# Patient Record
Sex: FEMALE | Race: BLACK OR AFRICAN AMERICAN | Employment: OTHER | URBAN - METROPOLITAN AREA
[De-identification: names, ages, dates, MRNs, and addresses within clinical notes are randomized per-mention and may not be internally consistent; named-entity substitution may affect disease eponyms.]

---

## 2022-03-14 ENCOUNTER — TELEMEDICINE (OUTPATIENT)
Dept: FAMILY MEDICINE CLINIC | Age: 77
End: 2022-03-14
Payer: MEDICARE

## 2022-03-14 DIAGNOSIS — E78.00 PURE HYPERCHOLESTEROLEMIA: ICD-10-CM

## 2022-03-14 DIAGNOSIS — M54.50 CHRONIC LOW BACK PAIN, UNSPECIFIED BACK PAIN LATERALITY, UNSPECIFIED WHETHER SCIATICA PRESENT: ICD-10-CM

## 2022-03-14 DIAGNOSIS — R19.7 DIARRHEA, UNSPECIFIED TYPE: Primary | ICD-10-CM

## 2022-03-14 DIAGNOSIS — I10 ESSENTIAL HYPERTENSION: ICD-10-CM

## 2022-03-14 DIAGNOSIS — G89.29 CHRONIC LOW BACK PAIN, UNSPECIFIED BACK PAIN LATERALITY, UNSPECIFIED WHETHER SCIATICA PRESENT: ICD-10-CM

## 2022-03-14 DIAGNOSIS — E11.42 TYPE 2 DIABETES MELLITUS WITH DIABETIC POLYNEUROPATHY, WITHOUT LONG-TERM CURRENT USE OF INSULIN (HCC): ICD-10-CM

## 2022-03-14 PROCEDURE — 99443 PR PHYS/QHP TELEPHONE EVALUATION 21-30 MIN: CPT | Performed by: FAMILY MEDICINE

## 2022-03-14 RX ORDER — GABAPENTIN 300 MG/1
600 CAPSULE ORAL NIGHTLY
Qty: 180 CAPSULE | Refills: 0 | Status: SHIPPED | OUTPATIENT
Start: 2022-03-14 | End: 2022-04-13 | Stop reason: SDUPTHER

## 2022-03-14 RX ORDER — GABAPENTIN 300 MG/1
300 CAPSULE ORAL NIGHTLY
COMMUNITY
End: 2022-03-14 | Stop reason: SDUPTHER

## 2022-03-14 RX ORDER — ATORVASTATIN CALCIUM 80 MG/1
80 TABLET, FILM COATED ORAL DAILY
COMMUNITY
End: 2022-04-13 | Stop reason: SDUPTHER

## 2022-03-14 ASSESSMENT — PATIENT HEALTH QUESTIONNAIRE - PHQ9
2. FEELING DOWN, DEPRESSED OR HOPELESS: 0
SUM OF ALL RESPONSES TO PHQ QUESTIONS 1-9: 0
1. LITTLE INTEREST OR PLEASURE IN DOING THINGS: 0
SUM OF ALL RESPONSES TO PHQ QUESTIONS 1-9: 0
SUM OF ALL RESPONSES TO PHQ QUESTIONS 1-9: 0
SUM OF ALL RESPONSES TO PHQ9 QUESTIONS 1 & 2: 0
SUM OF ALL RESPONSES TO PHQ QUESTIONS 1-9: 0

## 2022-03-14 ASSESSMENT — ENCOUNTER SYMPTOMS
DIARRHEA: 1
BLOOD IN STOOL: 0

## 2022-03-14 NOTE — PROGRESS NOTES
Subjective:      Patient ID: Karen Payan is a 64 y.o. female    Diarrhea   This is a chronic problem. The current episode started more than 1 year ago. The stool consistency is described as mucous. Pertinent negatives include no fever. Hyperlipidemia  This is a chronic problem. The current episode started more than 1 year ago. Pertinent negatives include no chest pain. Current antihyperlipidemic treatment includes statins. Diabetes  Pertinent negatives for diabetes include no chest pain and no weakness. Here to establish. Here with diarrhea x 2.5 months. Has been progressively getting worse over this time. No fever or chills. No emesis. Some cramping abdominal pain but no real abdominal pain. Hx of diabetes and neuropathy. Having more stools than usual -up to 5 times per day. Has been in [facility] for last 3 months-previously in [facility]. No recent antibiotics. Taken kaopectate without much improvement. Has been on current dose of glucophage for nearly a year    Review of Systems   Constitutional: Negative for fever. Cardiovascular: Negative for chest pain. Gastrointestinal: Positive for diarrhea. Negative for blood in stool. Neurological: Negative for weakness. Reviewed allergy, medical, social, surgical, family and med list changes and updated   Files  Karen Payan is a 64 y.o. female evaluated via telephone on 3/14/2022. Consent:  She and/or health care decision maker is aware that that she may receive a bill for this telephone service, depending on her insurance coverage, and has provided verbal consent to proceed: Yes      Documentation:  I communicated with the patient and/or health care decision maker about . Details of this discussion including any medical advice provided: This visit was a telephone encounter. Patient was located at their home. Provider was located at their ___ home or        __x_ office.        I affirm this is a Patient Initiated Episode with an Established Patient who has not had a related appointment within my department in the past 7 days or scheduled within the next 24 hours. Total Time: minutes: 21-30 minutes    Note: not billable if this call serves to triage the patient into an appointment for the relevant concern      Wilmer Puente MD    Social History     Socioeconomic History    Marital status:      Spouse name: Not on file    Number of children: Not on file    Years of education: Not on file    Highest education level: Not on file   Occupational History    Not on file   Tobacco Use    Smoking status: Former Smoker     Years: 24.00    Smokeless tobacco: Never Used   Substance and Sexual Activity    Alcohol use: Yes     Alcohol/week: 3.0 standard drinks     Types: 3 Glasses of wine per week    Drug use: No    Sexual activity: Not on file   Other Topics Concern    Not on file   Social History Narrative    Not on file     Social Determinants of Health     Financial Resource Strain:     Difficulty of Paying Living Expenses: Not on file   Food Insecurity:     Worried About Running Out of Food in the Last Year: Not on file    [place] of Food in the Last Year: Not on file   Transportation Needs:     Lack of Transportation (Medical): Not on file    Lack of Transportation (Non-Medical):  Not on file   Physical Activity:     Days of Exercise per Week: Not on file    Minutes of Exercise per Session: Not on file   Stress:     Feeling of Stress : Not on file   Social Connections:     Frequency of Communication with Friends and Family: Not on file    Frequency of Social Gatherings with Friends and Family: Not on file    Attends Yarsanism Services: Not on file    Active Member of Clubs or Organizations: Not on file    Attends Club or Organization Meetings: Not on file    Marital Status: Not on file   Intimate Partner Violence:     Fear of Current or Ex-Partner: Not on file    Emotionally Abused: Not on file    Physically Abused: Not on file    Sexually Abused: Not on file   Housing Stability:     Unable to Pay for Housing in the Last Year: Not on file    Number of Places Lived in the Last Year: Not on file    Unstable Housing in the Last Year: Not on file     Current Outpatient Medications   Medication Sig Dispense Refill    atorvastatin (LIPITOR) 80 MG tablet Take 80 mg by mouth daily      gabapentin (NEURONTIN) 300 MG capsule Take 300 mg by mouth at bedtime.  furosemide (LASIX) 20 MG tablet Take 1 tablet by mouth daily 30 tablet 3    aspirin 81 MG tablet Take 81 mg by mouth daily      isosorbide mononitrate (IMDUR) 30 MG CR tablet Take 30 mg by mouth daily      metFORMIN (GLUCOPHAGE) 500 MG tablet Take 500 mg by mouth 2 times daily (with meals)      pantoprazole (PROTONIX) 40 MG tablet Take 1 tablet by mouth daily 30 tablet 3    lisinopril (PRINIVIL;ZESTRIL) 20 MG tablet Take 20 mg by mouth daily (Patient not taking: Reported on 3/14/2022)      metoprolol (LOPRESSOR) 50 MG tablet Take 50 mg by mouth 2 times daily (Patient not taking: Reported on 3/14/2022)       No current facility-administered medications for this visit. Family History   Problem Relation Age of Onset    Cancer Mother     Arthritis Mother     Diabetes Mother     Heart Disease Mother     High Blood Pressure Mother     High Cholesterol Mother     Arthritis Father     Diabetes Father     Heart Disease Father     High Blood Pressure Father     High Cholesterol Father      Past Medical History:   Diagnosis Date    Cancer ([facility] [address]     Hyperlipidemia     Hypertension     Type II or unspecified type diabetes mellitus without mention of complication, not stated as uncontrolled    Controlled substances monitoring: no signs of potential drug abuse or diversion identified and OARRS report reviewed today- activity consistent with treatment plan. Objective: There were no vitals taken for this visit.     Physical Exam  No exam   Assessment: Diagnosis Orders   1. Diarrhea, unspecified type     2. Type 2 diabetes mellitus with diabetic polyneuropathy, without long-term current use of insulin (HCC)     3. Pure hypercholesterolemia     4. Essential hypertension     5. Chronic low back pain, unspecified back pain laterality, unspecified whether sciatica present           Plan:      Orders Placed This Encounter   Procedures    Gastrointestinal Panel by DNA     Standing Status:   Future     Standing Expiration Date:   3/14/2023    Clostridium Difficile Toxin/Antigen     Standing Status:   Future     Standing Expiration Date:   3/14/2023    Lipid Panel     Standing Status:   Future     Standing Expiration Date:   3/14/2023     Order Specific Question:   Is Patient Fasting?/# of Hours     Answer:   9    Comprehensive Metabolic Panel     Standing Status:   Future     Standing Expiration Date:   3/14/2023    CBC with Auto Differential     Standing Status:   Future     Standing Expiration Date:   9/14/2022    Hemoglobin A1C     Standing Status:   Future     Standing Expiration Date:   3/14/2023     Orders Placed This Encounter   Medications    gabapentin (NEURONTIN) 300 MG capsule     Sig: Take 2 capsules by mouth at bedtime for 90 days.      Dispense:  180 capsule     Refill:  0   fasting blood work and stool cultures this wee  Hold glucophage-imodium ad otc for now   F/u next week

## 2022-03-21 DIAGNOSIS — R19.7 DIARRHEA, UNSPECIFIED TYPE: ICD-10-CM

## 2022-03-22 LAB
ADENOVIRUS F 40 41 PCR: NOT DETECTED
ASTROVIRUS PCR: NOT DETECTED
C DIFF TOXIN/ANTIGEN: NORMAL
CAMPYLOBACTER PCR: NOT DETECTED
CRYPTOSPORIDIUM PCR: NOT DETECTED
CYCLOSPORA CAYETANENSIS PCR: NOT DETECTED
E COLI ENTEROAGGREGATIVE PCR: NOT DETECTED
E COLI ENTEROPATHOGENIC PCR: NOT DETECTED
E COLI ENTEROTOXIGENIC PCR: NOT DETECTED
E COLI SHIGELLA/ENTEROINVASIVE PCR: NOT DETECTED
ENTAMOEBA HISTOLYTICA PCR: NOT DETECTED
GIARDIA LAMBLIA PCR: NOT DETECTED
NOROVIRUS GI GII PCR: NOT DETECTED
PLESIOMONAS SHIGELLOIDES PCR: NOT DETECTED
ROTAVIRUS A PCR: NOT DETECTED
SALMONELLA PCR: NOT DETECTED
SAPOVIRUS PCR: NOT DETECTED
SHIGA-LIKE TOXIN-PRODUCING E. COLI (STEC) STX1/STX2: NOT DETECTED
VIBRIO CHOLERAE PCR: NOT DETECTED
VIBRIO PCR: NOT DETECTED
YERSINIA ENTEROCOLITICA PCR: NOT DETECTED

## 2022-03-28 ENCOUNTER — TELEMEDICINE (OUTPATIENT)
Dept: FAMILY MEDICINE CLINIC | Age: 77
End: 2022-03-28
Payer: MEDICARE

## 2022-03-28 DIAGNOSIS — R19.7 DIARRHEA, UNSPECIFIED TYPE: Primary | ICD-10-CM

## 2022-03-28 PROCEDURE — 99442 PR PHYS/QHP TELEPHONE EVALUATION 11-20 MIN: CPT | Performed by: FAMILY MEDICINE

## 2022-03-28 ASSESSMENT — ENCOUNTER SYMPTOMS: BLOOD IN STOOL: 0

## 2022-03-28 NOTE — PROGRESS NOTES
Subjective:      Patient ID: Brian Turner is a 64 y.o. female    HPI  Here in follow up for diarrhea which is 60 % better overall. Not testing sugars    Review of Systems   Constitutional: Negative for fever and unexpected weight change. Gastrointestinal: Negative for blood in stool. Reviewed allergy, medical, social, surgical, family and med list changes and updated   Files--reviewed stool studies which were neg      Social History     Socioeconomic History    Marital status:      Spouse name: Not on file    Number of children: Not on file    Years of education: Not on file    Highest education level: Not on file   Occupational History    Not on file   Tobacco Use    Smoking status: Former Smoker     Years: 24.00    Smokeless tobacco: Never Used   Substance and Sexual Activity    Alcohol use: Yes     Alcohol/week: 3.0 standard drinks     Types: 3 Glasses of wine per week    Drug use: No    Sexual activity: Not on file   Other Topics Concern    Not on file   Social History Narrative    Not on file     Social Determinants of Health     Financial Resource Strain:     Difficulty of Paying Living Expenses: Not on file   Food Insecurity:     Worried About Running Out of Food in the Last Year: Not on file    [place] of Food in the Last Year: Not on file   Transportation Needs:     Lack of Transportation (Medical): Not on file    Lack of Transportation (Non-Medical):  Not on file   Physical Activity:     Days of Exercise per Week: Not on file    Minutes of Exercise per Session: Not on file   Stress:     Feeling of Stress : Not on file   Social Connections:     Frequency of Communication with Friends and Family: Not on file    Frequency of Social Gatherings with Friends and Family: Not on file    Attends Christian Services: Not on file    Active Member of Clubs or Organizations: Not on file    Attends Club or Organization Meetings: Not on file    Marital Status: Not on file   Intimate Partner Violence:     Fear of Current or Ex-Partner: Not on file    Emotionally Abused: Not on file    Physically Abused: Not on file    Sexually Abused: Not on file   Housing Stability:     Unable to Pay for Housing in the Last Year: Not on file    Number of [place] in the Last Year: Not on file    Unstable Housing in the Last Year: Not on file     Current Outpatient Medications   Medication Sig Dispense Refill    atorvastatin (LIPITOR) 80 MG tablet Take 80 mg by mouth daily      gabapentin (NEURONTIN) 300 MG capsule Take 2 capsules by mouth at bedtime for 90 days. 180 capsule 0    furosemide (LASIX) 20 MG tablet Take 1 tablet by mouth daily 30 tablet 3    aspirin 81 MG tablet Take 81 mg by mouth daily      isosorbide mononitrate (IMDUR) 30 MG CR tablet Take 30 mg by mouth daily      lisinopril (PRINIVIL;ZESTRIL) 20 MG tablet Take 20 mg by mouth daily (Patient not taking: Reported on 3/14/2022)      metFORMIN (GLUCOPHAGE) 500 MG tablet Take 500 mg by mouth 2 times daily (with meals)      metoprolol (LOPRESSOR) 50 MG tablet Take 50 mg by mouth 2 times daily (Patient not taking: Reported on 3/14/2022)      pantoprazole (PROTONIX) 40 MG tablet Take 1 tablet by mouth daily 30 tablet 3     No current facility-administered medications for this visit. Family History   Problem Relation Age of Onset    Cancer Mother     Arthritis Mother     Diabetes Mother     Heart Disease Mother     High Blood Pressure Mother     High Cholesterol Mother     Arthritis Father     Diabetes Father     Heart Disease Father     High Blood Pressure Father     High Cholesterol Father      Past Medical History:   Diagnosis Date    Cancer ([facility] [address]     Hyperlipidemia     Hypertension     Type II or unspecified type diabetes mellitus without mention of complication, not stated as uncontrolled    Fredo Matt is a 64 y.o. female evaluated via telephone on 3/28/2022.       Consent:  She and/or health care decision maker is aware that that she may receive a bill for this telephone service, depending on her insurance coverage, and has provided verbal consent to proceed: Yes      Documentation:  I communicated with the patient and/or health care decision maker about   Details of this discussion including any medical advice provided: This visit was a telephone encounter. Patient was located at their home. Provider was located at their ___ home or        __x__ office. I affirm this is a Patient Initiated Episode with an Established Patient who has not had a related appointment within my department in the past 7 days or scheduled within the next 24 hours. Total Time: minutes: 11-20 minutes    Note: not billable if this call serves to triage the patient into an appointment for the relevant concern      Rell Hall MD   Objective: There were no vitals taken for this visit. Physical Exam  No exam done-  Assessment:       Diagnosis Orders   1.  Diarrhea, unspecified type           Plan:      Remain off glucophage  Fasting blood work soon and f/u in office after done

## 2022-03-29 DIAGNOSIS — E78.00 PURE HYPERCHOLESTEROLEMIA: ICD-10-CM

## 2022-03-29 DIAGNOSIS — I10 ESSENTIAL HYPERTENSION: ICD-10-CM

## 2022-03-29 DIAGNOSIS — E11.42 TYPE 2 DIABETES MELLITUS WITH DIABETIC POLYNEUROPATHY, WITHOUT LONG-TERM CURRENT USE OF INSULIN (HCC): ICD-10-CM

## 2022-03-29 LAB
ALBUMIN SERPL-MCNC: 4.2 G/DL (ref 3.5–4.6)
ALP BLD-CCNC: 62 U/L (ref 40–130)
ALT SERPL-CCNC: 27 U/L (ref 0–33)
ANION GAP SERPL CALCULATED.3IONS-SCNC: 12 MEQ/L (ref 9–15)
AST SERPL-CCNC: 29 U/L (ref 0–35)
BASOPHILS ABSOLUTE: 0 K/UL (ref 0–0.2)
BASOPHILS RELATIVE PERCENT: 0.4 %
BILIRUB SERPL-MCNC: 0.9 MG/DL (ref 0.2–0.7)
BUN BLDV-MCNC: 13 MG/DL (ref 8–23)
CALCIUM SERPL-MCNC: 8.7 MG/DL (ref 8.5–9.9)
CHLORIDE BLD-SCNC: 103 MEQ/L (ref 95–107)
CHOLESTEROL, TOTAL: 160 MG/DL (ref 0–199)
CO2: 24 MEQ/L (ref 20–31)
CREAT SERPL-MCNC: 1.04 MG/DL (ref 0.5–0.9)
EOSINOPHILS ABSOLUTE: 0.1 K/UL (ref 0–0.7)
EOSINOPHILS RELATIVE PERCENT: 2.2 %
GFR AFRICAN AMERICAN: >60
GFR NON-AFRICAN AMERICAN: 51.2
GLOBULIN: 2.2 G/DL (ref 2.3–3.5)
GLUCOSE BLD-MCNC: 105 MG/DL (ref 70–99)
HBA1C MFR BLD: 6.2 % (ref 4.8–5.9)
HCT VFR BLD CALC: 43.2 % (ref 37–47)
HDLC SERPL-MCNC: 39 MG/DL (ref 40–59)
HEMOGLOBIN: 14.2 G/DL (ref 12–16)
LDL CHOLESTEROL CALCULATED: 98 MG/DL (ref 0–129)
LYMPHOCYTES ABSOLUTE: 1.6 K/UL (ref 1–4.8)
LYMPHOCYTES RELATIVE PERCENT: 34.9 %
MCH RBC QN AUTO: 31.3 PG (ref 27–31.3)
MCHC RBC AUTO-ENTMCNC: 32.9 % (ref 33–37)
MCV RBC AUTO: 95.2 FL (ref 82–100)
MONOCYTES ABSOLUTE: 0.6 K/UL (ref 0.2–0.8)
MONOCYTES RELATIVE PERCENT: 12.4 %
NEUTROPHILS ABSOLUTE: 2.4 K/UL (ref 1.4–6.5)
NEUTROPHILS RELATIVE PERCENT: 50.1 %
PDW BLD-RTO: 16.7 % (ref 11.5–14.5)
PLATELET # BLD: 237 K/UL (ref 130–400)
POTASSIUM SERPL-SCNC: 3.5 MEQ/L (ref 3.4–4.9)
RBC # BLD: 4.53 M/UL (ref 4.2–5.4)
SODIUM BLD-SCNC: 139 MEQ/L (ref 135–144)
TOTAL PROTEIN: 6.4 G/DL (ref 6.3–8)
TRIGL SERPL-MCNC: 113 MG/DL (ref 0–150)
WBC # BLD: 4.7 K/UL (ref 4.8–10.8)

## 2022-03-31 ENCOUNTER — IMMUNIZATION (OUTPATIENT)
Dept: FAMILY MEDICINE CLINIC | Age: 77
End: 2022-03-31
Payer: MEDICARE

## 2022-03-31 PROCEDURE — 91306 COVID-19, MODERNA BOOSTER VACCINE 0.25ML DOSE: CPT | Performed by: FAMILY MEDICINE

## 2022-03-31 PROCEDURE — 0064A COVID-19, MODERNA BOOSTER VACCINE 0.25ML DOSE: CPT | Performed by: FAMILY MEDICINE

## 2022-04-13 ENCOUNTER — OFFICE VISIT (OUTPATIENT)
Dept: FAMILY MEDICINE CLINIC | Age: 77
End: 2022-04-13
Payer: MEDICARE

## 2022-04-13 VITALS
OXYGEN SATURATION: 95 % | HEIGHT: 61 IN | RESPIRATION RATE: 14 BRPM | WEIGHT: 182 LBS | SYSTOLIC BLOOD PRESSURE: 120 MMHG | TEMPERATURE: 97.3 F | BODY MASS INDEX: 34.36 KG/M2 | HEART RATE: 64 BPM | DIASTOLIC BLOOD PRESSURE: 82 MMHG

## 2022-04-13 DIAGNOSIS — E78.00 PURE HYPERCHOLESTEROLEMIA: ICD-10-CM

## 2022-04-13 DIAGNOSIS — E11.42 TYPE 2 DIABETES MELLITUS WITH DIABETIC POLYNEUROPATHY, WITHOUT LONG-TERM CURRENT USE OF INSULIN (HCC): Primary | ICD-10-CM

## 2022-04-13 DIAGNOSIS — I10 ESSENTIAL HYPERTENSION: ICD-10-CM

## 2022-04-13 DIAGNOSIS — R29.6 FREQUENT FALLS: ICD-10-CM

## 2022-04-13 DIAGNOSIS — R42 DIZZINESS: ICD-10-CM

## 2022-04-13 PROCEDURE — G8427 DOCREV CUR MEDS BY ELIG CLIN: HCPCS | Performed by: FAMILY MEDICINE

## 2022-04-13 PROCEDURE — G8400 PT W/DXA NO RESULTS DOC: HCPCS | Performed by: FAMILY MEDICINE

## 2022-04-13 PROCEDURE — G8417 CALC BMI ABV UP PARAM F/U: HCPCS | Performed by: FAMILY MEDICINE

## 2022-04-13 PROCEDURE — 1036F TOBACCO NON-USER: CPT | Performed by: FAMILY MEDICINE

## 2022-04-13 PROCEDURE — 1090F PRES/ABSN URINE INCON ASSESS: CPT | Performed by: FAMILY MEDICINE

## 2022-04-13 PROCEDURE — 4040F PNEUMOC VAC/ADMIN/RCVD: CPT | Performed by: FAMILY MEDICINE

## 2022-04-13 PROCEDURE — 3044F HG A1C LEVEL LT 7.0%: CPT | Performed by: FAMILY MEDICINE

## 2022-04-13 PROCEDURE — 99214 OFFICE O/P EST MOD 30 MIN: CPT | Performed by: FAMILY MEDICINE

## 2022-04-13 PROCEDURE — 1123F ACP DISCUSS/DSCN MKR DOCD: CPT | Performed by: FAMILY MEDICINE

## 2022-04-13 RX ORDER — PANTOPRAZOLE SODIUM 40 MG/1
40 TABLET, DELAYED RELEASE ORAL DAILY
Qty: 90 TABLET | Refills: 0 | Status: SHIPPED | OUTPATIENT
Start: 2022-04-13 | End: 2022-08-17

## 2022-04-13 RX ORDER — GABAPENTIN 300 MG/1
600 CAPSULE ORAL NIGHTLY
Qty: 180 CAPSULE | Refills: 0 | Status: SHIPPED | OUTPATIENT
Start: 2022-04-13 | End: 2022-07-12

## 2022-04-13 RX ORDER — METOPROLOL TARTRATE 50 MG/1
50 TABLET, FILM COATED ORAL 2 TIMES DAILY
Qty: 180 TABLET | Refills: 0 | Status: SHIPPED | OUTPATIENT
Start: 2022-04-13 | End: 2022-10-31 | Stop reason: SDUPTHER

## 2022-04-13 RX ORDER — FUROSEMIDE 20 MG/1
20 TABLET ORAL DAILY
Qty: 90 TABLET | Refills: 0 | Status: SHIPPED | OUTPATIENT
Start: 2022-04-13 | End: 2022-08-09

## 2022-04-13 RX ORDER — ATORVASTATIN CALCIUM 80 MG/1
80 TABLET, FILM COATED ORAL DAILY
Qty: 90 TABLET | Refills: 0 | Status: SHIPPED | OUTPATIENT
Start: 2022-04-13 | End: 2022-07-16

## 2022-04-13 RX ORDER — EZETIMIBE 10 MG/1
10 TABLET ORAL DAILY
Qty: 90 TABLET | Refills: 0 | Status: SHIPPED | OUTPATIENT
Start: 2022-04-13 | End: 2022-08-17

## 2022-04-13 RX ORDER — LANCETS 30 GAUGE
1 EACH MISCELLANEOUS DAILY
Qty: 50 EACH | Refills: 5 | Status: SHIPPED | OUTPATIENT
Start: 2022-04-13

## 2022-04-13 RX ORDER — GLUCOSAMINE HCL/CHONDROITIN SU 500-400 MG
CAPSULE ORAL
Qty: 50 STRIP | Refills: 2 | Status: SHIPPED | OUTPATIENT
Start: 2022-04-13

## 2022-04-13 SDOH — ECONOMIC STABILITY: FOOD INSECURITY: WITHIN THE PAST 12 MONTHS, YOU WORRIED THAT YOUR FOOD WOULD RUN OUT BEFORE YOU GOT MONEY TO BUY MORE.: NEVER TRUE

## 2022-04-13 SDOH — ECONOMIC STABILITY: FOOD INSECURITY: WITHIN THE PAST 12 MONTHS, THE FOOD YOU BOUGHT JUST DIDN'T LAST AND YOU DIDN'T HAVE MONEY TO GET MORE.: NEVER TRUE

## 2022-04-13 ASSESSMENT — ENCOUNTER SYMPTOMS
SHORTNESS OF BREATH: 0
BLOOD IN STOOL: 0

## 2022-04-13 ASSESSMENT — SOCIAL DETERMINANTS OF HEALTH (SDOH): HOW HARD IS IT FOR YOU TO PAY FOR THE VERY BASICS LIKE FOOD, HOUSING, MEDICAL CARE, AND HEATING?: NOT HARD AT ALL

## 2022-04-13 NOTE — PROGRESS NOTES
Subjective:      Patient ID: Jeffy Rodriguez is a 64 y.o. female. HPI    Review of Systems  Treatment Adherence:   Medication compliance:  {Desc; compliance:5303::\"compliant most of the time\"}  Diet compliance:  {Desc; compliance:5303::\"compliant most of the time\"}  Weight trend: {INCREASING/DECREASING/STABLE:43202}  Current exercise: {EXERCISE JKNW:648769849}  What might prevent you from meeting your goal?: {Barriers to success:57060}  Patient plan for overcoming barriers: {COMMENT/NA:245017312}     Patient Confidence: {NUMBERS 1-10:22763}/10      Diabetes Mellitus Type 2: Current symptoms/problems include {Symptoms; diabetes:91763::\"none\"}. Home blood sugar records:  {diabetes glucometry results:24671}  Any episodes of hypoglycemia? {yes***/no:36494}  Eye exam current (within one year): {yes/no/unknown:74}  Tobacco history: She  reports that she has quit smoking. She quit after 24.00 years of use. She has never used smokeless tobacco.   Daily Aspirin? {yes no:784452::\"Yes\"}  Known diabetic complications: {diabetes complications:1215}    Hypertension:  Home blood pressure monitoring: {NO/YES:2953538610::\"No\"}. She {is/is not:9024} adherent to a low sodium diet. Patient {denies/complains:01272} {Symptoms of Hypertension, Denies:29031}. Antihypertensive medication side effects: {Hypertension med side effects:5728::\"no medication side effects noted\"}. Use of agents associated with hypertension: {bp agents assoc with hypertension:511::\"none\"}. Hyperlipidemia:  No new myalgias or GI upset on {RP HYPERLIPIDEMIA MEDS:45631}.        Lab Results   Component Value Date    LABA1C 6.2 (H) 03/29/2022    LABA1C 6.2 (H) 07/31/2015     Lab Results   Component Value Date    CREATININE 1.04 (H) 03/29/2022     Lab Results   Component Value Date    ALT 27 03/29/2022    AST 29 03/29/2022     Lab Results   Component Value Date    CHOL 160 03/29/2022    TRIG 113 03/29/2022    HDL 39 (L) 03/29/2022    LDLCALC 98 03/29/2022 Objective:   Physical Exam    Assessment / Plan:

## 2022-04-13 NOTE — PROGRESS NOTES
Subjective:      Patient ID: Shine Clemons is a 64 y.o. female    Hypertension  This is a chronic problem. Pertinent negatives include no chest pain or shortness of breath. Diabetes  Pertinent negatives for diabetes include no chest pain. Hyperlipidemia  Pertinent negatives include no chest pain or shortness of breath. Here in follow up for htn and lipids and diabetes and diarrhea. Has known pad with stent in past.  Chronic dizziness x 3 months . has fallen few times over the last few months hitting head . No headaches     No headaches or nausea or emesis. Does not have meter to test sugars    Review of Systems   Constitutional: Negative for chills and unexpected weight change. Respiratory: Negative for shortness of breath. Cardiovascular: Negative for chest pain. Gastrointestinal: Negative for blood in stool. Skin: Negative for rash. Reviewed allergy, medical, social, surgical, family and med list changes and updated   Files   -reviewed blood work with elevated lipids  Social History     Socioeconomic History    Marital status:      Spouse name: None    Number of children: None    Years of education: None    Highest education level: None   Occupational History    None   Tobacco Use    Smoking status: Former Smoker     Years: 24.00    Smokeless tobacco: Never Used   Substance and Sexual Activity    Alcohol use: Yes     Alcohol/week: 3.0 standard drinks     Types: 3 Glasses of wine per week    Drug use: No    Sexual activity: None   Other Topics Concern    None   Social History Narrative    None     Social Determinants of Health     Financial Resource Strain: Low Risk     Difficulty of Paying Living Expenses: Not hard at all   Food Insecurity: No Food Insecurity    Worried About Running Out of Food in the Last Year: Never true    Antonieta of Food in the Last Year: Never true   Transportation Needs:     Lack of Transportation (Medical):  Not on file    Lack of Transportation (Non-Medical): Not on file   Physical Activity:     Days of Exercise per Week: Not on file    Minutes of Exercise per Session: Not on file   Stress:     Feeling of Stress : Not on file   Social Connections:     Frequency of Communication with Friends and Family: Not on file    Frequency of Social Gatherings with Friends and Family: Not on file    Attends Mosque Services: Not on file    Active Member of [address] [place] [address] or Organizations: Not on file    Attends Club or Organization Meetings: Not on file    Marital Status: Not on file   Intimate Partner Violence:     Fear of Current or Ex-Partner: Not on file    Emotionally Abused: Not on file    Physically Abused: Not on file    Sexually Abused: Not on file   Housing Stability:     Unable to Pay for Housing in the Last Year: Not on file    Number of [place] in the Last Year: Not on file    Unstable Housing in the Last Year: Not on file     Current Outpatient Medications   Medication Sig Dispense Refill    atorvastatin (LIPITOR) 80 MG tablet Take 80 mg by mouth daily      gabapentin (NEURONTIN) 300 MG capsule Take 2 capsules by mouth at bedtime for 90 days. 180 capsule 0    furosemide (LASIX) 20 MG tablet Take 1 tablet by mouth daily 30 tablet 3    aspirin 81 MG tablet Take 81 mg by mouth daily      isosorbide mononitrate (IMDUR) 30 MG CR tablet Take 30 mg by mouth daily      lisinopril (PRINIVIL;ZESTRIL) 20 MG tablet Take 20 mg by mouth daily       metFORMIN (GLUCOPHAGE) 500 MG tablet Take 500 mg by mouth 2 times daily (with meals)      metoprolol (LOPRESSOR) 50 MG tablet Take 50 mg by mouth 2 times daily       pantoprazole (PROTONIX) 40 MG tablet Take 1 tablet by mouth daily 30 tablet 3     No current facility-administered medications for this visit.      Family History   Problem Relation Age of Onset    Cancer Mother     Arthritis Mother     Diabetes Mother     Heart Disease Mother     High Blood Pressure Mother     High Cholesterol Mother     Arthritis Father     Diabetes Father     Heart Disease Father     High Blood Pressure Father     High Cholesterol Father      Past Medical History:   Diagnosis Date    Cancer ([facility] [address]     Hyperlipidemia     Hypertension     Type II or unspecified type diabetes mellitus without mention of complication, not stated as uncontrolled    Neck:no c  Objective:   /82   Pulse 64   Temp 97.3 °F (36.3 °C)   Resp 14   Ht 5' 1.2\" (1.554 m)   Wt 182 lb (82.6 kg)   SpO2 95%   BMI 34.16 kg/m²     Physical Exam  Neck:no carotid bruits. No masses. No adenopathy. No thyroid asymmetry. Lungs:clear and equal breath sounds. No wheezes or rales. Heart:rate reg. No murmur. No gallops   Pulses:Radials 2+ equal               D.P  1+ equal  Extremities: 1+ pitting edema of both legs   Gen: In no acute distress  Abdomen; B.S present. Soft  Non tender. No hepatosplenomegaly. No masses    Heent: T.M's normal Nares patent. EOM'S intact. Pupillary reaction directly and                Consensually to light normal.  No photophobia. Tongue mid line. No facial               Asymmetry. Neck:   No rigidity. No masses. No thyroid asymmetry. No bruits    Neuro:  C.N 2-12 intact. No focal deficits. Cerebellar function intact    Dorsalis pedis and posterior tibial pulses are symmetric. No fissures between the toes. No open sores on the feet. Tactile sensation intact    Assessment:       Diagnosis Orders   1. Type 2 diabetes mellitus with diabetic polyneuropathy, without long-term current use of insulin ([facility] [address]     2. Pure hypercholesterolemia     3. Essential hypertension     4. Dizziness           Plan:      Add zetia   Orders Placed This Encounter   Medications    atorvastatin (LIPITOR) 80 MG tablet     Sig: Take 1 tablet by mouth daily     Dispense:  90 tablet     Refill:  0    gabapentin (NEURONTIN) 300 MG capsule     Sig: Take 2 capsules by mouth at bedtime for 90 days. Dispense:  180 capsule     Refill:  0    furosemide (LASIX) 20 MG tablet     Sig: Take 1 tablet by mouth daily     Dispense:  90 tablet     Refill:  0     For ksenich    metoprolol tartrate (LOPRESSOR) 50 MG tablet     Sig: Take 1 tablet by mouth 2 times daily     Dispense:  180 tablet     Refill:  0    pantoprazole (PROTONIX) 40 MG tablet     Sig: Take 1 tablet by mouth daily     Dispense:  90 tablet     Refill:  0    ezetimibe (ZETIA) 10 MG tablet     Sig: Take 1 tablet by mouth daily     Dispense:  90 tablet     Refill:  0   fasting blood work in 8 weeks and f/u after done

## 2022-05-23 ENCOUNTER — APPOINTMENT (OUTPATIENT)
Dept: ULTRASOUND IMAGING | Age: 77
DRG: 070 | End: 2022-05-23
Payer: MEDICARE

## 2022-05-23 ENCOUNTER — APPOINTMENT (OUTPATIENT)
Dept: GENERAL RADIOLOGY | Age: 77
DRG: 070 | End: 2022-05-23
Payer: MEDICARE

## 2022-05-23 ENCOUNTER — APPOINTMENT (OUTPATIENT)
Dept: CT IMAGING | Age: 77
DRG: 070 | End: 2022-05-23
Payer: MEDICARE

## 2022-05-23 ENCOUNTER — HOSPITAL ENCOUNTER (INPATIENT)
Age: 77
LOS: 9 days | Discharge: HOME HEALTH | DRG: 070 | End: 2022-06-03
Attending: INTERNAL MEDICINE | Admitting: INTERNAL MEDICINE
Payer: MEDICARE

## 2022-05-23 DIAGNOSIS — Z74.09 IMPAIRED MOBILITY AND ACTIVITIES OF DAILY LIVING: ICD-10-CM

## 2022-05-23 DIAGNOSIS — M47.816 LUMBAR SPONDYLOSIS: ICD-10-CM

## 2022-05-23 DIAGNOSIS — Z78.9 IMPAIRED MOBILITY AND ACTIVITIES OF DAILY LIVING: ICD-10-CM

## 2022-05-23 DIAGNOSIS — R77.8 ELEVATED TROPONIN: Primary | ICD-10-CM

## 2022-05-23 DIAGNOSIS — M48.062 LUMBAR STENOSIS WITH NEUROGENIC CLAUDICATION: ICD-10-CM

## 2022-05-23 PROBLEM — R79.89 ELEVATED TROPONIN: Status: ACTIVE | Noted: 2022-05-23

## 2022-05-23 LAB
ALBUMIN SERPL-MCNC: 4.4 G/DL (ref 3.5–4.6)
ALP BLD-CCNC: 74 U/L (ref 40–130)
ALT SERPL-CCNC: 23 U/L (ref 0–33)
ANION GAP SERPL CALCULATED.3IONS-SCNC: 14 MEQ/L (ref 9–15)
APTT: 26.3 SEC (ref 24.4–36.8)
AST SERPL-CCNC: 18 U/L (ref 0–35)
BASOPHILS ABSOLUTE: 0.1 K/UL (ref 0–0.2)
BASOPHILS RELATIVE PERCENT: 0.4 %
BILIRUB SERPL-MCNC: 1.1 MG/DL (ref 0.2–0.7)
BILIRUBIN URINE: NEGATIVE
BLOOD, URINE: NEGATIVE
BUN BLDV-MCNC: 15 MG/DL (ref 8–23)
CALCIUM SERPL-MCNC: 10.6 MG/DL (ref 8.5–9.9)
CHLORIDE BLD-SCNC: 102 MEQ/L (ref 95–107)
CLARITY: CLEAR
CO2: 24 MEQ/L (ref 20–31)
COLOR: YELLOW
CREAT SERPL-MCNC: 0.96 MG/DL (ref 0.5–0.9)
EOSINOPHILS ABSOLUTE: 0 K/UL (ref 0–0.7)
EOSINOPHILS RELATIVE PERCENT: 0.1 %
GFR AFRICAN AMERICAN: >60
GFR NON-AFRICAN AMERICAN: 56.1
GLOBULIN: 2.2 G/DL (ref 2.3–3.5)
GLUCOSE BLD-MCNC: 103 MG/DL (ref 70–99)
GLUCOSE BLD-MCNC: 146 MG/DL (ref 70–99)
GLUCOSE URINE: NEGATIVE MG/DL
HCT VFR BLD CALC: 43.4 % (ref 37–47)
HEMOGLOBIN: 14.3 G/DL (ref 12–16)
INR BLD: 1
KETONES, URINE: NEGATIVE MG/DL
LEUKOCYTE ESTERASE, URINE: NEGATIVE
LYMPHOCYTES ABSOLUTE: 0.9 K/UL (ref 1–4.8)
LYMPHOCYTES RELATIVE PERCENT: 6.4 %
MCH RBC QN AUTO: 31.8 PG (ref 27–31.3)
MCHC RBC AUTO-ENTMCNC: 32.9 % (ref 33–37)
MCV RBC AUTO: 96.9 FL (ref 82–100)
MONOCYTES ABSOLUTE: 1 K/UL (ref 0.2–0.8)
MONOCYTES RELATIVE PERCENT: 7.2 %
NEUTROPHILS ABSOLUTE: 12.2 K/UL (ref 1.4–6.5)
NEUTROPHILS RELATIVE PERCENT: 85.9 %
NITRITE, URINE: NEGATIVE
PDW BLD-RTO: 15.8 % (ref 11.5–14.5)
PERFORMED ON: ABNORMAL
PH UA: 7 (ref 5–9)
PLATELET # BLD: 150 K/UL (ref 130–400)
POTASSIUM SERPL-SCNC: 4 MEQ/L (ref 3.4–4.9)
PROTEIN UA: NEGATIVE MG/DL
PROTHROMBIN TIME: 12.7 SEC (ref 12.3–14.9)
RBC # BLD: 4.48 M/UL (ref 4.2–5.4)
REASON FOR REJECTION: NORMAL
REJECTED TEST: NORMAL
SARS-COV-2, NAAT: NOT DETECTED
SODIUM BLD-SCNC: 140 MEQ/L (ref 135–144)
SPECIFIC GRAVITY UA: 1.01 (ref 1–1.03)
TOTAL CK: 139 U/L (ref 0–170)
TOTAL PROTEIN: 6.6 G/DL (ref 6.3–8)
TROPONIN: 0.02 NG/ML (ref 0–0.01)
TROPONIN: 0.03 NG/ML (ref 0–0.01)
URINE REFLEX TO CULTURE: NORMAL
UROBILINOGEN, URINE: 0.2 E.U./DL
WBC # BLD: 14.2 K/UL (ref 4.8–10.8)

## 2022-05-23 PROCEDURE — G0378 HOSPITAL OBSERVATION PER HR: HCPCS

## 2022-05-23 PROCEDURE — 87635 SARS-COV-2 COVID-19 AMP PRB: CPT

## 2022-05-23 PROCEDURE — 85025 COMPLETE CBC W/AUTO DIFF WBC: CPT

## 2022-05-23 PROCEDURE — 6360000002 HC RX W HCPCS: Performed by: PHYSICIAN ASSISTANT

## 2022-05-23 PROCEDURE — 93005 ELECTROCARDIOGRAM TRACING: CPT | Performed by: INTERNAL MEDICINE

## 2022-05-23 PROCEDURE — 85730 THROMBOPLASTIN TIME PARTIAL: CPT

## 2022-05-23 PROCEDURE — 84484 ASSAY OF TROPONIN QUANT: CPT

## 2022-05-23 PROCEDURE — 96372 THER/PROPH/DIAG INJ SC/IM: CPT

## 2022-05-23 PROCEDURE — 6370000000 HC RX 637 (ALT 250 FOR IP): Performed by: PHYSICIAN ASSISTANT

## 2022-05-23 PROCEDURE — 6370000000 HC RX 637 (ALT 250 FOR IP): Performed by: INTERNAL MEDICINE

## 2022-05-23 PROCEDURE — 80053 COMPREHEN METABOLIC PANEL: CPT

## 2022-05-23 PROCEDURE — G0378 HOSPITAL OBSERVATION PER HR: HCPCS | Performed by: INTERNAL MEDICINE

## 2022-05-23 PROCEDURE — 81003 URINALYSIS AUTO W/O SCOPE: CPT

## 2022-05-23 PROCEDURE — 36415 COLL VENOUS BLD VENIPUNCTURE: CPT

## 2022-05-23 PROCEDURE — 99220 PR INITIAL OBSERVATION CARE/DAY 70 MINUTES: CPT | Performed by: INTERNAL MEDICINE

## 2022-05-23 PROCEDURE — 71045 X-RAY EXAM CHEST 1 VIEW: CPT

## 2022-05-23 PROCEDURE — 99285 EMERGENCY DEPT VISIT HI MDM: CPT

## 2022-05-23 PROCEDURE — 70450 CT HEAD/BRAIN W/O DYE: CPT

## 2022-05-23 PROCEDURE — 82550 ASSAY OF CK (CPK): CPT

## 2022-05-23 PROCEDURE — 93880 EXTRACRANIAL BILAT STUDY: CPT

## 2022-05-23 PROCEDURE — 85610 PROTHROMBIN TIME: CPT

## 2022-05-23 RX ORDER — ACETAMINOPHEN 325 MG/1
650 TABLET ORAL EVERY 6 HOURS PRN
Status: DISCONTINUED | OUTPATIENT
Start: 2022-05-23 | End: 2022-06-03 | Stop reason: HOSPADM

## 2022-05-23 RX ORDER — ACETAMINOPHEN 650 MG/1
650 SUPPOSITORY RECTAL EVERY 6 HOURS PRN
Status: DISCONTINUED | OUTPATIENT
Start: 2022-05-23 | End: 2022-06-03 | Stop reason: HOSPADM

## 2022-05-23 RX ORDER — NITROGLYCERIN 0.4 MG/1
0.4 TABLET SUBLINGUAL EVERY 5 MIN PRN
Status: DISCONTINUED | OUTPATIENT
Start: 2022-05-23 | End: 2022-06-03 | Stop reason: HOSPADM

## 2022-05-23 RX ORDER — NITROGLYCERIN 0.4 MG/1
0.4 TABLET SUBLINGUAL EVERY 5 MIN PRN
Status: DISCONTINUED | OUTPATIENT
Start: 2022-05-23 | End: 2022-05-23 | Stop reason: SDUPTHER

## 2022-05-23 RX ORDER — HYDRALAZINE HYDROCHLORIDE 20 MG/ML
10 INJECTION INTRAMUSCULAR; INTRAVENOUS EVERY 6 HOURS PRN
Status: DISCONTINUED | OUTPATIENT
Start: 2022-05-23 | End: 2022-06-03 | Stop reason: HOSPADM

## 2022-05-23 RX ORDER — GABAPENTIN 300 MG/1
600 CAPSULE ORAL NIGHTLY
Status: DISCONTINUED | OUTPATIENT
Start: 2022-05-23 | End: 2022-06-03 | Stop reason: HOSPADM

## 2022-05-23 RX ORDER — SODIUM CHLORIDE 0.9 % (FLUSH) 0.9 %
5-40 SYRINGE (ML) INJECTION PRN
Status: DISCONTINUED | OUTPATIENT
Start: 2022-05-23 | End: 2022-06-03 | Stop reason: HOSPADM

## 2022-05-23 RX ORDER — SODIUM CHLORIDE 9 MG/ML
INJECTION, SOLUTION INTRAVENOUS PRN
Status: DISCONTINUED | OUTPATIENT
Start: 2022-05-23 | End: 2022-06-03 | Stop reason: HOSPADM

## 2022-05-23 RX ORDER — ASPIRIN 81 MG/1
81 TABLET, CHEWABLE ORAL DAILY
Status: DISCONTINUED | OUTPATIENT
Start: 2022-05-24 | End: 2022-05-23 | Stop reason: ALTCHOICE

## 2022-05-23 RX ORDER — MECLIZINE HYDROCHLORIDE 25 MG/1
25 TABLET ORAL ONCE
Status: COMPLETED | OUTPATIENT
Start: 2022-05-23 | End: 2022-05-23

## 2022-05-23 RX ORDER — ATORVASTATIN CALCIUM 80 MG/1
80 TABLET, FILM COATED ORAL NIGHTLY
Status: DISCONTINUED | OUTPATIENT
Start: 2022-05-23 | End: 2022-06-03 | Stop reason: HOSPADM

## 2022-05-23 RX ORDER — ENOXAPARIN SODIUM 100 MG/ML
1 INJECTION SUBCUTANEOUS 2 TIMES DAILY
Status: DISCONTINUED | OUTPATIENT
Start: 2022-05-23 | End: 2022-05-28

## 2022-05-23 RX ORDER — ASPIRIN 81 MG/1
81 TABLET ORAL 2 TIMES DAILY
Status: DISCONTINUED | OUTPATIENT
Start: 2022-05-23 | End: 2022-06-03 | Stop reason: HOSPADM

## 2022-05-23 RX ORDER — ONDANSETRON 2 MG/ML
4 INJECTION INTRAMUSCULAR; INTRAVENOUS EVERY 6 HOURS PRN
Status: DISCONTINUED | OUTPATIENT
Start: 2022-05-23 | End: 2022-06-03 | Stop reason: HOSPADM

## 2022-05-23 RX ORDER — SODIUM CHLORIDE 0.9 % (FLUSH) 0.9 %
5-40 SYRINGE (ML) INJECTION EVERY 12 HOURS SCHEDULED
Status: DISCONTINUED | OUTPATIENT
Start: 2022-05-23 | End: 2022-06-03 | Stop reason: HOSPADM

## 2022-05-23 RX ORDER — ONDANSETRON 4 MG/1
4 TABLET, ORALLY DISINTEGRATING ORAL EVERY 8 HOURS PRN
Status: DISCONTINUED | OUTPATIENT
Start: 2022-05-23 | End: 2022-06-03 | Stop reason: HOSPADM

## 2022-05-23 RX ORDER — INSULIN LISPRO 100 [IU]/ML
0-12 INJECTION, SOLUTION INTRAVENOUS; SUBCUTANEOUS
Status: DISCONTINUED | OUTPATIENT
Start: 2022-05-23 | End: 2022-06-03 | Stop reason: HOSPADM

## 2022-05-23 RX ORDER — ASPIRIN 81 MG/1
324 TABLET, CHEWABLE ORAL ONCE
Status: COMPLETED | OUTPATIENT
Start: 2022-05-23 | End: 2022-05-23

## 2022-05-23 RX ORDER — INSULIN LISPRO 100 [IU]/ML
0-6 INJECTION, SOLUTION INTRAVENOUS; SUBCUTANEOUS NIGHTLY
Status: DISCONTINUED | OUTPATIENT
Start: 2022-05-23 | End: 2022-06-03 | Stop reason: HOSPADM

## 2022-05-23 RX ORDER — POLYETHYLENE GLYCOL 3350 17 G/17G
17 POWDER, FOR SOLUTION ORAL DAILY PRN
Status: DISCONTINUED | OUTPATIENT
Start: 2022-05-23 | End: 2022-06-03 | Stop reason: HOSPADM

## 2022-05-23 RX ADMIN — INSULIN LISPRO 1 UNITS: 100 INJECTION, SOLUTION INTRAVENOUS; SUBCUTANEOUS at 22:10

## 2022-05-23 RX ADMIN — MECLIZINE HYDROCHLORIDE 25 MG: 25 TABLET ORAL at 15:12

## 2022-05-23 RX ADMIN — ENOXAPARIN SODIUM 80 MG: 100 INJECTION SUBCUTANEOUS at 21:54

## 2022-05-23 RX ADMIN — GABAPENTIN 600 MG: 300 CAPSULE ORAL at 21:54

## 2022-05-23 RX ADMIN — ASPIRIN 81 MG 324 MG: 81 TABLET ORAL at 15:12

## 2022-05-23 RX ADMIN — ATORVASTATIN CALCIUM 80 MG: 80 TABLET, FILM COATED ORAL at 21:54

## 2022-05-23 ASSESSMENT — ENCOUNTER SYMPTOMS
ABDOMINAL DISTENTION: 0
WHEEZING: 0
BLOOD IN STOOL: 0
ABDOMINAL PAIN: 0
COLOR CHANGE: 0
SHORTNESS OF BREATH: 0
CONSTIPATION: 0
RHINORRHEA: 0
GASTROINTESTINAL NEGATIVE: 1
STRIDOR: 0
EYES NEGATIVE: 1
RESPIRATORY NEGATIVE: 1
SORE THROAT: 0
COUGH: 0
NAUSEA: 0
CHEST TIGHTNESS: 0
EYE DISCHARGE: 0

## 2022-05-23 ASSESSMENT — PAIN - FUNCTIONAL ASSESSMENT
PAIN_FUNCTIONAL_ASSESSMENT: NONE - DENIES PAIN
PAIN_FUNCTIONAL_ASSESSMENT: NONE - DENIES PAIN

## 2022-05-23 NOTE — ED NOTES
Report called to [facility] [place], [place] RN     Elbert Corcoran RN  05/23/22 [id number]

## 2022-05-23 NOTE — ED PROVIDER NOTES
[address] [facility] ED  eMERGENCY dEPARTMENT eNCOUnter      Pt Name: Miguelito Calvert  MRN: [id number]  [place] 1945  Date of evaluation: 5/23/2022  Provider: Aleksandar Landa PA-C    CHIEF COMPLAINT       Chief Complaint   Patient presents with    Dizziness         HISTORY OF PRESENT ILLNESS   (Location/Symptom, Timing/Onset,Context/Setting, Quality, Duration, Modifying Factors, Severity)  Note limiting factors. Miguelito Calvert is a 64 y.o. female who presents to the emergency department complaint of dizziness, and balance issues, which patient states been ongoing for last 2 weeks, she states she has not followed up with her regular family provider, she has no headaches, no blurred vision, no speech issues, no appreciable weakness in arms or legs. Patient states she did just recently have an MRI of her brain at the [facility] clinic yesterday, she does not know the results of this, she states this was due to an unrelated fall that she has had in the past causing some weakness for her. She denies any pain at this time, 0-10 she been eating and drinking is normal, no vomiting, no diarrhea. Has medical history significant for hyperlipidemia, type 2 diabetes, hypertension    HPI    NursingNotes were reviewed. REVIEW OF SYSTEMS    (2-9 systems for level 4, 10 or more for level 5)     Review of Systems   Constitutional: Negative for activity change, appetite change and fatigue. HENT: Negative for congestion, ear discharge, ear pain, nosebleeds, rhinorrhea and sore throat. Eyes: Negative for discharge. Respiratory: Negative for shortness of breath. Cardiovascular: Negative for chest pain, palpitations and leg swelling. Gastrointestinal: Negative for abdominal distention, abdominal pain and constipation. Genitourinary: Negative for difficulty urinating and dysuria. Musculoskeletal: Negative for arthralgias. Skin: Negative for color change. Neurological: Positive for dizziness and weakness. Negative for syncope, numbness and headaches. Psychiatric/Behavioral: Negative for agitation and confusion. Except as noted above the remainder of the review of systems was reviewed and negative. PAST MEDICAL HISTORY     Past Medical History:   Diagnosis Date    Cancer ([facility] [address]     Hyperlipidemia     Hypertension     Type II or unspecified type diabetes mellitus without mention of complication, not stated as uncontrolled          SURGICALHISTORY       Past Surgical History:   Procedure Laterality Date    CORONARY ANGIOPLASTY WITH STENT PLACEMENT      UPPER GASTROINTESTINAL ENDOSCOPY  8/12/15    w/bx,dilation          CURRENT MEDICATIONS       Previous Medications    ASPIRIN 81 MG TABLET    Take 81 mg by mouth daily    ATORVASTATIN (LIPITOR) 80 MG TABLET    Take 1 tablet by mouth daily    BLOOD GLUCOSE MONITOR KIT AND SUPPLIES    Test 1 time daily    BLOOD GLUCOSE MONITOR STRIPS    Test 1 time daily    EZETIMIBE (ZETIA) 10 MG TABLET    Take 1 tablet by mouth daily    FUROSEMIDE (LASIX) 20 MG TABLET    Take 1 tablet by mouth daily    GABAPENTIN (NEURONTIN) 300 MG CAPSULE    Take 2 capsules by mouth at bedtime for 90 days.     ISOSORBIDE MONONITRATE (IMDUR) 30 MG CR TABLET    Take 30 mg by mouth daily    LANCETS MISC    1 each by Does not apply route daily    LISINOPRIL (PRINIVIL;ZESTRIL) 20 MG TABLET    Take 20 mg by mouth daily     METOPROLOL TARTRATE (LOPRESSOR) 50 MG TABLET    Take 1 tablet by mouth 2 times daily    PANTOPRAZOLE (PROTONIX) 40 MG TABLET    Take 1 tablet by mouth daily       ALLERGIES     Eggs or egg-derived products, Glucophage [metformin], Valium [diazepam], Zithromax [azithromycin], and Milk-related compounds    FAMILY HISTORY       Family History   Problem Relation Age of Onset    Cancer Mother     Arthritis Mother     Diabetes Mother     Heart Disease Mother     High Blood Pressure Mother     High Cholesterol Mother     Arthritis Father     Diabetes Father  Heart Disease Father     High Blood Pressure Father     High Cholesterol Father           SOCIAL HISTORY       Social History     Socioeconomic History    Marital status:      Spouse name: None    Number of children: None    Years of education: None    Highest education level: None   Occupational History    None   Tobacco Use    Smoking status: Former Smoker     Years: 24.00    Smokeless tobacco: Never Used   Substance and Sexual Activity    Alcohol use: Yes     Alcohol/week: 3.0 standard drinks     Types: 3 Glasses of wine per week    Drug use: No    Sexual activity: None   Other Topics Concern    None   Social History Narrative    None     Social Determinants of Health     Financial Resource Strain: Low Risk     Difficulty of Paying Living Expenses: Not hard at all   Food Insecurity: No Food Insecurity    Worried About Running Out of Food in the Last Year: Never true    Antonieta of Food in the Last Year: Never true   Transportation Needs:     Lack of Transportation (Medical): Not on file    Lack of Transportation (Non-Medical):  Not on file   Physical Activity:     Days of Exercise per Week: Not on file    Minutes of Exercise per Session: Not on file   Stress:     Feeling of Stress : Not on file   Social Connections:     Frequency of Communication with Friends and Family: Not on file    Frequency of Social Gatherings with Friends and Family: Not on file    Attends Faith Services: Not on file    Active Member of Clubs or Organizations: Not on file    Attends Club or Organization Meetings: Not on file    Marital Status: Not on file   Intimate Partner Violence:     Fear of Current or Ex-Partner: Not on file    Emotionally Abused: Not on file    Physically Abused: Not on file    Sexually Abused: Not on file   Housing Stability:     Unable to Pay for Housing in the Last Year: Not on file    Number of [place] in the Last Year: Not on file    Unstable Housing in the Last Year: Not on file       SCREENINGS   NIH Stroke Scale  Interval: Baseline  Level of Consciousness (1a): Alert  LOC Questions (1b): Answers both correctly  LOC Commands (1c): Performs both tasks correctly  Best Gaze (2): Normal  Visual (3): No visual loss  Facial Palsy (4): Normal symmetrical movement  Motor Arm, Left (5a): No drift  Motor Arm, Right (5b): No drift  Motor Leg, Left (6a): No drift  Motor Leg, Right (6b): No drift  Limb Ataxia (7): Absent  Sensory (8): Normal  Best Language (9): No aphasia  Dysarthria (10): Normal  Extinction and Inattention (11): No abnormality  Total: 0Glasgow Coma Scale  Eye Opening: Spontaneous  Best Verbal Response: Oriented  Best Motor Response: Obeys commands  [place] Coma Scale Score: 15 @FLOW([id number])@      PHYSICAL EXAM    (up to 7 for level 4, 8 or more for level 5)     ED Triage Vitals [05/23/22 1305]   BP Temp Temp Source Pulse Resp SpO2 Height Weight   (!) 139/97 99.2 °F (37.3 °C) Oral 81 18 100 % 5' (1.524 m) 183 lb (83 kg)       Physical Exam  Vitals and nursing note reviewed. Constitutional:       General: She is not in acute distress. Appearance: She is well-developed. She is not ill-appearing, toxic-appearing or diaphoretic. HENT:      Head: Normocephalic. Nose: No congestion. Mouth/Throat:      Mouth: Mucous membranes are moist.      Pharynx: No oropharyngeal exudate or posterior oropharyngeal erythema. Eyes:      Extraocular Movements: Extraocular movements intact. Conjunctiva/sclera: Conjunctivae normal.      Pupils: Pupils are equal, round, and reactive to light. Neck:      Vascular: No JVD. Trachea: No tracheal deviation. Cardiovascular:      Rate and Rhythm: Normal rate. Pulses: Normal pulses. Heart sounds: Normal heart sounds. No murmur heard. No friction rub. No gallop. Pulmonary:      Effort: Pulmonary effort is normal. No tachypnea, accessory muscle usage, respiratory distress or retractions.       Breath sounds: No stridor. No wheezing, rhonchi or rales. Chest:      Chest wall: No tenderness. Abdominal:      General: Abdomen is flat. Bowel sounds are normal. There is no distension or abdominal bruit. Palpations: There is no shifting dullness, fluid wave, hepatomegaly, splenomegaly, mass or pulsatile mass. Tenderness: There is no abdominal tenderness. There is no right CVA tenderness, left CVA tenderness, guarding or rebound. Negative signs include Mcneill's sign, Rovsing's sign and McBurney's sign. Musculoskeletal:         General: No deformity. Normal range of motion. Cervical back: Normal range of motion and neck supple. No rigidity. Comments: Patient is moving all extremities well without any significant distress, she has weakness noted to her lower extremities, which she states is chronically present for her. She is able to raise her legs up off the bed, but cannot hold them for prolonged period time due to pain in her hips which she states is been present for many months. Skin:     General: Skin is warm and dry. Capillary Refill: Capillary refill takes less than 2 seconds. Coloration: Skin is not jaundiced. Neurological:      General: No focal deficit present. Mental Status: She is alert and oriented to person, place, and time. Mental status is at baseline. Cranial Nerves: No cranial nerve deficit. Sensory: No sensory deficit. Motor: No weakness. Coordination: Coordination normal.      Comments: Patient is alert and oriented, she is neurologically intact, no facial droop, no slurring of speech, no drift upper or lower extremities.    Psychiatric:         Mood and Affect: Mood normal.         DIAGNOSTIC RESULTS     EKG: All EKG's are interpreted by the Emergency Department Physician who either signs or Co-signsthis chart in the absence of a cardiologist.    EKG shows normal sinus rhythm at 77 bpm there is no acute ST segment abnormality no ventricular ectopy  ms    RADIOLOGY:   Non-plain filmimages such as CT, Ultrasound and MRI are read by the radiologist. Plain radiographic images are visualized and preliminarily interpreted by the emergency physician with the below findings:        Interpretation per the Radiologist below, if available at the time ofthis note:    CT Head WO Contrast   Final Result   No acute intracranial hemorrhage or mass effect. Chronic microvascular disease and chronic atrophic brain changes. XR CHEST PORTABLE   Final Result   COPD and chronic interstitial changes. ED BEDSIDE ULTRASOUND:   Performed by ED Physician - none    LABS:  Labs Reviewed   CBC WITH AUTO DIFFERENTIAL - Abnormal; Notable for the following components:       Result Value    WBC 14.2 (*)     MCH 31.8 (*)     MCHC 32.9 (*)     RDW 15.8 (*)     Neutrophils Absolute 12.2 (*)     Lymphocytes Absolute 0.9 (*)     Monocytes Absolute 1.0 (*)     All other components within normal limits   TROPONIN - Abnormal; Notable for the following components:    Troponin 0.024 (*)     All other components within normal limits    Narrative:     CALL  [facility] tel. [phone],  Trop results called to and read back by Mery Brumfield, 05/23/2022 14:31, by  Freedom Jennings   COMPREHENSIVE METABOLIC PANEL - Abnormal; Notable for the following components:    Glucose 103 (*)     CREATININE 0.96 (*)     GFR Non- 56.1 (*)     Calcium 10.6 (*)     Total Bilirubin 1.1 (*)     Globulin 2.2 (*)     All other components within normal limits    Narrative:     redraw   COVID-19, RAPID   URINALYSIS WITH REFLEX TO CULTURE   PROTIME-INR   APTT   SPECIMEN REJECTION   CK    Narrative:     redraw       All other labs were within normal range or not returned as of this dictation.     EMERGENCY DEPARTMENT COURSE and DIFFERENTIAL DIAGNOSIS/MDM:   Vitals:    Vitals:    05/23/22 1305 05/23/22 1445 05/23/22 1539 05/23/22 1624   BP: (!) 139/97 (!) 153/68  (!) 149/78 Pulse: 81 73 78 79   Resp: 18 23 18 18   Temp: 99.2 °F (37.3 °C)      TempSrc: Oral      SpO2: 100% 95% 100% 100%   Weight: 183 lb (83 kg)      Height: 5' (1.524 m)           MDM  Number of Diagnoses or Management Options  Elevated troponin  Diagnosis management comments: MRI of the brain which was completed at the [facility] 5/20/2022 shows no acute intracranial process    Patient presented ED with complaint of dizziness which she states been intermittently occurring for last 1 week. She has no dizziness at the time my evaluation, cardiac evaluation was completed, does show an elevation troponin 0.028, EKG shows no acute abnormality. Patient states she does not have any chest pain. CT scan of the brain shows no acute intracranial process. Chest x-ray findings chronic changes consistent with that of COPD. Patient was given aspirin and nitroglycerin, and she was started on Lovenox. I did speak with [facility] cardiology Dr. Cristy Hernandez, he will accept admissions patient for further evaluation management for elevated troponin. Amount and/or Complexity of Data Reviewed  Decide to obtain previous medical records or to obtain history from someone other than the patient: yes        CRITICAL CARE TIME   Total Critical Care time was 0 minutes, excluding separately reportableprocedures. There was a high probability of clinicallysignificant/life threatening deterioration in the patient's condition which required my urgent intervention. CONSULTS:  None    PROCEDURES:  Unless otherwise noted below, none     Procedures    FINAL IMPRESSION      1. Elevated troponin          DISPOSITION/PLAN   DISPOSITION Decision To Admit 05/23/2022 04:54:12 PM      PATIENT REFERRED TO:  No follow-up provider specified.     DISCHARGE MEDICATIONS:  New Prescriptions    No medications on file          (Please note that portions of this note were completed with a voice recognition program.  Efforts were made to edit the dictations but occasionally words are mis-transcribed.)    Sridhar Leiva PA-C (electronically signed)  Attending Emergency Physician         Sridhar Leiva PA-C  05/23/22 [phone]

## 2022-05-23 NOTE — H&P
History and Physical  Patient: Leigh Ann Mendoza  Unit/Bed: 05/05  YOB: 1945  MRN: [id number]  Acct: [de-identified]   Admitting Diagnosis: No admission diagnoses are documented for this encounter. Admit Date:  5/23/2022  Hospital Day: 0      Chief Complaint: Dizzy       History of Present Illness: pt has been dizzy on and off for > 1 year. She has had 3 falls at home since October. She did not have her walker with her on times when she fell. States she gets dizzy often. She had MR Brain [facility] yesterday and reported unremakable. No prior MI nor CVA. She denies CP nor SOB.  in ER with her. EKG: SR  PMHx:  Past Medical History:   Diagnosis Date    Cancer ([facility] [address]     Hyperlipidemia     Hypertension     Type II or unspecified type diabetes mellitus without mention of complication, not stated as uncontrolled        PSHx:  Past Surgical History:   Procedure Laterality Date    CORONARY ANGIOPLASTY WITH STENT PLACEMENT      UPPER GASTROINTESTINAL ENDOSCOPY  8/12/15    w/bx,dilation        Social Hx:  Social History     Socioeconomic History    Marital status:      Spouse name: None    Number of children: None    Years of education: None    Highest education level: None   Occupational History    None   Tobacco Use    Smoking status: Former Smoker     Years: 24.00    Smokeless tobacco: Never Used   Substance and Sexual Activity    Alcohol use:  Yes     Alcohol/week: 3.0 standard drinks     Types: 3 Glasses of wine per week    Drug use: No    Sexual activity: None   Other Topics Concern    None   Social History Narrative    None     Social Determinants of Health     Financial Resource Strain: Low Risk     Difficulty of Paying Living Expenses: Not hard at all   Food Insecurity: No Food Insecurity    Worried About Running Out of Food in the Last Year: Never true    Antonieta of Food in the Last Year: Never true   Transportation Needs:     Lack of Transportation (Medical): Not on file    Lack of Transportation (Non-Medical):  Not on file   Physical Activity:     Days of Exercise per Week: Not on file    Minutes of Exercise per Session: Not on file   Stress:     Feeling of Stress : Not on file   Social Connections:     Frequency of Communication with Friends and Family: Not on file    Frequency of Social Gatherings with Friends and Family: Not on file    Attends Judaism Services: Not on file    Active Member of Clubs or Organizations: Not on file    Attends Club or Organization Meetings: Not on file    Marital Status: Not on file   Intimate Partner Violence:     Fear of Current or Ex-Partner: Not on file    Emotionally Abused: Not on file    Physically Abused: Not on file    Sexually Abused: Not on file   Housing Stability:     Unable to Pay for Housing in the Last Year: Not on file    Number of Places Lived in the Last Year: Not on file    Unstable Housing in the Last Year: Not on file       Family Hx:  Family History   Problem Relation Age of Onset    Cancer Mother     Arthritis Mother     Diabetes Mother     Heart Disease Mother     High Blood Pressure Mother     High Cholesterol Mother     Arthritis Father     Diabetes Father     Heart Disease Father     High Blood Pressure Father     High Cholesterol Father        Allergies   Allergen Reactions    Eggs Or Egg-Derived Products Nausea Only    Glucophage [Metformin]      diarrhea    Valium [Diazepam]     Zithromax [Azithromycin]     Milk-Related Compounds Diarrhea and Nausea Only       Current Facility-Administered Medications   Medication Dose Route Frequency Provider Last Rate Last Admin    nitroGLYCERIN (NITROSTAT) SL tablet 0.4 mg  0.4 mg SubLINGual Q5 Min PRN Sridhar Leiva PA-C         Current Outpatient Medications   Medication Sig Dispense Refill    atorvastatin (LIPITOR) 80 MG tablet Take 1 tablet by mouth daily 90 tablet 0    gabapentin (NEURONTIN) 300 MG capsule Take 2 capsules by mouth at bedtime for 90 days. 180 capsule 0    furosemide (LASIX) 20 MG tablet Take 1 tablet by mouth daily 90 tablet 0    metoprolol tartrate (LOPRESSOR) 50 MG tablet Take 1 tablet by mouth 2 times daily 180 tablet 0    pantoprazole (PROTONIX) 40 MG tablet Take 1 tablet by mouth daily 90 tablet 0    ezetimibe (ZETIA) 10 MG tablet Take 1 tablet by mouth daily 90 tablet 0    blood glucose monitor kit and supplies Test 1 time daily 1 kit 0    blood glucose monitor strips Test 1 time daily 50 strip 2    Lancets MISC 1 each by Does not apply route daily 50 each 5    aspirin 81 MG tablet Take 81 mg by mouth daily      isosorbide mononitrate (IMDUR) 30 MG CR tablet Take 30 mg by mouth daily      lisinopril (PRINIVIL;ZESTRIL) 20 MG tablet Take 20 mg by mouth daily          Review of Systems:   Review of Systems   Constitutional: Negative. Negative for diaphoresis and fatigue. HENT: Negative. Eyes: Negative. Respiratory: Negative. Negative for cough, chest tightness, shortness of breath, wheezing and stridor. Cardiovascular: Negative. Negative for chest pain, palpitations and leg swelling. Gastrointestinal: Negative. Negative for blood in stool and nausea. Genitourinary: Negative. Musculoskeletal: Negative. Skin: Negative. Neurological: Positive for weakness and light-headedness. Negative for syncope. Hematological: Negative. Psychiatric/Behavioral: Negative. Physical Examination:    BP (!) 130/112   Pulse 83   Temp 99.2 °F (37.3 °C) (Oral)   Resp 18   Ht 5' (1.524 m)   Wt 183 lb (83 kg)   SpO2 100%   BMI 35.74 kg/m²    Physical Exam   Constitutional: She appears healthy. No distress. HENT:   Normal cephalic and Atraumatic   Eyes: Pupils are equal, round, and reactive to light. Neck: Thyroid normal. No JVD present. No neck adenopathy. No thyromegaly present.    Cardiovascular: Normal rate, regular rhythm, normal heart sounds, intact distal pulses and normal pulses. Pulmonary/Chest: Effort normal and breath sounds normal. She has no wheezes. She has no rales. She exhibits no tenderness. Abdominal: Soft. Bowel sounds are normal. There is no abdominal tenderness. Musculoskeletal:         General: No tenderness or edema. Normal range of motion. Cervical back: Normal range of motion and neck supple. Neurological: She is alert and oriented to person, place, and time. Skin: Skin is warm. No cyanosis. Nails show no clubbing.          LABS:  CBC:   Lab Results   Component Value Date    WBC 14.2 05/23/2022    RBC 4.48 05/23/2022    HGB 14.3 05/23/2022    HCT 43.4 05/23/2022    MCV 96.9 05/23/2022    MCH 31.8 05/23/2022    MCHC 32.9 05/23/2022    RDW 15.8 05/23/2022     05/23/2022    MPV 8.2 10/12/2015     CBC with Differential:    Lab Results   Component Value Date    WBC 14.2 05/23/2022    RBC 4.48 05/23/2022    HGB 14.3 05/23/2022    HCT 43.4 05/23/2022     05/23/2022    MCV 96.9 05/23/2022    MCH 31.8 05/23/2022    MCHC 32.9 05/23/2022    RDW 15.8 05/23/2022    LYMPHOPCT 6.4 05/23/2022    MONOPCT 7.2 05/23/2022    BASOPCT 0.4 05/23/2022    MONOSABS 1.0 05/23/2022    LYMPHSABS 0.9 05/23/2022    EOSABS 0.0 05/23/2022    BASOSABS 0.1 05/23/2022     CMP:    Lab Results   Component Value Date     05/23/2022    K 4.0 05/23/2022     05/23/2022    CO2 24 05/23/2022    BUN 15 05/23/2022    CREATININE 0.96 05/23/2022    GFRAA >60.0 05/23/2022    LABGLOM 56.1 05/23/2022    GLUCOSE 103 05/23/2022    PROT 6.6 05/23/2022    LABALBU 4.4 05/23/2022    CALCIUM 10.6 05/23/2022    BILITOT 1.1 05/23/2022    ALKPHOS 74 05/23/2022    AST 18 05/23/2022    ALT 23 05/23/2022     BMP:    Lab Results   Component Value Date     05/23/2022    K 4.0 05/23/2022     05/23/2022    CO2 24 05/23/2022    BUN 15 05/23/2022    LABALBU 4.4 05/23/2022    CREATININE 0.96 05/23/2022    CALCIUM 10.6 05/23/2022    GFRAA >60.0 05/23/2022    LABGLOM 56.1 05/23/2022 GLUCOSE 103 05/23/2022     Magnesium:    Lab Results   Component Value Date    MG 1.8 10/12/2015     Troponin:    Lab Results   Component Value Date    TROPONINI 0.024 05/23/2022       There are no active hospital problems to display for this patient. Assessment/Plan:  1. Dizziness _ Telemetry. CUS  2. Elevated Trop- possible Demand ischemia - follow peak. 3. [facility] Echo  4. HTN stable - continue meds. Low salt diet.    5. HPL - Statin      Electronically signed by Izabel Saini MD on 5/23/2022 at 5:52 PM

## 2022-05-23 NOTE — ED NOTES
Food and drink provided to patient and visitor. Deny other needs at this time.      Yi Fleming RN  05/23/22 5926

## 2022-05-23 NOTE — ED NOTES
Patient denies any chest pain. Nitro held at this time. Patient encouraged to notify RN if chest pain returns. Patient and family member updated on pending results. Deny any further needs at this time.      Rudi Cramer RN  05/23/22 5622

## 2022-05-23 NOTE — ED NOTES
Patient to restroom via wheelchair with RN. Patient returned to room and placed back on monitor. Denies other needs at this time.      Mallory Grande RN  05/23/22 [id number]

## 2022-05-23 NOTE — ED TRIAGE NOTES
Pt states that she has been having off and on headaches and dizziness for \"weeks\" pt states that she feels dizzy right now.

## 2022-05-24 ENCOUNTER — APPOINTMENT (OUTPATIENT)
Dept: CT IMAGING | Age: 77
DRG: 070 | End: 2022-05-24
Payer: MEDICARE

## 2022-05-24 ENCOUNTER — APPOINTMENT (OUTPATIENT)
Dept: GENERAL RADIOLOGY | Age: 77
DRG: 070 | End: 2022-05-24
Payer: MEDICARE

## 2022-05-24 LAB
ANION GAP SERPL CALCULATED.3IONS-SCNC: 12 MEQ/L (ref 9–15)
BASOPHILS ABSOLUTE: 0.1 K/UL (ref 0–0.2)
BASOPHILS RELATIVE PERCENT: 0.5 %
BUN BLDV-MCNC: 11 MG/DL (ref 8–23)
CALCIUM SERPL-MCNC: 10.2 MG/DL (ref 8.5–9.9)
CHLORIDE BLD-SCNC: 103 MEQ/L (ref 95–107)
CHOLESTEROL, TOTAL: 101 MG/DL (ref 0–199)
CO2: 24 MEQ/L (ref 20–31)
CREAT SERPL-MCNC: 1.06 MG/DL (ref 0.5–0.9)
EKG ATRIAL RATE: 77 BPM
EKG ATRIAL RATE: 78 BPM
EKG P AXIS: 47 DEGREES
EKG P AXIS: 50 DEGREES
EKG P-R INTERVAL: 180 MS
EKG P-R INTERVAL: 180 MS
EKG Q-T INTERVAL: 330 MS
EKG Q-T INTERVAL: 362 MS
EKG QRS DURATION: 76 MS
EKG QRS DURATION: 82 MS
EKG QTC CALCULATION (BAZETT): 373 MS
EKG QTC CALCULATION (BAZETT): 412 MS
EKG R AXIS: -1 DEGREES
EKG R AXIS: -8 DEGREES
EKG T AXIS: 33 DEGREES
EKG T AXIS: 50 DEGREES
EKG VENTRICULAR RATE: 77 BPM
EKG VENTRICULAR RATE: 78 BPM
EOSINOPHILS ABSOLUTE: 0 K/UL (ref 0–0.7)
EOSINOPHILS RELATIVE PERCENT: 0.2 %
GFR AFRICAN AMERICAN: >60
GFR NON-AFRICAN AMERICAN: 50
GLUCOSE BLD-MCNC: 104 MG/DL (ref 70–99)
GLUCOSE BLD-MCNC: 117 MG/DL (ref 70–99)
GLUCOSE BLD-MCNC: 120 MG/DL (ref 70–99)
GLUCOSE BLD-MCNC: 120 MG/DL (ref 70–99)
GLUCOSE BLD-MCNC: 89 MG/DL (ref 70–99)
GLUCOSE BLD-MCNC: 99 MG/DL (ref 70–99)
HCT VFR BLD CALC: 39 % (ref 37–47)
HCT VFR BLD CALC: 40.6 % (ref 37–47)
HDLC SERPL-MCNC: 59 MG/DL (ref 40–59)
HEMOGLOBIN: 12.7 G/DL (ref 12–16)
HEMOGLOBIN: 13.1 G/DL (ref 12–16)
LACTIC ACID: 1.4 MMOL/L (ref 0.5–2.2)
LDL CHOLESTEROL CALCULATED: 31 MG/DL (ref 0–129)
LV EF: 65 %
LVEF MODALITY: NORMAL
LYMPHOCYTES ABSOLUTE: 1.1 K/UL (ref 1–4.8)
LYMPHOCYTES RELATIVE PERCENT: 7.5 %
MAGNESIUM: 1.6 MG/DL (ref 1.7–2.4)
MCH RBC QN AUTO: 31.5 PG (ref 27–31.3)
MCH RBC QN AUTO: 31.8 PG (ref 27–31.3)
MCHC RBC AUTO-ENTMCNC: 32.3 % (ref 33–37)
MCHC RBC AUTO-ENTMCNC: 32.6 % (ref 33–37)
MCV RBC AUTO: 97.4 FL (ref 82–100)
MCV RBC AUTO: 97.5 FL (ref 82–100)
MONOCYTES ABSOLUTE: 1.1 K/UL (ref 0.2–0.8)
MONOCYTES RELATIVE PERCENT: 8.1 %
NEUTROPHILS ABSOLUTE: 11.9 K/UL (ref 1.4–6.5)
NEUTROPHILS RELATIVE PERCENT: 83.7 %
PDW BLD-RTO: 15.4 % (ref 11.5–14.5)
PDW BLD-RTO: 15.6 % (ref 11.5–14.5)
PERFORMED ON: ABNORMAL
PERFORMED ON: NORMAL
PERFORMED ON: NORMAL
PLATELET # BLD: 194 K/UL (ref 130–400)
PLATELET # BLD: 203 K/UL (ref 130–400)
POTASSIUM SERPL-SCNC: 4 MEQ/L (ref 3.4–4.9)
PRO-BNP: 539 PG/ML
PROCALCITONIN: 0.11 NG/ML (ref 0–0.15)
RBC # BLD: 4 M/UL (ref 4.2–5.4)
RBC # BLD: 4.16 M/UL (ref 4.2–5.4)
SODIUM BLD-SCNC: 139 MEQ/L (ref 135–144)
TRIGL SERPL-MCNC: 53 MG/DL (ref 0–150)
TROPONIN: 0.03 NG/ML (ref 0–0.01)
TROPONIN: 0.04 NG/ML (ref 0–0.01)
WBC # BLD: 13.8 K/UL (ref 4.8–10.8)
WBC # BLD: 14.2 K/UL (ref 4.8–10.8)

## 2022-05-24 PROCEDURE — 93306 TTE W/DOPPLER COMPLETE: CPT

## 2022-05-24 PROCEDURE — 84145 PROCALCITONIN (PCT): CPT

## 2022-05-24 PROCEDURE — 99223 1ST HOSP IP/OBS HIGH 75: CPT | Performed by: INTERNAL MEDICINE

## 2022-05-24 PROCEDURE — 96372 THER/PROPH/DIAG INJ SC/IM: CPT

## 2022-05-24 PROCEDURE — 80048 BASIC METABOLIC PNL TOTAL CA: CPT

## 2022-05-24 PROCEDURE — 6370000000 HC RX 637 (ALT 250 FOR IP): Performed by: PHYSICIAN ASSISTANT

## 2022-05-24 PROCEDURE — G0378 HOSPITAL OBSERVATION PER HR: HCPCS

## 2022-05-24 PROCEDURE — 2580000003 HC RX 258: Performed by: NURSE PRACTITIONER

## 2022-05-24 PROCEDURE — 70498 CT ANGIOGRAPHY NECK: CPT

## 2022-05-24 PROCEDURE — 96367 TX/PROPH/DG ADDL SEQ IV INF: CPT

## 2022-05-24 PROCEDURE — 97162 PT EVAL MOD COMPLEX 30 MIN: CPT

## 2022-05-24 PROCEDURE — 85027 COMPLETE CBC AUTOMATED: CPT

## 2022-05-24 PROCEDURE — 99221 1ST HOSP IP/OBS SF/LOW 40: CPT | Performed by: PSYCHIATRY & NEUROLOGY

## 2022-05-24 PROCEDURE — 6370000000 HC RX 637 (ALT 250 FOR IP): Performed by: INTERNAL MEDICINE

## 2022-05-24 PROCEDURE — 97166 OT EVAL MOD COMPLEX 45 MIN: CPT

## 2022-05-24 PROCEDURE — 70450 CT HEAD/BRAIN W/O DYE: CPT

## 2022-05-24 PROCEDURE — 99225 PR SBSQ OBSERVATION CARE/DAY 25 MINUTES: CPT | Performed by: INTERNAL MEDICINE

## 2022-05-24 PROCEDURE — 71045 X-RAY EXAM CHEST 1 VIEW: CPT

## 2022-05-24 PROCEDURE — 96366 THER/PROPH/DIAG IV INF ADDON: CPT

## 2022-05-24 PROCEDURE — 6370000000 HC RX 637 (ALT 250 FOR IP): Performed by: PSYCHIATRY & NEUROLOGY

## 2022-05-24 PROCEDURE — 2580000003 HC RX 258: Performed by: PHYSICIAN ASSISTANT

## 2022-05-24 PROCEDURE — 87040 BLOOD CULTURE FOR BACTERIA: CPT

## 2022-05-24 PROCEDURE — 6360000002 HC RX W HCPCS: Performed by: NURSE PRACTITIONER

## 2022-05-24 PROCEDURE — 6360000002 HC RX W HCPCS: Performed by: PHYSICIAN ASSISTANT

## 2022-05-24 PROCEDURE — 96365 THER/PROPH/DIAG IV INF INIT: CPT

## 2022-05-24 PROCEDURE — 70496 CT ANGIOGRAPHY HEAD: CPT

## 2022-05-24 PROCEDURE — G0378 HOSPITAL OBSERVATION PER HR: HCPCS | Performed by: INTERNAL MEDICINE

## 2022-05-24 PROCEDURE — 83880 ASSAY OF NATRIURETIC PEPTIDE: CPT

## 2022-05-24 PROCEDURE — 83605 ASSAY OF LACTIC ACID: CPT

## 2022-05-24 PROCEDURE — 93005 ELECTROCARDIOGRAM TRACING: CPT | Performed by: PHYSICIAN ASSISTANT

## 2022-05-24 PROCEDURE — 6360000004 HC RX CONTRAST MEDICATION: Performed by: PSYCHIATRY & NEUROLOGY

## 2022-05-24 PROCEDURE — 36415 COLL VENOUS BLD VENIPUNCTURE: CPT

## 2022-05-24 PROCEDURE — 84484 ASSAY OF TROPONIN QUANT: CPT

## 2022-05-24 PROCEDURE — 83735 ASSAY OF MAGNESIUM: CPT

## 2022-05-24 PROCEDURE — 85025 COMPLETE CBC W/AUTO DIFF WBC: CPT

## 2022-05-24 PROCEDURE — 80061 LIPID PANEL: CPT

## 2022-05-24 RX ORDER — MECLIZINE HYDROCHLORIDE 25 MG/1
25 TABLET ORAL 3 TIMES DAILY
Status: COMPLETED | OUTPATIENT
Start: 2022-05-24 | End: 2022-05-31

## 2022-05-24 RX ORDER — DEXTROSE MONOHYDRATE 50 MG/ML
100 INJECTION, SOLUTION INTRAVENOUS PRN
Status: DISCONTINUED | OUTPATIENT
Start: 2022-05-24 | End: 2022-06-03 | Stop reason: HOSPADM

## 2022-05-24 RX ORDER — LANOLIN ALCOHOL/MO/W.PET/CERES
800 CREAM (GRAM) TOPICAL DAILY
Status: DISCONTINUED | OUTPATIENT
Start: 2022-05-24 | End: 2022-06-03 | Stop reason: HOSPADM

## 2022-05-24 RX ORDER — PANTOPRAZOLE SODIUM 40 MG/1
40 TABLET, DELAYED RELEASE ORAL
Status: DISCONTINUED | OUTPATIENT
Start: 2022-05-25 | End: 2022-06-03 | Stop reason: HOSPADM

## 2022-05-24 RX ORDER — LISINOPRIL 20 MG/1
20 TABLET ORAL DAILY
Status: DISCONTINUED | OUTPATIENT
Start: 2022-05-24 | End: 2022-06-03 | Stop reason: HOSPADM

## 2022-05-24 RX ORDER — GUAIFENESIN/DEXTROMETHORPHAN 100-10MG/5
5 SYRUP ORAL EVERY 4 HOURS PRN
Status: DISCONTINUED | OUTPATIENT
Start: 2022-05-24 | End: 2022-06-03 | Stop reason: HOSPADM

## 2022-05-24 RX ORDER — AMLODIPINE BESYLATE 10 MG/1
10 TABLET ORAL DAILY
Status: DISCONTINUED | OUTPATIENT
Start: 2022-05-24 | End: 2022-06-03 | Stop reason: HOSPADM

## 2022-05-24 RX ADMIN — Medication 10 ML: at 08:45

## 2022-05-24 RX ADMIN — GUAIFENESIN SYRUP AND DEXTROMETHORPHAN 5 ML: 100; 10 SYRUP ORAL at 21:26

## 2022-05-24 RX ADMIN — ENOXAPARIN SODIUM 80 MG: 100 INJECTION SUBCUTANEOUS at 08:45

## 2022-05-24 RX ADMIN — GUAIFENESIN SYRUP AND DEXTROMETHORPHAN 5 ML: 100; 10 SYRUP ORAL at 12:11

## 2022-05-24 RX ADMIN — Medication 800 MG: at 12:10

## 2022-05-24 RX ADMIN — VANCOMYCIN HYDROCHLORIDE 1250 MG: 1.25 INJECTION, POWDER, LYOPHILIZED, FOR SOLUTION INTRAVENOUS at 18:29

## 2022-05-24 RX ADMIN — MECLIZINE HYDROCHLORIDE 25 MG: 25 TABLET ORAL at 12:10

## 2022-05-24 RX ADMIN — ATORVASTATIN CALCIUM 80 MG: 80 TABLET, FILM COATED ORAL at 21:21

## 2022-05-24 RX ADMIN — ASPIRIN 81 MG: 81 TABLET, COATED ORAL at 08:45

## 2022-05-24 RX ADMIN — IOPAMIDOL 100 ML: 612 INJECTION, SOLUTION INTRAVENOUS at 16:06

## 2022-05-24 RX ADMIN — ACETAMINOPHEN 650 MG: 325 TABLET ORAL at 16:27

## 2022-05-24 RX ADMIN — GABAPENTIN 600 MG: 300 CAPSULE ORAL at 21:21

## 2022-05-24 RX ADMIN — AMLODIPINE BESYLATE 10 MG: 10 TABLET ORAL at 16:27

## 2022-05-24 RX ADMIN — CEFTRIAXONE SODIUM 2000 MG: 2 INJECTION, POWDER, FOR SOLUTION INTRAMUSCULAR; INTRAVENOUS at 17:42

## 2022-05-24 RX ADMIN — MECLIZINE HYDROCHLORIDE 25 MG: 25 TABLET ORAL at 21:21

## 2022-05-24 RX ADMIN — Medication 10 ML: at 21:21

## 2022-05-24 RX ADMIN — AMPICILLIN SODIUM 2000 MG: 2 INJECTION, POWDER, FOR SOLUTION INTRAMUSCULAR; INTRAVENOUS at 21:22

## 2022-05-24 RX ADMIN — LISINOPRIL 20 MG: 20 TABLET ORAL at 12:10

## 2022-05-24 ASSESSMENT — ENCOUNTER SYMPTOMS
WHEEZING: 0
RESPIRATORY NEGATIVE: 1
STRIDOR: 0
CHOKING: 0
VOMITING: 0
SHORTNESS OF BREATH: 0
PHOTOPHOBIA: 0
GASTROINTESTINAL NEGATIVE: 1
COLOR CHANGE: 0
BLOOD IN STOOL: 0
CHEST TIGHTNESS: 0
TROUBLE SWALLOWING: 0
NAUSEA: 0
COUGH: 0
EYES NEGATIVE: 1
BACK PAIN: 1

## 2022-05-24 ASSESSMENT — PAIN SCALES - GENERAL: PAINLEVEL_OUTOF10: 5

## 2022-05-24 NOTE — FLOWSHEET NOTE
AM assessment completed. Patient resting in bed at this time. Denies any chest pain. Remains NSR/Sinus arrhythmia on the monitor. +1 pitting pedal edema noted. Pedal pulses palpable. Denies any shortness of breath. Lungs are clear but diminished bilaterally. SATS 96% on RA. She is A/Ox3 and can be up with a 1 person assist secondary to weakness and complaints of dizziness with movement. Denies any pain with elimination. Last BM 5/23/22. Skin remains warm, dry and intact. #20g SL to right wrist, flushed and patent. Vital signs stable and AM medications provided. Call light remains in reach.  Electronically signed by Júnior Quintanilla RN on 5/24/2022 at 9:58 AM

## 2022-05-24 NOTE — PROGRESS NOTES
[address] [facility] Pharmacokinetic Monitoring Service - Vancomycin     Miguelito Calvert is a 64 y.o. 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 Pharmacy consulted by Paul Lanza for monitoring and adjustment. Target Concentration: Goal AUC/NADER 400-600 mg*hr/L    Additional Antimicrobials: Ampicillin, Rocephin    Pertinent Laboratory Values: Wt Readings from Last 1 Encounters:   05/24/22 185 lb (83.9 kg)     Temp Readings from Last 1 Encounters:   05/24/22 (!) 101.8 °F (38.8 °C) (Oral)     Estimated Creatinine Clearance: 42 mL/min (A) (based on SCr of 1.06 mg/dL (H)). Recent Labs     05/23/22  1330 05/23/22  1457 05/24/22  0448 05/24/22  1513   CREATININE  --  0.96*  --  1.06*   WBC   < >  --  13.8* 14.2*    < > = values in this interval not displayed. Pertinent Cultures:  Culture Date Source Results   5-24-22 blood Culture, Blood 2  Order: [id number]  Status: In process    Visible to patient: No (not released)    Next appt: 06/09/2022 at 09:45 AM in Family [facility] [place] MD [place])       MRSA Nasal Swab: N/A. Non-respiratory infection.     Plan:  Dosing recommendations based on Bayesian software  Start vancomycin 1250mg IV q 24 hours  Anticipated AUC of 467 and trough concentration of 14.2 at steady state  Renal labs as indicated   Vancomycin concentration ordered for 05-26-22 @ 0600   Pharmacy will continue to monitor patient and adjust therapy as indicated    Thank you for the consult,  Danny Craig, [facility]  5/24/2022 5:40 PM

## 2022-05-24 NOTE — FLOWSHEET NOTE
Per PT/OT therapy they stated that patient is exhibiting some slurred speech and numbness in her left arm/hand. Also only oriented to 2 at this time when she was A/Ox3 earlier with my AM assessment. BP elevated. Dr Sun Diaz notified that a rapid response was being called.  Electronically signed by Checo Banegas RN on 5/24/2022 at 3:05 PM

## 2022-05-24 NOTE — PROGRESS NOTES
[facility] OCCUPATIONAL THERAPY EVALUATION - ACUTE     NAME: Chey Bagley  : 1945 (64 y.o.)  MRN: [id number]  CODE STATUS: Full Code  Room: [facility]/V903-58    Date of Service: 2022    Patient Diagnosis(es): Elevated troponin [R77.8]   Patient Active Problem List    Diagnosis Date Noted    Dizziness     Benign paroxysmal positional vertigo due to bilateral vestibular disorder     Elevated troponin 2022    Hyperlipidemia 10/16/2015    Type 2 diabetes mellitus with circulatory disorder ([facility] 10/16/2015    Vertebral compression fracture ([facility] 10/16/2015    Multiple myeloma ([facility] 2015    Obese 2015    Ataxia 2015        Past Medical History:   Diagnosis Date    Cancer ([facility]     Hyperlipidemia     Hypertension     Type II or unspecified type diabetes mellitus without mention of complication, not stated as uncontrolled      Past Surgical History:   Procedure Laterality Date    CORONARY ANGIOPLASTY WITH STENT PLACEMENT      UPPER GASTROINTESTINAL ENDOSCOPY  8/12/15    w/bx,dilation         Restrictions  Restrictions/Precautions: Fall Risk              Safety Devices: Safety Devices  Type of Devices: All fall risk precautions in place; Left in bed;Bed alarm in place     Patient's date of birth confirmed: Yes    General:       Subjective:Pt pleasant and cooperative. Pt's spouse arrived during session. Nursing notified of patient's cognitive and functional decline during session and called Dr Brown for assessment. Pt returned to supine position for assessment.           Pain at start of treatment:     Pain at end of treatment:     Location: Low back, not rated     Prior Level of Function:  Social/Functional History  Lives With: Spouse,Son (pt never home alone)  Type of Home: [address] [place], [place] [address] [facility],[address]: Laundry in basement,Two level (doesn't have to use basement)  Home Access: Stairs to enter with rails  Entrance Stairs - Number of Steps: 6  Entrance Stairs - Rails: Right  Bathroom Shower/Tub: Walk-in shower  Bathroom Equipment: Shower chair,Grab bars in shower  Home Equipment: [id number] [id number]:  ( assists with bathing/dressing; pt indep with toileting activities)  Homemaking Assistance:  (spouse and son complete)  Homemaking Responsibilities: No  Ambulation Assistance: Independent (cane/rollator PRN)  Transfer Assistance: Independent  Additional Comments: Pt inconsistent historian. Pt's spouse arriving mid assessment and confirms home set up and PLOF    OBJECTIVE:     Orientation Status:  Orientation  Orientation Level: Oriented to person    Observation:  Observation/Palpation  Posture: Fair  Observation: Drowsy with decreased attention and at times inaudible speech requiring frequent clarification. Significant slurred speech and word finding difficulties. RN notified    Cognition Status:  Cognition  Cognition Comment: Pt inconsistently following one step commands. Pt exhibiting word finding difficulty and and words tailing off at the end    Perception Status:  Perception  Overall Perceptual Status: WFL    Vision and Hearing Status:  Vision  Vision Exceptions: Wears glasses for reading  Hearing  Hearing: Within functional limits   Vision - Basic Assessment  Prior Vision: Wears glasses only for reading    GROSS ASSESSMENT AROM/PROM:     PROM: Within functional limits    ROM:   LUE PROM (degrees)  LUE PROM: WFL  Left Hand AROM (degrees)  Left Hand AROM: WFL  RUE PROM (degrees)  RUE PROM: WFL  Right Hand AROM (degrees)  Right Hand AROM: WFL    UE STRENGTH:       UE COORDINATION:  Coordination:  (decreased bilateral UE gross anf FMC)    UE TONE:  Tone: Normal    UE SENSATION:  Sensation: Impaired (Pt reporting numbness in left hand)    Hand Dominance:  Hand Dominance  Hand Dominance: Right    ADL Status:  ADL  Feeding: Unable to assess(comment)  Grooming: Maximum assistance; Increased time to complete  UE Bathing: Maximum assistance; Increased time to complete  LE Bathing: Maximum assistance; Increased time to complete  UE Dressing: Maximum assistance; Increased time to complete  LE Dressing: Dependent/Total  Toileting: Unable to assess(comment)    Functional Mobility:  Patient ambulated no secondary to decreasing medical status  Bed Mobility  Bed mobility  Rolling to Left: Minimal assistance  Supine to Sit: Minimal assistance  Sit to Supine:  Moderate assistance    Seated and Standing Balance:  Balance  Sitting:  (SBA)  Standing:  (Max A/D)    Functional Endurance:  Activity Tolerance  Activity Tolerance: Patient limited by fatigue;Treatment limited secondary to decreased cognition;Treatment limited secondary to medical complications (free text)    D/C Recommendations:  OT D/C RECOMMENDATIONS  REQUIRES OT FOLLOW-UP: Yes    Equipment Recommendations:       OT Education:        OT Follow Up:  OT D/C RECOMMENDATIONS  REQUIRES OT FOLLOW-UP: Yes       Assessment/Discharge Disposition:     Performance deficits / Impairments: Decreased functional mobility ,Decreased balance,Decreased safe awareness,Decreased coordination,Decreased posture,Decreased cognition,Decreased ADL status,Decreased endurance,Decreased sensation,Decreased strength,Decreased fine motor control,Decreased ROM  Prognosis: Fair  Discharge Recommendations: Continue to assess pending progress  Decision Making: Medium Complexity  History: 4 complexities  Exam: 12 deficits  Assistance / Modification: Max A    [facility] ([employer]) Self care Score    How much help for putting on and taking off regular lower body clothing?: Total  How much help for Bathing?: A Lot  How much help for Toileting?: Total  How much help for putting on and taking off regular upper body clothing?: A Lot  How much help for taking care of personal grooming?: A Lot  How much help for eating meals?: A Little  AM-[facility] Inpatient Daily Activity Raw Score: 11  AM-PAC Inpatient ADL T-Scale Score : 29.04  ADL Inpatient CMS 0-100% Score: 70.42    Therapy key for assistance levels -   Independent/Mod I = Pt. is able to perform task with no assistance but may require a device   Stand by assistance = Pt. does not perform task at an independent level but does not need physical assistance, requires verbal cues  Minimal, Moderate, Maximal Assistance = Pt. requires physical assistance (25%, 50%, 75% assist from helper) for task but is able to actively participate in task   Dependent = Pt. requires total assistance with task and is not able to actively participate with task completion     Plan:  Plan  Times per Week: 1-4x/wk  Current Treatment Recommendations: Strengthening,Functional mobility training,Neuromuscular re-education,Endurance training,Self-Care / ADL,Cognitive/Perceptual training    Goals:   Patient will:    - Improve functional endurance to tolerate/complete 10-15 mins of ADL's  - Be Min A in UB ADLs   - Be Mod A in LB ADLs  - Be Min A in ADL transfers without LOB  - Be Mod A in toileting tasks  - Improve bilateral UE strength and endurance to Fair in order to participate in self-care activities as projected.   - Sequence self-care tasks with out verbal cues    Patient Goal: Patient goals : Not stated at this time      Discussed and agreed upon: Yes Comments:       Therapy Time:   Individual   Time In 1443   Time Out 1455   Minutes 12          Eval: 12 minutes     Electronically signed by:    WYATT Clark,   3/33/7843, 3:58 PM Electronically signed by WYATT Clark on 7/76/33 at 3:57 PM EDT

## 2022-05-24 NOTE — CONSULTS
Consult Note    Reason for Consult:  Medical management    Requesting Physician:  Stas Diop MD    HISTORY OF PRESENT ILLNESS:    The patient is a 64 y.o. female who is admitted under the cardiac service for dizziness on and off x1 year and elevated troponin. Evaluated by neurology and felt dizziness may likely be suggestive of vestibular dysfucntoin and she was started on antivert. Had MRI done at [facility] which showed no acute findings. Today, rapid response was called for mentation change and confusion. CTH/CTA head/neck completed. NIH stroke scale was 0 per documentation. Ambulatory with walker. Febrile 38.8 Has PMH of CKD3,  DM2, HTN, multiple myeloma in remission, CAD, DVT, NSTEMI . We are consulted to assist with medical management. Past Medical History:   Diagnosis Date    Cancer ([facility]     Hyperlipidemia     Hypertension     Type II or unspecified type diabetes mellitus without mention of complication, not stated as uncontrolled        Past Surgical History:   Procedure Laterality Date    CORONARY ANGIOPLASTY WITH STENT PLACEMENT      UPPER GASTROINTESTINAL ENDOSCOPY  8/12/15    w/bx,dilation        Prior to Admission medications    Medication Sig Start Date End Date Taking? Authorizing Provider   atorvastatin (LIPITOR) 80 MG tablet Take 1 tablet by mouth daily 4/13/22   Shana Ortiz MD   gabapentin (NEURONTIN) 300 MG capsule Take 2 capsules by mouth at bedtime for 90 days.  4/13/22 7/12/22  Shana Ortiz MD   furosemide (LASIX) 20 MG tablet Take 1 tablet by mouth daily 4/13/22   Shana Ortiz MD   metoprolol tartrate (LOPRESSOR) 50 MG tablet Take 1 tablet by mouth 2 times daily  Patient taking differently: Take 50 mg by mouth daily  4/13/22   Shana Ortiz MD   pantoprazole (PROTONIX) 40 MG tablet Take 1 tablet by mouth daily 4/13/22   Shana Ortiz MD   ezetimibe (ZETIA) 10 MG tablet Take 1 tablet by mouth daily 4/13/22   Shana Ortiz MD   blood glucose monitor kit and supplies Test 1 time daily 4/13/22   Josesito Winters MD   blood glucose monitor strips Test 1 time daily 4/13/22   Josesito Winters MD   Lancets MISC 1 each by Does not apply route daily 4/13/22   Josesito Winters MD   aspirin 81 MG tablet Take 81 mg by mouth 2 times daily     Historical Provider, MD   isosorbide mononitrate (IMDUR) 30 MG CR tablet Take 30 mg by mouth daily  Patient not taking: Reported on 5/23/2022    Historical Provider, MD   lisinopril (PRINIVIL;ZESTRIL) 20 MG tablet Take 20 mg by mouth daily   Patient not taking: Reported on 5/23/2022    Historical Provider, MD       Scheduled Meds:   lisinopril  20 mg Oral Daily    [START ON 5/25/2022] pantoprazole  40 mg Oral QAM AC    magnesium oxide  800 mg Oral Daily    meclizine  25 mg Oral TID    amLODIPine  10 mg Oral Daily    sodium chloride flush  5-40 mL IntraVENous 2 times per day    atorvastatin  80 mg Oral Nightly    enoxaparin  1 mg/kg SubCUTAneous BID    aspirin  81 mg Oral BID    gabapentin  600 mg Oral Nightly    insulin lispro  0-12 Units SubCUTAneous 4x Daily WC    insulin lispro  0-6 Units SubCUTAneous Nightly     Continuous Infusions:   dextrose      sodium chloride       PRN Meds:guaiFENesin-dextromethorphan, glucose, dextrose bolus **OR** dextrose bolus, glucagon (rDNA), dextrose, iopamidol, sodium chloride flush, sodium chloride, ondansetron **OR** ondansetron, acetaminophen **OR** acetaminophen, polyethylene glycol, nitroGLYCERIN, hydrALAZINE    Allergies   Allergen Reactions    Eggs Or Egg-Derived Products Nausea Only    Glucophage [Metformin]      diarrhea    Valium [Diazepam]     Zithromax [Azithromycin]     Milk-Related Compounds Diarrhea and Nausea Only       Social History     Socioeconomic History    Marital status:      Spouse name: Not on file    Number of children: Not on file    Years of education: Not on file    Highest education level: Not on file   Occupational History    Not on file   Tobacco Use    Smoking status: Former Smoker     Years: 24.00    Smokeless tobacco: Never Used   Substance and Sexual Activity    Alcohol use: Yes     Alcohol/week: 3.0 standard drinks     Types: 3 Glasses of wine per week    Drug use: No    Sexual activity: Not on file   Other Topics Concern    Not on file   Social History Narrative    Not on file     Social Determinants of Health     Financial Resource Strain: Low Risk     Difficulty of Paying Living Expenses: Not hard at all   Food Insecurity: No Food Insecurity    Worried About Running Out of Food in the Last Year: Never true    [address] in the Last Year: Never true   Transportation Needs:     Lack of Transportation (Medical): Not on file    Lack of Transportation (Non-Medical):  Not on file   Physical Activity:     Days of Exercise per Week: Not on file    Minutes of Exercise per Session: Not on file   Stress:     Feeling of Stress : Not on file   Social Connections:     Frequency of Communication with Friends and Family: Not on file    Frequency of Social Gatherings with Friends and Family: Not on file    Attends Zoroastrian Services: Not on file    Active Member of [address] [place] [address] or Organizations: Not on file    Attends Club or Organization Meetings: Not on file    Marital Status: Not on file   Intimate Partner Violence:     Fear of Current or Ex-Partner: Not on file    Emotionally Abused: Not on file    Physically Abused: Not on file    Sexually Abused: Not on file   Housing Stability:     Unable to Pay for Housing in the Last Year: Not on file    Number of [place] in the Last Year: Not on file    Unstable Housing in the Last Year: Not on file       Family History   Problem Relation Age of Onset    Cancer Mother     Arthritis Mother     Diabetes Mother     Heart Disease Mother     High Blood Pressure Mother     High Cholesterol Mother     Arthritis Father     Diabetes Father     Heart Disease Father     High Blood Pressure Father     High Cholesterol Father        Review Of Systems:   12 point ROS negative unless indicated below or in the HPI    Physical Exam:  Vitals:    05/24/22 0854 05/24/22 1146 05/24/22 1435 05/24/22 1456   BP: (!) 163/78 (!) 161/116 (!) 175/94 (!) 151/85   Pulse: 88 102 111 104   Resp: 18 20 17 18   Temp: 98.1 °F (36.7 °C)  100.6 °F (38.1 °C)    TempSrc: Oral  Oral    SpO2: 96% 93% 95% 98%   Weight:       Height:           CONSTITUTIONAL:  awake, confused, cooperative, no apparent distress, and appears stated age  LUNGS: Clear to auscultation bilaterally, no crackles or wheezing  CARDIOVASCULAR:  Tachycardic, normal S1 and S2  ABDOMEN: Normal bowel sounds, soft, non-distended, non-tender  MUSCULOSKELETAL:  There is no redness, warmth, or swelling of the joints. Full range of motion noted. Motor strength is 5 out of 5 all extremities bilaterally. Tone is normal.  NEUROLOGIC:  Awake, alert, oriented x1.   Follows commands   SKIN:  Warm and dry    Labs:  Recent Results (from the past 24 hour(s))   Troponin    Collection Time: 05/23/22  7:45 PM   Result Value Ref Range    Troponin 0.026 (HH) 0.000 - 0.010 ng/mL   POCT Glucose    Collection Time: 05/23/22  8:00 PM   Result Value Ref Range    POC Glucose 146 (H) 70 - 99 mg/dl    Performed on ACCU-CHEK    Troponin    Collection Time: 05/24/22 12:15 AM   Result Value Ref Range    Troponin 0.028 (HH) 0.000 - 0.010 ng/mL   Brain natriuretic peptide    Collection Time: 05/24/22 12:15 AM   Result Value Ref Range    Pro- pg/mL   Magnesium    Collection Time: 05/24/22  4:48 AM   Result Value Ref Range    Magnesium 1.6 (L) 1.7 - 2.4 mg/dL   Lipid panel - fasting    Collection Time: 05/24/22  4:48 AM   Result Value Ref Range    Cholesterol, Total 101 0 - 199 mg/dL    Triglycerides 53 0 - 150 mg/dL    HDL 59 40 - 59 mg/dL    LDL Calculated 31 0 - 129 mg/dL   CBC    Collection Time: 05/24/22  4:48 AM   Result Value Ref Range    WBC 13.8 (H) 4.8 - 10.8 K/uL RBC 4.00 (L) 4.20 - 5.40 M/uL    Hemoglobin 12.7 12.0 - 16.0 g/dL    Hematocrit 39.0 37.0 - 47.0 %    MCV 97.4 82.0 - 100.0 fL    MCH 31.8 (H) 27.0 - 31.3 pg    MCHC 32.6 (L) 33.0 - 37.0 %    RDW 15.4 (H) 11.5 - 14.5 %    Platelets 186 156 - 191 K/uL   EKG 12 lead    Collection Time: 05/24/22  5:13 AM   Result Value Ref Range    Ventricular Rate 78 BPM    Atrial Rate 78 BPM    P-R Interval 180 ms    QRS Duration 76 ms    Q-T Interval 362 ms    QTc Calculation (Bazett) 412 ms    P Axis 47 degrees    R Axis -1 degrees    T Axis 50 degrees   POCT Glucose    Collection Time: 05/24/22  5:50 AM   Result Value Ref Range    POC Glucose 104 (H) 70 - 99 mg/dl    Performed on ACCU-CHEK    POCT Glucose    Collection Time: 05/24/22 11:21 AM   Result Value Ref Range    POC Glucose 89 70 - 99 mg/dl    Performed on ACCU-CHEK    POCT Glucose    Collection Time: 05/24/22  2:58 PM   Result Value Ref Range    POC Glucose 99 70 - 99 mg/dl    Performed on ACCU-CHEK    CBC with Auto Differential    Collection Time: 05/24/22  3:13 PM   Result Value Ref Range    WBC 14.2 (H) 4.8 - 10.8 K/uL    RBC 4.16 (L) 4.20 - 5.40 M/uL    Hemoglobin 13.1 12.0 - 16.0 g/dL    Hematocrit 40.6 37.0 - 47.0 %    MCV 97.5 82.0 - 100.0 fL    MCH 31.5 (H) 27.0 - 31.3 pg    MCHC 32.3 (L) 33.0 - 37.0 %    RDW 15.6 (H) 11.5 - 14.5 %    Platelets 869 762 - 486 K/uL    Neutrophils % 83.7 %    Lymphocytes % 7.5 %    Monocytes % 8.1 %    Eosinophils % 0.2 %    Basophils % 0.5 %    Neutrophils Absolute 11.9 (H) 1.4 - 6.5 K/uL    Lymphocytes Absolute 1.1 1.0 - 4.8 K/uL    Monocytes Absolute 1.1 (H) 0.2 - 0.8 K/uL    Eosinophils Absolute 0.0 0.0 - 0.7 K/uL    Basophils Absolute 0.1 0.0 - 0.2 K/uL     Assessment/Plan:    64 y.o. female with PMH of  CKD3,  DM2, HTN, multiple myeloma, CAD, DVT, NSTEMI presented with:    1. Fever unknown origin: Check BCx2, UA and CXR. Check lactic and pro-brittni. Given confusion and no source of infection will empirically treat for meningitis. Consult ID. Consult IR for LP. Neurology already following. Repeat labs in am. Hold lovenox (okay with cardiology) for planned LP. Medicine will become primary with cardiology to follow on consult. 2. Metabolic encephalopathy: CTH no acute changes. Neurology following. Neuro checks q4hr. Infectious work-up ongoing. 3. Dizziness: CUS completed. Management per neurology and cardiology. 4. Elevated troponin: Cardiology managing. Possibly demand ischemia. Cycle and trend. ECHO completed. 5. HTN: BP elevated. Continue current medication regimen. Cardiology managing  6. DM2: SSI  7. CKD3: Stable.    8. Hypomagnesemia: Replete    Electronically signed by JOSE Warren NP on 5/24/22 at 4:03 PM EDT

## 2022-05-24 NOTE — FLOWSHEET NOTE
63  Pt family to desk and state patient is having chest pain. Upon arrival room patient states yes, she is having pain. She describes it as a soreness. Vitals obtained. BP is elevated 161/116, Sarah Campbell notified. 1155  Pt requesting to go to bathroom before EKG done. Assisted to [facility] with assist x1, gait is very slow. EKG obtained and placed on chart. Pt states she is not having any chest pain at this time but, her chest is \"sore\". Patient states this has been happening at home also. 1202  Pt back to bed and EKG obtained. Suzette Jurado RN in room to continue patient care.

## 2022-05-24 NOTE — PROGRESS NOTES
Physical Therapy Med Surg Initial Assessment  Facility/Department: [facility]  Room: [facility]/N943-06       NAME: Shine Clemons  : 1945 (64 y.o.)  MRN: [id number]  CODE STATUS: Full Code    Date of Service: 2022    Patient Diagnosis(es): Elevated troponin [R77.8]   Chief Complaint   Patient presents with    Dizziness     Patient Active Problem List    Diagnosis Date Noted    Dizziness     Benign paroxysmal positional vertigo due to bilateral vestibular disorder     Elevated troponin 2022    Hyperlipidemia 10/16/2015    Type 2 diabetes mellitus with circulatory disorder ([facility] 10/16/2015    Vertebral compression fracture ([facility] 10/16/2015    Multiple myeloma ([facility] 2015    Obese 2015    Ataxia 2015        Past Medical History:   Diagnosis Date    Cancer ([facility]     Hyperlipidemia     Hypertension     Type II or unspecified type diabetes mellitus without mention of complication, not stated as uncontrolled      Past Surgical History:   Procedure Laterality Date    CORONARY ANGIOPLASTY WITH STENT PLACEMENT      UPPER GASTROINTESTINAL ENDOSCOPY  8/12/15    w/bx,dilation        Chart Reviewed: Yes  Family / Caregiver Present: No  General Comment  Comments: Pt resting in bed - agreeable to PT evaluation    Restrictions:  Restrictions/Precautions: Fall Risk     SUBJECTIVE:   Subjective: \"I'm at the cross. \"    Pain  Pain: Pt describes low back pain    Prior Level of Function:  Social/Functional History  Lives With: Spouse,Son (pt never home alone)  Type of Home: House  Home Layout: Laundry in basement,Two level,Bed/Bath upstairs (doesn't have to use basement; pt's family members assist pt up steps)  Home Access: Stairs to enter with rails  Entrance Stairs - Number of Steps: 6  Entrance Stairs - Rails: Right  Bathroom Shower/Tub: Walk-in shower  Bathroom Equipment: Shower chair,Grab bars in [address]: Rollator,Cane  ADL Assistance:  ( assists with bathing/dressing; pt indep with toileting activities)  Homemaking Assistance:  (spouse and son complete)  Ambulation Assistance: Independent (cane/rollator PRN)  Transfer Assistance: Independent  Additional Comments: Pt inconsistent historian. Pt's spouse arriving mid assessment and confirms home set up and PLOF    OBJECTIVE:   Vision  Vision Exceptions: Wears glasses for reading  Hearing: Within functional limits    Cognition:  Orientation Level: Oriented to person  Follows Commands: Within Functional Limits  Cognition Comment: Pt inconsistently following one step commands. Pt exhibiting word finding difficulty and and words tailing off at the end    Observation/Palpation  Observation: Drowsy with decreased attention and at times inaudible speech requiring frequent clarification. Significant slurred speech and word finding difficulties. RN notified    ROM:  RLE PROM: WFL  LLE PROM: WFL    Strength:  Strength RLE  Strength RLE: WFL  Comment: Grossly 3/5  Strength LLE  Strength LLE: WFL  Comment: Grossly 3/5  Strength Other  Other: Trunk strength grossly 2+/5    Neuro:  Balance  Sitting - Static: Good  Sitting - Dynamic: Fair                      Tone: Normal    Sensation: Intact    Bed mobility  Rolling to Left: Minimal assistance  Supine to Sit: Minimal assistance  Sit to Supine: Moderate assistance  Bed Mobility Comments: Slow to complete. Increased time and effort. HHA to lift trunk from bed surface. HOB slightly elevated. Transfers  Comment: Deferred d/t safety concerns    Ambulation  Comments: Deferred - safety concerns                   Activity Tolerance  Activity Tolerance: Treatment limited secondary to decreased cognition  Activity Tolerance Comments: Elevated BP once sitting up 170/104. Improves once returned to supine to 156/77. Patient Education  Education Given To: Patient; Family  Education Provided: Role of Therapy;Plan of Care;Precautions  Education Method: Verbal  Barriers to Learning: (attention/difficulty verbalizing)  Education Outcome: Unable to demonstrate understanding; Unable to verbalize       ASSESSMENT:   Body Structures, Functions, Activity Limitations Requiring Skilled Therapeutic Intervention: Decreased ADL status; Decreased functional mobility ; Decreased ROM; Decreased safe awareness;Decreased cognition;Decreased endurance;Decreased balance;Decreased coordination  Decision Making: High Complexity  History: High  Exam: Med  Clinical Presentation: High    Therapy Prognosis: Good;Fair  Barriers to Learning: speech         DISCHARGE RECOMMENDATIONS:  Discharge Recommendations: Continue to assess pending progress,Patient would benefit from continued therapy after discharge    Assessment: pt with garbled speech throughout assessment demonstrating significant word finding difficulties and decreased attention to task. oriented X 1. Per spouse, symptoms are new since this AM. Pt also admitting to new numbness/tingling L UE. RN notified. Rapid response called. Handoff of care to response team. Continued PT indicated to progress mobility and facilitate DC at highest level of indep and safety. Guarded prognosis pending medical work up and plan. Would recommend therapy stay prior to DC home if pt unable to achieve goals upon DC. Requires PT Follow-Up: Yes      PLAN OF CARE:  Plan  Plan: 1 time a day 3-6 times a week  Current Treatment Recommendations: Strengthening,Balance training,Functional mobility training,Transfer training,Endurance training,Gait training,Neuromuscular re-education,Home exercise program,Safety education & training,Patient/Caregiver education & training,Equipment evaluation, education, & procurement,Modalities,Positioning    Safety Devices  Type of Devices:  All fall risk precautions in place,Left in bed,Nurse notified (rapid response team present)    Goals:  Long Term Goals  Long term goal 1: Pt to complete bed mobility with indep  Long term goal 2: Pt to complete transfers with indep (once assessed)  Long term goal 3: Pt to ambulate 50-150ft with LRD and indep (once assessed)  Long term goal 4: Pt to tolerate 15min therapeutic exercise/activities without rest break    AMPA (6 CLICK) Ayden Modi 28 Inpatient Mobility Raw Score : 10     Therapy Time:   Individual   Time In [address] [place]    Time Out 1455   Minutes [address], [place], 05/24/22 at 3:45 PM         Definitions for assistance levels  Independent = pt does not require any physical supervision or assistance from another person for activity completion. Device may be needed.   Stand by assistance = pt requires verbal cues or instructions from another person, close to but not touching, to perform the activity  Minimal assistance= pt performs 75% or more of the activity; assistance is required to complete the activity  Moderate assistance= pt performs 50% of the activity; assistance is required to complete the activity  Maximal assistance = pt performs 25% of the activity; assistance is required to complete the activity  Dependent = pt requires total physical assistance to accomplish the task

## 2022-05-24 NOTE — PROGRESS NOTES
Progress Note  Patient: Petra Wilson  Unit/Bed: L695/T238-05  YOB: 1945  MRN: [id number]  Acct: [de-identified]   Admitting Diagnosis: Elevated troponin [R77.8]  Admit Date:  5/23/2022  Hospital Day: 0    Chief Complaint: weakness dizizness     Histories:  Past Medical History:   Diagnosis Date    Cancer ([facility]     Hyperlipidemia     Hypertension     Type II or unspecified type diabetes mellitus without mention of complication, not stated as uncontrolled      Past Surgical History:   Procedure Laterality Date    CORONARY ANGIOPLASTY WITH STENT PLACEMENT      UPPER GASTROINTESTINAL ENDOSCOPY  8/12/15    w/bx,dilation      Family History   Problem Relation Age of Onset    Cancer Mother     Arthritis Mother     Diabetes Mother     Heart Disease Mother     High Blood Pressure Mother     High Cholesterol Mother     Arthritis Father     Diabetes Father     Heart Disease Father     High Blood Pressure Father     High Cholesterol Father      Social History     Socioeconomic History    Marital status:      Spouse name: None    Number of children: None    Years of education: None    Highest education level: None   Occupational History    None   Tobacco Use    Smoking status: Former Smoker     Years: 24.00    Smokeless tobacco: Never Used   Substance and Sexual Activity    Alcohol use: Yes     Alcohol/week: 3.0 standard drinks     Types: 3 Glasses of wine per week    Drug use: No    Sexual activity: None   Other Topics Concern    None   Social History Narrative    None     Social Determinants of Health     Financial Resource Strain: Low Risk     Difficulty of Paying Living Expenses: Not hard at all   Food Insecurity: No Food Insecurity    Worried About Running Out of Food in the Last Year: Never true    [place] of Food in the Last Year: Never true   Transportation Needs:     Lack of Transportation (Medical): Not on file    Lack of Transportation (Non-Medical):  Not on file   Physical Activity:     Days of Exercise per Week: Not on file    Minutes of Exercise per Session: Not on file   Stress:     Feeling of Stress : Not on file   Social Connections:     Frequency of Communication with Friends and Family: Not on file    Frequency of Social Gatherings with Friends and Family: Not on file    Attends [employer] Services: Not on file    Active Member of [address] [place] [address] or Organizations: Not on file    Attends Club or Organization Meetings: Not on file    Marital Status: Not on file   Intimate Partner Violence:     Fear of Current or Ex-Partner: Not on file    Emotionally Abused: Not on file    Physically Abused: Not on file    Sexually Abused: Not on file   Housing Stability:     Unable to Pay for Housing in the Last Year: Not on file    Number of [place] in the Last Year: Not on file    Unstable Housing in the Last Year: Not on file       Subjective/HPI  Legs feel weaker. Difficult walking. No cp no sob  in room. EKG: SR 90 PVCs        Review of Systems:   Review of Systems   Constitutional: Negative. Negative for diaphoresis and fatigue. HENT: Negative. Eyes: Negative. Respiratory: Negative. Negative for cough, chest tightness, shortness of breath, wheezing and stridor. Cardiovascular: Negative. Negative for chest pain, palpitations and leg swelling. Gastrointestinal: Negative. Negative for blood in stool and nausea. Genitourinary: Negative. Musculoskeletal: Positive for arthralgias, back pain and gait problem. Skin: Negative. Neurological: Positive for weakness. Negative for dizziness, syncope and light-headedness. Hematological: Negative. Psychiatric/Behavioral: Negative. Physical Examination:    BP (!) 163/78   Pulse 88   Temp 98.1 °F (36.7 °C) (Oral)   Resp 18   Ht 5' (1.524 m)   Wt 185 lb (83.9 kg)   SpO2 96%   BMI 36.13 kg/m²    Physical Exam   Constitutional: She appears healthy. No distress.    HENT: Normal cephalic and Atraumatic   Eyes: Pupils are equal, round, and reactive to light. Neck: Thyroid normal. No JVD present. No neck adenopathy. No thyromegaly present. Cardiovascular: Normal rate, regular rhythm, normal heart sounds, intact distal pulses and normal pulses. Pulmonary/Chest: Effort normal and breath sounds normal. She has no wheezes. She has no rales. She exhibits no tenderness. Abdominal: Soft. Bowel sounds are normal. There is no abdominal tenderness. Musculoskeletal:         General: No tenderness or edema. Normal range of motion. Cervical back: Normal range of motion and neck supple. Neurological: She is alert and oriented to person, place, and time. Skin: Skin is warm. No cyanosis. Nails show no clubbing.        LABS:  CBC:   Lab Results   Component Value Date    WBC 13.8 05/24/2022    RBC 4.00 05/24/2022    HGB 12.7 05/24/2022    HCT 39.0 05/24/2022    MCV 97.4 05/24/2022    MCH 31.8 05/24/2022    MCHC 32.6 05/24/2022    RDW 15.4 05/24/2022     05/24/2022    MPV 8.2 10/12/2015     CBC with Differential:    Lab Results   Component Value Date    WBC 13.8 05/24/2022    RBC 4.00 05/24/2022    HGB 12.7 05/24/2022    HCT 39.0 05/24/2022     05/24/2022    MCV 97.4 05/24/2022    MCH 31.8 05/24/2022    MCHC 32.6 05/24/2022    RDW 15.4 05/24/2022    LYMPHOPCT 6.4 05/23/2022    MONOPCT 7.2 05/23/2022    BASOPCT 0.4 05/23/2022    MONOSABS 1.0 05/23/2022    LYMPHSABS 0.9 05/23/2022    EOSABS 0.0 05/23/2022    BASOSABS 0.1 05/23/2022     CMP:    Lab Results   Component Value Date     05/23/2022    K 4.0 05/23/2022     05/23/2022    CO2 24 05/23/2022    BUN 15 05/23/2022    CREATININE 0.96 05/23/2022    GFRAA >60.0 05/23/2022    LABGLOM 56.1 05/23/2022    GLUCOSE 103 05/23/2022    PROT 6.6 05/23/2022    LABALBU 4.4 05/23/2022    CALCIUM 10.6 05/23/2022    BILITOT 1.1 05/23/2022    ALKPHOS 74 05/23/2022    AST 18 05/23/2022    ALT 23 05/23/2022     BMP:    Lab Results   Component Value Date     05/23/2022    K 4.0 05/23/2022     05/23/2022    CO2 24 05/23/2022    BUN 15 05/23/2022    LABALBU 4.4 05/23/2022    CREATININE 0.96 05/23/2022    CALCIUM 10.6 05/23/2022    GFRAA >60.0 05/23/2022    LABGLOM 56.1 05/23/2022    GLUCOSE 103 05/23/2022     Magnesium:    Lab Results   Component Value Date    MG 1.6 05/24/2022     Troponin:    Lab Results   Component Value Date    TROPONINI 0.028 05/24/2022        Active Hospital Problems    Diagnosis Date Noted    Elevated troponin [R77.8] 05/23/2022     Priority: Medium        Assessment/Plan:  1. Dizziness -Telemetry. CUS - negative  2. Elevated Trop- possible Demand ischemia - follow peak. - Borderline levels. 3. [facility] Echo  4. HTN stable - continue meds. Low salt diet. 5. HPL - Statin   6. HypoMag 1.6 - will replace  7.  Weakness - will consult Neurololgy and obtain PTOT eval         Electronically signed by Guero Cody MD on 5/24/2022 at 10:02 AM

## 2022-05-24 NOTE — CONSULTS
Subjective:      Patient ID: Kervin Valenzuela is a 64 y.o. female who presents today for dizziness    HPI 76-year right-handed female admitted for dizziness and issues with balance. Symptoms going on for almost a year on and off. She reports she is dizzy upon walking and reads to 1 side or the other. MRI was done yesterday at [facility] clinic which appeared to show no acute findings. Patient has significant other cerebrovascular risk factors she denies any tinnitus or hearing loss. She denies any neck pain but does have back pain. Has not recent falls injuries or trauma. Review of Systems   Constitutional: Negative for fever. HENT: Negative for ear pain, tinnitus and trouble swallowing. Eyes: Negative for photophobia and visual disturbance. Respiratory: Negative for choking and shortness of breath. Cardiovascular: Negative for chest pain and palpitations. Gastrointestinal: Negative for nausea and vomiting. Musculoskeletal: Positive for back pain. Negative for gait problem, joint swelling, myalgias, neck pain and neck stiffness. Skin: Negative for color change. Allergic/Immunologic: Negative for food allergies. Neurological: Positive for dizziness and light-headedness. Negative for tremors, seizures, syncope, facial asymmetry, speech difficulty, weakness, numbness and headaches. Psychiatric/Behavioral: Negative for behavioral problems, confusion, hallucinations and sleep disturbance.        Past Medical History:   Diagnosis Date    Cancer ([facility] [address]     Hyperlipidemia     Hypertension     Type II or unspecified type diabetes mellitus without mention of complication, not stated as uncontrolled      Past Surgical History:   Procedure Laterality Date    CORONARY ANGIOPLASTY WITH STENT PLACEMENT      UPPER GASTROINTESTINAL ENDOSCOPY  8/12/15    w/bx,dilation      Social History     Socioeconomic History    Marital status:      Spouse name: Not on file    Number of children: Not on file    Years of education: Not on file    Highest education level: Not on file   Occupational History    Not on file   Tobacco Use    Smoking status: Former Smoker     Years: 24.00    Smokeless tobacco: Never Used   Substance and Sexual Activity    Alcohol use: Yes     Alcohol/week: 3.0 standard drinks     Types: 3 Glasses of wine per week    Drug use: No    Sexual activity: Not on file   Other Topics Concern    Not on file   Social History Narrative    Not on file     Social Determinants of Health     Financial Resource Strain: Low Risk     Difficulty of Paying Living Expenses: Not hard at all   Food Insecurity: No Food Insecurity    Worried About Running Out of Food in the Last Year: Never true    [address] in the Last Year: Never true   Transportation Needs:     Lack of Transportation (Medical): Not on file    Lack of Transportation (Non-Medical):  Not on file   Physical Activity:     Days of Exercise per Week: Not on file    Minutes of Exercise per Session: Not on file   Stress:     Feeling of Stress : Not on file   Social Connections:     Frequency of Communication with Friends and Family: Not on file    Frequency of Social Gatherings with Friends and Family: Not on file    Attends Jehovah's witness Services: Not on file    Active Member of [address] [place] [address] [employer] or Organizations: Not on file    Attends Club or Organization Meetings: Not on file    Marital Status: Not on file   Intimate Partner Violence:     Fear of Current or Ex-Partner: Not on file    Emotionally Abused: Not on file    Physically Abused: Not on file    Sexually Abused: Not on file   Housing Stability:     Unable to Pay for Housing in the Last Year: Not on file    Number of [place] in the Last Year: Not on file    Unstable Housing in the Last Year: Not on file     Family History   Problem Relation Age of Onset    Cancer Mother     Arthritis Mother     Diabetes Mother     Heart Disease Mother     High Blood Pressure Mother     High Cholesterol Mother     Arthritis Father     Diabetes Father     Heart Disease Father     High Blood Pressure Father     High Cholesterol Father      Allergies   Allergen Reactions    Eggs Or Egg-Derived Products Nausea Only    Glucophage [Metformin]      diarrhea    Valium [Diazepam]     Zithromax [Azithromycin]     Milk-Related Compounds Diarrhea and Nausea Only     Current Facility-Administered Medications   Medication Dose Route Frequency Provider Last Rate Last Admin    lisinopril (PRINIVIL;ZESTRIL) tablet 20 mg  20 mg Oral Daily Anuja Brown MD        Jovan Coil ON 5/25/2022] pantoprazole (PROTONIX) tablet 40 mg  40 mg Oral QAM AC Anuja Brown MD        magnesium oxide (MAG-OX) tablet 800 mg  800 mg Oral Daily Anuja Brown MD        guaiFENesin-dextromethorphan (ROBITUSSIN DM) 100-10 MG/5ML syrup 5 mL  5 mL Oral Q4H PRN Anuja Brown MD        meclizine (ANTIVERT) tablet 25 mg  25 mg Oral TID Delfina Marquez MD        sodium chloride flush 0.9 % injection 5-40 mL  5-40 mL IntraVENous 2 times per day Ryan Ta PA-C   10 mL at 05/24/22 0845    sodium chloride flush 0.9 % injection 5-40 mL  5-40 mL IntraVENous PRN Ryan Ta PA-C        0.9 % sodium chloride infusion   IntraVENous PRN Ryan Ta PA-C        ondansetron (ZOFRAN-ODT) disintegrating tablet 4 mg  4 mg Oral Q8H PRN Ryan Ta PA-C        Or    ondansetron [facility]) injection 4 mg  4 mg IntraVENous Q6H PRN Ryan Ta PA-C        acetaminophen (TYLENOL) tablet 650 mg  650 mg Oral Q6H PRN Ryan Ta PA-C        Or    acetaminophen (TYLENOL) suppository 650 mg  650 mg Rectal Q6H PRN Ryan Ta PA-C        polyethylene glycol (GLYCOLAX) packet 17 g  17 g Oral Daily PRN Ryan Ta PA-C        atorvastatin (LIPITOR) tablet 80 mg  80 mg Oral Nightly Ryan Ta PA-C   80 mg at 05/23/22 2154    nitroGLYCERIN (NITROSTAT) SL tablet 0.4 mg  0.4 mg SubLINGual Q5 Min PRN [facility] Kyle ELIO Marshall        enoxaparin (LOVENOX) injection 80 mg  1 mg/kg SubCUTAneous BID Aleksandar Landa PA-C   80 mg at 05/24/22 0845    aspirin EC tablet 81 mg  81 mg Oral BID Barbara Saavedra MD   81 mg at 05/24/22 0845    gabapentin (NEURONTIN) capsule 600 mg  600 mg Oral Nightly Barbara Saavedra MD   600 mg at 05/23/22 2154    hydrALAZINE (APRESOLINE) injection 10 mg  10 mg IntraVENous Q6H PRN Barbara Saavedra MD        insulin lispro (HUMALOG) injection vial 0-12 Units  0-12 Units SubCUTAneous 4x Daily WC Barbara Saavedra MD        insulin lispro (HUMALOG) injection vial 0-6 Units  0-6 Units SubCUTAneous Nightly Barbara Saavedra MD   1 Units at 05/23/22 2210        Objective:   BP (!) 163/78   Pulse 88   Temp 98.1 °F (36.7 °C) (Oral)   Resp 18   Ht 5' (1.524 m)   Wt 185 lb (83.9 kg)   SpO2 96%   BMI 36.13 kg/m²     Physical Exam  Vitals reviewed. Eyes:      Pupils: Pupils are equal, round, and reactive to light. Cardiovascular:      Rate and Rhythm: Normal rate and regular rhythm. Heart sounds: No murmur heard. Pulmonary:      Effort: Pulmonary effort is normal.      Breath sounds: Normal breath sounds. Abdominal:      General: Bowel sounds are normal.   Musculoskeletal:         General: Normal range of motion. Cervical back: Normal range of motion. Skin:     General: Skin is warm. Neurological:      Mental Status: She is alert and oriented to person, place, and time. Cranial Nerves: No cranial nerve deficit. Sensory: No sensory deficit. Motor: No abnormal muscle tone. Coordination: Coordination normal.      Deep Tendon Reflexes: Reflexes are normal and symmetric. Babinski sign absent on the right side. Babinski sign absent on the left side.    Psychiatric:         Mood and Affect: Mood normal.       Patient appears to be dizzy upon sitting up from a lying down position and we were not able to walk her or perform a vestibular examination due to the dizziness. Patient is areflexic in the lower extremities no definite sensory levels are noted  CT Head WO Contrast    Result Date: 5/23/2022  INDICATION: 24-year-old female presenting with dizziness and unsteady gait. COMPARISON: 10/9/2015. TECHNIQUE: Multidetector CT imaging was obtained from the skull base to vertex without the administration of intravenous contrast. Coronal and sagittal reformatted images were obtained. Automated dose exposure control was used for this exam. FINDINGS: Scattered areas of low-attenuation are visualized in the periventricular and subcortical white matter consistent with chronic microvascular disease. No evidence of parenchymal hemorrhages or contusions. No evidence of intra or extra-axial fluid collection is seen. Prominence of ventricles and sulci are visualized demonstrating no significant increase in comparison to the prior study consistent is more chronic brain changes. Ventricles and sulci normal in size and configuration. The posterior fossa is unremarkable. No structural midline abnormalities seen. Visualized paranasal sinuses are well aerated. Visualized mastoid air cells are well aerated. The calvarium is intact. No acute intracranial hemorrhage or mass effect. Chronic microvascular disease and chronic atrophic brain changes. XR CHEST PORTABLE    Result Date: 5/23/2022  CHEST X-RAY. TECHNIQUE: Single AP portable view of the chest was obtained. Júnior Weir CLINICAL INDICATION: 24-year-old female presenting with dizziness. COMPARISON: Chest x-ray obtained on 10/9/2015 PROCEDURE AND FINDINGS: Poor inspiratory effort is seen. The cardiomediastinal silhouette is slightly prominent in size, mild tortuosity of the thoracic aorta is seen. Biapical prominence suggestive of COPD changes. Mild prominence of the bronchovascular interstitial lung markings is seen but no evidence of focal lung infiltrate or consolidation is seen.  The costophrenic angles are clear, no evidence of lung infiltrate, pleural effusion or parenchymal lung mass. Degenerative bone changes are visualized. COPD and chronic interstitial changes. Lab Results   Component Value Date    WBC 13.8 05/24/2022    RBC 4.00 05/24/2022    HGB 12.7 05/24/2022    HCT 39.0 05/24/2022    MCV 97.4 05/24/2022    MCH 31.8 05/24/2022    MCHC 32.6 05/24/2022    RDW 15.4 05/24/2022     05/24/2022    MPV 8.2 10/12/2015     Lab Results   Component Value Date     05/23/2022    K 4.0 05/23/2022     05/23/2022    CO2 24 05/23/2022    BUN 15 05/23/2022    CREATININE 0.96 05/23/2022    GFRAA >60.0 05/23/2022    LABGLOM 56.1 05/23/2022    GLUCOSE 103 05/23/2022    PROT 6.6 05/23/2022    LABALBU 4.4 05/23/2022    CALCIUM 10.6 05/23/2022    BILITOT 1.1 05/23/2022    ALKPHOS 74 05/23/2022    AST 18 05/23/2022    ALT 23 05/23/2022     Lab Results   Component Value Date    PROTIME 12.7 05/23/2022    INR 1.0 05/23/2022     Lab Results   Component Value Date    TSH 0.822 10/09/2015     Lab Results   Component Value Date    TRIG 53 05/24/2022    HDL 59 05/24/2022    LDLCALC 31 05/24/2022     No results found for: LABAMPH, BARBSCNU, LABBENZ, CANNAB, COCAINESCRN, LABMETH, OPIATESCREENURINE, PHENCYCLIDINESCREENURINE, PPXUR, ETOH  No results found for: LITHIUM, DILFRTOT, VALPROATE    Assessment:   Dizziness which may be suggestive of vestibular dysfunction. We will start her on Antivert 25 g 3 times a day and arrange for orthostatic blood pressure recordings. MRI of the brain was done at [facility] clinic yesterday which are reviewed and does not show any acute stroke. Recommended a CT angiogram to make sure there is no basilar artery stenosis. Patient may benefit from vestibular rehab once the work-up is complete. Kashif Velázquez MD, [facility], American Board of Psychiatry & Neurology  Board Certified in Vascular Neurology  Board Certified in Neuromuscular Medicine  Certified in [facility]:

## 2022-05-24 NOTE — CARE COORDINATION
[facility] AT [place] Case Management Initial Discharge Assessment    Met with Patient and Family to discuss discharge plan. MET WITH PT AND SPOUSE AT BEDSIDE      PCP: Carolina Granado MD                                Date of Last Visit: A FEW WEEKS AGO    [facility] Patient: No        [facility] Notified: no    If no PCP, list provided? N/A    Discharge Planning    Living Arrangements: independently at home    Who do you live with? SPOUSE, SON    Who helps you with your care:  self    If lives at home:     Do you have any barriers navigating in your home? no    Patient can perform ADL? Yes    Current Services (outpatient and in home) :  None    Dialysis: No    Is transportation available to get to your appointments? Yes SON DRIVES    DME Equipment:  yes - WALKER    Respiratory equipment: None    Respiratory provider:  no     Pharmacy:  yes - [facility], [address] with Medication Assistance Program?  No      Patient agreeable to [address]? Declined    Patient agreeable to SNF/Rehab? Declined    Other discharge needs identified? N/A    Does Patient Have a High-Risk for Readmission Diagnosis (CHF, PN, MI, COPD)? No        Initial Discharge Plan? (Note: please see concurrent daily documentation for any updates after initial note). RETURN HOME W/SPOUSE.  DENIES NEEDS    Readmission Risk              Risk of Unplanned Readmission:  0         Electronically signed by Leidy Barrett RN on 5/24/2022 at 1:25 PM

## 2022-05-24 NOTE — PROGRESS NOTES
Transfer received, pt A&O x3, Temp 101.8, elevated BP, NP aware.  Tylenol and norvasc given    Lab called critical trop 0.036, dr mercer notified

## 2022-05-24 NOTE — PROGRESS NOTES
Pharmacy Note - Renal Dosing    Ampicillin 2,000 mg IV every 4 hours ordered for treatment of meningitis. Per [facility] Renal Dose Adjustment Policy, ampicillin 0,673 mg IV every 4 hours will be changed to ampicillin 2,000 mg IV every 6 hours. Estimated Creatinine Clearance: Estimated Creatinine Clearance: 42 mL/min (A) (based on SCr of 1.06 mg/dL (H)). Dialysis Status, OSIRIS, CKD: CKD stage 3    BMI: Body mass index is 36.13 kg/m². Rationale for Adjustment: Agent is renally eliminated. Pharmacy will continue to monitor renal function and adjust dose as necessary. Please call with any questions.     Thank you,    Alisson Cárdenas, PharmD   5/24/2022 5:11 PM

## 2022-05-24 NOTE — FLOWSHEET NOTE
Medicated with 5ml Robitussin DM liquid for complaints of persistent cough. Also medicated with routine antivert ordered per Dr Ramos Staff, and her routine lisinopril that was re-ordered per Dr Ena Matias. Will reassess BP in 1 hour. Family remains at bedside. Call light remains in reach.  Electronically signed by Amira Oglesby RN on 5/24/2022 at 12:16 PM

## 2022-05-24 NOTE — SIGNIFICANT EVENT
Rapid response called for change in mentation. Patient seemed to be more confused. She is oriented x2. Previously oriented x3. Patient also complained to therapist of left hand numbness. Upon arrival to the room, patient is oriented times 2-3, requires prompting. Neurologically her strength is 5/5 throughout intact and symmetric with good muscle bulk and tone, sensation intact throughout, subjectively she has left hand numbness in the ulnar distribution. No drift. Speech is well intelligible, no dysarthria. Cranial nerve II through XII symmetric and intact. Vitals are stable. Glucose 99. NIH stroke scale of 0    CT head stat ordered  Will moved to neuro telemetry floor, neurochecks  Patient is already on aspirin and statin  Neurologist urology following, has been notified  Dr. Josh Caballero has also been notified by staff already.   Reassess basic labs     [facility] Medicine

## 2022-05-24 NOTE — PROGRESS NOTES
Spiritual Care Services     Summary of Visit:  Accompanied the  to the patient's new room on [address]. He became rather emotional. They have been  45 years. He expressed that he became very teary in the room as the doctor was working with his wife. They recently moved here from [facility] to be close to their son and daughter-in-law and new grandchild. He was concerned that she couldn't focus or answer some of the questions correctly.  also expressed that his wife had fallen in November, prior to their move, and hit her head very hard on the floor. The hospital, at that time, said that she was fine and didn't see anything. However, he said that he had noticed that she has not been the same since then. More falling. Spiritual Assessment/Intervention/Outcomes:    Encounter Summary  Encounter Overview/Reason : Crisis  Service Provided For[de-identified] Patient and family together  Referral/Consult From[de-identified] Nurse  Support System: [employer] [address] of Encounter: Moderate  Begin Time: 1445  End Time : 1510  Total Time Calculated: 25 min  Encounter   Type: Initial Screen/Assessment  Crisis  Type: Rapid Response  Spiritual/Emotional needs  Type: Emotional Distress     Grief, Loss, and Adjustments  Type: New Diagnosis              Values / Beliefs  Do You Have Any Ethnic, Cultural, Sacramental, or Spiritual [place]ian Needs You Would Like Us To Be Aware of While You Are in the Hospital : No    Care Plan:    Spiritual care to continue to provide support to both patient and her . Spiritual Care Services   Electronically signed by Campos Clemente on 5/24/22 at 4:42 PM EDT     To reach a  for emotional and spiritual support, place an [facility] consult request.   If a  is needed immediately, dial 0 and ask to page the on-call .

## 2022-05-24 NOTE — FLOWSHEET NOTE
Report called to MADAY Byrnes on [address]. Plan is to go to CT and then to [facility] room 269.  Electronically signed by Kaiden Yanez RN on 5/24/2022 at 3:22 PM

## 2022-05-24 NOTE — FLOWSHEET NOTE
Secondary IV access obtained for CT scan. #20g SL placed right below her right AC with 2 attempts. Patient tolerated well with no complaints of any discomfort. Family has her belongings and is heading up to room 269 with them while she is taken to CT scan via bed.  Electronically signed by French Rosales RN on 5/24/2022 at 3:51 PM

## 2022-05-24 NOTE — PROGRESS NOTES
Radiology called, states dr Alanna Lucio is on vacation and there is a possibility there is another physician who can perform the LP tomorrow but will need either dr Rachna Davidson or dr George Ulrich to place the orders and labs.  Dr Rachna Davidson made aware

## 2022-05-25 ENCOUNTER — APPOINTMENT (OUTPATIENT)
Dept: INTERVENTIONAL RADIOLOGY/VASCULAR | Age: 77
DRG: 070 | End: 2022-05-25
Payer: MEDICARE

## 2022-05-25 LAB
ANION GAP SERPL CALCULATED.3IONS-SCNC: 14 MEQ/L (ref 9–15)
APPEARANCE CSF: CLEAR
BACTERIA: NEGATIVE /HPF
BASOPHILS ABSOLUTE: 0.1 K/UL (ref 0–0.2)
BASOPHILS RELATIVE PERCENT: 0.8 %
BILIRUBIN URINE: NEGATIVE
BLOOD, URINE: NEGATIVE
BUN BLDV-MCNC: 12 MG/DL (ref 8–23)
CALCIUM SERPL-MCNC: 9.3 MG/DL (ref 8.5–9.9)
CHLORIDE BLD-SCNC: 105 MEQ/L (ref 95–107)
CLARITY: CLEAR
CLOT EVALUATION CSF: NORMAL
CO2: 21 MEQ/L (ref 20–31)
COLOR CSF: COLORLESS
COLOR: YELLOW
CREAT SERPL-MCNC: 0.97 MG/DL (ref 0.5–0.9)
EKG ATRIAL RATE: 93 BPM
EKG P AXIS: 48 DEGREES
EKG P-R INTERVAL: 164 MS
EKG Q-T INTERVAL: 332 MS
EKG QRS DURATION: 74 MS
EKG QTC CALCULATION (BAZETT): 412 MS
EKG R AXIS: 7 DEGREES
EKG T AXIS: 66 DEGREES
EKG VENTRICULAR RATE: 93 BPM
EOSINOPHILS ABSOLUTE: 0.2 K/UL (ref 0–0.7)
EOSINOPHILS RELATIVE PERCENT: 1.4 %
EPITHELIAL CELLS, UA: NORMAL /HPF (ref 0–5)
GFR AFRICAN AMERICAN: >60
GFR NON-AFRICAN AMERICAN: 55.4
GLUCOSE BLD-MCNC: 108 MG/DL (ref 70–99)
GLUCOSE BLD-MCNC: 113 MG/DL (ref 70–99)
GLUCOSE BLD-MCNC: 124 MG/DL (ref 70–99)
GLUCOSE BLD-MCNC: 175 MG/DL (ref 70–99)
GLUCOSE URINE: NEGATIVE MG/DL
GLUCOSE, CSF: 73 MG/DL (ref 50–70)
HCT VFR BLD CALC: 38.5 % (ref 37–47)
HEMOGLOBIN: 12.2 G/DL (ref 12–16)
HYALINE CASTS: NORMAL /HPF (ref 0–5)
KETONES, URINE: NEGATIVE MG/DL
LEUKOCYTE ESTERASE, URINE: ABNORMAL
LYMPHOCYTES ABSOLUTE: 1.3 K/UL (ref 1–4.8)
LYMPHOCYTES RELATIVE PERCENT: 10 %
MCH RBC QN AUTO: 31.1 PG (ref 27–31.3)
MCHC RBC AUTO-ENTMCNC: 31.7 % (ref 33–37)
MCV RBC AUTO: 98.2 FL (ref 82–100)
MONOCYTES ABSOLUTE: 1.5 K/UL (ref 0.2–0.8)
MONOCYTES RELATIVE PERCENT: 11.3 %
NEUTROPHILS ABSOLUTE: 9.8 K/UL (ref 1.4–6.5)
NEUTROPHILS RELATIVE PERCENT: 76.5 %
NITRITE, URINE: NEGATIVE
NO DIFFERENTIAL CSF: NORMAL
PDW BLD-RTO: 15.9 % (ref 11.5–14.5)
PERFORMED ON: ABNORMAL
PH UA: 8.5 (ref 5–9)
PLATELET # BLD: 191 K/UL (ref 130–400)
POTASSIUM SERPL-SCNC: 4.3 MEQ/L (ref 3.4–4.9)
PROCALCITONIN: 0.12 NG/ML (ref 0–0.15)
PROTEIN CSF: 47 MG/DL (ref 15–45)
PROTEIN UA: 30 MG/DL
RBC # BLD: 3.92 M/UL (ref 4.2–5.4)
RBC CSF: 3 K/UL
RBC UA: NORMAL /HPF (ref 0–5)
SODIUM BLD-SCNC: 140 MEQ/L (ref 135–144)
SPECIFIC GRAVITY UA: 1.05 (ref 1–1.03)
TUBE NUMBER CSF: NORMAL
URINE REFLEX TO CULTURE: ABNORMAL
UROBILINOGEN, URINE: 1 E.U./DL
VOLUME CSF: 13.5 ML
WBC # BLD: 12.8 K/UL (ref 4.8–10.8)
WBC CSF: 1 K/UL (ref 0–8)
WBC UA: NORMAL /HPF (ref 0–5)

## 2022-05-25 PROCEDURE — 2500000003 HC RX 250 WO HCPCS: Performed by: RADIOLOGY

## 2022-05-25 PROCEDURE — 00JU3ZZ INSPECTION OF SPINAL CANAL, PERCUTANEOUS APPROACH: ICD-10-PCS | Performed by: RADIOLOGY

## 2022-05-25 PROCEDURE — 89050 BODY FLUID CELL COUNT: CPT

## 2022-05-25 PROCEDURE — 96366 THER/PROPH/DIAG IV INF ADDON: CPT

## 2022-05-25 PROCEDURE — 2580000003 HC RX 258: Performed by: PSYCHIATRY & NEUROLOGY

## 2022-05-25 PROCEDURE — 86592 SYPHILIS TEST NON-TREP QUAL: CPT

## 2022-05-25 PROCEDURE — 1210000000 HC MED SURG R&B

## 2022-05-25 PROCEDURE — 85025 COMPLETE CBC W/AUTO DIFF WBC: CPT

## 2022-05-25 PROCEDURE — 99233 SBSQ HOSP IP/OBS HIGH 50: CPT | Performed by: PSYCHIATRY & NEUROLOGY

## 2022-05-25 PROCEDURE — 36415 COLL VENOUS BLD VENIPUNCTURE: CPT

## 2022-05-25 PROCEDURE — 99232 SBSQ HOSP IP/OBS MODERATE 35: CPT | Performed by: INTERNAL MEDICINE

## 2022-05-25 PROCEDURE — 86694 HERPES SIMPLEX NES ANTBDY: CPT

## 2022-05-25 PROCEDURE — 84145 PROCALCITONIN (PCT): CPT

## 2022-05-25 PROCEDURE — 6370000000 HC RX 637 (ALT 250 FOR IP): Performed by: PHYSICIAN ASSISTANT

## 2022-05-25 PROCEDURE — 6360000002 HC RX W HCPCS: Performed by: NURSE PRACTITIONER

## 2022-05-25 PROCEDURE — 2709999900 IR LUMBAR PUNCTURE FOR DIAGNOSIS

## 2022-05-25 PROCEDURE — 82945 GLUCOSE OTHER FLUID: CPT

## 2022-05-25 PROCEDURE — 62328 DX LMBR SPI PNXR W/FLUOR/CT: CPT

## 2022-05-25 PROCEDURE — 6370000000 HC RX 637 (ALT 250 FOR IP): Performed by: INTERNAL MEDICINE

## 2022-05-25 PROCEDURE — 93880 EXTRACRANIAL BILAT STUDY: CPT | Performed by: INTERNAL MEDICINE

## 2022-05-25 PROCEDURE — 2580000003 HC RX 258: Performed by: NURSE PRACTITIONER

## 2022-05-25 PROCEDURE — 87070 CULTURE OTHR SPECIMN AEROBIC: CPT

## 2022-05-25 PROCEDURE — 80048 BASIC METABOLIC PNL TOTAL CA: CPT

## 2022-05-25 PROCEDURE — 6370000000 HC RX 637 (ALT 250 FOR IP)

## 2022-05-25 PROCEDURE — 87205 SMEAR GRAM STAIN: CPT

## 2022-05-25 PROCEDURE — 6370000000 HC RX 637 (ALT 250 FOR IP): Performed by: PSYCHIATRY & NEUROLOGY

## 2022-05-25 PROCEDURE — 81001 URINALYSIS AUTO W/SCOPE: CPT

## 2022-05-25 PROCEDURE — 84157 ASSAY OF PROTEIN OTHER: CPT

## 2022-05-25 PROCEDURE — 87529 HSV DNA AMP PROBE: CPT

## 2022-05-25 PROCEDURE — 6360000002 HC RX W HCPCS: Performed by: PHYSICIAN ASSISTANT

## 2022-05-25 RX ORDER — SODIUM CHLORIDE 9 MG/ML
INJECTION, SOLUTION INTRAVENOUS PRN
Status: DISCONTINUED | OUTPATIENT
Start: 2022-05-25 | End: 2022-06-03 | Stop reason: HOSPADM

## 2022-05-25 RX ORDER — SODIUM CHLORIDE 0.9 % (FLUSH) 0.9 %
5-40 SYRINGE (ML) INJECTION PRN
Status: DISCONTINUED | OUTPATIENT
Start: 2022-05-25 | End: 2022-06-03 | Stop reason: HOSPADM

## 2022-05-25 RX ORDER — SODIUM CHLORIDE 0.9 % (FLUSH) 0.9 %
5-40 SYRINGE (ML) INJECTION EVERY 12 HOURS SCHEDULED
Status: DISCONTINUED | OUTPATIENT
Start: 2022-05-25 | End: 2022-06-03 | Stop reason: HOSPADM

## 2022-05-25 RX ORDER — LIDOCAINE HYDROCHLORIDE 10 MG/ML
INJECTION, SOLUTION INFILTRATION; PERINEURAL
Status: COMPLETED | OUTPATIENT
Start: 2022-05-25 | End: 2022-05-25

## 2022-05-25 RX ORDER — LANOLIN ALCOHOL/MO/W.PET/CERES
3 CREAM (GRAM) TOPICAL NIGHTLY PRN
Status: DISCONTINUED | OUTPATIENT
Start: 2022-05-25 | End: 2022-06-03 | Stop reason: HOSPADM

## 2022-05-25 RX ADMIN — CEFTRIAXONE SODIUM 2000 MG: 2 INJECTION, POWDER, FOR SOLUTION INTRAMUSCULAR; INTRAVENOUS at 06:00

## 2022-05-25 RX ADMIN — ATORVASTATIN CALCIUM 80 MG: 80 TABLET, FILM COATED ORAL at 21:31

## 2022-05-25 RX ADMIN — LISINOPRIL 20 MG: 20 TABLET ORAL at 09:52

## 2022-05-25 RX ADMIN — MECLIZINE HYDROCHLORIDE 25 MG: 25 TABLET ORAL at 09:52

## 2022-05-25 RX ADMIN — ASPIRIN 81 MG: 81 TABLET, COATED ORAL at 21:30

## 2022-05-25 RX ADMIN — MECLIZINE HYDROCHLORIDE 25 MG: 25 TABLET ORAL at 21:31

## 2022-05-25 RX ADMIN — AMPICILLIN SODIUM 2000 MG: 2 INJECTION, POWDER, FOR SOLUTION INTRAMUSCULAR; INTRAVENOUS at 00:29

## 2022-05-25 RX ADMIN — MECLIZINE HYDROCHLORIDE 25 MG: 25 TABLET ORAL at 14:07

## 2022-05-25 RX ADMIN — CEFTRIAXONE SODIUM 2000 MG: 2 INJECTION, POWDER, FOR SOLUTION INTRAMUSCULAR; INTRAVENOUS at 17:44

## 2022-05-25 RX ADMIN — GABAPENTIN 600 MG: 300 CAPSULE ORAL at 21:31

## 2022-05-25 RX ADMIN — AMPICILLIN SODIUM 2000 MG: 2 INJECTION, POWDER, FOR SOLUTION INTRAMUSCULAR; INTRAVENOUS at 09:58

## 2022-05-25 RX ADMIN — AMPICILLIN SODIUM 2000 MG: 2 INJECTION, POWDER, FOR SOLUTION INTRAMUSCULAR; INTRAVENOUS at 23:13

## 2022-05-25 RX ADMIN — GUAIFENESIN SYRUP AND DEXTROMETHORPHAN 5 ML: 100; 10 SYRUP ORAL at 09:52

## 2022-05-25 RX ADMIN — Medication 3 MG: at 23:09

## 2022-05-25 RX ADMIN — Medication 800 MG: at 09:52

## 2022-05-25 RX ADMIN — INSULIN LISPRO 2 UNITS: 100 INJECTION, SOLUTION INTRAVENOUS; SUBCUTANEOUS at 17:01

## 2022-05-25 RX ADMIN — PANTOPRAZOLE SODIUM 40 MG: 40 TABLET, DELAYED RELEASE ORAL at 06:12

## 2022-05-25 RX ADMIN — ACETAMINOPHEN 650 MG: 325 TABLET ORAL at 00:37

## 2022-05-25 RX ADMIN — AMLODIPINE BESYLATE 10 MG: 10 TABLET ORAL at 09:52

## 2022-05-25 RX ADMIN — LIDOCAINE HYDROCHLORIDE 9 ML: 10 INJECTION, SOLUTION INFILTRATION; PERINEURAL at 11:30

## 2022-05-25 RX ADMIN — ACETAMINOPHEN 650 MG: 325 TABLET ORAL at 13:17

## 2022-05-25 RX ADMIN — Medication 10 ML: at 09:55

## 2022-05-25 RX ADMIN — ENOXAPARIN SODIUM 80 MG: 100 INJECTION SUBCUTANEOUS at 21:30

## 2022-05-25 RX ADMIN — Medication 10 ML: at 21:31

## 2022-05-25 RX ADMIN — AMPICILLIN SODIUM 2000 MG: 2 INJECTION, POWDER, FOR SOLUTION INTRAMUSCULAR; INTRAVENOUS at 17:00

## 2022-05-25 RX ADMIN — VANCOMYCIN HYDROCHLORIDE 1250 MG: 1.25 INJECTION, POWDER, LYOPHILIZED, FOR SOLUTION INTRAVENOUS at 18:26

## 2022-05-25 ASSESSMENT — ENCOUNTER SYMPTOMS
VOMITING: 0
CHEST TIGHTNESS: 0
VOICE CHANGE: 0
SORE THROAT: 0
BLOOD IN STOOL: 0
ABDOMINAL PAIN: 0
DIARRHEA: 0
CONSTIPATION: 0
COLOR CHANGE: 0
SHORTNESS OF BREATH: 0
NAUSEA: 0
EYES NEGATIVE: 1
WHEEZING: 0
BACK PAIN: 1
STRIDOR: 0
ABDOMINAL PAIN: 1
RESPIRATORY NEGATIVE: 1
TROUBLE SWALLOWING: 0
COUGH: 0

## 2022-05-25 ASSESSMENT — PAIN SCALES - GENERAL
PAINLEVEL_OUTOF10: 4
PAINLEVEL_OUTOF10: 0

## 2022-05-25 NOTE — PROGRESS NOTES
Pharmacy Vancomycin Consult     Vancomycin Day: 2  Current Dosing: Vanco 1250mg IV q24h      Recent Labs     05/24/22  1513 05/25/22  0611   BUN 11 12   CREATININE 1.06* 0.97*   WBC 14.2* 12.8*       Intake/Output Summary (Last 24 hours) at 5/25/2022 1411  Last data filed at 5/25/2022 1037  Gross per 24 hour   Intake 651.29 ml   Output --   Net 651.29 ml     Cultures  Recent Labs     05/25/22  0941 05/23/22  1408   [address] CSF  --    COVID19  --  Not Detected     Height: 5' (152.4 cm), Weight: 185 lb (83.9 kg), Body mass index is 36.13 kg/m². Estimated Creatinine Clearance: 46 mL/min (A) (based on SCr of 0.97 mg/dL (H)). .    Trough:  No results for input(s): Elicia Benjamin in the last 72 hours. Assessment/Plan:  Data input into [email or web address] platform. Current dosing continues therapeutic prediction  trough 13.6. Random dosing tomorrow am to further assess dosing. Timing of future trough levels may be adjusted based on culture results, renal function, and clinical response.     Thank you,  PURNIMA [facility] PharmD

## 2022-05-25 NOTE — PROGRESS NOTES
Progress Note  Patient: Yvonna Duverney  Unit/Bed: C841/D365-48  YOB: 1945  MRN: [id number]  Acct: [de-identified]   Admitting Diagnosis: Elevated troponin [R77.8]  Admit Date:  5/23/2022  Hospital Day: 0    Chief Complaint: weakness dizizness     Histories:  Past Medical History:   Diagnosis Date    Cancer ([facility] [email or web address] 75.)     Hyperlipidemia     Hypertension     Type II or unspecified type diabetes mellitus without mention of complication, not stated as uncontrolled      Past Surgical History:   Procedure Laterality Date    CORONARY ANGIOPLASTY WITH STENT PLACEMENT      UPPER GASTROINTESTINAL ENDOSCOPY  8/12/15    w/bx,dilation      Family History   Problem Relation Age of Onset    Cancer Mother     Arthritis Mother     Diabetes Mother     Heart Disease Mother     High Blood Pressure Mother     High Cholesterol Mother     Arthritis Father     Diabetes Father     Heart Disease Father     High Blood Pressure Father     High Cholesterol Father      Social History     Socioeconomic History    Marital status:      Spouse name: None    Number of children: None    Years of education: None    Highest education level: None   Occupational History    None   Tobacco Use    Smoking status: Former Smoker     Years: 24.00    Smokeless tobacco: Never Used   Substance and Sexual Activity    Alcohol use: Yes     Alcohol/week: 3.0 standard drinks     Types: 3 Glasses of wine per week    Drug use: No    Sexual activity: None   Other Topics Concern    None   Social History Narrative    None     Social Determinants of Health     Financial Resource Strain: Low Risk     Difficulty of Paying Living Expenses: Not hard at all   Food Insecurity: No Food Insecurity    Worried About Running Out of Food in the Last Year: Never true    [place] of Food in the Last Year: Never true   Transportation Needs:     Lack of Transportation (Medical): Not on file    Lack of Transportation (Non-Medical):  Not on file   Physical Activity:     Days of Exercise per Week: Not on file    Minutes of Exercise per Session: Not on file   Stress:     Feeling of Stress : Not on file   Social Connections:     Frequency of Communication with Friends and Family: Not on file    Frequency of Social Gatherings with Friends and Family: Not on file    Attends Orthodox Services: Not on file    Active Member of [address] [place] [address] or Organizations: Not on file    Attends Club or Organization Meetings: Not on file    Marital Status: Not on file   Intimate Partner Violence:     Fear of Current or Ex-Partner: Not on file    Emotionally Abused: Not on file    Physically Abused: Not on file    Sexually Abused: Not on file   Housing Stability:     Unable to Pay for Housing in the Last Year: Not on file    Number of [place] in the Last Year: Not on file    Unstable Housing in the Last Year: Not on file       Subjective/HPI   Pt had fever. Had mild abd/epigastric pain. Has been there 2 weeks. No cp no osb    EKG: SR 87        Review of Systems:   Review of Systems   Constitutional: Negative. Negative for diaphoresis and fatigue. HENT: Negative. Eyes: Negative. Respiratory: Negative. Negative for cough, chest tightness, shortness of breath, wheezing and stridor. Cardiovascular: Negative. Negative for chest pain, palpitations and leg swelling. Gastrointestinal: Positive for abdominal pain. Negative for blood in stool and nausea. Genitourinary: Negative. Musculoskeletal: Positive for arthralgias, back pain and gait problem. Skin: Negative. Neurological: Positive for dizziness and weakness. Negative for syncope and light-headedness. Hematological: Negative. Psychiatric/Behavioral: Negative. Physical Examination:    /66   Pulse 84   Temp 98.2 °F (36.8 °C) (Oral)   Resp 18   Ht 5' (1.524 m)   Wt 185 lb (83.9 kg)   SpO2 95%   BMI 36.13 kg/m²    Physical Exam   Constitutional: She appears healthy. No distress. HENT:   Normal cephalic and Atraumatic   Eyes: Pupils are equal, round, and reactive to light. Neck: Thyroid normal. No JVD present. No neck adenopathy. No thyromegaly present. Cardiovascular: Normal rate, regular rhythm, normal heart sounds, intact distal pulses and normal pulses. Pulmonary/Chest: Effort normal and breath sounds normal. She has no wheezes. She has no rales. She exhibits no tenderness. Abdominal: Soft. Bowel sounds are normal. There is no abdominal tenderness. Musculoskeletal:         General: No tenderness or edema. Normal range of motion. Cervical back: Normal range of motion and neck supple. Neurological: She is alert and oriented to person, place, and time. Skin: Skin is warm. No cyanosis. Nails show no clubbing.        LABS:  CBC:   Lab Results   Component Value Date    WBC 12.8 05/25/2022    RBC 3.92 05/25/2022    HGB 12.2 05/25/2022    HCT 38.5 05/25/2022    MCV 98.2 05/25/2022    MCH 31.1 05/25/2022    MCHC 31.7 05/25/2022    RDW 15.9 05/25/2022     05/25/2022    MPV 8.2 10/12/2015     CBC with Differential:    Lab Results   Component Value Date    WBC 12.8 05/25/2022    RBC 3.92 05/25/2022    HGB 12.2 05/25/2022    HCT 38.5 05/25/2022     05/25/2022    MCV 98.2 05/25/2022    MCH 31.1 05/25/2022    MCHC 31.7 05/25/2022    RDW 15.9 05/25/2022    LYMPHOPCT 10.0 05/25/2022    MONOPCT 11.3 05/25/2022    BASOPCT 0.8 05/25/2022    MONOSABS 1.5 05/25/2022    LYMPHSABS 1.3 05/25/2022    EOSABS 0.2 05/25/2022    BASOSABS 0.1 05/25/2022     CMP:    Lab Results   Component Value Date     05/25/2022    K 4.3 05/25/2022     05/25/2022    CO2 21 05/25/2022    BUN 12 05/25/2022    CREATININE 0.97 05/25/2022    GFRAA >60.0 05/25/2022    LABGLOM 55.4 05/25/2022    GLUCOSE 108 05/25/2022    PROT 6.6 05/23/2022    LABALBU 4.4 05/23/2022    CALCIUM 9.3 05/25/2022    BILITOT 1.1 05/23/2022    ALKPHOS 74 05/23/2022    AST 18 05/23/2022    ALT 23 05/23/2022     BMP:    Lab Results   Component Value Date     05/25/2022    K 4.3 05/25/2022     05/25/2022    CO2 21 05/25/2022    BUN 12 05/25/2022    LABALBU 4.4 05/23/2022    CREATININE 0.97 05/25/2022    CALCIUM 9.3 05/25/2022    GFRAA >60.0 05/25/2022    LABGLOM 55.4 05/25/2022    GLUCOSE 108 05/25/2022     Magnesium:    Lab Results   Component Value Date    MG 1.6 05/24/2022     Troponin:    Lab Results   Component Value Date    TROPONINI 0.036 05/24/2022        Active Hospital Problems    Diagnosis Date Noted    Dizziness [R42]      Priority: Medium    Benign paroxysmal positional vertigo due to bilateral vestibular disorder [H81.13]      Priority: Medium    Maxillary pain [R68.84]      Priority: Medium    Nonintractable headache [R51.9]      Priority: Medium    Fall at home [W19. Jennie Ropes, Y92.009]      Priority: Medium    Fever and chills [R50.9]      Priority: Medium    Elevated troponin [R77.8] 05/23/2022     Priority: Medium        Assessment/Plan:  1. Pt was transferred to Medicine service as of yesterday at request of Dr. Harmony Ceballos. 2. Fever - work up in progress. 3. Dizziness -Telemetry. CUS - negative  4. Elevated possible Demand ischemia  - Borderline levels. No CP  5. Echo EF 65%  6. HTN stable - continue meds. Low salt diet. 7. HPL - Statin   8.  HypoMag 1.6 - replaced         Electronically signed by Yoni Pastrana MD on 5/25/2022 at 9:17 AM

## 2022-05-25 NOTE — OR NURSING
NO SEDATION    1117 - Pt arrived to specials via cart. Lumbar Puncture procedure explained. Consent confirmed - telephone consent by patient's . VSS. On RA. Patient positioned prone on IR table with pillows for support. Imaging done by IR tech using fluoroscopy to visualize lumbar spine. 1127 - Time out completed, site marked by Dr. Juan Luis Urias, then procedure site prepped with Betadine and draped with sterile drape and towels. 1130 - Lumbar spine site then numbed with lidocaine 2% by Dr Juan Luis Urias, spinal needle placed into the central canal of the spinal cord using fluoroscopic guidance. Fluid draining appears clear. 1140 -Sample of fluid obtained and placed into 4 separate specimen tubes. Each tube labeled with one through four. Total CSF removed 14.5 ml.  Band-aid applied. Patient tolerated well and assisted onto cart for recovery period. Post procedure instructions telling patient to keep the head of bed flat x 1 hour per verbal instruction Dr. Juan Luis Urias. Patient returned to inpatient room and report called to MADAY Byrnes.     1155 - Fluid taken to lab for testing as ordered.

## 2022-05-25 NOTE — PROGRESS NOTES
Pt remained flat for 1hr post LP as ordered. Denies headache, complains of mild back pain. Medicated with PRN tylenol.  at bedside.

## 2022-05-25 NOTE — PROGRESS NOTES
Physical Therapy Missed Treatment   Facility/Department: [facility] MED SURG O539/[id number]    NAME: Christopher Miller    : 1945 (64 y.o.)  MRN: [id number]    Account: [de-identified]  Gender: female      Chart reviewed. Discussed pt case with Heather Diallo MD - suspect meningitis. Pt scheduled for LP today. Physician requesting hold PT this date and resume on Thursday.       Marco Dowling, PT, 22 at 11:08 AM

## 2022-05-25 NOTE — PROGRESS NOTES
[facility] Neurology Daily Progress Note  Name: Chey Bagley  Age: 64 y.o. Gender: female  CodeStatus: Full Code  Allergies: Eggs Or Egg-Derived Products  Glucophage [Metformin]  Valium [Diazepam]  Zithromax [Azithromycin]  Milk-Related Compounds    Chief Complaint:Dizziness    Primary Care Provider: Bhavna Short MD  InpatientTreatment Team: Treatment Team: Attending Provider: Sam Duenas MD; Consulting Physician: Graham Day MD; Consulting Physician: Orville Mesa DO; Consulting Physician: Pretty Robin MD; Consulting Physician: Karly Rojo MD; Registered Nurse: Yash Quiroz RN; Utilization Reviewer: Jalen Tolbert RN  Admission Date: 5/23/2022  HPI 66-year right-handed female admitted for dizziness and issues with balance. Symptoms going on for almost a year on and off. She reports she is dizzy upon walking and reads to 1 side or the other. MRI was done yesterday at [facility] which appeared to show no acute findings. Patient has significant other cerebrovascular risk factors she denies any tinnitus or hearing loss. She denies any neck pain but does have back pain. Has not recent falls injuries or trauma. Patient appears to be dizzy upon sitting up from a lying down position and we were not able to walk her or perform a vestibular examination due to the dizziness. Patient is areflexic in the lower extremities no definite sensory levels are noted  CT Head WO Contrast    Events noted, episode of confusion  Seen by ID and ordered LP    HPI   Pt seen and examined for neuro follow up. NO Headache, double vision, blurry vision, difficulty with speech, difficulty with swallowing, weakness, numbness, pain, nausea, vomiting, choking, neck pain, reports dizziness with movement. Vitals:    05/25/22 1147   BP: (!) 103/59   Pulse: (!) 102   Resp: 16   Temp:    SpO2: 99%      Review of Systems   Constitutional: Negative for appetite change and fever.    HENT: Negative for drooling, ear pain, sore throat, trouble swallowing and voice change. Respiratory: Negative for cough and shortness of breath. Cardiovascular: Negative for chest pain. Gastrointestinal: Negative for abdominal pain, constipation, diarrhea, nausea and vomiting. Genitourinary: Negative for decreased urine volume and dysuria. Musculoskeletal: Positive for back pain. Negative for arthralgias. Skin: Negative for color change. Neurological: Positive for dizziness and light-headedness. Negative for weakness and headaches. Psychiatric/Behavioral: Negative for agitation and behavioral problems. All other systems reviewed and are negative. Physical Exam  Vitals and nursing note reviewed. Constitutional:       General: She is not in acute distress. Appearance: Normal appearance. She is well-developed. HENT:      Head: Normocephalic and atraumatic. Nose: Nose normal.      Mouth/Throat:      Mouth: Mucous membranes are moist.   Eyes:      General:         Right eye: No discharge. Left eye: No discharge. Conjunctiva/sclera: Conjunctivae normal.   Neck:      Vascular: No JVD. Trachea: No tracheal deviation. Cardiovascular:      Rate and Rhythm: Normal rate. Pulmonary:      Effort: Pulmonary effort is normal.      Breath sounds: Normal breath sounds. Abdominal:      General: Bowel sounds are normal.      Palpations: Abdomen is soft. Musculoskeletal:         General: Normal range of motion. Cervical back: Normal range of motion and neck supple. No rigidity. No muscular tenderness. Lymphadenopathy:      Cervical: No cervical adenopathy. Skin:     General: Skin is warm and dry. Capillary Refill: Capillary refill takes less than 2 seconds. Neurological:      Mental Status: She is alert and oriented to person, place, and time.    Psychiatric:         Mood and Affect: Mood normal.         Behavior: Behavior normal.       Pt oreinted x 3 and nonfocal        Medications: Reviewed    Infusion Medications:    sodium chloride      sodium chloride      dextrose      sodium chloride       Scheduled Medications:    sodium chloride flush  5-40 mL IntraVENous 2 times per day    sodium chloride flush  5-40 mL IntraVENous 2 times per day    lisinopril  20 mg Oral Daily    pantoprazole  40 mg Oral QAM AC    magnesium oxide  800 mg Oral Daily    meclizine  25 mg Oral TID    amLODIPine  10 mg Oral Daily    cefTRIAXone (ROCEPHIN) IV  2,000 mg IntraVENous Q12H    ampicillin IV  2,000 mg IntraVENous 4 times per day    vancomycin  15 mg/kg IntraVENous Q24H    vancomycin (VANCOCIN) intermittent dosing (placeholder)   Other RX Placeholder    sodium chloride flush  5-40 mL IntraVENous 2 times per day    atorvastatin  80 mg Oral Nightly    [Held by provider] enoxaparin  1 mg/kg SubCUTAneous BID    [Held by provider] aspirin  81 mg Oral BID    gabapentin  600 mg Oral Nightly    insulin lispro  0-12 Units SubCUTAneous 4x Daily WC    insulin lispro  0-6 Units SubCUTAneous Nightly     PRN Meds: sodium chloride flush, sodium chloride, sodium chloride flush, sodium chloride, guaiFENesin-dextromethorphan, glucose, dextrose bolus **OR** dextrose bolus, glucagon (rDNA), dextrose, sodium chloride flush, sodium chloride, ondansetron **OR** ondansetron, acetaminophen **OR** acetaminophen, polyethylene glycol, nitroGLYCERIN, hydrALAZINE    Labs:   Recent Labs     05/24/22  0448 05/24/22  1513 05/25/22  0611   WBC 13.8* 14.2* 12.8*   HGB 12.7 13.1 12.2   HCT 39.0 40.6 38.5    203 191     Recent Labs     05/23/22  1457 05/24/22  1513 05/25/22  0611    139 140   K 4.0 4.0 4.3    103 105   CO2 24 24 21   BUN 15 11 12   CREATININE 0.96* 1.06* 0.97*   CALCIUM 10.6* 10.2* 9.3     Recent Labs     05/23/22  1457   AST 18   ALT 23   BILITOT 1.1*   ALKPHOS 74     Recent Labs     05/23/22  1330   INR 1.0     Recent Labs     05/23/22  1330 05/23/22  1457 05/23/22  1945 05/24/22  0015 05/24/22  1513   CKTOTAL --  139  --   --   --    TROPONINI   < >  --  0.026* 0.028* 0.036*    < > = values in this interval not displayed. Urinalysis:   Lab Results   Component Value Date    NITRU Negative 05/25/2022    WBCUA 0-2 05/25/2022    BACTERIA Negative 05/25/2022    RBCUA 0-2 05/25/2022    BLOODU Negative 05/25/2022    SPECGRAV 1.050 05/25/2022    GLUCOSEU Negative 05/25/2022       Radiology:   Most recent    EEG No valid procedures specified. MRI of Brain No results found for this or any previous visit. No results found for this or any previous visit. MRA of the Head and Neck: No results found for this or any previous visit. No results found for this or any previous visit. No results found for this or any previous visit. CT of the Head: Results for orders placed during the hospital encounter of 05/23/22    CT HEAD WO CONTRAST    Narrative  INDICATION: 64-year-old female presenting with dizziness and altered mental status. COMPARISON: 5/23/2022 and 10/9/2015. Ted Spinner TECHNIQUE: Multidetector CT imaging was obtained from the skull base to vertex without the administration of intravenous contrast. Coronal and sagittal reformatted images were obtained. Automated dose exposure control was used for this exam.    FINDINGS:    Scattered areas of low-attenuation are visualized in the periventricular and subcortical white matter that demonstrate no significant change in comparison to the prior study consistent with chronic microvascular disease. No evidence of parenchymal hemorrhages or contusions. No evidence of intra or extra-axial fluid collection is seen. Mild prominence of ventricles and sulci are visualized demonstrating no significant increase in comparison to the prior study consistent is more chronic brain changes. No structural midline abnormalities seen. Visualized paranasal sinuses are well aerated.  Visualized mastoid air cells are well aerated. Extensive lucencies visualized within the diploic space throughout the calvarium would recommend clinical correlation. Impression  No acute intracranial hemorrhage or mass effect. Chronic microvascular disease and chronic atrophic brain changes. Extensive lucencies visualized within the diploic space throughout the calvarium would recommend clinical correlation. No results found for this or any previous visit. No results found for this or any previous visit. Carotid duplex: No results found for this or any previous visit. No results found for this or any previous visit. Results for orders placed during the hospital encounter of 05/23/22    US CAROTID ARTERY BILATERAL    Narrative  EXAMINATION:  CAROTID DUPLEX ULTRASONOGRAPHY    CLINICAL HISTORY:  DIZZINESS    COMPARISONS:  NONE AVAILABLE    TECHNIQUE:  B-mode, color flow and spectral Doppler    FINDINGS:    ARTERIAL BLOOD FLOW VELOCITY    RIGHT PS    Prox CCA    67 cm/s  Mid CCA     65 cm/s  Dist CCA    62 cm/s  Prox ICA    59 cm/s  Mid ICA     106 cm/s  Dist ICA    94 cm/s  Prox ECA    72 cm/s  Prox VERT   61 cm/s    ICA/CCA     1.64    LEFT PS    Prox CCA    120 cm/s  Mid CCA     95 cm/s  Dist CCA    92 cm/s  Prox ICA    87 cm/s  Mid ICA     119 cm/s  Dist ICA    146 cm/s  Prox ECA    71 cm/s  Prox VERT   76 cm/s    ICA/CCA     1.62    Impression  MILD SCATTERED ATHEROSCLEROSIS BOTH CAROTID TREES. NO FLOW OBSTRUCTION. BILATERAL ICAS LESS THAN 50% STENOSIS. BILATERAL ANTEGRADE VERTEBRAL FLOW. Echo No results found for this or any previous visit. Assessment/Plan:  5/24/22  Dizziness which may be suggestive of vestibular dysfunction. We will start her on Antivert 25 mg 3 times daily and arrange for orthostatic blood pressure recordings. MRI of the brain was done at [facility] yesterday which were reviewed and does not show an acute stroke. Recommend ct angiogram to make sure there is no basilar artery stenosis.      Patient may benefit from vestibular rehab once work-up is complete. 5/25/22  Patient continues to have dizziness with movement  Started on Antivert yesterday 25mg TID  Orthostatic blood pressure readings negative for orthostatic hypotension   MRI of brain negative (performed yesterday at [facility])  Ct angiogram shows no significant carotid stenosis. Carotid Ultrasound: bilateral internal carotid less than 50% stenosis    LP pending for fevers ordered by medical team   Patient's symptoms consistent with vestibular dysfunction. As noted above would benefit from vestibular rehab. Can discharge from neurology standpoint. Follow up in 4-6 weeks. 5/26  I have personally performed a face to face diagnostic evaluation on this patient, reviewed all data and investigations, and am the sole provider of all clinical decisions on the neurological status of this patient. Transient confusion,  seen by ID, ordered LP, which is negative, MRI at [facility] negative, dizziness better with antivert      Kashif Baig MD, [address], American Board of Psychiatry & Neurology  Board Certified in Vascular Neurology  Board Certified in Neuromuscular Medicine  Certified in Neurorehabilitation           Collaborating physicians: Dr Peggy Baig    Electronically signed by JOSE Gramajo CNP on 5/25/2022 at 11:47 AM

## 2022-05-25 NOTE — CONSULTS
Yvrose Beckford  1945  female  Medical Record Number: [id number]    Patient informed that I am an Infectious Disease physician and permission obtained from the patient to speak in front of any visitors prior to any discussion for HIPPA purposes. HPI: Patient with dizziness and pain under the eyes across her face in the area of the bilateral maxillary sinus/ ethmoid region  + hx of fever and chills. + hx altered mentation, now better. BC and UC pending  Started on Vanco, Cef, and Ampicillin    Review of Systems: All 14 review of systems were discussed with the patient and are negative other than as stated above      Physical Exam:  General: Patient appears in no acute distress, cooperative  Sleepy but arousable and appropriate in her conversation with me. No slurred speech. Complains of dizziness with too much movement and no headache or neck pain per say - has facial pain bilateral maxillary region under her eyes when asked to point to the source of the pain. Also has pain in the sternocleidomastoid posterior belly L>R on exam. No midline pain. She is able to touch the chin to the chest with ease. All CNs intact. States that that she has had chronic sinus issues for years.    Skin: no new rashes or erythema or induration  HEENT: EOMI, MMM, Neck is supple  Heart: S1 S2  Lungs: bilaterally clear to auscultation  Abdomen: soft, ND, NTTP, +BS  Extrem:+pulses, no calf pain, no asymmetry of the upper or lower extremities  Neuro exam: CN II-XII intact, facial expressions symmertrical bilaterally, no slurred speech        Past Medical History:   Diagnosis Date    Cancer ([facility] [address]     Hyperlipidemia     Hypertension     Type II or unspecified type diabetes mellitus without mention of complication, not stated as uncontrolled        Past Surgical History:   Procedure Laterality Date    CORONARY ANGIOPLASTY WITH STENT PLACEMENT      UPPER GASTROINTESTINAL ENDOSCOPY  8/12/15    w/bx,dilation        Social History     Socioeconomic History    Marital status:      Spouse name: Not on file    Number of children: Not on file    Years of education: Not on file    Highest education level: Not on file   Occupational History    Not on file   Tobacco Use    Smoking status: Former Smoker     Years: 24.00    Smokeless tobacco: Never Used   Substance and Sexual Activity    Alcohol use: Yes     Alcohol/week: 3.0 standard drinks     Types: 3 Glasses of wine per week    Drug use: No    Sexual activity: Not on file   Other Topics Concern    Not on file   Social History Narrative    Not on file     Social Determinants of Health     Financial Resource Strain: Low Risk     Difficulty of Paying Living Expenses: Not hard at all   Food Insecurity: No Food Insecurity    Worried About Running Out of Food in the Last Year: Never true    [address] in the Last Year: Never true   Transportation Needs:     Lack of Transportation (Medical): Not on file    Lack of Transportation (Non-Medical):  Not on file   Physical Activity:     Days of Exercise per Week: Not on file    Minutes of Exercise per Session: Not on file   Stress:     Feeling of Stress : Not on file   Social Connections:     Frequency of Communication with Friends and Family: Not on file    Frequency of Social Gatherings with Friends and Family: Not on file    Attends Nondenominational Services: Not on file    Active Member of [address] [place] [address] or Organizations: Not on file    Attends Club or Organization Meetings: Not on file    Marital Status: Not on file   Intimate Partner Violence:     Fear of Current or Ex-Partner: Not on file    Emotionally Abused: Not on file    Physically Abused: Not on file    Sexually Abused: Not on file   Housing Stability:     Unable to Pay for Housing in the Last Year: Not on file    Number of [place] in the Last Year: Not on file    Unstable Housing in the Last Year: Not on file       Family History   Problem Relation Age of Onset    Cancer Mother     Arthritis Mother     Diabetes Mother     Heart Disease Mother     High Blood Pressure Mother     High Cholesterol Mother     Arthritis Father     Diabetes Father     Heart Disease Father     High Blood Pressure Father     High Cholesterol Father        No current facility-administered medications on file prior to encounter. Current Outpatient Medications on File Prior to Encounter   Medication Sig Dispense Refill    atorvastatin (LIPITOR) 80 MG tablet Take 1 tablet by mouth daily 90 tablet 0    gabapentin (NEURONTIN) 300 MG capsule Take 2 capsules by mouth at bedtime for 90 days. 180 capsule 0    furosemide (LASIX) 20 MG tablet Take 1 tablet by mouth daily 90 tablet 0    metoprolol tartrate (LOPRESSOR) 50 MG tablet Take 1 tablet by mouth 2 times daily (Patient taking differently: Take 50 mg by mouth daily ) 180 tablet 0    pantoprazole (PROTONIX) 40 MG tablet Take 1 tablet by mouth daily 90 tablet 0    ezetimibe (ZETIA) 10 MG tablet Take 1 tablet by mouth daily 90 tablet 0    blood glucose monitor kit and supplies Test 1 time daily 1 kit 0    blood glucose monitor strips Test 1 time daily 50 strip 2    Lancets MISC 1 each by Does not apply route daily 50 each 5    aspirin 81 MG tablet Take 81 mg by mouth 2 times daily       isosorbide mononitrate (IMDUR) 30 MG CR tablet Take 30 mg by mouth daily (Patient not taking: Reported on 5/23/2022)      lisinopril (PRINIVIL;ZESTRIL) 20 MG tablet Take 20 mg by mouth daily  (Patient not taking: Reported on 5/23/2022)         Labs: I have reviewed all lab results by electronic record, including most recent CBC, metabolic panel, and pertinent abnormalities were addressed from an infectious disease perspective. WBC trends are being monitored.     Lab Results   Component Value Date     05/24/2022    K 4.0 05/24/2022     05/24/2022    CO2 24 05/24/2022    BUN 11 05/24/2022    CREATININE 1.06 05/24/2022    GLUCOSE 120 05/24/2022    CALCIUM 10.2 05/24/2022      Lab Results   Component Value Date    WBC 14.2 (H) 05/24/2022    HGB 13.1 05/24/2022    HCT 40.6 05/24/2022    MCV 97.5 05/24/2022     05/24/2022       Radiology:   I have reviewed imaging results per electronic record and most pertinent abnormalities are being addressed from an infectious disease standpoint. CT head showed well aerated sinuses and mastoid. Assessment:  Acute metabolic encephalopathy  Falls at home  Dizziness  Headache/ facial pain with fever and chills per patient. Plan:  Syphilis Igg  Add VDRL to LP along with HSV 1 and HSV 2  Consider CT sinuses as she states that she has had issues with sinusitis for years. LP pending  Currently started on Vanco, Ceftriaxone, and Ampicillin for possible meningitis. Add procal  Follow up all cx results.          Dyna Burns D.O.

## 2022-05-25 NOTE — CARE COORDINATION
PATIENT NOT MEDICALLY CLEARED FOR D/C. STILL COMPLETING W/U TO R/O MENINGITIS. PATIENT TO HAVE LP, AND IS ON IV ANTIBIOTICS. PLAN WAS FOR THE PATIENT TO RETURN HOME W/ SPOUSE. MAY NEED TO RE-EVAL PATIENT FOR THERAPY NEEDS AS THE PATIENT HAD A MEDICAL STATUS CHANGE. CM TO FOLLOW FOR ANY NEW D/C NEEDS OR CHANGES TO THE D/C PLAN OF CARE.

## 2022-05-25 NOTE — PROGRESS NOTES
Hospitalist Progress Note      PCP: Lit Tucker MD    Date of Admission: 5/23/2022    Chief Complaint:    Chief Complaint   Patient presents with    Dizziness     Subjective:  Patient feels better and back to her normal; does not fully recall the incident yesterday.   12 point ROS negative other than mentioned above     Medications:  Reviewed    Infusion Medications    sodium chloride      sodium chloride      dextrose      sodium chloride       Scheduled Medications    sodium chloride flush  5-40 mL IntraVENous 2 times per day    sodium chloride flush  5-40 mL IntraVENous 2 times per day    lisinopril  20 mg Oral Daily    pantoprazole  40 mg Oral QAM AC    magnesium oxide  800 mg Oral Daily    meclizine  25 mg Oral TID    amLODIPine  10 mg Oral Daily    cefTRIAXone (ROCEPHIN) IV  2,000 mg IntraVENous Q12H    ampicillin IV  2,000 mg IntraVENous 4 times per day    vancomycin  15 mg/kg IntraVENous Q24H    vancomycin (VANCOCIN) intermittent dosing (placeholder)   Other RX Placeholder    sodium chloride flush  5-40 mL IntraVENous 2 times per day    atorvastatin  80 mg Oral Nightly    [Held by provider] enoxaparin  1 mg/kg SubCUTAneous BID    [Held by provider] aspirin  81 mg Oral BID    gabapentin  600 mg Oral Nightly    insulin lispro  0-12 Units SubCUTAneous 4x Daily WC    insulin lispro  0-6 Units SubCUTAneous Nightly     PRN Meds: sodium chloride flush, sodium chloride, sodium chloride flush, sodium chloride, guaiFENesin-dextromethorphan, glucose, dextrose bolus **OR** dextrose bolus, glucagon (rDNA), dextrose, sodium chloride flush, sodium chloride, ondansetron **OR** ondansetron, acetaminophen **OR** acetaminophen, polyethylene glycol, nitroGLYCERIN, hydrALAZINE      Intake/Output Summary (Last 24 hours) at 5/25/2022 1327  Last data filed at 5/25/2022 1037  Gross per 24 hour   Intake 651.29 ml   Output --   Net 651.29 ml     Exam:    BP (!) 103/59   Pulse (!) 102   Temp 98.2 °F (36.8 °C) (Oral)   Resp 16   Ht 5' (1.524 m)   Wt 185 lb (83.9 kg)   SpO2 99%   BMI 36.13 kg/m²     General appearance: No apparent distress, appears stated age and cooperative. HEENT: Conjunctivae/corneas clear. Neck:  Trachea midline. Respiratory:  Normal respiratory effort. Clear to auscultation. Cardiovascular: Regular rate and rhythm   Abdomen: Soft, non-tender, non-distended with normal bowel sounds. Musculoskeletal: No clubbing, cyanosis or edema bilaterally. Neuro: Non Focal.   Capillary Refill: Brisk,< 3 seconds   Peripheral Pulses: +2 palpable, equal bilaterally     Labs:   Recent Labs     05/24/22  0448 05/24/22  1513 05/25/22  0611   WBC 13.8* 14.2* 12.8*   HGB 12.7 13.1 12.2   HCT 39.0 40.6 38.5    203 191     Recent Labs     05/23/22  1457 05/24/22  1513 05/25/22  0611    139 140   K 4.0 4.0 4.3    103 105   CO2 24 24 21   BUN 15 11 12   CREATININE 0.96* 1.06* 0.97*   CALCIUM 10.6* 10.2* 9.3     Recent Labs     05/23/22  1457   AST 18   ALT 23   BILITOT 1.1*   ALKPHOS 74     Recent Labs     05/23/22  1330   INR 1.0     Recent Labs     05/23/22  1330 05/23/22  1457 05/23/22  1945 05/24/22  0015 05/24/22  1513   CKTOTAL  --  139  --   --   --    TROPONINI   < >  --  0.026* 0.028* 0.036*    < > = values in this interval not displayed. Urinalysis:      Lab Results   Component Value Date    NITRU Negative 05/25/2022    WBCUA 0-2 05/25/2022    BACTERIA Negative 05/25/2022    RBCUA 0-2 05/25/2022    BLOODU Negative 05/25/2022    SPECGRAV 1.050 05/25/2022    GLUCOSEU Negative 05/25/2022     Radiology:  XR CHEST PORTABLE   Final Result   Suggestion of left lower lobe infiltrate/consolidation. Recommend clinical correlation      CT HEAD WO CONTRAST   Final Result   No acute intracranial hemorrhage or mass effect. Chronic microvascular disease and chronic atrophic brain changes.    Extensive lucencies visualized within the diploic space throughout the calvarium would recommend clinical correlation. CTA HEAD W WO CONTRAST   Final Result      Small caliber of the vertebrobasilar system as discussed. Possible short segment of narrowing of the distal basilar artery near terminus versus tortuosity. Dominant anterior circulation with fetal origin of the PCAs bilaterally. No evidence for significant carotid stenosis. CTA NECK W WO CONTRAST   Final Result      Small caliber of the vertebrobasilar system as discussed. Possible short segment of narrowing of the distal basilar artery near terminus versus tortuosity. Dominant anterior circulation with fetal origin of the PCAs bilaterally. No evidence for significant carotid stenosis. US CAROTID ARTERY BILATERAL   Final Result   MILD SCATTERED ATHEROSCLEROSIS BOTH CAROTID TREES. NO FLOW OBSTRUCTION. BILATERAL ICAS LESS THAN 50% STENOSIS. BILATERAL ANTEGRADE VERTEBRAL FLOW. CT Head WO Contrast   Final Result   No acute intracranial hemorrhage or mass effect. Chronic microvascular disease and chronic atrophic brain changes. XR CHEST PORTABLE   Final Result   COPD and chronic interstitial changes. IR LUMBAR PUNCTURE FOR DIAGNOSIS    (Results Pending)     Assessment/Plan:    #Fever with encephalopathy     - neuro and ID consulted; LP planned for today; empirically treated for meningitis started yesterday     - encephalopathy appears much improved    #Dizziness    - per neuro    #Elevated troponin    - demand ischemia per cardiology    #HTN/HLD    - continue home meds    #DMII    - Continue SSI    Active Hospital Problems    Diagnosis Date Noted    Dizziness [R42]      Priority: Medium    Benign paroxysmal positional vertigo due to bilateral vestibular disorder [H81.13]      Priority: Medium    Maxillary pain [R68.84]      Priority: Medium    Nonintractable headache [R51.9]      Priority: Medium    Fall at home [W19. Sruthi Worthington, Y92.009]      Priority: Medium    Fever and chills [R50.9]      Priority: Medium    Elevated troponin [R77.8] 05/23/2022     Priority: Medium     Additional work up or/and treatment plan may be added today or then after based on clinical progression. I am managing a portion of pt care. Some medical issues are handled by other specialists. Additional work up and treatment should be done in out pt setting by pt PCP and other out pt providers. In addition to examining and evaluating pt, I spent additional time explaining care, normal and abnormal findings, and treatment plan. All of pt questions were answered. Counseling, diet and education were  provided. Case will be discussed with nursing staff when appropriate. Family will be updated if and when appropriate. Diet: ADULT DIET; Regular; Low Fat/Low Chol/High Fiber/2 gm Na;  No Caffeine    Code Status: Full Code    PT/OT Eval     Electronically signed by Radha Nunez MD on 5/25/2022 at 1:27 PM

## 2022-05-25 NOTE — PROGRESS NOTES
Pt A&O x3, consent for LP completed by  (primary decision maker).     945am- dr Pancho Lange notified of need for LP order/imaging

## 2022-05-26 ENCOUNTER — APPOINTMENT (OUTPATIENT)
Dept: MRI IMAGING | Age: 77
DRG: 070 | End: 2022-05-26
Payer: MEDICARE

## 2022-05-26 ENCOUNTER — TELEPHONE (OUTPATIENT)
Dept: INTERVENTIONAL RADIOLOGY/VASCULAR | Age: 77
End: 2022-05-26

## 2022-05-26 LAB
ADENOVIRUS BY PCR: NOT DETECTED
ANION GAP SERPL CALCULATED.3IONS-SCNC: 15 MEQ/L (ref 9–15)
BASOPHILS ABSOLUTE: 0.1 K/UL (ref 0–0.2)
BASOPHILS RELATIVE PERCENT: 0.6 %
BORDETELLA PARAPERTUSSIS BY PCR: NOT DETECTED
BORDETELLA PERTUSSIS BY PCR: NOT DETECTED
BUN BLDV-MCNC: 13 MG/DL (ref 8–23)
CALCIUM SERPL-MCNC: 9.5 MG/DL (ref 8.5–9.9)
CHLAMYDOPHILIA PNEUMONIAE BY PCR: NOT DETECTED
CHLORIDE BLD-SCNC: 103 MEQ/L (ref 95–107)
CO2: 20 MEQ/L (ref 20–31)
CORONAVIRUS 229E BY PCR: NOT DETECTED
CORONAVIRUS HKU1 BY PCR: NOT DETECTED
CORONAVIRUS NL63 BY PCR: NOT DETECTED
CORONAVIRUS OC43 BY PCR: NOT DETECTED
CREAT SERPL-MCNC: 1.18 MG/DL (ref 0.5–0.9)
EOSINOPHILS ABSOLUTE: 0.3 K/UL (ref 0–0.7)
EOSINOPHILS RELATIVE PERCENT: 2.4 %
GFR AFRICAN AMERICAN: 53.5
GFR NON-AFRICAN AMERICAN: 44.2
GLUCOSE BLD-MCNC: 102 MG/DL (ref 70–99)
GLUCOSE BLD-MCNC: 105 MG/DL (ref 70–99)
GLUCOSE BLD-MCNC: 127 MG/DL (ref 70–99)
GLUCOSE BLD-MCNC: 131 MG/DL (ref 70–99)
GLUCOSE BLD-MCNC: 168 MG/DL (ref 70–99)
GLUCOSE BLD-MCNC: 99 MG/DL (ref 70–99)
HCT VFR BLD CALC: 40.6 % (ref 37–47)
HEMOGLOBIN: 13.4 G/DL (ref 12–16)
HUMAN METAPNEUMOVIRUS BY PCR: NOT DETECTED
HUMAN RHINOVIRUS/ENTEROVIRUS BY PCR: NOT DETECTED
INFLUENZA A BY PCR: NOT DETECTED
INFLUENZA B BY PCR: NOT DETECTED
LYMPHOCYTES ABSOLUTE: 1.2 K/UL (ref 1–4.8)
LYMPHOCYTES RELATIVE PERCENT: 11.2 %
MCH RBC QN AUTO: 32 PG (ref 27–31.3)
MCHC RBC AUTO-ENTMCNC: 32.9 % (ref 33–37)
MCV RBC AUTO: 97.2 FL (ref 82–100)
MONOCYTES ABSOLUTE: 0.9 K/UL (ref 0.2–0.8)
MONOCYTES RELATIVE PERCENT: 8 %
MYCOPLASMA PNEUMONIAE BY PCR: NOT DETECTED
NEUTROPHILS ABSOLUTE: 8.5 K/UL (ref 1.4–6.5)
NEUTROPHILS RELATIVE PERCENT: 77.8 %
PARAINFLUENZA VIRUS 1 BY PCR: NOT DETECTED
PARAINFLUENZA VIRUS 2 BY PCR: NOT DETECTED
PARAINFLUENZA VIRUS 3 BY PCR: NOT DETECTED
PARAINFLUENZA VIRUS 4 BY PCR: NOT DETECTED
PDW BLD-RTO: 15.5 % (ref 11.5–14.5)
PERFORMED ON: ABNORMAL
PERFORMED ON: NORMAL
PLATELET # BLD: 230 K/UL (ref 130–400)
POTASSIUM REFLEX MAGNESIUM: 3.6 MEQ/L (ref 3.4–4.9)
PROCALCITONIN: 0.18 NG/ML (ref 0–0.15)
RBC # BLD: 4.17 M/UL (ref 4.2–5.4)
RESPIRATORY SYNCYTIAL VIRUS BY PCR: NOT DETECTED
SARS-COV-2, PCR: NOT DETECTED
SODIUM BLD-SCNC: 138 MEQ/L (ref 135–144)
WBC # BLD: 10.9 K/UL (ref 4.8–10.8)

## 2022-05-26 PROCEDURE — 97112 NEUROMUSCULAR REEDUCATION: CPT

## 2022-05-26 PROCEDURE — 97535 SELF CARE MNGMENT TRAINING: CPT

## 2022-05-26 PROCEDURE — 6360000002 HC RX W HCPCS: Performed by: PHYSICIAN ASSISTANT

## 2022-05-26 PROCEDURE — 6370000000 HC RX 637 (ALT 250 FOR IP): Performed by: PSYCHIATRY & NEUROLOGY

## 2022-05-26 PROCEDURE — 6360000002 HC RX W HCPCS: Performed by: NURSE PRACTITIONER

## 2022-05-26 PROCEDURE — 80048 BASIC METABOLIC PNL TOTAL CA: CPT

## 2022-05-26 PROCEDURE — 99232 SBSQ HOSP IP/OBS MODERATE 35: CPT | Performed by: INTERNAL MEDICINE

## 2022-05-26 PROCEDURE — 2580000003 HC RX 258: Performed by: PSYCHIATRY & NEUROLOGY

## 2022-05-26 PROCEDURE — 1210000000 HC MED SURG R&B

## 2022-05-26 PROCEDURE — 85025 COMPLETE CBC W/AUTO DIFF WBC: CPT

## 2022-05-26 PROCEDURE — 84145 PROCALCITONIN (PCT): CPT

## 2022-05-26 PROCEDURE — 2580000003 HC RX 258: Performed by: PHYSICIAN ASSISTANT

## 2022-05-26 PROCEDURE — 99233 SBSQ HOSP IP/OBS HIGH 50: CPT | Performed by: PSYCHIATRY & NEUROLOGY

## 2022-05-26 PROCEDURE — 6370000000 HC RX 637 (ALT 250 FOR IP): Performed by: PHYSICIAN ASSISTANT

## 2022-05-26 PROCEDURE — 6370000000 HC RX 637 (ALT 250 FOR IP): Performed by: INTERNAL MEDICINE

## 2022-05-26 PROCEDURE — 72148 MRI LUMBAR SPINE W/O DYE: CPT

## 2022-05-26 PROCEDURE — 2580000003 HC RX 258: Performed by: NURSE PRACTITIONER

## 2022-05-26 PROCEDURE — 36415 COLL VENOUS BLD VENIPUNCTURE: CPT

## 2022-05-26 PROCEDURE — 0202U NFCT DS 22 TRGT SARS-COV-2: CPT

## 2022-05-26 RX ADMIN — PANTOPRAZOLE SODIUM 40 MG: 40 TABLET, DELAYED RELEASE ORAL at 05:54

## 2022-05-26 RX ADMIN — ATORVASTATIN CALCIUM 80 MG: 80 TABLET, FILM COATED ORAL at 20:42

## 2022-05-26 RX ADMIN — Medication 10 ML: at 11:24

## 2022-05-26 RX ADMIN — ASPIRIN 81 MG: 81 TABLET, COATED ORAL at 20:42

## 2022-05-26 RX ADMIN — AMLODIPINE BESYLATE 10 MG: 10 TABLET ORAL at 11:20

## 2022-05-26 RX ADMIN — GABAPENTIN 600 MG: 300 CAPSULE ORAL at 20:42

## 2022-05-26 RX ADMIN — MECLIZINE HYDROCHLORIDE 25 MG: 25 TABLET ORAL at 11:20

## 2022-05-26 RX ADMIN — LISINOPRIL 20 MG: 20 TABLET ORAL at 11:20

## 2022-05-26 RX ADMIN — MECLIZINE HYDROCHLORIDE 25 MG: 25 TABLET ORAL at 15:07

## 2022-05-26 RX ADMIN — Medication 10 ML: at 11:23

## 2022-05-26 RX ADMIN — ENOXAPARIN SODIUM 80 MG: 100 INJECTION SUBCUTANEOUS at 20:41

## 2022-05-26 RX ADMIN — Medication 10 ML: at 20:42

## 2022-05-26 RX ADMIN — Medication 10 ML: at 20:47

## 2022-05-26 RX ADMIN — ASPIRIN 81 MG: 81 TABLET, COATED ORAL at 11:20

## 2022-05-26 RX ADMIN — MECLIZINE HYDROCHLORIDE 25 MG: 25 TABLET ORAL at 20:42

## 2022-05-26 RX ADMIN — GUAIFENESIN SYRUP AND DEXTROMETHORPHAN 5 ML: 100; 10 SYRUP ORAL at 20:42

## 2022-05-26 RX ADMIN — GUAIFENESIN SYRUP AND DEXTROMETHORPHAN 5 ML: 100; 10 SYRUP ORAL at 15:07

## 2022-05-26 RX ADMIN — CEFTRIAXONE SODIUM 2000 MG: 2 INJECTION, POWDER, FOR SOLUTION INTRAMUSCULAR; INTRAVENOUS at 18:28

## 2022-05-26 RX ADMIN — ENOXAPARIN SODIUM 80 MG: 100 INJECTION SUBCUTANEOUS at 11:23

## 2022-05-26 RX ADMIN — CEFTRIAXONE SODIUM 2000 MG: 2 INJECTION, POWDER, FOR SOLUTION INTRAMUSCULAR; INTRAVENOUS at 05:54

## 2022-05-26 RX ADMIN — Medication 800 MG: at 11:19

## 2022-05-26 ASSESSMENT — ENCOUNTER SYMPTOMS
VOICE CHANGE: 0
ABDOMINAL PAIN: 1
STRIDOR: 0
WHEEZING: 0
BACK PAIN: 1
EYES NEGATIVE: 1
SORE THROAT: 0
NAUSEA: 0
TROUBLE SWALLOWING: 0
BLOOD IN STOOL: 0
CHEST TIGHTNESS: 0
VOMITING: 0
DIARRHEA: 0
RESPIRATORY NEGATIVE: 1
ABDOMINAL PAIN: 0
SHORTNESS OF BREATH: 0
CONSTIPATION: 0
COUGH: 0
COLOR CHANGE: 0

## 2022-05-26 ASSESSMENT — PAIN SCALES - GENERAL
PAINLEVEL_OUTOF10: 0
PAINLEVEL_OUTOF10: 0

## 2022-05-26 NOTE — DISCHARGE INSTR - COC
Continuity of Care Form    Patient Name: Kayla Alarcon   :  1945  MRN:  [id number]    Admit date:  2022  Discharge date:  6/3/22    Code Status Order: Full Code   Advance Directives:      Admitting Physician:  Stas Diop MD  PCP: Shana Ortiz MD    Discharging Nurse: [address] [place] [address] Unit/Room#: K766/V110-53  Discharging Unit Phone Number: [phone]    Emergency Contact:   Extended Emergency Contact Information  Primary Emergency Contact: Jess Mendoza  Address: [address], [address] [facility] [place] of [address] Phone: [phone]  Mobile Phone: [phone]  Relation: Child  Secondary Emergency Contact: Ana Maria Reeves  Address: [address]           [place], [address] [facility] N[place] Ogles of [address] Phone: [phone]  Relation: Spouse   needed? 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 XXXA, Y92.009    Fever and chills R50.9       Isolation/Infection:   Isolation            No Isolation          Patient Infection Status       Infection Onset Added Last Indicated Last Indicated By Review Planned Expiration Resolved Resolved By    COVID-19 (Rule Out) 05/26/22 05/26/22 05/26/22 Respiratory Panel, Molecular, with COVID-19 (Restricted: peds pts or suitable admitted adults) (Ordered) 06/02/22 06/09/22      Resolved    COVID-19 (Rule Out) 05/23/22 05/23/22 05/23/22 COVID-19, Rapid (Ordered)   05/23/22 Rule-Out Test Resulted    C-diff Rule Out 03/21/22 03/21/22 03/21/22 Gastrointestinal Panel by DNA (Ordered)   03/22/22 Rule-Out Test Resulted            Nurse Assessment:  Last Vital Signs: /64   Pulse 92   Temp 98.8 °F (37.1 °C) (Oral)   Resp 16   Ht 5' (1.524 m)   Wt 185 lb (83.9 kg)   SpO2 93%   BMI 36.13 kg/m²     Last documented pain score (0-10 scale): Pain Level: 0  Last Weight:   Wt Readings from Last 1 Encounters:   05/24/22 185 lb (83.9 kg)     Mental Status:  oriented and alert    IV Access:  - None    Nursing Mobility/ADLs:  Walking   Assisted  Transfer  Assisted  Bathing  Independent  Dressing  Independent  Toileting  Independent  Feeding  Independent  Med Admin  Independent  Med Delivery   whole    Wound Care Documentation and Therapy:        Elimination:  Continence: Bowel: Yes  Bladder: Yes  Urinary Catheter: None   Colostomy/Ileostomy/Ileal Conduit: No       Date of Last BM: 6/3/22    Intake/Output Summary (Last 24 hours) at 5/26/2022 1531  Last data filed at 5/25/2022 1818  Gross per 24 hour   Intake 98.28 ml   Output --   Net 98.28 ml     I/O last 3 completed shifts: In: 702.2 [IV Piggyback:702.2]  Out: -     Safety Concerns: At Risk for Falls    Impairments/Disabilities:      VISION     Nutrition Therapy:  Current Nutrition Therapy:   - Oral Diet:  Carb Control 4 carbs/meal (1800kcals/day)    Routes of Feeding: Oral  Liquids: Thin Liquids  Daily Fluid Restriction: no  Last Modified Barium Swallow with Video (Video Swallowing Test): not done    Treatments at the Time of Hospital Discharge:   Respiratory Treatments: NONE  Oxygen Therapy:  is not on home oxygen therapy.   Ventilator:    - No ventilator support    Rehab Therapies: Physical Therapy and Occupational Therapy  Weight Bearing Status/Restrictions: No weight bearing restrictions  Other Medical Equipment (for information only, NOT a DME order):  walker and bedside commode  Other Treatments: NONE    Patient's personal belongings (please select all that are sent with patient):  Glasses    RN SIGNATURE:  Electronically signed by Monae Corado RN on 6/3/22 at 11:50 AM EDT    CASE MANAGEMENT/SOCIAL WORK SECTION    Inpatient Status Date:     Readmission Risk Assessment Score:  Readmission Risk              Risk of Unplanned Readmission:  15           Discharging to Facility/ Agency   Name: Antoinette Solano  Address:  Phone:  Fax:    Dialysis Facility (if applicable)   Name:  Address:  Dialysis Schedule:  Phone:  Fax:    / signature: Electronically signed by NORMAN Hennessy on 5/26/22 at 3:31 PM EDT    PHYSICIAN SECTION    Prognosis: {Prognosis:8881166877}    Condition at Discharge: Nayana Lyman Patient Condition:[id number]}    Rehab Potential (if transferring to Rehab): {Prognosis:0200184410}    Recommended Labs or Other Treatments After Discharge: ***    Physician Certification: I certify the above information and transfer of Kervin Jacobochester  is necessary for the continuing treatment of the diagnosis listed and that she requires {Admit to Appropriate Level of Care:70929} for {GREATER/LESS:039780437} 30 days.      Update Admission H&P: [facility]}    PHYSICIAN SIGNATURE:  Electronically signed by Renetta Shepherd MD on 6/1/22 at 1:09 PM EDT

## 2022-05-26 NOTE — PROGRESS NOTES
[facility] Neurology Daily Progress Note  Name: Rita Billy  Age: 64 y.o. Gender: female  CodeStatus: Full Code  Allergies: Eggs Or Egg-Derived Products  Glucophage [Metformin]  Valium [Diazepam]  Zithromax [Azithromycin]  Milk-Related Compounds    Chief Complaint:Dizziness    Primary Care Provider: Kyree Herman MD  InpatientTreatment Team: Treatment Team: Attending Provider: Ute Hutchins MD; Consulting Physician: Artemio Pearson MD; Consulting Physician: Anastacia Cantor DO; Consulting Physician: Bette Sandifer, MD; Utilization Reviewer: France Ross, RN; Registered Nurse: Vera Preston RN; Patient Care Tech: edulio  Admission Date: 5/23/2022  HPI 66-year right-handed female admitted for dizziness and issues with balance. Symptoms going on for almost a year on and off. She reports she is dizzy upon walking and reads to 1 side or the other. MRI was done yesterday at [facility] which appeared to show no acute findings. Patient has significant other cerebrovascular risk factors she denies any tinnitus or hearing loss. She denies any neck pain but does have back pain. Has not recent falls injuries or trauma. Patient appears to be dizzy upon sitting up from a lying down position and we were not able to walk her or perform a vestibular examination due to the dizziness. Patient is areflexic in the lower extremities no definite sensory levels are noted  CT Head WO Contrast    Events noted, episode of confusion  Seen by ID and ordered LP    Dizziness  Pertinent negatives include no abdominal pain, arthralgias, chest pain, coughing, fever, headaches, nausea, sore throat, vomiting or weakness. Pt seen and examined for neuro follow up. NO Headache, double vision, blurry vision, difficulty with speech, difficulty with swallowing, weakness, numbness, pain, nausea, vomiting, choking, neck pain, reports dizziness with movement. Patient seen and examined for neuro follow-up.   She is sitting in the chair with complaints of dizziness. She describes this dizziness as her head spinning. She denies any nausea or vomiting. She has not any further episodes of dizziness but has difficulty ambulating. She has chronic low back pain which has not been evaluated. Vitals:    05/26/22 0722   BP: 126/64   Pulse: 92   Resp: 16   Temp: 98.8 °F (37.1 °C)   SpO2: 93%      Review of Systems   Constitutional: Negative for appetite change and fever. HENT: Negative for drooling, ear pain, sore throat, trouble swallowing and voice change. Respiratory: Negative for cough and shortness of breath. Cardiovascular: Negative for chest pain. Gastrointestinal: Negative for abdominal pain, constipation, diarrhea, nausea and vomiting. Genitourinary: Negative for decreased urine volume and dysuria. Musculoskeletal: Positive for back pain. Negative for arthralgias. Skin: Negative for color change. Neurological: Positive for dizziness and light-headedness. Negative for weakness and headaches. Psychiatric/Behavioral: Negative for agitation and behavioral problems. All other systems reviewed and are negative. Physical Exam  Vitals and nursing note reviewed. Constitutional:       General: She is not in acute distress. Appearance: Normal appearance. She is well-developed. HENT:      Head: Normocephalic and atraumatic. Nose: Nose normal.      Mouth/Throat:      Mouth: Mucous membranes are moist.   Eyes:      General:         Right eye: No discharge. Left eye: No discharge. Conjunctiva/sclera: Conjunctivae normal.   Neck:      Vascular: No JVD. Trachea: No tracheal deviation. Cardiovascular:      Rate and Rhythm: Normal rate. Pulmonary:      Effort: Pulmonary effort is normal.      Breath sounds: Normal breath sounds. Abdominal:      General: Bowel sounds are normal.      Palpations: Abdomen is soft. Musculoskeletal:         General: Normal range of motion.       Cervical back: Normal range of motion and neck supple. No rigidity. No muscular tenderness. Lymphadenopathy:      Cervical: No cervical adenopathy. Skin:     General: Skin is warm and dry. Capillary Refill: Capillary refill takes less than 2 seconds. Neurological:      Mental Status: She is alert and oriented to person, place, and time.    Psychiatric:         Mood and Affect: Mood normal.         Behavior: Behavior normal.       Pt oreinted x 3 and nonfocal        Medications:  Reviewed    Infusion Medications:    sodium chloride      sodium chloride      dextrose      sodium chloride       Scheduled Medications:    sodium chloride flush  5-40 mL IntraVENous 2 times per day    sodium chloride flush  5-40 mL IntraVENous 2 times per day    lisinopril  20 mg Oral Daily    pantoprazole  40 mg Oral QAM AC    magnesium oxide  800 mg Oral Daily    meclizine  25 mg Oral TID    amLODIPine  10 mg Oral Daily    cefTRIAXone (ROCEPHIN) IV  2,000 mg IntraVENous Q12H    sodium chloride flush  5-40 mL IntraVENous 2 times per day    atorvastatin  80 mg Oral Nightly    enoxaparin  1 mg/kg SubCUTAneous BID    aspirin  81 mg Oral BID    gabapentin  600 mg Oral Nightly    insulin lispro  0-12 Units SubCUTAneous 4x Daily WC    insulin lispro  0-6 Units SubCUTAneous Nightly     PRN Meds: sodium chloride flush, sodium chloride, sodium chloride flush, sodium chloride, melatonin, guaiFENesin-dextromethorphan, glucose, dextrose bolus **OR** dextrose bolus, glucagon (rDNA), dextrose, sodium chloride flush, sodium chloride, ondansetron **OR** ondansetron, acetaminophen **OR** acetaminophen, polyethylene glycol, nitroGLYCERIN, hydrALAZINE    Labs:   Recent Labs     05/24/22  0448 05/24/22  1513 05/25/22  0611   WBC 13.8* 14.2* 12.8*   HGB 12.7 13.1 12.2   HCT 39.0 40.6 38.5    203 191     Recent Labs     05/23/22  1457 05/24/22  1513 05/25/22  0611    139 140   K 4.0 4.0 4.3    103 105   CO2 24 24 21   BUN 15 11 12 CREATININE 0.96* 1.06* 0.97*   CALCIUM 10.6* 10.2* 9.3     Recent Labs     05/23/22  1457   AST 18   ALT 23   BILITOT 1.1*   ALKPHOS 74     Recent Labs     05/23/22  1330   INR 1.0     Recent Labs     05/23/22  1330 05/23/22  1457 05/23/22  1945 05/24/22  0015 05/24/22  1513   CKTOTAL  --  139  --   --   --    TROPONINI   < >  --  0.026* 0.028* 0.036*    < > = values in this interval not displayed. Urinalysis:   Lab Results   Component Value Date    NITRU Negative 05/25/2022    WBCUA 0-2 05/25/2022    BACTERIA Negative 05/25/2022    RBCUA 0-2 05/25/2022    BLOODU Negative 05/25/2022    SPECGRAV 1.050 05/25/2022    GLUCOSEU Negative 05/25/2022       Radiology:   Most recent    EEG No valid procedures specified. MRI of Brain No results found for this or any previous visit. No results found for this or any previous visit. MRA of the Head and Neck: No results found for this or any previous visit. No results found for this or any previous visit. No results found for this or any previous visit. CT of the Head: Results for orders placed during the hospital encounter of 05/23/22    CT HEAD WO CONTRAST    Narrative  INDICATION: 57-year-old female presenting with dizziness and altered mental status. COMPARISON: 5/23/2022 and 10/9/2015. [facility] TECHNIQUE: Multidetector CT imaging was obtained from the skull base to vertex without the administration of intravenous contrast. Coronal and sagittal reformatted images were obtained. Automated dose exposure control was used for this exam.    FINDINGS:    Scattered areas of low-attenuation are visualized in the periventricular and subcortical white matter that demonstrate no significant change in comparison to the prior study consistent with chronic microvascular disease. No evidence of parenchymal hemorrhages or contusions. No evidence of intra or extra-axial fluid collection is seen.     Mild prominence of ventricles and sulci are visualized demonstrating no significant increase in comparison to the prior study consistent is more chronic brain changes. No structural midline abnormalities seen. Visualized paranasal sinuses are well aerated. Visualized mastoid air cells are well aerated. Extensive lucencies visualized within the diploic space throughout the calvarium would recommend clinical correlation. Impression  No acute intracranial hemorrhage or mass effect. Chronic microvascular disease and chronic atrophic brain changes. Extensive lucencies visualized within the diploic space throughout the calvarium would recommend clinical correlation. No results found for this or any previous visit. No results found for this or any previous visit. Carotid duplex: No results found for this or any previous visit. No results found for this or any previous visit. Results for orders placed during the hospital encounter of 05/23/22    US CAROTID ARTERY BILATERAL    Narrative  EXAMINATION:  CAROTID DUPLEX ULTRASONOGRAPHY    CLINICAL HISTORY:  DIZZINESS    COMPARISONS:  NONE AVAILABLE    TECHNIQUE:  B-mode, color flow and spectral Doppler    FINDINGS:    ARTERIAL BLOOD FLOW VELOCITY    RIGHT PS    Prox CCA    67 cm/s  Mid CCA     65 cm/s  Dist CCA    62 cm/s  Prox ICA    59 cm/s  Mid ICA     106 cm/s  Dist ICA    94 cm/s  Prox ECA    72 cm/s  Prox VERT   61 cm/s    ICA/CCA     1.64    LEFT PS    Prox CCA    120 cm/s  Mid CCA     95 cm/s  Dist CCA    92 cm/s  Prox ICA    87 cm/s  Mid ICA     119 cm/s  Dist ICA    146 cm/s  Prox ECA    71 cm/s  Prox VERT   76 cm/s    ICA/CCA     1.62    Impression  MILD SCATTERED ATHEROSCLEROSIS BOTH CAROTID TREES. NO FLOW OBSTRUCTION. BILATERAL ICAS LESS THAN 50% STENOSIS. BILATERAL ANTEGRADE VERTEBRAL FLOW. Echo No results found for this or any previous visit. Assessment/Plan:  5/24/22  Dizziness which may be suggestive of vestibular dysfunction.  We will start her on Antivert 25 mg 3 times daily and arrange for orthostatic blood pressure recordings. MRI of the brain was done [facility] yesterday which were reviewed and does not show an acute stroke. Recommend ct angiogram to make sure there is no basilar artery stenosis. Patient may benefit from vestibular rehab once work-up is complete. 5/25/22  Patient continues to have dizziness with movement  Started on Antivert yesterday 25mg TID  Orthostatic blood pressure readings negative for orthostatic hypotension   MRI of brain negative (performed yesterday at [facility])  Ct angiogram shows no significant carotid stenosis. Carotid Ultrasound: bilateral internal carotid less than 50% stenosis    LP pending for fevers ordered by medical team   Patient's symptoms consistent with vestibular dysfunction. As noted above would benefit from vestibular rehab. Can discharge from neurology standpoint. Follow up in 4-6 weeks. I have personally performed a face to face diagnostic evaluation on this patient, reviewed all data and investigations, and am the sole provider of all clinical decisions on the neurological status of this patient. Transient confusion,  seen by ID, ordered LP, which is negative, MRI at [facility] negative, dizziness better with antivert        5/26/22  Patient reports to be having episodes of dizziness where her head is spinning. She is seen sitting in chair awake alert and oriented. She denies any nausea or vomiting. She reports lower extremity weakness which is not new for her. Denies any numbness tingling. Patient continues Antivert. LP performed and is negative. RI at [facility] negative. MRI of lumbar spine for evaluation of cervical stenosis pending. I have personally performed a face to face diagnostic evaluation on this patient, reviewed all data and investigations, and am the sole provider of all clinical decisions on the neurological status of this patient.   No further dizzy spells are noted though patient is nonambulatory and I recommended we obtain MRI of the lumbar spine as she is not walking on her own. Patient continue on Antivert for now lumbar puncture was      Kashif Smith MD, 1961 Radha Melchor, American Board of Psychiatry & Neurology  Board Certified in Vascular Neurology  Board Certified in Neuromuscular Medicine  Certified in Neurorehabilitation         Collaborating physicians: Dr Bridger Smith    Electronically signed by JOSE Wilkerson CNP on 5/26/2022 at 11:14 AM

## 2022-05-26 NOTE — PROGRESS NOTES
Spoke with the nurse Chevy Alcala today 05/26/2022. This patient had a lumbar puncture done on 05/25/2022 by Dr. Mckayla Jimenez. The patient has had no problems,issues, or complications overnight due to the LP. The site looks good per nurse Chevy Alcala.

## 2022-05-26 NOTE — PROGRESS NOTES
Angely Mcclendon  1945  female  Medical Record Number: [id number]    Patient with dizziness and pain under the eyes across her face in the area of the bilateral maxillary sinus/ ethmoid region  + hx of fever and chills. + hx altered mentation, now better. Fevers are trending down  BC and UC negative  Started on Vanco, Ceftriaxone, and Ampicillin    Prior imaging reviewed and the maxillary sinuses were patent  Patient states that she is most concerned about the pain in the maxillary areas. She is sniffling and spitting up clear mucus    Physical Exam:  General: Patient appears in no acute distress, cooperative  Much more awake today and feels better - still with intermittent headache. No slurred speech. Complains of dizziness   No midline pain neck or back. She is able to touch the chin to the chest with ease. All CNs intact. States that that she has had chronic sinus issues for years.    Skin: no new rashes or erythema or induration  HEENT: EOMI, MMM, Neck is supple  Heart: S1 S2  Lungs: bilaterally clear to auscultation  Abdomen: soft, ND, NTTP, +BS  Extrem:+pulses, no calf pain, no asymmetry of the upper or lower extremities  Neuro exam: CN II-XII intact, facial expressions symmertrical bilaterally, no slurred speech        Past Medical History:   Diagnosis Date    Cancer [facility])     Encounter for lumbar puncture 05/25/2022    Completed by Dr. Macrina Perez - diagnostics sent    Hyperlipidemia     Hypertension     Type II or unspecified type diabetes mellitus without mention of complication, not stated as uncontrolled        Past Surgical History:   Procedure Laterality Date    CORONARY ANGIOPLASTY WITH STENT PLACEMENT      UPPER GASTROINTESTINAL ENDOSCOPY  8/12/15    w/bx,dilation        Social History     Socioeconomic History    Marital status:      Spouse name: Not on file    Number of children: Not on file    Years of education: Not on file    Highest education level: Not on file Occupational History    Not on file   Tobacco Use    Smoking status: Former Smoker     Years: 24.00    Smokeless tobacco: Never Used   Substance and Sexual Activity    Alcohol use: Yes     Alcohol/week: 3.0 standard drinks     Types: 3 Glasses of wine per week    Drug use: No    Sexual activity: Not on file   Other Topics Concern    Not on file   Social History Narrative    Not on file     Social Determinants of Health     Financial Resource Strain: Low Risk     Difficulty of Paying Living Expenses: Not hard at all   Food Insecurity: No Food Insecurity    Worried About Running Out of Food in the Last Year: Never true    [address] [facility] [address] in the Last Year: Never true   Transportation Needs:     Lack of Transportation (Medical): Not on file    Lack of Transportation (Non-Medical):  Not on file   Physical Activity:     Days of Exercise per Week: Not on file    Minutes of Exercise per Session: Not on file   Stress:     Feeling of Stress : Not on file   Social Connections:     Frequency of Communication with Friends and Family: Not on file    Frequency of Social Gatherings with Friends and Family: Not on file    Attends Hoahaoism Services: Not on file    Active Member of [address] [place] [address] or Organizations: Not on file    Attends Club or Organization Meetings: Not on file    Marital Status: Not on file   Intimate Partner Violence:     Fear of Current or Ex-Partner: Not on file    Emotionally Abused: Not on file    Physically Abused: Not on file    Sexually Abused: Not on file   Housing Stability:     Unable to Pay for Housing in the Last Year: Not on file    Number of [place] in the Last Year: Not on file    Unstable Housing in the Last Year: Not on file       Family History   Problem Relation Age of Onset    Cancer Mother     Arthritis Mother     Diabetes Mother     Heart Disease Mother     High Blood Pressure Mother     High Cholesterol Mother     Arthritis Father     Diabetes Father    Radha Hahn Heart Disease Father     High Blood Pressure Father     High Cholesterol Father        No current facility-administered medications on file prior to encounter. Current Outpatient Medications on File Prior to Encounter   Medication Sig Dispense Refill    atorvastatin (LIPITOR) 80 MG tablet Take 1 tablet by mouth daily 90 tablet 0    gabapentin (NEURONTIN) 300 MG capsule Take 2 capsules by mouth at bedtime for 90 days. 180 capsule 0    furosemide (LASIX) 20 MG tablet Take 1 tablet by mouth daily 90 tablet 0    metoprolol tartrate (LOPRESSOR) 50 MG tablet Take 1 tablet by mouth 2 times daily (Patient taking differently: Take 50 mg by mouth daily ) 180 tablet 0    pantoprazole (PROTONIX) 40 MG tablet Take 1 tablet by mouth daily 90 tablet 0    ezetimibe (ZETIA) 10 MG tablet Take 1 tablet by mouth daily 90 tablet 0    blood glucose monitor kit and supplies Test 1 time daily 1 kit 0    blood glucose monitor strips Test 1 time daily 50 strip 2    Lancets MISC 1 each by Does not apply route daily 50 each 5    aspirin 81 MG tablet Take 81 mg by mouth 2 times daily       isosorbide mononitrate (IMDUR) 30 MG CR tablet Take 30 mg by mouth daily (Patient not taking: Reported on 5/23/2022)      lisinopril (PRINIVIL;ZESTRIL) 20 MG tablet Take 20 mg by mouth daily  (Patient not taking: Reported on 5/23/2022)         Labs: I have reviewed all lab results by electronic record, including most recent CBC, metabolic panel, and pertinent abnormalities were addressed from an infectious disease perspective. WBC trends are being monitored.     Lab Results   Component Value Date     05/25/2022    K 4.3 05/25/2022     05/25/2022    CO2 21 05/25/2022    BUN 12 05/25/2022    CREATININE 0.97 05/25/2022    GLUCOSE 108 05/25/2022    CALCIUM 9.3 05/25/2022      Lab Results   Component Value Date    WBC 12.8 (H) 05/25/2022    HGB 12.2 05/25/2022    HCT 38.5 05/25/2022    MCV 98.2 05/25/2022     05/25/2022 Radiology:   I have reviewed imaging results per electronic record and most pertinent abnormalities are being addressed from an infectious disease standpoint. CT head showed well aerated sinuses and mastoid. Assessment:  Acute metabolic encephalopathy - better  Fevers - trending down  Falls at home  Dizziness  Headache/ facial pain with fever and chills     Plan:  CSF counts reviewed. Based on cell counts, DC all abx except Rocephin for now. Follow up all cx results. Verified no hx of cold sores or HSV. She has poor dentition but no dental abscess. + missing and broken teeth    Etiology of her pain, dizziness, and fever unclear.  Consider ENT eval.     Rukhsana Burns D.O.

## 2022-05-26 NOTE — CONSULTS
Physical Medicine & Rehabilitation  Consult Note      Admitting Physician: Travis Lin MD    Primary Care Provider: Alison Muhammad MD     Reason for Consult:  Asses rehab needs, promote physical and mental function, analyze level of care to determine rehab needs, improve ability to actively participate in the rehabilitation process, and decrease likelihood of re-admit to the hospital after discharge. History of Present Illness:    Elan Bustos is a 64 y.o. female admitted to [facility] on 5/23/2022. Presented to hospital with fevers chills mental status changes being worked up currently for metabolic encephalopathy with primary origins of infectious etiology. Initial work-up has included but not limited to CS F examination for meningitis. Had functional loss but been able to participate in therapy she is being evaluated for acute rehabilitation needs. Patient is still having active signs of fever and mental status changes currently. HPI      I reviewed recent nursing notes discussed care with acute care providers, \" see note\". Events from the previous 24 hours reviewed    .       Their inpatient work up has included:    Imaging:  Imaging and other studies reviewed and discussed with patient and staff    Echocardiogram complete 2D with doppler with color    Result Date: 5/24/2022  Transthoracic Echocardiography Report (TTE)  Demographics   Patient Name    Asya Vaughn Gender                Female   Patient Number  [id number]      Race                  Other                                 Ethnicity   Visit Number    [id number]     Room Number           W269   Corporate ID                  Date of Study         05/24/2022   Accession       [id number]    Referring Physician   Jerome Portillo MD  Number   Date of Birth   1945    Sonographer           Britney Cobb   Age             64 year(s)    Interpreting          [facility])                                Physician Cardiology                                                      [facility]  Procedure Type of Study   TTE procedure:ECHO COMPLETE 2D W/DOP W/COLOR. Procedure Date Date: 05/24/2022 Start: 08:20 AM Study Location: Portable Technical Quality: Adequate visualization Indications: Altered Mental Status. Patient Status: Routine Height: 60 inches Weight: 183 pounds BSA: 1.8 m^2 BMI: 35.74 kg/m^2 BP: 139/69 mmHg Allergies   - Other allergy:(eggs, dairy, valium, zithromax). Conclusions   Summary  Left ventricular ejection fraction is estimated at 65%. E/A flow reversal noted. Suggestive of diastolic dysfunction. Normal right ventricle systolic pressure. RVSP 22mmHg  No hemodynamic evidence of significant valve disease   Signature   ----------------------------------------------------------------  Electronically signed by Nazia Munoz(Interpreting physician)  on 05/24/2022 04:13 PM  ----------------------------------------------------------------   Findings  Left Ventricle Left ventricular ejection fraction is estimated at 65%. E/A flow reversal noted. Suggestive of diastolic dysfunction. Right Ventricle Normal right ventricle structure and function. Normal right ventricle systolic pressure. RVSP 22mmHg Left Atrium Normal size left atrium. Right Atrium Normal right atrium. Mitral Valve Structurally normal mitral valve. No evidence of mitral valve stenosis. No evidence of mitral regurgitaton. Tricuspid Valve Tricuspid valve is structurally normal. No evidence of tricuspid stenosis. No evidence of tricuspid regurgitation. Aortic Valve Mildly thickened trileaflet aortic valve with normal excursion. No evidence of aortic valve stenosis . No evidence of aortic valve regurgitation . Pulmonic Valve The pulmonic valve was not well visualized . Pericardial Effusion No evidence of significant pericardial effusion is noted. Aorta \ Miscellaneous The aorta is within normal limits.  M-Mode Measurements (cm)   LVIDd: 3.2 cm LVIDs: 2.01 cm  IVSd: 1.63 cm                          IVSs: 2.05 cm  LVPWd: 1.42 cm                         LVPWs: 1.31 cm  Rt. Vent.  Dimension: 2.36 cm           AO Root Dimension: 2.46 cm                                         ACS: 1.32 cm                                         LVOT: 1.8 cm  Doppler Measurements:   AV Velocity:0.02 m/s                   MV Peak E-Wave: 0.83 m/s  AV Peak Gradient: 10.58 mmHg           MV Peak A-Wave: 1.39 m/s  AV Mean Gradient: 4.87 mmHg  AV Area (Continuity):2.14 cm^2  TR Velocity:2.06 m/s                   Estimated RAP:5 mmHg  TR Gradient:16.9 mmHg                  RVSP:21.9 mmHg  Valves  Mitral Valve   Peak E-Wave: 0.83 m/s                 Peak A-Wave: 1.39 m/s                                        E/A Ratio: 0.59                                        Peak Gradient: 2.73 mmHg                                        Deceleration Time: 205.2 msec   Tissue Doppler   E' Septal Velocity: 0.06 m/s  E' Lateral Velocity: 0.09 m/s   Aortic Valve   Peak Velocity: 1.63 m/s                Mean Velocity: 0.99 m/s  Peak Gradient: 10.58 mmHg              Mean Gradient: 4.87 mmHg  Area (continuity): 2.14 cm^2  AV VTI: 25.47 cm   Cusp Separation: 1.32 cm   Tricuspid Valve   Estimated RVSP: 21.9 mmHg               Estimated RAP: 5 mmHg  TR Velocity: 2.06 m/s                   TR Gradient: 16.9 mmHg   Pulmonic Valve   Peak Velocity: 0.97 m/s            Peak Gradient: 3.78 mmHg                                     Estimated PASP: 21.9 mmHg   LVOT   Peak Velocity: 1.08 m/s               Mean Velocity: 0.77 m/s  Peak Gradient: 4.65 mmHg              Mean Gradient: 2.5 mmHg  LVOT Diameter: 1.8 cm                 LVOT VTI: 21.42 cm  Structures  Left Atrium   LA Volume/Index: 33.74 ml /19 m^2             LA Area: 12.41 cm^2   Left Ventricle   Diastolic Dimension: 3.2 cm           Systolic Dimension: 3.24 cm  Septum Diastolic: 0.51 cm             Septum Systolic: 5.86 cm  PW Diastolic: 7.66 cm                 PW Systolic: 5.30 cm                                        FS: 37.2 %  LV EDV/LV EDV Index: 40.91 ml/23 m^2  LV ESV/LV ESV Index: 12.84 ml/7 m^2  EF Calculated: 68.6 %                 LV Length: 5.74 cm   LVOT Diameter: 1.8 cm   Right Atrium   RA Systolic Pressure: 5 mmHg   Right Ventricle   Diastolic Dimension: 2.24 cm                                     RV Systolic Pressure: 70.5 mmHg  Aorta/ Miscellaneous Aorta   Aortic Root: 2.46 cm  LVOT Diameter: 1.8 cm      CTA HEAD W WO CONTRAST    Result Date: 5/24/2022  EXAMINATION:  CTA NECK W WO CONTRAST, CTA HEAD W WO CONTRAST HISTORY:  Dizziness and altered mental status. TECHNIQUE:   Spiral high resolution axial images were obtained through the head, neck and superior mediastinum following bolus administration of intravenous contrast for CT angiography. The data was subsequently post-processed utilizing 3D multi-planar  reconstructions, 3D maximum intensity projections. Contrast:  iopamidol (ISOVUE-370) injection of 100 mL All CT scans at this facility use dose modulation, iterative reconstruction, and/or weight based dosing when appropriate to reduce radiation dose to as low as reasonably achievable. COMPARISON: CT head 5/24/2022. RESULT: BRAIN: Grossly unchanged from the recent CT head dictation. Please refer to the CT head dictation. NECK: Soft tissues: The soft tissue planes are maintained throughout. No evidence of a soft tissue mass in the neck or superior mediastinum. No significant lymphadenopathy. Spine:  Multilevel degenerative changes. Lung apices:   Nonspecific patchy interstitial opacities, which may be chronic, and emphysematous changes, partially imaged. CT ARTERIOGRAM:     Extracranial Circulation: Aortic Arch: Normal branching pattern. .  There is no significant stenosis in the proximal brachiocephalic vessels. Carotid Stenosis: Right Common:  No significant stenosis.   Right Internal Carotid  Plaque:  Mild plaque formation. Right Internal Carotid Stenosis by NASCET criteria:  Less than 20% Left Common:  No significant stenosis. Left Internal Carotid Plaque:  Mild plaque formation. Left Internal Carotid Stenosis by NASCET criteria:  Less than 20%. Cervical Vertebral Arteries: Small in caliber bilaterally with the right side slightly dominant. Appear patent with tortuosity. Intracranial Circulation: Anterior Circulation:  Distal ICAs appear patent with mild to moderate calcifications. Proximal ACAs and proximal MCAs appear patent. No evidence for significant stenosis or aneurysm. Vertebrobasilar Circulation:   Small caliber of the vertebral arteries as above with the left vertebral artery appearing to end in PICA, likely normal variant. Basilar artery is also small in caliber with tortuosity with short segment of possible narrowing near terminus versus tortuosity. Fetal origin of the bilateral PCAs with the PCAs appearing patent. Dural venous sinuses: Visualized dural venous sinuses are patent. Small caliber of the vertebrobasilar system as discussed. Possible short segment of narrowing of the distal basilar artery near terminus versus tortuosity. Dominant anterior circulation with fetal origin of the PCAs bilaterally. No evidence for significant carotid stenosis. CT HEAD WO CONTRAST    Result Date: 5/24/2022  INDICATION: 68-year-old female presenting with dizziness and altered mental status. COMPARISON: 5/23/2022 [facility]: Multidetector CT imaging was obtained from the skull base to vertex without the administration of intravenous contrast. Coronal and sagittal reformatted images were obtained. Automated dose exposure control was used for this exam. FINDINGS: Scattered areas of low-attenuation are visualized in the periventricular and subcortical white matter that demonstrate no significant change in comparison to the prior study consistent with chronic microvascular disease.  No evidence of parenchymal hemorrhages or contusions. No evidence of intra or extra-axial fluid collection is seen. Mild prominence of ventricles and sulci are visualized demonstrating no significant increase in comparison to the prior study consistent is more chronic brain changes. No structural midline abnormalities seen. Visualized paranasal sinuses are well aerated. Visualized mastoid air cells are well aerated. Extensive lucencies visualized within the diploic space throughout the calvarium would recommend clinical correlation. No acute intracranial hemorrhage or mass effect. Chronic microvascular disease and chronic atrophic brain changes. Extensive lucencies visualized within the diploic space throughout the calvarium would recommend clinical correlation. CT Head WO Contrast    Result Date: 5/23/2022  INDICATION: 68-year-old female presenting with dizziness and unsteady gait. COMPARISON: 10/9/2015. TECHNIQUE: Multidetector CT imaging was obtained from the skull base to vertex without the administration of intravenous contrast. Coronal and sagittal reformatted images were obtained. Automated dose exposure control was used for this exam. FINDINGS: Scattered areas of low-attenuation are visualized in the periventricular and subcortical white matter consistent with chronic microvascular disease. No evidence of parenchymal hemorrhages or contusions. No evidence of intra or extra-axial fluid collection is seen. Prominence of ventricles and sulci are visualized demonstrating no significant increase in comparison to the prior study consistent is more chronic brain changes. Ventricles and sulci normal in size and configuration. The posterior fossa is unremarkable. No structural midline abnormalities seen. Visualized paranasal sinuses are well aerated. Visualized mastoid air cells are well aerated. The calvarium is intact. No acute intracranial hemorrhage or mass effect.  Chronic microvascular disease and chronic atrophic brain changes. CTA NECK W WO CONTRAST    Result Date: 5/24/2022  EXAMINATION:  CTA NECK W WO CONTRAST, CTA HEAD W WO CONTRAST HISTORY:  Dizziness and altered mental status. TECHNIQUE:   Spiral high resolution axial images were obtained through the head, neck and superior mediastinum following bolus administration of intravenous contrast for CT angiography. The data was subsequently post-processed utilizing 3D multi-planar  reconstructions, 3D maximum intensity projections. Contrast:  iopamidol (ISOVUE-370) injection of 100 mL All CT scans at this facility use dose modulation, iterative reconstruction, and/or weight based dosing when appropriate to reduce radiation dose to as low as reasonably achievable. COMPARISON: CT head 5/24/2022. RESULT: BRAIN: Grossly unchanged from the recent CT head dictation. Please refer to the CT head dictation. NECK: Soft tissues: The soft tissue planes are maintained throughout. No evidence of a soft tissue mass in the neck or superior mediastinum. No significant lymphadenopathy. Spine:  Multilevel degenerative changes. Lung apices:   Nonspecific patchy interstitial opacities, which may be chronic, and emphysematous changes, partially imaged. CT ARTERIOGRAM:     Extracranial Circulation: Aortic Arch: Normal branching pattern. .  There is no significant stenosis in the proximal brachiocephalic vessels. Carotid Stenosis: Right Common:  No significant stenosis. Right Internal Carotid  Plaque:  Mild plaque formation. Right Internal Carotid Stenosis by NASCET criteria:  Less than 20% Left Common:  No significant stenosis. Left Internal Carotid Plaque:  Mild plaque formation. Left Internal Carotid Stenosis by NASCET criteria:  Less than 20%. Cervical Vertebral Arteries: Small in caliber bilaterally with the right side slightly dominant. Appear patent with tortuosity. Intracranial Circulation: Anterior Circulation:  Distal ICAs appear patent with mild to moderate calcifications. Proximal ACAs and proximal MCAs appear patent. No evidence for significant stenosis or aneurysm. Vertebrobasilar Circulation:   Small caliber of the vertebral arteries as above with the left vertebral artery appearing to end in PICA, likely normal variant. Basilar artery is also small in caliber with tortuosity with short segment of possible narrowing near terminus versus tortuosity. Fetal origin of the bilateral PCAs with the PCAs appearing patent. Dural venous sinuses: Visualized dural venous sinuses are patent. Small caliber of the vertebrobasilar system as discussed. Possible short segment of narrowing of the distal basilar artery near terminus versus tortuosity. Dominant anterior circulation with fetal origin of the PCAs bilaterally. No evidence for significant carotid stenosis. XR CHEST PORTABLE    Result Date: 5/25/2022  CHEST X-RAY. TECHNIQUE: Single AP portable view of the chest was obtained CLINICAL INDICATION: 75-year-old female presenting with fever. COMPARISON: Chest x-ray obtained on 5/23/2022. PROCEDURE AND FINDINGS: The cardiomediastinal silhouette is slightly prominent with tortuosity of the thoracic aorta. Mild prominence of the interstitial and bronchovascular markings is seen. Mild blunting of the left costophrenic angle is visualized and demonstrates prominence in comparison to the prior study cannot rule out left lower lobe infiltrate would recommend clinical correlation. Biapical prominent CSF COPD changes. No evidence of pneumothorax or parenchymal lung mass. Degenerative joint changes. Suggestion of left lower lobe infiltrate/consolidation. Recommend clinical correlation    XR CHEST PORTABLE    Result Date: 5/23/2022  CHEST X-RAY. TECHNIQUE: Single AP portable view of the chest was obtained. Lupillo Lee CLINICAL INDICATION: 75-year-old female presenting with dizziness. COMPARISON: Chest x-ray obtained on 10/9/2015 PROCEDURE AND FINDINGS: Poor inspiratory effort is seen.  The cardiomediastinal silhouette is slightly prominent in size, mild tortuosity of the thoracic aorta is seen. Biapical prominence suggestive of COPD changes. Mild prominence of the bronchovascular interstitial lung markings is seen but no evidence of focal lung infiltrate or consolidation is seen. The costophrenic angles are clear, no evidence of lung infiltrate, pleural effusion or parenchymal lung mass. Degenerative bone changes are visualized. COPD and chronic interstitial changes. IR LUMBAR PUNCTURE FOR DIAGNOSIS    1. Technically successful diagnostic lumbar puncture. HISTORY: Pita Crandall is a Female of 64 years age, with  lp r/o meningitis . FLUOROSCOPY TIME:   58.7 seconds. RADIATION DOSE:         25.85 mGy. DIAGNOSIS: 68-year-old female with headaches, evaluation for meningitis. COMMENTS: R77.8 Elevated troponin ICD10 PROCEDURE: Following universal protocol, patient and site verification was performed with a \"timeout\" prior to the procedure. Following the discussion of the procedure, and this, risks versus benefits, informed consent was obtained from the patient. The patient was placed on fluoroscopy table in prone position and the lower back area was prepped and draped in usual sterile fashion. The area between the interspinous process was marked. This was at the L3 level. Using the usual sterile conditions, 1% lidocaine (5 mL) and fluoroscopy guidance, a 20 gauge needle was inserted into the spinal canal.  After confirmation of intra-thecal location of the needle tip by CSF leakage through the needle. Approximately 14.5 cc of CSF were collected in 4 separate containers. Following that the needle was withdrawn from the back. The patient tolerated the procedure well without complications.      US CAROTID ARTERY BILATERAL    Result Date: 5/25/2022  EXAMINATION:  CAROTID DUPLEX ULTRASONOGRAPHY CLINICAL HISTORY:  DIZZINESS COMPARISONS:  NONE AVAILABLE TECHNIQUE:  B-mode, color flow and spectral Doppler FINDINGS:  ARTERIAL BLOOD FLOW VELOCITY RIGHT PS                                               Prox CCA    67 cm/s             Mid CCA     65 cm/s              Dist CCA    62 cm/s              Prox ICA    59 cm/s               Mid ICA     106 cm/s            Dist ICA    94 cm/s             Prox ECA    72 cm/s             Prox VERT   61 cm/s              ICA/CCA     1.64                        LEFT PS Prox CCA    120 cm/s Mid CCA     95 cm/s Dist CCA    92 cm/s Prox ICA    87 cm/s Mid ICA     119 cm/s Dist ICA    146 cm/s Prox ECA    71 cm/s Prox VERT   76 cm/s ICA/CCA     1.62     MILD SCATTERED ATHEROSCLEROSIS BOTH CAROTID TREES. NO FLOW OBSTRUCTION. BILATERAL ICAS LESS THAN 50% STENOSIS. BILATERAL ANTEGRADE VERTEBRAL FLOW.               Labs:    Labs reviewed and discussed with patient and staff    Lab Results   Component Value Date    POCGLU 105 05/26/2022    POCGLU 127 05/26/2022    POCGLU 102 05/26/2022    POCGLU 124 05/25/2022    POCGLU 175 05/25/2022     Lab Results   Component Value Date     05/25/2022    K 4.3 05/25/2022     05/25/2022    CO2 21 05/25/2022    BUN 12 05/25/2022    CREATININE 0.97 05/25/2022    CALCIUM 9.3 05/25/2022    LABALBU 4.4 05/23/2022    BILITOT 1.1 05/23/2022    ALKPHOS 74 05/23/2022    AST 18 05/23/2022    ALT 23 05/23/2022     Lab Results   Component Value Date    WBC 10.9 05/26/2022    RBC 4.17 05/26/2022    HGB 13.4 05/26/2022    HCT 40.6 05/26/2022    MCV 97.2 05/26/2022    MCH 32.0 05/26/2022    MCHC 32.9 05/26/2022    RDW 15.5 05/26/2022     05/26/2022    MPV 8.2 10/12/2015     No results found for: VITD25  Lab Results   Component Value Date    COLORU Yellow 05/25/2022    NITRU Negative 05/25/2022    GLUCOSEU Negative 05/25/2022    KETUA Negative 05/25/2022    UROBILINOGEN 1.0 05/25/2022    BILIRUBINUR Negative 05/25/2022     Lab Results   Component Value Date    PROTIME 12.7 05/23/2022     Lab Results   Component Value Date    INR 1.0 05/23/2022 I discussed results with patient. Current Rehabilitation Assessments:    Rehabilitation:  Physical Therapy  Bed mobility:  Bed mobility  Rolling to Left: Minimal assistance (05/24/22 1537)  Rolling to Right: Stand by assistance (05/26/22 1047)  Supine to Sit: Stand by assistance (05/26/22 1047)  Sit to Supine: Moderate assistance (05/24/22 1537)  Bed Mobility Comments: increased time and effort to complete, however pt able to complete on her own without assist. HOB slightly elevated. (05/26/22 1047)  Transfers:  Transfers  Sit to Stand: Supervision;Stand by assistance (05/26/22 1047)  Stand to sit: Supervision;Stand by assistance (05/26/22 1047)  Bed to Chair: Supervision;Stand by assistance (05/26/22 1047)  Comment: Multiple transfers completed. Slow to complete. Safe technique employed. (05/26/22 1047)  Gait:   Ambulation  Surface: level tile (05/26/22 1048)  Device: Rolling Walker (05/26/22 1048)  Assistance: Stand by assistance (05/26/22 1048)  Quality of Gait: Slow pacing. Pt describes dizziness and requires multiple standing rest breaks. No LOB however. (05/26/22 1048)  Distance: 12ft X 2 (05/26/22 1048)  Comments: Deferred - safety concerns (05/24/22 1538)  Stairs:     W/C mobility:         Occupational therapy:   Hand Dominance: Right  ADL  Feeding: Unable to assess(comment) (05/24/22 1541)  Grooming: Maximum assistance; Increased time to complete (05/24/22 1541)  UE Bathing: Maximum assistance; Increased time to complete (05/24/22 1541)  LE Bathing: Maximum assistance; Increased time to complete (05/24/22 1541)  UE Dressing: Maximum assistance; Increased time to complete (05/24/22 1541)  LE Dressing: Dependent/Total (05/24/22 1541)  Toileting: Unable to assess(comment) (05/24/22 1541)               Speech therapy:            Diet/Swallow:                         COGNITION  OT: Cognition Comment: Pt inconsistently following one step commands.   Pt exhibiting word finding difficulty and and words tailing off at the end  SP:             Re-evals pending      Past Medical History:        Diagnosis Date    Cancer [facility])     Encounter for lumbar puncture 05/25/2022    Completed by Dr. Willem Chang - diagnostics sent    Hyperlipidemia     Hypertension     Type II or unspecified type diabetes mellitus without mention of complication, not stated as uncontrolled          PastSurgical History:        Procedure Laterality Date    CORONARY ANGIOPLASTY WITH STENT PLACEMENT      UPPER GASTROINTESTINAL ENDOSCOPY  8/12/15    w/bx,dilation          Allergies:     Allergies   Allergen Reactions    Eggs Or Egg-Derived Products Nausea Only    Glucophage [Metformin]      diarrhea    Valium [Diazepam]     Zithromax [Azithromycin]     Milk-Related Compounds Diarrhea and Nausea Only        CurrentMedications:   Current Facility-Administered Medications: sodium chloride flush 0.9 % injection 5-40 mL, 5-40 mL, IntraVENous, 2 times per day  sodium chloride flush 0.9 % injection 5-40 mL, 5-40 mL, IntraVENous, PRN  0.9 % sodium chloride infusion, , IntraVENous, PRN  sodium chloride flush 0.9 % injection 5-40 mL, 5-40 mL, IntraVENous, 2 times per day  sodium chloride flush 0.9 % injection 5-40 mL, 5-40 mL, IntraVENous, PRN  0.9 % sodium chloride infusion, , IntraVENous, PRN  melatonin tablet 3 mg, 3 mg, Oral, Nightly PRN  lisinopril (PRINIVIL;ZESTRIL) tablet 20 mg, 20 mg, Oral, Daily  pantoprazole (PROTONIX) tablet 40 mg, 40 mg, Oral, QAM AC  magnesium oxide (MAG-OX) tablet 800 mg, 800 mg, Oral, Daily  guaiFENesin-dextromethorphan (ROBITUSSIN DM) 100-10 MG/5ML syrup 5 mL, 5 mL, Oral, Q4H PRN  meclizine (ANTIVERT) tablet 25 mg, 25 mg, Oral, TID  glucose chewable tablet 16 g, 4 tablet, Oral, PRN  dextrose bolus 10% 125 mL, 125 mL, IntraVENous, PRN **OR** dextrose bolus 10% 250 mL, 250 mL, IntraVENous, PRN  glucagon (rDNA) injection 1 mg, 1 mg, IntraMUSCular, PRN  dextrose 5 % solution, 100 mL/hr, IntraVENous, Strain: Low Risk     Difficulty of Paying Living Expenses: Not hard at all   Food Insecurity: No Food Insecurity    Worried About Running Out of Food in the Last Year: Never true    [place] of Food in the Last Year: Never true   Transportation Needs:     Lack of Transportation (Medical): Not on file    Lack of Transportation (Non-Medical): Not on file   Physical Activity:     Days of Exercise per Week: Not on file    Minutes of Exercise per Session: Not on file   Stress:     Feeling of Stress : Not on file   Social Connections:     Frequency of Communication with Friends and Family: Not on file    Frequency of Social Gatherings with Friends and Family: Not on file    Attends Alevism Services: Not on file    Active Member of Clubs or Organizations: Not on file    Attends Club or Organization Meetings: Not on file    Marital Status: Not on file   Intimate Partner Violence:     Fear of Current or Ex-Partner: Not on file    Emotionally Abused: Not on file    Physically Abused: Not on file    Sexually Abused: Not on file   Housing Stability:     Unable to Pay for Housing in the Last Year: Not on file    Number of [place] in the Last Year: Not on file    Unstable Housing in the Last Year: Not on file          Family History:       Problem Relation Age of Onset    Cancer Mother     Arthritis Mother     Diabetes Mother     Heart Disease Mother     High Blood Pressure Mother     High Cholesterol Mother     Arthritis Father     Diabetes Father     Heart Disease Father     High Blood Pressure Father     High Cholesterol Father        Review of Systems:  Review of Systems          Physical Exam:  /64   Pulse 92   Temp 98.8 °F (37.1 °C) (Oral)   Resp 16   Ht 5' (1.524 m)   Wt 185 lb (83.9 kg)   SpO2 93%   BMI 36.13 kg/m²      Physical Exam  Ortho Exam  Neurologic Exam       ROS x10: The patient also complains of severely impaired mobility and activities of daily living. Otherwise no new problems with vision, hearing, nose, mouth, throat, dermal, cardiovascular, GI, , pulmonary, musculoskeletal, psychiatric or neurological. See also Acute Rehab PM&R H&P. Vital signs:  /64   Pulse 92   Temp 98.8 °F (37.1 °C) (Oral)   Resp 16   Ht 5' (1.524 m)   Wt 185 lb (83.9 kg)   SpO2 93%   BMI 36.13 kg/m²   I/O:   PO/Intake:  fair PO intake,      Bowel:   continent   Bladder: continent    General:  Patient is well developed,   adequately nourished, and    well kempt. HEENT:    Pupils equal, hearing intact to loud voice, external inspection of ear and nose benign. Inspection of lips, tongue and gums benign  Musculoskeletal: No significant change in strength or tone. All joints stable. Inspection and palpation of digits and nails show no clubbing, cyanosis or inflammatory conditions. Neuro/Psychiatric: Affect: flat but pleasant. Alert and oriented to person, place and situation with    cues. No significant change in deep tendon reflexes or sensation  Lungs:  Diminished, CTA-B. Respiration effort is   normal at rest.     Heart:   S1 = S2,   RRR. Abdomen:  Soft, non-tender, no enlargement of liver or spleen. Extremities:   trace lower extremity edema    Skin:   Intact to general survey,    Rehabilitation:  Physical Therapy:   Bed mobility:  Bed mobility  Rolling to Left: Minimal assistance (05/24/22 1537)  Rolling to Right: Stand by assistance (05/26/22 1047)  Supine to Sit: Stand by assistance (05/26/22 1047)  Sit to Supine: Moderate assistance (05/24/22 1537)  Bed Mobility Comments: increased time and effort to complete, however pt able to complete on her own without assist. HOB slightly elevated.  (05/26/22 1047)  Transfers:  Transfers  Sit to Stand: Supervision;Stand by assistance (05/26/22 1047)  Stand to sit: Supervision;Stand by assistance (05/26/22 1047)  Bed to Chair: Supervision;Stand by assistance (05/26/22 1047)  Comment: Multiple transfers completed. Slow to complete. Safe technique employed. (05/26/22 1047)  Gait:   Ambulation  Surface: level tile (05/26/22 1048)  Device: Rolling Walker (05/26/22 1048)  Assistance: Stand by assistance (05/26/22 1048)  Quality of Gait: Slow pacing. Pt describes dizziness and requires multiple standing rest breaks. No LOB however. (05/26/22 1048)  Distance: 12ft X 2 (05/26/22 1048)  Comments: Deferred - safety concerns (05/24/22 1538)  Stairs:     W/C mobility:       Occupational Therapy:   Hand Dominance: Right  ADL  Feeding: Unable to assess(comment) (05/24/22 1541)  Grooming: Maximum assistance; Increased time to complete (05/24/22 1541)  UE Bathing: Maximum assistance; Increased time to complete (05/24/22 1541)  LE Bathing: Maximum assistance; Increased time to complete (05/24/22 1541)  UE Dressing: Maximum assistance; Increased time to complete (05/24/22 1541)  LE Dressing: Dependent/Total (05/24/22 1541)  Toileting: Unable to assess(comment) (05/24/22 1541)             Speech Therapy:            Diet/Swallow:                      Diagnostics:  Recent Results (from the past 24 hour(s))   POCT Glucose    Collection Time: 05/25/22  4:12 PM   Result Value Ref Range    POC Glucose 175 (H) 70 - 99 mg/dl    Performed on ACCU-CHEK    POCT Glucose    Collection Time: 05/25/22  7:35 PM   Result Value Ref Range    POC Glucose 124 (H) 70 - 99 mg/dl    Performed on ACCU-CHEK    POCT Glucose    Collection Time: 05/26/22  8:04 AM   Result Value Ref Range    POC Glucose 102 (H) 70 - 99 mg/dl    Performed on ACCU-CHEK    POCT Glucose    Collection Time: 05/26/22  8:37 AM   Result Value Ref Range    POC Glucose 127 (H) 70 - 99 mg/dl    Performed on ACCU-CHEK    POCT Glucose    Collection Time: 05/26/22 11:29 AM   Result Value Ref Range    POC Glucose 105 (H) 70 - 99 mg/dl    Performed on ACCU-CHEK    CBC with Auto Differential    Collection Time: 05/26/22  1:39 PM   Result Value Ref Range    WBC 10.9 (H) 4.8 - 10.8 K/uL    RBC 4.17 (L) 4.20 - 5.40 M/uL    Hemoglobin 13.4 12.0 - 16.0 g/dL    Hematocrit 40.6 37.0 - 47.0 %    MCV 97.2 82.0 - 100.0 fL    MCH 32.0 (H) 27.0 - 31.3 pg    MCHC 32.9 (L) 33.0 - 37.0 %    RDW 15.5 (H) 11.5 - 14.5 %    Platelets 034 959 - 906 K/uL    Neutrophils % 77.8 %    Lymphocytes % 11.2 %    Monocytes % 8.0 %    Eosinophils % 2.4 %    Basophils % 0.6 %    Neutrophils Absolute 8.5 (H) 1.4 - 6.5 K/uL    Lymphocytes Absolute 1.2 1.0 - 4.8 K/uL    Monocytes Absolute 0.9 (H) 0.2 - 0.8 K/uL    Eosinophils Absolute 0.3 0.0 - 0.7 K/uL    Basophils Absolute 0.1 0.0 - 0.2 K/uL              Impression:  1. Impaired mobility and ADLs due to metabolic encephalopathy and dizziness currently being worked up by medical team neurology and infectious disease  2. Factors of concern for recovery include:  good family support at home        Complex Active General Medical Issues thatcomplicate care Assess & Plan: 1. Principal Problem:    Elevated troponin  Active Problems:    Dizziness    Benign paroxysmal positional vertigo due to bilateral vestibular disorder    Maxillary pain    Nonintractable headache    Fall at home    Fever and chills  Resolved Problems:    * No resolved hospital problems. *    Patient Active Problem List   Diagnosis    Multiple myeloma ([facility] [address]    Obese    Ataxia    Hyperlipidemia    Type 2 diabetes mellitus with circulatory disorder (HCC)    Vertebral compression fracture (HCC)    Elevated troponin    Dizziness    Benign paroxysmal positional vertigo due to bilateral vestibular disorder    Maxillary pain    Nonintractable headache    Fall at home    Fever and chills             Recommendations:  1. Considering all of the factors above including the patient's current medical status, social status/home environment, their functional needs, and their ability to participate in a therapy program, I feel that they would best be served at   acute    rehabilitation level of care.   It is my opinion that they will   be able to tolerate and benefit from 3 hours of therapy a day. I reviewed the various options re: levels of care with the patient and family. Please see pre-admission screen note for further details. 2. Specialized nursing care to focus on:  1. Bowel and bladder issues-Monitor for urinary retention-check PVRs, bladder scan--cath if no void. 2. Wound management re   -pressure relief protocols-side to side turns  3. IV medication administration    3. Monitor endurance and if necessary spread therapy out over a 7-day window-adding scheduled rest breaks when needed. Focus on energy conservation. Monitor heart rate and   cardiac medications effects on heart rate and blood pressure before, during and after therapy. Progress toward endurance training with pulse ox monitoring for saturation and heart rate. 4. continue to monitor closely for dehydration-- Improve hydration and nutrition by adding Vitamin B12 shot times one, adding Protein supplements and push PO fluids  5. Monitor for higher level cognitive deficits, focus on difficulty with sequencing and problem-solving. 6. Focus on higher-level balance and falls risk issues focusing on balance training and monitoring for orthostasis. Above recommendations are indicated to address medical complexity and need for appropriate rehab services. Will tailor individual care and rehab plan per individuals needs . Focus of today's plan-pain OT evaluation for full functional assessment we will also obtain speech-language pathology evaluation  Follow clinically, she does have help at home but has had functional loss cognitive deficits and would be a good rehab candidate once primary etiology has been identified. I discussed acute rehab with the patient and verify that the patient is able and willing to participate in 3 hours of therapy a day. Rehab and Acute Care Case Management has also reinforced this expectation.        It was my pleasure to evaluate Mckenna Mendoza today. Please call [phone] with questions.     Divina Hammer MD

## 2022-05-26 NOTE — CARE COORDINATION
PM&R Consult received. Met with patient and spouse along with PM&R Dr Chad Menard, explained [facility] Acute Inpatient Rehab program and requirements, including 3 hours of intense therapy daily, anticipated length of stay and goal of discharge to home. All questions answered and patient verbalized understanding. PM&R recommendation for Acute rehab d/t functional loss with dizziness requiring multiple rest breaks and cognitive deficits. Freedom of choice provided and patient requests admit to [facility] if approved by insurance. Page sent to OT for updated notes. SLP eval also ordered. Precert will be needed from [facility] [employer] [facility].  Electronically signed by Alanna Mcdaniels RN on 5/26/22 at 2:42 PM EDT

## 2022-05-26 NOTE — PROGRESS NOTES
Progress Note  Patient: Arcenio Ellington  Unit/Bed: W245/P170-86  YOB: 1945  MRN: [id number]  Acct: [de-identified]   Admitting Diagnosis: Elevated troponin [R77.8]  Fever and chills [R50.9]  Admit Date:  5/23/2022  Hospital Day: 1    Chief Complaint: weakness dizizness     Histories:  Past Medical History:   Diagnosis Date    Cancer [facility])     Encounter for lumbar puncture 05/25/2022    Completed by Dr. Shaista Guzmán - diagnostics sent    Hyperlipidemia     Hypertension     Type II or unspecified type diabetes mellitus without mention of complication, not stated as uncontrolled      Past Surgical History:   Procedure Laterality Date    CORONARY ANGIOPLASTY WITH STENT PLACEMENT      UPPER GASTROINTESTINAL ENDOSCOPY  8/12/15    w/bx,dilation      Family History   Problem Relation Age of Onset    Cancer Mother     Arthritis Mother     Diabetes Mother     Heart Disease Mother     High Blood Pressure Mother     High Cholesterol Mother     Arthritis Father     Diabetes Father     Heart Disease Father     High Blood Pressure Father     High Cholesterol Father      Social History     Socioeconomic History    Marital status:      Spouse name: None    Number of children: None    Years of education: None    Highest education level: None   Occupational History    None   Tobacco Use    Smoking status: Former Smoker     Years: 24.00    Smokeless tobacco: Never Used   Substance and Sexual Activity    Alcohol use:  Yes     Alcohol/week: 3.0 standard drinks     Types: 3 Glasses of wine per week    Drug use: No    Sexual activity: None   Other Topics Concern    None   Social History Narrative    None     Social Determinants of Health     Financial Resource Strain: Low Risk     Difficulty of Paying Living Expenses: Not hard at all   Food Insecurity: No Food Insecurity    Worried About Running Out of Food in the Last Year: Never true    [place] of Food in the Last Year: Never true Transportation Needs:     Lack of Transportation (Medical): Not on file    Lack of Transportation (Non-Medical): Not on file   Physical Activity:     Days of Exercise per Week: Not on file    Minutes of Exercise per Session: Not on file   Stress:     Feeling of Stress : Not on file   Social Connections:     Frequency of Communication with Friends and Family: Not on file    Frequency of Social Gatherings with Friends and Family: Not on file    Attends Temple Services: Not on file    Active Member of [address] [place] [address] [employer] or Organizations: Not on file    Attends Club or Organization Meetings: Not on file    Marital Status: Not on file   Intimate Partner Violence:     Fear of Current or Ex-Partner: Not on file    Emotionally Abused: Not on file    Physically Abused: Not on file    Sexually Abused: Not on file   Housing Stability:     Unable to Pay for Housing in the Last Year: Not on file    Number of [place] in the Last Year: Not on file    Unstable Housing in the Last Year: Not on file       Subjective/HPI   Pt had fever. Feels head and sinus pressure. No cp no sob. Still little dizzy. EKG: SR 80        Review of Systems:   Review of Systems   Constitutional: Negative. Negative for diaphoresis and fatigue. HENT: Negative. Eyes: Negative. Respiratory: Negative. Negative for cough, chest tightness, shortness of breath, wheezing and stridor. Cardiovascular: Negative. Negative for chest pain, palpitations and leg swelling. Gastrointestinal: Positive for abdominal pain. Negative for blood in stool and nausea. Genitourinary: Negative. Musculoskeletal: Positive for arthralgias, back pain and gait problem. Skin: Negative. Neurological: Positive for dizziness and weakness. Negative for syncope and light-headedness. Hematological: Negative. Psychiatric/Behavioral: Negative.           Physical Examination:    /64   Pulse 92   Temp 98.8 °F (37.1 °C) (Oral)   Resp 16   Ht 5' 05/23/2022    CALCIUM 9.3 05/25/2022    BILITOT 1.1 05/23/2022    ALKPHOS 74 05/23/2022    AST 18 05/23/2022    ALT 23 05/23/2022     BMP:    Lab Results   Component Value Date     05/25/2022    K 4.3 05/25/2022     05/25/2022    CO2 21 05/25/2022    BUN 12 05/25/2022    LABALBU 4.4 05/23/2022    CREATININE 0.97 05/25/2022    CALCIUM 9.3 05/25/2022    GFRAA >60.0 05/25/2022    LABGLOM 55.4 05/25/2022    GLUCOSE 108 05/25/2022     Magnesium:    Lab Results   Component Value Date    MG 1.6 05/24/2022     Troponin:    Lab Results   Component Value Date    TROPONINI 0.036 05/24/2022        Active Hospital Problems    Diagnosis Date Noted    Dizziness [R42]      Priority: Medium    Benign paroxysmal positional vertigo due to bilateral vestibular disorder [H81.13]      Priority: Medium    Maxillary pain [R68.84]      Priority: Medium    Nonintractable headache [R51.9]      Priority: Medium    Fall at home [W19. Yohannes Chandrakant, Y92.009]      Priority: Medium    Fever and chills [R50.9]      Priority: Medium    Elevated troponin [R77.8] 05/23/2022     Priority: Medium        Assessment/Plan:    1. Fever - work up in progress. 2. Dizziness -Telemetry. CUS - negative  3. Elevated possible Demand ischemia  - Borderline levels. No CP  4. Echo EF 65%  5. HTN stable - continue meds. Low salt diet. 6. HPL - Statin   7.  HypoMag 1.6 - replaced         Electronically signed by Dorcas Sharma MD on 5/26/2022 at 12:28 PM

## 2022-05-26 NOTE — PROGRESS NOTES
[facility] OCCUPATIONAL THERAPY MED SURG TREATMENT NOTE     Date: 2022  Patient Name: Any Del Valle        MRN: [id number]  Account: [de-identified]   : 1945  (64 y.o.)  [facility]    Chart Review:    Restrictions  Restrictions/Precautions  Restrictions/Precautions: Fall Risk     Safety:  Safety Devices  Type of Devices: Bed alarm in place; All fall risk precautions in place;Call light within reach; Left in bed    Patient's birthday verified: Yes    Subjective:    Subjective: \"I'm sore but I feel better\"       Pain at start of treatment: No    Pain at end of treatment: No    Location: Feeling stiff and sore but declined to rate- per pt, low back where lumbar puncture    Nursing notified: Not Applicable  RN: Hannah Engel  Intervention: Repositioned    Objective:    ADL  Feeding: Setup  Grooming: Setup  UE Bathing: Setup  LE Bathing: Minimal assistance  UE Dressing: None  UE Dressing Skilled Clinical Factors: Anticipate min A for bra fastener  LE Dressing: Moderate assistance  Toileting: Contact guard assistance  Additional Comments: Pt states she washed up already today, however amenable to partial sponge bath. Washed face, torso and down legs. Toileted with brief CGA during pants management. Pt also performed hygiene with SBA. Unable to reach feet to doff/don socks but was able to doff/don underwear.   Toilet Transfers  Toilet - Technique: Ambulating  Equipment Used: Grab bars  Toilet Transfer: Stand by assistance  Toilet Transfers Comments: Increased effort, BUE pull from grab bar to stand    Therapy key for assistance levels -   Independent/Mod I = Pt. is able to perform task with no assistance but may require a device   Stand by assistance = Pt. does not perform task at an independent level but does not need physical assistance, requires verbal cues  Minimal, Moderate, Maximal Assistance = Pt. requires physical assistance (25%, 50%, 75% assist from helper) for task but is able to actively participate in task   Dependent = Pt. requires total assistance with task and is not able to actively participate with task completion    Cognition  Overall Cognitive Status: WFL  Cognition Comment: Mild safety deficits    Functional Balance:  Balance  Sitting: Intact  Standing: Impaired  Standing - Static: Constant support  Standing - Dynamic: Fair     Standing: Impaired  Standing - Static: Constant support    Patient ambulated to/from bathroom with Handheld assist at [facility] level. Pt initially ambulates with no device and SBA per per preference. F safety, reaching for walls and objects. Pt CGA to ambulate remainder of the distance back from bathroom d/t fatigue.     Treatment consisted of:    ADL training    Assessment/Discharge Disposition:  Assessment  Discharge Recommendations: Continue to assess pending progress    [employer]  How much help for putting on and taking off regular lower body clothing?: A Lot  How much help for Bathing?: A Little  How much help for Toileting?: A Little  How much help for putting on and taking off regular upper body clothing?: A Little  How much help for taking care of personal grooming?: A Little  How much help for eating meals?: A Little  AM-[facility] Inpatient Daily Activity Raw Score: 17  AM-PAC Inpatient ADL T-Scale Score : 37.26  ADL Inpatient CMS 0-100% Score: 50.11  ADL Inpatient CMS G-Code Modifier : CK    Plan:    Continue OT per POC    Patient Education:  Patient Education  Education Given To: Patient  Education Provided: Role of Therapy;Plan of Care  Education Method: Verbal;Demonstration  Barriers to Learning: None  Education Outcome: Verbalized understanding;Demonstrated understanding    Equipment recommendations:  OT Equipment Recommendations  Other: Continue to assess    Goals/Plan of care addressed during this session:          Improve [place] with ADLs, Improve [place] with Functional Transfers and Improve Cognition/Safety awareness    Therapy Time:   Individual Group Co-Treat Time In 1505       Time Out 1530         Minutes 25           ADL/IADL trainin minutes    Electronically signed by:     NICK De Souza/L    2022, 3:47 PM

## 2022-05-26 NOTE — CARE COORDINATION
[facility] Pre-Admission Screening Document      Patient Name: Shine Clemons       MRN: [id number]    : 1945    Age: 64 y.o. Gender: female   Payor: Payor: Philippe Alvarez / Plan: Niki Wan / Product Type: *No Product type* /   MSSP: No    Admitted from: [facility] Floor: 2w  Attending Care Provider: Ji Ayers MD  Inpatient Rehab Referring Care Provider: Adrien Tan MD  Primary Care Provider: Dudley Rodríguez MD  Inpatient Treatment Team including Consults: Treatment Team: Attending Provider: Ji Ayers MD; Consulting Physician: Mikael Faith MD; Consulting Physician: Willy Hall DO; Consulting Physician: Serafin Lucero MD; Utilization Reviewer: Gloria Walsh RN; Consulting Physician: Radha Jim DO; Consulting Physician: Preston Morejon MD; Registered Nurse: Daysi Vences RN; Patient Care Tech: Bety Dimas; Consulting Physician: Mariaa Luque MD    Reason for Hospitalization:   1. Elevated troponin      Chief Complaint   Patient presents with    Dizziness     Isolation:No active isolations    Hospital Course:  Admit Date: 2022 12:56 PM  Inpatient Rehab Referral Date: 22  Narrative of hospital course/history of present illness: 64 yr old female presented to ED with c/o dizziness, and balance issues, ongoing for last 2 weeks. Neurology consult-Dizziness which may be suggestive of vestibular dysfunction. MRI Brain and LP negative. Started on antivert. Cardiology consult-Elevated possible Demand ischemia.  Meds adjusted           Medical & Surgical History/Current Comorbidities:  Past Medical History:   Diagnosis Date    Cancer [facility])     Encounter for lumbar puncture 2022    Completed by Dr. Yoel Escudero - diagnostics sent    Hyperlipidemia     Hypertension     Type II or unspecified type diabetes mellitus without mention of complication, not stated as uncontrolled      Past Surgical History: Procedure Laterality Date    CORONARY ANGIOPLASTY WITH STENT PLACEMENT      UPPER GASTROINTESTINAL ENDOSCOPY  8/12/15    w/bx,dilation        Advanced Directives:  Advance Directives     Power of  Living Will ACP-Advance Directive ACP-Power of     Not on File Not on File Not on File Not on File          Labs/Infection Control:  Recent Labs     05/24/22  1513 05/25/22  0611 05/26/22  1339   WBC 14.2* 12.8* 10.9*   HGB 13.1 12.2 13.4   HCT 40.6 38.5 40.6    191 230   BUN 11 12 13   CREATININE 1.06* 0.97* 1.18*   GLUCOSE 120* 108* 168*    140 138   K 4.0 4.3 3.6   CALCIUM 10.2* 9.3 9.5      Blood cultures:  Recent Labs     05/24/22  1714   BC No Growth to date. Any change in status will be called. Urinalysis/C&S:  Recent Labs     05/25/22  0633   WBCUA 0-2   RBCUA 0-2   BACTERIA Negative   LEUKOCYTESUR TRACE*   BLOODU Negative   GLUCOSEU Negative   KETUA Negative       Radiology:  Echocardiogram complete 2D with doppler with color  Result Date: 5/24/2022  Left Ventricle Left ventricular ejection fraction is estimated at 65%. E/A flow reversal noted. Suggestive of diastolic dysfunction. Right Ventricle Normal right ventricle structure and function. Normal right ventricle systolic pressure. RVSP 22mmHg Left Atrium Normal size left atrium. Right Atrium Normal right atrium. Mitral Valve Structurally normal mitral valve. No evidence of mitral valve stenosis. No evidence of mitral regurgitaton. Tricuspid Valve Tricuspid valve is structurally normal. No evidence of tricuspid stenosis. No evidence of tricuspid regurgitation. Aortic Valve Mildly thickened trileaflet aortic valve with normal excursion. No evidence of aortic valve stenosis . No evidence of aortic valve regurgitation . Pulmonic Valve The pulmonic valve was not well visualized . Pericardial Effusion No evidence of significant pericardial effusion is noted.      CTA HEAD W WO CONTRAST  Result Date: 5/24/2022  Small caliber of the vertebrobasilar system as discussed. Possible short segment of narrowing of the distal basilar artery near terminus versus tortuosity. Dominant anterior circulation with fetal origin of the PCAs bilaterally. No evidence for significant carotid stenosis. CT HEAD WO CONTRAST  Result Date: 5/24/2022  No acute intracranial hemorrhage or mass effect. Chronic microvascular disease and chronic atrophic brain changes. Extensive lucencies visualized within the diploic space throughout the calvarium would recommend clinical correlation. CT Head WO Contrast  Result Date: 5/23/2022  No acute intracranial hemorrhage or mass effect. Chronic microvascular disease and chronic atrophic brain changes. CTA NECK W WO CONTRAST  Result Date: 5/24/2022  Small caliber of the vertebrobasilar system as discussed. Possible short segment of narrowing of the distal basilar artery near terminus versus tortuosity. Dominant anterior circulation with fetal origin of the PCAs bilaterally. No evidence for significant carotid stenosis. XR CHEST PORTABLE  Result Date: 5/25/2022  Suggestion of left lower lobe infiltrate/consolidation. Recommend clinical correlation    XR CHEST PORTABLE  Result Date: 5/23/2022  COPD and chronic interstitial changes. US CAROTID ARTERY BILATERAL  Result Date: 5/25/2022  MILD SCATTERED ATHEROSCLEROSIS BOTH CAROTID TREES. NO FLOW OBSTRUCTION. BILATERAL ICAS LESS THAN 50% STENOSIS. BILATERAL ANTEGRADE VERTEBRAL FLOW. Medications/IV's:  The patient is currently on lovenox for DVT prophylaxis.      Scheduled:    guaiFENesin, 600 mg, Oral, BID    sodium chloride flush, 5-40 mL, IntraVENous, 2 times per day    sodium chloride flush, 5-40 mL, IntraVENous, 2 times per day    [Held by provider] lisinopril, 20 mg, Oral, Daily    pantoprazole, 40 mg, Oral, QAM AC    magnesium oxide, 800 mg, Oral, Daily    meclizine, 25 mg, Oral, TID    amLODIPine, 10 mg, Oral, Daily    cefTRIAXone (ROCEPHIN) IV, 2,000 mg, IntraVENous, Q12H    sodium chloride flush, 5-40 mL, IntraVENous, 2 times per day    atorvastatin, 80 mg, Oral, Nightly    enoxaparin, 1 mg/kg, SubCUTAneous, BID    aspirin, 81 mg, Oral, BID    gabapentin, 600 mg, Oral, Nightly    insulin lispro, 0-12 Units, SubCUTAneous, 4x Daily WC    insulin lispro, 0-6 Units, SubCUTAneous, Nightly    PRN:  sodium chloride flush, sodium chloride, sodium chloride flush, sodium chloride, melatonin, guaiFENesin-dextromethorphan, glucose, dextrose bolus **OR** dextrose bolus, glucagon (rDNA), dextrose, sodium chloride flush, sodium chloride, ondansetron **OR** ondansetron, acetaminophen **OR** acetaminophen, polyethylene glycol, nitroGLYCERIN, hydrALAZINE    Allergies: Allergies   Allergen Reactions    Eggs Or Egg-Derived Products Nausea Only    Glucophage [Metformin]      diarrhea    Valium [Diazepam]     Zithromax [Azithromycin]          Most Recent Vitals, Height and Weight  /62   Pulse 86   Temp 98.1 °F (36.7 °C) (Oral)   Resp 18   Ht 5' (1.524 m)   Wt 186 lb 1.6 oz (84.4 kg)   SpO2 94%   BMI 36.35 kg/m²     Weight Bearing Restrictions: wbat    Current Diet Order: ADULT DIET; Regular; Low Fat/Low Chol/High Fiber/2 gm Na;  No Caffeine    Skin: intact          Lungs:   Respiratory  Respiratory Pattern: Regular  Respiratory Depth: Normal  Respiratory Quality/Effort: Unlabored  Chest Assessment: Chest expansion symmetrical,Trachea midline  L Breath Sounds: Diminished  R Breath Sounds: Diminished  Level of Activity/Mobility: Up with assist  Subcutaneous air/Crepitus: None  Breath Sounds  Right Lower Lobe: Crackles  Left Lower Lobe: Crackles      Cognition and Behavior:  Language Preference (if other than English):      Alertness/Behavior  Neuro (WDL): Exceptions to WDL  Level of Consciousness: Alert (0)  History of Falling: Yes Cognition Comment: Mild safety deficits    Short Term Memory Deficits     History of Falling: Yes    Safety Prior Level of Function and Living Arrangements:  Social/Functional History  Lives With: Spouse,Son (pt never home alone)  Type of Home: House  Home Layout: Laundry in basement,Two level (doesn't have to use basement)  Home Access: Stairs to enter with rails  Entrance Stairs - Number of Steps: 6  Entrance Stairs - Rails: Right  Bathroom Shower/Tub: Walk-in shower  Bathroom Equipment: Shower chair,Grab bars in shower  Home Equipment: Rollator,Cane  ADL Assistance:  ( assists with bathing/dressing; pt indep with toileting activities)  Homemaking Assistance:  (spouse and son complete)  Homemaking Responsibilities: No  Ambulation Assistance: Independent (cane/rollator PRN)  Transfer Assistance: Independent  Additional Comments: Pt inconsistent historian. Pt's spouse arriving mid assessment and confirms home set up and PLOF  Living Arrangements: Spouse/Significant Other,Children  Support Systems: Spouse/Significant Other  Type of Home Care Services: None  Dental Appliances: None  Vision - Corrective Lenses: Eyeglasses  Hearing Aid: None  Personal Equipment:   Dental Appliances: None  Vision - Corrective Lenses: Eyeglasses  Hearing Aid: None      CURRENT FUNCTIONAL LEVEL:  Physical Therapy  Bed mobility:  Bed mobility  Rolling to Left: Minimal assistance (05/24/22 1537)  Rolling to Right: Stand by assistance (05/26/22 1047)  Supine to Sit: Stand by assistance (05/26/22 1047)  Sit to Supine: Moderate assistance (05/24/22 1537)  Bed Mobility Comments: increased time and effort to complete, however pt able to complete on her own without assist. HOB slightly elevated. (05/26/22 1047)  Transfers:  Transfers  Sit to Stand: Supervision;Stand by assistance (05/26/22 1047)  Stand to sit: Supervision;Stand by assistance (05/26/22 1047)  Bed to Chair: Supervision;Stand by assistance (05/26/22 1047)  Comment: Multiple transfers completed. Slow to complete. Safe technique employed.  (05/26/22 1047)  Gait: Ambulation  Surface: level tile (05/26/22 1048)  Device: Rolling Walker (05/26/22 1048)  Assistance: Stand by assistance (05/26/22 1048)  Quality of Gait: Slow pacing. Pt describes dizziness and requires multiple standing rest breaks. No LOB however. (05/26/22 1048)  Distance: 12ft X 2 (05/26/22 1048)  Comments: Deferred - safety concerns (05/24/22 1538)  Stairs:     W/C mobility:         Occupational Therapy  Hand Dominance: Right  ADL  Feeding: Setup (05/26/22 1534)  Grooming: Setup (05/26/22 1534)  UE Bathing: Setup (05/26/22 1534)  LE Bathing: Minimal assistance (05/26/22 1534)  UE Dressing: None (05/26/22 1534)  UE Dressing Skilled Clinical Factors: Anticipate min A for bra fastener (05/26/22 1534)  LE Dressing: Moderate assistance (05/26/22 1534)  Toileting: Contact guard assistance (05/26/22 1534)  Additional Comments: Pt states she washed up already today, however amenable to partial sponge bath. Washed face, torso and down legs. Toileted with brief CGA during pants management. Pt also performed hygiene with SBA. Unable to reach feet to doff/don socks but was able to doff/don underwear. (05/26/22 1534)  Toilet Transfers  Toilet - Technique: Ambulating (05/26/22 1538)  Equipment Used: Grab bars (05/26/22 1538)  Toilet Transfer: Stand by assistance (05/26/22 1538)  Toilet Transfers Comments: Increased effort, BUE pull from grab bar to stand (05/26/22 1538)            Speech Language Pathology      Comprehension: Within Functional Limits  Verbal Expression: Within functional limits  Diet/Swallow:        Dysphagia Outcome Severity Scale: Level 7: Normal in all situations  Compensatory Swallowing Strategies : Small bites/sips,Upright as possible for all oral intake         Current Conditions Requiring Inpatient Rehabilitation  Bowel/Bladder Dysfunction: Yes  Intervention Required = Frequent toileting  Risk for Medical/Clinical Complications = moderate  Skin Healing/Breakdown Risk: Yes  Intervention Required = Side to side turns  Risk for Medical/Clinical Complications = moderate  Nutrition/Hydration Deficiency: Yes  Intervention Required = Monitor I&Os and Check Labs  Risk for Medical/Clinical Complications = moderate  Medical Comorbidities: Yes  Intervention Required = DVT risk and DMII  Risk for Medical/Clinical Complications = high    Rehab/Skilled Needs:   3 hours of Intensive Acute Rehab therapy daily, 5 days/week for a total of 900 minutes  PT Treatment Time:  1.5 hrs/day  OT Treatment Time: 1.5 hrs/day  Rehabilitation Nursing   Case management/Social work    Cultural needs:   Values / Beliefs  Do You Have Any Ethnic, Cultural, Sacramental, or Spiritual Denominational Needs You Would Like Us To Be Aware of While You Are in the Hospital : No   Funding needs:   Potential Assistance Purchasing Medications: No     Expected Level of Improvement with Rehab  Assist for ADL Modified [place]  Assist for Transfers Modified [place]  Assist for Gait Modified [place]    Patient's willingness to participate: Yes  Patient's ability to tolerate proposed care: Yes  Patient/Family Goals of Rehab (in patient's/family's own words): go back home, stop being so dizzy    Anticipated Discharge Plan: Denied by Insurance for [address] with   [address], RN PT OT Aide to be determined      Barriers to Discharge:  Home entry accessibility  Multi-level home  Caregiver availability  [address] transportation  Resource availability      Rehab evaluation plan: Recommend Acute Rehab But Denied for Acute Inpatient Rehab  Rehabilitation Impairment Group Code: 3.9  Rehab Impairment Group: Neurologic: Metabolic Encephalopathy  Estimated Length of Stay (days): 12  1. Rehab Diagnosis: Impaired mobility and ADL's due to metabolic encephalopathy and dizziness    Reviewer's Signature: Electronically signed by Felicia Rivas RN on 5/27/22 at 1:57 PM EDT        I have reviewed and concur with the above Preadmission Screening. Rehab Admitting Doctor: Dr. Shilpa Carlton, DO

## 2022-05-26 NOTE — PROGRESS NOTES
Hospitalist Progress Note      PCP: Shana Ortiz MD    Date of Admission: 5/23/2022    Chief Complaint:    Chief Complaint   Patient presents with    Dizziness     Subjective:  Patient feels wel today; denies fevers, chills,s weats, diarrhea; has a small cough with expectoration today. 12 point ROS negative other than mentioned above     Medications:  Reviewed    Infusion Medications    sodium chloride      sodium chloride      dextrose      sodium chloride       Scheduled Medications    sodium chloride flush  5-40 mL IntraVENous 2 times per day    sodium chloride flush  5-40 mL IntraVENous 2 times per day    lisinopril  20 mg Oral Daily    pantoprazole  40 mg Oral QAM AC    magnesium oxide  800 mg Oral Daily    meclizine  25 mg Oral TID    amLODIPine  10 mg Oral Daily    cefTRIAXone (ROCEPHIN) IV  2,000 mg IntraVENous Q12H    sodium chloride flush  5-40 mL IntraVENous 2 times per day    atorvastatin  80 mg Oral Nightly    enoxaparin  1 mg/kg SubCUTAneous BID    aspirin  81 mg Oral BID    gabapentin  600 mg Oral Nightly    insulin lispro  0-12 Units SubCUTAneous 4x Daily WC    insulin lispro  0-6 Units SubCUTAneous Nightly     PRN Meds: sodium chloride flush, sodium chloride, sodium chloride flush, sodium chloride, melatonin, guaiFENesin-dextromethorphan, glucose, dextrose bolus **OR** dextrose bolus, glucagon (rDNA), dextrose, sodium chloride flush, sodium chloride, ondansetron **OR** ondansetron, acetaminophen **OR** acetaminophen, polyethylene glycol, nitroGLYCERIN, hydrALAZINE      Intake/Output Summary (Last 24 hours) at 5/26/2022 1239  Last data filed at 5/25/2022 1818  Gross per 24 hour   Intake 98.28 ml   Output --   Net 98.28 ml     Exam:    /64   Pulse 92   Temp 98.8 °F (37.1 °C) (Oral)   Resp 16   Ht 5' (1.524 m)   Wt 185 lb (83.9 kg)   SpO2 93%   BMI 36.13 kg/m²     General appearance: No apparent distress, appears stated age and cooperative.   HEENT: Conjunctivae/corneas clear. Neck:  Trachea midline. Respiratory:  Normal respiratory effort. Clear to auscultation. Cardiovascular: Regular rate and rhythm   Abdomen: Soft, non-tender, non-distended with normal bowel sounds. Musculoskeletal: No clubbing, cyanosis or edema bilaterally. Neuro: Non Focal.   Capillary Refill: Brisk,< 3 seconds   Peripheral Pulses: +2 palpable, equal bilaterally     Labs:   Recent Labs     05/24/22  0448 05/24/22  1513 05/25/22  0611   WBC 13.8* 14.2* 12.8*   HGB 12.7 13.1 12.2   HCT 39.0 40.6 38.5    203 191     Recent Labs     05/23/22  1457 05/24/22  1513 05/25/22  0611    139 140   K 4.0 4.0 4.3    103 105   CO2 24 24 21   BUN 15 11 12   CREATININE 0.96* 1.06* 0.97*   CALCIUM 10.6* 10.2* 9.3     Recent Labs     05/23/22  1457   AST 18   ALT 23   BILITOT 1.1*   ALKPHOS 74     Recent Labs     05/23/22  1330   INR 1.0     Recent Labs     05/23/22  1330 05/23/22  1457 05/23/22  1945 05/24/22  0015 05/24/22  1513   CKTOTAL  --  139  --   --   --    TROPONINI   < >  --  0.026* 0.028* 0.036*    < > = values in this interval not displayed. Urinalysis:      Lab Results   Component Value Date    NITRU Negative 05/25/2022    WBCUA 0-2 05/25/2022    BACTERIA Negative 05/25/2022    RBCUA 0-2 05/25/2022    BLOODU Negative 05/25/2022    SPECGRAV 1.050 05/25/2022    GLUCOSEU Negative 05/25/2022     Radiology:  IR LUMBAR PUNCTURE FOR DIAGNOSIS   Final Result   1. Technically successful diagnostic lumbar puncture. HISTORY: Abby Dolan is a Female of 64 years age, with  lp r/o meningitis . FLUOROSCOPY TIME:   58.7 seconds. RADIATION DOSE:         25.85 mGy. DIAGNOSIS: 58-year-old female with headaches, evaluation for meningitis. COMMENTS: R77.8 Elevated troponin ICD10      PROCEDURE:   Following universal protocol, patient and site verification was performed with a \"timeout\" prior to the procedure.     Following the discussion of the procedure, and this, risks versus benefits, informed consent was obtained from the patient. The patient was placed on fluoroscopy table in prone position and the lower back area was prepped and draped in usual sterile    fashion. The area between the interspinous process was marked. This was at the L3 level. Using the usual sterile conditions, 1% lidocaine (5 mL) and fluoroscopy guidance, a 20 gauge needle was inserted into the spinal canal.  After confirmation of    intra-thecal location of the needle tip by CSF leakage through the needle. Approximately 14.5 cc of CSF were collected in 4 separate containers. Following that the needle was withdrawn from the back. The patient tolerated the procedure well without    complications. XR CHEST PORTABLE   Final Result   Suggestion of left lower lobe infiltrate/consolidation. Recommend clinical correlation      CT HEAD WO CONTRAST   Final Result   No acute intracranial hemorrhage or mass effect. Chronic microvascular disease and chronic atrophic brain changes. Extensive lucencies visualized within the diploic space throughout the calvarium would recommend clinical correlation. CTA HEAD W WO CONTRAST   Final Result      Small caliber of the vertebrobasilar system as discussed. Possible short segment of narrowing of the distal basilar artery near terminus versus tortuosity. Dominant anterior circulation with fetal origin of the PCAs bilaterally. No evidence for significant carotid stenosis. CTA NECK W WO CONTRAST   Final Result      Small caliber of the vertebrobasilar system as discussed. Possible short segment of narrowing of the distal basilar artery near terminus versus tortuosity. Dominant anterior circulation with fetal origin of the PCAs bilaterally. No evidence for significant carotid stenosis. US CAROTID ARTERY BILATERAL   Final Result   MILD SCATTERED ATHEROSCLEROSIS BOTH CAROTID TREES. NO FLOW OBSTRUCTION. BILATERAL ICAS LESS THAN 50% STENOSIS. BILATERAL ANTEGRADE VERTEBRAL FLOW. CT Head WO Contrast   Final Result   No acute intracranial hemorrhage or mass effect. Chronic microvascular disease and chronic atrophic brain changes. XR CHEST PORTABLE   Final Result   COPD and chronic interstitial changes. MRI LUMBAR SPINE WO CONTRAST    (Results Pending)     Assessment/Plan:    #Fever with encephalopathy     - Unclear etiology; examined tympanic membrane today and no obvious abnormality or fluid level    - possible infiltrate now observed on CXR with fevers    #Dizziness    - per neuro    #Elevated troponin    - demand ischemia per cardiology    #HTN/HLD    - continue home meds    #DMII    - Continue SSI    Active Hospital Problems    Diagnosis Date Noted    Dizziness [R42]      Priority: Medium    Benign paroxysmal positional vertigo due to bilateral vestibular disorder [H81.13]      Priority: Medium    Maxillary pain [R68.84]      Priority: Medium    Nonintractable headache [R51.9]      Priority: Medium    Fall at home [W19. Warren Trinidad, Y92.009]      Priority: Medium    Fever and chills [R50.9]      Priority: Medium    Elevated troponin [R77.8] 05/23/2022     Priority: Medium     Additional work up or/and treatment plan may be added today or then after based on clinical progression. I am managing a portion of pt care. Some medical issues are handled by other specialists. Additional work up and treatment should be done in out pt setting by pt PCP and other out pt providers. In addition to examining and evaluating pt, I spent additional time explaining care, normal and abnormal findings, and treatment plan. All of pt questions were answered. Counseling, diet and education were  provided. Case will be discussed with nursing staff when appropriate. Family will be updated if and when appropriate. Diet: ADULT DIET; Regular; Low Fat/Low Chol/High Fiber/2 gm Na;  No Caffeine    Code

## 2022-05-26 NOTE — PROGRESS NOTES
Physical Therapy Med Surg Daily Treatment Note  Fac[facility]Department[facility]  Room: B4/U668-42       NAME: Elan Bustos  : 1945 (64 y.o.)  MRN: [id number]  CODE STATUS: Full Code    Date of Service: 2022    Patient Diagnosis(es): Elevated troponin [R77.8]  Fever and chills [R50.9]   Chief Complaint   Patient presents with    Dizziness     Patient Active Problem List    Diagnosis Date Noted    Dizziness     Benign paroxysmal positional vertigo due to bilateral vestibular disorder     Maxillary pain     Nonintractable headache     Fall at home     Fever and chills     Elevated troponin 2022    Hyperlipidemia 10/16/2015    Type 2 diabetes mellitus with circulatory disorder ([facility] [address] 10/16/2015    Vertebral compression fracture ([facility] [address] 10/16/2015    Multiple myeloma ([facility] [address] 2015    Obese 2015    Ataxia 2015        Past Medical History:   Diagnosis Date    Cancer ([facility] [address]     Encounter for lumbar puncture 2022    Completed by Dr. Vandana Garrido - diagnostics sent    Hyperlipidemia     Hypertension     Type II or unspecified type diabetes mellitus without mention of complication, not stated as uncontrolled      Past Surgical History:   Procedure Laterality Date    CORONARY ANGIOPLASTY WITH STENT PLACEMENT      UPPER GASTROINTESTINAL ENDOSCOPY  8/12/15    w/bx,dilation        Chart Reviewed: Yes  Family / Caregiver Present: No  General Comment  Comments: Pt resting in bed - agreeable to PT tx    Restrictions:  Restrictions/Precautions: Fall Risk    SUBJECTIVE:   Subjective: \"I'm doing alright. I wish they'd figure out what is going on with this cough. \"    Pain  Pain: Denies pre and post session pain.     OBJECTIVE:   Orientation  Orientation Level: Oriented to situation;Oriented to person;Oriented to place    Bed mobility  Rolling to Right: Stand by assistance  Supine to Sit: Stand by assistance  Bed Mobility Comments: increased time and effort to complete, however pt able to complete on her own without assist. HOB slightly elevated. Transfers  Sit to Stand: Supervision;Stand by assistance  Stand to sit: Supervision;Stand by assistance  Bed to Chair: Supervision;Stand by assistance  Comment: Multiple transfers completed. Slow to complete. Safe technique employed. Ambulation  Surface: level tile  Device: Rolling Walker  Assistance: Stand by assistance  Quality of Gait: Slow pacing. Pt describes dizziness and requires multiple standing rest breaks. No LOB however. Distance: 12ft X 2              Balance  Comments: Balance activities including static standing with reaching; lateral and rotational weight shifts. Activity Tolerance  Activity Tolerance Comments: Limited minimally by dizziness    Patient Education  Education Given To: Patient  Education Provided: Role of Therapy;Plan of Care  Education Method: Verbal  Education Outcome: Verbalized understanding     ASSESSMENT   Assessment: Pt with vast improvement in functional mobility this date. Still somewhat limited by dizziness. States that she normally walks rather slow and can have her spouse walking with her. Discussed vestibular therapy with pt - pt states this would depend on her son's schedule. Would benefit from continued therapy upon DC.   Specific Instructions for Next Treatment: increased gait/mobility speed; mobility and balance with head turns     Discharge Recommendations:  Continue to assess pending progress,Patient would benefit from continued therapy after discharge         Goals  Long Term Goals  Long term goal 1: Pt to complete bed mobility with indep  Long term goal 2: Pt to complete transfers with indep  Long term goal 3: Pt to ambulate 50-150ft with LRD and indep  Long term goal 4: Pt to tolerate 15min therapeutic exercise/activities without rest break  Long term goal 5: Pt to manage 12 steps with HR and Kenny    PLAN    Plan: 1 time a day 3-6 times a week  Specific Instructions for Next Treatment: increased gait/mobility speed; mobility and balance with head turns  Safety Devices  Type of Devices: All fall risk precautions in place,Left in chair,Nurse notified     [facility] (6 CLICK) [facility] Inpatient Mobility Raw Score : 17     Therapy Time   Individual   Time In 1015   Time Out 1042   Minutes 27     Timed Code Treatment Minutes: 23 Minutes (gait 10min; NMR 8min; 5min transfers)   *4min using rest room    Nafisa Benito PT, 05/26/22 at 12:33 PM         Definitions for assistance levels  Independent = pt does not require any physical supervision or assistance from another person for activity completion. Device may be needed.   Stand by assistance = pt requires verbal cues or instructions from another person, close to but not touching, to perform the activity  Minimal assistance= pt performs 75% or more of the activity; assistance is required to complete the activity  Moderate assistance= pt performs 50% of the activity; assistance is required to complete the activity  Maximal assistance = pt performs 25% of the activity; assistance is required to complete the activity  Dependent = pt requires total physical assistance to accomplish the task

## 2022-05-27 PROBLEM — M47.816 LUMBAR SPONDYLOSIS: Status: ACTIVE | Noted: 2022-05-27

## 2022-05-27 PROBLEM — Z74.09 IMPAIRED MOBILITY AND ACTIVITIES OF DAILY LIVING: Status: ACTIVE | Noted: 2022-05-27

## 2022-05-27 PROBLEM — M48.062 LUMBAR STENOSIS WITH NEUROGENIC CLAUDICATION: Status: ACTIVE | Noted: 2022-05-27

## 2022-05-27 PROBLEM — Z78.9 IMPAIRED MOBILITY AND ACTIVITIES OF DAILY LIVING: Status: ACTIVE | Noted: 2022-05-27

## 2022-05-27 LAB
GLUCOSE BLD-MCNC: 100 MG/DL (ref 70–99)
GLUCOSE BLD-MCNC: 109 MG/DL (ref 70–99)
GLUCOSE BLD-MCNC: 113 MG/DL (ref 70–99)
GLUCOSE BLD-MCNC: 88 MG/DL (ref 70–99)
PERFORMED ON: ABNORMAL
PERFORMED ON: NORMAL
VDRL CSF SCREEN: NON REACTIVE

## 2022-05-27 PROCEDURE — 87449 NOS EACH ORGANISM AG IA: CPT

## 2022-05-27 PROCEDURE — 2580000003 HC RX 258: Performed by: PHYSICIAN ASSISTANT

## 2022-05-27 PROCEDURE — 2580000003 HC RX 258: Performed by: INTERNAL MEDICINE

## 2022-05-27 PROCEDURE — 92610 EVALUATE SWALLOWING FUNCTION: CPT

## 2022-05-27 PROCEDURE — 2580000003 HC RX 258: Performed by: PSYCHIATRY & NEUROLOGY

## 2022-05-27 PROCEDURE — 2580000003 HC RX 258: Performed by: NURSE PRACTITIONER

## 2022-05-27 PROCEDURE — 99232 SBSQ HOSP IP/OBS MODERATE 35: CPT | Performed by: INTERNAL MEDICINE

## 2022-05-27 PROCEDURE — 99233 SBSQ HOSP IP/OBS HIGH 50: CPT | Performed by: PSYCHIATRY & NEUROLOGY

## 2022-05-27 PROCEDURE — 99232 SBSQ HOSP IP/OBS MODERATE 35: CPT | Performed by: PHYSICAL MEDICINE & REHABILITATION

## 2022-05-27 PROCEDURE — 6370000000 HC RX 637 (ALT 250 FOR IP)

## 2022-05-27 PROCEDURE — 6370000000 HC RX 637 (ALT 250 FOR IP): Performed by: PSYCHIATRY & NEUROLOGY

## 2022-05-27 PROCEDURE — 6370000000 HC RX 637 (ALT 250 FOR IP): Performed by: INTERNAL MEDICINE

## 2022-05-27 PROCEDURE — 92523 SPEECH SOUND LANG COMPREHEN: CPT

## 2022-05-27 PROCEDURE — 99222 1ST HOSP IP/OBS MODERATE 55: CPT | Performed by: INTERNAL MEDICINE

## 2022-05-27 PROCEDURE — 1210000000 HC MED SURG R&B

## 2022-05-27 PROCEDURE — 6360000002 HC RX W HCPCS: Performed by: NURSE PRACTITIONER

## 2022-05-27 PROCEDURE — 6360000002 HC RX W HCPCS: Performed by: PHYSICIAN ASSISTANT

## 2022-05-27 PROCEDURE — 6370000000 HC RX 637 (ALT 250 FOR IP): Performed by: PHYSICIAN ASSISTANT

## 2022-05-27 RX ORDER — GUAIFENESIN 600 MG/1
600 TABLET, EXTENDED RELEASE ORAL 2 TIMES DAILY
Status: DISCONTINUED | OUTPATIENT
Start: 2022-05-27 | End: 2022-06-03 | Stop reason: HOSPADM

## 2022-05-27 RX ORDER — SODIUM CHLORIDE 9 MG/ML
INJECTION, SOLUTION INTRAVENOUS CONTINUOUS
Status: DISCONTINUED | OUTPATIENT
Start: 2022-05-27 | End: 2022-05-30

## 2022-05-27 RX ADMIN — Medication 10 ML: at 22:35

## 2022-05-27 RX ADMIN — AMLODIPINE BESYLATE 10 MG: 10 TABLET ORAL at 08:12

## 2022-05-27 RX ADMIN — Medication 10 ML: at 22:31

## 2022-05-27 RX ADMIN — CEFTRIAXONE SODIUM 2000 MG: 2 INJECTION, POWDER, FOR SOLUTION INTRAMUSCULAR; INTRAVENOUS at 06:02

## 2022-05-27 RX ADMIN — GUAIFENESIN 600 MG: 600 TABLET ORAL at 22:30

## 2022-05-27 RX ADMIN — MECLIZINE HYDROCHLORIDE 25 MG: 25 TABLET ORAL at 22:30

## 2022-05-27 RX ADMIN — ENOXAPARIN SODIUM 80 MG: 100 INJECTION SUBCUTANEOUS at 08:11

## 2022-05-27 RX ADMIN — ENOXAPARIN SODIUM 80 MG: 100 INJECTION SUBCUTANEOUS at 22:31

## 2022-05-27 RX ADMIN — GABAPENTIN 600 MG: 300 CAPSULE ORAL at 22:31

## 2022-05-27 RX ADMIN — GUAIFENESIN 600 MG: 600 TABLET ORAL at 09:32

## 2022-05-27 RX ADMIN — MECLIZINE HYDROCHLORIDE 25 MG: 25 TABLET ORAL at 08:11

## 2022-05-27 RX ADMIN — ASPIRIN 81 MG: 81 TABLET, COATED ORAL at 08:11

## 2022-05-27 RX ADMIN — MECLIZINE HYDROCHLORIDE 25 MG: 25 TABLET ORAL at 13:17

## 2022-05-27 RX ADMIN — ATORVASTATIN CALCIUM 80 MG: 80 TABLET, FILM COATED ORAL at 22:31

## 2022-05-27 RX ADMIN — SODIUM CHLORIDE: 9 INJECTION, SOLUTION INTRAVENOUS at 13:18

## 2022-05-27 RX ADMIN — CEFTRIAXONE SODIUM 2000 MG: 2 INJECTION, POWDER, FOR SOLUTION INTRAMUSCULAR; INTRAVENOUS at 18:06

## 2022-05-27 RX ADMIN — LISINOPRIL 20 MG: 20 TABLET ORAL at 08:11

## 2022-05-27 RX ADMIN — Medication 10 ML: at 22:34

## 2022-05-27 RX ADMIN — ASPIRIN 81 MG: 81 TABLET, COATED ORAL at 22:30

## 2022-05-27 RX ADMIN — PANTOPRAZOLE SODIUM 40 MG: 40 TABLET, DELAYED RELEASE ORAL at 06:03

## 2022-05-27 RX ADMIN — Medication 800 MG: at 08:12

## 2022-05-27 RX ADMIN — GUAIFENESIN SYRUP AND DEXTROMETHORPHAN 5 ML: 100; 10 SYRUP ORAL at 22:31

## 2022-05-27 RX ADMIN — Medication 10 ML: at 08:11

## 2022-05-27 RX ADMIN — GUAIFENESIN SYRUP AND DEXTROMETHORPHAN 5 ML: 100; 10 SYRUP ORAL at 08:16

## 2022-05-27 RX ADMIN — Medication 3 MG: at 22:31

## 2022-05-27 ASSESSMENT — ENCOUNTER SYMPTOMS
SHORTNESS OF BREATH: 0
NAUSEA: 0
BLOOD IN STOOL: 0
VOICE CHANGE: 0
SORE THROAT: 0
RESPIRATORY NEGATIVE: 1
BACK PAIN: 1
COUGH: 0
CHEST TIGHTNESS: 0
STRIDOR: 0
ABDOMINAL PAIN: 1
TROUBLE SWALLOWING: 0
EYES NEGATIVE: 1
VOMITING: 0
ABDOMINAL PAIN: 0
WHEEZING: 0
DIARRHEA: 0
CONSTIPATION: 0
COLOR CHANGE: 0

## 2022-05-27 NOTE — CONSULTS
Patient Name: Dylan Arndt Date: 2022 12:56 PM  MR #: [id number]  : 1945    Attending Physician: Jerald Zabala MD  Reason for consult: Lumbar stenosis    History of Presenting Illness and Review of Systems     Warden De Paz is a 64 y.o. female on hospital day 2 . History Of Present Illness: Admitted to the hospital for mental status changes fever chills possible metabolic encephalopathy. CSF examination for meningitis not positive. Has over a year dizziness difficulty with balance walking. She is much improved at this time completely awake and alert and gives a good history. Came to the house this last December is walker dependent. Back surgery  for cracked vertebrae which the surgery did help but was difficult recovery.       History:      Past Medical History:   Diagnosis Date    Cancer [facility])     Encounter for lumbar puncture 2022    Completed by Dr. Gennaro Desai - diagnostics sent    Hyperlipidemia     Hypertension     Type II or unspecified type diabetes mellitus without mention of complication, not stated as uncontrolled      Past Surgical History:   Procedure Laterality Date    CORONARY ANGIOPLASTY WITH STENT PLACEMENT      UPPER GASTROINTESTINAL ENDOSCOPY  8/12/15    w/bx,dilation        Family History  Family History   Problem Relation Age of Onset    Cancer Mother     Arthritis Mother     Diabetes Mother     Heart Disease Mother     High Blood Pressure Mother     High Cholesterol Mother     Arthritis Father     Diabetes Father     Heart Disease Father     High Blood Pressure Father     High Cholesterol Father      [] Unable to obtain due to ventilated and/ or neurologic status    Social History     Socioeconomic History    Marital status:      Spouse name: Not on file    Number of children: Not on file    Years of education: Not on file    Highest education level: Not on file   Occupational History    Not on file   Tobacco Use    Smoking status: Former Smoker     Years: 24.00    Smokeless tobacco: Never Used   Substance and Sexual Activity    Alcohol use: Yes     Alcohol/week: 3.0 standard drinks     Types: 3 Glasses of wine per week    Drug use: No    Sexual activity: Not on file   Other Topics Concern    Not on file   Social History Narrative    Lives With: Spouse,  Son (pt never home alone)    Type of Home: House in [address] in basement,Two level (doesn't have to use basement)    Home Access: Stairs to enter with rails- Number of Steps: 6- Rails: Right    Bathroom Shower/Tub: Walk-in shower    Bathroom Equipment: Shower chair,Grab bars in [facility]: [facility] [address]:  ( assists with bathing/dressing; pt indep with toileting activities)    Homemaking Assistance:  (spouse and son complete)    Homemaking Responsibilities: No    Ambulation Assistance: Independent (cane/rollator PRN)    Transfer Assistance: Independent    Additional Comments: Pt inconsistent historian. Pt's spouse arriving mid assessment and confirms home set up and PLOF         Social Determinants of Health     Financial Resource Strain: Low Risk     Difficulty of Paying Living Expenses: Not hard at all   Food Insecurity: No Food Insecurity    Worried About Running Out of Food in the Last Year: Never true    [address] in the Last Year: Never true   Transportation Needs:     Lack of Transportation (Medical): Not on file    Lack of Transportation (Non-Medical):  Not on file   Physical Activity:     Days of Exercise per Week: Not on file    Minutes of Exercise per Session: Not on file   Stress:     Feeling of Stress : Not on file   Social Connections:     Frequency of Communication with Friends and Family: Not on file    Frequency of Social Gatherings with Friends and Family: Not on file    Attends Restorationist Services: Not on file    Active Member of Clubs or Organizations: Not on file   [employer] [employer] or Organization Meetings: Not on file    Marital Status: Not on file   Intimate Partner Violence:     Fear of Current or Ex-Partner: Not on file    Emotionally Abused: Not on file    Physically Abused: Not on file    Sexually Abused: Not on file   Housing Stability:     Unable to Pay for Housing in the Last Year: Not on file    Number of Mey in the Last Year: Not on file    Unstable Housing in the Last Year: Not on file      [] Unable to obtain due to ventilated and/ or neurologic status    Review of Systems    Home Medications:      Medications Prior to Admission: atorvastatin (LIPITOR) 80 MG tablet, Take 1 tablet by mouth daily  gabapentin (NEURONTIN) 300 MG capsule, Take 2 capsules by mouth at bedtime for 90 days.   furosemide (LASIX) 20 MG tablet, Take 1 tablet by mouth daily  metoprolol tartrate (LOPRESSOR) 50 MG tablet, Take 1 tablet by mouth 2 times daily (Patient taking differently: Take 50 mg by mouth daily )  pantoprazole (PROTONIX) 40 MG tablet, Take 1 tablet by mouth daily  ezetimibe (ZETIA) 10 MG tablet, Take 1 tablet by mouth daily  blood glucose monitor kit and supplies, Test 1 time daily  blood glucose monitor strips, Test 1 time daily  Lancets MISC, 1 each by Does not apply route daily  aspirin 81 MG tablet, Take 81 mg by mouth 2 times daily   isosorbide mononitrate (IMDUR) 30 MG CR tablet, Take 30 mg by mouth daily (Patient not taking: Reported on 5/23/2022)  lisinopril (PRINIVIL;ZESTRIL) 20 MG tablet, Take 20 mg by mouth daily  (Patient not taking: Reported on 5/23/2022)    Current Hospital Medications:     Scheduled Meds:   guaiFENesin  600 mg Oral BID    sodium chloride flush  5-40 mL IntraVENous 2 times per day    sodium chloride flush  5-40 mL IntraVENous 2 times per day    [Held by provider] lisinopril  20 mg Oral Daily    pantoprazole  40 mg Oral QAM AC    magnesium oxide  800 mg Oral Daily    meclizine  25 mg Oral TID    amLODIPine  10 mg Oral Daily    cefTRIAXone (ROCEPHIN) IV  2,000 mg IntraVENous Q12H    sodium chloride flush  5-40 mL IntraVENous 2 times per day    atorvastatin  80 mg Oral Nightly    enoxaparin  1 mg/kg SubCUTAneous BID    aspirin  81 mg Oral BID    gabapentin  600 mg Oral Nightly    insulin lispro  0-12 Units SubCUTAneous 4x Daily WC    insulin lispro  0-6 Units SubCUTAneous Nightly     Continuous Infusions:   sodium chloride 75 mL/hr at 05/27/22 1318    sodium chloride      sodium chloride      dextrose      sodium chloride       PRN Meds:.sodium chloride flush, sodium chloride, sodium chloride flush, sodium chloride, melatonin, guaiFENesin-dextromethorphan, glucose, dextrose bolus **OR** dextrose bolus, glucagon (rDNA), dextrose, sodium chloride flush, sodium chloride, ondansetron **OR** ondansetron, acetaminophen **OR** acetaminophen, polyethylene glycol, nitroGLYCERIN, hydrALAZINE  .  sodium chloride 75 mL/hr at 05/27/22 1318    sodium chloride      sodium chloride      dextrose      sodium chloride          Allergies: Allergies   Allergen Reactions    Eggs Or Egg-Derived Products Nausea Only    Glucophage [Metformin]      diarrhea    Valium [Diazepam]     Zithromax [Azithromycin]           REVIEW OF SYSTEMS    (2-9 systems for level 4, 10 or more for level 5)     Review of Systems   Constitutional: Negative for fever. HENT: Negative for ear pain, tinnitus and trouble swallowing. Eyes: Negative for photophobia and visual disturbance. Respiratory: Negative for choking and shortness of breath. Cardiovascular: Negative for chest pain and palpitations. Gastrointestinal: Negative for nausea and vomiting. Musculoskeletal: Positive for back pain. Negative for gait problem, joint swelling, myalgias, neck pain and neck stiffness. Skin: Negative for color change. Allergic/Immunologic: Negative for food allergies. Neurological: Positive for dizziness and light-headedness.  Negative for tremors, seizures, syncope, facial asymmetry, speech difficulty, weakness, numbness and headaches. Psychiatric/Behavioral: Negative for behavioral problems, confusion, hallucinations and sleep disturbance. Except as noted above the remainder of the review of systems was reviewed and negative. Physical Exam     Physical Exam:  Vitals and notes reviewed. Constitutional:  See above height, weight, pulse rate, and blood pressure. BMI      Appearance: Normal appearance. Head:      Normocephalic and atraumatic. Ears, Nose, Mouth, and Throat:      Normal shape, no discharge, deglutition intact  Eyes:      Extraocular Movements: Extraocular movements intact. Pupils: Pupils are equal, round, and reactive to light. Cardiovascular:      Pulses: Normal radialis pulses. Heart sounds: Normal heart sounds, regular rate, no rubs or murmurs. Respiratory:      lungs clear to auscultation  Abdomen:        Soft to palpation. Bowel sounds present. Genitourinary:      Normal for sex  Musculoskeletal:   Since to sit up stand up walker dependent for gait. General: Normal range of motion. Extremities well developed and symmetric. Muscle tone normal with no spasticity or fasciculations. Skin:     General: Skin is warm and dry. Capillary Refill: Capillary refill less than 2 seconds. Neurological: Absent reflexes in the lower extremities. Right-sided Babinski sign. No sensory levels. With movement sitting from a laying position felt lightheaded and dizzy. Mental Status: Alert and oriented to person, place, and time. Cranial nerves individually tested 2 through 12 normal  Hematologic/Lymphatic/Immunologic:      Negative for lymphadenopathy, hematomas. Psychiatric:         Mood and Affect: Mood normal. Appropriate affect    I have reviewed all laboratory studies, reports, data, and pertinent images.     Objective Findings:     Vitals:   Vitals:    05/26/22 1930 05/26/22 1945 05/27/22 0600 05/27/22 0753   BP:  128/73  133/62   Pulse:  (!) 101  86   Resp: 18 18  18   Temp: 98.4 °F (36.9 °C) 98.4 °F (36.9 °C)  98.1 °F (36.7 °C)   TempSrc: Oral Oral  Oral   SpO2:    94%   Weight:   186 lb 1.6 oz (84.4 kg)    Height:            Laboratory, Microbiology, Pathology, Radiology, Cardiology, Medications and Transcriptions reviewed  Scheduled Meds:   guaiFENesin  600 mg Oral BID    sodium chloride flush  5-40 mL IntraVENous 2 times per day    sodium chloride flush  5-40 mL IntraVENous 2 times per day    [Held by provider] lisinopril  20 mg Oral Daily    pantoprazole  40 mg Oral QAM AC    magnesium oxide  800 mg Oral Daily    meclizine  25 mg Oral TID    amLODIPine  10 mg Oral Daily    cefTRIAXone (ROCEPHIN) IV  2,000 mg IntraVENous Q12H    sodium chloride flush  5-40 mL IntraVENous 2 times per day    atorvastatin  80 mg Oral Nightly    enoxaparin  1 mg/kg SubCUTAneous BID    aspirin  81 mg Oral BID    gabapentin  600 mg Oral Nightly    insulin lispro  0-12 Units SubCUTAneous 4x Daily WC    insulin lispro  0-6 Units SubCUTAneous Nightly     Continuous Infusions:   sodium chloride 75 mL/hr at 05/27/22 1318    sodium chloride      sodium chloride      dextrose      sodium chloride         Recent Labs     05/25/22  0611 05/26/22  1339   WBC 12.8* 10.9*   HGB 12.2 13.4   HCT 38.5 40.6   MCV 98.2 97.2    230     Recent Labs     05/25/22  0611 05/26/22  1339    138   K 4.3 3.6    103   CO2 21 20   BUN 12 13   CREATININE 0.97* 1.18*     No results for input(s): AST, ALT, ALB, BILIDIR, BILITOT, ALKPHOS in the last 72 hours. No results for input(s): LIPASE, AMYLASE in the last 72 hours. No results for input(s): PROT, INR in the last 72 hours. Echocardiogram complete 2D with doppler with color      CTA HEAD W WO CONTRAST    Result Date: 5/24/2022  EXAMINATION:  CTA NECK W WO CONTRAST, CTA HEAD W WO CONTRAST HISTORY:  Dizziness and altered mental status.  TECHNIQUE:   Spiral high resolution axial images were obtained through the head, neck and superior mediastinum following bolus administration of intravenous contrast for CT angiography. The data was subsequently post-processed utilizing 3D multi-planar  reconstructions, 3D maximum intensity projections. Contrast:  iopamidol (ISOVUE-370) injection of 100 mL All CT scans at this facility use dose modulation, iterative reconstruction, and/or weight based dosing when appropriate to reduce radiation dose to as low as reasonably achievable. COMPARISON: CT head 5/24/2022. RESULT: BRAIN: Grossly unchanged from the recent CT head dictation. Please refer to the CT head dictation. NECK: Soft tissues: The soft tissue planes are maintained throughout. No evidence of a soft tissue mass in the neck or superior mediastinum. No significant lymphadenopathy. Spine:  Multilevel degenerative changes. Lung apices:   Nonspecific patchy interstitial opacities, which may be chronic, and emphysematous changes, partially imaged. CT ARTERIOGRAM:     Extracranial Circulation: Aortic Arch: Normal branching pattern. .  There is no significant stenosis in the proximal brachiocephalic vessels. Carotid Stenosis: Right Common:  No significant stenosis. Right Internal Carotid  Plaque:  Mild plaque formation. Right Internal Carotid Stenosis by NASCET criteria:  Less than 20% Left Common:  No significant stenosis. Left Internal Carotid Plaque:  Mild plaque formation. Left Internal Carotid Stenosis by NASCET criteria:  Less than 20%. Cervical Vertebral Arteries: Small in caliber bilaterally with the right side slightly dominant. Appear patent with tortuosity. Intracranial Circulation: Anterior Circulation:  Distal ICAs appear patent with mild to moderate calcifications. Proximal ACAs and proximal MCAs appear patent. No evidence for significant stenosis or aneurysm.  Vertebrobasilar Circulation:   Small caliber of the vertebral arteries as above with the left vertebral artery appearing to end in PICA, likely normal variant. Basilar artery is also small in caliber with tortuosity with short segment of possible narrowing near terminus versus tortuosity. Fetal origin of the bilateral PCAs with the PCAs appearing patent. Dural venous sinuses: Visualized dural venous sinuses are patent. Small caliber of the vertebrobasilar system as discussed. Possible short segment of narrowing of the distal basilar artery near terminus versus tortuosity. Dominant anterior circulation with fetal origin of the PCAs bilaterally. No evidence for significant carotid stenosis. CT HEAD WO CONTRAST    Result Date: 5/24/2022  INDICATION: 75-year-old female presenting with dizziness and altered mental status. COMPARISON: 5/23/2022 and 10/9/2015. Colby Aranak TECHNIQUE: Multidetector CT imaging was obtained from the skull base to vertex without the administration of intravenous contrast. Coronal and sagittal reformatted images were obtained. Automated dose exposure control was used for this exam. FINDINGS: Scattered areas of low-attenuation are visualized in the periventricular and subcortical white matter that demonstrate no significant change in comparison to the prior study consistent with chronic microvascular disease. No evidence of parenchymal hemorrhages or contusions. No evidence of intra or extra-axial fluid collection is seen. Mild prominence of ventricles and sulci are visualized demonstrating no significant increase in comparison to the prior study consistent is more chronic brain changes. No structural midline abnormalities seen. Visualized paranasal sinuses are well aerated. Visualized mastoid air cells are well aerated. Extensive lucencies visualized within the diploic space throughout the calvarium would recommend clinical correlation. No acute intracranial hemorrhage or mass effect. Chronic microvascular disease and chronic atrophic brain changes.  Extensive lucencies visualized within the diploic space throughout the calvarium would recommend clinical correlation. CT Head WO Contrast    Result Date: 5/23/2022  INDICATION: 49-year-old female presenting with dizziness and unsteady gait. COMPARISON: 10/9/2015. TECHNIQUE: Multidetector CT imaging was obtained from the skull base to vertex without the administration of intravenous contrast. Coronal and sagittal reformatted images were obtained. Automated dose exposure control was used for this exam. FINDINGS: Scattered areas of low-attenuation are visualized in the periventricular and subcortical white matter consistent with chronic microvascular disease. No evidence of parenchymal hemorrhages or contusions. No evidence of intra or extra-axial fluid collection is seen. Prominence of ventricles and sulci are visualized demonstrating no significant increase in comparison to the prior study consistent is more chronic brain changes. Ventricles and sulci normal in size and configuration. The posterior fossa is unremarkable. No structural midline abnormalities seen. Visualized paranasal sinuses are well aerated. Visualized mastoid air cells are well aerated. The calvarium is intact. No acute intracranial hemorrhage or mass effect. Chronic microvascular disease and chronic atrophic brain changes. CTA NECK W WO CONTRAST    Result Date: 5/24/2022  EXAMINATION:  CTA NECK W WO CONTRAST, CTA HEAD W WO CONTRAST HISTORY:  Dizziness and altered mental status. TECHNIQUE:   Spiral high resolution axial images were obtained through the head, neck and superior mediastinum following bolus administration of intravenous contrast for CT angiography. The data was subsequently post-processed utilizing 3D multi-planar  reconstructions, 3D maximum intensity projections.  Contrast:  iopamidol (ISOVUE-370) injection of 100 mL All CT scans at this facility use dose modulation, iterative reconstruction, and/or weight based dosing when appropriate to reduce radiation dose to as low as reasonably achievable. COMPARISON: CT head 5/24/2022. RESULT: BRAIN: Grossly unchanged from the recent CT head dictation. Please refer to the CT head dictation. NECK: Soft tissues: The soft tissue planes are maintained throughout. No evidence of a soft tissue mass in the neck or superior mediastinum. No significant lymphadenopathy. Spine:  Multilevel degenerative changes. Lung apices:   Nonspecific patchy interstitial opacities, which may be chronic, and emphysematous changes, partially imaged. CT ARTERIOGRAM:     Extracranial Circulation: Aortic Arch: Normal branching pattern. .  There is no significant stenosis in the proximal brachiocephalic vessels. Carotid Stenosis: Right Common:  No significant stenosis. Right Internal Carotid  Plaque:  Mild plaque formation. Right Internal Carotid Stenosis by NASCET criteria:  Less than 20% Left Common:  No significant stenosis. Left Internal Carotid Plaque:  Mild plaque formation. Left Internal Carotid Stenosis by NASCET criteria:  Less than 20%. Cervical Vertebral Arteries: Small in caliber bilaterally with the right side slightly dominant. Appear patent with tortuosity. Intracranial Circulation: Anterior Circulation:  Distal ICAs appear patent with mild to moderate calcifications. Proximal ACAs and proximal MCAs appear patent. No evidence for significant stenosis or aneurysm. Vertebrobasilar Circulation:   Small caliber of the vertebral arteries as above with the left vertebral artery appearing to end in PICA, likely normal variant. Basilar artery is also small in caliber with tortuosity with short segment of possible narrowing near terminus versus tortuosity. Fetal origin of the bilateral PCAs with the PCAs appearing patent. Dural venous sinuses: Visualized dural venous sinuses are patent. Small caliber of the vertebrobasilar system as discussed. Possible short segment of narrowing of the distal basilar artery near terminus versus tortuosity.  Dominant anterior circulation with fetal origin of the PCAs bilaterally. No evidence for significant carotid stenosis. MRI LUMBAR SPINE WO CONTRAST    Result Date: 5/26/2022  INDICATION: 42-year-old female, lumbar stenosis. COMPARISON: None. TECHNIQUE: Multiplanar, multisequence MR imaging of the lumbar spine was performed without No administration of intravenous contrast. FINDINGS: There is exaggeration of the normal lordosis of the columns of the lumbar spine visualized, grade 1 anterolisthesis of L5 over S1. Vertebral augmentation in the L1 vertebral body. Depression of the superior endplate of the L3 and L5 vertebral bodies is seen. Multilevel degenerative endplate changes is seen. The STIR sequence demonstrates unremarkable signal intensity of the bone marrow , no evidence of T2 prolongation within the marrow to suggest acute fracture, edema or infiltrative process. The cord terminates at the level of the L1-L2 intervertebral disc space, unremarkable signal intensity of internal cord, the terminal nerve fibers demonstrate unremarkable contour with no evidence of thickening or clumping. L1-L2 demonstrates degenerative disc changes with hypertrophic changes in the facet joints, no significant narrowing of the spinal canal is seen, with mild narrowing of bilateral neuroforamina. L2-L3 demonstrates degenerative disc changes. Atrophic changes of the facet joints and ligamentum flavum, no significant narrowing of the spinal canal is visualized, mild narrowing of the right neural foramina and mild-to-moderate narrowing of the left neural foramina is seen at this level. L3-L4 demonstrates degenerative disc with hypertrophic changes in the facet joints and ligamentum flavum, no significant narrowing of the spinal canal is visualized. Moderate to severe narrowing of the right neural foramina and moderate narrowing of the left neural foramina is seen at this level.  L4-L5 demonstrates extensive degenerative changes, hypertrophic changes in the facet joints with prominent hypertrophic changes in the ligamentum flavum, moderate to severe narrowing of the spinal canal with severe narrowing of the right neural foramina and moderate to severe narrowing of the left neural foramina seen at this level. L5-S1 demonstrates degenerative changes with hypertrophic changes in the facet joints, no significant narrowing of the spinal canal is visualized at this level,  severe narrowing of the right neuroforamina and moderate to severe narrowing of the left neural foramina is seen at this level. Extensive degenerative changes of the lumbar spine visualized most prominent at L4-L5 and L5-S1. XR CHEST PORTABLE    Result Date: 5/25/2022  CHEST X-RAY. TECHNIQUE: Single AP portable view of the chest was obtained CLINICAL INDICATION: 73-year-old female presenting with fever. COMPARISON: Chest x-ray obtained on 5/23/2022. PROCEDURE AND FINDINGS: The cardiomediastinal silhouette is slightly prominent with tortuosity of the thoracic aorta. Mild prominence of the interstitial and bronchovascular markings is seen. Mild blunting of the left costophrenic angle is visualized and demonstrates prominence in comparison to the prior study cannot rule out left lower lobe infiltrate would recommend clinical correlation. Biapical prominent CSF COPD changes. No evidence of pneumothorax or parenchymal lung mass. Degenerative joint changes. Suggestion of left lower lobe infiltrate/consolidation. Recommend clinical correlation    XR CHEST PORTABLE    Result Date: 5/23/2022  CHEST X-RAY. TECHNIQUE: Single AP portable view of the chest was obtained. Toya Barrow CLINICAL INDICATION: 73-year-old female presenting with dizziness. COMPARISON: Chest x-ray obtained on 10/9/2015 PROCEDURE AND FINDINGS: Poor inspiratory effort is seen. The cardiomediastinal silhouette is slightly prominent in size, mild tortuosity of the thoracic aorta is seen. Biapical prominence suggestive of COPD changes.  Mild prominence of the bronchovascular interstitial lung markings is seen but no evidence of focal lung infiltrate or consolidation is seen. The costophrenic angles are clear, no evidence of lung infiltrate, pleural effusion or parenchymal lung mass. Degenerative bone changes are visualized. COPD and chronic interstitial changes. IR LUMBAR PUNCTURE FOR DIAGNOSIS    1. Technically successful diagnostic lumbar puncture. HISTORY: Leonel Jacobsen is a Female of 64 years age, with  lp r/o meningitis . FLUOROSCOPY TIME:   58.7 seconds. RADIATION DOSE:         25.85 mGy. DIAGNOSIS: 64-year-old female with headaches, evaluation for meningitis. COMMENTS: R77.8 Elevated troponin ICD10 PROCEDURE: Following universal protocol, patient and site verification was performed with a \"timeout\" prior to the procedure. Following the discussion of the procedure, and this, risks versus benefits, informed consent was obtained from the patient. The patient was placed on fluoroscopy table in prone position and the lower back area was prepped and draped in usual sterile fashion. The area between the interspinous process was marked. This was at the L3 level. Using the usual sterile conditions, 1% lidocaine (5 mL) and fluoroscopy guidance, a 20 gauge needle was inserted into the spinal canal.  After confirmation of intra-thecal location of the needle tip by CSF leakage through the needle. Approximately 14.5 cc of CSF were collected in 4 separate containers. Following that the needle was withdrawn from the back. The patient tolerated the procedure well without complications.      US CAROTID ARTERY BILATERAL    Result Date: 5/25/2022  EXAMINATION:  CAROTID DUPLEX ULTRASONOGRAPHY CLINICAL HISTORY:  DIZZINESS COMPARISONS:  NONE AVAILABLE TECHNIQUE:  B-mode, color flow and spectral Doppler FINDINGS:  ARTERIAL BLOOD FLOW VELOCITY RIGHT PS                                               Prox CCA    67 cm/s             Mid CCA     65 cm/s Dist CCA    62 cm/s              Prox ICA    59 cm/s               Mid ICA     106 cm/s            Dist ICA    94 cm/s             Prox ECA    72 cm/s             Prox VERT   61 cm/s              ICA/CCA     1.64                        LEFT PS Prox CCA    120 cm/s Mid CCA     95 cm/s Dist CCA    92 cm/s Prox ICA    87 cm/s Mid ICA     119 cm/s Dist ICA    146 cm/s Prox ECA    71 cm/s Prox VERT   76 cm/s ICA/CCA     1.62     MILD SCATTERED ATHEROSCLEROSIS BOTH CAROTID TREES. NO FLOW OBSTRUCTION. BILATERAL ICAS LESS THAN 50% STENOSIS. BILATERAL ANTEGRADE VERTEBRAL FLOW. Impression:     Significant improvement since admission feeling better awake alert no fever chills. Moderately severe canal stenosis L4-5  Significant lumbar spondylosis. Status post 2014 lumbar spine surgery. Walker dependent for gait. Plan:     Patient and I concur that she should undergo a course of physiotherapy and follow-up as an outpatient. Comments: The patient may benefit from elective L4-5 microdecompression to enhance her ability to walk. She may respond to physical therapy. Plan to follow-up as an outpatient for further discussion.       Electronically signed by Rajinder Pederson MD on 5/27/2022 at 4:27 PM

## 2022-05-27 NOTE — PROGRESS NOTES
Progress Note  Patient: Yahaira Ast  Unit/Bed: B021/U673-88  YOB: 1945  MRN: [id number]  Acct: [de-identified]   Admitting Diagnosis: Elevated troponin [R77.8]  Fever and chills [R50.9]  Admit Date:  5/23/2022  Hospital Day: 2    Chief Complaint: weakness dizizness     Histories:  Past Medical History:   Diagnosis Date    Cancer [facility])     Encounter for lumbar puncture 05/25/2022    Completed by Dr. Nasreen Elizalde - diagnostics sent    Hyperlipidemia     Hypertension     Type II or unspecified type diabetes mellitus without mention of complication, not stated as uncontrolled      Past Surgical History:   Procedure Laterality Date    CORONARY ANGIOPLASTY WITH STENT PLACEMENT      UPPER GASTROINTESTINAL ENDOSCOPY  8/12/15    w/bx,dilation      Family History   Problem Relation Age of Onset    Cancer Mother     Arthritis Mother     Diabetes Mother     Heart Disease Mother     High Blood Pressure Mother     High Cholesterol Mother     Arthritis Father     Diabetes Father     Heart Disease Father     High Blood Pressure Father     High Cholesterol Father      Social History     Socioeconomic History    Marital status:      Spouse name: None    Number of children: None    Years of education: None    Highest education level: None   Occupational History    None   Tobacco Use    Smoking status: Former Smoker     Years: 24.00    Smokeless tobacco: Never Used   Substance and Sexual Activity    Alcohol use:  Yes     Alcohol/week: 3.0 standard drinks     Types: 3 Glasses of wine per week    Drug use: No    Sexual activity: None   Other Topics Concern    None   Social History Narrative    Lives With: Spouse,  Son (pt never home alone)    Type of Home: House in [address] [place] [address],[address] in basement,Two level (doesn't have to use basement)    Home Access: Stairs to enter with rails- Number of Steps: 6- Rails: Right    Bathroom Shower/Tub: Walk-in shower Bathroom Equipment: Shower chair,Grab bars in shower    Home Equipment: [address]:  ( assists with bathing/dressing; pt indep with toileting activities)    Homemaking Assistance:  (spouse and son complete)    Homemaking Responsibilities: No    Ambulation Assistance: Independent (cane/rollator PRN)    Transfer Assistance: Independent    Additional Comments: Pt inconsistent historian. Pt's spouse arriving mid assessment and confirms home set up and PLOF         Social Determinants of Health     Financial Resource Strain: Low Risk     Difficulty of Paying Living Expenses: Not hard at all   Food Insecurity: No Food Insecurity    Worried About Running Out of Food in the Last Year: Never true    [address] in the Last Year: Never true   Transportation Needs:     Lack of Transportation (Medical): Not on file    Lack of Transportation (Non-Medical): Not on file   Physical Activity:     Days of Exercise per Week: Not on file    Minutes of Exercise per Session: Not on file   Stress:     Feeling of Stress : Not on file   Social Connections:     Frequency of Communication with Friends and Family: Not on file    Frequency of Social Gatherings with Friends and Family: Not on file    Attends Scientologist Services: Not on file    Active Member of [address] [place] [address] [employer] or Organizations: Not on file    Attends Club or Organization Meetings: Not on file    Marital Status: Not on file   Intimate Partner Violence:     Fear of Current or Ex-Partner: Not on file    Emotionally Abused: Not on file    Physically Abused: Not on file    Sexually Abused: Not on file   Housing Stability:     Unable to Pay for Housing in the Last Year: Not on file    Number of [place] in the Last Year: Not on file    Unstable Housing in the Last Year: Not on file       Subjective/HPI  No fever today. Coughing more. No cp     EKG: SR 80        Review of Systems:   Review of Systems   Constitutional: Negative.   Negative for diaphoresis and fatigue. HENT: Negative. Eyes: Negative. Respiratory: Negative. Negative for cough, chest tightness, shortness of breath, wheezing and stridor. Cardiovascular: Negative. Negative for chest pain, palpitations and leg swelling. Gastrointestinal: Positive for abdominal pain. Negative for blood in stool and nausea. Genitourinary: Negative. Musculoskeletal: Positive for arthralgias, back pain and gait problem. Skin: Negative. Neurological: Positive for dizziness and weakness. Negative for syncope and light-headedness. Hematological: Negative. Psychiatric/Behavioral: Negative. Physical Examination:    /62   Pulse 86   Temp 98.1 °F (36.7 °C) (Oral)   Resp 18   Ht 5' (1.524 m)   Wt 186 lb 1.6 oz (84.4 kg)   SpO2 94%   BMI 36.35 kg/m²    Physical Exam   Constitutional: She appears healthy. No distress. HENT:   Normal cephalic and Atraumatic   Eyes: Pupils are equal, round, and reactive to light. Neck: Thyroid normal. No JVD present. No neck adenopathy. No thyromegaly present. Cardiovascular: Normal rate, regular rhythm, normal heart sounds, intact distal pulses and normal pulses. Pulmonary/Chest: Effort normal and breath sounds normal. She has no wheezes. She has no rales. She exhibits no tenderness. Abdominal: Soft. Bowel sounds are normal. There is no abdominal tenderness. Musculoskeletal:         General: No tenderness or edema. Normal range of motion. Cervical back: Normal range of motion and neck supple. Neurological: She is alert and oriented to person, place, and time. Skin: Skin is warm. No cyanosis. Nails show no clubbing.        LABS:  CBC:   Lab Results   Component Value Date    WBC 10.9 05/26/2022    RBC 4.17 05/26/2022    HGB 13.4 05/26/2022    HCT 40.6 05/26/2022    MCV 97.2 05/26/2022    MCH 32.0 05/26/2022    MCHC 32.9 05/26/2022    RDW 15.5 05/26/2022     05/26/2022    MPV 8.2 10/12/2015     CBC with Differential:    Lab Results   Component Value Date    WBC 10.9 05/26/2022    RBC 4.17 05/26/2022    HGB 13.4 05/26/2022    HCT 40.6 05/26/2022     05/26/2022    MCV 97.2 05/26/2022    MCH 32.0 05/26/2022    MCHC 32.9 05/26/2022    RDW 15.5 05/26/2022    LYMPHOPCT 11.2 05/26/2022    MONOPCT 8.0 05/26/2022    BASOPCT 0.6 05/26/2022    MONOSABS 0.9 05/26/2022    LYMPHSABS 1.2 05/26/2022    EOSABS 0.3 05/26/2022    BASOSABS 0.1 05/26/2022     CMP:    Lab Results   Component Value Date     05/26/2022    K 3.6 05/26/2022     05/26/2022    CO2 20 05/26/2022    BUN 13 05/26/2022    CREATININE 1.18 05/26/2022    GFRAA 53.5 05/26/2022    LABGLOM 44.2 05/26/2022    GLUCOSE 168 05/26/2022    PROT 6.6 05/23/2022    LABALBU 4.4 05/23/2022    CALCIUM 9.5 05/26/2022    BILITOT 1.1 05/23/2022    ALKPHOS 74 05/23/2022    AST 18 05/23/2022    ALT 23 05/23/2022     BMP:    Lab Results   Component Value Date     05/26/2022    K 3.6 05/26/2022     05/26/2022    CO2 20 05/26/2022    BUN 13 05/26/2022    LABALBU 4.4 05/23/2022    CREATININE 1.18 05/26/2022    CALCIUM 9.5 05/26/2022    GFRAA 53.5 05/26/2022    LABGLOM 44.2 05/26/2022    GLUCOSE 168 05/26/2022     Magnesium:    Lab Results   Component Value Date    MG 1.6 05/24/2022     Troponin:    Lab Results   Component Value Date    TROPONINI 0.036 05/24/2022        Active Hospital Problems    Diagnosis Date Noted    Impaired mobility and activities of daily living-metabolic encephalopathy with primary origins of infectious etiology [Z74.09, Z78.9] 05/27/2022     Priority: Medium    Dizziness [R42]      Priority: Medium    Benign paroxysmal positional vertigo due to bilateral vestibular disorder [H81.13]      Priority: Medium    Maxillary pain [R68.84]      Priority: Medium    Nonintractable headache [R51.9]      Priority: Medium    Fall at home [W19. Rebecca Mejia, Y92.009]      Priority: Medium    Fever and chills [R50.9]      Priority: Medium    Elevated troponin [R77.8] 05/23/2022     Priority: Medium        Assessment/Plan:    1. Fever - work up in progress. Possible PNA  2. Dizziness -Telemetry. CUS - negative  3. Elevated possible Demand ischemia  - Borderline levels. No CP  4. Echo EF 65%  5. HTN stable - continue meds. Low salt diet. 6. HPL - Statin   7. HypoMag 1.6 - replaced  8. Awaiting rehab precert. Ok for transfer anytime.           Electronically signed by Pam Yost MD on 5/27/2022 at 10:03 AM

## 2022-05-27 NOTE — CARE COORDINATION
[facility] for [facility] acute [facility] rehab stay. The case #[id number] was forwarded to the medical director who determined the case does not support the criteria for an acute inpatient rehab admission. A P;P can be done by the treating physician if requested  By calling [phone] ext [phone] this case will close completely on Tuesday 5/31/2022. Reference #[id number]  Called PM&R Updated PAS now that the patient has been denied she will not be able to admit to acute inpatient rehab at this time. Called Zaida DAN CM to update.    Electronically signed by Jacky Vinson RN on 5/27/22 at 3:51 PM EDT

## 2022-05-27 NOTE — PROGRESS NOTES
Progress Note  Date:2022       [facility]:[id number]  Patient Name:Mckenna Mendoza     YOB: 1945     Age:76 y.o. Subjective    Subjective:  Symptoms:  She reports malaise and weakness. No shortness of breath, cough, chest pain, headache, chest pressure, anorexia, diarrhea or anxiety. Diet:  No nausea or vomiting. Review of Systems   Respiratory: Negative for cough and shortness of breath. Cardiovascular: Negative for chest pain. Gastrointestinal: Negative for anorexia, diarrhea, nausea and vomiting. Neurological: Positive for weakness. Objective         Vitals Last 24 Hours:  TEMPERATURE:  Temp  Av.3 °F (36.8 °C)  Min: 98.1 °F (36.7 °C)  Max: 98.4 °F (36.9 °C)  RESPIRATIONS RANGE: Resp  Av  Min: 18  Max: 18  PULSE OXIMETRY RANGE: SpO2  Av %  Min: 94 %  Max: 94 %  PULSE RANGE: Pulse  Av.5  Min: 86  Max: 101  BLOOD PRESSURE RANGE: Systolic (86NMU), SDB:837 , Min:128 , LUE:678   ; Diastolic (81DBI), XZM:47, Min:62, Max:73    I/O (24Hr): Intake/Output Summary (Last 24 hours) at 2022 1309  Last data filed at 2022 2300  Gross per 24 hour   Intake 240 ml   Output --   Net 240 ml     Objective:  General Appearance:  Comfortable, in no acute distress and well-appearing. Vital signs: (most recent): Blood pressure 133/62, pulse 86, temperature 98.1 °F (36.7 °C), temperature source Oral, resp. rate 18, height 5' (1.524 m), weight 186 lb 1.6 oz (84.4 kg), SpO2 94 %. HEENT: Normal HEENT exam.    Lungs: There are decreased breath sounds. Heart: Regular rhythm. S1 normal and S2 normal.    Abdomen: Abdomen is soft. Bowel sounds are normal.     Neurological: Patient is alert and oriented to person, place and time. Pupils:  Pupils are equal, round, and reactive to light. Skin:  Warm and dry.       Labs/Imaging/Diagnostics    Labs:  CBC:  Recent Labs     22  1513 22  0611 22  1339   WBC 14.2* 12.8* 10.9*   RBC 4.16* 3.92* 4.17* HGB 13.1 12.2 13.4   HCT 40.6 38.5 40.6   MCV 97.5 98.2 97.2   RDW 15.6* 15.9* 15.5*    191 230     CHEMISTRIES:  Recent Labs     05/24/22  1513 05/25/22  0611 05/26/22  1339    140 138   K 4.0 4.3 3.6    105 103   CO2 24 21 20   BUN 11 12 13   CREATININE 1.06* 0.97* 1.18*   GLUCOSE 120* 108* 168*     PT/INR:No results for input(s): PROTIME, INR in the last 72 hours. APTT:No results for input(s): APTT in the last 72 hours. LIVER PROFILE:No results for input(s): AST, ALT, BILIDIR, BILITOT, ALKPHOS in the last 72 hours. Imaging Last 24 Hours:  MRI LUMBAR SPINE WO CONTRAST    Result Date: 5/26/2022  INDICATION: 78-year-old female, lumbar stenosis. COMPARISON: None. TECHNIQUE: Multiplanar, multisequence MR imaging of the lumbar spine was performed without No administration of intravenous contrast. FINDINGS: There is exaggeration of the normal lordosis of the columns of the lumbar spine visualized, grade 1 anterolisthesis of L5 over S1. Vertebral augmentation in the L1 vertebral body. Depression of the superior endplate of the L3 and L5 vertebral bodies is seen. Multilevel degenerative endplate changes is seen. The STIR sequence demonstrates unremarkable signal intensity of the bone marrow , no evidence of T2 prolongation within the marrow to suggest acute fracture, edema or infiltrative process. The cord terminates at the level of the L1-L2 intervertebral disc space, unremarkable signal intensity of internal cord, the terminal nerve fibers demonstrate unremarkable contour with no evidence of thickening or clumping. L1-L2 demonstrates degenerative disc changes with hypertrophic changes in the facet joints, no significant narrowing of the spinal canal is seen, with mild narrowing of bilateral neuroforamina. L2-L3 demonstrates degenerative disc changes.  Atrophic changes of the facet joints and ligamentum flavum, no significant narrowing of the spinal canal is visualized, mild narrowing of the right neural foramina and mild-to-moderate narrowing of the left neural foramina is seen at this level. L3-L4 demonstrates degenerative disc with hypertrophic changes in the facet joints and ligamentum flavum, no significant narrowing of the spinal canal is visualized. Moderate to severe narrowing of the right neural foramina and moderate narrowing of the left neural foramina is seen at this level. L4-L5 demonstrates extensive degenerative changes, hypertrophic changes in the facet joints with prominent hypertrophic changes in the ligamentum flavum, moderate to severe narrowing of the spinal canal with severe narrowing of the right neural foramina and moderate to severe narrowing of the left neural foramina seen at this level. L5-S1 demonstrates degenerative changes with hypertrophic changes in the facet joints, no significant narrowing of the spinal canal is visualized at this level,  severe narrowing of the right neuroforamina and moderate to severe narrowing of the left neural foramina is seen at this level. Extensive degenerative changes of the lumbar spine visualized most prominent at L4-L5 and L5-S1. Assessment//Plan           Hospital Problems           Last Modified POA    * (Principal) Elevated troponin 5/23/2022 Yes    Dizziness 5/24/2022 Yes    Benign paroxysmal positional vertigo due to bilateral vestibular disorder 5/24/2022 Yes    Maxillary pain 5/24/2022 Yes    Nonintractable headache 5/24/2022 Yes    Fall at home 5/24/2022 Yes    Fever and chills 5/24/2022 Yes    Impaired mobility and activities of daily living-metabolic encephalopathy with primary origins of infectious etiology 5/27/2022 Yes      Encephalopathy  OSIRIS  Dizziness  HTN/HLD  OSIRIS  Assessment & Plan  5/27: Hold lisinopril, IV fluids for acute kidney injury, continue with PT OT. Dizziness resolved. Encephalopathy resolved. Fevers down. No overnight events no new complaints. anticipated discharge tomorrow to rehab.  Bmp tomorrow.   Electronically signed by Jerald Zabala MD on 5/27/22 at 1:09 PM EDT

## 2022-05-27 NOTE — CONSULTS
Inpatient consult to Pulmonology  Consult performed by: Landy Garcia MD  Consult ordered by: Ilia Cummings MD            Admit Date: 5/23/2022    PCP:  Robby Natarajan MD    CHIEF COMPLAINT: falls, dizziness     HPI:  The patient is a 64 y.o. female with significant past medical history of HTN and HLP who presented with falls and dizziness and some mental status changes. She presented with fever and chills. She had complete blood culture and urine culture and was started on broad-spectrum antibiotic. She was seen by infectious disease. Initially, there was concern about meningitis. She had LP and the results were not strongly suggestive of meningitis. Her antibiotic downgraded and now she is currently only on Rocephin. We have been asked to see her due to concern of abnormal CXR and possible consolidation vs atelectasis. She did not have respiratory complaints per se. She has been on room air. She is denying any cough. She is not short of breath. I reviewed chest x-ray personally and interpreted the results independently. There is subtle infiltrates on the left side.   Her procalcitonin is normal.       Past Medical History:      Diagnosis Date    Cancer [facility])     Encounter for lumbar puncture 05/25/2022    Completed by Dr. Tianna Keating - diagnostics sent    Hyperlipidemia     Hypertension     Type II or unspecified type diabetes mellitus without mention of complication, not stated as uncontrolled         Past Surgical History:        Procedure Laterality Date    CORONARY ANGIOPLASTY WITH STENT PLACEMENT      UPPER GASTROINTESTINAL ENDOSCOPY  8/12/15    w/bx,dilation        Current Medications:     guaiFENesin  600 mg Oral BID    sodium chloride flush  5-40 mL IntraVENous 2 times per day    sodium chloride flush  5-40 mL IntraVENous 2 times per day    [Held by provider] lisinopril  20 mg Oral Daily    pantoprazole  40 mg Oral QAM AC    magnesium oxide  800 mg Oral Daily    meclizine  25 mg Oral TID    amLODIPine  10 mg Oral Daily    cefTRIAXone (ROCEPHIN) IV  2,000 mg IntraVENous Q12H    sodium chloride flush  5-40 mL IntraVENous 2 times per day    atorvastatin  80 mg Oral Nightly    enoxaparin  1 mg/kg SubCUTAneous BID    aspirin  81 mg Oral BID    gabapentin  600 mg Oral Nightly    insulin lispro  0-12 Units SubCUTAneous 4x Daily WC    insulin lispro  0-6 Units SubCUTAneous Nightly     Home Meds:  Prior to Admission medications    Medication Sig Start Date End Date Taking? Authorizing Provider   atorvastatin (LIPITOR) 80 MG tablet Take 1 tablet by mouth daily 4/13/22   Dora Padilla MD   gabapentin (NEURONTIN) 300 MG capsule Take 2 capsules by mouth at bedtime for 90 days.  4/13/22 7/12/22  Dora Padilla MD   furosemide (LASIX) 20 MG tablet Take 1 tablet by mouth daily 4/13/22   Dora Padilla MD   metoprolol tartrate (LOPRESSOR) 50 MG tablet Take 1 tablet by mouth 2 times daily  Patient taking differently: Take 50 mg by mouth daily  4/13/22   Dora Padilla MD   pantoprazole (PROTONIX) 40 MG tablet Take 1 tablet by mouth daily 4/13/22   Dora Padilla MD   ezetimibe (ZETIA) 10 MG tablet Take 1 tablet by mouth daily 4/13/22   Dora Padilla MD   blood glucose monitor kit and supplies Test 1 time daily 4/13/22   Dora Padilla MD   blood glucose monitor strips Test 1 time daily 4/13/22   Dora Padilla MD   Lancets MISC 1 each by Does not apply route daily 4/13/22   Dora Padilla MD   aspirin 81 MG tablet Take 81 mg by mouth 2 times daily     Historical Provider, MD   isosorbide mononitrate (IMDUR) 30 MG CR tablet Take 30 mg by mouth daily  Patient not taking: Reported on 5/23/2022    Historical Provider, MD   lisinopril (PRINIVIL;ZESTRIL) 20 MG tablet Take 20 mg by mouth daily   Patient not taking: Reported on 5/23/2022    Historical Provider, MD       Allergies:  Eggs or egg-derived products, Glucophage [metformin], Valium [diazepam], and Zithromax [azithromycin]     Social History:      reports that she has quit smoking. She quit after 24.00 years of use. She has never used smokeless tobacco. She reports current alcohol use of about 3.0 standard drinks of alcohol per week. She reports that she does not use drugs. TOBACCO:   reports that she has quit smoking. She quit after 24.00 years of use. She has never used smokeless tobacco.     ETOH:   reports current alcohol use of about 3.0 standard drinks of alcohol per week. Family History:   family history includes Arthritis in her father and mother; Cancer in her mother; Diabetes in her father and mother; Heart Disease in her father and mother; High Blood Pressure in her father and mother; High Cholesterol in her father and mother. Review of Systems  Complete review of systems done and negative unless otherwise noted positive. Objective:     PHYSICAL EXAM:      VITALS:  /62   Pulse 86   Temp 98.1 °F (36.7 °C) (Oral)   Resp 18   Ht 5' (1.524 m)   Wt 186 lb 1.6 oz (84.4 kg)   SpO2 94%   BMI 36.35 kg/m²   24HR INTAKE/OUTPUT:      Intake/Output Summary (Last 24 hours) at 2022 1000  Last data filed at 2022 2300  Gross per 24 hour   Intake 240 ml   Output --   Net 240 ml     CURRENT PULSE OXIMETRY:  SpO2: 94 %  24HR PULSE OXIMETRY RANGE:  SpO2  Av %  Min: 94 %  Max: 94 %    General appearance - alert, ill appearing, and in no distress  Mental status - alert, oriented to person, place, and time  Eyes - pupils equal and reactive, extraocular eye movements intact. Normal sclera and conjunctiva   Nose - normal and patent, no erythema   Neck - supple, no significant adenopathy. No thyromegaly. Chest -diminished bilaterally to auscultation, no wheezes, rales or rhonchi, symmetric air entry  Heart - normal rate, regular rhythm, normal S1, S2, no murmurs, rubs, clicks or gallops  Abdomen - soft, nontender, nondistended, no masses or organomegaly. Bowel sounds present.  No hernia   Rectal - deferred, not clinically indicated  Neurological - alert, oriented, normal speech, no focal findings or movement disorder noted, motor and sensory grossly normal bilaterally  Musculoskeletal - no joint tenderness, deformity or swelling  Extremities - peripheral pulses normal, no pedal edema, no clubbing or cyanosis  Skin - normal coloration and turgor, no rashes, no suspicious skin lesions noted  Psych: Normal mood          DATA:    CBC:   Recent Labs     05/24/22  1513 05/25/22  0611 05/26/22  1339   WBC 14.2* 12.8* 10.9*   HGB 13.1 12.2 13.4   HCT 40.6 38.5 40.6    191 230     BMP:    Recent Labs     05/24/22  1513 05/25/22  0611 05/26/22  1339    140 138   K 4.0 4.3 3.6    105 103   CO2 24 21 20   BUN 11 12 13   CREATININE 1.06* 0.97* 1.18*   GLUCOSE 120* 108* 168*   CALCIUM 10.2* 9.3 9.5     HEPATIC: No results for input(s): AST, ALT, ALB, BILITOT, ALKPHOS in the last 72 hours. LACTATE:   Recent Labs     05/24/22  1714   LACTA 1.4     PROCALCITONIN:   Recent Labs     05/24/22  1714 05/25/22  0611 05/26/22  1339   PROCAL 0.11 0.12 0.18*     TROPONIN:   Recent Labs     05/24/22  1513   TROPONINI 0.036*      Recent Labs     05/25/22  0633   COLORU Yellow   NITRU Negative   LEUKOCYTESUR TRACE*   GLUCOSEU Negative   KETUA Negative   RBCUA 0-2   WBCUA 0-2   BACTERIA Negative     TSH: No results for input(s): TSH in the last 72 hours. Cultures:  No results for input(s): LABURIN in the last 72 hours. Recent Labs     05/24/22  1714   BC No Growth to date. Any change in status will be called. Recent Labs     05/24/22  1715   BLOODCULT2 No Growth to date. Any change in status will be called. Radiology Review:    CXR portable: Results for orders placed during the hospital encounter of 05/23/22    XR CHEST PORTABLE    Narrative  CHEST X-RAY.     TECHNIQUE:  Single AP portable view of the chest was obtained    CLINICAL INDICATION:  47-year-old female presenting with fever.    COMPARISON:  Chest x-ray obtained on 5/23/2022. PROCEDURE AND FINDINGS:  The cardiomediastinal silhouette is slightly prominent with tortuosity of the thoracic aorta. Mild prominence of the interstitial and bronchovascular markings is seen. Mild blunting of the left costophrenic angle is visualized and demonstrates prominence in comparison to the prior study cannot rule out left lower lobe infiltrate would recommend clinical correlation. Biapical prominent CSF COPD changes. No evidence of pneumothorax or parenchymal lung mass. Degenerative joint changes. Impression  Suggestion of left lower lobe infiltrate/consolidation. Recommend clinical correlation       Impression:     -Acute encephalopathy. Toxic metabolic. In the setting of fever. Presumed to be infectious process but so far work-up is negative. Overall has improved  -Fever of unknown origin. This has improved. Work-up so far is unrevealing  -Abnormal chest x-ray. Subtle infiltrates in the left lower lobe. Not very suggestive of pneumonia but cannot be ruled out completely.  -Elevated troponin. Likely demand.  -History of hypertension and hyperlipidemia. Recommendations:    -Continue to observe off of oxygen.    -Aspiration precautions.  -Reasonable to treat for a total of 7 days of antibiotics. Procalcitonin is normal and so far work-up is unremarkable. -Incentive spirometry. -Mental status has improved. Baseline is unclear to me but she appears to be appropriate at this point. -DVT GI prophylaxis          Thank you for allowing as to participate in the care of this pleasant patient. Further recommendations to follow.        Electronically signed by Zari Rivera MD on 5/27/2022 at 10:00 AM

## 2022-05-27 NOTE — PROGRESS NOTES
[facility]   Facility/Department: [facility] [address] [place]  Speech Language Pathology  Clinical Bedside Swallow Evaluation    NAME:Mckenna Mendoza  : 1945 (64 y.o.)   [x]   confirmed    MRN: [id number]  ROOM: [place] [address]  ADMISSION DATE: 2022  PATIENT DIAGNOSIS(ES): Elevated troponin [R77.8]  Fever and chills [R50.9]  Chief Complaint   Patient presents with    Dizziness     Patient Active Problem List    Diagnosis Date Noted    Impaired mobility and activities of daily living-metabolic encephalopathy with primary origins of infectious etiology 2022    Dizziness     Benign paroxysmal positional vertigo due to bilateral vestibular disorder     Maxillary pain     Nonintractable headache     Fall at home     Fever and chills     Elevated troponin 2022    Hyperlipidemia 10/16/2015    Type 2 diabetes mellitus with circulatory disorder ([facility] [address] 10/16/2015    Vertebral compression fracture ([facility] [address] 10/16/2015    Multiple myeloma ([facility] [address] 2015    Obese 2015    Ataxia 2015     Past Medical History:   Diagnosis Date    Cancer ([facility] [address]     Encounter for lumbar puncture 2022    Completed by Dr. Nery Bundy - diagnostics sent    Hyperlipidemia     Hypertension     Type II or unspecified type diabetes mellitus without mention of complication, not stated as uncontrolled      Past Surgical History:   Procedure Laterality Date    CORONARY ANGIOPLASTY WITH STENT PLACEMENT      UPPER GASTROINTESTINAL ENDOSCOPY  8/12/15    w/bx,dilation      Allergies   Allergen Reactions    Eggs Or Egg-Derived Products Nausea Only    Glucophage [Metformin]      diarrhea    Valium [Diazepam]     Zithromax [Azithromycin]        DATE ONSET: 2022    Date of Evaluation: 2022   Evaluating Therapist: Rosalee Franco SLP    Dysphagia Diagnosis  Dysphagia Diagnosis: Swallow function appears [facility]  Dysphagia Impression : Patient presents with swallow function that appears to be [facility]. Patient demonstrated no s/s of aspiration in all observed trials. A modified diet and follow up is not recommended at this time. Patient is cleared for a regular solid and thin liquid diet. Recommended Diet     Diet Solids Recommendation: Regular  Liquid Consistency Recommendation: Thin  Recommended Form of Meds: PO  Compensatory Swallowing Strategies : Small bites/sips;Upright as possible for all oral intake      Reason for Referral  Mckenna Mendoza was referred for a bedside swallow evaluation to assess the efficiency of her swallow function, identify signs and symptoms of aspiration, identify risk factors, and make recommendations regarding safe dietary consistencies, effective compensatory strategies, and safe eating environment. General  Chart Reviewed: Yes  Subjective  Subjective: Patient was pleasant and cooperative for BSE. Patient was alert and oreinted to person, place, and situation. Patient arrived to ER for dizziness follow a fall. Chest x-ray and CT scan were clear. Patient has a cough that she reports has been going on for several days. Patient reports that the doctor had just prescribed medication for her cough. Behavior/Cognition: Alert;Pleasant mood; Cooperative  Respiratory Status: Room air  O2 Device: None (Room air)  Communication Observation: Functional  Follows Directions: Simple  Dentition: Some missing teeth  Patient Positioning: Upright in bed  Baseline Vocal Quality: Normal  Volitional Cough: Strong  Prior Dysphagia History: Patient denies hx of dysphagia  Consistencies Administered: Regular;Pureed; Thin    Vision and Hearing  Vision  Vision Exceptions: Wears glasses for reading  Hearing  Hearing: Within functional limits    Current Diet level  Current Diet : Regular  Current Liquid Diet : Thin    Oral Motor  Labial: No impairment  Lingual: No impairment  Velum: No Impairment  Mandible: No impairment    Oral/Pharyngeal Phase  Oral Phase - Comment: Patient presents with functional oral phase of the swallow. Patient demonstrated adequate bolus acceptance, mastication, and oral clearance. Pharyngeal Phase: Patient presents with suspected functional pharyngeal phase of the swallow. Patient demonstratd adequate laryngeal elevation, a timely swallow onset, and no s/s of aspiration. PO Trials  Assessment Method(s): Observation  Patient Position: Patient was positioned upright in bed. Vocal Quality: No Impairment  Consistency Presented: Pureed  How Presented: Self-fed/presented  How much presented: 5 (Teaspoons)  Bolus Acceptance: No impairment  Bolus Formation/Control: No impairment  Propulsion: No impairment  Oral Residue: None  Initiation of Swallow: No impairment  Laryngeal Elevation: Functional  Aspiration Signs/Symptoms: None  Pharyngeal Phase Characteristics: No impairment, issues, or problems  Comments: [facility]  Additional PO Trials: 2      PO Trials 2  Consistency Presented: Regular  How Presented: Self-fed/presented  How much presented: 1 (cracker)  Bolus Acceptance: No impairment  Bolus Formation/Control: No impairment  Propulsion: No impairment  Oral Residue: None  Aspiration Signs/Symptoms: None  Pharyngeal Phase Characteristics: No impairment, issues, or problems  Comments: [facility]      PO Trials 3  Consistency Presented: Thin  How Presented: Self-fed/presented  How much presented: 3 (oz via side of cup)  Bolus Acceptance: No impairment  Bolus Formation/Control: No impairment  Propulsion: No impairment  Oral Residue: None  Initiation of Swallow: No impairment  Aspiration Signs/Symptoms: None  Pharyngeal Phase Characteristics: No impairment, issues, or problems  Effective Modifications: None  Comments: [facility]      Dysphagia Diagnosis  Dysphagia Diagnosis: Swallow function appears [facility]  Dysphagia Impression : Patient presents with swallow function that appears to be [facility]. Patient demonstrated no s/s of aspiration in all observed trials. A modified diet and follow up is not recommended at this time. Patient is cleared for a regular solid and thin liquid diet. Dysphagia Outcome Severity Scale: Level 7: Normal in all situations     Recommendations  Requires SLP Intervention: No  Diet Solids Recommendation: Regular  Liquid Consistency Recommendation:  Thin  Compensatory Swallowing Strategies : Small bites/sips;Upright as possible for all oral intake  Recommended Form of Meds: PO    Education  Individuals consulted  Consulted and agree with results and recommendations: Patient;RN  RN Name: Jb Xiong Devices in place: Yes  Type of devices: Bed alarm in place;Call light within reach  Restraints Initially in Place: No    Pain Assessment  Pain Assessment: Patient does not c/o pain    Pain Re-assessment  Pain Reassessment: Patient does not c/o pain      Therapy Time  SLP Individual Minutes  Time In: 0900  Time Out: 7043  Minutes: 10              Signature: Electronically signed by DERRELL Uribe on 5/27/2022 at 11:39 AM

## 2022-05-27 NOTE — PROGRESS NOTES
Inspection and palpation of digits and nails show no clubbing, cyanosis or inflammatory conditions. Neuro/Psychiatric: Affect: flat-  Alert and oriented to self and situation with  mod cues. No significant change in deep tendon reflexes or sensation  Lungs:  Diminished, CTA-B  . Respiration effort is normal at rest.   Heart:   S1 = S2,   RRR. Abdomen:  Soft, non-tender    Extremities:  Trace  lower extremity edema but no unusual tenderness. Skin:   BUE bruises dt blood draws      Rehabilitation:  Physical Therapy:   Bed mobility:  Bed mobility  Rolling to Left: Minimal assistance (05/24/22 1537)  Rolling to Right: Stand by assistance (05/26/22 1047)  Supine to Sit: Stand by assistance (05/26/22 1047)  Sit to Supine: Moderate assistance (05/24/22 1537)  Bed Mobility Comments: increased time and effort to complete, however pt able to complete on her own without assist. HOB slightly elevated. (05/26/22 1047)  Transfers:  Transfers  Sit to Stand: Supervision;Stand by assistance (05/26/22 1047)  Stand to sit: Supervision;Stand by assistance (05/26/22 1047)  Bed to Chair: Supervision;Stand by assistance (05/26/22 1047)  Comment: Multiple transfers completed. Slow to complete. Safe technique employed. (05/26/22 1047)  Gait:   Ambulation  Surface: level tile (05/26/22 1048)  Device: Rolling Walker (05/26/22 1048)  Assistance: Stand by assistance (05/26/22 1048)  Quality of Gait: Slow pacing. Pt describes dizziness and requires multiple standing rest breaks.  No LOB however. (05/26/22 1048)  Distance: 12ft X 2 (05/26/22 1048)  Comments: Deferred - safety concerns (05/24/22 1538)  Stairs:     W/C mobility:       Occupational Therapy:   Hand Dominance: Right  ADL  Feeding: Setup (05/26/22 1534)  Grooming: Setup (05/26/22 1534)  UE Bathing: Setup (05/26/22 1534)  LE Bathing: Minimal assistance (05/26/22 1534)  UE Dressing: None (05/26/22 1534)  UE Dressing Skilled Clinical Factors: Anticipate min A for bra fastener (05/26/22 1534)  LE Dressing: Moderate assistance (05/26/22 1534)  Toileting: Contact guard assistance (05/26/22 1534)  Additional Comments: Pt states she washed up already today, however amenable to partial sponge bath. Washed face, torso and down legs. Toileted with brief CGA during pants management. Pt also performed hygiene with SBA. Unable to reach feet to doff/don socks but was able to doff/don underwear. (05/26/22 1534)  Toilet Transfers  Toilet - Technique: Ambulating (05/26/22 1538)  Equipment Used: Grab bars (05/26/22 1538)  Toilet Transfer: Stand by assistance (05/26/22 1538)  Toilet Transfers Comments: Increased effort, BUE pull from grab bar to stand (05/26/22 1538)          Speech Therapy:            Diet/Swallow:                   COGNITION  OT: Cognition Comment: Mild safety deficits  SP:           Lab/X-ray studies reviewed, analyzed and discussed with patient and staff:   Recent Results (from the past 24 hour(s))   POCT Glucose    Collection Time: 05/26/22  8:37 AM   Result Value Ref Range    POC Glucose 127 (H) 70 - 99 mg/dl    Performed on ACCU-CHEK    POCT Glucose    Collection Time: 05/26/22 11:29 AM   Result Value Ref Range    POC Glucose 105 (H) 70 - 99 mg/dl    Performed on ACCU-CHEK    CBC with Auto Differential    Collection Time: 05/26/22  1:39 PM   Result Value Ref Range    WBC 10.9 (H) 4.8 - 10.8 K/uL    RBC 4.17 (L) 4.20 - 5.40 M/uL    Hemoglobin 13.4 12.0 - 16.0 g/dL    Hematocrit 40.6 37.0 - 47.0 %    MCV 97.2 82.0 - 100.0 fL    MCH 32.0 (H) 27.0 - 31.3 pg    MCHC 32.9 (L) 33.0 - 37.0 %    RDW 15.5 (H) 11.5 - 14.5 %    Platelets 416 490 - 042 K/uL    Neutrophils % 77.8 %    Lymphocytes % 11.2 %    Monocytes % 8.0 %    Eosinophils % 2.4 %    Basophils % 0.6 %    Neutrophils Absolute 8.5 (H) 1.4 - 6.5 K/uL    Lymphocytes Absolute 1.2 1.0 - 4.8 K/uL    Monocytes Absolute 0.9 (H) 0.2 - 0.8 K/uL    Eosinophils Absolute 0.3 0.0 - 0.7 K/uL    Basophils Absolute 0.1 0.0 - 0.2 K/uL Basic Metabolic Panel w/ Reflex to MG    Collection Time: 05/26/22  1:39 PM   Result Value Ref Range    Sodium 138 135 - 144 mEq/L    Potassium reflex Magnesium 3.6 3.4 - 4.9 mEq/L    Chloride 103 95 - 107 mEq/L    CO2 20 20 - 31 mEq/L    Anion Gap 15 9 - 15 mEq/L    Glucose 168 (H) 70 - 99 mg/dL    BUN 13 8 - 23 mg/dL    CREATININE 1.18 (H) 0.50 - 0.90 mg/dL    GFR Non- 44.2 (L) >60    GFR  53.5 (L) >60    Calcium 9.5 8.5 - 9.9 mg/dL   Procalcitonin    Collection Time: 05/26/22  1:39 PM   Result Value Ref Range    Procalcitonin 0.18 (H) 0.00 - 0.15 ng/mL   Respiratory Panel, Molecular, with COVID-19 (Restricted: peds pts or suitable admitted adults)    Collection Time: 05/26/22  2:54 PM    Specimen: Nasopharyngeal   Result Value Ref Range    Adenovirus by PCR Not Detected Not Detected    Bordetella parapertussis by PCR Not Detected Not Detected    Bordetella pertussis by PCR Not Detected Not Detected    Chlamydophilia pneumoniae by PCR Not Detected Not Detected    Coronavirus 229E by PCR Not Detected Not Detected    Coronavirus HKU1 by PCR Not Detected Not Detected    Coronavirus NL63 by PCR Not Detected Not Detected    Coronavirus OC43 by PCR Not Detected Not Detected    SARS-CoV-2, PCR Not Detected Not Detected    Human Metapneumovirus by PCR Not Detected Not Detected    Human Rhinovirus/Enterovirus by PCR Not Detected Not Detected    Influenza A by PCR Not Detected Not Detected    Influenza B by PCR Not Detected Not Detected    Mycoplasma pneumoniae by PCR Not Detected Not Detected    Parainfluenza Virus 1 by PCR Not Detected Not Detected    Parainfluenza Virus 2 by PCR Not Detected Not Detected    Parainfluenza Virus 3 by PCR Not Detected Not Detected    Parainfluenza Virus 4 by PCR Not Detected Not Detected    Respiratory Syncytial Virus by PCR Not Detected Not Detected   POCT Glucose    Collection Time: 05/26/22  3:55 PM   Result Value Ref Range    POC Glucose 131 (H) 70 - 99 mg/dl    Performed on ACCU-CHEK    POCT Glucose    Collection Time: 05/26/22  8:41 PM   Result Value Ref Range    POC Glucose 99 70 - 99 mg/dl    Performed on ACCU-CHEK    POCT Glucose    Collection Time: 05/27/22  7:49 AM   Result Value Ref Range    POC Glucose 100 (H) 70 - 99 mg/dl    Performed on ACCU-CHEK      Previous extensive, complex labs, notes and diagnostics reviewed and analyzed. ALLERGIES:    Allergies as of 05/23/2022 - Fully Reviewed 05/23/2022   Allergen Reaction Noted    Eggs or egg-derived products Nausea Only 10/10/2015    Glucophage [metformin]  04/13/2022    Valium [diazepam]  06/30/2015    Zithromax [azithromycin]  06/30/2015    Milk-related compounds Diarrhea and Nausea Only 10/10/2015      (please also verify by checking [facility])     Complex Physical Medicine & Rehab Issues Assess & Plan:   1. Severe abnormality of gait and mobility and impaired self-care and ADL's secondary to  metabolic encephalopathy with primary origins of infectious etiology . Updated functional and medical status reassessed regarding patients ability to participate in therapies and patient found to be able to participate in  acute intensive comprehensive inpatient rehabilitation program including PT/OT to improve balance, ambulation, ADLs, and to improve the P/AROM. It is my opinion that they will be able to tolerate 3 hours of therapy a day and benefit from it at an acute level. As always we will attempt to get patient to the most efficient but most effective level of care will be in their best interest.  Continue to focus on energy conservation heart rate and blood pressure monitoring before during and after therapy endurance and consistency of function. Will continue to follow to attempt to dc to the lowest most effective and efficient level of rehab care.   2. Bowel constipation   and Bladder dysfunction   overactive, neurogenic bladder:  frequent toileting, ambulate to bathroom with assistance, check post void residuals. Check for C.difficile x1 if >2 loose stools in 24 hours, continue bowel & bladder program.  Monitor for UTI symptoms including lethargy and confusion  3. Severe LB and generalized OA pain: reassess pain every shift and prior to and after each therapy session, give prn Tylenol   and consider scheduled Tylenol, modalities prn in therapy, consider Lidoderm, K-pad prn.   4. Skin breakdown   risk:  continue pressure relief program.  Daily skin exams and reports from nursing. 5. Severe fatigue due to immobility and nutritional deficits: continue to monitor closely for dehydration   Add vitamin B12 vitamin D and CoQ10 titrate dosing and add protein supplementation with low carb content. 6. Complex discharge planning:   Discussed with care team-last 24 hour events noted. I will continue to follow along and reassess functional and medical status as we strive to improve patient's functional and medical outcomes progressing to the most efficient and lowest level of care. Complex Active General Medical Issues that complicate care Assess & Plan:     1. Principal Problem:    Elevated troponin  Active Problems:    Dizziness    Benign paroxysmal positional vertigo due to bilateral vestibular disorder    Maxillary pain    Nonintractable headache    Fall at home    Fever and chills    Impaired mobility and activities of daily living-metabolic encephalopathy with primary origins of infectious etiology  Resolved Problems:    * No resolved hospital problems. *          Events and functional changes in the past 24 hours reviewed improvements in functional status are encouraging     I again discussed acute rehab with the patient and verify that the patient is able and willing to participate in 3 hours of therapy a day. Rehab and Acute Care Case Management has also reinforced this expectation. Focus of today's plan-await word from her insurance company regarding precertification for rehab.     Dk Covarrubias, HORACIO, PM&R     Attending    [address] [facility]

## 2022-05-27 NOTE — PROGRESS NOTES
[facility] Neurology Daily Progress Note  Name: Luly Kathleen  Age: 64 y.o. Gender: female  CodeStatus: Full Code  Allergies: Eggs Or Egg-Derived Products  Glucophage [Metformin]  Valium [Diazepam]  Zithromax [Azithromycin]    Chief Complaint:Dizziness    Primary Care Provider: Jono Bustillo MD  InpatientTreatment Team: Treatment Team: Attending Provider: Christiano Rodriguez MD; Consulting Physician: Lj Dalton MD; Consulting Physician: Joana Saenz DO; Consulting Physician: Ady Patel MD; Utilization Reviewer: Katt Gaxiola RN; Consulting Physician: Leonard Cash DO; Consulting Physician: Stefany Cummings MD; Registered Nurse: Yahaira Hua RN; Patient Care Tech: Magalys Every  Admission Date: 5/23/2022  HPI 66-year right-handed female admitted for dizziness and issues with balance. Symptoms going on for almost a year on and off. She reports she is dizzy upon walking and reads to 1 side or the other. MRI was done yesterday at [facility] clinic which appeared to show no acute findings. Patient has significant other cerebrovascular risk factors she denies any tinnitus or hearing loss. She denies any neck pain but does have back pain. Has not recent falls injuries or trauma. Patient appears to be dizzy upon sitting up from a lying down position and we were not able to walk her or perform a vestibular examination due to the dizziness. Patient is areflexic in the lower extremities no definite sensory levels are noted  CT Head WO Contrast    Events noted, episode of confusion  Seen by ID and ordered LP    Dizziness  Pertinent negatives include no abdominal pain, arthralgias, chest pain, coughing, fever, headaches, nausea, sore throat, vomiting or weakness. Pt seen and examined for neuro follow up. NO Headache, double vision, blurry vision, difficulty with speech, difficulty with swallowing, weakness, numbness, pain, nausea, vomiting, choking, neck pain, reports dizziness with movement.      Patient seen and examined for neuro follow-up. She is sitting in the chair with complaints of dizziness. She describes this dizziness as her head spinning. She denies any nausea or vomiting. She has not any further episodes of dizziness but has difficulty ambulating. She has chronic low back pain which has not been evaluated. As noted above    Patient has not any episodes of confusion and her dizziness is improving. Her main issues appears to be lower extremity weakness and therefore we obtained an MRI. LP does not show anything significant. Vitals:    05/27/22 0753   BP: 133/62   Pulse: 86   Resp: 18   Temp: 98.1 °F (36.7 °C)   SpO2: 94%      Review of Systems   Constitutional: Negative for appetite change and fever. HENT: Negative for drooling, ear pain, sore throat, trouble swallowing and voice change. Respiratory: Negative for cough and shortness of breath. Cardiovascular: Negative for chest pain. Gastrointestinal: Negative for abdominal pain, constipation, diarrhea, nausea and vomiting. Genitourinary: Negative for decreased urine volume and dysuria. Musculoskeletal: Positive for back pain. Negative for arthralgias. Skin: Negative for color change. Neurological: Positive for dizziness and light-headedness. Negative for weakness and headaches. Psychiatric/Behavioral: Negative for agitation and behavioral problems. All other systems reviewed and are negative. Physical Exam  Vitals and nursing note reviewed. Constitutional:       General: She is not in acute distress. Appearance: Normal appearance. She is well-developed. HENT:      Head: Normocephalic and atraumatic. Nose: Nose normal.      Mouth/Throat:      Mouth: Mucous membranes are moist.   Eyes:      General:         Right eye: No discharge. Left eye: No discharge. Conjunctiva/sclera: Conjunctivae normal.   Neck:      Vascular: No JVD. Trachea: No tracheal deviation.    Cardiovascular: Rate and Rhythm: Normal rate. Pulmonary:      Effort: Pulmonary effort is normal.      Breath sounds: Normal breath sounds. Abdominal:      General: Bowel sounds are normal.      Palpations: Abdomen is soft. Musculoskeletal:         General: Normal range of motion. Cervical back: Normal range of motion and neck supple. No rigidity. No muscular tenderness. Lymphadenopathy:      Cervical: No cervical adenopathy. Skin:     General: Skin is warm and dry. Capillary Refill: Capillary refill takes less than 2 seconds. Neurological:      Mental Status: She is alert and oriented to person, place, and time.    Psychiatric:         Mood and Affect: Mood normal.         Behavior: Behavior normal.       Pt oreinted x 3 and nonfocal        Medications:  Reviewed    Infusion Medications:    sodium chloride      sodium chloride      dextrose      sodium chloride       Scheduled Medications:    guaiFENesin  600 mg Oral BID    sodium chloride flush  5-40 mL IntraVENous 2 times per day    sodium chloride flush  5-40 mL IntraVENous 2 times per day    [Held by provider] lisinopril  20 mg Oral Daily    pantoprazole  40 mg Oral QAM AC    magnesium oxide  800 mg Oral Daily    meclizine  25 mg Oral TID    amLODIPine  10 mg Oral Daily    cefTRIAXone (ROCEPHIN) IV  2,000 mg IntraVENous Q12H    sodium chloride flush  5-40 mL IntraVENous 2 times per day    atorvastatin  80 mg Oral Nightly    enoxaparin  1 mg/kg SubCUTAneous BID    aspirin  81 mg Oral BID    gabapentin  600 mg Oral Nightly    insulin lispro  0-12 Units SubCUTAneous 4x Daily WC    insulin lispro  0-6 Units SubCUTAneous Nightly     PRN Meds: sodium chloride flush, sodium chloride, sodium chloride flush, sodium chloride, melatonin, guaiFENesin-dextromethorphan, glucose, dextrose bolus **OR** dextrose bolus, glucagon (rDNA), dextrose, sodium chloride flush, sodium chloride, ondansetron **OR** ondansetron, acetaminophen **OR** acetaminophen, polyethylene glycol, nitroGLYCERIN, hydrALAZINE    Labs:   Recent Labs     05/24/22  1513 05/25/22  0611 05/26/22  1339   WBC 14.2* 12.8* 10.9*   HGB 13.1 12.2 13.4   HCT 40.6 38.5 40.6    191 230     Recent Labs     05/24/22  1513 05/25/22  0611 05/26/22  1339    140 138   K 4.0 4.3 3.6    105 103   CO2 24 21 20   BUN 11 12 13   CREATININE 1.06* 0.97* 1.18*   CALCIUM 10.2* 9.3 9.5     No results for input(s): AST, ALT, BILIDIR, BILITOT, ALKPHOS in the last 72 hours. No results for input(s): INR in the last 72 hours. Recent Labs     05/24/22  1513   TROPONINI 0.036*       Urinalysis:   Lab Results   Component Value Date    NITRU Negative 05/25/2022    WBCUA 0-2 05/25/2022    BACTERIA Negative 05/25/2022    RBCUA 0-2 05/25/2022    BLOODU Negative 05/25/2022    SPECGRAV 1.050 05/25/2022    GLUCOSEU Negative 05/25/2022       Radiology:   Most recent    EEG No valid procedures specified. MRI of Brain No results found for this or any previous visit. No results found for this or any previous visit. MRA of the Head and Neck: No results found for this or any previous visit. No results found for this or any previous visit. No results found for this or any previous visit. CT of the Head: Results for orders placed during the hospital encounter of 05/23/22    CT HEAD WO CONTRAST    Narrative  INDICATION: 58-year-old female presenting with dizziness and altered mental status. COMPARISON: 5/23/2022 and 10/9/2015. Cruzito Tay TECHNIQUE: Multidetector CT imaging was obtained from the skull base to vertex without the administration of intravenous contrast. Coronal and sagittal reformatted images were obtained.  Automated dose exposure control was used for this exam.    FINDINGS:    Scattered areas of low-attenuation are visualized in the periventricular and subcortical white matter that demonstrate no significant change in comparison to the prior study consistent with chronic microvascular disease. No evidence of parenchymal hemorrhages or contusions. No evidence of intra or extra-axial fluid collection is seen. Mild prominence of ventricles and sulci are visualized demonstrating no significant increase in comparison to the prior study consistent is more chronic brain changes. No structural midline abnormalities seen. Visualized paranasal sinuses are well aerated. Visualized mastoid air cells are well aerated. Extensive lucencies visualized within the diploic space throughout the calvarium would recommend clinical correlation. Impression  No acute intracranial hemorrhage or mass effect. Chronic microvascular disease and chronic atrophic brain changes. Extensive lucencies visualized within the diploic space throughout the calvarium would recommend clinical correlation. No results found for this or any previous visit. No results found for this or any previous visit. Carotid duplex: No results found for this or any previous visit. No results found for this or any previous visit. Results for orders placed during the hospital encounter of 05/23/22    US CAROTID ARTERY BILATERAL    Narrative  EXAMINATION:  CAROTID DUPLEX ULTRASONOGRAPHY    CLINICAL HISTORY:  DIZZINESS    COMPARISONS:  NONE AVAILABLE    TECHNIQUE:  B-mode, color flow and spectral Doppler    FINDINGS:    ARTERIAL BLOOD FLOW VELOCITY    RIGHT PS    Prox CCA    67 cm/s  Mid CCA     65 cm/s  Dist CCA    62 cm/s  Prox ICA    59 cm/s  Mid ICA     106 cm/s  Dist ICA    94 cm/s  Prox ECA    72 cm/s  Prox VERT   61 cm/s    ICA/CCA     1.64    LEFT PS    Prox CCA    120 cm/s  Mid CCA     95 cm/s  Dist CCA    92 cm/s  Prox ICA    87 cm/s  Mid ICA     119 cm/s  Dist ICA    146 cm/s  Prox ECA    71 cm/s  Prox VERT   76 cm/s    ICA/CCA     1.62    Impression  MILD SCATTERED ATHEROSCLEROSIS BOTH CAROTID TREES. NO FLOW OBSTRUCTION. BILATERAL ICAS LESS THAN 50% STENOSIS.     BILATERAL ANTEGRADE VERTEBRAL FLOW.      Echo No results found for this or any previous visit. Assessment/Plan:  5/24/22  Dizziness which may be suggestive of vestibular dysfunction. We will start her on Antivert 25 mg 3 times daily and arrange for orthostatic blood pressure recordings. MRI of the brain was done at [facility] yesterday which were reviewed and does not show an acute stroke. Recommend ct angiogram to make sure there is no basilar artery stenosis. Patient may benefit from vestibular rehab once work-up is complete. 5/25/22  Patient continues to have dizziness with movement  Started on Antivert yesterday 25mg TID  Orthostatic blood pressure readings negative for orthostatic hypotension   MRI of brain negative (performed yesterday at [facility])  Ct angiogram shows no significant carotid stenosis. Carotid Ultrasound: bilateral internal carotid less than 50% stenosis    LP pending for fevers ordered by medical team   Patient's symptoms consistent with vestibular dysfunction. As noted above would benefit from vestibular rehab. Can discharge from neurology standpoint. Follow up in 4-6 weeks. I have personally performed a face to face diagnostic evaluation on this patient, reviewed all data and investigations, and am the sole provider of all clinical decisions on the neurological status of this patient. Transient confusion,  seen by ID, ordered LP, which is negative, MRI at [facility] negative, dizziness better with antivert        5/26/22  Patient reports to be having episodes of dizziness where her head is spinning. She is seen sitting in chair awake alert and oriented. She denies any nausea or vomiting. She reports lower extremity weakness which is not new for her. Denies any numbness tingling. Patient continues Antivert. LP performed and is negative. RI at [facility] negative. MRI of lumbar spine for evaluation of cervical stenosis pending.     I have personally performed a face to face diagnostic evaluation on this patient, reviewed all data and investigations, and am the sole provider of all clinical decisions on the neurological status of this patient. No further dizzy spells are noted though patient is nonambulatory and I recommended we obtain MRI of the lumbar spine as she is not walking on her own. Patient continue on Antivert for now lumbar puncture was    5/27/22  Further dizzy spells are noted though the patient is not ambulatory  MRI of bar spine obtained secondary to patient not ambulating on her own  MRI shows extensive degenerative changes of the lumbar spine visualized most prominent L4-L5 and L5-S1. No significant narrowing of the spinal canal seen at L1-L2, L2-L3, L3-L4, L5-S1. There is moderate to severe narrowing of the spinal canal with severe narrowing of the right neuroforamina and moderate to severe narrowing of the left neuroforamina at the L4-L5 levels. We will consult neurosurgery for evaluation and treatment recommendations. Continue Antivert for dizziness. May be a good candidate for vestibular rehabilitation. I have personally performed a face to face diagnostic evaluation on this patient, reviewed all data and investigations, and am the sole provider of all clinical decisions on the neurological status of this patient. Patient's dizziness is improving. Her main issues appears to be lower extremity weakness and she has severe spinal canal stenosis notable at L4-L5. Patient may require surgical intervention. We will continue with rehabilitation for an treated dizziness for now. Kashif Bowles MD, [place], American Board of Psychiatry & Neurology  Board Certified in Vascular Neurology  Board Certified in Neuromuscular Medicine  Certified in [address] [place] [address].  Verónica Bowles MD, [place], American Board of Psychiatry & Neurology  Board Certified in Vascular Neurology  Board Certified in Neuromuscular Medicine  Certified in Neurorehabilitation         Collaborating physicians: Dr Grayson Catherine    Electronically signed by JOSE Reaves CNP on 5/27/2022 at 12:53 PM

## 2022-05-27 NOTE — PROGRESS NOTES
Pt A&O x4 today, 1 assist with the walker to bedside commode. Patient complains of mild dizziness when ambulating. Pt states mucinex has helped her cough. Patient's legs continue to be very weak with ambulation. Call light within reach. Denies any needs at this time.

## 2022-05-27 NOTE — CARE COORDINATION
PATIENT WAS DENIED FOR [facility] ACUTE REHAB BY INSURANCE. PATIENT IS DOING BETTER PER THERAPY NOTES THAN BEFORE, HOWEVER TODAY THE PATIENT HAS A NEW CONSULT FOR NEUROSURGERY. IF PATIENT DOES HAVE NEUROSURGERY THEY COULD TRY AGAIN FOR ACUTE REHAB AND SEE IF THE PATIENT WOULD QUALIFY, OTHERWISE IF THE PATIENT STILL HAS THERAPY NEEDS SHE WILL NEED TO GO HOME W/ [facility] VS SNF. MET WITH THE PATIENT. SHE IS STILL DECIDING BETWEEN GOING TO A SNF AND HOME W/ [facility]. SHE STATES SHE IS AGREEABLE TO [facility] FOC PROVIDED, AND WILL HOPEFULLY DO THAT. WE ARE STILL WAITING ON THE NEURO SX CONSULT AND IF THE PATIENT DOES HAVE SURGERY SHE MAY NEED MORE HELP AFTER. SHE IS AGREEABLE TO A SNF BUT DOESN'T WANT TO PICK ON RIGHT NOW UNTIL SHE KNOWS SHE WILL DEFINITELY NEED ONE. PATIENT WILL BE HERE THROUGH THE WEEKEND IF SHE DOES DECIDE ON A SNF AS IT WILL NEED A PRECERT. OTHERWISE THE PATIENT CAN D/C HOME W/ [facility], ONCE ORDERED AND SET UP. CM TO FOLLOW ON PATIENT DECISION ONCE NEURO SX PLAN HAS BEEN ESTABLISHED.

## 2022-05-27 NOTE — PROGRESS NOTES
[facility]   Facility/Department: [facility]  Jadyn Merino  Speech Language Pathology  Initial Speech/Language/Cognitive Assessment    NAME:Mckenna Mendoza  : 1945 (64 y.o.)   [x]   confirmed    MRN: [id number]  ROOM: [place] [id number]  ADMISSION DATE: 2022  PATIENT DIAGNOSIS(ES): Elevated troponin [R77.8]  Fever and chills [R50.9]  Chief Complaint   Patient presents with    Dizziness     Patient Active Problem List    Diagnosis Date Noted    Impaired mobility and activities of daily living-metabolic encephalopathy with primary origins of infectious etiology 2022    Dizziness     Benign paroxysmal positional vertigo due to bilateral vestibular disorder     Maxillary pain     Nonintractable headache     Fall at home     Fever and chills     Elevated troponin 2022    Hyperlipidemia 10/16/2015    Type 2 diabetes mellitus with circulatory disorder ([facility] [address] 10/16/2015    Vertebral compression fracture ([facility] [address] 10/16/2015    Multiple myeloma ([facility] [address] 2015    Obese 2015    Ataxia 2015     Past Medical History:   Diagnosis Date    Cancer ([facility] [address]     Encounter for lumbar puncture 2022    Completed by Dr. Macrina Perez - diagnostics sent    Hyperlipidemia     Hypertension     Type II or unspecified type diabetes mellitus without mention of complication, not stated as uncontrolled      Past Surgical History:   Procedure Laterality Date    CORONARY ANGIOPLASTY WITH STENT PLACEMENT      UPPER GASTROINTESTINAL ENDOSCOPY  8/12/15    w/bx,dilation        DATE ONSET: 2022    Date of Evaluation: 2022   Evaluating Therapist: DERRELL Herman        Diagnosis: Patient presents with functional speech and language abilities. Patient reports that she feels like she is speaking slower than usual. SLP determined that patient is speaking at a rate that is [facility] and demonstrating no apraxia or dysarthria characteristics. Patient is 100% intelligible to an unfamiliar listener. Follow up is not recommended at this time.     Requires SLP Intervention: No    General  General  Chart Reviewed: Yes  Patient assessed for rehabilitation services?: Yes  Family / Caregiver Present: No    Vision and Hearing  Vision  Vision: Impaired  Vision Exceptions: Wears glasses for reading  Hearing  Hearing: Within functional limits     Oral/Motor  Oral Motor   Labial: No impairment  Lingual: No impairment  Velum: No Impairment  Mandible: No impairment    Motor Speech  Motor Speech  Apraxic Characteristics: None  Dysarthric Characteristics: None  Intelligibility: No impairment  Overall Impairment Severity: None    Comprehension  Auditory Comprehension  Comprehension: Within Functional Limits  Basic Questions: WFL  Complex Questions: WFL  One Step Commands: WFL  Two Step Commands: WFL  Multistep Commands: WFL  Common Objects: WFL    Expression  Expression  Primary Mode of Expression: Verbal  Verbal Expression  Verbal Expression: Within functional limits  Initiation: WFL  Repetition: WFL  Automatic Speech: WFL (GRAY MARQUEZ)  Confrontation: WFL  Convergent: WFL  Divergent: WFL  Responsive: WFL    Recommendations  Requires SLP Intervention: No  Patient Education: Patient educated on results of SLE  Patient Education Response: Verbalizes understanding      Education  Individuals consulted  Consulted and agree with results and recommendations: Patient;RN  RN Name: Danyember Ordaz in place: Yes  Type of devices: Bed alarm in place;Call light within reach  Restraints Initially in Place: No    Pain Assessment  Pain Assessment: Patient does not c/o pain    Pain Re-assessment  Pain Reassessment: Patient does not c/o pain         Therapy Time  SLP Individual Minutes  Time In: 0910  Time Out: [facility]  Minutes: 15                 Signature: Electronically signed by DERRELL Stoddard on 5/27/2022 at 11:44 AM

## 2022-05-27 NOTE — ED NOTES
86/10/00    PRECERT IS STILL PENDING FOR [facility] ACUTE REHAB--CM/SW FOLLOWING    From: HOME W/SPOUSE, SON.  WALKER    Admit:DIZZINESS, BALANCE ISSUES    PMH: HTN, HLD, CA, DM    Anticipated Discharge Disposition: [facility]    Patient Mobility or PT/OT ordered: YES--RECOM REHAB--PRECERT PENDING  Consults: GERALD SERRATO IR    Covid result &/or vacc status: 5/23 COVID NEG  TROP 0.024-0.026-0.028--0.036  5/24 RR--AMS, SLURRED SPEECH, LT ARM/HAND NUMBNESS--TX 2W    Barriers to Discharge:   R/O MENINGITIS    LP: NEG  W: 14.2--12.8--10.9  12/0.97--13/1.18  IV MECHELLE    Assessments: CMI DONE

## 2022-05-28 LAB
ANION GAP SERPL CALCULATED.3IONS-SCNC: 12 MEQ/L (ref 9–15)
BUN BLDV-MCNC: 13 MG/DL (ref 8–23)
CALCIUM SERPL-MCNC: 8.7 MG/DL (ref 8.5–9.9)
CHLORIDE BLD-SCNC: 103 MEQ/L (ref 95–107)
CO2: 21 MEQ/L (ref 20–31)
CREAT SERPL-MCNC: 1.06 MG/DL (ref 0.5–0.9)
CSF CULTURE: NORMAL
GFR AFRICAN AMERICAN: >60
GFR NON-AFRICAN AMERICAN: 50
GLUCOSE BLD-MCNC: 106 MG/DL (ref 70–99)
GLUCOSE BLD-MCNC: 113 MG/DL (ref 70–99)
GLUCOSE BLD-MCNC: 118 MG/DL (ref 70–99)
GLUCOSE BLD-MCNC: 189 MG/DL (ref 70–99)
HERPES SIMPLEX VIRUS BY PCR: NOT DETECTED
HSV I/II IGG CSF: 0.39 IV
HSV I/II IGM CSF: 0.2 IV
HSV SOURCE: NORMAL
PERFORMED ON: ABNORMAL
POTASSIUM SERPL-SCNC: 3.5 MEQ/L (ref 3.4–4.9)
SODIUM BLD-SCNC: 136 MEQ/L (ref 135–144)

## 2022-05-28 PROCEDURE — 2580000003 HC RX 258: Performed by: PSYCHIATRY & NEUROLOGY

## 2022-05-28 PROCEDURE — 6370000000 HC RX 637 (ALT 250 FOR IP): Performed by: INTERNAL MEDICINE

## 2022-05-28 PROCEDURE — 2580000003 HC RX 258: Performed by: PHYSICIAN ASSISTANT

## 2022-05-28 PROCEDURE — 80048 BASIC METABOLIC PNL TOTAL CA: CPT

## 2022-05-28 PROCEDURE — 2580000003 HC RX 258: Performed by: NURSE PRACTITIONER

## 2022-05-28 PROCEDURE — 99233 SBSQ HOSP IP/OBS HIGH 50: CPT | Performed by: INTERNAL MEDICINE

## 2022-05-28 PROCEDURE — 99223 1ST HOSP IP/OBS HIGH 75: CPT | Performed by: NEUROLOGICAL SURGERY

## 2022-05-28 PROCEDURE — 6360000002 HC RX W HCPCS: Performed by: PHYSICIAN ASSISTANT

## 2022-05-28 PROCEDURE — 99232 SBSQ HOSP IP/OBS MODERATE 35: CPT | Performed by: INTERNAL MEDICINE

## 2022-05-28 PROCEDURE — 1210000000 HC MED SURG R&B

## 2022-05-28 PROCEDURE — 99232 SBSQ HOSP IP/OBS MODERATE 35: CPT | Performed by: PHYSICAL MEDICINE & REHABILITATION

## 2022-05-28 PROCEDURE — 6370000000 HC RX 637 (ALT 250 FOR IP): Performed by: PHYSICIAN ASSISTANT

## 2022-05-28 PROCEDURE — 6370000000 HC RX 637 (ALT 250 FOR IP): Performed by: PSYCHIATRY & NEUROLOGY

## 2022-05-28 PROCEDURE — 6360000002 HC RX W HCPCS: Performed by: NURSE PRACTITIONER

## 2022-05-28 PROCEDURE — 6370000000 HC RX 637 (ALT 250 FOR IP)

## 2022-05-28 PROCEDURE — 36415 COLL VENOUS BLD VENIPUNCTURE: CPT

## 2022-05-28 PROCEDURE — 97116 GAIT TRAINING THERAPY: CPT

## 2022-05-28 RX ORDER — MECLIZINE HYDROCHLORIDE 25 MG/1
25 TABLET ORAL 2 TIMES DAILY
Qty: 14 TABLET | Refills: 0 | Status: SHIPPED | OUTPATIENT
Start: 2022-05-28 | End: 2022-06-04

## 2022-05-28 RX ORDER — AMLODIPINE BESYLATE 10 MG/1
10 TABLET ORAL DAILY
Qty: 30 TABLET | Refills: 3 | Status: SHIPPED | OUTPATIENT
Start: 2022-05-29 | End: 2022-08-09

## 2022-05-28 RX ORDER — ENOXAPARIN SODIUM 100 MG/ML
40 INJECTION SUBCUTANEOUS DAILY
Status: DISCONTINUED | OUTPATIENT
Start: 2022-05-29 | End: 2022-06-03 | Stop reason: HOSPADM

## 2022-05-28 RX ADMIN — Medication 10 ML: at 22:44

## 2022-05-28 RX ADMIN — ACETAMINOPHEN 650 MG: 325 TABLET ORAL at 13:32

## 2022-05-28 RX ADMIN — ASPIRIN 81 MG: 81 TABLET, COATED ORAL at 22:43

## 2022-05-28 RX ADMIN — CEFTRIAXONE SODIUM 2000 MG: 2 INJECTION, POWDER, FOR SOLUTION INTRAMUSCULAR; INTRAVENOUS at 05:38

## 2022-05-28 RX ADMIN — GABAPENTIN 600 MG: 300 CAPSULE ORAL at 22:43

## 2022-05-28 RX ADMIN — Medication 10 ML: at 22:46

## 2022-05-28 RX ADMIN — ASPIRIN 81 MG: 81 TABLET, COATED ORAL at 08:30

## 2022-05-28 RX ADMIN — ATORVASTATIN CALCIUM 80 MG: 80 TABLET, FILM COATED ORAL at 22:43

## 2022-05-28 RX ADMIN — AMLODIPINE BESYLATE 10 MG: 10 TABLET ORAL at 08:30

## 2022-05-28 RX ADMIN — GUAIFENESIN SYRUP AND DEXTROMETHORPHAN 5 ML: 100; 10 SYRUP ORAL at 05:39

## 2022-05-28 RX ADMIN — GUAIFENESIN 600 MG: 600 TABLET ORAL at 08:30

## 2022-05-28 RX ADMIN — ACETAMINOPHEN 650 MG: 325 TABLET ORAL at 23:25

## 2022-05-28 RX ADMIN — Medication 800 MG: at 08:30

## 2022-05-28 RX ADMIN — MECLIZINE HYDROCHLORIDE 25 MG: 25 TABLET ORAL at 13:32

## 2022-05-28 RX ADMIN — Medication 10 ML: at 22:45

## 2022-05-28 RX ADMIN — MECLIZINE HYDROCHLORIDE 25 MG: 25 TABLET ORAL at 22:43

## 2022-05-28 RX ADMIN — ENOXAPARIN SODIUM 80 MG: 100 INJECTION SUBCUTANEOUS at 08:32

## 2022-05-28 RX ADMIN — INSULIN LISPRO 2 UNITS: 100 INJECTION, SOLUTION INTRAVENOUS; SUBCUTANEOUS at 13:45

## 2022-05-28 RX ADMIN — Medication 3 MG: at 23:21

## 2022-05-28 RX ADMIN — MECLIZINE HYDROCHLORIDE 25 MG: 25 TABLET ORAL at 08:30

## 2022-05-28 RX ADMIN — GUAIFENESIN 600 MG: 600 TABLET ORAL at 22:43

## 2022-05-28 RX ADMIN — ACETAMINOPHEN 650 MG: 325 TABLET ORAL at 08:30

## 2022-05-28 RX ADMIN — PANTOPRAZOLE SODIUM 40 MG: 40 TABLET, DELAYED RELEASE ORAL at 05:39

## 2022-05-28 ASSESSMENT — ENCOUNTER SYMPTOMS
BLOOD IN STOOL: 0
STRIDOR: 0
EYES NEGATIVE: 1
ABDOMINAL PAIN: 1
ABDOMINAL PAIN: 0
TROUBLE SWALLOWING: 0
BACK PAIN: 1
CHEST TIGHTNESS: 0
RESPIRATORY NEGATIVE: 1
WHEEZING: 0
SHORTNESS OF BREATH: 0
DIARRHEA: 0
SORE THROAT: 0
COUGH: 0
COLOR CHANGE: 0
CONSTIPATION: 0
VOMITING: 0
VOICE CHANGE: 0
NAUSEA: 0

## 2022-05-28 ASSESSMENT — PAIN SCALES - GENERAL: PAINLEVEL_OUTOF10: 3

## 2022-05-28 NOTE — PROGRESS NOTES
INPATIENT PROGRESS NOTES    PATIENT NAME: Milind Braga  MRN: [id number]  SERVICE DATE:  May 28, 2022   SERVICE TIME:  12:31 PM      PRIMARY SERVICE: Pulmonary Disease    CHIEF COMPLAIN: Fall      INTERVAL HPI: Patient seen and examined at bedside, Interval Notes, orders reviewed. Nursing notes noted   she denies having any shortness of breath. No cough or sputum production. No fever or chills. She is on room air 94% she is on Mucinex 600 mg daily. Chest x-ray shows left lower lobe atelectasis versus infiltration. OBJECTIVE    Body mass index is 36.35 kg/m². PHYSICAL EXAM:  Vitals:  /62   Pulse 86   Temp 98.1 °F (36.7 °C) (Oral)   Resp 18   Ht 5' (1.524 m)   Wt 186 lb 1.6 oz (84.4 kg)   SpO2 94%   BMI 36.35 kg/m²   General: Alert, awake . comfortable in bed, No distress. Head: Atraumatic , Normocephalic   Eyes: PERRL. No sclera icterus. No conjunctival injection. No discharge   ENT: No nasal  discharge. Pharynx clear. Neck:  Trachea midline. No thyromegaly, no JVD, No cervical adenopathy. Chest : Bilaterally symmetrical ,Normal effort,  No accessory muscle use  Lung : . Fair BS bilateral, decreased BS at bases. No Rales. No wheezing. No rhonchi. Heart[de-identified] Normal  rate. Regular rhythm. No mumur ,  Rub or gallop  ABD: Non-tender. Non-distended. No masses. No organmegaly. Normal bowel sounds. No hernia. Ext : No Pitting both leg , No Cyanosis No clubbing  Neuro: no focal weakness          DATA:   Recent Labs     05/26/22  1339   WBC 10.9*   HGB 13.4   HCT 40.6   MCV 97.2        Recent Labs     05/26/22  1339 05/26/22  1339 05/28/22  0452     --  136   K 3.6  --  3.5     --  103   CO2 20  --  21   BUN 13  --  13   CREATININE 1.18*  --  1.06*   GLUCOSE 168*  --  118*   CALCIUM 9.5  --  8.7   LABGLOM 44.2*   < > 50.0*   GFRAA 53.5*   < > >60.0    < > = values in this interval not displayed.        MV Settings:          No results for input(s): PHART, ZPS5WFC, PO2ART, ELS0QOR, BEART, T5TWIHOB in the last 72 hours. O2 Device: None (Room air)    ADULT DIET; Regular; Low Fat/Low Chol/High Fiber/2 gm Na;  No Caffeine     MEDICATIONS during current hospitalization:    Continuous Infusions:   sodium chloride 75 mL/hr at 05/27/22 1318    sodium chloride      sodium chloride      dextrose      sodium chloride         Scheduled Meds:   [START ON 5/29/2022] enoxaparin  40 mg SubCUTAneous Daily    guaiFENesin  600 mg Oral BID    sodium chloride flush  5-40 mL IntraVENous 2 times per day    sodium chloride flush  5-40 mL IntraVENous 2 times per day    [Held by provider] lisinopril  20 mg Oral Daily    pantoprazole  40 mg Oral QAM AC    magnesium oxide  800 mg Oral Daily    meclizine  25 mg Oral TID    amLODIPine  10 mg Oral Daily    cefTRIAXone (ROCEPHIN) IV  2,000 mg IntraVENous Q12H    sodium chloride flush  5-40 mL IntraVENous 2 times per day    atorvastatin  80 mg Oral Nightly    aspirin  81 mg Oral BID    gabapentin  600 mg Oral Nightly    insulin lispro  0-12 Units SubCUTAneous 4x Daily WC    insulin lispro  0-6 Units SubCUTAneous Nightly       PRN Meds:sodium chloride flush, sodium chloride, sodium chloride flush, sodium chloride, melatonin, guaiFENesin-dextromethorphan, glucose, dextrose bolus **OR** dextrose bolus, glucagon (rDNA), dextrose, sodium chloride flush, sodium chloride, ondansetron **OR** ondansetron, acetaminophen **OR** acetaminophen, polyethylene glycol, nitroGLYCERIN, hydrALAZINE    Radiology  Echocardiogram complete 2D with doppler with color    Result Date: 5/24/2022  Transthoracic Echocardiography Report (TTE)  Demographics   Patient Name    Areli Falling Gender                Female   Patient Number  [id number]      Race                  Other                                 Ethnicity   Visit Number    [id number]     Room Number           R917   Corporate ID                  Date of Study         05/24/2022   Accession       [id number]    Referring Physician   Yeimi Villegas MD  Number   Date of Birth   1945    Sonographer           Tania Dowling   Age             64 year(s)    Interpreting          [facility])                                Physician             Cardiology                                                      Trudi Trinidad  Procedure Type of Study   TTE procedure:ECHO COMPLETE 2D W/DOP W/COLOR. Procedure Date Date: 05/24/2022 Start: 08:20 AM Study Location: Portable Technical Quality: Adequate visualization Indications: Altered Mental Status. Patient Status: Routine Height: 60 inches Weight: 183 pounds BSA: 1.8 m^2 BMI: 35.74 kg/m^2 BP: 139/69 mmHg Allergies   - Other allergy:(eggs, dairy, valium, zithromax). Conclusions   Summary  Left ventricular ejection fraction is estimated at 65%. E/A flow reversal noted. Suggestive of diastolic dysfunction. Normal right ventricle systolic pressure. RVSP 22mmHg  No hemodynamic evidence of significant valve disease   Signature   ----------------------------------------------------------------  Electronically signed by Magaly Munoz(Interpreting physician)  on 05/24/2022 04:13 PM  ----------------------------------------------------------------   Findings  Left Ventricle Left ventricular ejection fraction is estimated at 65%. E/A flow reversal noted. Suggestive of diastolic dysfunction. Right Ventricle Normal right ventricle structure and function. Normal right ventricle systolic pressure. RVSP 22mmHg Left Atrium Normal size left atrium. Right Atrium Normal right atrium. Mitral Valve Structurally normal mitral valve. No evidence of mitral valve stenosis. No evidence of mitral regurgitaton. Tricuspid Valve Tricuspid valve is structurally normal. No evidence of tricuspid stenosis. No evidence of tricuspid regurgitation. Aortic Valve Mildly thickened trileaflet aortic valve with normal excursion. No evidence of aortic valve stenosis . No evidence of aortic valve regurgitation .  Pulmonic Valve The pulmonic valve was not well visualized . Pericardial Effusion No evidence of significant pericardial effusion is noted. Aorta \ Miscellaneous The aorta is within normal limits. M-Mode Measurements (cm)   LVIDd: 3.2 cm                          LVIDs: 2.01 cm  IVSd: 1.63 cm                          IVSs: 2.05 cm  LVPWd: 1.42 cm                         LVPWs: 1.31 cm  Rt. Vent.  Dimension: 2.36 cm           AO Root Dimension: 2.46 cm                                         ACS: 1.32 cm                                         LVOT: 1.8 cm  Doppler Measurements:   AV Velocity:0.02 m/s                   MV Peak E-Wave: 0.83 m/s  AV Peak Gradient: 10.58 mmHg           MV Peak A-Wave: 1.39 m/s  AV Mean Gradient: 4.87 mmHg  AV Area (Continuity):2.14 cm^2  TR Velocity:2.06 m/s                   Estimated RAP:5 mmHg  TR Gradient:16.9 mmHg                  RVSP:21.9 mmHg  Valves  Mitral Valve   Peak E-Wave: 0.83 m/s                 Peak A-Wave: 1.39 m/s                                        E/A Ratio: 0.59                                        Peak Gradient: 2.73 mmHg                                        Deceleration Time: 205.2 msec   Tissue Doppler   E' Septal Velocity: 0.06 m/s  E' Lateral Velocity: 0.09 m/s   Aortic Valve   Peak Velocity: 1.63 m/s                Mean Velocity: 0.99 m/s  Peak Gradient: 10.58 mmHg              Mean Gradient: 4.87 mmHg  Area (continuity): 2.14 cm^2  AV VTI: 25.47 cm   Cusp Separation: 1.32 cm   Tricuspid Valve   Estimated RVSP: 21.9 mmHg               Estimated RAP: 5 mmHg  TR Velocity: 2.06 m/s                   TR Gradient: 16.9 mmHg   Pulmonic Valve   Peak Velocity: 0.97 m/s            Peak Gradient: 3.78 mmHg                                     Estimated PASP: 21.9 mmHg   LVOT   Peak Velocity: 1.08 m/s               Mean Velocity: 0.77 m/s  Peak Gradient: 4.65 mmHg              Mean Gradient: 2.5 mmHg  LVOT Diameter: 1.8 cm                 LVOT VTI: 21.42 cm  Structures  Left Atrium LA Volume/Index: 33.74 ml /19 m^2             LA Area: 12.41 cm^2   Left Ventricle   Diastolic Dimension: 3.2 cm           Systolic Dimension: 1.63 cm  Septum Diastolic: 9.29 cm             Septum Systolic: 8.36 cm  PW Diastolic: 9.23 cm                 PW Systolic: 9.18 cm                                        FS: 37.2 %  LV EDV/LV EDV Index: 40.91 ml/23 m^2  LV ESV/LV ESV Index: 12.84 ml/7 m^2  EF Calculated: 68.6 %                 LV Length: 5.74 cm   LVOT Diameter: 1.8 cm   Right Atrium   RA Systolic Pressure: 5 mmHg   Right Ventricle   Diastolic Dimension: 7.95 cm                                     RV Systolic Pressure: 06.1 mmHg  Aorta/ Miscellaneous Aorta   Aortic Root: 2.46 cm  LVOT Diameter: 1.8 cm      CTA HEAD W WO CONTRAST    Result Date: 5/24/2022  EXAMINATION:  CTA NECK W WO CONTRAST, CTA HEAD W WO CONTRAST HISTORY:  Dizziness and altered mental status. TECHNIQUE:   Spiral high resolution axial images were obtained through the head, neck and superior mediastinum following bolus administration of intravenous contrast for CT angiography. The data was subsequently post-processed utilizing 3D multi-planar  reconstructions, 3D maximum intensity projections. Contrast:  iopamidol (ISOVUE-370) injection of 100 mL All CT scans at this facility use dose modulation, iterative reconstruction, and/or weight based dosing when appropriate to reduce radiation dose to as low as reasonably achievable. COMPARISON: CT head 5/24/2022. RESULT: BRAIN: Grossly unchanged from the recent CT head dictation. Please refer to the CT head dictation. NECK: Soft tissues: The soft tissue planes are maintained throughout. No evidence of a soft tissue mass in the neck or superior mediastinum. No significant lymphadenopathy. Spine:  Multilevel degenerative changes. Lung apices:   Nonspecific patchy interstitial opacities, which may be chronic, and emphysematous changes, partially imaged.  CT ARTERIOGRAM:     Extracranial Circulation: Aortic Arch: Normal branching pattern. .  There is no significant stenosis in the proximal brachiocephalic vessels. Carotid Stenosis: Right Common:  No significant stenosis. Right Internal Carotid  Plaque:  Mild plaque formation. Right Internal Carotid Stenosis by NASCET criteria:  Less than 20% Left Common:  No significant stenosis. Left Internal Carotid Plaque:  Mild plaque formation. Left Internal Carotid Stenosis by NASCET criteria:  Less than 20%. Cervical Vertebral Arteries: Small in caliber bilaterally with the right side slightly dominant. Appear patent with tortuosity. Intracranial Circulation: Anterior Circulation:  Distal ICAs appear patent with mild to moderate calcifications. Proximal ACAs and proximal MCAs appear patent. No evidence for significant stenosis or aneurysm. Vertebrobasilar Circulation:   Small caliber of the vertebral arteries as above with the left vertebral artery appearing to end in PICA, likely normal variant. Basilar artery is also small in caliber with tortuosity with short segment of possible narrowing near terminus versus tortuosity. Fetal origin of the bilateral PCAs with the PCAs appearing patent. Dural venous sinuses: Visualized dural venous sinuses are patent. Small caliber of the vertebrobasilar system as discussed. Possible short segment of narrowing of the distal basilar artery near terminus versus tortuosity. Dominant anterior circulation with fetal origin of the PCAs bilaterally. No evidence for significant carotid stenosis. CT HEAD WO CONTRAST    Result Date: 5/24/2022  INDICATION: 70-year-old female presenting with dizziness and altered mental status. COMPARISON: 5/23/2022 and 10/9/2015. Fallon Diaz TECHNIQUE: Multidetector CT imaging was obtained from the skull base to vertex without the administration of intravenous contrast. Coronal and sagittal reformatted images were obtained.  Automated dose exposure control was used for this exam. FINDINGS: Scattered areas of low-attenuation are visualized in the periventricular and subcortical white matter that demonstrate no significant change in comparison to the prior study consistent with chronic microvascular disease. No evidence of parenchymal hemorrhages or contusions. No evidence of intra or extra-axial fluid collection is seen. Mild prominence of ventricles and sulci are visualized demonstrating no significant increase in comparison to the prior study consistent is more chronic brain changes. No structural midline abnormalities seen. Visualized paranasal sinuses are well aerated. Visualized mastoid air cells are well aerated. Extensive lucencies visualized within the diploic space throughout the calvarium would recommend clinical correlation. No acute intracranial hemorrhage or mass effect. Chronic microvascular disease and chronic atrophic brain changes. Extensive lucencies visualized within the diploic space throughout the calvarium would recommend clinical correlation. CT Head WO Contrast    Result Date: 5/23/2022  INDICATION: 17-year-old female presenting with dizziness and unsteady gait. COMPARISON: 10/9/2015. TECHNIQUE: Multidetector CT imaging was obtained from the skull base to vertex without the administration of intravenous contrast. Coronal and sagittal reformatted images were obtained. Automated dose exposure control was used for this exam. FINDINGS: Scattered areas of low-attenuation are visualized in the periventricular and subcortical white matter consistent with chronic microvascular disease. No evidence of parenchymal hemorrhages or contusions. No evidence of intra or extra-axial fluid collection is seen. Prominence of ventricles and sulci are visualized demonstrating no significant increase in comparison to the prior study consistent is more chronic brain changes. Ventricles and sulci normal in size and configuration. The posterior fossa is unremarkable.  No structural midline abnormalities seen. Visualized paranasal sinuses are well aerated. Visualized mastoid air cells are well aerated. The calvarium is intact. No acute intracranial hemorrhage or mass effect. Chronic microvascular disease and chronic atrophic brain changes. CTA NECK W WO CONTRAST    Result Date: 5/24/2022  EXAMINATION:  CTA NECK W WO CONTRAST, CTA HEAD W WO CONTRAST HISTORY:  Dizziness and altered mental status. TECHNIQUE:   Spiral high resolution axial images were obtained through the head, neck and superior mediastinum following bolus administration of intravenous contrast for CT angiography. The data was subsequently post-processed utilizing 3D multi-planar  reconstructions, 3D maximum intensity projections. Contrast:  iopamidol (ISOVUE-370) injection of 100 mL All CT scans at this facility use dose modulation, iterative reconstruction, and/or weight based dosing when appropriate to reduce radiation dose to as low as reasonably achievable. COMPARISON: CT head 5/24/2022. RESULT: BRAIN: Grossly unchanged from the recent CT head dictation. Please refer to the CT head dictation. NECK: Soft tissues: The soft tissue planes are maintained throughout. No evidence of a soft tissue mass in the neck or superior mediastinum. No significant lymphadenopathy. Spine:  Multilevel degenerative changes. Lung apices:   Nonspecific patchy interstitial opacities, which may be chronic, and emphysematous changes, partially imaged. CT ARTERIOGRAM:     Extracranial Circulation: Aortic Arch: Normal branching pattern. .  There is no significant stenosis in the proximal brachiocephalic vessels. Carotid Stenosis: Right Common:  No significant stenosis. Right Internal Carotid  Plaque:  Mild plaque formation. Right Internal Carotid Stenosis by NASCET criteria:  Less than 20% Left Common:  No significant stenosis. Left Internal Carotid Plaque:  Mild plaque formation. Left Internal Carotid Stenosis by NASCET criteria:  Less than 20%.  Cervical Vertebral Arteries: Small in caliber bilaterally with the right side slightly dominant. Appear patent with tortuosity. Intracranial Circulation: Anterior Circulation:  Distal ICAs appear patent with mild to moderate calcifications. Proximal ACAs and proximal MCAs appear patent. No evidence for significant stenosis or aneurysm. Vertebrobasilar Circulation:   Small caliber of the vertebral arteries as above with the left vertebral artery appearing to end in PICA, likely normal variant. Basilar artery is also small in caliber with tortuosity with short segment of possible narrowing near terminus versus tortuosity. Fetal origin of the bilateral PCAs with the PCAs appearing patent. Dural venous sinuses: Visualized dural venous sinuses are patent. Small caliber of the vertebrobasilar system as discussed. Possible short segment of narrowing of the distal basilar artery near terminus versus tortuosity. Dominant anterior circulation with fetal origin of the PCAs bilaterally. No evidence for significant carotid stenosis. MRI LUMBAR SPINE WO CONTRAST    Result Date: 5/26/2022  INDICATION: 64-year-old female, lumbar stenosis. COMPARISON: None. TECHNIQUE: Multiplanar, multisequence MR imaging of the lumbar spine was performed without No administration of intravenous contrast. FINDINGS: There is exaggeration of the normal lordosis of the columns of the lumbar spine visualized, grade 1 anterolisthesis of L5 over S1. Vertebral augmentation in the L1 vertebral body. Depression of the superior endplate of the L3 and L5 vertebral bodies is seen. Multilevel degenerative endplate changes is seen. The STIR sequence demonstrates unremarkable signal intensity of the bone marrow , no evidence of T2 prolongation within the marrow to suggest acute fracture, edema or infiltrative process.  The cord terminates at the level of the L1-L2 intervertebral disc space, unremarkable signal intensity of internal cord, the terminal nerve fibers demonstrate unremarkable contour with no evidence of thickening or clumping. L1-L2 demonstrates degenerative disc changes with hypertrophic changes in the facet joints, no significant narrowing of the spinal canal is seen, with mild narrowing of bilateral neuroforamina. L2-L3 demonstrates degenerative disc changes. Atrophic changes of the facet joints and ligamentum flavum, no significant narrowing of the spinal canal is visualized, mild narrowing of the right neural foramina and mild-to-moderate narrowing of the left neural foramina is seen at this level. L3-L4 demonstrates degenerative disc with hypertrophic changes in the facet joints and ligamentum flavum, no significant narrowing of the spinal canal is visualized. Moderate to severe narrowing of the right neural foramina and moderate narrowing of the left neural foramina is seen at this level. L4-L5 demonstrates extensive degenerative changes, hypertrophic changes in the facet joints with prominent hypertrophic changes in the ligamentum flavum, moderate to severe narrowing of the spinal canal with severe narrowing of the right neural foramina and moderate to severe narrowing of the left neural foramina seen at this level. L5-S1 demonstrates degenerative changes with hypertrophic changes in the facet joints, no significant narrowing of the spinal canal is visualized at this level,  severe narrowing of the right neuroforamina and moderate to severe narrowing of the left neural foramina is seen at this level. Extensive degenerative changes of the lumbar spine visualized most prominent at L4-L5 and L5-S1. XR CHEST PORTABLE    Result Date: 5/25/2022  CHEST X-RAY. TECHNIQUE: Single AP portable view of the chest was obtained CLINICAL INDICATION: 75-year-old female presenting with fever. COMPARISON: Chest x-ray obtained on 5/23/2022. PROCEDURE AND FINDINGS: The cardiomediastinal silhouette is slightly prominent with tortuosity of the thoracic aorta.  Mild prominence of the interstitial and bronchovascular markings is seen. Mild blunting of the left costophrenic angle is visualized and demonstrates prominence in comparison to the prior study cannot rule out left lower lobe infiltrate would recommend clinical correlation. Biapical prominent CSF COPD changes. No evidence of pneumothorax or parenchymal lung mass. Degenerative joint changes. Suggestion of left lower lobe infiltrate/consolidation. Recommend clinical correlation    XR CHEST PORTABLE    Result Date: 5/23/2022  CHEST X-RAY. TECHNIQUE: Single AP portable view of the chest was obtained. [facility] CLINICAL INDICATION: 75-year-old female presenting with dizziness. COMPARISON: Chest x-ray obtained on 10/9/2015 PROCEDURE AND FINDINGS: Poor inspiratory effort is seen. The cardiomediastinal silhouette is slightly prominent in size, mild tortuosity of the thoracic aorta is seen. Biapical prominence suggestive of COPD changes. Mild prominence of the bronchovascular interstitial lung markings is seen but no evidence of focal lung infiltrate or consolidation is seen. The costophrenic angles are clear, no evidence of lung infiltrate, pleural effusion or parenchymal lung mass. Degenerative bone changes are visualized. COPD and chronic interstitial changes. IR LUMBAR PUNCTURE FOR DIAGNOSIS    1. Technically successful diagnostic lumbar puncture. HISTORY: Ade Snider is a Female of 64 years age, with  lp r/o meningitis . FLUOROSCOPY TIME:   58.7 seconds. RADIATION DOSE:         25.85 mGy. DIAGNOSIS: 75-year-old female with headaches, evaluation for meningitis. COMMENTS: R77.8 Elevated troponin ICD10 PROCEDURE: Following universal protocol, patient and site verification was performed with a \"timeout\" prior to the procedure. Following the discussion of the procedure, and this, risks versus benefits, informed consent was obtained from the patient.   The patient was placed on fluoroscopy table in prone position and the lower back area was prepped and draped in usual sterile fashion. The area between the interspinous process was marked. This was at the L3 level. Using the usual sterile conditions, 1% lidocaine (5 mL) and fluoroscopy guidance, a 20 gauge needle was inserted into the spinal canal.  After confirmation of intra-thecal location of the needle tip by CSF leakage through the needle. Approximately 14.5 cc of CSF were collected in 4 separate containers. Following that the needle was withdrawn from the back. The patient tolerated the procedure well without complications. US CAROTID ARTERY BILATERAL    Result Date: 5/25/2022  EXAMINATION:  CAROTID DUPLEX ULTRASONOGRAPHY CLINICAL HISTORY:  DIZZINESS COMPARISONS:  NONE AVAILABLE TECHNIQUE:  B-mode, color flow and spectral Doppler FINDINGS:  ARTERIAL BLOOD FLOW VELOCITY RIGHT PS                                               Prox CCA    67 cm/s             Mid CCA     65 cm/s              Dist CCA    62 cm/s              Prox ICA    59 cm/s               Mid ICA     106 cm/s            Dist ICA    94 cm/s             Prox ECA    72 cm/s             Prox VERT   61 cm/s              ICA/CCA     1.64                        LEFT PS Prox CCA    120 cm/s Mid CCA     95 cm/s Dist CCA    92 cm/s Prox ICA    87 cm/s Mid ICA     119 cm/s Dist ICA    146 cm/s Prox ECA    71 cm/s Prox VERT   76 cm/s ICA/CCA     1.62     MILD SCATTERED ATHEROSCLEROSIS BOTH CAROTID TREES. NO FLOW OBSTRUCTION. BILATERAL ICAS LESS THAN 50% STENOSIS. BILATERAL ANTEGRADE VERTEBRAL FLOW. IMPRESSION AND SUGGESTION:  1. Abnormal chest x-ray, left lower lobe atelectasis rule out pneumonia  2. Acute encephalopathy resolved  3. History of hypertension  4. Hyperlipidemia    Patient is currently afebrile on room air. No complaint of shortness of breath or cough. The patient developed fevers and antibiotic otherwise observed likely left basilar atelectasis. Incentive spirometer.   Continue Mucinex 600 mg daily.    NOTE: This report was transcribed using voice recognition software. Every effort was made to ensure accuracy; however, inadvertent computerized transcription errors may be present.       Electronically signed by Gerald Cramer MD, FCCP on 5/28/2022 at 12:31 PM

## 2022-05-28 NOTE — CARE COORDINATION
SPOKE W/PT AND FAMILY TO DISCUSS D/C PLANNING. PT IS UNDECIDED AT THIS TIME AS TO DISPOSITION. SHE WOULD LIKE TO DISCUSS WITH SPOUSE AND FAMILY AND WILL LET CM KNOW TOMORROW. PT WAS DENIED ACUTE REHAB BY INSURANCE. WILL AWAIT PT/FAMILY'S DECISION. WILL FOLLOW PROGRESS.

## 2022-05-28 NOTE — PROGRESS NOTES
Progress Note  Patient: Leslye Blomo  Unit/Bed: U446/W687-17  YOB: 1945  MRN: [id number]  Acct: [de-identified]   Admitting Diagnosis: Elevated troponin [R77.8]  Fever and chills [R50.9]  Admit Date:  5/23/2022  Hospital Day: 3    Chief Complaint: weakness dizizness     Histories:  Past Medical History:   Diagnosis Date    Cancer [facility])     Encounter for lumbar puncture 05/25/2022    Completed by Dr. Gaurav Patiño - diagnostics sent    Hyperlipidemia     Hypertension     Type II or unspecified type diabetes mellitus without mention of complication, not stated as uncontrolled      Past Surgical History:   Procedure Laterality Date    CORONARY ANGIOPLASTY WITH STENT PLACEMENT      UPPER GASTROINTESTINAL ENDOSCOPY  8/12/15    w/bx,dilation      Family History   Problem Relation Age of Onset    Cancer Mother     Arthritis Mother     Diabetes Mother     Heart Disease Mother     High Blood Pressure Mother     High Cholesterol Mother     Arthritis Father     Diabetes Father     Heart Disease Father     High Blood Pressure Father     High Cholesterol Father      Social History     Socioeconomic History    Marital status:      Spouse name: None    Number of children: None    Years of education: None    Highest education level: None   Occupational History    None   Tobacco Use    Smoking status: Former Smoker     Years: 24.00    Smokeless tobacco: Never Used   Substance and Sexual Activity    Alcohol use:  Yes     Alcohol/week: 3.0 standard drinks     Types: 3 Glasses of wine per week    Drug use: No    Sexual activity: None   Other Topics Concern    None   Social History Narrative    Lives With: Spouse,  Son (pt never home alone)    Type of Home: House in [address] [place] [address],[address] in basement,Two level (doesn't have to use basement)    Home Access: Stairs to enter with rails- Number of Steps: 6- Rails: Right    Bathroom Shower/Tub: Walk-in shower Bathroom Equipment: Shower chair,Grab bars in shower    Home Equipment: [address]:  ( assists with bathing/dressing; pt indep with toileting activities)    Homemaking Assistance:  (spouse and son complete)    Homemaking Responsibilities: No    Ambulation Assistance: Independent (cane/rollator PRN)    Transfer Assistance: Independent    Additional Comments: Pt inconsistent historian. Pt's spouse arriving mid assessment and confirms home set up and PLOF         Social Determinants of Health     Financial Resource Strain: Low Risk     Difficulty of Paying Living Expenses: Not hard at all   Food Insecurity: No Food Insecurity    Worried About Running Out of Food in the Last Year: Never true    [address] in the Last Year: Never true   Transportation Needs:     Lack of Transportation (Medical): Not on file    Lack of Transportation (Non-Medical):  Not on file   Physical Activity:     Days of Exercise per Week: Not on file    Minutes of Exercise per Session: Not on file   Stress:     Feeling of Stress : Not on file   Social Connections:     Frequency of Communication with Friends and Family: Not on file    Frequency of Social Gatherings with Friends and Family: Not on file    Attends Zoroastrian Services: Not on file    Active Member of [address] [place] [address] [employer] or Organizations: Not on file    Attends Club or Organization Meetings: Not on file    Marital Status: Not on file   Intimate Partner Violence:     Fear of Current or Ex-Partner: Not on file    Emotionally Abused: Not on file    Physically Abused: Not on file    Sexually Abused: Not on file   Housing Stability:     Unable to Pay for Housing in the Last Year: Not on file    Number of [place] in the Last Year: Not on file    Unstable Housing in the Last Year: Not on file       Subjective/HPI   Patient laying in bed looks comfortable  Denies chest pain  Denies dizziness  Tele sinus rhythm in the 80s  Stable from cardiology standpoint to be discharged at any time    EKG: SR 80        Review of Systems:   Review of Systems   Constitutional: Negative. Negative for diaphoresis and fatigue. HENT: Negative. Eyes: Negative. Respiratory: Negative. Negative for cough, chest tightness, shortness of breath, wheezing and stridor. Cardiovascular: Negative. Negative for chest pain, palpitations and leg swelling. Gastrointestinal: Positive for abdominal pain. Negative for blood in stool and nausea. Genitourinary: Negative. Musculoskeletal: Positive for arthralgias, back pain and gait problem. Skin: Negative. Neurological: Positive for dizziness and weakness. Negative for syncope and light-headedness. Hematological: Negative. Psychiatric/Behavioral: Negative. Physical Examination:    /62   Pulse 86   Temp 98.1 °F (36.7 °C) (Oral)   Resp 18   Ht 5' (1.524 m)   Wt 186 lb 1.6 oz (84.4 kg)   SpO2 94%   BMI 36.35 kg/m²    Physical Exam   Constitutional: She appears healthy. No distress. HENT:   Normal cephalic and Atraumatic   Eyes: Pupils are equal, round, and reactive to light. Neck: Thyroid normal. No JVD present. No neck adenopathy. No thyromegaly present. Cardiovascular: Normal rate, regular rhythm, normal heart sounds, intact distal pulses and normal pulses. Pulmonary/Chest: Effort normal and breath sounds normal. She has no wheezes. She has no rales. She exhibits no tenderness. Abdominal: Soft. Bowel sounds are normal. There is no abdominal tenderness. Musculoskeletal:         General: No tenderness or edema. Normal range of motion. Cervical back: Normal range of motion and neck supple. Neurological: She is alert and oriented to person, place, and time. Skin: Skin is warm. No cyanosis. Nails show no clubbing.        LABS:  CBC:   Lab Results   Component Value Date    WBC 10.9 05/26/2022    RBC 4.17 05/26/2022    HGB 13.4 05/26/2022    HCT 40.6 05/26/2022    MCV 97.2 05/26/2022 MCH 32.0 05/26/2022    MCHC 32.9 05/26/2022    RDW 15.5 05/26/2022     05/26/2022    MPV 8.2 10/12/2015     CBC with Differential:    Lab Results   Component Value Date    WBC 10.9 05/26/2022    RBC 4.17 05/26/2022    HGB 13.4 05/26/2022    HCT 40.6 05/26/2022     05/26/2022    MCV 97.2 05/26/2022    MCH 32.0 05/26/2022    MCHC 32.9 05/26/2022    RDW 15.5 05/26/2022    LYMPHOPCT 11.2 05/26/2022    MONOPCT 8.0 05/26/2022    BASOPCT 0.6 05/26/2022    MONOSABS 0.9 05/26/2022    LYMPHSABS 1.2 05/26/2022    EOSABS 0.3 05/26/2022    BASOSABS 0.1 05/26/2022     CMP:    Lab Results   Component Value Date     05/28/2022    K 3.5 05/28/2022    K 3.6 05/26/2022     05/28/2022    CO2 21 05/28/2022    BUN 13 05/28/2022    CREATININE 1.06 05/28/2022    GFRAA >60.0 05/28/2022    LABGLOM 50.0 05/28/2022    GLUCOSE 118 05/28/2022    PROT 6.6 05/23/2022    LABALBU 4.4 05/23/2022    CALCIUM 8.7 05/28/2022    BILITOT 1.1 05/23/2022    ALKPHOS 74 05/23/2022    AST 18 05/23/2022    ALT 23 05/23/2022     BMP:    Lab Results   Component Value Date     05/28/2022    K 3.5 05/28/2022    K 3.6 05/26/2022     05/28/2022    CO2 21 05/28/2022    BUN 13 05/28/2022    LABALBU 4.4 05/23/2022    CREATININE 1.06 05/28/2022    CALCIUM 8.7 05/28/2022    GFRAA >60.0 05/28/2022    LABGLOM 50.0 05/28/2022    GLUCOSE 118 05/28/2022     Magnesium:    Lab Results   Component Value Date    MG 1.6 05/24/2022     Troponin:    Lab Results   Component Value Date    TROPONINI 0.036 05/24/2022        Active Hospital Problems    Diagnosis Date Noted    Impaired mobility and activities of daily living-metabolic encephalopathy with primary origins of infectious etiology [Z74.09, Z78.9] 05/27/2022     Priority: Medium    Lumbar spondylosis [M47.816] 05/27/2022     Priority: Medium    Lumbar stenosis with neurogenic claudication [M48.062] 05/27/2022     Priority: Medium    Dizziness [R42]      Priority: Medium    Benign paroxysmal positional vertigo due to bilateral vestibular disorder [H81.13]      Priority: Medium    Maxillary pain [R68.84]      Priority: Medium    Nonintractable headache [R51.9]      Priority: Medium    Fall at home [W19. Alexei Estes, Y92.009]      Priority: Medium    Fever and chills [R50.9]      Priority: Medium    Elevated troponin [R77.8] 05/23/2022     Priority: Medium        Assessment/Plan:    1. Fever - work up in progress. Possible PNA  2. Dizziness -Telemetry. CUS - negative  3. Elevated possible Demand ischemia  - Borderline levels. No CP  4. Echo EF 65%  5. HTN stable - continue meds. Low salt diet. 6. HPL - Statin   7. HypoMag 1.6 - replaced  8. Awaiting rehab precert. Ok for transfer anytime.           Electronically signed by Beena Odom MD on 5/28/2022 at 11:04 AM

## 2022-05-28 NOTE — PROGRESS NOTES
05/28/22  From: HOME W/SPOUSE, SON.  WALKER    Admit:DIZZINESS, BALANCE ISSUES    PMH: HTN, HLD, CA, DM    [facility]--PENDING NEUROSX  PLAN> F/U AS OP    Patient Mobility or PT/OT ordered: YES  Consults: GERALD SERRATO IR    Covid result &/or vacc status: 5/23 COVID NEG  TROP 0.024-0.026-0.028--0.036  5/24 RR--AMS, SLURRED SPEECH, LT ARM/HAND NUMBNESS--TX 2W    Barriers to Discharge:   R/O MENINGITIS    LP: NEG  W: 14.2--12.8--10.9  12/0.97--13/1.18    Assessments: CMI DONE

## 2022-05-28 NOTE — PROGRESS NOTES
Progress Note  Date:2022       GGXF:U589/P156-14  Patient Name:Mckenna Mendoza     YOB: 1945     Age:76 y.o. Subjective    Subjective:  Symptoms:  She reports malaise and weakness. No shortness of breath, cough, chest pain, headache, chest pressure, anorexia, diarrhea or anxiety. Diet:  No nausea or vomiting. Review of Systems   Respiratory: Negative for cough and shortness of breath. Cardiovascular: Negative for chest pain. Gastrointestinal: Negative for anorexia, diarrhea, nausea and vomiting. Neurological: Positive for weakness. Objective         Vitals Last 24 Hours:  TEMPERATURE:  No data recorded  RESPIRATIONS RANGE: Resp  Av  Min: 18  Max: 18  PULSE OXIMETRY RANGE: No data recorded  PULSE RANGE: No data recorded  BLOOD PRESSURE RANGE: No data recorded.  ; No data recorded. I/O (24Hr): No intake or output data in the 24 hours ending 22 1133  Objective:  General Appearance:  Comfortable, in no acute distress and well-appearing. Vital signs: (most recent): Blood pressure 133/62, pulse 86, temperature 98.1 °F (36.7 °C), temperature source Oral, resp. rate 18, height 5' (1.524 m), weight 186 lb 1.6 oz (84.4 kg), SpO2 94 %. HEENT: Normal HEENT exam.    Lungs: There are decreased breath sounds. Heart: Regular rhythm. S1 normal and S2 normal.    Abdomen: Abdomen is soft. Bowel sounds are normal.     Neurological: Patient is alert and oriented to person, place and time. Pupils:  Pupils are equal, round, and reactive to light. Skin:  Warm and dry.       Labs/Imaging/Diagnostics    Labs:  CBC:  Recent Labs     22  1339   WBC 10.9*   RBC 4.17*   HGB 13.4   HCT 40.6   MCV 97.2   RDW 15.5*        CHEMISTRIES:  Recent Labs     22  1339 22  0452    136   K 3.6 3.5    103   CO2 20 21   BUN 13 13   CREATININE 1.18* 1.06*   GLUCOSE 168* 118*     PT/INR:No results for input(s): PROTIME, INR in the last 72 hours.  APTT:No results for input(s): APTT in the last 72 hours. LIVER PROFILE:No results for input(s): AST, ALT, BILIDIR, BILITOT, ALKPHOS in the last 72 hours. Imaging Last 24 Hours:  MRI LUMBAR SPINE WO CONTRAST    Result Date: 5/26/2022  INDICATION: 64-year-old female, lumbar stenosis. COMPARISON: None. TECHNIQUE: Multiplanar, multisequence MR imaging of the lumbar spine was performed without No administration of intravenous contrast. FINDINGS: There is exaggeration of the normal lordosis of the columns of the lumbar spine visualized, grade 1 anterolisthesis of L5 over S1. Vertebral augmentation in the L1 vertebral body. Depression of the superior endplate of the L3 and L5 vertebral bodies is seen. Multilevel degenerative endplate changes is seen. The STIR sequence demonstrates unremarkable signal intensity of the bone marrow , no evidence of T2 prolongation within the marrow to suggest acute fracture, edema or infiltrative process. The cord terminates at the level of the L1-L2 intervertebral disc space, unremarkable signal intensity of internal cord, the terminal nerve fibers demonstrate unremarkable contour with no evidence of thickening or clumping. L1-L2 demonstrates degenerative disc changes with hypertrophic changes in the facet joints, no significant narrowing of the spinal canal is seen, with mild narrowing of bilateral neuroforamina. L2-L3 demonstrates degenerative disc changes. Atrophic changes of the facet joints and ligamentum flavum, no significant narrowing of the spinal canal is visualized, mild narrowing of the right neural foramina and mild-to-moderate narrowing of the left neural foramina is seen at this level. L3-L4 demonstrates degenerative disc with hypertrophic changes in the facet joints and ligamentum flavum, no significant narrowing of the spinal canal is visualized.  Moderate to severe narrowing of the right neural foramina and moderate narrowing of the left neural foramina is seen at this level. L4-L5 demonstrates extensive degenerative changes, hypertrophic changes in the facet joints with prominent hypertrophic changes in the ligamentum flavum, moderate to severe narrowing of the spinal canal with severe narrowing of the right neural foramina and moderate to severe narrowing of the left neural foramina seen at this level. L5-S1 demonstrates degenerative changes with hypertrophic changes in the facet joints, no significant narrowing of the spinal canal is visualized at this level,  severe narrowing of the right neuroforamina and moderate to severe narrowing of the left neural foramina is seen at this level. Extensive degenerative changes of the lumbar spine visualized most prominent at L4-L5 and L5-S1. Assessment//Plan           Hospital Problems           Last Modified POA    * (Principal) Elevated troponin 5/23/2022 Yes    Dizziness 5/24/2022 Yes    Benign paroxysmal positional vertigo due to bilateral vestibular disorder 5/24/2022 Yes    Maxillary pain 5/24/2022 Yes    Nonintractable headache 5/24/2022 Yes    Fall at home 5/24/2022 Yes    Fever and chills 5/24/2022 Yes    Impaired mobility and activities of daily living-metabolic encephalopathy with primary origins of infectious etiology 5/27/2022 Yes    Lumbar spondylosis 5/27/2022 Yes    Lumbar stenosis with neurogenic claudication 5/27/2022 Yes      Encephalopathy  OSIRIS  Dizziness  HTN/HLD  OSIRIS  Assessment & Plan    5/27: Hold lisinopril, IV fluids for acute kidney injury, continue with PT OT. Dizziness resolved. Encephalopathy resolved. Fevers down. No overnight events no new complaints. anticipated discharge tomorrow to rehab. Bmp tomorrow.   5/28: pt is medically stable to be discharged any time to [facility] vs home, no overnight events, dizziness is less no fevers, abx as per ID  Electronically signed by Scarlet Casanova MD on 5/27/22 at 1:09 PM EDT

## 2022-05-28 NOTE — PROGRESS NOTES
[facility] Neurology Daily Progress Note  Name: Leslye Bloom  Age: 64 y.o. Gender: female  CodeStatus: Full Code  Allergies: Eggs Or Egg-Derived Products  Glucophage [Metformin]  Valium [Diazepam]  Zithromax [Azithromycin]    Chief Complaint:Dizziness    Primary Care Provider: Julia Love MD  InpatientTreatment Team: Treatment Team: Attending Provider: Melanie Fowler MD; Consulting Physician: Kourtney Pastrana MD; Consulting Physician: Marbella Oglesby DO; Consulting Physician: Kathryn Demarco MD; Utilization Reviewer: Jay Strange, RN; Consulting Physician: Shilpa Carlton DO; Consulting Physician: David Nolasco MD; Tech: Skylar Ona; LPN: Keely Berkowitz LPN; Registered Nurse: Ruddy Canas RN; : Jerrell Davis RN; Respiratory Therapist (Day): Campos Campbell RCP; Utilization Reviewer: Mukesh Palomares RN; : Loraine Ratliff RN  Admission Date: 5/23/2022  HPI 66-year right-handed female admitted for dizziness and issues with balance. Symptoms going on for almost a year on and off. She reports she is dizzy upon walking and reads to 1 side or the other. MRI was done yesterday at [facility] which appeared to show no acute findings. Patient has significant other cerebrovascular risk factors she denies any tinnitus or hearing loss. She denies any neck pain but does have back pain. Has not recent falls injuries or trauma. Patient appears to be dizzy upon sitting up from a lying down position and we were not able to walk her or perform a vestibular examination due to the dizziness. Patient is areflexic in the lower extremities no definite sensory levels are noted  CT Head WO Contrast    Events noted, episode of confusion  Seen by ID and ordered LP    Dizziness  Pertinent negatives include no abdominal pain, arthralgias, chest pain, coughing, fever, headaches, nausea, sore throat, vomiting or weakness. Pt seen and examined for neuro follow up.   NO Headache, double vision, blurry vision, difficulty with speech, difficulty with swallowing, weakness, numbness, pain, nausea, vomiting, choking, neck pain, reports dizziness with movement. Patient seen and examined for neuro follow-up. She is sitting in the chair with complaints of dizziness. She describes this dizziness as her head spinning. She denies any nausea or vomiting. She has not any further episodes of dizziness but has difficulty ambulating. She has chronic low back pain which has not been evaluated. As noted above    Patient has not any episodes of confusion and her dizziness is improving. Her main issues appears to be lower extremity weakness and therefore we obtained an MRI. LP does not show anything significant. 5/28  Patient not feeling as dizzy as she was and no confusional episodes have occurred. Patient had a neurosurgical evaluation and may be able to be discharged. Vitals:    05/27/22 1915   BP:    Pulse:    Resp: 18   Temp:    SpO2:       Review of Systems   Constitutional: Negative for appetite change and fever. HENT: Negative for drooling, ear pain, sore throat, trouble swallowing and voice change. Respiratory: Negative for cough and shortness of breath. Cardiovascular: Negative for chest pain. Gastrointestinal: Negative for abdominal pain, constipation, diarrhea, nausea and vomiting. Genitourinary: Negative for decreased urine volume and dysuria. Musculoskeletal: Positive for back pain. Negative for arthralgias. Skin: Negative for color change. Neurological: Positive for dizziness and light-headedness. Negative for weakness and headaches. Psychiatric/Behavioral: Negative for agitation and behavioral problems. All other systems reviewed and are negative. Physical Exam  Vitals and nursing note reviewed. Constitutional:       General: She is not in acute distress. Appearance: Normal appearance. She is well-developed.    HENT:      Head: Normocephalic and atraumatic. Nose: Nose normal.      Mouth/Throat:      Mouth: Mucous membranes are moist.   Eyes:      General:         Right eye: No discharge. Left eye: No discharge. Conjunctiva/sclera: Conjunctivae normal.   Neck:      Vascular: No JVD. Trachea: No tracheal deviation. Cardiovascular:      Rate and Rhythm: Normal rate. Pulmonary:      Effort: Pulmonary effort is normal.      Breath sounds: Normal breath sounds. Abdominal:      General: Bowel sounds are normal.      Palpations: Abdomen is soft. Musculoskeletal:         General: Normal range of motion. Cervical back: Normal range of motion and neck supple. No rigidity. No muscular tenderness. Lymphadenopathy:      Cervical: No cervical adenopathy. Skin:     General: Skin is warm and dry. Capillary Refill: Capillary refill takes less than 2 seconds. Neurological:      Mental Status: She is alert and oriented to person, place, and time.    Psychiatric:         Mood and Affect: Mood normal.         Behavior: Behavior normal.       Pt oreinted x 3 and nonfocal and not dizzy and no behavioral issues occurred        Medications:  Reviewed    Infusion Medications:    sodium chloride 75 mL/hr at 05/27/22 1318    sodium chloride      sodium chloride      dextrose      sodium chloride       Scheduled Medications:    [START ON 5/29/2022] enoxaparin  40 mg SubCUTAneous Daily    guaiFENesin  600 mg Oral BID    sodium chloride flush  5-40 mL IntraVENous 2 times per day    sodium chloride flush  5-40 mL IntraVENous 2 times per day    [Held by provider] lisinopril  20 mg Oral Daily    pantoprazole  40 mg Oral QAM AC    magnesium oxide  800 mg Oral Daily    meclizine  25 mg Oral TID    amLODIPine  10 mg Oral Daily    cefTRIAXone (ROCEPHIN) IV  2,000 mg IntraVENous Q12H    sodium chloride flush  5-40 mL IntraVENous 2 times per day    atorvastatin  80 mg Oral Nightly    aspirin  81 mg Oral BID    gabapentin  600 mg Oral Nightly    insulin lispro  0-12 Units SubCUTAneous 4x Daily WC    insulin lispro  0-6 Units SubCUTAneous Nightly     PRN Meds: sodium chloride flush, sodium chloride, sodium chloride flush, sodium chloride, melatonin, guaiFENesin-dextromethorphan, glucose, dextrose bolus **OR** dextrose bolus, glucagon (rDNA), dextrose, sodium chloride flush, sodium chloride, ondansetron **OR** ondansetron, acetaminophen **OR** acetaminophen, polyethylene glycol, nitroGLYCERIN, hydrALAZINE    Labs:   Recent Labs     05/26/22  1339   WBC 10.9*   HGB 13.4   HCT 40.6        Recent Labs     05/26/22  1339 05/28/22  0452    136   K 3.6 3.5    103   CO2 20 21   BUN 13 13   CREATININE 1.18* 1.06*   CALCIUM 9.5 8.7     No results for input(s): AST, ALT, BILIDIR, BILITOT, ALKPHOS in the last 72 hours. No results for input(s): INR in the last 72 hours. No results for input(s): Jah Kendleton in the last 72 hours. Urinalysis:   Lab Results   Component Value Date    NITRU Negative 05/25/2022    WBCUA 0-2 05/25/2022    BACTERIA Negative 05/25/2022    RBCUA 0-2 05/25/2022    BLOODU Negative 05/25/2022    SPECGRAV 1.050 05/25/2022    GLUCOSEU Negative 05/25/2022       Radiology:   Most recent    EEG No valid procedures specified. MRI of Brain No results found for this or any previous visit. No results found for this or any previous visit. MRA of the Head and Neck: No results found for this or any previous visit. No results found for this or any previous visit. No results found for this or any previous visit. CT of the Head: Results for orders placed during the hospital encounter of 05/23/22    CT HEAD WO CONTRAST    Narrative  INDICATION: 64-year-old female presenting with dizziness and altered mental status. COMPARISON: 5/23/2022 and 10/9/2015. Fabienne Luna     TECHNIQUE: Multidetector CT imaging was obtained from the skull base to vertex without the administration of intravenous contrast. Coronal and sagittal reformatted images were obtained. Automated dose exposure control was used for this exam.    FINDINGS:    Scattered areas of low-attenuation are visualized in the periventricular and subcortical white matter that demonstrate no significant change in comparison to the prior study consistent with chronic microvascular disease. No evidence of parenchymal hemorrhages or contusions. No evidence of intra or extra-axial fluid collection is seen. Mild prominence of ventricles and sulci are visualized demonstrating no significant increase in comparison to the prior study consistent is more chronic brain changes. No structural midline abnormalities seen. Visualized paranasal sinuses are well aerated. Visualized mastoid air cells are well aerated. Extensive lucencies visualized within the diploic space throughout the calvarium would recommend clinical correlation. Impression  No acute intracranial hemorrhage or mass effect. Chronic microvascular disease and chronic atrophic brain changes. Extensive lucencies visualized within the diploic space throughout the calvarium would recommend clinical correlation. No results found for this or any previous visit. No results found for this or any previous visit. Carotid duplex: No results found for this or any previous visit. No results found for this or any previous visit.   Results for orders placed during the hospital encounter of 05/23/22    US CAROTID ARTERY BILATERAL    Narrative  EXAMINATION:  CAROTID DUPLEX ULTRASONOGRAPHY    CLINICAL HISTORY:  DIZZINESS    COMPARISONS:  NONE AVAILABLE    TECHNIQUE:  B-mode, color flow and spectral Doppler    FINDINGS:    ARTERIAL BLOOD FLOW VELOCITY    RIGHT PS    Prox CCA    67 cm/s  Mid CCA     65 cm/s  Dist CCA    62 cm/s  Prox ICA    59 cm/s  Mid ICA     106 cm/s  Dist ICA    94 cm/s  Prox ECA    72 cm/s  Prox VERT   61 cm/s    ICA/CCA     1.64    LEFT PS    Prox CCA 120 cm/s  Mid CCA     95 cm/s  Dist CCA    92 cm/s  Prox ICA    87 cm/s  Mid ICA     119 cm/s  Dist ICA    146 cm/s  Prox ECA    71 cm/s  Prox VERT   76 cm/s    ICA/CCA     1.62    Impression  MILD SCATTERED ATHEROSCLEROSIS BOTH CAROTID TREES. NO FLOW OBSTRUCTION. BILATERAL ICAS LESS THAN 50% STENOSIS. BILATERAL ANTEGRADE VERTEBRAL FLOW. Echo No results found for this or any previous visit. Assessment/Plan:  5/24/22  Dizziness which may be suggestive of vestibular dysfunction. We will start her on Antivert 25 mg 3 times daily and arrange for orthostatic blood pressure recordings. MRI of the brain was done at [facility] yesterday which were reviewed and does not show an acute stroke. Recommend ct angiogram to make sure there is no basilar artery stenosis. Patient may benefit from vestibular rehab once work-up is complete. 5/25/22  Patient continues to have dizziness with movement  Started on Antivert yesterday 25mg TID  Orthostatic blood pressure readings negative for orthostatic hypotension   MRI of brain negative (performed yesterday at [facility])  Ct angiogram shows no significant carotid stenosis. Carotid Ultrasound: bilateral internal carotid less than 50% stenosis    LP pending for fevers ordered by medical team   Patient's symptoms consistent with vestibular dysfunction. As noted above would benefit from vestibular rehab. Can discharge from neurology standpoint. Follow up in 4-6 weeks. I have personally performed a face to face diagnostic evaluation on this patient, reviewed all data and investigations, and am the sole provider of all clinical decisions on the neurological status of this patient. Transient confusion,  seen by ID, ordered LP, which is negative, MRI at [facility] negative, dizziness better with antivert        5/26/22  Patient reports to be having episodes of dizziness where her head is spinning. She is seen sitting in chair awake alert and oriented.   She denies any nausea or vomiting. She reports lower extremity weakness which is not new for her. Denies any numbness tingling. Patient continues Antivert. LP performed and is negative. RI at [facility] negative. MRI of lumbar spine for evaluation of cervical stenosis pending. I have personally performed a face to face diagnostic evaluation on this patient, reviewed all data and investigations, and am the sole provider of all clinical decisions on the neurological status of this patient. No further dizzy spells are noted though patient is nonambulatory and I recommended we obtain MRI of the lumbar spine as she is not walking on her own. Patient continue on Antivert for now lumbar puncture was    5/27/22  Further dizzy spells are noted though the patient is not ambulatory  MRI of bar spine obtained secondary to patient not ambulating on her own  MRI shows extensive degenerative changes of the lumbar spine visualized most prominent L4-L5 and L5-S1. No significant narrowing of the spinal canal seen at L1-L2, L2-L3, L3-L4, L5-S1. There is moderate to severe narrowing of the spinal canal with severe narrowing of the right neuroforamina and moderate to severe narrowing of the left neuroforamina at the L4-L5 levels. We will consult neurosurgery for evaluation and treatment recommendations. Continue Antivert for dizziness. May be a good candidate for vestibular rehabilitation. 5/28  Patient dizziness has improved but she was not able to ambulate much so we are not quite sure if she starts to ambulate and get dizzy. We will further obtain an MRI of the lumbar spine which shows severe stenosis and consulted neurosurgery. We recommended rehabilitation for now and surgical intervention when she is somewhat better patient may be transferred to rehabilitation    Kashif Ricketts MD, Ej Garland, American Board of Psychiatry & Neurology  Board Certified in Vascular Neurology  Board Certified in Neuromuscular Medicine  Certified in Neurorehabilitation           Collaborating physicians: Dr Gaviota Zapata    Electronically signed by Blanka Suarez MD on 5/28/2022 at 11:38 AM

## 2022-05-28 NOTE — PROGRESS NOTES
Physical Therapy Med Surg Daily Treatment Note  Facility/Department: [facility]  [facility]       NAME: Yahaira Merida  : 1945 (64 y.o.)  MRN: [id number]  CODE STATUS: Full Code    Date of Service: 2022    Patient Diagnosis(es): Elevated troponin [R77.8]  Fever and chills [R50.9]   Chief Complaint   Patient presents with    Dizziness     Patient Active Problem List    Diagnosis Date Noted    Atelectasis, left     Impaired mobility and activities of daily living-metabolic encephalopathy with primary origins of infectious etiology 2022    Lumbar spondylosis 2022    Lumbar stenosis with neurogenic claudication 2022    Dizziness     Benign paroxysmal positional vertigo due to bilateral vestibular disorder     Maxillary pain     Nonintractable headache     Fall at home     Fever and chills     Elevated troponin 2022    Hyperlipidemia 10/16/2015    Type 2 diabetes mellitus with circulatory disorder ([address] 10/16/2015    Vertebral compression fracture ([address] 10/16/2015    Multiple myeloma ([address] 2015    Obese 2015    Ataxia 2015        Past Medical History:   Diagnosis Date    Cancer ([address]     Encounter for lumbar puncture 2022    Completed by Dr. Nasreen Elizalde - diagnostics sent    Hyperlipidemia     Hypertension     Type II or unspecified type diabetes mellitus without mention of complication, not stated as uncontrolled      Past Surgical History:   Procedure Laterality Date    CORONARY ANGIOPLASTY WITH STENT PLACEMENT      UPPER GASTROINTESTINAL ENDOSCOPY  8/12/15    w/bx,dilation        Chart Reviewed: Yes  Family / Caregiver Present: No    Restrictions:  Restrictions/Precautions: Fall Risk    SUBJECTIVE:   Subjective: \"I am doing okay. \"      OBJECTIVE:        Bed mobility  Supine to Sit: Stand by assistance  Sit to Supine: Stand by assistance  Bed Mobility Comments: Educated pt on log roll technique for return to supine, with G follow through of instruction, increased time and effort    Transfers  Sit to Stand: Supervision  Stand to sit: Supervision  Comment: Slow to complete. Safe technique employed. Ambulation  Surface: level tile  Device: Rolling Walker  Assistance: Stand by assistance  Quality of Gait: Slow pacing. No LOB however. Distance: 36'            Activity Tolerance  Activity Tolerance: Patient tolerated treatment well          ASSESSMENT   Assessment: pt able to increase ambulation distance, very slow pacing. Discharge Recommendations:  Continue to assess pending progress,Patient would benefit from continued therapy after discharge         Goals  Long Term Goals  Long term goal 1: Pt to complete bed mobility with indep  Long term goal 2: Pt to complete transfers with indep  Long term goal 3: Pt to ambulate 50-150ft with LRD and indep  Long term goal 4: Pt to tolerate 15min therapeutic exercise/activities without rest break  Long term goal 5: Pt to manage 12 steps with HR and Kenny    PLAN    Plan: 1 time a day 3-6 times a week  Safety Devices  Type of Devices: All fall risk precautions in place,Bed alarm in place,Call light within reach,Left in bed     AMPAC (6 CLICK) BASIC MOBILITY  AM-PAC Inpatient Mobility Raw Score : 17      Therapy Time   Individual   Time In 1513   Time Out 1530   Minutes 17      BM/Trsf: 4  Gait: [facility], PTA, 05/28/22 at 3:52 PM         Definitions for assistance levels  Independent = pt does not require any physical supervision or assistance from another person for activity completion. Device may be needed.   Stand by assistance = pt requires verbal cues or instructions from another person, close to but not touching, to perform the activity  Minimal assistance= pt performs 75% or more of the activity; assistance is required to complete the activity  Moderate assistance= pt performs 50% of the activity; assistance is required to complete the activity  Maximal assistance = pt performs 25% of the activity; assistance is required to complete the activity  Dependent = pt requires total physical assistance to accomplish the task

## 2022-05-28 NOTE — PROGRESS NOTES
Subjective: The patient complains of severe acute on chronic progressive fatigue and generalized weakness partially relieved by rest, PT, OT and meds and IV fluids and exacerbated by exertion and recent illness-fever chills pneumonia. Patient is also suffering from acute encephalopathy -seems to be clearing. I am concerned about patients medical complexities including:  Principal Problem:    Elevated troponin  Active Problems:    Dizziness    Benign paroxysmal positional vertigo due to bilateral vestibular disorder    Maxillary pain    Nonintractable headache    Fall at home    Fever and chills    Impaired mobility and activities of daily living-metabolic encephalopathy with primary origins of infectious etiology    Lumbar spondylosis    Lumbar stenosis with neurogenic claudication  Resolved Problems:    * No resolved hospital problems. *      .    Reviewed recent nursing note and discussed current status and planned care with acute care providers, \" Pt A&O x4 today, 1 assist with the walker to bedside commode. Patient complains of mild dizziness when ambulating. Pt states mucinex has helped her cough. Patient's legs continue to be very weak with ambulation. Call light within reach. Denies any needs at this time. .\".    ROS x10: The patient also complains of severely impaired mobility and activities of daily living. Otherwise no new problems with vision, hearing, nose, mouth, throat, dermal, cardiovascular, GI, , pulmonary, musculoskeletal, psychiatric or neurological.        Vital signs:  /62   Pulse 86   Temp 98.1 °F (36.7 °C) (Oral)   Resp 18   Ht 5' (1.524 m)   Wt 186 lb 1.6 oz (84.4 kg)   SpO2 94%   BMI 36.35 kg/m²   I/O:   PO/Intake:    fair PO intake, monitoring for dysphagia    Bowel/Bladder:   continent, diarrhea  General:  Patient is well developed, adequately nourished, and    well kempt. HEENT:    PERRLA, hearing intact to loud voice, external inspection of ear and nose benign. Inspection of lips, tongue and gums benign  Musculoskeletal: No significant change in strength or tone. All joints stable. Inspection and palpation of digits and nails show no clubbing, cyanosis or inflammatory conditions. Neuro/Psychiatric: Affect: flat-  Alert and oriented to self and situation with  mod cues. No significant change in deep tendon reflexes or sensation  Lungs:  Diminished, CTA-B  . Respiration effort is normal at rest.   Heart:   S1 = S2,   RRR. Abdomen:  Soft, non-tender    Extremities:  Trace  lower extremity edema but no unusual tenderness. Skin:   BUE bruises dt blood draws      Rehabilitation:  Physical Therapy:   Bed mobility:  Bed mobility  Rolling to Left: Minimal assistance (05/24/22 1537)  Rolling to Right: Stand by assistance (05/26/22 1047)  Supine to Sit: Stand by assistance (05/26/22 1047)  Sit to Supine: Moderate assistance (05/24/22 1537)  Bed Mobility Comments: increased time and effort to complete, however pt able to complete on her own without assist. HOB slightly elevated. (05/26/22 1047)  Transfers:  Transfers  Sit to Stand: Supervision;Stand by assistance (05/26/22 1047)  Stand to sit: Supervision;Stand by assistance (05/26/22 1047)  Bed to Chair: Supervision;Stand by assistance (05/26/22 1047)  Comment: Multiple transfers completed. Slow to complete. Safe technique employed. (05/26/22 1047)  Gait:   Ambulation  Surface: level tile (05/26/22 1048)  Device: Rolling Walker (05/26/22 1048)  Assistance: Stand by assistance (05/26/22 1048)  Quality of Gait: Slow pacing. Pt describes dizziness and requires multiple standing rest breaks.  No LOB however. (05/26/22 1048)  Distance: 12ft X 2 (05/26/22 1048)  Comments: Deferred - safety concerns (05/24/22 1538)  Stairs:     W/C mobility:       Occupational Therapy:   Hand Dominance: Right  ADL  Feeding: Setup (05/26/22 1534)  Grooming: Setup (05/26/22 1534)  UE Bathing: Setup (05/26/22 1534)  LE Bathing: Minimal assistance (05/26/22 1534)  UE Dressing: None (05/26/22 1534)  UE Dressing Skilled Clinical Factors: Anticipate min A for bra fastener (05/26/22 1534)  LE Dressing: Moderate assistance (05/26/22 1534)  Toileting: Contact guard assistance (05/26/22 1534)  Additional Comments: Pt states she washed up already today, however amenable to partial sponge bath. Washed face, torso and down legs. Toileted with brief CGA during pants management. Pt also performed hygiene with SBA. Unable to reach feet to doff/don socks but was able to doff/don underwear. (05/26/22 1534)  Toilet Transfers  Toilet - Technique: Ambulating (05/26/22 1538)  Equipment Used: Grab bars (05/26/22 1538)  Toilet Transfer: Stand by assistance (05/26/22 1538)  Toilet Transfers Comments: Increased effort, BUE pull from grab bar to stand (05/26/22 1538)          Speech Therapy:      Comprehension: Within Functional Limits  Verbal Expression: Within functional limits  Diet/Swallow:        Dysphagia Outcome Severity Scale: Level 7: Normal in all situations  Compensatory Swallowing Strategies : Small bites/sips,Upright as possible for all oral intake       COGNITION  OT: Cognition Comment: Mild safety deficits  SP:           Lab/X-ray studies reviewed, analyzed and discussed with patient and staff:   Recent Results (from the past 24 hour(s))   POCT Glucose    Collection Time: 05/27/22 11:37 AM   Result Value Ref Range    POC Glucose 109 (H) 70 - 99 mg/dl    Performed on ACCU-CHEK    POCT Glucose    Collection Time: 05/27/22  4:59 PM   Result Value Ref Range    POC Glucose 88 70 - 99 mg/dl    Performed on ACCU-CHEK    POCT Glucose    Collection Time: 05/27/22 10:26 PM   Result Value Ref Range    POC Glucose 113 (H) 70 - 99 mg/dl    Performed on ACCU-CHEK    Basic Metabolic Panel    Collection Time: 05/28/22  4:52 AM   Result Value Ref Range    Sodium 136 135 - 144 mEq/L    Potassium 3.5 3.4 - 4.9 mEq/L    Chloride 103 95 - 107 mEq/L    CO2 21 20 - 31 mEq/L Anion Gap 12 9 - 15 mEq/L    Glucose 118 (H) 70 - 99 mg/dL    BUN 13 8 - 23 mg/dL    CREATININE 1.06 (H) 0.50 - 0.90 mg/dL    GFR Non-African American 50.0 (L) >60    GFR  >60.0 >60    Calcium 8.7 8.5 - 9.9 mg/dL     Previous extensive, complex labs, notes and diagnostics reviewed and analyzed. ALLERGIES:    Allergies as of 05/23/2022 - Fully Reviewed 05/23/2022   Allergen Reaction Noted    Eggs or egg-derived products Nausea Only 10/10/2015    Glucophage [metformin]  04/13/2022    Valium [diazepam]  06/30/2015    Zithromax [azithromycin]  06/30/2015    Milk-related compounds Diarrhea and Nausea Only 10/10/2015      (please also verify by checking [facility])     Complex Physical Medicine & Rehab Issues Assess & Plan:   1. Severe abnormality of gait and mobility and impaired self-care and ADL's secondary to  metabolic encephalopathy with primary origins of infectious etiology . Updated functional and medical status reassessed regarding patients ability to participate in therapies and patient found to be able to participate in  acute intensive comprehensive inpatient rehabilitation program including PT/OT to improve balance, ambulation, ADLs, and to improve the P/AROM. It is my opinion that they will be able to tolerate 3 hours of therapy a day and benefit from it at an acute level. As always we will attempt to get patient to the most efficient but most effective level of care will be in their best interest.  Continue to focus on energy conservation heart rate and blood pressure monitoring before during and after therapy endurance and consistency of function. Will continue to follow to attempt to dc to the lowest most effective and efficient level of rehab care. 2. Bowel constipation now with diarrhea and Bladder dysfunction   overactive, neurogenic bladder:  frequent toileting, ambulate to bathroom with assistance, check post void residuals.   Check for C.difficile x1 if >2 loose stools in 24 hours, continue bowel & bladder program.  Monitor for UTI symptoms including lethargy and confusion  3. Severe LB and generalized OA pain: reassess pain every shift and prior to and after each therapy session, give prn Tylenol   and consider scheduled Tylenol, modalities prn in therapy, consider Lidoderm, K-pad prn.   4. Skin breakdown   risk:  continue pressure relief program.  Daily skin exams and reports from nursing. 5. Severe fatigue due to immobility and nutritional deficits: continue to monitor closely for dehydration   Add vitamin B12 vitamin D and CoQ10 titrate dosing and add protein supplementation with low carb content. 6. Complex discharge planning:   Discussed with care team-last 24 hour events noted. I will continue to follow along and reassess functional and medical status as we strive to improve patient's functional and medical outcomes progressing to the most efficient and lowest level of care. Complex Active General Medical Issues that complicate care Assess & Plan:     1. Principal Problem:    Elevated troponin  Active Problems:    Dizziness    Benign paroxysmal positional vertigo due to bilateral vestibular disorder    Maxillary pain    Nonintractable headache    Fall at home    Fever and chills    Impaired mobility and activities of daily living-metabolic encephalopathy with primary origins of infectious etiology    Lumbar spondylosis    Lumbar stenosis with neurogenic claudication  Resolved Problems:    * No resolved hospital problems. *          Events and functional changes in the past 24 hours reviewed improvements in functional status are encouraging     I again discussed acute rehab with the patient and verify that the patient is able and willing to participate in 3 hours of therapy a day. Rehab and Acute Care Case Management has also reinforced this expectation.   She has been denied by her insurance however I will follow along regarding her myelopathy possible surgery and repeat recertify postop if required. Focus of today's plan-await word from her insurance company regarding precertification for rehab.     Conrad Choudhury D.O., PM&R     Attending    [address]

## 2022-05-28 NOTE — PROGRESS NOTES
Fatuma Pham  1945  female  Medical Record Number: [id number]    Patient with dizziness and pain under the eyes across her face in the area of the bilateral maxillary sinus/ ethmoid region  + hx of fever and chills. + hx altered mentation, now better. Fevers are down  BC and UC negative    Physical Exam:  Resting  All CNs intact. States that that she has had chronic sinus issues for years. Skin: no new rashes or erythema or induration  HEENT: EOMI, MMM, Neck is supple  Heart: S1 S2  Lungs: bilaterally equal expansion  Abdomen: soft, ND, NTTP, +BS  Extrem: no asymmetry of the upper or lower extremities  Neuro exam: CN II-XII intact      Past Medical History:   Diagnosis Date    Cancer [facility])     Encounter for lumbar puncture 05/25/2022    Completed by Dr. Elzbieta Philip - diagnostics sent    Hyperlipidemia     Hypertension     Type II or unspecified type diabetes mellitus without mention of complication, not stated as uncontrolled        Past Surgical History:   Procedure Laterality Date    CORONARY ANGIOPLASTY WITH STENT PLACEMENT      UPPER GASTROINTESTINAL ENDOSCOPY  8/12/15    w/bx,dilation        Social History     Socioeconomic History    Marital status:      Spouse name: Not on file    Number of children: Not on file    Years of education: Not on file    Highest education level: Not on file   Occupational History    Not on file   Tobacco Use    Smoking status: Former Smoker     Years: 24.00    Smokeless tobacco: Never Used   Substance and Sexual Activity    Alcohol use:  Yes     Alcohol/week: 3.0 standard drinks     Types: 3 Glasses of wine per week    Drug use: No    Sexual activity: Not on file   Other Topics Concern    Not on file   Social History Narrative    Lives With: Spouse,  Son (pt never home alone)    Type of Home: House in [address] [place] [address],[address] in basement,Two level (doesn't have to use basement)    Home Access: Stairs to enter with rails- Number of Steps: 6- Rails: Right    Bathroom Shower/Tub: Walk-in shower    Bathroom Equipment: Shower chair,Grab bars in shower    Home Equipment: [address]:  ( assists with bathing/dressing; pt indep with toileting activities)    Homemaking Assistance:  (spouse and son complete)    Homemaking Responsibilities: No    Ambulation Assistance: Independent (cane/rollator PRN)    Transfer Assistance: Independent    Additional Comments: Pt inconsistent historian. Pt's spouse arriving mid assessment and confirms home set up and PLOF         Social Determinants of Health     Financial Resource Strain: Low Risk     Difficulty of Paying Living Expenses: Not hard at all   Food Insecurity: No Food Insecurity    Worried About Running Out of Food in the Last Year: Never true    [address] in the Last Year: Never true   Transportation Needs:     Lack of Transportation (Medical): Not on file    Lack of Transportation (Non-Medical):  Not on file   Physical Activity:     Days of Exercise per Week: Not on file    Minutes of Exercise per Session: Not on file   Stress:     Feeling of Stress : Not on file   Social Connections:     Frequency of Communication with Friends and Family: Not on file    Frequency of Social Gatherings with Friends and Family: Not on file    Attends [employer] Services: Not on file    Active Member of [address] [place] [address] or Organizations: Not on file    Attends Club or Organization Meetings: Not on file    Marital Status: Not on file   Intimate Partner Violence:     Fear of Current or Ex-Partner: Not on file    Emotionally Abused: Not on file    Physically Abused: Not on file    Sexually Abused: Not on file   Housing Stability:     Unable to Pay for Housing in the Last Year: Not on file    Number of [place] in the Last Year: Not on file    Unstable Housing in the Last Year: Not on file       Family History   Problem Relation Age of Onset    Cancer Mother    Brice Arthritis Mother     Diabetes Mother     Heart Disease Mother     High Blood Pressure Mother     High Cholesterol Mother     Arthritis Father     Diabetes Father     Heart Disease Father     High Blood Pressure Father     High Cholesterol Father        No current facility-administered medications on file prior to encounter. Current Outpatient Medications on File Prior to Encounter   Medication Sig Dispense Refill    atorvastatin (LIPITOR) 80 MG tablet Take 1 tablet by mouth daily 90 tablet 0    gabapentin (NEURONTIN) 300 MG capsule Take 2 capsules by mouth at bedtime for 90 days. 180 capsule 0    furosemide (LASIX) 20 MG tablet Take 1 tablet by mouth daily 90 tablet 0    metoprolol tartrate (LOPRESSOR) 50 MG tablet Take 1 tablet by mouth 2 times daily (Patient taking differently: Take 50 mg by mouth daily ) 180 tablet 0    pantoprazole (PROTONIX) 40 MG tablet Take 1 tablet by mouth daily 90 tablet 0    ezetimibe (ZETIA) 10 MG tablet Take 1 tablet by mouth daily 90 tablet 0    blood glucose monitor kit and supplies Test 1 time daily 1 kit 0    blood glucose monitor strips Test 1 time daily 50 strip 2    Lancets MISC 1 each by Does not apply route daily 50 each 5    aspirin 81 MG tablet Take 81 mg by mouth 2 times daily       isosorbide mononitrate (IMDUR) 30 MG CR tablet Take 30 mg by mouth daily (Patient not taking: Reported on 5/23/2022)      lisinopril (PRINIVIL;ZESTRIL) 20 MG tablet Take 20 mg by mouth daily  (Patient not taking: Reported on 5/23/2022)         Labs: I have reviewed all lab results by electronic record, including most recent CBC, metabolic panel, and pertinent abnormalities were addressed from an infectious disease perspective. WBC trends are being monitored.     Lab Results   Component Value Date     05/28/2022    K 3.5 05/28/2022    K 3.6 05/26/2022     05/28/2022    CO2 21 05/28/2022    BUN 13 05/28/2022    CREATININE 1.06 05/28/2022    GLUCOSE 118 05/28/2022    CALCIUM 8.7 05/28/2022      Lab Results   Component Value Date    WBC 10.9 (H) 05/26/2022    HGB 13.4 05/26/2022    HCT 40.6 05/26/2022    MCV 97.2 05/26/2022     05/26/2022       Radiology:   I have reviewed imaging results per electronic record and most pertinent abnormalities are being addressed from an infectious disease standpoint. CT head showed well aerated sinuses and mastoid. Assessment:  Acute metabolic encephalopathy - better  Fevers - trending down  Falls at home  Dizziness  Headache/ facial pain with fever and chills - no more fevers or chills. No evidence of sinusitis on imaging. Spinal stenosis by MRI    Plan:  She has poor dentition. She will need to follow up with oral surgeon    Stopped antibiotics   Observe off of abx.         Dyan Burns D.O.

## 2022-05-29 ENCOUNTER — APPOINTMENT (OUTPATIENT)
Dept: MRI IMAGING | Age: 77
DRG: 070 | End: 2022-05-29
Payer: MEDICARE

## 2022-05-29 ENCOUNTER — APPOINTMENT (OUTPATIENT)
Dept: GENERAL RADIOLOGY | Age: 77
DRG: 070 | End: 2022-05-29
Payer: MEDICARE

## 2022-05-29 LAB
CULTURE, BLOOD 2: NORMAL
GLUCOSE BLD-MCNC: 102 MG/DL (ref 70–99)
GLUCOSE BLD-MCNC: 123 MG/DL (ref 70–99)
GLUCOSE BLD-MCNC: 96 MG/DL (ref 70–99)
GLUCOSE BLD-MCNC: 98 MG/DL (ref 70–99)
L. PNEUMOPHILA SEROGP 1 UR AG: NEGATIVE
PERFORMED ON: ABNORMAL
PERFORMED ON: ABNORMAL
PERFORMED ON: NORMAL
PERFORMED ON: NORMAL

## 2022-05-29 PROCEDURE — 6370000000 HC RX 637 (ALT 250 FOR IP): Performed by: PHYSICIAN ASSISTANT

## 2022-05-29 PROCEDURE — 6360000002 HC RX W HCPCS: Performed by: INTERNAL MEDICINE

## 2022-05-29 PROCEDURE — 99232 SBSQ HOSP IP/OBS MODERATE 35: CPT | Performed by: INTERNAL MEDICINE

## 2022-05-29 PROCEDURE — 99232 SBSQ HOSP IP/OBS MODERATE 35: CPT | Performed by: PHYSICAL MEDICINE & REHABILITATION

## 2022-05-29 PROCEDURE — 70551 MRI BRAIN STEM W/O DYE: CPT

## 2022-05-29 PROCEDURE — 6370000000 HC RX 637 (ALT 250 FOR IP): Performed by: INTERNAL MEDICINE

## 2022-05-29 PROCEDURE — 99233 SBSQ HOSP IP/OBS HIGH 50: CPT | Performed by: PSYCHIATRY & NEUROLOGY

## 2022-05-29 PROCEDURE — 71046 X-RAY EXAM CHEST 2 VIEWS: CPT

## 2022-05-29 PROCEDURE — 2580000003 HC RX 258: Performed by: PSYCHIATRY & NEUROLOGY

## 2022-05-29 PROCEDURE — 2580000003 HC RX 258: Performed by: INTERNAL MEDICINE

## 2022-05-29 PROCEDURE — 6370000000 HC RX 637 (ALT 250 FOR IP): Performed by: PSYCHIATRY & NEUROLOGY

## 2022-05-29 PROCEDURE — 1210000000 HC MED SURG R&B

## 2022-05-29 PROCEDURE — 2580000003 HC RX 258: Performed by: PHYSICIAN ASSISTANT

## 2022-05-29 RX ORDER — LIDOCAINE 4 G/G
3 PATCH TOPICAL DAILY
Status: DISCONTINUED | OUTPATIENT
Start: 2022-05-29 | End: 2022-06-03 | Stop reason: HOSPADM

## 2022-05-29 RX ORDER — LORAZEPAM 2 MG/ML
1 INJECTION INTRAMUSCULAR
Status: ACTIVE | OUTPATIENT
Start: 2022-05-29 | End: 2022-05-29

## 2022-05-29 RX ADMIN — Medication 10 ML: at 21:11

## 2022-05-29 RX ADMIN — PANTOPRAZOLE SODIUM 40 MG: 40 TABLET, DELAYED RELEASE ORAL at 07:17

## 2022-05-29 RX ADMIN — ATORVASTATIN CALCIUM 80 MG: 80 TABLET, FILM COATED ORAL at 20:57

## 2022-05-29 RX ADMIN — GABAPENTIN 600 MG: 300 CAPSULE ORAL at 20:57

## 2022-05-29 RX ADMIN — AMLODIPINE BESYLATE 10 MG: 10 TABLET ORAL at 08:58

## 2022-05-29 RX ADMIN — MECLIZINE HYDROCHLORIDE 25 MG: 25 TABLET ORAL at 08:58

## 2022-05-29 RX ADMIN — GUAIFENESIN 600 MG: 600 TABLET ORAL at 08:58

## 2022-05-29 RX ADMIN — ASPIRIN 81 MG: 81 TABLET, COATED ORAL at 08:58

## 2022-05-29 RX ADMIN — Medication 800 MG: at 08:58

## 2022-05-29 RX ADMIN — MECLIZINE HYDROCHLORIDE 25 MG: 25 TABLET ORAL at 14:34

## 2022-05-29 RX ADMIN — ENOXAPARIN SODIUM 40 MG: 100 INJECTION SUBCUTANEOUS at 08:58

## 2022-05-29 RX ADMIN — SODIUM CHLORIDE: 9 INJECTION, SOLUTION INTRAVENOUS at 14:32

## 2022-05-29 RX ADMIN — ACETAMINOPHEN 650 MG: 325 TABLET ORAL at 08:58

## 2022-05-29 RX ADMIN — GUAIFENESIN 600 MG: 600 TABLET ORAL at 20:56

## 2022-05-29 RX ADMIN — ACETAMINOPHEN 650 MG: 325 TABLET ORAL at 19:44

## 2022-05-29 RX ADMIN — MECLIZINE HYDROCHLORIDE 25 MG: 25 TABLET ORAL at 20:57

## 2022-05-29 RX ADMIN — ASPIRIN 81 MG: 81 TABLET, COATED ORAL at 20:56

## 2022-05-29 RX ADMIN — SALINE NASAL SPRAY 2 SPRAY: 1.5 SOLUTION NASAL at 20:14

## 2022-05-29 ASSESSMENT — ENCOUNTER SYMPTOMS
COUGH: 0
COLOR CHANGE: 0
CONSTIPATION: 0
ABDOMINAL PAIN: 0
NAUSEA: 0
VOICE CHANGE: 0
DIARRHEA: 0
BACK PAIN: 1
TROUBLE SWALLOWING: 0
VOMITING: 0
SORE THROAT: 0
SHORTNESS OF BREATH: 0

## 2022-05-29 ASSESSMENT — PAIN SCALES - GENERAL: PAINLEVEL_OUTOF10: 7

## 2022-05-29 ASSESSMENT — PAIN DESCRIPTION - LOCATION: LOCATION: BACK

## 2022-05-29 NOTE — FLOWSHEET NOTE
Pt has been very good throught the day. Has used her call light to use the bathroom Medicated two times for pain with tylenol. Electronically signed by Ernesto Grijalva LPN on 0/30/8976 at 6:55 PM

## 2022-05-29 NOTE — PROGRESS NOTES
INPATIENT PROGRESS NOTES    PATIENT NAME: Arcenio Ellington  MRN: [id number]  SERVICE DATE:  May 29, 2022   SERVICE TIME:  12:05 PM      PRIMARY SERVICE: Pulmonary Disease    CHIEF COMPLAIN: Fall      INTERVAL HPI: Patient seen and examined at bedside, Interval Notes, orders reviewed. Nursing notes noted  She denies having any shortness of breath. She has cough with green mucus. She has postnasal drip sinus congestion. Complains of dizziness. No fever or chills. She is on room air 98% she is on Mucinex 600 mg daily. Chest x-ray shows left lower lobe atelectasis versus infiltration. OBJECTIVE    Body mass index is 36.35 kg/m². PHYSICAL EXAM:  Vitals:  /62   Pulse 83   Temp 97 °F (36.1 °C) (Oral)   Resp 18   Ht 5' (1.524 m)   Wt 186 lb 1.6 oz (84.4 kg)   SpO2 98%   BMI 36.35 kg/m²   General: Alert, awake . comfortable in bed, No distress. Head: Atraumatic , Normocephalic   Eyes: PERRL. No sclera icterus. No conjunctival injection. No discharge   ENT: No nasal  discharge. Pharynx clear. Neck:  Trachea midline. No thyromegaly, no JVD, No cervical adenopathy. Chest : Bilaterally symmetrical ,Normal effort,  No accessory muscle use  Lung : . Fair BS bilateral, decreased BS at bases. No Rales. No wheezing. No rhonchi. Heart[de-identified] Normal  rate. Regular rhythm. No mumur ,  Rub or gallop  ABD: Non-tender. Non-distended. No masses. No organmegaly. Normal bowel sounds. No hernia. Ext : No Pitting both leg , No Cyanosis No clubbing  Neuro: no focal weakness          DATA:   Recent Labs     05/26/22  1339   WBC 10.9*   HGB 13.4   HCT 40.6   MCV 97.2        Recent Labs     05/26/22  1339 05/26/22  1339 05/28/22  0452     --  136   K 3.6  --  3.5     --  103   CO2 20  --  21   BUN 13  --  13   CREATININE 1.18*  --  1.06*   GLUCOSE 168*  --  118*   CALCIUM 9.5  --  8.7   LABGLOM 44.2*   < > 50.0*   GFRAA 53.5*   < > >60.0    < > = values in this interval not displayed.        MV Settings: No results for input(s): PHART, WKO9AQS, PO2ART, AKD4MAW, BEART, R9NDBLJF in the last 72 hours. O2 Device: Nasal cannula    ADULT DIET; Regular; Low Fat/Low Chol/High Fiber/2 gm Na;  No Caffeine     MEDICATIONS during current hospitalization:    Continuous Infusions:   sodium chloride 75 mL/hr at 05/27/22 1318    sodium chloride      sodium chloride      dextrose      sodium chloride         Scheduled Meds:   sodium chloride  2 spray Each Nostril TID    enoxaparin  40 mg SubCUTAneous Daily    guaiFENesin  600 mg Oral BID    sodium chloride flush  5-40 mL IntraVENous 2 times per day    sodium chloride flush  5-40 mL IntraVENous 2 times per day    [Held by provider] lisinopril  20 mg Oral Daily    pantoprazole  40 mg Oral QAM AC    magnesium oxide  800 mg Oral Daily    meclizine  25 mg Oral TID    amLODIPine  10 mg Oral Daily    sodium chloride flush  5-40 mL IntraVENous 2 times per day    atorvastatin  80 mg Oral Nightly    aspirin  81 mg Oral BID    gabapentin  600 mg Oral Nightly    insulin lispro  0-12 Units SubCUTAneous 4x Daily WC    insulin lispro  0-6 Units SubCUTAneous Nightly       PRN Meds:LORazepam, sodium chloride flush, sodium chloride, sodium chloride flush, sodium chloride, melatonin, guaiFENesin-dextromethorphan, glucose, dextrose bolus **OR** dextrose bolus, glucagon (rDNA), dextrose, sodium chloride flush, sodium chloride, ondansetron **OR** ondansetron, acetaminophen **OR** acetaminophen, polyethylene glycol, nitroGLYCERIN, hydrALAZINE    Radiology  Echocardiogram complete 2D with doppler with color    Result Date: 5/24/2022  Transthoracic Echocardiography Report (TTE)  Demographics   Patient Name    Milagros Grande Gender                Female   Patient Number  [id number]      Race                  Other                                 Ethnicity   Visit Number    [id number]     Room Number           E041   Corporate ID                  Date of Study         05/24/2022 Accession       [id number]    Referring Physician   Lanre Sparks MD  Number   Date of Birth   1945    Sonographer           Juan J Fisher   Age             64 year(s)    Interpreting          [facility])                                Physician             Cardiology                                                      [facility]  Procedure Type of Study   TTE procedure:ECHO COMPLETE 2D W/DOP W/COLOR. Procedure Date Date: 05/24/2022 Start: 08:20 AM Study Location: Portable Technical Quality: Adequate visualization Indications: Altered Mental Status. Patient Status: Routine Height: 60 inches Weight: 183 pounds BSA: 1.8 m^2 BMI: 35.74 kg/m^2 BP: 139/69 mmHg Allergies   - Other allergy:(eggs, dairy, valium, zithromax). Conclusions   Summary  Left ventricular ejection fraction is estimated at 65%. E/A flow reversal noted. Suggestive of diastolic dysfunction. Normal right ventricle systolic pressure. RVSP 22mmHg  No hemodynamic evidence of significant valve disease   Signature   ----------------------------------------------------------------  Electronically signed by Latonia Munoz(Interpreting physician)  on 05/24/2022 04:13 PM  ----------------------------------------------------------------   Findings  Left Ventricle Left ventricular ejection fraction is estimated at 65%. E/A flow reversal noted. Suggestive of diastolic dysfunction. Right Ventricle Normal right ventricle structure and function. Normal right ventricle systolic pressure. RVSP 22mmHg Left Atrium Normal size left atrium. Right Atrium Normal right atrium. Mitral Valve Structurally normal mitral valve. No evidence of mitral valve stenosis. No evidence of mitral regurgitaton. Tricuspid Valve Tricuspid valve is structurally normal. No evidence of tricuspid stenosis. No evidence of tricuspid regurgitation. Aortic Valve Mildly thickened trileaflet aortic valve with normal excursion. No evidence of aortic valve stenosis .  No evidence of aortic valve regurgitation . Pulmonic Valve The pulmonic valve was not well visualized . Pericardial Effusion No evidence of significant pericardial effusion is noted. Aorta \ Miscellaneous The aorta is within normal limits. M-Mode Measurements (cm)   LVIDd: 3.2 cm                          LVIDs: 2.01 cm  IVSd: 1.63 cm                          IVSs: 2.05 cm  LVPWd: 1.42 cm                         LVPWs: 1.31 cm  Rt. Vent.  Dimension: 2.36 cm           AO Root Dimension: 2.46 cm                                         ACS: 1.32 cm                                         LVOT: 1.8 cm  Doppler Measurements:   AV Velocity:0.02 m/s                   MV Peak E-Wave: 0.83 m/s  AV Peak Gradient: 10.58 mmHg           MV Peak A-Wave: 1.39 m/s  AV Mean Gradient: 4.87 mmHg  AV Area (Continuity):2.14 cm^2  TR Velocity:2.06 m/s                   Estimated RAP:5 mmHg  TR Gradient:16.9 mmHg                  RVSP:21.9 mmHg  Valves  Mitral Valve   Peak E-Wave: 0.83 m/s                 Peak A-Wave: 1.39 m/s                                        E/A Ratio: 0.59                                        Peak Gradient: 2.73 mmHg                                        Deceleration Time: 205.2 msec   Tissue Doppler   E' Septal Velocity: 0.06 m/s  E' Lateral Velocity: 0.09 m/s   Aortic Valve   Peak Velocity: 1.63 m/s                Mean Velocity: 0.99 m/s  Peak Gradient: 10.58 mmHg              Mean Gradient: 4.87 mmHg  Area (continuity): 2.14 cm^2  AV VTI: 25.47 cm   Cusp Separation: 1.32 cm   Tricuspid Valve   Estimated RVSP: 21.9 mmHg               Estimated RAP: 5 mmHg  TR Velocity: 2.06 m/s                   TR Gradient: 16.9 mmHg   Pulmonic Valve   Peak Velocity: 0.97 m/s            Peak Gradient: 3.78 mmHg                                     Estimated PASP: 21.9 mmHg   LVOT   Peak Velocity: 1.08 m/s               Mean Velocity: 0.77 m/s  Peak Gradient: 4.65 mmHg              Mean Gradient: 2.5 mmHg  LVOT Diameter: 1.8 cm                 LVOT VTI: 21.42 cm  Structures  Left Atrium   LA Volume/Index: 33.74 ml /19 m^2             LA Area: 12.41 cm^2   Left Ventricle   Diastolic Dimension: 3.2 cm           Systolic Dimension: 7.11 cm  Septum Diastolic: 0.19 cm             Septum Systolic: 8.03 cm  PW Diastolic: 3.25 cm                 PW Systolic: 8.28 cm                                        FS: 37.2 %  LV EDV/LV EDV Index: 40.91 ml/23 m^2  LV ESV/LV ESV Index: 12.84 ml/7 m^2  EF Calculated: 68.6 %                 LV Length: 5.74 cm   LVOT Diameter: 1.8 cm   Right Atrium   RA Systolic Pressure: 5 mmHg   Right Ventricle   Diastolic Dimension: 4.58 cm                                     RV Systolic Pressure: 77.7 mmHg  Aorta/ Miscellaneous Aorta   Aortic Root: 2.46 cm  LVOT Diameter: 1.8 cm      CTA HEAD W WO CONTRAST    Result Date: 5/24/2022  EXAMINATION:  CTA NECK W WO CONTRAST, CTA HEAD W WO CONTRAST HISTORY:  Dizziness and altered mental status. TECHNIQUE:   Spiral high resolution axial images were obtained through the head, neck and superior mediastinum following bolus administration of intravenous contrast for CT angiography. The data was subsequently post-processed utilizing 3D multi-planar  reconstructions, 3D maximum intensity projections. Contrast:  iopamidol (ISOVUE-370) injection of 100 mL All CT scans at this facility use dose modulation, iterative reconstruction, and/or weight based dosing when appropriate to reduce radiation dose to as low as reasonably achievable. COMPARISON: CT head 5/24/2022. RESULT: BRAIN: Grossly unchanged from the recent CT head dictation. Please refer to the CT head dictation. NECK: Soft tissues: The soft tissue planes are maintained throughout. No evidence of a soft tissue mass in the neck or superior mediastinum. No significant lymphadenopathy. Spine:  Multilevel degenerative changes. Lung apices:   Nonspecific patchy interstitial opacities, which may be chronic, and emphysematous changes, partially imaged.  CT ARTERIOGRAM:     Extracranial Circulation: Aortic Arch: Normal branching pattern. .  There is no significant stenosis in the proximal brachiocephalic vessels. Carotid Stenosis: Right Common:  No significant stenosis. Right Internal Carotid  Plaque:  Mild plaque formation. Right Internal Carotid Stenosis by NASCET criteria:  Less than 20% Left Common:  No significant stenosis. Left Internal Carotid Plaque:  Mild plaque formation. Left Internal Carotid Stenosis by NASCET criteria:  Less than 20%. Cervical Vertebral Arteries: Small in caliber bilaterally with the right side slightly dominant. Appear patent with tortuosity. Intracranial Circulation: Anterior Circulation:  Distal ICAs appear patent with mild to moderate calcifications. Proximal ACAs and proximal MCAs appear patent. No evidence for significant stenosis or aneurysm. Vertebrobasilar Circulation:   Small caliber of the vertebral arteries as above with the left vertebral artery appearing to end in PICA, likely normal variant. Basilar artery is also small in caliber with tortuosity with short segment of possible narrowing near terminus versus tortuosity. Fetal origin of the bilateral PCAs with the PCAs appearing patent. Dural venous sinuses: Visualized dural venous sinuses are patent. Small caliber of the vertebrobasilar system as discussed. Possible short segment of narrowing of the distal basilar artery near terminus versus tortuosity. Dominant anterior circulation with fetal origin of the PCAs bilaterally. No evidence for significant carotid stenosis. CT HEAD WO CONTRAST    Result Date: 5/24/2022  INDICATION: 68-year-old female presenting with dizziness and altered mental status. COMPARISON: 5/23/2022 and 10/9/2015. [facility] TECHNIQUE: Multidetector CT imaging was obtained from the skull base to vertex without the administration of intravenous contrast. Coronal and sagittal reformatted images were obtained.  Automated dose exposure control was used for this exam. FINDINGS: Scattered areas of low-attenuation are visualized in the periventricular and subcortical white matter that demonstrate no significant change in comparison to the prior study consistent with chronic microvascular disease. No evidence of parenchymal hemorrhages or contusions. No evidence of intra or extra-axial fluid collection is seen. Mild prominence of ventricles and sulci are visualized demonstrating no significant increase in comparison to the prior study consistent is more chronic brain changes. No structural midline abnormalities seen. Visualized paranasal sinuses are well aerated. Visualized mastoid air cells are well aerated. Extensive lucencies visualized within the diploic space throughout the calvarium would recommend clinical correlation. No acute intracranial hemorrhage or mass effect. Chronic microvascular disease and chronic atrophic brain changes. Extensive lucencies visualized within the diploic space throughout the calvarium would recommend clinical correlation. CT Head WO Contrast    Result Date: 5/23/2022  INDICATION: 75-year-old female presenting with dizziness and unsteady gait. COMPARISON: 10/9/2015. TECHNIQUE: Multidetector CT imaging was obtained from the skull base to vertex without the administration of intravenous contrast. Coronal and sagittal reformatted images were obtained. Automated dose exposure control was used for this exam. FINDINGS: Scattered areas of low-attenuation are visualized in the periventricular and subcortical white matter consistent with chronic microvascular disease. No evidence of parenchymal hemorrhages or contusions. No evidence of intra or extra-axial fluid collection is seen. Prominence of ventricles and sulci are visualized demonstrating no significant increase in comparison to the prior study consistent is more chronic brain changes. Ventricles and sulci normal in size and configuration. The posterior fossa is unremarkable.  No structural midline abnormalities seen. Visualized paranasal sinuses are well aerated. Visualized mastoid air cells are well aerated. The calvarium is intact. No acute intracranial hemorrhage or mass effect. Chronic microvascular disease and chronic atrophic brain changes. CTA NECK W WO CONTRAST    Result Date: 5/24/2022  EXAMINATION:  CTA NECK W WO CONTRAST, CTA HEAD W WO CONTRAST HISTORY:  Dizziness and altered mental status. TECHNIQUE:   Spiral high resolution axial images were obtained through the head, neck and superior mediastinum following bolus administration of intravenous contrast for CT angiography. The data was subsequently post-processed utilizing 3D multi-planar  reconstructions, 3D maximum intensity projections. Contrast:  iopamidol (ISOVUE-370) injection of 100 mL All CT scans at this facility use dose modulation, iterative reconstruction, and/or weight based dosing when appropriate to reduce radiation dose to as low as reasonably achievable. COMPARISON: CT head 5/24/2022. RESULT: BRAIN: Grossly unchanged from the recent CT head dictation. Please refer to the CT head dictation. NECK: Soft tissues: The soft tissue planes are maintained throughout. No evidence of a soft tissue mass in the neck or superior mediastinum. No significant lymphadenopathy. Spine:  Multilevel degenerative changes. Lung apices:   Nonspecific patchy interstitial opacities, which may be chronic, and emphysematous changes, partially imaged. CT ARTERIOGRAM:     Extracranial Circulation: Aortic Arch: Normal branching pattern. .  There is no significant stenosis in the proximal brachiocephalic vessels. Carotid Stenosis: Right Common:  No significant stenosis. Right Internal Carotid  Plaque:  Mild plaque formation. Right Internal Carotid Stenosis by NASCET criteria:  Less than 20% Left Common:  No significant stenosis. Left Internal Carotid Plaque:  Mild plaque formation.  Left Internal Carotid Stenosis by NASCET criteria:  Less than 20%. Cervical Vertebral Arteries: Small in caliber bilaterally with the right side slightly dominant. Appear patent with tortuosity. Intracranial Circulation: Anterior Circulation:  Distal ICAs appear patent with mild to moderate calcifications. Proximal ACAs and proximal MCAs appear patent. No evidence for significant stenosis or aneurysm. Vertebrobasilar Circulation:   Small caliber of the vertebral arteries as above with the left vertebral artery appearing to end in PICA, likely normal variant. Basilar artery is also small in caliber with tortuosity with short segment of possible narrowing near terminus versus tortuosity. Fetal origin of the bilateral PCAs with the PCAs appearing patent. Dural venous sinuses: Visualized dural venous sinuses are patent. Small caliber of the vertebrobasilar system as discussed. Possible short segment of narrowing of the distal basilar artery near terminus versus tortuosity. Dominant anterior circulation with fetal origin of the PCAs bilaterally. No evidence for significant carotid stenosis. MRI LUMBAR SPINE WO CONTRAST    Result Date: 5/26/2022  INDICATION: 75-year-old female, lumbar stenosis. COMPARISON: None. TECHNIQUE: Multiplanar, multisequence MR imaging of the lumbar spine was performed without No administration of intravenous contrast. FINDINGS: There is exaggeration of the normal lordosis of the columns of the lumbar spine visualized, grade 1 anterolisthesis of L5 over S1. Vertebral augmentation in the L1 vertebral body. Depression of the superior endplate of the L3 and L5 vertebral bodies is seen. Multilevel degenerative endplate changes is seen. The STIR sequence demonstrates unremarkable signal intensity of the bone marrow , no evidence of T2 prolongation within the marrow to suggest acute fracture, edema or infiltrative process.  The cord terminates at the level of the L1-L2 intervertebral disc space, unremarkable signal intensity of internal cord, the terminal nerve fibers demonstrate unremarkable contour with no evidence of thickening or clumping. L1-L2 demonstrates degenerative disc changes with hypertrophic changes in the facet joints, no significant narrowing of the spinal canal is seen, with mild narrowing of bilateral neuroforamina. L2-L3 demonstrates degenerative disc changes. Atrophic changes of the facet joints and ligamentum flavum, no significant narrowing of the spinal canal is visualized, mild narrowing of the right neural foramina and mild-to-moderate narrowing of the left neural foramina is seen at this level. L3-L4 demonstrates degenerative disc with hypertrophic changes in the facet joints and ligamentum flavum, no significant narrowing of the spinal canal is visualized. Moderate to severe narrowing of the right neural foramina and moderate narrowing of the left neural foramina is seen at this level. L4-L5 demonstrates extensive degenerative changes, hypertrophic changes in the facet joints with prominent hypertrophic changes in the ligamentum flavum, moderate to severe narrowing of the spinal canal with severe narrowing of the right neural foramina and moderate to severe narrowing of the left neural foramina seen at this level. L5-S1 demonstrates degenerative changes with hypertrophic changes in the facet joints, no significant narrowing of the spinal canal is visualized at this level,  severe narrowing of the right neuroforamina and moderate to severe narrowing of the left neural foramina is seen at this level. Extensive degenerative changes of the lumbar spine visualized most prominent at L4-L5 and L5-S1. XR CHEST PORTABLE    Result Date: 5/25/2022  CHEST X-RAY. TECHNIQUE: Single AP portable view of the chest was obtained CLINICAL INDICATION: 64-year-old female presenting with fever. COMPARISON: Chest x-ray obtained on 5/23/2022. PROCEDURE AND FINDINGS: The cardiomediastinal silhouette is slightly prominent with tortuosity of the thoracic aorta. Mild prominence of the interstitial and bronchovascular markings is seen. Mild blunting of the left costophrenic angle is visualized and demonstrates prominence in comparison to the prior study cannot rule out left lower lobe infiltrate would recommend clinical correlation. Biapical prominent CSF COPD changes. No evidence of pneumothorax or parenchymal lung mass. Degenerative joint changes. Suggestion of left lower lobe infiltrate/consolidation. Recommend clinical correlation    XR CHEST PORTABLE    Result Date: 5/23/2022  CHEST X-RAY. TECHNIQUE: Single AP portable view of the chest was obtained. [place] CLINICAL INDICATION: 49-year-old female presenting with dizziness. COMPARISON: Chest x-ray obtained on 10/9/2015 PROCEDURE AND FINDINGS: Poor inspiratory effort is seen. The cardiomediastinal silhouette is slightly prominent in size, mild tortuosity of the thoracic aorta is seen. Biapical prominence suggestive of COPD changes. Mild prominence of the bronchovascular interstitial lung markings is seen but no evidence of focal lung infiltrate or consolidation is seen. The costophrenic angles are clear, no evidence of lung infiltrate, pleural effusion or parenchymal lung mass. Degenerative bone changes are visualized. COPD and chronic interstitial changes. IR LUMBAR PUNCTURE FOR DIAGNOSIS    1. Technically successful diagnostic lumbar puncture. HISTORY: Zachary Cagle is a Female of 64 years age, with  lp r/o meningitis . FLUOROSCOPY TIME:   58.7 seconds. RADIATION DOSE:         25.85 mGy. DIAGNOSIS: 49-year-old female with headaches, evaluation for meningitis. COMMENTS: R77.8 Elevated troponin ICD10 PROCEDURE: Following universal protocol, patient and site verification was performed with a \"timeout\" prior to the procedure. Following the discussion of the procedure, and this, risks versus benefits, informed consent was obtained from the patient.   The patient was placed on fluoroscopy table in prone position and the lower back area was prepped and draped in usual sterile fashion. The area between the interspinous process was marked. This was at the L3 level. Using the usual sterile conditions, 1% lidocaine (5 mL) and fluoroscopy guidance, a 20 gauge needle was inserted into the spinal canal.  After confirmation of intra-thecal location of the needle tip by CSF leakage through the needle. Approximately 14.5 cc of CSF were collected in 4 separate containers. Following that the needle was withdrawn from the back. The patient tolerated the procedure well without complications. US CAROTID ARTERY BILATERAL    Result Date: 5/25/2022  EXAMINATION:  CAROTID DUPLEX ULTRASONOGRAPHY CLINICAL HISTORY:  DIZZINESS COMPARISONS:  NONE AVAILABLE TECHNIQUE:  B-mode, color flow and spectral Doppler FINDINGS:  ARTERIAL BLOOD FLOW VELOCITY RIGHT PS                                               Prox CCA    67 cm/s             Mid CCA     65 cm/s              Dist CCA    62 cm/s              Prox ICA    59 cm/s               Mid ICA     106 cm/s            Dist ICA    94 cm/s             Prox ECA    72 cm/s             Prox VERT   61 cm/s              ICA/CCA     1.64                        LEFT PS Prox CCA    120 cm/s Mid CCA     95 cm/s Dist CCA    92 cm/s Prox ICA    87 cm/s Mid ICA     119 cm/s Dist ICA    146 cm/s Prox ECA    71 cm/s Prox VERT   76 cm/s ICA/CCA     1.62     MILD SCATTERED ATHEROSCLEROSIS BOTH CAROTID TREES. NO FLOW OBSTRUCTION. BILATERAL ICAS LESS THAN 50% STENOSIS. BILATERAL ANTEGRADE VERTEBRAL FLOW. IMPRESSION AND SUGGESTION:  1. Abnormal chest x-ray, left lower lobe atelectasis r/o  pneumonia  2. Acute encephalopathy resolved  3. History of hypertension  4. Hyperlipidemia    Patient is currently afebrile on room air. No complaint of shortness of breath or cough. The patient developed fevers and antibiotic otherwise observed likely left basilar atelectasis. Incentive spirometer. Repeat chest x-ray.   Continue Mucinex 600 mg daily. NOTE: This report was transcribed using voice recognition software. Every effort was made to ensure accuracy; however, inadvertent computerized transcription errors may be present.       Electronically signed by Rakan Sampson MD, FCCP on 5/29/2022 at 12:05 PM

## 2022-05-29 NOTE — PROGRESS NOTES
Progress Note  Date:2022       DEOY:L949/L107-08  Patient Name:Mckenna Mendoza     YOB: 1945     Age:76 y.o. Subjective    Subjective:  Symptoms:  She reports malaise and weakness. No shortness of breath, cough, chest pain, headache, chest pressure, anorexia, diarrhea or anxiety. Diet:  No nausea or vomiting. Review of Systems   Respiratory: Negative for cough and shortness of breath. Cardiovascular: Negative for chest pain. Gastrointestinal: Negative for anorexia, diarrhea, nausea and vomiting. Neurological: Positive for weakness. Objective         Vitals Last 24 Hours:  TEMPERATURE:  Temp  Av.6 °F (36.4 °C)  Min: 97 °F (36.1 °C)  Max: 98.1 °F (36.7 °C)  RESPIRATIONS RANGE: Resp  Av  Min: 18  Max: 18  PULSE OXIMETRY RANGE: SpO2  Av.5 %  Min: 97 %  Max: 98 %  PULSE RANGE: Pulse  Av.5  Min: 83  Max: 86  BLOOD PRESSURE RANGE: Systolic (31ADU), WEZ:021 , Min:119 , CKA:974   ; Diastolic (99HPM), JTW:31, Min:62, Max:62    I/O (24Hr): No intake or output data in the 24 hours ending 22 1122  Objective:  General Appearance:  Comfortable, in no acute distress and well-appearing. Vital signs: (most recent): Blood pressure 125/62, pulse 83, temperature 97 °F (36.1 °C), temperature source Oral, resp. rate 18, height 5' (1.524 m), weight 186 lb 1.6 oz (84.4 kg), SpO2 98 %. HEENT: Normal HEENT exam.    Lungs: There are decreased breath sounds. Heart: Regular rhythm. S1 normal and S2 normal.    Abdomen: Abdomen is soft. Bowel sounds are normal.     Neurological: Patient is alert and oriented to person, place and time. Pupils:  Pupils are equal, round, and reactive to light. Skin:  Warm and dry.       Labs/Imaging/Diagnostics    Labs:  CBC:  Recent Labs     22  1339   WBC 10.9*   RBC 4.17*   HGB 13.4   HCT 40.6   MCV 97.2   RDW 15.5*        CHEMISTRIES:  Recent Labs     22  1339 22  0452    136   K 3.6 3.5   CL 103 103   CO2 20 21   BUN 13 13   CREATININE 1.18* 1.06*   GLUCOSE 168* 118*     PT/INR:No results for input(s): PROTIME, INR in the last 72 hours. APTT:No results for input(s): APTT in the last 72 hours. LIVER PROFILE:No results for input(s): AST, ALT, BILIDIR, BILITOT, ALKPHOS in the last 72 hours. Imaging Last 24 Hours:  MRI LUMBAR SPINE WO CONTRAST    Result Date: 5/26/2022  INDICATION: 68-year-old female, lumbar stenosis. COMPARISON: None. TECHNIQUE: Multiplanar, multisequence MR imaging of the lumbar spine was performed without No administration of intravenous contrast. FINDINGS: There is exaggeration of the normal lordosis of the columns of the lumbar spine visualized, grade 1 anterolisthesis of L5 over S1. Vertebral augmentation in the L1 vertebral body. Depression of the superior endplate of the L3 and L5 vertebral bodies is seen. Multilevel degenerative endplate changes is seen. The STIR sequence demonstrates unremarkable signal intensity of the bone marrow , no evidence of T2 prolongation within the marrow to suggest acute fracture, edema or infiltrative process. The cord terminates at the level of the L1-L2 intervertebral disc space, unremarkable signal intensity of internal cord, the terminal nerve fibers demonstrate unremarkable contour with no evidence of thickening or clumping. L1-L2 demonstrates degenerative disc changes with hypertrophic changes in the facet joints, no significant narrowing of the spinal canal is seen, with mild narrowing of bilateral neuroforamina. L2-L3 demonstrates degenerative disc changes. Atrophic changes of the facet joints and ligamentum flavum, no significant narrowing of the spinal canal is visualized, mild narrowing of the right neural foramina and mild-to-moderate narrowing of the left neural foramina is seen at this level.  L3-L4 demonstrates degenerative disc with hypertrophic changes in the facet joints and ligamentum flavum, no significant narrowing of the spinal canal is visualized. Moderate to severe narrowing of the right neural foramina and moderate narrowing of the left neural foramina is seen at this level. L4-L5 demonstrates extensive degenerative changes, hypertrophic changes in the facet joints with prominent hypertrophic changes in the ligamentum flavum, moderate to severe narrowing of the spinal canal with severe narrowing of the right neural foramina and moderate to severe narrowing of the left neural foramina seen at this level. L5-S1 demonstrates degenerative changes with hypertrophic changes in the facet joints, no significant narrowing of the spinal canal is visualized at this level,  severe narrowing of the right neuroforamina and moderate to severe narrowing of the left neural foramina is seen at this level. Extensive degenerative changes of the lumbar spine visualized most prominent at L4-L5 and L5-S1. Assessment//Plan           Hospital Problems           Last Modified POA    * (Principal) Elevated troponin 5/23/2022 Yes    Dizziness 5/24/2022 Yes    Benign paroxysmal positional vertigo due to bilateral vestibular disorder 5/24/2022 Yes    Maxillary pain 5/24/2022 Yes    Nonintractable headache 5/24/2022 Yes    Fall at home 5/24/2022 Yes    Fever and chills 5/24/2022 Yes    Impaired mobility and activities of daily living-metabolic encephalopathy with primary origins of infectious etiology 5/27/2022 Yes    Lumbar spondylosis 5/27/2022 Yes    Lumbar stenosis with neurogenic claudication 5/27/2022 Yes    Atelectasis, left 5/28/2022 Yes      Encephalopathy  OSIRIS  Dizziness  HTN/HLD  OSIRIS  Assessment & Plan    5/27: Hold lisinopril, IV fluids for acute kidney injury, continue with PT OT. Dizziness resolved. Encephalopathy resolved. Fevers down. No overnight events no new complaints. anticipated discharge tomorrow to rehab. Bmp tomorrow.   5/28: pt is medically stable to be discharged any time to [facility] vs home, no overnight events, dizziness is less no fevers, abx as per ID  5/29: Had episode of dizziness upon standing. Orthostatic was negative. ENT consulted for chronic sinus pain and pressure. This is the first time patient is told me this. Patient stated today that she had a lot of nasal surgery and ENT evaluation in the past for sinuses and also patient complaining of ear pain also as per edna. Patient has been getting IV antibiotics since admission. Spoke with nursing about the care.   Electronically signed by Marisa Torres MD on 5/27/22 at 1:09 PM EDT

## 2022-05-29 NOTE — PROGRESS NOTES
05/29/22  From: HOME W/SPOUSE, SON. MICHELLE    Admit:DIZZINESS, BALANCE ISSUES    PMH: HTN, HLD, CA, DM    Anticipated Discharge Disposition: [facility] DEN[facility]--[facility] VS SNF--PENDING NEUROSX  PLAN> F/U AS OP. SHAMIKA Wyatt. NEED CURRENT OT NOTES PER TAMMIE DIETRICH FOR INSURANCE.      Patient Mobility or PT/OT ordered: YES  Consults: [facility]ER[facility]D SERRATO IR    Covid result &/or vacc status: 5/23 COVID NEG  TROP 0.024-0.026-0.028--0.036  5/24 RR--AMS, SLURRED SPEECH, LT ARM/HAND NUMBNESS--TX 2W    Barriers to Discharge:   R/O MENINGITIS    LP: NEG  W: 14.2--12.8--10.9  12/0.97--13/1.18    Assessments: CMI DONE

## 2022-05-29 NOTE — FLOWSHEET NOTE
Pt was c/o having a dizzy spell this morning she almost passed out. Pt said it happened early this morning and to tell the doctor when he came in [address] here to see her and we mentioned it to him also that she is coughing up green sputum in am. New orders received for ortho static b/p taken. Balaton spray ordered and ENT consulted and  and Dr. Ruth Knows are talking to each other. Pt went off the floor for Ct and is now off the floor for MRI Pt was up at the sink bathing. Voices no further c/o discomfort  visiting. Electronically signed by Xavier Quiroz LPN on 0/51/4321 at 6:33 PM

## 2022-05-29 NOTE — PROGRESS NOTES
New referral from Gely MUNSON CM. I called [employer] insurance to verify benefits. No benefit information was available via automated system, I was transferred to a representative line, but they are only open M-F 8a-8p. I also informed Gely Daley that OT notes are too old and will need update PT/OT notes to send to [facility].

## 2022-05-29 NOTE — PROGRESS NOTES
[facility] Daily Progress Note  Name: Shine Clemons  Age: 64 y.o. Gender: female  CodeStatus: Full Code  Allergies: Eggs Or Egg-Derived Products  Glucophage [Metformin]  Valium [Diazepam]  Zithromax [Azithromycin]    Chief Complaint:Dizziness    Primary Care Provider: Dudley Rodríguez MD  InpatientTreatment Team: Treatment Team: Consulting Physician: Mikael Faith MD; Consulting Physician: Willy Hall DO; Consulting Physician: Radha Jim DO; Consulting Physician: Preston Morejon MD; : Godwin Layton RN; LPN: Clyde Coles LPN; : Yamileth De La Fuente RN; Patient Care Tech: Jasmin Naidu; Respiratory Therapist (Day): Adelina Foote RCP; Utilization Reviewer: Sherryle Canterbury, RN; Consulting Physician: Denise Lopez MD  Admission Date: 5/23/2022  HPI 66-year right-handed female admitted for dizziness and issues with balance. Symptoms going on for almost a year on and off. She reports she is dizzy upon walking and reads to 1 side or the other. MRI was done yesterday at [facility] which appeared to show no acute findings. Patient has significant other cerebrovascular risk factors she denies any tinnitus or hearing loss. She denies any neck pain but does have back pain. Has not recent falls injuries or trauma. Patient appears to be dizzy upon sitting up from a lying down position and we were not able to walk her or perform a vestibular examination due to the dizziness. Patient is areflexic in the lower extremities no definite sensory levels are noted  CT Head WO Contrast    Events noted, episode of confusion  Seen by ID and ordered LP    Dizziness  Pertinent negatives include no abdominal pain, arthralgias, chest pain, coughing, fever, headaches, nausea, sore throat, vomiting or weakness. Pt seen and examined for neuro follow up.   NO Headache, double vision, blurry vision, difficulty with speech, difficulty with swallowing, weakness, numbness, pain, nausea, vomiting, choking, neck pain, reports dizziness with movement. Patient seen and examined for neuro follow-up. She is sitting in the chair with complaints of dizziness. She describes this dizziness as her head spinning. She denies any nausea or vomiting. She has not any further episodes of dizziness but has difficulty ambulating. She has chronic low back pain which has not been evaluated. As noted above    Patient has not any episodes of confusion and her dizziness is improving. Her main issues appears to be lower extremity weakness and therefore we obtained an MRI. LP does not show anything significant. 5/28  Patient not feeling as dizzy as she was and no confusional episodes have occurred. Patient had a neurosurgical evaluation and may be able to be discharged. 5/29  Patient had another spell of dizziness again upon movement but she was not orthostatic. She has greenish sputum noted. She denies any fever or headaches. Vitals:    05/29/22 0900   BP: 125/62   Pulse: 83   Resp:    Temp: 97 °F (36.1 °C)   SpO2: 98%      Review of Systems   Constitutional: Negative for appetite change and fever. HENT: Negative for drooling, ear pain, sore throat, trouble swallowing and voice change. Respiratory: Negative for cough and shortness of breath. Cardiovascular: Negative for chest pain. Gastrointestinal: Negative for abdominal pain, constipation, diarrhea, nausea and vomiting. Genitourinary: Negative for decreased urine volume and dysuria. Musculoskeletal: Positive for back pain. Negative for arthralgias. Skin: Negative for color change. Neurological: Positive for dizziness and light-headedness. Negative for weakness and headaches. Psychiatric/Behavioral: Negative for agitation and behavioral problems. All other systems reviewed and are negative. Physical Exam  Vitals and nursing note reviewed. Constitutional:       General: She is not in acute distress.      Appearance: Normal appearance. She is well-developed. HENT:      Head: Normocephalic and atraumatic. Nose: Nose normal.      Mouth/Throat:      Mouth: Mucous membranes are moist.   Eyes:      General:         Right eye: No discharge. Left eye: No discharge. Conjunctiva/sclera: Conjunctivae normal.   Neck:      Vascular: No JVD. Trachea: No tracheal deviation. Cardiovascular:      Rate and Rhythm: Normal rate. Pulmonary:      Effort: Pulmonary effort is normal.      Breath sounds: Normal breath sounds. Abdominal:      General: Bowel sounds are normal.      Palpations: Abdomen is soft. Musculoskeletal:         General: Normal range of motion. Cervical back: Normal range of motion and neck supple. No rigidity. No muscular tenderness. Lymphadenopathy:      Cervical: No cervical adenopathy. Skin:     General: Skin is warm and dry. Capillary Refill: Capillary refill takes less than 2 seconds. Neurological:      Mental Status: She is alert and oriented to person, place, and time. Psychiatric:         Mood and Affect: Mood normal.         Behavior: Behavior normal.       Pt oreinted x 3 and nonfocal and not dizzy and no behavioral issues occurred.   Patient had 1 more dizzy spell yesterday as well        Medications:  Reviewed    Infusion Medications:    sodium chloride 75 mL/hr at 05/27/22 1318    sodium chloride      sodium chloride      dextrose      sodium chloride       Scheduled Medications:    sodium chloride  2 spray Each Nostril TID    enoxaparin  40 mg SubCUTAneous Daily    guaiFENesin  600 mg Oral BID    sodium chloride flush  5-40 mL IntraVENous 2 times per day    sodium chloride flush  5-40 mL IntraVENous 2 times per day    [Held by provider] lisinopril  20 mg Oral Daily    pantoprazole  40 mg Oral QAM AC    magnesium oxide  800 mg Oral Daily    meclizine  25 mg Oral TID    amLODIPine  10 mg Oral Daily    sodium chloride flush  5-40 mL IntraVENous 2 times per day    atorvastatin  80 mg Oral Nightly    aspirin  81 mg Oral BID    gabapentin  600 mg Oral Nightly    insulin lispro  0-12 Units SubCUTAneous 4x Daily WC    insulin lispro  0-6 Units SubCUTAneous Nightly     PRN Meds: LORazepam, sodium chloride flush, sodium chloride, sodium chloride flush, sodium chloride, melatonin, guaiFENesin-dextromethorphan, glucose, dextrose bolus **OR** dextrose bolus, glucagon (rDNA), dextrose, sodium chloride flush, sodium chloride, ondansetron **OR** ondansetron, acetaminophen **OR** acetaminophen, polyethylene glycol, nitroGLYCERIN, hydrALAZINE    Labs:   Recent Labs     05/26/22  1339   WBC 10.9*   HGB 13.4   HCT 40.6        Recent Labs     05/26/22  1339 05/28/22  0452    136   K 3.6 3.5    103   CO2 20 21   BUN 13 13   CREATININE 1.18* 1.06*   CALCIUM 9.5 8.7     No results for input(s): AST, ALT, BILIDIR, BILITOT, ALKPHOS in the last 72 hours. No results for input(s): INR in the last 72 hours. No results for input(s): Donraffy Keens in the last 72 hours. Urinalysis:   Lab Results   Component Value Date    NITRU Negative 05/25/2022    WBCUA 0-2 05/25/2022    BACTERIA Negative 05/25/2022    RBCUA 0-2 05/25/2022    BLOODU Negative 05/25/2022    SPECGRAV 1.050 05/25/2022    GLUCOSEU Negative 05/25/2022       Radiology:   Most recent    EEG No valid procedures specified. MRI of Brain No results found for this or any previous visit. No results found for this or any previous visit. MRA of the Head and Neck: No results found for this or any previous visit. No results found for this or any previous visit. No results found for this or any previous visit. CT of the Head: Results for orders placed during the hospital encounter of 05/23/22    CT HEAD WO CONTRAST    Narrative  INDICATION: 64-year-old female presenting with dizziness and altered mental status.     COMPARISON: 5/23/2022 and 10/9/2015. Sanjeev Mcmahon TECHNIQUE: Multidetector CT imaging was obtained from the skull base to vertex without the administration of intravenous contrast. Coronal and sagittal reformatted images were obtained. Automated dose exposure control was used for this exam.    FINDINGS:    Scattered areas of low-attenuation are visualized in the periventricular and subcortical white matter that demonstrate no significant change in comparison to the prior study consistent with chronic microvascular disease. No evidence of parenchymal hemorrhages or contusions. No evidence of intra or extra-axial fluid collection is seen. Mild prominence of ventricles and sulci are visualized demonstrating no significant increase in comparison to the prior study consistent is more chronic brain changes. No structural midline abnormalities seen. Visualized paranasal sinuses are well aerated. Visualized mastoid air cells are well aerated. Extensive lucencies visualized within the diploic space throughout the calvarium would recommend clinical correlation. Impression  No acute intracranial hemorrhage or mass effect. Chronic microvascular disease and chronic atrophic brain changes. Extensive lucencies visualized within the diploic space throughout the calvarium would recommend clinical correlation. No results found for this or any previous visit. No results found for this or any previous visit. Carotid duplex: No results found for this or any previous visit. No results found for this or any previous visit.   Results for orders placed during the hospital encounter of 05/23/22    US CAROTID ARTERY BILATERAL    Narrative  EXAMINATION:  CAROTID DUPLEX ULTRASONOGRAPHY    CLINICAL HISTORY:  DIZZINESS    COMPARISONS:  NONE AVAILABLE    TECHNIQUE:  B-mode, color flow and spectral Doppler    FINDINGS:    ARTERIAL BLOOD FLOW VELOCITY    RIGHT PS    Prox CCA    67 cm/s  Mid CCA     65 cm/s  Dist CCA    62 cm/s  Prox ICA    59 cm/s  Mid ICA 106 cm/s  Dist ICA    94 cm/s  Prox ECA    72 cm/s  Prox VERT   61 cm/s    ICA/CCA     1.64    LEFT PS    Prox CCA    120 cm/s  Mid CCA     95 cm/s  Dist CCA    92 cm/s  Prox ICA    87 cm/s  Mid ICA     119 cm/s  Dist ICA    146 cm/s  Prox ECA    71 cm/s  Prox VERT   76 cm/s    ICA/CCA     1.62    Impression  MILD SCATTERED ATHEROSCLEROSIS BOTH CAROTID TREES. NO FLOW OBSTRUCTION. BILATERAL ICAS LESS THAN 50% STENOSIS. BILATERAL ANTEGRADE VERTEBRAL FLOW. Echo No results found for this or any previous visit. Assessment/Plan:  5/24/22  Dizziness which may be suggestive of vestibular dysfunction. We will start her on Antivert 25 mg 3 times daily and arrange for orthostatic blood pressure recordings. MRI of the brain was done at [facility] yesterday which were reviewed and does not show an acute stroke. Recommend ct angiogram to make sure there is no basilar artery stenosis. Patient may benefit from vestibular rehab once work-up is complete. 5/25/22  Patient continues to have dizziness with movement  Started on Antivert yesterday 25mg TID  Orthostatic blood pressure readings negative for orthostatic hypotension   MRI of brain negative (performed yesterday at [facility])  Ct angiogram shows no significant carotid stenosis. Carotid Ultrasound: bilateral internal carotid less than 50% stenosis    LP pending for fevers ordered by medical team   Patient's symptoms consistent with vestibular dysfunction. As noted above would benefit from vestibular rehab. Can discharge from neurology standpoint. Follow up in 4-6 weeks. I have personally performed a face to face diagnostic evaluation on this patient, reviewed all data and investigations, and am the sole provider of all clinical decisions on the neurological status of this patient. Transient confusion,  seen by ID, ordered LP, which is negative, MRI at [facility] negative, dizziness better with antivert        5/26/22  Patient reports to be having episodes of dizziness where her head is spinning. She is seen sitting in chair awake alert and oriented. She denies any nausea or vomiting. She reports lower extremity weakness which is not new for her. Denies any numbness tingling. Patient continues Antivert. LP performed and is negative. RI at [facility] negative. MRI of lumbar spine for evaluation of cervical stenosis pending. I have personally performed a face to face diagnostic evaluation on this patient, reviewed all data and investigations, and am the sole provider of all clinical decisions on the neurological status of this patient. No further dizzy spells are noted though patient is nonambulatory and I recommended we obtain MRI of the lumbar spine as she is not walking on her own. Patient continue on Antivert for now lumbar puncture was    5/27/22  Further dizzy spells are noted though the patient is not ambulatory  MRI of bar spine obtained secondary to patient not ambulating on her own  MRI shows extensive degenerative changes of the lumbar spine visualized most prominent L4-L5 and L5-S1. No significant narrowing of the spinal canal seen at L1-L2, L2-L3, L3-L4, L5-S1. There is moderate to severe narrowing of the spinal canal with severe narrowing of the right neuroforamina and moderate to severe narrowing of the left neuroforamina at the L4-L5 levels. We will consult neurosurgery for evaluation and treatment recommendations. Continue Antivert for dizziness. May be a good candidate for vestibular rehabilitation. 5/28  Patient dizziness has improved but she was not able to ambulate much so we are not quite sure if she starts to ambulate and get dizzy. We will further obtain an MRI of the lumbar spine which shows severe stenosis and consulted neurosurgery. We recommended rehabilitation for now and surgical intervention when she is somewhat better patient may be transferred to rehabilitation    5/29  Again another dizzy spell did not COVID.   She was not orthostatic. MRI results were reviewed from [facility] and there is some suggestion of mild ventriculomegaly. 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 We will repeat her MRI as her initial MRI was done too early in the course of her disease at [facility] Kashif Sher MD, Devin Trinidad, American Board of Psychiatry & Neurology  Board Certified in Vascular Neurology  Board Certified in Neuromuscular Medicine  Certified in Neurorehabilitation           Collaborating physicians: Dr Oleksandr Sher    Electronically signed by Tyson Cochran MD on 5/29/2022 at 11:37 AM

## 2022-05-29 NOTE — PROGRESS NOTES
Subjective: The patient complains of severe acute on chronic progressive fatigue and generalized weakness partially relieved by rest, PT, OT and meds and IV fluids and exacerbated by exertion and recent illness-fever chills pneumonia. Patient is also suffering from acute encephalopathy -seems to be clearing. I am concerned about patients medical complexities including:  Principal Problem:    Elevated troponin  Active Problems:    Dizziness    Benign paroxysmal positional vertigo due to bilateral vestibular disorder    Maxillary pain    Nonintractable headache    Fall at home    Fever and chills    Impaired mobility and activities of daily living-metabolic encephalopathy with primary origins of infectious etiology    Lumbar spondylosis    Lumbar stenosis with neurogenic claudication    Atelectasis, left  Resolved Problems:    * No resolved hospital problems. *      .    Reviewed recent nursing note and discussed current status and planned care with acute care providers, \" Pt A&O x4 today, 1 assist with the walker to bedside commode. Patient complains of mild dizziness when ambulating. Pt states mucinex has helped her cough. Patient's legs continue to be very weak with ambulation. Call light within reach. Denies any needs at this time. .\". Today she is complaining of sinusitis and acute on chronic low back pain. She states that she was on Ultram and Tylenol at home and also took Neurontin 600 mg. She was taken off of her Ultram and Tylenol in [place] when he was she was living there because of the [facility] requirement to wean opiate medication. She states that she is still in a lot of pain. She was willing to try to Lidoderm and we will discuss Ultram if okay with medical.    ROS x10: The patient also complains of severely impaired mobility and activities of daily living.   Otherwise no new problems with vision, hearing, nose, mouth, throat, dermal, cardiovascular, GI, , pulmonary, musculoskeletal, pacing. No LOB however. (05/28/22 1550)  Distance: 40' (05/28/22 1550)  Comments: Deferred - safety concerns (05/24/22 1538)  Stairs:     W/C mobility:       Occupational Therapy:   Hand Dominance: Right  ADL  Feeding: Setup (05/26/22 1534)  Grooming: Setup (05/26/22 1534)  UE Bathing: Setup (05/26/22 1534)  LE Bathing: Minimal assistance (05/26/22 1534)  UE Dressing: None (05/26/22 1534)  UE Dressing Skilled Clinical Factors: Anticipate min A for bra fastener (05/26/22 1534)  LE Dressing: Moderate assistance (05/26/22 1534)  Toileting: Contact guard assistance (05/26/22 1534)  Additional Comments: Pt states she washed up already today, however amenable to partial sponge bath. Washed face, torso and down legs. Toileted with brief CGA during pants management. Pt also performed hygiene with SBA. Unable to reach feet to doff/don socks but was able to doff/don underwear. (05/26/22 1534)  Toilet Transfers  Toilet - Technique: Ambulating (05/26/22 1538)  Equipment Used: Grab bars (05/26/22 1538)  Toilet Transfer: Stand by assistance (05/26/22 1538)  Toilet Transfers Comments: Increased effort, BUE pull from grab bar to stand (05/26/22 1538)          Speech Therapy:      Comprehension: Within Functional Limits  Verbal Expression: Within functional limits  Diet/Swallow:        Dysphagia Outcome Severity Scale: Level 7: Normal in all situations  Compensatory Swallowing Strategies : Small bites/sips,Upright as possible for all oral intake       COGNITION  OT: Cognition Comment: Mild safety deficits  SP:           Lab/X-ray studies reviewed, analyzed and discussed with patient and staff:   Recent Results (from the past 24 hour(s))   POCT Glucose    Collection Time: 05/28/22  1:00 PM   Result Value Ref Range    POC Glucose 189 (H) 70 - 99 mg/dl    Performed on ACCU-CHEK    POCT Glucose    Collection Time: 05/28/22  4:55 PM   Result Value Ref Range    POC Glucose 106 (H) 70 - 99 mg/dl    Performed on ACCU-CHEK    POCT Glucose Collection Time: 05/28/22  7:55 PM   Result Value Ref Range    POC Glucose 113 (H) 70 - 99 mg/dl    Performed on ACCU-CHEK    POCT Glucose    Collection Time: 05/29/22  7:19 AM   Result Value Ref Range    POC Glucose 102 (H) 70 - 99 mg/dl    Performed on ACCU-CHEK      Previous extensive, complex labs, notes and diagnostics reviewed and analyzed. ALLERGIES:    Allergies as of 05/23/2022 - Fully Reviewed 05/23/2022   Allergen Reaction Noted    Eggs or egg-derived products Nausea Only 10/10/2015    Glucophage [metformin]  04/13/2022    Valium [diazepam]  06/30/2015    Zithromax [azithromycin]  06/30/2015    Milk-related compounds Diarrhea and Nausea Only 10/10/2015      (please also verify by checking [facility])     Complex Physical Medicine & Rehab Issues Assess & Plan:   1. Severe abnormality of gait and mobility and impaired self-care and ADL's secondary to  metabolic encephalopathy with primary origins of infectious etiology . Updated functional and medical status reassessed regarding patients ability to participate in therapies and patient found to be able to participate in  acute intensive comprehensive inpatient rehabilitation program including PT/OT to improve balance, ambulation, ADLs, and to improve the P/AROM. It is my opinion that they will be able to tolerate 3 hours of therapy a day and benefit from it at an acute level. As always we will attempt to get patient to the most efficient but most effective level of care will be in their best interest.  Continue to focus on energy conservation heart rate and blood pressure monitoring before during and after therapy endurance and consistency of function. Will continue to follow to attempt to dc to the lowest most effective and efficient level of rehab care. 2. Bowel constipation now with diarrhea and Bladder dysfunction   overactive, neurogenic bladder:  frequent toileting, ambulate to bathroom with assistance, check post void residuals.   Check for C.difficile x1 if >2 loose stools in 24 hours, continue bowel & bladder program.  Monitor for UTI symptoms including lethargy and confusion  3. Severe LB and generalized OA pain: reassess pain every shift and prior to and after each therapy session, give prn Tylenol   and consider scheduled Tylenol, modalities prn in therapy, consider Lidoderm, K-pad prn. I suggest that she be trialed on Ultram versus Tylenol 3 and Neurontin 600 mg at at bedtime. 4. Skin breakdown   risk:  continue pressure relief program.  Daily skin exams and reports from nursing. 5. Severe fatigue due to immobility and nutritional deficits: continue to monitor closely for dehydration   Add vitamin B12 vitamin D and CoQ10 titrate dosing and add protein supplementation with low carb content. 6. Complex discharge planning:   Discussed with care team-last 24 hour events noted. I will continue to follow along and reassess functional and medical status as we strive to improve patient's functional and medical outcomes progressing to the most efficient and lowest level of care. Complex Active General Medical Issues that complicate care Assess & Plan:     1. Principal Problem:    Elevated troponin  Active Problems:    Dizziness    Benign paroxysmal positional vertigo due to bilateral vestibular disorder    Maxillary pain    Nonintractable headache    Fall at home    Fever and chills    Impaired mobility and activities of daily living-metabolic encephalopathy with primary origins of infectious etiology    Lumbar spondylosis    Lumbar stenosis with neurogenic claudication    Atelectasis, left  Resolved Problems:    * No resolved hospital problems. *          Events and functional changes in the past 24 hours reviewed improvements in functional status are encouraging     I again discussed acute rehab with the patient and verify that the patient is able and willing to participate in 3 hours of therapy a day.   Rehab and Acute Care Case Management has also reinforced this expectation. She has been denied by her insurance however I will follow along regarding her myelopathy possible surgery and repeat recertify postop if required. Focus of today's plan-await word from her insurance company regarding precertification for rehab.     Leonor Rios D.O., PM&R     Attending    [address]

## 2022-05-29 NOTE — FLOWSHEET NOTE
5/29/22 @ 1051 Notified Dr. Robby Santoro of ENT consult via [facility]

## 2022-05-29 NOTE — CARE COORDINATION
[facility]. WILL FOLLOW.

## 2022-05-30 LAB
GLUCOSE BLD-MCNC: 104 MG/DL (ref 70–99)
GLUCOSE BLD-MCNC: 106 MG/DL (ref 70–99)
GLUCOSE BLD-MCNC: 138 MG/DL (ref 70–99)
GLUCOSE BLD-MCNC: 97 MG/DL (ref 70–99)
PERFORMED ON: ABNORMAL
PERFORMED ON: NORMAL

## 2022-05-30 PROCEDURE — 1210000000 HC MED SURG R&B

## 2022-05-30 PROCEDURE — 6370000000 HC RX 637 (ALT 250 FOR IP): Performed by: PSYCHIATRY & NEUROLOGY

## 2022-05-30 PROCEDURE — 99233 SBSQ HOSP IP/OBS HIGH 50: CPT | Performed by: PSYCHIATRY & NEUROLOGY

## 2022-05-30 PROCEDURE — 97535 SELF CARE MNGMENT TRAINING: CPT

## 2022-05-30 PROCEDURE — 6370000000 HC RX 637 (ALT 250 FOR IP): Performed by: INTERNAL MEDICINE

## 2022-05-30 PROCEDURE — 2700000000 HC OXYGEN THERAPY PER DAY

## 2022-05-30 PROCEDURE — 6360000002 HC RX W HCPCS: Performed by: INTERNAL MEDICINE

## 2022-05-30 PROCEDURE — 99232 SBSQ HOSP IP/OBS MODERATE 35: CPT | Performed by: PHYSICAL MEDICINE & REHABILITATION

## 2022-05-30 PROCEDURE — 2580000003 HC RX 258: Performed by: PSYCHIATRY & NEUROLOGY

## 2022-05-30 PROCEDURE — 99232 SBSQ HOSP IP/OBS MODERATE 35: CPT | Performed by: INTERNAL MEDICINE

## 2022-05-30 PROCEDURE — 97116 GAIT TRAINING THERAPY: CPT

## 2022-05-30 PROCEDURE — 6370000000 HC RX 637 (ALT 250 FOR IP): Performed by: PHYSICIAN ASSISTANT

## 2022-05-30 RX ADMIN — ENOXAPARIN SODIUM 40 MG: 100 INJECTION SUBCUTANEOUS at 08:10

## 2022-05-30 RX ADMIN — GABAPENTIN 600 MG: 300 CAPSULE ORAL at 21:24

## 2022-05-30 RX ADMIN — SALINE NASAL SPRAY 2 SPRAY: 1.5 SOLUTION NASAL at 21:29

## 2022-05-30 RX ADMIN — Medication 10 ML: at 08:14

## 2022-05-30 RX ADMIN — PANTOPRAZOLE SODIUM 40 MG: 40 TABLET, DELAYED RELEASE ORAL at 05:53

## 2022-05-30 RX ADMIN — GUAIFENESIN 600 MG: 600 TABLET ORAL at 08:11

## 2022-05-30 RX ADMIN — SALINE NASAL SPRAY 2 SPRAY: 1.5 SOLUTION NASAL at 16:44

## 2022-05-30 RX ADMIN — ACETAMINOPHEN 650 MG: 325 TABLET ORAL at 23:21

## 2022-05-30 RX ADMIN — ATORVASTATIN CALCIUM 80 MG: 80 TABLET, FILM COATED ORAL at 21:25

## 2022-05-30 RX ADMIN — ASPIRIN 81 MG: 81 TABLET, COATED ORAL at 21:25

## 2022-05-30 RX ADMIN — MECLIZINE HYDROCHLORIDE 25 MG: 25 TABLET ORAL at 08:11

## 2022-05-30 RX ADMIN — Medication 800 MG: at 08:11

## 2022-05-30 RX ADMIN — MECLIZINE HYDROCHLORIDE 25 MG: 25 TABLET ORAL at 16:44

## 2022-05-30 RX ADMIN — MECLIZINE HYDROCHLORIDE 25 MG: 25 TABLET ORAL at 21:25

## 2022-05-30 RX ADMIN — GUAIFENESIN 600 MG: 600 TABLET ORAL at 21:25

## 2022-05-30 RX ADMIN — SALINE NASAL SPRAY 2 SPRAY: 1.5 SOLUTION NASAL at 08:12

## 2022-05-30 RX ADMIN — ASPIRIN 81 MG: 81 TABLET, COATED ORAL at 08:11

## 2022-05-30 RX ADMIN — AMLODIPINE BESYLATE 10 MG: 10 TABLET ORAL at 08:11

## 2022-05-30 ASSESSMENT — ENCOUNTER SYMPTOMS
DIARRHEA: 0
COLOR CHANGE: 0
NAUSEA: 0
VOMITING: 0
TROUBLE SWALLOWING: 0
CONSTIPATION: 0
VOICE CHANGE: 0
SORE THROAT: 0
BACK PAIN: 1
COUGH: 0
SHORTNESS OF BREATH: 0
ABDOMINAL PAIN: 0

## 2022-05-30 ASSESSMENT — PAIN DESCRIPTION - DESCRIPTORS: DESCRIPTORS: ACHING

## 2022-05-30 NOTE — PROGRESS NOTES
Subjective: The patient complains of severe acute on chronic progressive fatigue and generalized weakness partially relieved by rest, PT, OT and meds and IV fluids and exacerbated by exertion and recent illness-fever chills pneumonia. Patient is also suffering from acute encephalopathy -seems to be clearing. She had a problem with sinus pain and pressure. She was seen by ear nose and throat recently. There were no evidence of sinus disease on her CAT scan. I again spent a long time talking to her about pain management issues. She is taken Tylenol 3 and Ultram at home before. We discussed Percocet she does not want anything stronger than Ultram or Tylenol 3. Hi again I am advising that she started on those medications prior to her going to [facility] to optimize her functional status. I am concerned about patients medical complexities including:  Principal Problem:    Elevated troponin  Active Problems:    Dizziness    Benign paroxysmal positional vertigo due to bilateral vestibular disorder    Maxillary pain    Nonintractable headache    Fall at home    Fever and chills    Impaired mobility and activities of daily living-metabolic encephalopathy with primary origins of infectious etiology    Lumbar spondylosis    Lumbar stenosis with neurogenic claudication    Atelectasis, left  Resolved Problems:    * No resolved hospital problems. *      .    Reviewed recent nursing note and discussed current status and planned care with acute care providers, [facility] referral from Chastity MUNSON CM. I called [facility] insurance to verify benefits. No benefit information was available via automated system, I was transferred to a representative line, but they are only open M-F 8a-8p. I also informed Chastity Angela that OT notes are too old and will need update PT/OT notes to send to [facility]. \".    Today she is complaining of sinusitis and acute on chronic low back pain.   She states that she was on Ultram and Tylenol at home and also took Neurontin 600 mg. ROS x10: The patient also complains of severely impaired mobility and activities of daily living. Otherwise no new problems with vision, hearing, nose, mouth, throat, dermal, cardiovascular, GI, , pulmonary, musculoskeletal, psychiatric or neurological.        Vital signs:  /73   Pulse (!) 101   Temp 97.9 °F (36.6 °C) (Oral)   Resp 18   Ht 5' (1.524 m)   Wt 186 lb 1.6 oz (84.4 kg)   SpO2 99%   BMI 36.35 kg/m²   I/O:   PO/Intake:    fair PO intake, monitoring for dysphagia    Bowel/Bladder:   continent, diarrhea  General:  Patient is well developed, adequately nourished, and    well kempt. HEENT:    PERRLA, hearing intact to loud voice, external inspection of ear and nose benign. Inspection of lips, tongue and gums benign  Musculoskeletal: No significant change in strength or tone. All joints stable. Inspection and palpation of digits and nails show no clubbing, cyanosis or inflammatory conditions. Neuro/Psychiatric: Affect: flat-  Alert and oriented to self and situation with  mod cues. No significant change in deep tendon reflexes or sensation  Lungs:  Diminished, CTA-B  . Respiration effort is normal at rest.   Heart:   S1 = S2,   RRR. Abdomen:  Soft, non-tender    Extremities:  Trace  lower extremity edema but no unusual tenderness.   Skin:   BUE bruises dt blood draws      Rehabilitation:  Physical Therapy:   Bed mobility:  Bed mobility  Rolling to Left: Minimal assistance (05/24/22 1537)  Rolling to Right: Stand by assistance (05/26/22 1047)  Supine to Sit: Stand by assistance (05/28/22 1549)  Sit to Supine: Stand by assistance (05/28/22 1549)  Bed Mobility Comments: Educated pt on log roll technique for return to supine, with G follow through of instruction, increased time and effort (05/28/22 1549)  Transfers:  Transfers  Sit to Stand: Supervision (05/28/22 1549)  Stand to sit: Supervision (05/28/22 1549)  Bed to Chair: Supervision;Stand by assistance (05/26/22 1047)  Comment: Slow to complete. Safe technique employed. (05/28/22 1549)  Gait:   Ambulation  Surface: level tile (05/28/22 1550)  Device: Rolling Walker (05/28/22 1550)  Assistance: Stand by assistance (05/28/22 1550)  Quality of Gait: Slow pacing. No LOB however. (05/28/22 1550)  Distance: 40' (05/28/22 1550)  Comments: Deferred - safety concerns (05/24/22 1538)  Stairs:     W/C mobility:       Occupational Therapy:   Hand Dominance: Right  ADL  Feeding: Setup (05/26/22 1534)  Grooming: Setup (05/26/22 1534)  UE Bathing: Setup (05/26/22 1534)  LE Bathing: Minimal assistance (05/26/22 1534)  UE Dressing: None (05/26/22 1534)  UE Dressing Skilled Clinical Factors: Anticipate min A for bra fastener (05/26/22 1534)  LE Dressing: Moderate assistance (05/26/22 1534)  Toileting: Contact guard assistance (05/26/22 1534)  Additional Comments: Pt states she washed up already today, however amenable to partial sponge bath. Washed face, torso and down legs. Toileted with brief CGA during pants management. Pt also performed hygiene with SBA. Unable to reach feet to doff/don socks but was able to doff/don underwear. (05/26/22 1534)  Toilet Transfers  Toilet - Technique: Ambulating (05/26/22 1538)  Equipment Used: Grab bars (05/26/22 1538)  Toilet Transfer: Stand by assistance (05/26/22 1538)  Toilet Transfers Comments: Increased effort, BUE pull from grab bar to stand (05/26/22 1538)          Speech Therapy:      Comprehension: Within Functional Limits  Verbal Expression: Within functional limits  Diet/Swallow:        Dysphagia Outcome Severity Scale: Level 7: Normal in all situations  Compensatory Swallowing Strategies : Small bites/sips,Upright as possible for all oral intake       COGNITION  OT: Cognition Comment: Mild safety deficits  SP:           Lab/X-ray studies reviewed, analyzed and discussed with patient and staff:   Recent Results (from the past 24 hour(s))   POCT Glucose    Collection Time: 05/29/22 11:36 AM   Result Value Ref Range    POC Glucose 96 70 - 99 mg/dl    Performed on ACCU-CHEK    POCT Glucose    Collection Time: 05/29/22  4:26 PM   Result Value Ref Range    POC Glucose 123 (H) 70 - 99 mg/dl    Performed on ACCU-CHEK    POCT Glucose    Collection Time: 05/29/22  9:09 PM   Result Value Ref Range    POC Glucose 98 70 - 99 mg/dl    Performed on ACCU-CHEK    POCT Glucose    Collection Time: 05/30/22  7:56 AM   Result Value Ref Range    POC Glucose 97 70 - 99 mg/dl    Performed on ACCU-CHEK      Previous extensive, complex labs, notes and diagnostics reviewed and analyzed. ALLERGIES:    Allergies as of 05/23/2022 - Fully Reviewed 05/23/2022   Allergen Reaction Noted    Eggs or egg-derived products Nausea Only 10/10/2015    Glucophage [metformin]  04/13/2022    Valium [diazepam]  06/30/2015    Zithromax [azithromycin]  06/30/2015    Milk-related compounds Diarrhea and Nausea Only 10/10/2015      (please also verify by checking [facility])     Complex Physical Medicine & Rehab Issues Assess & Plan:   1. Severe abnormality of gait and mobility and impaired self-care and ADL's secondary to  metabolic encephalopathy with primary origins of infectious etiology . Updated functional and medical status reassessed regarding patients ability to participate in therapies and patient found to be able to participate in  acute intensive comprehensive inpatient rehabilitation program including PT/OT to improve balance, ambulation, ADLs, and to improve the P/AROM. It is my opinion that they will be able to tolerate 3 hours of therapy a day and benefit from it at an acute level. As always we will attempt to get patient to the most efficient but most effective level of care will be in their best interest.  Continue to focus on energy conservation heart rate and blood pressure monitoring before during and after therapy endurance and consistency of function.   Will continue to follow to attempt to dc to the lowest most effective and efficient level of rehab care. 2. Bowel constipation now with diarrhea and Bladder dysfunction   overactive, neurogenic bladder:  frequent toileting, ambulate to bathroom with assistance, check post void residuals. Check for C.difficile x1 if >2 loose stools in 24 hours, continue bowel & bladder program.  Monitor for UTI symptoms including lethargy and confusion  3. Severe LB and generalized OA pain: reassess pain every shift and prior to and after each therapy session, give prn Tylenol   and consider scheduled Tylenol, modalities prn in therapy, consider Lidoderm, K-pad prn. I AGAIN suggest that she be trialed on Ultram versus Tylenol 3 and Neurontin 600 mg at at bedtime. 4. Skin breakdown   risk:  continue pressure relief program.  Daily skin exams and reports from nursing. 5. Severe fatigue due to immobility and nutritional deficits: continue to monitor closely for dehydration   Add vitamin B12 vitamin D and CoQ10 titrate dosing and add protein supplementation with low carb content. 6. Complex discharge planning:   Discussed with care team-last 24 hour events noted. I will continue to follow along and reassess functional and medical status as we strive to improve patient's functional and medical outcomes progressing to the most efficient and lowest level of care. Complex Active General Medical Issues that complicate care Assess & Plan:     1. Principal Problem:    Elevated troponin  Active Problems:    Dizziness    Benign paroxysmal positional vertigo due to bilateral vestibular disorder    Maxillary pain    Nonintractable headache    Fall at home    Fever and chills    Impaired mobility and activities of daily living-metabolic encephalopathy with primary origins of infectious etiology    Lumbar spondylosis    Lumbar stenosis with neurogenic claudication    Atelectasis, left  Resolved Problems:    * No resolved hospital problems.  *          Events and functional changes in the past 24 hours reviewed improvements in functional status are encouraging     I again discussed acute rehab with the patient and verify that the patient is able and willing to participate in 3 hours of therapy a day. Rehab and Acute Care Case Management has also reinforced this expectation. She needs reevaluated in PT OT and possibly speech to get certified for her acute versus skilled level of care. There may be a possibility of acute level of rehab if she decides to have surgery we need to reevaluate postop. Focus of today's plan-await word from her insurance company regarding precertification for rehab.     Mario Faust D.O., PM&R     Attending    [address] [facility]

## 2022-05-30 NOTE — CONSULTS
[address]                      [facility], [address] [facility]                                  CONSULTATION    PATIENT NAME: Kye Robbins                      :        1945  MED REC NO:   [id number]                            ROOM:       W269  ACCOUNT NO:   [de-identified]                           ADMIT DATE: 2022  PROVIDER:     Amauri Lara MD    CONSULT DATE:  2022    HISTORY OF PRESENT ILLNESS:  The patient is a pleasant 68-year-old  female seen today at the request of Dr. Fidel Jacobo. The patient is being  evaluated for dizziness and sinus pressure. She has had a history of  dizziness dating back since October and she fell. There has been  intermittent episodes. Occasionally, they are accompanied by ear and  sinus pressure and fullness as well as occasionally accompanied by  tinnitus. She denies any [place] hearing loss or changes. The remaining  ENT inquiry is otherwise unremarkable. Her chart is reviewed and her  physical exam is performed at her bedside in bed space 269 on the second  floor, in [address]. Medications status noted as well as her allergies. REVIEW OF SYSTEMS:  Delineated. PHYSICAL EXAMINATION:  GENERAL:  Not in any acute distress. VITAL SIGNS:  Pulse 74, respirations 18. HEENT:  Tympanic membrane, canal and external ear are completely normal  bilaterally. The nasal exam is clear anteriorly. There is no evidence  of any mucopurulent secretions or polyp or mass or tumor, etc.  The oral  cavity, oropharynx unremarkable. NECK:  Negative for mass or lymphadenopathy. Trachea, parotid clear. Palpation of facial structures clear. DIAGNOSTIC DATA:  CT scans have been personally reviewed by me and  showed no evidence of any concerning sinus disease whatsoever. IMPRESSION AND PLAN:  History of dizziness.   She may very well have a  component of possible and lymphatic hydrops with pressures elevated in  the inner ear manifested by pressure and fullness along with tinnitus  and vertigo. She will need definitive hearing test evaluation in my  office upon discharge. Furthermore, she needs to watch dietary intake  of salt track and caffeine, all of which she stipulates she does a fair  amount of ingestion of and may be concerning factor. Furthermore, I  have already discussed the case with Dr. Brien Yan, Neurology. No  further input is otherwise necessary from our vantage point. Again, I  like to see her post discharge. We will review _____ again when she was  back to the office with a hearing test.    Thank you again for allowing me to partake in the care of this patient. Sally Ruiz MD    D: 05/29/2022 15:27:39       T: 05/29/2022 15:29:57     [email or web address]  Job#: [id number]     Doc#: [id number]    CC:   Edgardo Rosales MD

## 2022-05-30 NOTE — PLAN OF CARE
See OT evaluation for all goals and OT POC.  Electronically signed by WYATT Galloway on 5/30/2022 at 10:34 AM

## 2022-05-30 NOTE — PROGRESS NOTES
INPATIENT PROGRESS NOTES    PATIENT NAME: Marlyn Stevens  MRN: [id number]  SERVICE DATE:  May 30, 2022   SERVICE TIME:  10:29 AM      PRIMARY SERVICE: Pulmonary Disease    CHIEF COMPLAIN: Fall      INTERVAL HPI: Patient seen and examined at bedside, Interval Notes, orders reviewed. Nursing notes noted  She denies having any shortness of breath. She has cough with green mucus. She has postnasal drip sinus congestion. Complains of dizziness. No fever or chills. She is on room air 99%. Chest x-ray shows bibasilar atelectasis and trace pleural effusion. OBJECTIVE    Body mass index is 36.35 kg/m². PHYSICAL EXAM:  Vitals:  /73   Pulse (!) 101   Temp 97.9 °F (36.6 °C) (Oral)   Resp 18   Ht 5' (1.524 m)   Wt 186 lb 1.6 oz (84.4 kg)   SpO2 99%   BMI 36.35 kg/m²   General: Alert, awake . comfortable in bed, No distress. Head: Atraumatic , Normocephalic   Eyes: PERRL. No sclera icterus. No conjunctival injection. No discharge   ENT: No nasal  discharge. Pharynx clear. Neck:  Trachea midline. No thyromegaly, no JVD, No cervical adenopathy. Chest : Bilaterally symmetrical ,Normal effort,  No accessory muscle use  Lung : . Fair BS bilateral, decreased BS at bases. No Rales. No wheezing. No rhonchi. Heart[de-identified] Normal  rate. Regular rhythm. No mumur ,  Rub or gallop  ABD: Non-tender. Non-distended. No masses. No organmegaly. Normal bowel sounds. No hernia. Ext : No Pitting both leg , No Cyanosis No clubbing  Neuro: no focal weakness          DATA:   No results for input(s): WBC, HGB, HCT, MCV, PLT in the last 72 hours. Recent Labs     05/28/22  0452      K 3.5      CO2 21   BUN 13   CREATININE 1.06*   GLUCOSE 118*   CALCIUM 8.7   LABGLOM 50.0*   GFRAA >60.0       MV Settings:          No results for input(s): PHART, RSS1FTG, PO2ART, IIG7QJF, BEART, D1IMKBTW in the last 72 hours. O2 Device: Nasal cannula    ADULT DIET; Regular; Low Fat/Low Chol/High Fiber/2 gm Na;  No Caffeine     MEDICATIONS during current hospitalization:    Continuous Infusions:   sodium chloride      sodium chloride      dextrose      sodium chloride         Scheduled Meds:   sodium chloride  2 spray Each Nostril TID    lidocaine  3 patch TransDERmal Daily    enoxaparin  40 mg SubCUTAneous Daily    guaiFENesin  600 mg Oral BID    sodium chloride flush  5-40 mL IntraVENous 2 times per day    sodium chloride flush  5-40 mL IntraVENous 2 times per day    [Held by provider] lisinopril  20 mg Oral Daily    pantoprazole  40 mg Oral QAM AC    magnesium oxide  800 mg Oral Daily    meclizine  25 mg Oral TID    amLODIPine  10 mg Oral Daily    sodium chloride flush  5-40 mL IntraVENous 2 times per day    atorvastatin  80 mg Oral Nightly    aspirin  81 mg Oral BID    gabapentin  600 mg Oral Nightly    insulin lispro  0-12 Units SubCUTAneous 4x Daily WC    insulin lispro  0-6 Units SubCUTAneous Nightly       PRN Meds:sodium chloride flush, sodium chloride, sodium chloride flush, sodium chloride, melatonin, guaiFENesin-dextromethorphan, glucose, dextrose bolus **OR** dextrose bolus, glucagon (rDNA), dextrose, sodium chloride flush, sodium chloride, ondansetron **OR** ondansetron, acetaminophen **OR** acetaminophen, polyethylene glycol, nitroGLYCERIN, hydrALAZINE    Radiology  Echocardiogram complete 2D with doppler with color    Result Date: 5/24/2022  Transthoracic Echocardiography Report (TTE)  Demographics   Patient Name    Sandee Woods Gender                Female   Patient Number  [id number]      Race                  Other                                 Ethnicity   Visit Number    [id number]     Room Number           W269   Corporate ID                  Date of Study         05/24/2022   Accession       [id number]    Referring Physician   Yeimi Villegas MD  Number   Date of Birth   1945    Sonographer           Tania Dowling   Age             64 year(s)    Interpreting          [facility]) Physician             Cardiology                                                      [facility]  Procedure Type of Study   TTE procedure:ECHO COMPLETE 2D W/DOP W/COLOR. Procedure Date Date: 05/24/2022 Start: 08:20 AM Study Location: Portable Technical Quality: Adequate visualization Indications: Altered Mental Status. Patient Status: Routine Height: 60 inches Weight: 183 pounds BSA: 1.8 m^2 BMI: 35.74 kg/m^2 BP: 139/69 mmHg Allergies   - Other allergy:(eggs, dairy, valium, zithromax). Conclusions   Summary  Left ventricular ejection fraction is estimated at 65%. E/A flow reversal noted. Suggestive of diastolic dysfunction. Normal right ventricle systolic pressure. RVSP 22mmHg  No hemodynamic evidence of significant valve disease   Signature   ----------------------------------------------------------------  Electronically signed by Shay Munoz(Interpreting physician)  on 05/24/2022 04:13 PM  ----------------------------------------------------------------   Findings  Left Ventricle Left ventricular ejection fraction is estimated at 65%. E/A flow reversal noted. Suggestive of diastolic dysfunction. Right Ventricle Normal right ventricle structure and function. Normal right ventricle systolic pressure. RVSP 22mmHg Left Atrium Normal size left atrium. Right Atrium Normal right atrium. Mitral Valve Structurally normal mitral valve. No evidence of mitral valve stenosis. No evidence of mitral regurgitaton. Tricuspid Valve Tricuspid valve is structurally normal. No evidence of tricuspid stenosis. No evidence of tricuspid regurgitation. Aortic Valve Mildly thickened trileaflet aortic valve with normal excursion. No evidence of aortic valve stenosis . No evidence of aortic valve regurgitation . Pulmonic Valve The pulmonic valve was not well visualized . Pericardial Effusion No evidence of significant pericardial effusion is noted. Aorta \ Miscellaneous The aorta is within normal limits.  M-Mode Measurements (cm) LVIDd: 3.2 cm                          LVIDs: 2.01 cm  IVSd: 1.63 cm                          IVSs: 2.05 cm  LVPWd: 1.42 cm                         LVPWs: 1.31 cm  Rt. Vent.  Dimension: 2.36 cm           AO Root Dimension: 2.46 cm                                         ACS: 1.32 cm                                         LVOT: 1.8 cm  Doppler Measurements:   AV Velocity:0.02 m/s                   MV Peak E-Wave: 0.83 m/s  AV Peak Gradient: 10.58 mmHg           MV Peak A-Wave: 1.39 m/s  AV Mean Gradient: 4.87 mmHg  AV Area (Continuity):2.14 cm^2  TR Velocity:2.06 m/s                   Estimated RAP:5 mmHg  TR Gradient:16.9 mmHg                  RVSP:21.9 mmHg  Valves  Mitral Valve   Peak E-Wave: 0.83 m/s                 Peak A-Wave: 1.39 m/s                                        E/A Ratio: 0.59                                        Peak Gradient: 2.73 mmHg                                        Deceleration Time: 205.2 msec   Tissue Doppler   E' Septal Velocity: 0.06 m/s  E' Lateral Velocity: 0.09 m/s   Aortic Valve   Peak Velocity: 1.63 m/s                Mean Velocity: 0.99 m/s  Peak Gradient: 10.58 mmHg              Mean Gradient: 4.87 mmHg  Area (continuity): 2.14 cm^2  AV VTI: 25.47 cm   Cusp Separation: 1.32 cm   Tricuspid Valve   Estimated RVSP: 21.9 mmHg               Estimated RAP: 5 mmHg  TR Velocity: 2.06 m/s                   TR Gradient: 16.9 mmHg   Pulmonic Valve   Peak Velocity: 0.97 m/s            Peak Gradient: 3.78 mmHg                                     Estimated PASP: 21.9 mmHg   LVOT   Peak Velocity: 1.08 m/s               Mean Velocity: 0.77 m/s  Peak Gradient: 4.65 mmHg              Mean Gradient: 2.5 mmHg  LVOT Diameter: 1.8 cm                 LVOT VTI: 21.42 cm  Structures  Left Atrium   LA Volume/Index: 33.74 ml /19 m^2             LA Area: 12.41 cm^2   Left Ventricle   Diastolic Dimension: 3.2 cm           Systolic Dimension: 8.05 cm  Septum Diastolic: 4.71 cm             Septum Systolic: 8.12 cm  PW Diastolic: 8.74 cm                 PW Systolic: 6.11 cm                                        FS: 37.2 %  LV EDV/LV EDV Index: 40.91 ml/23 m^2  LV ESV/LV ESV Index: 12.84 ml/7 m^2  EF Calculated: 68.6 %                 LV Length: 5.74 cm   LVOT Diameter: 1.8 cm   Right Atrium   RA Systolic Pressure: 5 mmHg   Right Ventricle   Diastolic Dimension: 5.38 cm                                     RV Systolic Pressure: 27.7 mmHg  Aorta/ Miscellaneous Aorta   Aortic Root: 2.46 cm  LVOT Diameter: 1.8 cm      CTA HEAD W WO CONTRAST    Result Date: 5/24/2022  EXAMINATION:  CTA NECK W WO CONTRAST, CTA HEAD W WO CONTRAST HISTORY:  Dizziness and altered mental status. TECHNIQUE:   Spiral high resolution axial images were obtained through the head, neck and superior mediastinum following bolus administration of intravenous contrast for CT angiography. The data was subsequently post-processed utilizing 3D multi-planar  reconstructions, 3D maximum intensity projections. Contrast:  iopamidol (ISOVUE-370) injection of 100 mL All CT scans at this facility use dose modulation, iterative reconstruction, and/or weight based dosing when appropriate to reduce radiation dose to as low as reasonably achievable. COMPARISON: CT head 5/24/2022. RESULT: BRAIN: Grossly unchanged from the recent CT head dictation. Please refer to the CT head dictation. NECK: Soft tissues: The soft tissue planes are maintained throughout. No evidence of a soft tissue mass in the neck or superior mediastinum. No significant lymphadenopathy. Spine:  Multilevel degenerative changes. Lung apices:   Nonspecific patchy interstitial opacities, which may be chronic, and emphysematous changes, partially imaged. CT ARTERIOGRAM:     Extracranial Circulation: Aortic Arch: Normal branching pattern. .  There is no significant stenosis in the proximal brachiocephalic vessels. Carotid Stenosis: Right Common:  No significant stenosis.   Right Internal Carotid  Plaque:  Mild plaque formation. Right Internal Carotid Stenosis by NASCET criteria:  Less than 20% Left Common:  No significant stenosis. Left Internal Carotid Plaque:  Mild plaque formation. Left Internal Carotid Stenosis by NASCET criteria:  Less than 20%. Cervical Vertebral Arteries: Small in caliber bilaterally with the right side slightly dominant. Appear patent with tortuosity. Intracranial Circulation: Anterior Circulation:  Distal ICAs appear patent with mild to moderate calcifications. Proximal ACAs and proximal MCAs appear patent. No evidence for significant stenosis or aneurysm. Vertebrobasilar Circulation:   Small caliber of the vertebral arteries as above with the left vertebral artery appearing to end in PICA, likely normal variant. Basilar artery is also small in caliber with tortuosity with short segment of possible narrowing near terminus versus tortuosity. Fetal origin of the bilateral PCAs with the PCAs appearing patent. Dural venous sinuses: Visualized dural venous sinuses are patent. Small caliber of the vertebrobasilar system as discussed. Possible short segment of narrowing of the distal basilar artery near terminus versus tortuosity. Dominant anterior circulation with fetal origin of the PCAs bilaterally. No evidence for significant carotid stenosis. CT HEAD WO CONTRAST    Result Date: 5/24/2022  INDICATION: 70-year-old female presenting with dizziness and altered mental status. COMPARISON: 5/23/2022 and 10/9/2015. Luly Cárdenas TECHNIQUE: Multidetector CT imaging was obtained from the skull base to vertex without the administration of intravenous contrast. Coronal and sagittal reformatted images were obtained.  Automated dose exposure control was used for this exam. FINDINGS: Scattered areas of low-attenuation are visualized in the periventricular and subcortical white matter that demonstrate no significant change in comparison to the prior study consistent with chronic microvascular disease. No evidence of parenchymal hemorrhages or contusions. No evidence of intra or extra-axial fluid collection is seen. Mild prominence of ventricles and sulci are visualized demonstrating no significant increase in comparison to the prior study consistent is more chronic brain changes. No structural midline abnormalities seen. Visualized paranasal sinuses are well aerated. Visualized mastoid air cells are well aerated. Extensive lucencies visualized within the diploic space throughout the calvarium would recommend clinical correlation. No acute intracranial hemorrhage or mass effect. Chronic microvascular disease and chronic atrophic brain changes. Extensive lucencies visualized within the diploic space throughout the calvarium would recommend clinical correlation. CT Head WO Contrast    Result Date: 5/23/2022  INDICATION: 68-year-old female presenting with dizziness and unsteady gait. COMPARISON: 10/9/2015. TECHNIQUE: Multidetector CT imaging was obtained from the skull base to vertex without the administration of intravenous contrast. Coronal and sagittal reformatted images were obtained. Automated dose exposure control was used for this exam. FINDINGS: Scattered areas of low-attenuation are visualized in the periventricular and subcortical white matter consistent with chronic microvascular disease. No evidence of parenchymal hemorrhages or contusions. No evidence of intra or extra-axial fluid collection is seen. Prominence of ventricles and sulci are visualized demonstrating no significant increase in comparison to the prior study consistent is more chronic brain changes. Ventricles and sulci normal in size and configuration. The posterior fossa is unremarkable. No structural midline abnormalities seen. Visualized paranasal sinuses are well aerated. Visualized mastoid air cells are well aerated. The calvarium is intact. No acute intracranial hemorrhage or mass effect.  Chronic microvascular disease and chronic atrophic brain changes. CTA NECK W WO CONTRAST    Result Date: 5/24/2022  EXAMINATION:  CTA NECK W WO CONTRAST, CTA HEAD W WO CONTRAST HISTORY:  Dizziness and altered mental status. TECHNIQUE:   Spiral high resolution axial images were obtained through the head, neck and superior mediastinum following bolus administration of intravenous contrast for CT angiography. The data was subsequently post-processed utilizing 3D multi-planar  reconstructions, 3D maximum intensity projections. Contrast:  iopamidol (ISOVUE-370) injection of 100 mL All CT scans at this facility use dose modulation, iterative reconstruction, and/or weight based dosing when appropriate to reduce radiation dose to as low as reasonably achievable. COMPARISON: CT head 5/24/2022. RESULT: BRAIN: Grossly unchanged from the recent CT head dictation. Please refer to the CT head dictation. NECK: Soft tissues: The soft tissue planes are maintained throughout. No evidence of a soft tissue mass in the neck or superior mediastinum. No significant lymphadenopathy. Spine:  Multilevel degenerative changes. Lung apices:   Nonspecific patchy interstitial opacities, which may be chronic, and emphysematous changes, partially imaged. CT ARTERIOGRAM:     Extracranial Circulation: Aortic Arch: Normal branching pattern. .  There is no significant stenosis in the proximal brachiocephalic vessels. Carotid Stenosis: Right Common:  No significant stenosis. Right Internal Carotid  Plaque:  Mild plaque formation. Right Internal Carotid Stenosis by NASCET criteria:  Less than 20% Left Common:  No significant stenosis. Left Internal Carotid Plaque:  Mild plaque formation. Left Internal Carotid Stenosis by NASCET criteria:  Less than 20%. Cervical Vertebral Arteries: Small in caliber bilaterally with the right side slightly dominant. Appear patent with tortuosity.  Intracranial Circulation: Anterior Circulation:  Distal ICAs appear patent with mild to moderate calcifications. Proximal ACAs and proximal MCAs appear patent. No evidence for significant stenosis or aneurysm. Vertebrobasilar Circulation:   Small caliber of the vertebral arteries as above with the left vertebral artery appearing to end in PICA, likely normal variant. Basilar artery is also small in caliber with tortuosity with short segment of possible narrowing near terminus versus tortuosity. Fetal origin of the bilateral PCAs with the PCAs appearing patent. Dural venous sinuses: Visualized dural venous sinuses are patent. Small caliber of the vertebrobasilar system as discussed. Possible short segment of narrowing of the distal basilar artery near terminus versus tortuosity. Dominant anterior circulation with fetal origin of the PCAs bilaterally. No evidence for significant carotid stenosis. MRI LUMBAR SPINE WO CONTRAST    Result Date: 5/26/2022  INDICATION: 53-year-old female, lumbar stenosis. COMPARISON: None. TECHNIQUE: Multiplanar, multisequence MR imaging of the lumbar spine was performed without No administration of intravenous contrast. FINDINGS: There is exaggeration of the normal lordosis of the columns of the lumbar spine visualized, grade 1 anterolisthesis of L5 over S1. Vertebral augmentation in the L1 vertebral body. Depression of the superior endplate of the L3 and L5 vertebral bodies is seen. Multilevel degenerative endplate changes is seen. The STIR sequence demonstrates unremarkable signal intensity of the bone marrow , no evidence of T2 prolongation within the marrow to suggest acute fracture, edema or infiltrative process. The cord terminates at the level of the L1-L2 intervertebral disc space, unremarkable signal intensity of internal cord, the terminal nerve fibers demonstrate unremarkable contour with no evidence of thickening or clumping.  L1-L2 demonstrates degenerative disc changes with hypertrophic changes in the facet joints, no significant narrowing of the spinal lobe infiltrate would recommend clinical correlation. Biapical prominent CSF COPD changes. No evidence of pneumothorax or parenchymal lung mass. Degenerative joint changes. Suggestion of left lower lobe infiltrate/consolidation. Recommend clinical correlation    XR CHEST PORTABLE    Result Date: 5/23/2022  CHEST X-RAY. TECHNIQUE: Single AP portable view of the chest was obtained. Terrance Polanco CLINICAL INDICATION: 20-year-old female presenting with dizziness. COMPARISON: Chest x-ray obtained on 10/9/2015 PROCEDURE AND FINDINGS: Poor inspiratory effort is seen. The cardiomediastinal silhouette is slightly prominent in size, mild tortuosity of the thoracic aorta is seen. Biapical prominence suggestive of COPD changes. Mild prominence of the bronchovascular interstitial lung markings is seen but no evidence of focal lung infiltrate or consolidation is seen. The costophrenic angles are clear, no evidence of lung infiltrate, pleural effusion or parenchymal lung mass. Degenerative bone changes are visualized. COPD and chronic interstitial changes. IR LUMBAR PUNCTURE FOR DIAGNOSIS    1. Technically successful diagnostic lumbar puncture. HISTORY: Gisell Miranda is a Female of 64 years age, with  lp r/o meningitis . FLUOROSCOPY TIME:   58.7 seconds. RADIATION DOSE:         25.85 mGy. DIAGNOSIS: 20-year-old female with headaches, evaluation for meningitis. COMMENTS: R77.8 Elevated troponin ICD10 PROCEDURE: Following universal protocol, patient and site verification was performed with a \"timeout\" prior to the procedure. Following the discussion of the procedure, and this, risks versus benefits, informed consent was obtained from the patient. The patient was placed on fluoroscopy table in prone position and the lower back area was prepped and draped in usual sterile fashion. The area between the interspinous process was marked. This was at the L3 level.  Using the usual sterile conditions, 1% lidocaine (5 mL) and fluoroscopy guidance, a 20 gauge needle was inserted into the spinal canal.  After confirmation of intra-thecal location of the needle tip by CSF leakage through the needle. Approximately 14.5 cc of CSF were collected in 4 separate containers. Following that the needle was withdrawn from the back. The patient tolerated the procedure well without complications. US CAROTID ARTERY BILATERAL    Result Date: 5/25/2022  EXAMINATION:  CAROTID DUPLEX ULTRASONOGRAPHY CLINICAL HISTORY:  DIZZINESS COMPARISONS:  NONE AVAILABLE TECHNIQUE:  B-mode, color flow and spectral Doppler FINDINGS:  ARTERIAL BLOOD FLOW VELOCITY RIGHT PS                                               Prox CCA    67 cm/s             Mid CCA     65 cm/s              Dist CCA    62 cm/s              Prox ICA    59 cm/s               Mid ICA     106 cm/s            Dist ICA    94 cm/s             Prox ECA    72 cm/s             Prox VERT   61 cm/s              ICA/CCA     1.64                        LEFT PS Prox CCA    120 cm/s Mid CCA     95 cm/s Dist CCA    92 cm/s Prox ICA    87 cm/s Mid ICA     119 cm/s Dist ICA    146 cm/s Prox ECA    71 cm/s Prox VERT   76 cm/s ICA/CCA     1.62     MILD SCATTERED ATHEROSCLEROSIS BOTH CAROTID TREES. NO FLOW OBSTRUCTION. BILATERAL ICAS LESS THAN 50% STENOSIS. BILATERAL ANTEGRADE VERTEBRAL FLOW. IMPRESSION AND SUGGESTION:  1. Bibasilar atelectasis  2. Acute encephalopathy resolved  3. History of hypertension  4. Hyperlipidemia    Patient is currently afebrile on room air. No complaint of shortness of breath or cough. Advise deep breathing exercise,. Incentive spirometer. We will see her as needed    NOTE: This report was transcribed using voice recognition software. Every effort was made to ensure accuracy; however, inadvertent computerized transcription errors may be present.       Electronically signed by Shun Cross MD, [facility] on 5/30/2022 at 10:29 AM

## 2022-05-30 NOTE — PROGRESS NOTES
Progress Note  Date:2022       WEVU:[id number]  Patient Name:Mckenna Mendoza     YOB: 1945     Age:76 y.o. Subjective    Subjective:  Symptoms:  She reports malaise and weakness. No shortness of breath, cough, chest pain, headache, chest pressure, anorexia, diarrhea or anxiety. Diet:  No nausea or vomiting. Review of Systems   Respiratory: Negative for cough and shortness of breath. Cardiovascular: Negative for chest pain. Gastrointestinal: Negative for anorexia, diarrhea, nausea and vomiting. Neurological: Positive for weakness. Objective         Vitals Last 24 Hours:  TEMPERATURE:  Temp  Av.9 °F (36.6 °C)  Min: 97.9 °F (36.6 °C)  Max: 97.9 °F (36.6 °C)  RESPIRATIONS RANGE: Resp  Av  Min: 18  Max: 18  PULSE OXIMETRY RANGE: SpO2  Av %  Min: 99 %  Max: 99 %  PULSE RANGE: Pulse  Av.3  Min: 83  Max: 101  BLOOD PRESSURE RANGE: Systolic (56DUW), CMT:607 , Min:120 , AILEEN:674   ; Diastolic (48AQC), RSC:87, Min:62, Max:73    I/O (24Hr): No intake or output data in the 24 hours ending 22 0903  Objective:  General Appearance:  Comfortable, in no acute distress and well-appearing. Vital signs: (most recent): Blood pressure 128/73, pulse (!) 101, temperature 97.9 °F (36.6 °C), temperature source Oral, resp. rate 18, height 5' (1.524 m), weight 186 lb 1.6 oz (84.4 kg), SpO2 99 %. HEENT: Normal HEENT exam.    Lungs: There are decreased breath sounds. Heart: Regular rhythm. S1 normal and S2 normal.    Abdomen: Abdomen is soft. Bowel sounds are normal.     Neurological: Patient is alert and oriented to person, place and time. Pupils:  Pupils are equal, round, and reactive to light. Skin:  Warm and dry. Labs/Imaging/Diagnostics    Labs:  CBC:  No results for input(s): WBC, RBC, HGB, HCT, MCV, RDW, PLT in the last 72 hours.   CHEMISTRIES:  Recent Labs     22  0452      K 3.5      CO2 21   BUN 13   CREATININE 1.06* GLUCOSE 118*     PT/INR:No results for input(s): PROTIME, INR in the last 72 hours. APTT:No results for input(s): APTT in the last 72 hours. LIVER PROFILE:No results for input(s): AST, ALT, BILIDIR, BILITOT, ALKPHOS in the last 72 hours. Imaging Last 24 Hours:  MRI LUMBAR SPINE WO CONTRAST    Result Date: 5/26/2022  INDICATION: 77-year-old female, lumbar stenosis. COMPARISON: None. TECHNIQUE: Multiplanar, multisequence MR imaging of the lumbar spine was performed without No administration of intravenous contrast. FINDINGS: There is exaggeration of the normal lordosis of the columns of the lumbar spine visualized, grade 1 anterolisthesis of L5 over S1. Vertebral augmentation in the L1 vertebral body. Depression of the superior endplate of the L3 and L5 vertebral bodies is seen. Multilevel degenerative endplate changes is seen. The STIR sequence demonstrates unremarkable signal intensity of the bone marrow , no evidence of T2 prolongation within the marrow to suggest acute fracture, edema or infiltrative process. The cord terminates at the level of the L1-L2 intervertebral disc space, unremarkable signal intensity of internal cord, the terminal nerve fibers demonstrate unremarkable contour with no evidence of thickening or clumping. L1-L2 demonstrates degenerative disc changes with hypertrophic changes in the facet joints, no significant narrowing of the spinal canal is seen, with mild narrowing of bilateral neuroforamina. L2-L3 demonstrates degenerative disc changes. Atrophic changes of the facet joints and ligamentum flavum, no significant narrowing of the spinal canal is visualized, mild narrowing of the right neural foramina and mild-to-moderate narrowing of the left neural foramina is seen at this level. L3-L4 demonstrates degenerative disc with hypertrophic changes in the facet joints and ligamentum flavum, no significant narrowing of the spinal canal is visualized.  Moderate to severe narrowing of the right neural foramina and moderate narrowing of the left neural foramina is seen at this level. L4-L5 demonstrates extensive degenerative changes, hypertrophic changes in the facet joints with prominent hypertrophic changes in the ligamentum flavum, moderate to severe narrowing of the spinal canal with severe narrowing of the right neural foramina and moderate to severe narrowing of the left neural foramina seen at this level. L5-S1 demonstrates degenerative changes with hypertrophic changes in the facet joints, no significant narrowing of the spinal canal is visualized at this level,  severe narrowing of the right neuroforamina and moderate to severe narrowing of the left neural foramina is seen at this level. Extensive degenerative changes of the lumbar spine visualized most prominent at L4-L5 and L5-S1. Assessment//Plan           Hospital Problems           Last Modified POA    * (Principal) Elevated troponin 5/23/2022 Yes    Dizziness 5/24/2022 Yes    Benign paroxysmal positional vertigo due to bilateral vestibular disorder 5/24/2022 Yes    Maxillary pain 5/24/2022 Yes    Nonintractable headache 5/24/2022 Yes    Fall at home 5/24/2022 Yes    Fever and chills 5/24/2022 Yes    Impaired mobility and activities of daily living-metabolic encephalopathy with primary origins of infectious etiology 5/27/2022 Yes    Lumbar spondylosis 5/27/2022 Yes    Lumbar stenosis with neurogenic claudication 5/27/2022 Yes    Atelectasis, left 5/28/2022 Yes      Encephalopathy  OSIRIS  Dizziness  HTN/HLD  OSIRIS  Assessment & Plan    5/27: Hold lisinopril, IV fluids for acute kidney injury, continue with PT OT. Dizziness resolved. Encephalopathy resolved. Fevers down. No overnight events no new complaints. anticipated discharge tomorrow to rehab. Bmp tomorrow.   5/28: pt is medically stable to be discharged any time to [facility] vs home, no overnight events, dizziness is less no fevers, abx as per ID  5/29: Had episode of dizziness upon standing. Orthostatic was negative. ENT consulted for chronic sinus pain and pressure. This is the first time patient is told me this. Patient stated today that she had a lot of nasal surgery and ENT evaluation in the past for sinuses and also patient complaining of ear pain also as per edna. Patient has been getting IV antibiotics since admission. Spoke with nursing about the care. 5/30: Dizziness is better. Pre-CERT is pending from [facility]. Appreciate ENT input. Continue current care.  Avoid chocolates, salt and caffeine  Electronically signed by Gema Lynn MD on 5/27/22 at 1:09 PM EDT

## 2022-05-30 NOTE — PROGRESS NOTES
05/30/22  From: HOME W/SPOUSE, SON.  MICHELLE    Admit:DIZZINESS, BALANCE ISSUES    PMH: HTN, HLD, CA, DM    [facility]--PENDING NEUROSX  PLAN> F/U AS OP. [address]    Patient Mobility or PT/OT ordered: YES  Consults: GERALD SERRATO IR    Covid result &/or vacc status: 5/23 COVID NEG  TROP 0.024-0.026-0.028--0.036  5/24 RR--AMS, SLURRED SPEECH, LT ARM/HAND NUMBNESS--TX 2W    Barriers to Discharge:   R/O MENINGITIS    LP: NEG  W: 14.2--12.8--10.9  12/0.97--13/1.18    Assessments: CMI DONE

## 2022-05-30 NOTE — PROGRESS NOTES
[address] [facility] Neurology Daily Progress Note  Name: Parrish Padilla  Age: 64 y.o. Gender: female  CodeStatus: Full Code  Allergies: Eggs Or Egg-Derived Products  Glucophage [Metformin]  Valium [Diazepam]  Zithromax [Azithromycin]    Chief Complaint:Dizziness    Primary Care Provider: Stevie Tanner MD  InpatientTreatment Team: Treatment Team: Attending Provider: Darell Santoyo MD; Consulting Physician: Blanka Suarez MD; Consulting Physician: Corene Nageotte, DO; Consulting Physician: Bing Linda DO; Consulting Physician: Steffany Xiong MD; Registered Nurse: Justina Antonio RN; : Nakia Patel, RN; Respiratory Therapist (Day): Abelino Luna RCP; Patient Care Tech: Amelia Poole; : Mamadou Sandhu RN  Admission Date: 5/23/2022  HPI 66-year right-handed female admitted for dizziness and issues with balance. Symptoms going on for almost a year on and off. She reports she is dizzy upon walking and reads to 1 side or the other. MRI was done yesterday at [facility] which appeared to show no acute findings. Patient has significant other cerebrovascular risk factors she denies any tinnitus or hearing loss. She denies any neck pain but does have back pain. Has not recent falls injuries or trauma. Patient appears to be dizzy upon sitting up from a lying down position and we were not able to walk her or perform a vestibular examination due to the dizziness. Patient is areflexic in the lower extremities no definite sensory levels are noted  CT Head WO Contrast    Dizziness  Pertinent negatives include no abdominal pain, arthralgias, chest pain, coughing, fever, headaches, nausea, sore throat, vomiting or weakness. Pt seen and examined for neuro follow up. NO Headache, double vision, blurry vision, difficulty with speech, difficulty with swallowing, weakness, numbness, pain, nausea, vomiting, choking, neck pain, reports dizziness with movement.      Patient seen and examined for neuro follow-up. She is sitting in the chair with complaints of dizziness. She describes this dizziness as her head spinning. She denies any nausea or vomiting. She has not any further episodes of dizziness but has difficulty ambulating. She has chronic low back pain which has not been evaluated. As noted above    Patient has not any episodes of confusion and her dizziness is improving. Her main issues appears to be lower extremity weakness and therefore we obtained an MRI. LP does not show anything significant. 5/28  Patient not feeling as dizzy as she was and no confusional episodes have occurred. Patient had a neurosurgical evaluation and may be able to be discharged. 5/29  Patient had another spell of dizziness again upon movement but she was not orthostatic. She has greenish sputum noted. She denies any fever or headaches. 5/30  Patient has not any further dizzy spells. Patient is sitting in the chair without any complaints today except for her back. Vitals:    05/29/22 1946   BP: 128/73   Pulse: (!) 101   Resp: 18   Temp:    SpO2:       Review of Systems   Constitutional: Negative for appetite change and fever. HENT: Negative for drooling, ear pain, sore throat, trouble swallowing and voice change. Respiratory: Negative for cough and shortness of breath. Cardiovascular: Negative for chest pain. Gastrointestinal: Negative for abdominal pain, constipation, diarrhea, nausea and vomiting. Genitourinary: Negative for decreased urine volume and dysuria. Musculoskeletal: Positive for back pain. Negative for arthralgias. Skin: Negative for color change. Neurological: Positive for dizziness and light-headedness. Negative for weakness and headaches. Psychiatric/Behavioral: Negative for agitation and behavioral problems. All other systems reviewed and are negative. Physical Exam  Vitals and nursing note reviewed.    Constitutional:       General: She is not in acute distress. Appearance: Normal appearance. She is well-developed. HENT:      Head: Normocephalic and atraumatic. Nose: Nose normal.      Mouth/Throat:      Mouth: Mucous membranes are moist.   Eyes:      General:         Right eye: No discharge. Left eye: No discharge. Conjunctiva/sclera: Conjunctivae normal.   Neck:      Vascular: No JVD. Trachea: No tracheal deviation. Cardiovascular:      Rate and Rhythm: Normal rate. Pulmonary:      Effort: Pulmonary effort is normal.      Breath sounds: Normal breath sounds. Abdominal:      General: Bowel sounds are normal.      Palpations: Abdomen is soft. Musculoskeletal:         General: Normal range of motion. Cervical back: Normal range of motion and neck supple. No rigidity. No muscular tenderness. Lymphadenopathy:      Cervical: No cervical adenopathy. Skin:     General: Skin is warm and dry. Capillary Refill: Capillary refill takes less than 2 seconds. Neurological:      Mental Status: She is alert and oriented to person, place, and time. Psychiatric:         Mood and Affect: Mood normal.         Behavior: Behavior normal.       Pt oreinted x 3 and nonfocal and not dizzy and no behavioral issues occurred.   Patient had 1 more dizzy spell yesterday as well    Examination unchanged from above    Medications:  Reviewed    Infusion Medications:    sodium chloride      sodium chloride      dextrose      sodium chloride       Scheduled Medications:    sodium chloride  2 spray Each Nostril TID    lidocaine  3 patch TransDERmal Daily    enoxaparin  40 mg SubCUTAneous Daily    guaiFENesin  600 mg Oral BID    sodium chloride flush  5-40 mL IntraVENous 2 times per day    sodium chloride flush  5-40 mL IntraVENous 2 times per day    [Held by provider] lisinopril  20 mg Oral Daily    pantoprazole  40 mg Oral QAM AC    magnesium oxide  800 mg Oral Daily    meclizine  25 mg Oral TID    amLODIPine  10 mg Oral Daily    sodium chloride flush  5-40 mL IntraVENous 2 times per day    atorvastatin  80 mg Oral Nightly    aspirin  81 mg Oral BID    gabapentin  600 mg Oral Nightly    insulin lispro  0-12 Units SubCUTAneous 4x Daily WC    insulin lispro  0-6 Units SubCUTAneous Nightly     PRN Meds: sodium chloride flush, sodium chloride, sodium chloride flush, sodium chloride, melatonin, guaiFENesin-dextromethorphan, glucose, dextrose bolus **OR** dextrose bolus, glucagon (rDNA), dextrose, sodium chloride flush, sodium chloride, ondansetron **OR** ondansetron, acetaminophen **OR** acetaminophen, polyethylene glycol, nitroGLYCERIN, hydrALAZINE    Labs:   No results for input(s): WBC, HGB, HCT, PLT in the last 72 hours. Recent Labs     05/28/22  0452      K 3.5      CO2 21   BUN 13   CREATININE 1.06*   CALCIUM 8.7     No results for input(s): AST, ALT, BILIDIR, BILITOT, ALKPHOS in the last 72 hours. No results for input(s): INR in the last 72 hours. No results for input(s): Vinay Beery in the last 72 hours. Urinalysis:   Lab Results   Component Value Date    NITRU Negative 05/25/2022    WBCUA 0-2 05/25/2022    BACTERIA Negative 05/25/2022    RBCUA 0-2 05/25/2022    BLOODU Negative 05/25/2022    SPECGRAV 1.050 05/25/2022    GLUCOSEU Negative 05/25/2022       Radiology:   Most recent    EEG No valid procedures specified. MRI of Brain No results found for this or any previous visit. No results found for this or any previous visit. MRA of the Head and Neck: No results found for this or any previous visit. No results found for this or any previous visit. No results found for this or any previous visit. CT of the Head: Results for orders placed during the hospital encounter of 05/23/22    CT HEAD WO CONTRAST    Narrative  INDICATION: 15-year-old female presenting with dizziness and altered mental status. COMPARISON: 5/23/2022 and 10/9/2015. [facility]     TECHNIQUE: Multidetector CT imaging was obtained from the skull base to vertex without the administration of intravenous contrast. Coronal and sagittal reformatted images were obtained. Automated dose exposure control was used for this exam.    FINDINGS:    Scattered areas of low-attenuation are visualized in the periventricular and subcortical white matter that demonstrate no significant change in comparison to the prior study consistent with chronic microvascular disease. No evidence of parenchymal hemorrhages or contusions. No evidence of intra or extra-axial fluid collection is seen. Mild prominence of ventricles and sulci are visualized demonstrating no significant increase in comparison to the prior study consistent is more chronic brain changes. No structural midline abnormalities seen. Visualized paranasal sinuses are well aerated. Visualized mastoid air cells are well aerated. Extensive lucencies visualized within the diploic space throughout the calvarium would recommend clinical correlation. Impression  No acute intracranial hemorrhage or mass effect. Chronic microvascular disease and chronic atrophic brain changes. Extensive lucencies visualized within the diploic space throughout the calvarium would recommend clinical correlation. No results found for this or any previous visit. No results found for this or any previous visit. Carotid duplex: No results found for this or any previous visit. No results found for this or any previous visit.   Results for orders placed during the hospital encounter of 05/23/22    US CAROTID ARTERY BILATERAL    Narrative  EXAMINATION:  CAROTID DUPLEX ULTRASONOGRAPHY    CLINICAL HISTORY:  DIZZINESS    COMPARISONS:  NONE AVAILABLE    TECHNIQUE:  B-mode, color flow and spectral Doppler    FINDINGS:    ARTERIAL BLOOD FLOW VELOCITY    RIGHT PS    Prox CCA    67 cm/s  Mid CCA     65 cm/s  Dist CCA    62 cm/s  Prox ICA    59 cm/s  Mid ICA     106 cm/s  Dist ICA 94 cm/s  Prox ECA    72 cm/s  Prox VERT   61 cm/s    ICA/CCA     1.64    LEFT PS    Prox CCA    120 cm/s  Mid CCA     95 cm/s  Dist CCA    92 cm/s  Prox ICA    87 cm/s  Mid ICA     119 cm/s  Dist ICA    146 cm/s  Prox ECA    71 cm/s  Prox VERT   76 cm/s    ICA/CCA     1.62    Impression  MILD SCATTERED ATHEROSCLEROSIS BOTH CAROTID TREES. NO FLOW OBSTRUCTION. BILATERAL ICAS LESS THAN 50% STENOSIS. BILATERAL ANTEGRADE VERTEBRAL FLOW. Echo No results found for this or any previous visit. Assessment/Plan:  5/24/22  Dizziness which may be suggestive of vestibular dysfunction. We will start her on Antivert 25 mg 3 times daily and arrange for orthostatic blood pressure recordings. MRI of the brain was done at [facility] yesterday which were reviewed and does not show an acute stroke. Recommend ct angiogram to make sure there is no basilar artery stenosis. Patient may benefit from vestibular rehab once work-up is complete. 5/25/22  Patient continues to have dizziness with movement  Started on Antivert yesterday 25mg TID  Orthostatic blood pressure readings negative for orthostatic hypotension   MRI of brain negative (performed yesterday at [facility])  Ct angiogram shows no significant carotid stenosis. Carotid Ultrasound: bilateral internal carotid less than 50% stenosis    LP pending for fevers ordered by medical team   Patient's symptoms consistent with vestibular dysfunction. As noted above would benefit from vestibular rehab. Can discharge from neurology standpoint. Follow up in 4-6 weeks. I have personally performed a face to face diagnostic evaluation on this patient, reviewed all data and investigations, and am the sole provider of all clinical decisions on the neurological status of this patient. Transient confusion,  seen by ID, ordered LP, which is negative, MRI at [facility] negative, dizziness better with antivert        5/26/22  Patient reports to be having episodes of dizziness where her head is spinning. She is seen sitting in chair awake alert and oriented. She denies any nausea or vomiting. She reports lower extremity weakness which is not new for her. Denies any numbness tingling. Patient continues Antivert. LP performed and is negative. RI at [facility] negative. MRI of lumbar spine for evaluation of cervical stenosis pending. I have personally performed a face to face diagnostic evaluation on this patient, reviewed all data and investigations, and am the sole provider of all clinical decisions on the neurological status of this patient. No further dizzy spells are noted though patient is nonambulatory and I recommended we obtain MRI of the lumbar spine as she is not walking on her own. Patient continue on Antivert for now lumbar puncture was    5/27/22  Further dizzy spells are noted though the patient is not ambulatory  MRI of bar spine obtained secondary to patient not ambulating on her own  MRI shows extensive degenerative changes of the lumbar spine visualized most prominent L4-L5 and L5-S1. No significant narrowing of the spinal canal seen at L1-L2, L2-L3, L3-L4, L5-S1. There is moderate to severe narrowing of the spinal canal with severe narrowing of the right neuroforamina and moderate to severe narrowing of the left neuroforamina at the L4-L5 levels. We will consult neurosurgery for evaluation and treatment recommendations. Continue Antivert for dizziness. May be a good candidate for vestibular rehabilitation. 5/28  Patient dizziness has improved but she was not able to ambulate much so we are not quite sure if she starts to ambulate and get dizzy. We will further obtain an MRI of the lumbar spine which shows severe stenosis and consulted neurosurgery. We recommended rehabilitation for now and surgical intervention when she is somewhat better patient may be transferred to rehabilitation    5/29  Again another dizzy spell did not have  COVID. She was not orthostatic. MRI results were reviewed from [facility] and there is some suggestion of mild ventriculomegaly. 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 We will repeat her MRI as her initial MRI was done too early in the course of her disease at [facility] clinic. 5/30  Patient does not report any more dizziness. Patient continues to have difficulty with ambulation due to her back only. Repeat MRI was done yesterday and shows no residual strokes or any other abnormal findings in the ears either. Patient is responding to Antivert and may be discharged from neurological standpoint MRI was reviewed personally    Kashif Ricketts MD, [address] American Board of Psychiatry & Neurology  Board Certified in Vascular Neurology  Board Certified in Neuromuscular Medicine  Certified in Neurorehabilitation           Collaborating physicians: Dr Lorene Ricketts    Electronically signed by Rekha Sheppard MD on 5/30/2022 at 12:32 PM

## 2022-05-30 NOTE — PROGRESS NOTES
[facility] OCCUPATIONAL THERAPY MED SURG TREATMENT NOTE     Date: 2022  Patient Name: Jeffy Rodriguez        MRN: [id number]  Account: [de-identified]   : 1945  (64 y.o.)  [facility]    Chart Review:    Restrictions  Restrictions/Precautions  Restrictions/Precautions: Fall Risk     Safety:  Safety Devices  Type of Devices: All fall risk precautions in place;Call light within reach; Chair alarm in place; Left in chair    Patient's birthday verified: Yes    Subjective:    Subjective: \"I'm feeling better\"       Pain at start of treatment: No    Pain at end of treatment: Yes: Pt. unable to rate pain- Pt states \"It just hurts. But I don't want to tell the nurse- I'm accustomed to it, and it will go away if I rest a little bit. \"    Location: Back  Nursing notified: Declined  RN: Cielo- notified pt. had BM  Intervention: Repositioned    Objective:    ADL Status:  ADL  Feeding: Setup  Grooming: Setup  UE Bathing: None  UE Bathing Skilled Clinical Factors: Already bathed today- states she completed upper body independently  LE Bathing: None  LE Bathing Skilled Clinical Factors: Already bathed today- states she went 'as far as she could' but declined to have therapist assist with feet  UE Dressing: None  LE Dressing: Moderate assistance  LE Dressing Skilled Clinical Factors: Difficulty reaching feet. Toileting: Stand by assistance  Additional Comments: Pt states she already washed up however toilets as above. Performs grooming standing at sink. Doff/don socks at chair level.   Toilet Transfers  Toilet - Technique: Ambulating  Equipment Used: Grab bars  Toilet Transfer: Stand by assistance  Toilet Transfers Comments: Increased effort, BUE pull from grab bar to stand    RHODA Hose donned: No  If no - why  N/A    Therapy key for assistance levels -   Independent/Mod I = Pt. is able to perform task with no assistance but may require a device   Stand by assistance = Pt. does not perform task at an independent level but does not need physical assistance, requires verbal cues  Minimal, Moderate, Maximal Assistance = Pt. requires physical assistance (25%, 50%, 75% assist from helper) for task but is able to actively participate in task   Dependent = Pt. requires total assistance with task and is not able to actively participate with task completion    Orientation Status:  Orientation  Orientation Level: Oriented to situation;Oriented to person;Oriented to place    Observation:  Observation/Palpation  Observation: Pt alert and attentive, agreeable to session    Cognition Status:  Cognition  Overall Cognitive Status: [facility]  Cognition Comment: Mild safety deficits    Perception Status:  Perception  Overall Perceptual Status: [facility]    Vision and Hearing Status:  Vision  Vision Exceptions: Wears glasses for reading  Hearing  Hearing: Within functional limits   Vision - Basic Assessment  Prior Vision: Wears glasses only for reading    GROSS ASSESSMENT AROM/PROM:  AROM: Within functional limits  PROM: Within functional limits    ROM:   LUE AROM (degrees)  LUE AROM : WFL  Left Hand AROM (degrees)  Left Hand AROM: WFL  RUE AROM (degrees)  RUE AROM : WFL  Right Hand AROM (degrees)  Right Hand AROM: WFL    UE STRENGTH:  Strength: Generally decreased, functional    UE COORDINATION:  Coordination: Generally decreased, functional    UE TONE:  Tone: Normal    UE SENSATION:  Sensation: Impaired (Reports continued numbness in L hand but 'not the way it used to be')    Functional Mobility:  Patient ambulated to/from bathroom with [employer] at SBA level. Pt requires heavy UE support during ambulation and min increased time to maneuver around objects. Increased time for stepping.     Bed Mobility  Bed mobility  Supine to Sit: Stand by assistance  Bed Mobility Comments: HOB elevated, pulls from bed rail, up to bedside chair at end of tx    Seated and Standing Balance:  Balance  Sitting: Intact  Standing: Impaired  Standing - Static: Fair  Standing - Dynamic: Fair    Functional Endurance:  Activity Tolerance  Activity Tolerance: Patient Tolerated treatment well    Treatment consisted of:    ADL training    Assessment/Discharge Disposition:  Assessment  Discharge Recommendations: Continue to assess pending progress      [employer]  How much help for putting on and taking off regular lower body clothing?: A Lot  How much help for Bathing?: A Little  How much help for Toileting?: A Little  How much help for putting on and taking off regular upper body clothing?: A Little  How much help for taking care of personal grooming?: A Little  How much help for eating meals?: A Little  AM-[facility] Inpatient Daily Activity Raw Score: 17  AM-PAC Inpatient ADL T-Scale Score : 37.26  ADL Inpatient CMS 0-100% Score: 50.11  ADL Inpatient CMS G-Code Modifier : CK    Plan:    Continue OT per POC    Patient Education:  Patient Education  Education Given To: Patient  Education Provided: Role of Therapy;Plan of Care  Education Method: Verbal;Demonstration  Barriers to Learning: None  Education Outcome: Verbalized understanding;Demonstrated understanding    Equipment recommendations:  Continue to assess    Goals/Plan of care addressed during this session:    Improve [place] with ADLs and Improve [place] with Functional Transfers    Therapy Time:   Individual Group Co-Treat   Time In 935       Time Out 1000         Minutes 25           ADL/IADL trainin minutes    Electronically signed by:     WYATT Lawrence    2022, 10:33 AM

## 2022-05-30 NOTE — PROGRESS NOTES
Physical Therapy Med Surg Daily Treatment Note  Facility/Department: [address] [place] [address] [address]  Room: [facility]       NAME: Leslye Bloom  : 1945 (64 y.o.)  MRN: [id number]  CODE STATUS: Full Code    Date of Service: 2022    Patient Diagnosis(es): Elevated troponin [R77.8]  Fever and chills [R50.9]   Chief Complaint   Patient presents with    Dizziness     Patient Active Problem List    Diagnosis Date Noted    Atelectasis, left     Impaired mobility and activities of daily living-metabolic encephalopathy with primary origins of infectious etiology 2022    Lumbar spondylosis 2022    Lumbar stenosis with neurogenic claudication 2022    Dizziness     Benign paroxysmal positional vertigo due to bilateral vestibular disorder     Maxillary pain     Nonintractable headache     Fall at home     Fever and chills     Elevated troponin 2022    Hyperlipidemia 10/16/2015    Type 2 diabetes mellitus with circulatory disorder ([facility] [address] 10/16/2015    Vertebral compression fracture ([facility] [address] 10/16/2015    Multiple myeloma ([facility] [address] 2015    Obese 2015    Ataxia 2015        Past Medical History:   Diagnosis Date    Cancer ([facility] [address]     Encounter for lumbar puncture 2022    Completed by Dr. Gaurav Patiño - diagnostics sent    Hyperlipidemia     Hypertension     Type II or unspecified type diabetes mellitus without mention of complication, not stated as uncontrolled      Past Surgical History:   Procedure Laterality Date    CORONARY ANGIOPLASTY WITH STENT PLACEMENT      UPPER GASTROINTESTINAL ENDOSCOPY  8/12/15    w/bx,dilation        Patient assessed for rehabilitation services?: Yes    Restrictions:  Restrictions/Precautions: Fall Risk    SUBJECTIVE:   Subjective: \"I'm pretty good. \"    Pain    No pain throughout therapy    OBJECTIVE:        Bed mobility  Supine to Sit: Stand by assistance  Sit to Supine: Stand by assistance  Bed Mobility Comments: HOB elevated, good sequencing and technique. Transfers  Sit to Stand: Supervision  Stand to sit: Supervision  Comment: Slow to initiate    Ambulation  Surface: level tile  Device: Rolling Walker  Assistance: Stand by assistance  Quality of Gait: Slow stacey and pace, reduced step length and height. Step to pattern  Distance: 61'  Comments: Cues for improving gait pattern, fair follow through                              Activity Tolerance  Activity Tolerance: Patient tolerated treatment well          ASSESSMENT   Assessment: Slow stacey this date but able to dispaly further ambulation this date. Focus on improving step length and height throughout with fair follow through. Discharge Recommendations:  Continue to assess pending progress,Patient would benefit from continued therapy after discharge         Goals  Long Term Goals  Long term goal 1: Pt to complete bed mobility with indep  Long term goal 2: Pt to complete transfers with indep  Long term goal 3: Pt to ambulate 50-150ft with LRD and indep  Long term goal 4: Pt to tolerate 15min therapeutic exercise/activities without rest break  Long term goal 5: Pt to manage 12 steps with HR and Kenny    PLAN    Plan: 1 time a day 3-6 times a week  Safety Devices  Type of Devices: All fall risk precautions in place,Call light within reach,Bed alarm in place,Left in bed     AMPA (6 CLICK) BASIC MOBILITY  AM-PAC Inpatient Mobility Raw Score : 17     Therapy Time   Individual   Time In 1301   Time Out 1319   Minutes 18     Timed Code Treatment Minutes: 18 Minutes      Gait: 13  Tr/Bm: [address], [facility], 05/30/22 at 1:28 PM         Definitions for assistance levels  Independent = pt does not require any physical supervision or assistance from another person for activity completion. Device may be needed.   Stand by assistance = pt requires verbal cues or instructions from another person, close to but not touching, to perform the activity  Minimal assistance= pt performs 75% or more

## 2022-05-31 LAB
BLOOD CULTURE, ROUTINE: NORMAL
GLUCOSE BLD-MCNC: 106 MG/DL (ref 70–99)
GLUCOSE BLD-MCNC: 80 MG/DL (ref 70–99)
GLUCOSE BLD-MCNC: 93 MG/DL (ref 70–99)
GLUCOSE BLD-MCNC: 97 MG/DL (ref 70–99)
PERFORMED ON: ABNORMAL
PERFORMED ON: NORMAL

## 2022-05-31 PROCEDURE — 2580000003 HC RX 258: Performed by: PSYCHIATRY & NEUROLOGY

## 2022-05-31 PROCEDURE — 2580000003 HC RX 258: Performed by: PHYSICIAN ASSISTANT

## 2022-05-31 PROCEDURE — 6370000000 HC RX 637 (ALT 250 FOR IP): Performed by: INTERNAL MEDICINE

## 2022-05-31 PROCEDURE — 97116 GAIT TRAINING THERAPY: CPT

## 2022-05-31 PROCEDURE — 6370000000 HC RX 637 (ALT 250 FOR IP): Performed by: PSYCHIATRY & NEUROLOGY

## 2022-05-31 PROCEDURE — 6360000002 HC RX W HCPCS: Performed by: INTERNAL MEDICINE

## 2022-05-31 PROCEDURE — 6370000000 HC RX 637 (ALT 250 FOR IP)

## 2022-05-31 PROCEDURE — 99232 SBSQ HOSP IP/OBS MODERATE 35: CPT | Performed by: INTERNAL MEDICINE

## 2022-05-31 PROCEDURE — 1210000000 HC MED SURG R&B

## 2022-05-31 PROCEDURE — 6370000000 HC RX 637 (ALT 250 FOR IP): Performed by: PHYSICIAN ASSISTANT

## 2022-05-31 RX ORDER — ASPIRIN 81 MG/1
81 TABLET ORAL 2 TIMES DAILY
Status: CANCELLED | OUTPATIENT
Start: 2022-05-31

## 2022-05-31 RX ORDER — GUAIFENESIN/DEXTROMETHORPHAN 100-10MG/5
5 SYRUP ORAL EVERY 4 HOURS PRN
Status: CANCELLED | OUTPATIENT
Start: 2022-05-31

## 2022-05-31 RX ORDER — DIPHENOXYLATE HYDROCHLORIDE AND ATROPINE SULFATE 2.5; .025 MG/1; MG/1
1 TABLET ORAL 4 TIMES DAILY PRN
Status: CANCELLED | OUTPATIENT
Start: 2022-05-31

## 2022-05-31 RX ORDER — NITROGLYCERIN 0.4 MG/1
0.4 TABLET SUBLINGUAL EVERY 5 MIN PRN
Status: CANCELLED | OUTPATIENT
Start: 2022-05-31

## 2022-05-31 RX ORDER — DEXTROSE MONOHYDRATE 50 MG/ML
100 INJECTION, SOLUTION INTRAVENOUS PRN
Status: CANCELLED | OUTPATIENT
Start: 2022-05-31

## 2022-05-31 RX ORDER — SODIUM CHLORIDE 9 MG/ML
INJECTION, SOLUTION INTRAVENOUS PRN
Status: CANCELLED | OUTPATIENT
Start: 2022-05-31

## 2022-05-31 RX ORDER — SODIUM CHLORIDE 0.9 % (FLUSH) 0.9 %
5-40 SYRINGE (ML) INJECTION PRN
Status: CANCELLED | OUTPATIENT
Start: 2022-05-31

## 2022-05-31 RX ORDER — ENOXAPARIN SODIUM 100 MG/ML
40 INJECTION SUBCUTANEOUS DAILY
Status: CANCELLED | OUTPATIENT
Start: 2022-05-31

## 2022-05-31 RX ORDER — ONDANSETRON 4 MG/1
4 TABLET, ORALLY DISINTEGRATING ORAL EVERY 8 HOURS PRN
Status: CANCELLED | OUTPATIENT
Start: 2022-05-31

## 2022-05-31 RX ORDER — GABAPENTIN 300 MG/1
600 CAPSULE ORAL NIGHTLY
Status: CANCELLED | OUTPATIENT
Start: 2022-05-31

## 2022-05-31 RX ORDER — HYDRALAZINE HYDROCHLORIDE 20 MG/ML
10 INJECTION INTRAMUSCULAR; INTRAVENOUS EVERY 6 HOURS PRN
Status: CANCELLED | OUTPATIENT
Start: 2022-05-31

## 2022-05-31 RX ORDER — ONDANSETRON 2 MG/ML
4 INJECTION INTRAMUSCULAR; INTRAVENOUS EVERY 6 HOURS PRN
Status: CANCELLED | OUTPATIENT
Start: 2022-05-31

## 2022-05-31 RX ORDER — LANOLIN ALCOHOL/MO/W.PET/CERES
800 CREAM (GRAM) TOPICAL DAILY
Status: CANCELLED | OUTPATIENT
Start: 2022-05-31

## 2022-05-31 RX ORDER — POLYETHYLENE GLYCOL 3350 17 G/17G
17 POWDER, FOR SOLUTION ORAL DAILY PRN
Status: CANCELLED | OUTPATIENT
Start: 2022-05-31

## 2022-05-31 RX ORDER — PANTOPRAZOLE SODIUM 40 MG/1
40 TABLET, DELAYED RELEASE ORAL
Status: CANCELLED | OUTPATIENT
Start: 2022-06-01

## 2022-05-31 RX ORDER — SODIUM CHLORIDE 0.9 % (FLUSH) 0.9 %
5-40 SYRINGE (ML) INJECTION EVERY 12 HOURS SCHEDULED
Status: CANCELLED | OUTPATIENT
Start: 2022-05-31

## 2022-05-31 RX ORDER — AMLODIPINE BESYLATE 10 MG/1
10 TABLET ORAL DAILY
Status: CANCELLED | OUTPATIENT
Start: 2022-05-31

## 2022-05-31 RX ORDER — LISINOPRIL 20 MG/1
20 TABLET ORAL DAILY
Status: CANCELLED | OUTPATIENT
Start: 2022-05-31

## 2022-05-31 RX ORDER — ATORVASTATIN CALCIUM 80 MG/1
80 TABLET, FILM COATED ORAL NIGHTLY
Status: CANCELLED | OUTPATIENT
Start: 2022-05-31

## 2022-05-31 RX ORDER — DIPHENOXYLATE HYDROCHLORIDE AND ATROPINE SULFATE 2.5; .025 MG/1; MG/1
1 TABLET ORAL 4 TIMES DAILY PRN
Status: DISCONTINUED | OUTPATIENT
Start: 2022-05-31 | End: 2022-06-03 | Stop reason: HOSPADM

## 2022-05-31 RX ORDER — GUAIFENESIN 600 MG/1
600 TABLET, EXTENDED RELEASE ORAL 2 TIMES DAILY
Status: CANCELLED | OUTPATIENT
Start: 2022-05-31

## 2022-05-31 RX ORDER — LANOLIN ALCOHOL/MO/W.PET/CERES
3 CREAM (GRAM) TOPICAL NIGHTLY PRN
Status: CANCELLED | OUTPATIENT
Start: 2022-05-31

## 2022-05-31 RX ORDER — INSULIN LISPRO 100 [IU]/ML
0-6 INJECTION, SOLUTION INTRAVENOUS; SUBCUTANEOUS NIGHTLY
Status: CANCELLED | OUTPATIENT
Start: 2022-05-31

## 2022-05-31 RX ORDER — ACETAMINOPHEN 325 MG/1
650 TABLET ORAL EVERY 6 HOURS PRN
Status: CANCELLED | OUTPATIENT
Start: 2022-05-31

## 2022-05-31 RX ORDER — INSULIN LISPRO 100 [IU]/ML
0-12 INJECTION, SOLUTION INTRAVENOUS; SUBCUTANEOUS
Status: CANCELLED | OUTPATIENT
Start: 2022-05-31

## 2022-05-31 RX ORDER — LIDOCAINE 4 G/G
3 PATCH TOPICAL DAILY
Status: CANCELLED | OUTPATIENT
Start: 2022-05-31

## 2022-05-31 RX ORDER — ACETAMINOPHEN 650 MG/1
650 SUPPOSITORY RECTAL EVERY 6 HOURS PRN
Status: CANCELLED | OUTPATIENT
Start: 2022-05-31

## 2022-05-31 RX ADMIN — Medication 800 MG: at 08:09

## 2022-05-31 RX ADMIN — SALINE NASAL SPRAY 2 SPRAY: 1.5 SOLUTION NASAL at 14:05

## 2022-05-31 RX ADMIN — Medication 10 ML: at 22:08

## 2022-05-31 RX ADMIN — ENOXAPARIN SODIUM 40 MG: 100 INJECTION SUBCUTANEOUS at 08:09

## 2022-05-31 RX ADMIN — ACETAMINOPHEN 650 MG: 325 TABLET ORAL at 18:43

## 2022-05-31 RX ADMIN — SALINE NASAL SPRAY 2 SPRAY: 1.5 SOLUTION NASAL at 22:37

## 2022-05-31 RX ADMIN — ASPIRIN 81 MG: 81 TABLET, COATED ORAL at 22:26

## 2022-05-31 RX ADMIN — GUAIFENESIN 600 MG: 600 TABLET ORAL at 22:37

## 2022-05-31 RX ADMIN — Medication 3 MG: at 22:33

## 2022-05-31 RX ADMIN — ASPIRIN 81 MG: 81 TABLET, COATED ORAL at 08:09

## 2022-05-31 RX ADMIN — DIPHENOXYLATE HYDROCHLORIDE AND ATROPINE SULFATE 1 TABLET: 2.5; .025 TABLET ORAL at 14:05

## 2022-05-31 RX ADMIN — GABAPENTIN 600 MG: 300 CAPSULE ORAL at 22:26

## 2022-05-31 RX ADMIN — SALINE NASAL SPRAY 2 SPRAY: 1.5 SOLUTION NASAL at 08:12

## 2022-05-31 RX ADMIN — AMLODIPINE BESYLATE 10 MG: 10 TABLET ORAL at 08:09

## 2022-05-31 RX ADMIN — ATORVASTATIN CALCIUM 80 MG: 80 TABLET, FILM COATED ORAL at 22:26

## 2022-05-31 RX ADMIN — GUAIFENESIN 600 MG: 600 TABLET ORAL at 08:09

## 2022-05-31 RX ADMIN — MECLIZINE HYDROCHLORIDE 25 MG: 25 TABLET ORAL at 08:09

## 2022-05-31 RX ADMIN — PANTOPRAZOLE SODIUM 40 MG: 40 TABLET, DELAYED RELEASE ORAL at 08:09

## 2022-05-31 ASSESSMENT — ENCOUNTER SYMPTOMS
RESPIRATORY NEGATIVE: 1
SHORTNESS OF BREATH: 0
NAUSEA: 0
BLOOD IN STOOL: 0
COUGH: 0
EYES NEGATIVE: 1
STRIDOR: 0
WHEEZING: 0
DIARRHEA: 0
CHEST TIGHTNESS: 0
VOMITING: 0

## 2022-05-31 ASSESSMENT — PAIN SCALES - GENERAL: PAINLEVEL_OUTOF10: 7

## 2022-05-31 ASSESSMENT — PAIN DESCRIPTION - ORIENTATION: ORIENTATION: MID

## 2022-05-31 ASSESSMENT — PAIN DESCRIPTION - LOCATION: LOCATION: BACK

## 2022-05-31 ASSESSMENT — PAIN DESCRIPTION - DESCRIPTORS: DESCRIPTORS: ACHING

## 2022-05-31 NOTE — PROGRESS NOTES
Physical Therapy Med Surg Daily Treatment Note  Facility/Department: [facility]  [facility]       NAME: Delta Briones  : 1945 (64 y.o.)  MRN: [id number]  CODE STATUS: Full Code    Date of Service: 2022    Patient Diagnosis(es): Elevated troponin [R77.8]  Fever and chills [R50.9]   Chief Complaint   Patient presents with    Dizziness     Patient Active Problem List    Diagnosis Date Noted    Atelectasis, left     Impaired mobility and activities of daily living-metabolic encephalopathy with primary origins of infectious etiology 2022    Lumbar spondylosis 2022    Lumbar stenosis with neurogenic claudication 2022    Dizziness     Benign paroxysmal positional vertigo due to bilateral vestibular disorder     Maxillary pain     Nonintractable headache     Fall at home     Fever and chills     Elevated troponin 2022    Hyperlipidemia 10/16/2015    Type 2 diabetes mellitus with circulatory disorder ([address] 10/16/2015    Vertebral compression fracture ([facility] [address] 10/16/2015    Multiple myeloma ([facility] [address] 2015    Obese 2015    Ataxia 2015        Past Medical History:   Diagnosis Date    Cancer ([facility] [address]     Encounter for lumbar puncture 2022    Completed by Dr. Alix Moses - diagnostics sent    Hyperlipidemia     Hypertension     Type II or unspecified type diabetes mellitus without mention of complication, not stated as uncontrolled      Past Surgical History:   Procedure Laterality Date    CORONARY ANGIOPLASTY WITH STENT PLACEMENT      UPPER GASTROINTESTINAL ENDOSCOPY  8/12/15    w/bx,dilation        Chart Reviewed: Yes  Family / Caregiver Present: No    Restrictions:  Restrictions/Precautions: Fall Risk    SUBJECTIVE:   Subjective: \"You want to take me on a walk? \"    Pain  Pain: \"My back always hurts. \" pt does not rate. OBJECTIVE:        Bed mobility  Bed Mobility Comments: DNT pt in chair before and after tx.     Transfers  Sit to Stand: Supervision  Stand to sit: Supervision  Comment: Slow to initiate    Ambulation  Surface: level tile  Device: Rolling Walker  Assistance: Stand by assistance  Quality of Gait: severely Slow stacey and pace, reduced step length and height. Step to pattern  Distance: [de-identified]'  Comments: Cues for improving gait pattern, fair follow through. Activity Tolerance  Activity Tolerance: Patient tolerated treatment well          ASSESSMENT   Assessment: pt able to increase ambulation distance but still with severely slow pace. Discharge Recommendations:  Continue to assess pending progress,Patient would benefit from continued therapy after discharge         Goals  Long Term Goals  Long term goal 1: Pt to complete bed mobility with indep  Long term goal 2: Pt to complete transfers with indep  Long term goal 3: Pt to ambulate 50-150ft with LRD and indep  Long term goal 4: Pt to tolerate 15min therapeutic exercise/activities without rest break  Long term goal 5: Pt to manage 12 steps with HR and Kenny    PLAN    Plan: 1 time a day 3-6 times a week  Safety Devices  Type of Devices: Call light within reach,Left in chair     [facility] (6 CLICK) [address] Mobility Raw Score : 17      Therapy Time   Individual   Time In 1524   Time Out 1541   Minutes 17      Gait: 15  Trsf:2        Luma ZunigaCHRISTIN, 05/31/22 at 3:57 PM         Definitions for assistance levels  Independent = pt does not require any physical supervision or assistance from another person for activity completion. Device may be needed.   Stand by assistance = pt requires verbal cues or instructions from another person, close to but not touching, to perform the activity  Minimal assistance= pt performs 75% or more of the activity; assistance is required to complete the activity  Moderate assistance= pt performs 50% of the activity; assistance is required to complete the activity  Maximal assistance = pt performs

## 2022-05-31 NOTE — PROGRESS NOTES
Comprehensive Nutrition Assessment    Type and Reason for Visit:  Initial,RD Nutrition Re-Screen/LOS    Nutrition Recommendations/Plan:   1. Discontinue cardiac restrictions    2. Add carb control 4    3. Start clear ONS BID     Malnutrition Assessment:  Malnutrition Status:  Mild malnutrition (05/31/22 9319)    Context:  Chronic Illness     Findings of the 6 clinical characteristics of malnutrition:  Energy Intake:  75% or less estimated energy requirements for 1 month or longer  Weight Loss:  Mild weight loss (specify amount and time period)     Body Fat Loss:  No significant body fat loss     Muscle Mass Loss:  No significant muscle mass loss    Fluid Accumulation:  No significant fluid accumulation     Strength:  Not Performed    Nutrition Assessment:    Pt is stable from a nutrtional standpoint. Reports decreased appetite/po intake on admission due to feeling unwell. PO intakes have been ~ 50% of meals. Will start a clear liquid ONS BID and continue to follow    Nutrition Related Findings:    PMH: Multiple myeloma, hld, htn, DM2. Labs/meds reviewed, 1+ BLE, loose BM 5/31 Wound Type: None       Current Nutrition Intake & Therapies:    Average Meal Intake: 26-50%  Average Supplements Intake: None Ordered  ADULT DIET; Regular; Low Fat/Low Chol/High Fiber/2 gm Na; No Caffeine  ADULT ORAL NUTRITION SUPPLEMENT; Breakfast, Dinner; Clear Liquid Oral Supplement    Anthropometric Measures:  Height: 5' (152.4 cm)  Ideal Body Weight (IBW): 100 lbs (45 kg)    Admission Body Weight: 183 lb (83 kg) (stated)  Current Body Weight: 186 lb (84.4 kg) (5/27),   IBW.  Weight Source: Bed Scale  Current BMI (kg/m2): 36.3  Usual Body Weight: 193 lb (87.5 kg) (11/17/21-office visit, 189 lb (10/26/21-office visit))  % Weight Change (Calculated): -3.6                    BMI Categories: Obese Class 2 (BMI 35.0 -39.9)    Estimated Daily Nutrient Needs:  Energy Requirements Based On: Kcal/kg  Weight Used for Energy Requirements: Current (84.4 kg)  Energy (kcal/day): 8294-8041 (kg x 15-18)  Weight Used for Protein Requirements: Ideal (45.4 kg)  Protein (g/day): 54-64 gm (kg x 1.2-1.4)  Method Used for Fluid Requirements: 1 ml/kcal  Fluid (ml/day): ~ 1500 ml    Nutrition Diagnosis:   · Inadequate protein-energy intake related to early satiety as evidenced by intake 26-50%,poor intake prior to admission    Nutrition Interventions:   Food and/or Nutrient Delivery: Modify Current Diet,Start Oral Nutrition Supplement (Discontinue cardiac restrictions  Add carb control 4  Start clear ONS BID)  Nutrition Education/Counseling: No recommendation at this time  Coordination of Nutrition Care: Continue to monitor while inpatient       Goals:     Goals: PO intake 50% or greater       Nutrition Monitoring and Evaluation:      Food/Nutrient Intake Outcomes: Food and Nutrient Intake,Supplement Intake  Physical Signs/Symptoms Outcomes: Biochemical Data,Meal Time Behavior,Weight    Discharge Planning:     Too soon to determine     Leighton Alvarado RD, LD

## 2022-05-31 NOTE — PROGRESS NOTES
Progress Note  Date:2022       [facility]:[id number]  Patient Name:Mckenna Mendoza     YOB: 1945     Age:76 y.o. Subjective    Subjective:  Symptoms:  She reports malaise and weakness. No shortness of breath, cough, chest pain, headache, chest pressure, anorexia, diarrhea or anxiety. Diet:  No nausea or vomiting. Review of Systems   Respiratory: Negative for cough and shortness of breath. Cardiovascular: Negative for chest pain. Gastrointestinal: Negative for anorexia, diarrhea, nausea and vomiting. Neurological: Positive for weakness. Objective         Vitals Last 24 Hours:  TEMPERATURE:  No data recorded  RESPIRATIONS RANGE: No data recorded  PULSE OXIMETRY RANGE: SpO2  Av %  Min: 96 %  Max: 96 %  PULSE RANGE: Pulse  Av  Min: 71  Max: 71  BLOOD PRESSURE RANGE: Systolic (44MFY), LLZ:070 , Min:146 , EAX:434   ; Diastolic (47PAV), IIL:99, Min:73, Max:73    I/O (24Hr): No intake or output data in the 24 hours ending 22 09  Objective:  General Appearance:  Comfortable, in no acute distress and well-appearing. Vital signs: (most recent): Blood pressure (!) 146/73, pulse 71, temperature 97.9 °F (36.6 °C), temperature source Oral, resp. rate 18, height 5' (1.524 m), weight 186 lb 1.6 oz (84.4 kg), SpO2 96 %. HEENT: Normal HEENT exam.    Lungs: There are decreased breath sounds. Heart: Regular rhythm. S1 normal and S2 normal.    Abdomen: Abdomen is soft. Bowel sounds are normal.     Neurological: Patient is alert and oriented to person, place and time. Pupils:  Pupils are equal, round, and reactive to light. Skin:  Warm and dry. Labs/Imaging/Diagnostics    Labs:  CBC:  No results for input(s): WBC, RBC, HGB, HCT, MCV, RDW, PLT in the last 72 hours. CHEMISTRIES:  No results for input(s): NA, K, CL, CO2, BUN, CREATININE, GLUCOSE, CA, PHOS, MG in the last 72 hours. PT/INR:No results for input(s): PROTIME, INR in the last 72 hours.   APTT:No results for input(s): APTT in the last 72 hours. LIVER PROFILE:No results for input(s): AST, ALT, BILIDIR, BILITOT, ALKPHOS in the last 72 hours. Imaging Last 24 Hours:  MRI LUMBAR SPINE WO CONTRAST    Result Date: 5/26/2022  INDICATION: 64-year-old female, lumbar stenosis. COMPARISON: None. TECHNIQUE: Multiplanar, multisequence MR imaging of the lumbar spine was performed without No administration of intravenous contrast. FINDINGS: There is exaggeration of the normal lordosis of the columns of the lumbar spine visualized, grade 1 anterolisthesis of L5 over S1. Vertebral augmentation in the L1 vertebral body. Depression of the superior endplate of the L3 and L5 vertebral bodies is seen. Multilevel degenerative endplate changes is seen. The STIR sequence demonstrates unremarkable signal intensity of the bone marrow , no evidence of T2 prolongation within the marrow to suggest acute fracture, edema or infiltrative process. The cord terminates at the level of the L1-L2 intervertebral disc space, unremarkable signal intensity of internal cord, the terminal nerve fibers demonstrate unremarkable contour with no evidence of thickening or clumping. L1-L2 demonstrates degenerative disc changes with hypertrophic changes in the facet joints, no significant narrowing of the spinal canal is seen, with mild narrowing of bilateral neuroforamina. L2-L3 demonstrates degenerative disc changes. Atrophic changes of the facet joints and ligamentum flavum, no significant narrowing of the spinal canal is visualized, mild narrowing of the right neural foramina and mild-to-moderate narrowing of the left neural foramina is seen at this level. L3-L4 demonstrates degenerative disc with hypertrophic changes in the facet joints and ligamentum flavum, no significant narrowing of the spinal canal is visualized. Moderate to severe narrowing of the right neural foramina and moderate narrowing of the left neural foramina is seen at this level. L4-L5 demonstrates extensive degenerative changes, hypertrophic changes in the facet joints with prominent hypertrophic changes in the ligamentum flavum, moderate to severe narrowing of the spinal canal with severe narrowing of the right neural foramina and moderate to severe narrowing of the left neural foramina seen at this level. L5-S1 demonstrates degenerative changes with hypertrophic changes in the facet joints, no significant narrowing of the spinal canal is visualized at this level,  severe narrowing of the right neuroforamina and moderate to severe narrowing of the left neural foramina is seen at this level. Extensive degenerative changes of the lumbar spine visualized most prominent at L4-L5 and L5-S1. Assessment//Plan           Hospital Problems           Last Modified POA    * (Principal) Elevated troponin 5/23/2022 Yes    Dizziness 5/24/2022 Yes    Benign paroxysmal positional vertigo due to bilateral vestibular disorder 5/24/2022 Yes    Maxillary pain 5/24/2022 Yes    Nonintractable headache 5/24/2022 Yes    Fall at home 5/24/2022 Yes    Fever and chills 5/24/2022 Yes    Impaired mobility and activities of daily living-metabolic encephalopathy with primary origins of infectious etiology 5/27/2022 Yes    Lumbar spondylosis 5/27/2022 Yes    Lumbar stenosis with neurogenic claudication 5/27/2022 Yes    Atelectasis, left 5/28/2022 Yes      Encephalopathy  OSIRIS  Dizziness  HTN/HLD  OSIRIS  Assessment & Plan    5/27: Hold lisinopril, IV fluids for acute kidney injury, continue with PT OT. Dizziness resolved. Encephalopathy resolved. Fevers down. No overnight events no new complaints. anticipated discharge tomorrow to rehab. Bmp tomorrow. 5/28: pt is medically stable to be discharged any time to [facility] vs home, no overnight events, dizziness is less no fevers, abx as per ID  5/29: Had episode of dizziness upon standing. Orthostatic was negative. ENT consulted for chronic sinus pain and pressure. This is the first time patient is told me this. Patient stated today that she had a lot of nasal surgery and ENT evaluation in the past for sinuses and also patient complaining of ear pain also as per edna. Patient has been getting IV antibiotics since admission. Spoke with nursing about the care. 5/30: Dizziness is better. Pre-CERT is pending from [facility]. Appreciate ENT input. Continue current care. Avoid chocolates, salt and caffeine   5/31: Patient was seen and evaluated. Waiting for rehab. No overnight events no new complaints. Continue current care.   Electronically signed by Ignacio Perez MD on 5/27/22 at 1:09 PM EDT

## 2022-05-31 NOTE — PLAN OF CARE
Nutrition Problem #1: Inadequate protein-energy intake  Intervention: Food and/or Nutrient Delivery: Modify Current Diet,Start Oral Nutrition Supplement (Discontinue cardiac restrictions  Add carb control 4  Start clear ONS BID)

## 2022-05-31 NOTE — PROGRESS NOTES
Progress Note  Patient: Arcenio Ellington  Unit/Bed: A605/G479-36  YOB: 1945  MRN: [id number]  Acct: [de-identified]   Admitting Diagnosis: Elevated troponin [R77.8]  Fever and chills [R50.9]  Admit Date:  5/23/2022  Hospital Day: 6    Chief Complaint: weakness dizizness     Histories:  Past Medical History:   Diagnosis Date    Cancer [facility])     Encounter for lumbar puncture 05/25/2022    Completed by Dr. Shaista Guzmán - diagnostics sent    Hyperlipidemia     Hypertension     Type II or unspecified type diabetes mellitus without mention of complication, not stated as uncontrolled      Past Surgical History:   Procedure Laterality Date    CORONARY ANGIOPLASTY WITH STENT PLACEMENT      UPPER GASTROINTESTINAL ENDOSCOPY  8/12/15    w/bx,dilation      Family History   Problem Relation Age of Onset    Cancer Mother     Arthritis Mother     Diabetes Mother     Heart Disease Mother     High Blood Pressure Mother     High Cholesterol Mother     Arthritis Father     Diabetes Father     Heart Disease Father     High Blood Pressure Father     High Cholesterol Father      Social History     Socioeconomic History    Marital status:      Spouse name: None    Number of children: None    Years of education: None    Highest education level: None   Occupational History    None   Tobacco Use    Smoking status: Former Smoker     Years: 24.00    Smokeless tobacco: Never Used   Substance and Sexual Activity    Alcohol use:  Yes     Alcohol/week: 3.0 standard drinks     Types: 3 Glasses of wine per week    Drug use: No    Sexual activity: None   Other Topics Concern    None   Social History Narrative    Lives With: Spouse,  Son (pt never home alone)    Type of Home: House in [address] [place] [address],[address] in basement,Two level (doesn't have to use basement)    Home Access: Stairs to enter with rails- Number of Steps: 6- Rails: Right    Bathroom Shower/Tub: Walk-in shower Bathroom Equipment: Shower chair,Grab bars in shower    Home Equipment: [address]:  ( assists with bathing/dressing; pt indep with toileting activities)    Homemaking Assistance:  (spouse and son complete)    Homemaking Responsibilities: No    Ambulation Assistance: Independent (cane/rollator PRN)    Transfer Assistance: Independent    Additional Comments: Pt inconsistent historian. Pt's spouse arriving mid assessment and confirms home set up and PLOF         Social Determinants of Health     Financial Resource Strain: Low Risk     Difficulty of Paying Living Expenses: Not hard at all   Food Insecurity: No Food Insecurity    Worried About Running Out of Food in the Last Year: Never true    [address] in the Last Year: Never true   Transportation Needs:     Lack of Transportation (Medical): Not on file    Lack of Transportation (Non-Medical): Not on file   Physical Activity:     Days of Exercise per Week: Not on file    Minutes of Exercise per Session: Not on file   Stress:     Feeling of Stress : Not on file   Social Connections:     Frequency of Communication with Friends and Family: Not on file    Frequency of Social Gatherings with Friends and Family: Not on file    Attends Ten[employer] Services: Not on file    Active Member of [address] [place] [address] [employer] or Organizations: Not on file    Attends Club or Organization Meetings: Not on file    Marital Status: Not on file   Intimate Partner Violence:     Fear of Current or Ex-Partner: Not on file    Emotionally Abused: Not on file    Physically Abused: Not on file    Sexually Abused: Not on file   Housing Stability:     Unable to Pay for Housing in the Last Year: Not on file    Number of [place] in the Last Year: Not on file    Unstable Housing in the Last Year: Not on file       Subjective/HPI  No fever today. Feels well. Not dizzy today. Eating well.     EKG: SR 80        Review of Systems:   Review of Systems   Constitutional: Negative. Negative for diaphoresis and fatigue. HENT: Negative. Eyes: Negative. Respiratory: Negative. Negative for cough, chest tightness, shortness of breath, wheezing and stridor. Cardiovascular: Negative. Negative for chest pain, palpitations and leg swelling. Gastrointestinal: Negative for blood in stool and nausea. Genitourinary: Negative. Skin: Negative. Neurological: Positive for weakness. Negative for syncope and light-headedness. Hematological: Negative. Psychiatric/Behavioral: Negative. Physical Examination:    BP (!) 146/73   Pulse 71   Temp 97.9 °F (36.6 °C) (Oral)   Resp 18   Ht 5' (1.524 m)   Wt 186 lb 1.6 oz (84.4 kg)   SpO2 96%   BMI 36.35 kg/m²    Physical Exam   Constitutional: She appears healthy. No distress. HENT:   Normal cephalic and Atraumatic   Eyes: Pupils are equal, round, and reactive to light. Neck: Thyroid normal. No JVD present. No neck adenopathy. No thyromegaly present. Cardiovascular: Normal rate, regular rhythm, normal heart sounds, intact distal pulses and normal pulses. Pulmonary/Chest: Effort normal and breath sounds normal. She has no wheezes. She has no rales. She exhibits no tenderness. Abdominal: Soft. Bowel sounds are normal. There is no abdominal tenderness. Musculoskeletal:         General: No tenderness or edema. Normal range of motion. Cervical back: Normal range of motion and neck supple. Neurological: She is alert and oriented to person, place, and time. Skin: Skin is warm. No cyanosis. Nails show no clubbing.        LABS:  CBC:   Lab Results   Component Value Date    WBC 10.9 05/26/2022    RBC 4.17 05/26/2022    HGB 13.4 05/26/2022    HCT 40.6 05/26/2022    MCV 97.2 05/26/2022    MCH 32.0 05/26/2022    MCHC 32.9 05/26/2022    RDW 15.5 05/26/2022     05/26/2022    MPV 8.2 10/12/2015     CBC with Differential:    Lab Results   Component Value Date    WBC 10.9 05/26/2022    RBC 4.17 05/26/2022 HGB 13.4 05/26/2022    HCT 40.6 05/26/2022     05/26/2022    MCV 97.2 05/26/2022    MCH 32.0 05/26/2022    MCHC 32.9 05/26/2022    RDW 15.5 05/26/2022    LYMPHOPCT 11.2 05/26/2022    MONOPCT 8.0 05/26/2022    BASOPCT 0.6 05/26/2022    MONOSABS 0.9 05/26/2022    LYMPHSABS 1.2 05/26/2022    EOSABS 0.3 05/26/2022    BASOSABS 0.1 05/26/2022     CMP:    Lab Results   Component Value Date     05/28/2022    K 3.5 05/28/2022    K 3.6 05/26/2022     05/28/2022    CO2 21 05/28/2022    BUN 13 05/28/2022    CREATININE 1.06 05/28/2022    GFRAA >60.0 05/28/2022    LABGLOM 50.0 05/28/2022    GLUCOSE 118 05/28/2022    PROT 6.6 05/23/2022    LABALBU 4.4 05/23/2022    CALCIUM 8.7 05/28/2022    BILITOT 1.1 05/23/2022    ALKPHOS 74 05/23/2022    AST 18 05/23/2022    ALT 23 05/23/2022     BMP:    Lab Results   Component Value Date     05/28/2022    K 3.5 05/28/2022    K 3.6 05/26/2022     05/28/2022    CO2 21 05/28/2022    BUN 13 05/28/2022    LABALBU 4.4 05/23/2022    CREATININE 1.06 05/28/2022    CALCIUM 8.7 05/28/2022    GFRAA >60.0 05/28/2022    LABGLOM 50.0 05/28/2022    GLUCOSE 118 05/28/2022     Magnesium:    Lab Results   Component Value Date    MG 1.6 05/24/2022     Troponin:    Lab Results   Component Value Date    TROPONINI 0.036 05/24/2022        Active Hospital Problems    Diagnosis Date Noted    Atelectasis, left [J98.11]      Priority: Medium    Impaired mobility and activities of daily living-metabolic encephalopathy with primary origins of infectious etiology [Z74.09, Z78.9] 05/27/2022     Priority: Medium    Lumbar spondylosis [M47.816] 05/27/2022     Priority: Medium    Lumbar stenosis with neurogenic claudication [M48.062] 05/27/2022     Priority: Medium    Dizziness [R42]      Priority: Medium    Benign paroxysmal positional vertigo due to bilateral vestibular disorder [H81.13]      Priority: Medium    Maxillary pain [R68.84]      Priority: Medium    Nonintractable headache [R51.9]      Priority: Medium    Fall at home [O71. Warren Trinidad, Y92.009]      Priority: Medium    Fever and chills [R50.9]      Priority: Medium    Elevated troponin [R77.8] 05/23/2022     Priority: Medium        Assessment/Plan:    1. Fever - resolved  2. Dizziness -ENT note reviewed. F/u out pt studies. 3. Elevated possible Demand ischemia  - Borderline levels. No CP  4. Echo EF 65%  5. HTN stable - continue meds. Low salt diet. 6. HPL - Statin   7. HypoMag 1.6 - replaced  8. Awaiting rehab precert. Ok for transfer anytime.           Electronically signed by Escobar Trinidad MD on 5/31/2022 at 8:43 AM

## 2022-05-31 NOTE — FLOWSHEET NOTE
Spiritual Care Services     Summary of Visit:  Pt was sitting up in a high back chair during the visit, pt was in good spirits and did a lot of reminiscing about her past, pt talked a lot about her mother and the impact she had in her life and also her extended families, pt smiled off and on and is taking life one day at a time, pt has great emotional and spiritual support from her immediate family, her son had her and her  move from [address] [place] [address] to be close to them, pt appreciated the visit,     Spiritual Assessment/Intervention/Outcomes:    Encounter Summary  Encounter Overview/Reason : Attempted Encounter  Service Provided For[de-identified] (P) Patient  Referral/Consult From[de-identified] (P) Rounding  Support System: (P) Spouse,Children,Family members  Complexity of Encounter: (P) Moderate  Begin Time: (P) 1345  End Time : (P) 1415  Total Time Calculated: (P) 30 min  Encounter   Type: (P) Follow up  Crisis  Type: Rapid Response  Spiritual/Emotional needs  Type: (P) Spiritual Support     Grief, Loss, and Adjustments  Type: Adjustment to illness              Values / Beliefs  Do You Have Any Ethnic, Cultural, Sacramental, or Spiritual Jainism Needs You Would Like Us To Be Aware of While You Are in the Hospital : No    Care Plan:        [id number] [address]   Electronically signed by Pema Kiran on 5/31/22 at 3:24 PM EDT     To reach a  for emotional and spiritual support, place an [facility] consult request.   If a  is needed immediately, dial 0 and ask to page the on-call .

## 2022-05-31 NOTE — PROGRESS NOTES
Physician Progress Note      Kristy ATKINSON #:                  [id number]  :                       1945  ADMIT DATE:       2022 12:56 PM  [address] [place] DATE:  RESPONDING  PROVIDER #:        Jean Dick MD          QUERY TEXT:    Patient admitted with dizziness. Noted documentation of Acute Kidney Injury   in PN. In order to support the diagnosis of OSIRIS, please include additional   clinical indicators in your documentation. ? Or please document if the   diagnosis of OSIRIS has been ruled out after further study. The medical record reflects the following:  Risk Factors: dizziness, metabolic encephalopathy, fever, CKD 3  Clinical Indicators: creat/gfr  .96/56.1 .97/55.4,1.18/44.2, 1.06/50.0  Treatment: lab monitoring    Defined by Kidney Disease Improving Global Outcomes (KDIGO) clinical practice   guideline for acute kidney injury:  -Increase in SCr by greater than or equal to 0.3 mg/dl within 48 hours; or  -Increase or decrease in SCr to greater than or equal to 1.5 times baseline,   which is known or presumed to have occurred within the prior 7 days; or  -Urine volume < 0.5ml/kg/h for 6 hours. Dearl Josette TOBIAS, RN, [facility]  [phone]  Options provided:  -- Acute kidney injury evidenced by, Please document evidence as well as a   numerical baseline creatinine, if known. -- Currently resolved acute kidney injury was evidenced by, Please document   evidence as well as a numerical baseline creatinine, if known.   -- Acute kidney injury ruled out after study  -- Other - I will add my own diagnosis  -- Disagree - Not applicable / Not valid  -- Disagree - Clinically unable to determine / Unknown  -- Refer to Clinical Documentation Reviewer    PROVIDER RESPONSE TEXT:    This patient has acute kidney injury as evidenced by by elevated creatine from   the baseline    Query created by: Bruno Ricci on 2022 12:00 PM      Electronically signed by:  Jean Dick MD 2022 12:49 PM

## 2022-06-01 LAB
GLUCOSE BLD-MCNC: 117 MG/DL (ref 70–99)
GLUCOSE BLD-MCNC: 130 MG/DL (ref 70–99)
GLUCOSE BLD-MCNC: 91 MG/DL (ref 70–99)
PERFORMED ON: ABNORMAL
PERFORMED ON: ABNORMAL
PERFORMED ON: NORMAL

## 2022-06-01 PROCEDURE — 6370000000 HC RX 637 (ALT 250 FOR IP): Performed by: INTERNAL MEDICINE

## 2022-06-01 PROCEDURE — 6370000000 HC RX 637 (ALT 250 FOR IP)

## 2022-06-01 PROCEDURE — 1210000000 HC MED SURG R&B

## 2022-06-01 PROCEDURE — 97110 THERAPEUTIC EXERCISES: CPT

## 2022-06-01 PROCEDURE — 2580000003 HC RX 258: Performed by: PSYCHIATRY & NEUROLOGY

## 2022-06-01 PROCEDURE — 6370000000 HC RX 637 (ALT 250 FOR IP): Performed by: PHYSICIAN ASSISTANT

## 2022-06-01 PROCEDURE — 99232 SBSQ HOSP IP/OBS MODERATE 35: CPT | Performed by: INTERNAL MEDICINE

## 2022-06-01 PROCEDURE — APPSS15 APP SPLIT SHARED TIME 0-15 MINUTES: Performed by: NURSE PRACTITIONER

## 2022-06-01 PROCEDURE — 99232 SBSQ HOSP IP/OBS MODERATE 35: CPT | Performed by: PSYCHIATRY & NEUROLOGY

## 2022-06-01 RX ADMIN — ATORVASTATIN CALCIUM 80 MG: 80 TABLET, FILM COATED ORAL at 20:48

## 2022-06-01 RX ADMIN — ASPIRIN 81 MG: 81 TABLET, COATED ORAL at 20:48

## 2022-06-01 RX ADMIN — AMLODIPINE BESYLATE 10 MG: 10 TABLET ORAL at 10:35

## 2022-06-01 RX ADMIN — GABAPENTIN 600 MG: 300 CAPSULE ORAL at 20:48

## 2022-06-01 RX ADMIN — ACETAMINOPHEN 650 MG: 325 TABLET ORAL at 20:48

## 2022-06-01 RX ADMIN — GUAIFENESIN 600 MG: 600 TABLET ORAL at 10:35

## 2022-06-01 RX ADMIN — Medication 3 MG: at 22:44

## 2022-06-01 RX ADMIN — SALINE NASAL SPRAY 2 SPRAY: 1.5 SOLUTION NASAL at 09:00

## 2022-06-01 RX ADMIN — PANTOPRAZOLE SODIUM 40 MG: 40 TABLET, DELAYED RELEASE ORAL at 05:30

## 2022-06-01 RX ADMIN — Medication 10 ML: at 20:47

## 2022-06-01 RX ADMIN — Medication 800 MG: at 10:35

## 2022-06-01 RX ADMIN — GUAIFENESIN 600 MG: 600 TABLET ORAL at 20:48

## 2022-06-01 RX ADMIN — ASPIRIN 81 MG: 81 TABLET, COATED ORAL at 10:35

## 2022-06-01 RX ADMIN — SALINE NASAL SPRAY 2 SPRAY: 1.5 SOLUTION NASAL at 16:53

## 2022-06-01 ASSESSMENT — ENCOUNTER SYMPTOMS
COUGH: 0
VOICE CHANGE: 0
CONSTIPATION: 0
CHEST TIGHTNESS: 0
BACK PAIN: 0
BLOOD IN STOOL: 0
WHEEZING: 0
TROUBLE SWALLOWING: 0
SORE THROAT: 0
STRIDOR: 0
VOMITING: 0
RESPIRATORY NEGATIVE: 1
DIARRHEA: 0
SHORTNESS OF BREATH: 0
COLOR CHANGE: 0
NAUSEA: 0
EYES NEGATIVE: 1

## 2022-06-01 NOTE — CARE COORDINATION
Patient was denied admission to skilled unit at [facility]. P2P information provided to [address] . Phone #[phone] option 5 available till noon today 6/1/2022.

## 2022-06-01 NOTE — PLAN OF CARE
Problem: Discharge Planning  Goal: Discharge to home or other facility with appropriate resources  Outcome: Progressing  Flowsheets (Taken 5/31/2022 0810)  Discharge to home or other facility with appropriate resources:   Identify barriers to discharge with patient and caregiver   Arrange for needed discharge resources and transportation as appropriate   Identify discharge learning needs (meds, wound care, etc)   Refer to discharge planning if patient needs post-hospital services based on physician order or complex needs related to functional status, cognitive ability or social support system     Problem: Safety - Adult  Goal: Free from fall injury  Outcome: Progressing     Problem: Chronic Conditions and Co-morbidities  Goal: Patient's chronic conditions and co-morbidity symptoms are monitored and maintained or improved  Outcome: Progressing  Flowsheets (Taken 5/31/2022 0810)  Care Plan - Patient's Chronic Conditions and Co-Morbidity Symptoms are Monitored and Maintained or Improved: Monitor and assess patient's chronic conditions and comorbid symptoms for stability, deterioration, or improvement     Problem: ABCDS Injury Assessment  Goal: Absence of physical injury  Outcome: Progressing     Problem: Skin/Tissue Integrity  Goal: Absence of new skin breakdown  Outcome: Progressing     Problem: Pain  Goal: Verbalizes/displays adequate comfort level or baseline comfort level  Outcome: Progressing     Problem: Nutrition Deficit:  Goal: Optimize nutritional status  5/31/2022 2147 by Patricia Armstrong RN  Outcome: Progressing  5/31/2022 1550 by Marlin Scruggs RD, LD  Flowsheets (Taken 5/31/2022 1550)  Nutrient intake appropriate for improving, restoring, or maintaining nutritional needs:   Assess nutritional status and recommend course of action   Monitor oral intake, labs, and treatment plans   Recommend appropriate diets, oral nutritional supplements, and vitamin/mineral supplements

## 2022-06-01 NOTE — PROGRESS NOTES
Physician Progress Note      Celestino Betancourt  [facility] #:                  [id number]  :                       1945  ADMIT DATE:       2022 12:56 PM  [address] [place] DATE:  RESPONDING  PROVIDER #:        Coco Pedroza MD          QUERY TEXT:    Patient admitted with dizziness. Noted documentation of Acute Kidney Injury   in PN. In order to support the diagnosis of OSIRIS, please include additional   clinical indicators in your documentation. ? Or please document if the   diagnosis of OSIRIS has been ruled out after further study. The medical record reflects the following:  Risk Factors: dizziness, metabolic encephalopathy, fever, CKD 3  Clinical Indicators: creat/gfr  .96/56.1 .97/55.4,1.18/44.2, 1.06/50.0  Treatment: lab monitoring    Defined by Kidney Disease Improving Global Outcomes (KDIGO) clinical practice   guideline for acute kidney injury:  -Increase in SCr by greater than or equal to 0.3 mg/dl within 48 hours; or  -Increase or decrease in SCr to greater than or equal to 1.5 times baseline,   which is known or presumed to have occurred within the prior 7 days; or  -Urine volume < 0.5ml/kg/h for 6 hours. Kimebrly TOBIAS, RN, CDS  [phone]  Options provided:  -- Acute kidney injury evidenced by, Please document evidence as well as a   numerical baseline creatinine, if known. -- Currently resolved acute kidney injury was evidenced by, Please document   evidence as well as a numerical baseline creatinine, if known. -- Acute kidney injury ruled out after study  -- Other - I will add my own diagnosis  -- Disagree - Not applicable / Not valid  -- Disagree - Clinically unable to determine / Unknown  -- Refer to Clinical Documentation Reviewer    PROVIDER RESPONSE TEXT:    Acute kidney injury was ruled out after study. Query created by: Calton Ahumada on 2022 9:30 AM      QUERY TEXT:    Pt admitted with dizziness. Noted documentation of ventriculomegaly.   If   possible, please document in progress notes and discharge summary:    The medical record reflects the following:  Risk Factors: dizziness, small caliber basilar artery, encephalopathy  Clinical Indicators: MRI from [facility] showed ventriculomegaly per Dr. Sun Dhaliwal  Treatment: neurology consult, MRI, CT, carotid US    Shanta TOBIAS, RN, CDS  [phone]  Options provided:  -- Ventriculomegaly confirmed present on admission  -- Ventriculomegaly ruled out  -- Defer to Dr. Sun Dhaliwal regarding ventriculomegaly  -- Other - I will add my own diagnosis  -- Disagree - Not applicable / Not valid  -- Disagree - Clinically unable to determine / Unknown  -- Refer to Clinical Documentation Reviewer    PROVIDER RESPONSE TEXT:    I defer to Dr. Sun Dhaliwal regarding documentation of ventriculomegaly.     Query created by: Holger James on 6/1/2022 9:47 AM      Electronically signed by:  Tressa Dow MD 6/1/2022 1:17 PM

## 2022-06-01 NOTE — PROGRESS NOTES
Progress Note  Date:6/1/2022       QNLF:G881/B180-73  Patient Name:Mckenna Mendoza     YOB: 1945     Age:76 y.o. Subjective    Subjective:  Symptoms:  She reports malaise and weakness. No shortness of breath, cough, chest pain, headache, chest pressure, anorexia, diarrhea or anxiety. Diet:  No nausea or vomiting. Review of Systems   Respiratory: Negative for cough and shortness of breath. Cardiovascular: Negative for chest pain. Gastrointestinal: Negative for anorexia, diarrhea, nausea and vomiting. Neurological: Positive for weakness. Objective         Vitals Last 24 Hours:  TEMPERATURE:  No data recorded  RESPIRATIONS RANGE: No data recorded  PULSE OXIMETRY RANGE: No data recorded  PULSE RANGE: No data recorded  BLOOD PRESSURE RANGE: No data recorded.  ; No data recorded. I/O (24Hr): Intake/Output Summary (Last 24 hours) at 6/1/2022 1318  Last data filed at 6/1/2022 0543  Gross per 24 hour   Intake --   Output 400 ml   Net -400 ml     Objective:  General Appearance:  Comfortable, in no acute distress and well-appearing. Vital signs: (most recent): Blood pressure (!) 146/73, pulse 71, temperature 97.9 °F (36.6 °C), temperature source Oral, resp. rate 18, height 5' (1.524 m), weight 189 lb 4.8 oz (85.9 kg), SpO2 96 %. HEENT: Normal HEENT exam.    Lungs: There are decreased breath sounds. Heart: Regular rhythm. S1 normal and S2 normal.    Abdomen: Abdomen is soft. Bowel sounds are normal.     Neurological: Patient is alert and oriented to person, place and time. Pupils:  Pupils are equal, round, and reactive to light. Skin:  Warm and dry. Labs/Imaging/Diagnostics    Labs:  CBC:  No results for input(s): WBC, RBC, HGB, HCT, MCV, RDW, PLT in the last 72 hours. CHEMISTRIES:  No results for input(s): NA, K, CL, CO2, BUN, CREATININE, GLUCOSE, CA, PHOS, MG in the last 72 hours. PT/INR:No results for input(s): PROTIME, INR in the last 72 hours.   APTT:No results for input(s): APTT in the last 72 hours. LIVER PROFILE:No results for input(s): AST, ALT, BILIDIR, BILITOT, ALKPHOS in the last 72 hours. Imaging Last 24 Hours:  MRI LUMBAR SPINE WO CONTRAST    Result Date: 5/26/2022  INDICATION: 73-year-old female, lumbar stenosis. COMPARISON: None. TECHNIQUE: Multiplanar, multisequence MR imaging of the lumbar spine was performed without No administration of intravenous contrast. FINDINGS: There is exaggeration of the normal lordosis of the columns of the lumbar spine visualized, grade 1 anterolisthesis of L5 over S1. Vertebral augmentation in the L1 vertebral body. Depression of the superior endplate of the L3 and L5 vertebral bodies is seen. Multilevel degenerative endplate changes is seen. The STIR sequence demonstrates unremarkable signal intensity of the bone marrow , no evidence of T2 prolongation within the marrow to suggest acute fracture, edema or infiltrative process. The cord terminates at the level of the L1-L2 intervertebral disc space, unremarkable signal intensity of internal cord, the terminal nerve fibers demonstrate unremarkable contour with no evidence of thickening or clumping. L1-L2 demonstrates degenerative disc changes with hypertrophic changes in the facet joints, no significant narrowing of the spinal canal is seen, with mild narrowing of bilateral neuroforamina. L2-L3 demonstrates degenerative disc changes. Atrophic changes of the facet joints and ligamentum flavum, no significant narrowing of the spinal canal is visualized, mild narrowing of the right neural foramina and mild-to-moderate narrowing of the left neural foramina is seen at this level. L3-L4 demonstrates degenerative disc with hypertrophic changes in the facet joints and ligamentum flavum, no significant narrowing of the spinal canal is visualized. Moderate to severe narrowing of the right neural foramina and moderate narrowing of the left neural foramina is seen at this level. L4-L5 demonstrates extensive degenerative changes, hypertrophic changes in the facet joints with prominent hypertrophic changes in the ligamentum flavum, moderate to severe narrowing of the spinal canal with severe narrowing of the right neural foramina and moderate to severe narrowing of the left neural foramina seen at this level. L5-S1 demonstrates degenerative changes with hypertrophic changes in the facet joints, no significant narrowing of the spinal canal is visualized at this level,  severe narrowing of the right neuroforamina and moderate to severe narrowing of the left neural foramina is seen at this level. Extensive degenerative changes of the lumbar spine visualized most prominent at L4-L5 and L5-S1. Assessment//Plan           Hospital Problems           Last Modified POA    * (Principal) Elevated troponin 5/23/2022 Yes    Dizziness 5/24/2022 Yes    Benign paroxysmal positional vertigo due to bilateral vestibular disorder 5/24/2022 Yes    Maxillary pain 5/24/2022 Yes    Nonintractable headache 5/24/2022 Yes    Fall at home 5/24/2022 Yes    Fever and chills 5/24/2022 Yes    Impaired mobility and activities of daily living-metabolic encephalopathy with primary origins of infectious etiology 5/27/2022 Yes    Lumbar spondylosis 5/27/2022 Yes    Lumbar stenosis with neurogenic claudication 5/27/2022 Yes    Atelectasis, left 5/28/2022 Yes      Encephalopathy  OSIRIS  Dizziness  HTN/HLD  OSIRIS  Assessment & Plan    5/27: Hold lisinopril, IV fluids for acute kidney injury, continue with PT OT. Dizziness resolved. Encephalopathy resolved. Fevers down. No overnight events no new complaints. anticipated discharge tomorrow to rehab. Bmp tomorrow. 5/28: pt is medically stable to be discharged any time to [facility] vs home, no overnight events, dizziness is less no fevers, abx as per ID  5/29: Had episode of dizziness upon standing. Orthostatic was negative. ENT consulted for chronic sinus pain and pressure. This is the first time patient is told me this. Patient stated today that she had a lot of nasal surgery and ENT evaluation in the past for sinuses and also patient complaining of ear pain also as per edna. Patient has been getting IV antibiotics since admission. Spoke with nursing about the care. 5/30: Dizziness is better. Pre-CERT is pending from [facility]. Appreciate ENT input. Continue current care. Avoid chocolates, salt and caffeine   5/31: Patient was seen and evaluated. Waiting for rehab. No overnight events no new complaints. Continue current care. 6/1: [facility]  did not recommend rehab for the patient recommend long-term care for 1 week before going home. Spoke to . precert pending, pt is in agreement.   Electronically signed by Padmini Vieira MD on 5/27/22 at 1:09 PM EDT

## 2022-06-01 NOTE — CARE COORDINATION
Referral was sent to [facility] for [facility] or [employer] per pt request.  Sunshine Machado approval for transfer.

## 2022-06-01 NOTE — PROGRESS NOTES
Physical Therapy Med Surg Daily Treatment Note  Facility/Department: [facility]  Room: [facility]/K744-51       NAME: Delta Briones  : 1945 (64 y.o.)  MRN: [id number]  CODE STATUS: Full Code    Date of Service: 2022    Patient Diagnosis(es): Elevated troponin [R77.8]  Fever and chills [R50.9]   Chief Complaint   Patient presents with    Dizziness     Patient Active Problem List    Diagnosis Date Noted    Atelectasis, left     Impaired mobility and activities of daily living-metabolic encephalopathy with primary origins of infectious etiology 2022    Lumbar spondylosis 2022    Lumbar stenosis with neurogenic claudication 2022    Dizziness     Benign paroxysmal positional vertigo due to bilateral vestibular disorder     Maxillary pain     Nonintractable headache     Fall at home     Fever and chills     Elevated troponin 2022    Hyperlipidemia 10/16/2015    Type 2 diabetes mellitus with circulatory disorder ([address] 10/16/2015    Vertebral compression fracture ([facility] 10/16/2015    Multiple myeloma ([facility] 2015    Obese 2015    Ataxia 2015        Past Medical History:   Diagnosis Date    Cancer ([address]     Encounter for lumbar puncture 2022    Completed by Dr. Alix Moses - diagnostics sent    Hyperlipidemia     Hypertension     Type II or unspecified type diabetes mellitus without mention of complication, not stated as uncontrolled      Past Surgical History:   Procedure Laterality Date    CORONARY ANGIOPLASTY WITH STENT PLACEMENT      UPPER GASTROINTESTINAL ENDOSCOPY  8/12/15    w/bx,dilation        Chart Reviewed: Yes  Family / Caregiver Present: Yes    Restrictions:  Restrictions/Precautions: Fall Risk    SUBJECTIVE:   Subjective: \"I was just going to the bathroom then back to bed. \"    Pain  Pain: \"My back always hurts. \" pt does not rate.       OBJECTIVE:        Bed mobility  Supine to Sit:  (DNT pt in chair at start of tx.)  Sit to Supine: Modified independent (HOB slightly elevated.)    Transfers  Sit to Stand: Supervision  Stand to sit: Supervision    Ambulation  Surface: level tile  Device: Rolling Walker  Assistance: Stand by assistance  Quality of Gait: severely Slow stacey and pace, reduced step length and height. Step to pattern  Distance: 30' in room. PT Exercises  Exercise Treatment: standing sink exercises calf raises, HC, hip abduction x10 BLE. Activity Tolerance  Activity Tolerance: Patient tolerated treatment well          ASSESSMENT   Assessment: pt tolerates standing therex well, distance not the focus of tx. pt reports concern with home setup making her want to go for therapy instead of home at [place]. Discharge Recommendations:  Continue to assess pending progress,Patient would benefit from continued therapy after discharge         Goals  Long Term Goals  Long term goal 1: Pt to complete bed mobility with indep  Long term goal 2: Pt to complete transfers with indep  Long term goal 3: Pt to ambulate 50-150ft with LRD and indep  Long term goal 4: Pt to tolerate 15min therapeutic exercise/activities without rest break  Long term goal 5: Pt to manage 12 steps with HR and Kenny    PLAN    Plan: 1 time a day 3-6 times a week  Safety Devices  Type of Devices: All fall risk precautions in place,Bed alarm in place,Call light within reach,Left in bed     [facility] (6 CLICK) Ayden [address] 28 Inpatient Mobility Raw Score : 17      Therapy Time   Individual   Time In 1500   Time Out 1515   Minutes 15      Gait: 5  Therex: 8  BM/Trsf: 2        Ron Oliver PTA, 06/01/22 at 3:36 PM         Definitions for assistance levels  Independent = pt does not require any physical supervision or assistance from another person for activity completion. Device may be needed.   Stand by assistance = pt requires verbal cues or instructions from another person, close to but not touching, to perform the activity  Minimal assistance= pt performs 75% or more of the activity; assistance is required to complete the activity  Moderate assistance= pt performs 50% of the activity; assistance is required to complete the activity  Maximal assistance = pt performs 25% of the activity; assistance is required to complete the activity  Dependent = pt requires total physical assistance to accomplish the task

## 2022-06-01 NOTE — PROGRESS NOTES
[facility] Neurology Daily Progress Note  Name: Arcenio Ellington  Age: 64 y.o. Gender: female  CodeStatus: Full Code  Allergies: Eggs Or Egg-Derived Products  Glucophage [Metformin]  Valium [Diazepam]  Zithromax [Azithromycin]    Chief Complaint:Dizziness    Primary Care Provider: Carolina Granado MD  InpatientTreatment Team: Treatment Team: Attending Provider: Scarlet Casanova MD; Consulting Physician: Chriss Oh MD; Consulting Physician: Lakshmi Berumen DO; Consulting Physician: Niecy Bush DO; Consulting Physician: Sal Kaur MD; Utilization Reviewer: Jb Montes, RN; LPN: Ramin Noriega LPN; : Michel Mckeon RN  Admission Date: 5/23/2022  Patient seen and examined for neurology follow-up for dizziness. Currently alert and oriented x3, no acute distress, cooperative. Dizziness greatly improved today. Denies tinnitus, otalgia, hearing changes. Sinus pressure improved with nasal spray. No focal neurodeficits. Blood pressure stable. No further dizziness and stable,  back stillan issue   Vitals:    05/31/22 0724   BP: (!) 146/73   Pulse: 71   Resp:    Temp:    SpO2: 96%      Review of Systems   Constitutional: Negative for appetite change and fever. HENT: Negative for ear pain, sore throat, tinnitus, trouble swallowing and voice change. Respiratory: Negative for cough and shortness of breath. Cardiovascular: Negative for chest pain and palpitations. Gastrointestinal: Negative for constipation, diarrhea, nausea and vomiting. Genitourinary: Negative for difficulty urinating. Musculoskeletal: Negative for arthralgias and back pain. Skin: Negative for color change. Neurological: Negative for dizziness, weakness, light-headedness and headaches. Psychiatric/Behavioral: Negative for agitation, behavioral problems and confusion. All other systems reviewed and are negative. Physical Exam  Vitals and nursing note reviewed.    Constitutional:       General: She is not in acute distress. Appearance: Normal appearance. She is well-developed. HENT:      Head: Normocephalic and atraumatic. Eyes:      General:         Right eye: No discharge. Extraocular Movements: Extraocular movements intact. Pupils: Pupils are equal, round, and reactive to light. Neck:      Vascular: No JVD. Trachea: No tracheal deviation. Cardiovascular:      Rate and Rhythm: Normal rate. Pulmonary:      Effort: Pulmonary effort is normal.      Breath sounds: Normal breath sounds. Abdominal:      General: Bowel sounds are normal.      Palpations: Abdomen is soft. Musculoskeletal:         General: Normal range of motion. Cervical back: Normal range of motion and neck supple. No rigidity. No muscular tenderness. Lymphadenopathy:      Cervical: No cervical adenopathy. Skin:     General: Skin is warm and dry. Neurological:      General: No focal deficit present. Mental Status: She is alert and oriented to person, place, and time. Mental status is at baseline.    Psychiatric:         Mood and Affect: Mood normal.         As noted above     Medications:  Reviewed    Infusion Medications:    sodium chloride      sodium chloride      dextrose      sodium chloride       Scheduled Medications:    sodium chloride  2 spray Each Nostril TID    lidocaine  3 patch TransDERmal Daily    enoxaparin  40 mg SubCUTAneous Daily    guaiFENesin  600 mg Oral BID    sodium chloride flush  5-40 mL IntraVENous 2 times per day    sodium chloride flush  5-40 mL IntraVENous 2 times per day    [Held by provider] lisinopril  20 mg Oral Daily    pantoprazole  40 mg Oral QAM AC    magnesium oxide  800 mg Oral Daily    amLODIPine  10 mg Oral Daily    sodium chloride flush  5-40 mL IntraVENous 2 times per day    atorvastatin  80 mg Oral Nightly    aspirin  81 mg Oral BID    gabapentin  600 mg Oral Nightly    insulin lispro  0-12 Units SubCUTAneous 4x Daily WC    insulin lispro  0-6 Units SubCUTAneous Nightly PRN Meds: diphenoxylate-atropine, sodium chloride flush, sodium chloride, sodium chloride flush, sodium chloride, melatonin, guaiFENesin-dextromethorphan, glucose, dextrose bolus **OR** dextrose bolus, glucagon (rDNA), dextrose, sodium chloride flush, sodium chloride, ondansetron **OR** ondansetron, acetaminophen **OR** acetaminophen, polyethylene glycol, nitroGLYCERIN, hydrALAZINE    Labs:   No results for input(s): WBC, HGB, HCT, PLT in the last 72 hours. No results for input(s): NA, K, CL, CO2, BUN, CREATININE, CALCIUM, PHOS in the last 72 hours. Invalid input(s): MAGNES  No results for input(s): AST, ALT, BILIDIR, BILITOT, ALKPHOS in the last 72 hours. No results for input(s): INR in the last 72 hours. No results for input(s): Avelino Favre in the last 72 hours. Urinalysis:   Lab Results   Component Value Date    NITRU Negative 05/25/2022    WBCUA 0-2 05/25/2022    BACTERIA Negative 05/25/2022    RBCUA 0-2 05/25/2022    BLOODU Negative 05/25/2022    SPECGRAV 1.050 05/25/2022    GLUCOSEU Negative 05/25/2022       Radiology:   Most recent    EEG No valid procedures specified. MRI of Brain No results found for this or any previous visit. No results found for this or any previous visit. MRA of the Head and Neck: No results found for this or any previous visit. No results found for this or any previous visit. No results found for this or any previous visit. CT of the Head: Results for orders placed during the hospital encounter of 05/23/22    CT HEAD WO CONTRAST    Narrative  INDICATION: 75-year-old female presenting with dizziness and altered mental status. COMPARISON: 5/23/2022 and 10/9/2015. Fallon Diaz TECHNIQUE: Multidetector CT imaging was obtained from the skull base to vertex without the administration of intravenous contrast. Coronal and sagittal reformatted images were obtained.  Automated dose exposure control was used for this exam.    FINDINGS:    Scattered areas of low-attenuation are visualized in the periventricular and subcortical white matter that demonstrate no significant change in comparison to the prior study consistent with chronic microvascular disease. No evidence of parenchymal hemorrhages or contusions. No evidence of intra or extra-axial fluid collection is seen. Mild prominence of ventricles and sulci are visualized demonstrating no significant increase in comparison to the prior study consistent is more chronic brain changes. No structural midline abnormalities seen. Visualized paranasal sinuses are well aerated. Visualized mastoid air cells are well aerated. Extensive lucencies visualized within the diploic space throughout the calvarium would recommend clinical correlation. Impression  No acute intracranial hemorrhage or mass effect. Chronic microvascular disease and chronic atrophic brain changes. Extensive lucencies visualized within the diploic space throughout the calvarium would recommend clinical correlation. No results found for this or any previous visit. No results found for this or any previous visit. Carotid duplex: No results found for this or any previous visit. No results found for this or any previous visit.   Results for orders placed during the hospital encounter of 05/23/22    US CAROTID ARTERY BILATERAL    Narrative  EXAMINATION:  CAROTID DUPLEX ULTRASONOGRAPHY    CLINICAL HISTORY:  DIZZINESS    COMPARISONS:  NONE AVAILABLE    TECHNIQUE:  B-mode, color flow and spectral Doppler    FINDINGS:    ARTERIAL BLOOD FLOW VELOCITY    RIGHT PS    Prox CCA    67 cm/s  Mid CCA     65 cm/s  Dist CCA    62 cm/s  Prox ICA    59 cm/s  Mid ICA     106 cm/s  Dist ICA    94 cm/s  Prox ECA    72 cm/s  Prox VERT   61 cm/s    ICA/CCA     1.64    LEFT PS    Prox CCA    120 cm/s  Mid CCA     95 cm/s  Dist CCA    92 cm/s  Prox ICA    87 cm/s  Mid ICA     119 cm/s  Dist ICA    146 cm/s  Prox ECA 71 cm/s  Prox VERT   76 cm/s    ICA/CCA     1.62    Impression  MILD SCATTERED ATHEROSCLEROSIS BOTH CAROTID TREES. NO FLOW OBSTRUCTION. BILATERAL ICAS LESS THAN 50% STENOSIS. BILATERAL ANTEGRADE VERTEBRAL FLOW. Echo No results found for this or any previous visit. Assessment/Plan:  5/24/22  Dizziness which may be suggestive of vestibular dysfunction. We will start her on Antivert 25 mg 3 times daily and arrange for orthostatic blood pressure recordings. MRI of the brain was done [facility] yesterday which were reviewed and does not show an acute stroke. Recommend ct angiogram to make sure there is no basilar artery stenosis. Patient may benefit from vestibular rehab once work-up is complete. 5/25/22  Patient continues to have dizziness with movement  Started on Antivert yesterday 25mg TID  Orthostatic blood pressure readings negative for orthostatic hypotension   MRI of brain negative (performed yesterday at [facility])  Ct angiogram shows no significant carotid stenosis. Carotid Ultrasound: bilateral internal carotid less than 50% stenosis    LP pending for fevers ordered by medical team   Patient's symptoms consistent with vestibular dysfunction. As noted above would benefit from vestibular rehab. Can discharge from neurology standpoint. Follow up in 4-6 weeks. I have personally performed a face to face diagnostic evaluation on this patient, reviewed all data and investigations, and am the sole provider of all clinical decisions on the neurological status of this patient. Transient confusion,  seen by ID, ordered LP, which is negative, MRI at [facility] negative, dizziness better with antivert        5/26/22  Patient reports to be having episodes of dizziness where her head is spinning. She is seen sitting in chair awake alert and oriented. She denies any nausea or vomiting. She reports lower extremity weakness which is not new for her. Denies any numbness tingling.   Patient continues Antivert. LP performed and is negative. RI at [facility] negative. MRI of lumbar spine for evaluation of cervical stenosis pending. I have personally performed a face to face diagnostic evaluation on this patient, reviewed all data and investigations, and am the sole provider of all clinical decisions on the neurological status of this patient. No further dizzy spells are noted though patient is nonambulatory and I recommended we obtain MRI of the lumbar spine as she is not walking on her own. Patient continue on Antivert for now lumbar puncture was    5/27/22  Further dizzy spells are noted though the patient is not ambulatory  MRI of bar spine obtained secondary to patient not ambulating on her own  MRI shows extensive degenerative changes of the lumbar spine visualized most prominent L4-L5 and L5-S1. No significant narrowing of the spinal canal seen at L1-L2, L2-L3, L3-L4, L5-S1. There is moderate to severe narrowing of the spinal canal with severe narrowing of the right neuroforamina and moderate to severe narrowing of the left neuroforamina at the L4-L5 levels. We will consult neurosurgery for evaluation and treatment recommendations. Continue Antivert for dizziness. May be a good candidate for vestibular rehabilitation. 5/28  Patient dizziness has improved but she was not able to ambulate much so we are not quite sure if she starts to ambulate and get dizzy. We will further obtain an MRI of the lumbar spine which shows severe stenosis and consulted neurosurgery. We recommended rehabilitation for now and surgical intervention when she is somewhat better patient may be transferred to rehabilitation    5/29  Again another dizzy spell did not COVID. She was not orthostatic. MRI results were reviewed from [facility] clinic and there is some suggestion of mild ventriculomegaly.   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 We will repeat her MRI as her initial MRI was done too early in the course of her disease at [facility] clinic. 6/1/2022:  Dizziness. Dizziness is much improved today. 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 Will require further outpatient follow-up with ENT. Dietary restrictions given. Neurology to sign off. Call with questions or concerns. I have personally performed a face to face diagnostic evaluation on this patient, reviewed all data and investigations, and am the sole provider of all clinical decisions on the neurological status of this patient. 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

## 2022-06-01 NOTE — FLOWSHEET NOTE
Pt has been up in chair through out the day No signs or symptom of distress . Pt has consume most of her food and has been ambulating to the bathroom.  visiting and stays at bedside most of the day. Electronically signed by Virgilio Rosenthal LPN on 6/3/4048 at 7:78 PM

## 2022-06-01 NOTE — PROGRESS NOTES
Progress Note  Patient: Yvonna Duverney  Unit/Bed: L537/C207-37  YOB: 1945  MRN: [id number]  Acct: [de-identified]   Admitting Diagnosis: Elevated troponin [R77.8]  Fever and chills [R50.9]  Admit Date:  5/23/2022  Hospital Day: 7    Chief Complaint: weakness dizizness     Histories:  Past Medical History:   Diagnosis Date    Cancer [facility])     Encounter for lumbar puncture 05/25/2022    Completed by Dr. María Elena Lebron - diagnostics sent    Hyperlipidemia     Hypertension     Type II or unspecified type diabetes mellitus without mention of complication, not stated as uncontrolled      Past Surgical History:   Procedure Laterality Date    CORONARY ANGIOPLASTY WITH STENT PLACEMENT      UPPER GASTROINTESTINAL ENDOSCOPY  8/12/15    w/bx,dilation      Family History   Problem Relation Age of Onset    Cancer Mother     Arthritis Mother     Diabetes Mother     Heart Disease Mother     High Blood Pressure Mother     High Cholesterol Mother     Arthritis Father     Diabetes Father     Heart Disease Father     High Blood Pressure Father     High Cholesterol Father      Social History     Socioeconomic History    Marital status:      Spouse name: None    Number of children: None    Years of education: None    Highest education level: None   Occupational History    None   Tobacco Use    Smoking status: Former Smoker     Years: 24.00    Smokeless tobacco: Never Used   Substance and Sexual Activity    Alcohol use:  Yes     Alcohol/week: 3.0 standard drinks     Types: 3 Glasses of wine per week    Drug use: No    Sexual activity: None   Other Topics Concern    None   Social History Narrative    Lives With: Spouse,  Son (pt never home alone)    Type of Home: House in [address] [place] [address],[address] in basement,Two level (doesn't have to use basement)    Home Access: Stairs to enter with rails- Number of Steps: 6- Rails: Right    Bathroom Shower/Tub: Walk-in shower Bathroom Equipment: Shower chair,Grab bars in shower    Home Equipment: [address]:  ( assists with bathing/dressing; pt indep with toileting activities)    Homemaking Assistance:  (spouse and son complete)    Homemaking Responsibilities: No    Ambulation Assistance: Independent (cane/rollator PRN)    Transfer Assistance: Independent    Additional Comments: Pt inconsistent historian. Pt's spouse arriving mid assessment and confirms home set up and PLOF         Social Determinants of Health     Financial Resource Strain: Low Risk     Difficulty of Paying Living Expenses: Not hard at all   Food Insecurity: No Food Insecurity    Worried About Running Out of Food in the Last Year: Never true    [address] in the Last Year: Never true   Transportation Needs:     Lack of Transportation (Medical): Not on file    Lack of Transportation (Non-Medical): Not on file   Physical Activity:     Days of Exercise per Week: Not on file    Minutes of Exercise per Session: Not on file   Stress:     Feeling of Stress : Not on file   Social Connections:     Frequency of Communication with Friends and Family: Not on file    Frequency of Social Gatherings with Friends and Family: Not on file    Attends [employer] Services: Not on file    Active Member of [address] [place] [address] or Organizations: Not on file    Attends Club or Organization Meetings: Not on file    Marital Status: Not on file   Intimate Partner Violence:     Fear of Current or Ex-Partner: Not on file    Emotionally Abused: Not on file    Physically Abused: Not on file    Sexually Abused: Not on file   Housing Stability:     Unable to Pay for Housing in the Last Year: Not on file    Number of [place] in the Last Year: Not on file    Unstable Housing in the Last Year: Not on file       Subjective/HPI  Walking in room. Feels well. Not dizzy today. Eating well.  No diarrhea today    EKG: SR 78        Review of Systems:   Review of Systems Constitutional: Negative. Negative for diaphoresis and fatigue. HENT: Negative. Eyes: Negative. Respiratory: Negative. Negative for cough, chest tightness, shortness of breath, wheezing and stridor. Cardiovascular: Negative. Negative for chest pain, palpitations and leg swelling. Gastrointestinal: Negative for blood in stool and nausea. Genitourinary: Negative. Skin: Negative. Neurological: Positive for weakness. Negative for syncope and light-headedness. Hematological: Negative. Psychiatric/Behavioral: Negative. Physical Examination:    BP (!) 146/73   Pulse 71   Temp 97.9 °F (36.6 °C) (Oral)   Resp 18   Ht 5' (1.524 m)   Wt 189 lb 4.8 oz (85.9 kg)   SpO2 96%   BMI 36.97 kg/m²    Physical Exam   Constitutional: She appears healthy. No distress. HENT:   Normal cephalic and Atraumatic   Eyes: Pupils are equal, round, and reactive to light. Neck: Thyroid normal. No JVD present. No neck adenopathy. No thyromegaly present. Cardiovascular: Normal rate, regular rhythm, normal heart sounds, intact distal pulses and normal pulses. Pulmonary/Chest: Effort normal and breath sounds normal. She has no wheezes. She has no rales. She exhibits no tenderness. Abdominal: Soft. Bowel sounds are normal. There is no abdominal tenderness. Musculoskeletal:         General: No tenderness or edema. Normal range of motion. Cervical back: Normal range of motion and neck supple. Neurological: She is alert and oriented to person, place, and time. Skin: Skin is warm. No cyanosis. Nails show no clubbing.        LABS:  CBC:   Lab Results   Component Value Date    WBC 10.9 05/26/2022    RBC 4.17 05/26/2022    HGB 13.4 05/26/2022    HCT 40.6 05/26/2022    MCV 97.2 05/26/2022    MCH 32.0 05/26/2022    MCHC 32.9 05/26/2022    RDW 15.5 05/26/2022     05/26/2022    MPV 8.2 10/12/2015     CBC with Differential:    Lab Results   Component Value Date    WBC 10.9 05/26/2022    RBC 4.17 05/26/2022    HGB 13.4 05/26/2022    HCT 40.6 05/26/2022     05/26/2022    MCV 97.2 05/26/2022    MCH 32.0 05/26/2022    MCHC 32.9 05/26/2022    RDW 15.5 05/26/2022    LYMPHOPCT 11.2 05/26/2022    MONOPCT 8.0 05/26/2022    BASOPCT 0.6 05/26/2022    MONOSABS 0.9 05/26/2022    LYMPHSABS 1.2 05/26/2022    EOSABS 0.3 05/26/2022    BASOSABS 0.1 05/26/2022     CMP:    Lab Results   Component Value Date     05/28/2022    K 3.5 05/28/2022    K 3.6 05/26/2022     05/28/2022    CO2 21 05/28/2022    BUN 13 05/28/2022    CREATININE 1.06 05/28/2022    GFRAA >60.0 05/28/2022    LABGLOM 50.0 05/28/2022    GLUCOSE 118 05/28/2022    PROT 6.6 05/23/2022    LABALBU 4.4 05/23/2022    CALCIUM 8.7 05/28/2022    BILITOT 1.1 05/23/2022    ALKPHOS 74 05/23/2022    AST 18 05/23/2022    ALT 23 05/23/2022     BMP:    Lab Results   Component Value Date     05/28/2022    K 3.5 05/28/2022    K 3.6 05/26/2022     05/28/2022    CO2 21 05/28/2022    BUN 13 05/28/2022    LABALBU 4.4 05/23/2022    CREATININE 1.06 05/28/2022    CALCIUM 8.7 05/28/2022    GFRAA >60.0 05/28/2022    LABGLOM 50.0 05/28/2022    GLUCOSE 118 05/28/2022     Magnesium:    Lab Results   Component Value Date    MG 1.6 05/24/2022     Troponin:    Lab Results   Component Value Date    TROPONINI 0.036 05/24/2022        Active Hospital Problems    Diagnosis Date Noted    Atelectasis, left [J98.11]      Priority: Medium    Impaired mobility and activities of daily living-metabolic encephalopathy with primary origins of infectious etiology [Z74.09, Z78.9] 05/27/2022     Priority: Medium    Lumbar spondylosis [M47.816] 05/27/2022     Priority: Medium    Lumbar stenosis with neurogenic claudication [M48.062] 05/27/2022     Priority: Medium    Dizziness [R42]      Priority: Medium    Benign paroxysmal positional vertigo due to bilateral vestibular disorder [H81.13]      Priority: Medium    Maxillary pain [R68.84]      Priority: Medium    Nonintractable headache [R51.9]      Priority: Medium    Fall at home [T93. Herson Hurst, Y92.009]      Priority: Medium    Fever and chills [R50.9]      Priority: Medium    Elevated troponin [R77.8] 05/23/2022     Priority: Medium        Assessment/Plan:    1. Fever - resolved  2. Dizziness -ENT note reviewed. F/u out pt studies. 3. Elevated possible Demand ischemia  - Borderline levels. No CP  4. Echo EF 65%  5. HTN stable - continue meds. Low salt diet. 6. HPL - Statin   7. HypoMag 1.6 - replaced  8. Awaiting rehab precert. Ok for transfer anytime.           Electronically signed by Izabel Saini MD on 6/1/2022 at 9:31 AM

## 2022-06-02 LAB
GLUCOSE BLD-MCNC: 100 MG/DL (ref 70–99)
GLUCOSE BLD-MCNC: 109 MG/DL (ref 70–99)
GLUCOSE BLD-MCNC: 118 MG/DL (ref 70–99)
GLUCOSE BLD-MCNC: 91 MG/DL (ref 70–99)
PERFORMED ON: ABNORMAL
PERFORMED ON: NORMAL

## 2022-06-02 PROCEDURE — 6370000000 HC RX 637 (ALT 250 FOR IP): Performed by: INTERNAL MEDICINE

## 2022-06-02 PROCEDURE — 6360000002 HC RX W HCPCS: Performed by: INTERNAL MEDICINE

## 2022-06-02 PROCEDURE — 1210000000 HC MED SURG R&B

## 2022-06-02 PROCEDURE — 99232 SBSQ HOSP IP/OBS MODERATE 35: CPT | Performed by: INTERNAL MEDICINE

## 2022-06-02 PROCEDURE — 6370000000 HC RX 637 (ALT 250 FOR IP): Performed by: PHYSICIAN ASSISTANT

## 2022-06-02 PROCEDURE — 6370000000 HC RX 637 (ALT 250 FOR IP)

## 2022-06-02 RX ADMIN — ATORVASTATIN CALCIUM 80 MG: 80 TABLET, FILM COATED ORAL at 20:54

## 2022-06-02 RX ADMIN — ASPIRIN 81 MG: 81 TABLET, COATED ORAL at 20:54

## 2022-06-02 RX ADMIN — SALINE NASAL SPRAY 2 SPRAY: 1.5 SOLUTION NASAL at 20:57

## 2022-06-02 RX ADMIN — GUAIFENESIN 600 MG: 600 TABLET ORAL at 20:54

## 2022-06-02 RX ADMIN — ASPIRIN 81 MG: 81 TABLET, COATED ORAL at 08:45

## 2022-06-02 RX ADMIN — ENOXAPARIN SODIUM 40 MG: 100 INJECTION SUBCUTANEOUS at 08:45

## 2022-06-02 RX ADMIN — PANTOPRAZOLE SODIUM 40 MG: 40 TABLET, DELAYED RELEASE ORAL at 06:18

## 2022-06-02 RX ADMIN — GUAIFENESIN 600 MG: 600 TABLET ORAL at 08:45

## 2022-06-02 RX ADMIN — Medication 800 MG: at 08:45

## 2022-06-02 RX ADMIN — ACETAMINOPHEN 650 MG: 325 TABLET ORAL at 20:54

## 2022-06-02 RX ADMIN — Medication 3 MG: at 22:24

## 2022-06-02 RX ADMIN — GABAPENTIN 600 MG: 300 CAPSULE ORAL at 20:53

## 2022-06-02 RX ADMIN — AMLODIPINE BESYLATE 10 MG: 10 TABLET ORAL at 08:45

## 2022-06-02 ASSESSMENT — ENCOUNTER SYMPTOMS
DIARRHEA: 0
RESPIRATORY NEGATIVE: 1
BLOOD IN STOOL: 0
EYES NEGATIVE: 1
STRIDOR: 0
NAUSEA: 0
VOMITING: 0
WHEEZING: 0
COUGH: 0
SHORTNESS OF BREATH: 0
CHEST TIGHTNESS: 0

## 2022-06-02 ASSESSMENT — PAIN DESCRIPTION - ORIENTATION: ORIENTATION: LOWER

## 2022-06-02 ASSESSMENT — PAIN SCALES - GENERAL
PAINLEVEL_OUTOF10: 4
PAINLEVEL_OUTOF10: 6

## 2022-06-02 NOTE — PROGRESS NOTES
[facility] Occupational Therapy      Date: 2022  Patient Name: Yvrose Beckford        MRN: [id number]  Account: [de-identified]   : 1945  (64 y.o.)  Room: [place] [address]    Chart reviewed, attempted OT at [phone] for treatment. Patient not seen 2° to:    Pt. declined, stating: \"I've already done everything today. \"     Will attempt again when able.     Electronically signed by WYATT Schmitt on 3097 at 3:11 PM

## 2022-06-02 NOTE — PROGRESS NOTES
Progress Note  Patient: Christiano Ovalles  Unit/Bed: D099/U710-95  YOB: 1945  MRN: [id number]  Acct: [de-identified]   Admitting Diagnosis: Elevated troponin [R77.8]  Fever and chills [R50.9]  Admit Date:  5/23/2022  Hospital Day: 8    Chief Complaint: weakness dizizness     Histories:  Past Medical History:   Diagnosis Date    Cancer [facility])     Encounter for lumbar puncture 05/25/2022    Completed by Dr. Jessenia Cloud - diagnostics sent    Hyperlipidemia     Hypertension     Type II or unspecified type diabetes mellitus without mention of complication, not stated as uncontrolled      Past Surgical History:   Procedure Laterality Date    CORONARY ANGIOPLASTY WITH STENT PLACEMENT      UPPER GASTROINTESTINAL ENDOSCOPY  8/12/15    w/bx,dilation      Family History   Problem Relation Age of Onset    Cancer Mother     Arthritis Mother     Diabetes Mother     Heart Disease Mother     High Blood Pressure Mother     High Cholesterol Mother     Arthritis Father     Diabetes Father     Heart Disease Father     High Blood Pressure Father     High Cholesterol Father      Social History     Socioeconomic History    Marital status:      Spouse name: None    Number of children: None    Years of education: None    Highest education level: None   Occupational History    None   Tobacco Use    Smoking status: Former Smoker     Years: 24.00    Smokeless tobacco: Never Used   Substance and Sexual Activity    Alcohol use:  Yes     Alcohol/week: 3.0 standard drinks     Types: 3 Glasses of wine per week    Drug use: No    Sexual activity: None   Other Topics Concern    None   Social History Narrative    Lives With: Spouse,  Son (pt never home alone)    Type of Home: House in [address] [place] [address],[address] in basement,Two level (doesn't have to use basement)    Home Access: Stairs to enter with rails- Number of Steps: 6- Rails: Right    Bathroom Shower/Tub: Walk-in shower Bathroom Equipment: Shower chair,Grab bars in shower    Home Equipment: [address]:  ( assists with bathing/dressing; pt indep with toileting activities)    Homemaking Assistance:  (spouse and son complete)    Homemaking Responsibilities: No    Ambulation Assistance: Independent (cane/rollator PRN)    Transfer Assistance: Independent    Additional Comments: Pt inconsistent historian. Pt's spouse arriving mid assessment and confirms home set up and PLOF         Social Determinants of Health     Financial Resource Strain: Low Risk     Difficulty of Paying Living Expenses: Not hard at all   Food Insecurity: No Food Insecurity    Worried About Running Out of Food in the Last Year: Never true    [address] in the Last Year: Never true   Transportation Needs:     Lack of Transportation (Medical): Not on file    Lack of Transportation (Non-Medical): Not on file   Physical Activity:     Days of Exercise per Week: Not on file    Minutes of Exercise per Session: Not on file   Stress:     Feeling of Stress : Not on file   Social Connections:     Frequency of Communication with Friends and Family: Not on file    Frequency of Social Gatherings with Friends and Family: Not on file    Attends [employer] Services: Not on file    Active Member of [address] [place] [address] [employer] or Organizations: Not on file    Attends Club or Organization Meetings: Not on file    Marital Status: Not on file   Intimate Partner Violence:     Fear of Current or Ex-Partner: Not on file    Emotionally Abused: Not on file    Physically Abused: Not on file    Sexually Abused: Not on file   Housing Stability:     Unable to Pay for Housing in the Last Year: Not on file    Number of [place] in the Last Year: Not on file    Unstable Housing in the Last Year: Not on file       Subjective/HPI  Walking in room. Feels well. Not dizzy today. Eating well. No diarrhea today.   Overall doing better    EKG: SR 80        Review of Systems: Review of Systems   Constitutional: Negative. Negative for diaphoresis and fatigue. HENT: Negative. Eyes: Negative. Respiratory: Negative. Negative for cough, chest tightness, shortness of breath, wheezing and stridor. Cardiovascular: Negative. Negative for chest pain, palpitations and leg swelling. Gastrointestinal: Negative for blood in stool and nausea. Genitourinary: Negative. Skin: Negative. Neurological: Positive for weakness. Negative for syncope and light-headedness. Hematological: Negative. Psychiatric/Behavioral: Negative. Physical Examination:    /70   Pulse 73   Temp 97.5 °F (36.4 °C) (Oral)   Resp 18   Ht 5' (1.524 m)   Wt 189 lb 4.8 oz (85.9 kg)   SpO2 97%   BMI 36.97 kg/m²    Physical Exam   Constitutional: She appears healthy. No distress. HENT:   Normal cephalic and Atraumatic   Eyes: Pupils are equal, round, and reactive to light. Neck: Thyroid normal. No JVD present. No neck adenopathy. No thyromegaly present. Cardiovascular: Normal rate, regular rhythm, normal heart sounds, intact distal pulses and normal pulses. Pulmonary/Chest: Effort normal and breath sounds normal. She has no wheezes. She has no rales. She exhibits no tenderness. Abdominal: Soft. Bowel sounds are normal. There is no abdominal tenderness. Musculoskeletal:         General: No tenderness or edema. Normal range of motion. Cervical back: Normal range of motion and neck supple. Neurological: She is alert and oriented to person, place, and time. Skin: Skin is warm. No cyanosis. Nails show no clubbing.        LABS:  CBC:   Lab Results   Component Value Date    WBC 10.9 05/26/2022    RBC 4.17 05/26/2022    HGB 13.4 05/26/2022    HCT 40.6 05/26/2022    MCV 97.2 05/26/2022    MCH 32.0 05/26/2022    MCHC 32.9 05/26/2022    RDW 15.5 05/26/2022     05/26/2022    MPV 8.2 10/12/2015     CBC with Differential:    Lab Results   Component Value Date    WBC 10.9 05/26/2022    RBC 4.17 05/26/2022    HGB 13.4 05/26/2022    HCT 40.6 05/26/2022     05/26/2022    MCV 97.2 05/26/2022    MCH 32.0 05/26/2022    MCHC 32.9 05/26/2022    RDW 15.5 05/26/2022    LYMPHOPCT 11.2 05/26/2022    MONOPCT 8.0 05/26/2022    BASOPCT 0.6 05/26/2022    MONOSABS 0.9 05/26/2022    LYMPHSABS 1.2 05/26/2022    EOSABS 0.3 05/26/2022    BASOSABS 0.1 05/26/2022     CMP:    Lab Results   Component Value Date     05/28/2022    K 3.5 05/28/2022    K 3.6 05/26/2022     05/28/2022    CO2 21 05/28/2022    BUN 13 05/28/2022    CREATININE 1.06 05/28/2022    GFRAA >60.0 05/28/2022    LABGLOM 50.0 05/28/2022    GLUCOSE 118 05/28/2022    PROT 6.6 05/23/2022    LABALBU 4.4 05/23/2022    CALCIUM 8.7 05/28/2022    BILITOT 1.1 05/23/2022    ALKPHOS 74 05/23/2022    AST 18 05/23/2022    ALT 23 05/23/2022     BMP:    Lab Results   Component Value Date     05/28/2022    K 3.5 05/28/2022    K 3.6 05/26/2022     05/28/2022    CO2 21 05/28/2022    BUN 13 05/28/2022    LABALBU 4.4 05/23/2022    CREATININE 1.06 05/28/2022    CALCIUM 8.7 05/28/2022    GFRAA >60.0 05/28/2022    LABGLOM 50.0 05/28/2022    GLUCOSE 118 05/28/2022     Magnesium:    Lab Results   Component Value Date    MG 1.6 05/24/2022     Troponin:    Lab Results   Component Value Date    TROPONINI 0.036 05/24/2022        Active Hospital Problems    Diagnosis Date Noted    Atelectasis, left [J98.11]      Priority: Medium    Impaired mobility and activities of daily living-metabolic encephalopathy with primary origins of infectious etiology [Z74.09, Z78.9] 05/27/2022     Priority: Medium    Lumbar spondylosis [M47.816] 05/27/2022     Priority: Medium    Lumbar stenosis with neurogenic claudication [M48.062] 05/27/2022     Priority: Medium    Dizziness [R42]      Priority: Medium    Benign paroxysmal positional vertigo due to bilateral vestibular disorder [H81.13]      Priority: Medium    Maxillary pain [R68.84]      Priority: Medium    Nonintractable headache [R51.9]      Priority: Medium    Fall at home [W38. Nohelia Amanda, Y92.009]      Priority: Medium    Fever and chills [R50.9]      Priority: Medium    Elevated troponin [R77.8] 05/23/2022     Priority: Medium        Assessment/Plan:    1. Fever - resolved  2. Dizziness -ENT note reviewed. F/u out pt studies. 3. Elevated possible Demand ischemia  - Borderline levels. No CP  4. Echo EF 65%  5. HTN stable - continue meds. Low salt diet. 6. HPL - Statin   7. HypoMag 1.6 - replaced  8. Awaiting rehab precert. Ok for transfer anytime. We will see pt in out pt setting.           Electronically signed by Clifford Powell MD on 6/2/2022 at 10:25 AM

## 2022-06-02 NOTE — CARE COORDINATION
NORMAN met with the pt and her  to discuss her DC plan to [facility]. [facility] is currently being worked on but there is no approval as of the time of this note. Pt is very frustrated with the amount of time this is taking. [facility] RECEIVED A DENIAL FOR THE PT TO ADMIT TO A SNF. PT AND  MADE AWARE AND ARE UPSET ABOUT THIS. PT WILL GO HOME TOMORROW AND SHE WILL NEED A BEDSIDE COMMODE AND HHC.

## 2022-06-02 NOTE — CARE COORDINATION
PATIENT WAS DENIED BY INSURANCE FOR ALL SNF PLACEMENTS. PATIENT WILL HAVE TO GO HOME W/ FAMILY AND WILL NEED HHC SERVICES. PATIENT'S BEDROOM AND BATHROOM IS ON THE SECOND FLOOR, WHICH SHE CANNOT GET TO AT THIS TIME. INSURANCE WAS AWARE OF THIS AND STILL DENIED THE PATIENT ANY SNF ADMISSION. FAMILY IS GOING TO BE MOVING THE BED DOWN TO THE FIRST LEVEL AND WE WILL NEED TO GET THE PATIENT A BEDSIDE COMMODE. OFFERED [facility] FOR HHC AND THEY SELECTED [facility]. CALL PLACED TO [facility] TO NOTIFY OF REFERRAL, HAD TO LEAVE . ORDERS TO BE PLACED BY PHYSICIAN AND NURSING IS CALLING PHYSICIAN TO OBTAIN THE [address] [place] [address] Road. SW/CM TO FOLLOW.

## 2022-06-02 NOTE — PROGRESS NOTES
hours.  PT/INR:No results for input(s): PROTIME, INR in the last 72 hours. APTT:No results for input(s): APTT in the last 72 hours. LIVER PROFILE:No results for input(s): AST, ALT, BILIDIR, BILITOT, ALKPHOS in the last 72 hours. Imaging Last 24 Hours:  MRI LUMBAR SPINE WO CONTRAST    Result Date: 5/26/2022  INDICATION: 64-year-old female, lumbar stenosis. COMPARISON: None. TECHNIQUE: Multiplanar, multisequence MR imaging of the lumbar spine was performed without No administration of intravenous contrast. FINDINGS: There is exaggeration of the normal lordosis of the columns of the lumbar spine visualized, grade 1 anterolisthesis of L5 over S1. Vertebral augmentation in the L1 vertebral body. Depression of the superior endplate of the L3 and L5 vertebral bodies is seen. Multilevel degenerative endplate changes is seen. The STIR sequence demonstrates unremarkable signal intensity of the bone marrow , no evidence of T2 prolongation within the marrow to suggest acute fracture, edema or infiltrative process. The cord terminates at the level of the L1-L2 intervertebral disc space, unremarkable signal intensity of internal cord, the terminal nerve fibers demonstrate unremarkable contour with no evidence of thickening or clumping. L1-L2 demonstrates degenerative disc changes with hypertrophic changes in the facet joints, no significant narrowing of the spinal canal is seen, with mild narrowing of bilateral neuroforamina. L2-L3 demonstrates degenerative disc changes. Atrophic changes of the facet joints and ligamentum flavum, no significant narrowing of the spinal canal is visualized, mild narrowing of the right neural foramina and mild-to-moderate narrowing of the left neural foramina is seen at this level. L3-L4 demonstrates degenerative disc with hypertrophic changes in the facet joints and ligamentum flavum, no significant narrowing of the spinal canal is visualized.  Moderate to severe narrowing of the right neural foramina and moderate narrowing of the left neural foramina is seen at this level. L4-L5 demonstrates extensive degenerative changes, hypertrophic changes in the facet joints with prominent hypertrophic changes in the ligamentum flavum, moderate to severe narrowing of the spinal canal with severe narrowing of the right neural foramina and moderate to severe narrowing of the left neural foramina seen at this level. L5-S1 demonstrates degenerative changes with hypertrophic changes in the facet joints, no significant narrowing of the spinal canal is visualized at this level,  severe narrowing of the right neuroforamina and moderate to severe narrowing of the left neural foramina is seen at this level. Extensive degenerative changes of the lumbar spine visualized most prominent at L4-L5 and L5-S1. Assessment//Plan           Hospital Problems           Last Modified POA    * (Principal) Elevated troponin 5/23/2022 Yes    Dizziness 5/24/2022 Yes    Benign paroxysmal positional vertigo due to bilateral vestibular disorder 5/24/2022 Yes    Maxillary pain 5/24/2022 Yes    Nonintractable headache 5/24/2022 Yes    Fall at home 5/24/2022 Yes    Fever and chills 5/24/2022 Yes    Impaired mobility and activities of daily living-metabolic encephalopathy with primary origins of infectious etiology 5/27/2022 Yes    Lumbar spondylosis 5/27/2022 Yes    Lumbar stenosis with neurogenic claudication 5/27/2022 Yes    Atelectasis, left 5/28/2022 Yes      Encephalopathy  OSIRIS  Dizziness  HTN/HLD  OSIRIS  Assessment & Plan    5/27: Hold lisinopril, IV fluids for acute kidney injury, continue with PT OT. Dizziness resolved. Encephalopathy resolved. Fevers down. No overnight events no new complaints. anticipated discharge tomorrow to rehab. Bmp tomorrow. 5/28: pt is medically stable to be discharged any time to [facility] vs home, no overnight events, dizziness is less no fevers, abx as per ID  5/29: Had episode of dizziness upon standing. Orthostatic was negative. ENT consulted for chronic sinus pain and pressure. This is the first time patient is told me this. Patient stated today that she had a lot of nasal surgery and ENT evaluation in the past for sinuses and also patient complaining of ear pain also as per edna. Patient has been getting IV antibiotics since admission. Spoke with nursing about the care. 5/30: Dizziness is better. Pre-CERT is pending from [facility]. Appreciate ENT input. Continue current care. Avoid chocolates, salt and caffeine   5/31: Patient was seen and evaluated. Waiting for rehab. No overnight events no new complaints. Continue current care. 6/1: Had peer to peer with [facility],  did not recommend rehab for the patient recommend long-term care for 1 week before going home. Spoke to . precert pending, pt is in agreement. 6/2: Patient was seen and evaluated. No overnight events. Awaiting for pre-CERT to SNF placement. Continue with current care. Answered all the questions.   Electronically signed by Susana Skaggs MD on 5/27/22 at 1:09 PM EDT

## 2022-06-03 VITALS
BODY MASS INDEX: 37.16 KG/M2 | WEIGHT: 189.3 LBS | OXYGEN SATURATION: 100 % | SYSTOLIC BLOOD PRESSURE: 117 MMHG | HEART RATE: 76 BPM | HEIGHT: 60 IN | RESPIRATION RATE: 18 BRPM | TEMPERATURE: 97.5 F | DIASTOLIC BLOOD PRESSURE: 55 MMHG

## 2022-06-03 LAB
GLUCOSE BLD-MCNC: 86 MG/DL (ref 70–99)
GLUCOSE BLD-MCNC: 91 MG/DL (ref 70–99)
PERFORMED ON: NORMAL
PERFORMED ON: NORMAL

## 2022-06-03 PROCEDURE — 6360000002 HC RX W HCPCS: Performed by: INTERNAL MEDICINE

## 2022-06-03 PROCEDURE — 6370000000 HC RX 637 (ALT 250 FOR IP): Performed by: INTERNAL MEDICINE

## 2022-06-03 RX ADMIN — GUAIFENESIN 600 MG: 600 TABLET ORAL at 08:48

## 2022-06-03 RX ADMIN — AMLODIPINE BESYLATE 10 MG: 10 TABLET ORAL at 08:48

## 2022-06-03 RX ADMIN — PANTOPRAZOLE SODIUM 40 MG: 40 TABLET, DELAYED RELEASE ORAL at 08:48

## 2022-06-03 RX ADMIN — Medication 800 MG: at 08:48

## 2022-06-03 RX ADMIN — ASPIRIN 81 MG: 81 TABLET, COATED ORAL at 08:48

## 2022-06-03 RX ADMIN — ENOXAPARIN SODIUM 40 MG: 100 INJECTION SUBCUTANEOUS at 08:49

## 2022-06-03 ASSESSMENT — ENCOUNTER SYMPTOMS
DIARRHEA: 0
SHORTNESS OF BREATH: 0
COUGH: 0
NAUSEA: 0
VOMITING: 0

## 2022-06-03 NOTE — DISCHARGE INSTR - DIET
Good nutrition is important when healing from an illness, injury, or surgery. Follow any nutrition recommendations given to you during your hospital stay. If you were given an oral nutrition supplement while in the hospital, continue to take this supplement at home. You can take it with meals, in-between meals, and/or before bedtime. These supplements can be purchased at most local grocery stores, pharmacies, and chain [employer]-stores. If you have any questions about your diet or nutrition, call the hospital and ask for the dietitian.   REGULAR 4 CARB DIET

## 2022-06-03 NOTE — FLOWSHEET NOTE
Discharge instructions given at bedside. Reviewed home medication and made aware of upcoming apts. Education given on new medication and dx. Script given for bedside commode. Tele monitor removed and sent back to 1wt.  Patient verbalized understanding

## 2022-06-03 NOTE — PROGRESS NOTES
Physical Therapy Missed Treatment   Facility/Department: [facility] MED SURG L568/Q024-83    NAME: Haritha Napier    : 1945 (64 y.o.)  MRN: [id number]    Account: [de-identified]  Gender: female      Pt dressed, packed and waiting to discharge. Pt declined therapy.          Shruthi Tavreas PTA, 22 at 1:36 PM

## 2022-06-03 NOTE — PROGRESS NOTES
Progress Note  Date:6/3/2022       GIZL:[id number]  Patient Name:Mckenna Mendoza     YOB: 1945     Age:76 y.o. Subjective    Subjective:  Symptoms:  She reports malaise and weakness. No shortness of breath, cough, chest pain, headache, chest pressure, anorexia, diarrhea or anxiety. Diet:  No nausea or vomiting. Review of Systems   Respiratory: Negative for cough and shortness of breath. Cardiovascular: Negative for chest pain. Gastrointestinal: Negative for anorexia, diarrhea, nausea and vomiting. Neurological: Positive for weakness. Objective         Vitals Last 24 Hours:  TEMPERATURE:  Temp  Av.8 °F (36.6 °C)  Min: 97.5 °F (36.4 °C)  Max: 98.1 °F (36.7 °C)  RESPIRATIONS RANGE: No data recorded  PULSE OXIMETRY RANGE: SpO2  Av %  Min: 98 %  Max: 100 %  PULSE RANGE: Pulse  Av.5  Min: 76  Max: 81  BLOOD PRESSURE RANGE: Systolic (38HCY), ESX:474 , Min:117 , KIKO:101   ; Diastolic (42UKV), JEFF:09, Min:55, Max:60    I/O (24Hr): Intake/Output Summary (Last 24 hours) at 6/3/2022 1029  Last data filed at 6/3/2022 0826  Gross per 24 hour   Intake 360 ml   Output 300 ml   Net 60 ml     Objective:  General Appearance:  Comfortable, in no acute distress and well-appearing. Vital signs: (most recent): Blood pressure (!) 117/55, pulse 76, temperature 97.5 °F (36.4 °C), resp. rate 18, height 5' (1.524 m), weight 189 lb 4.8 oz (85.9 kg), SpO2 100 %. HEENT: Normal HEENT exam.    Lungs: There are decreased breath sounds. Heart: Regular rhythm. S1 normal and S2 normal.    Abdomen: Abdomen is soft. Bowel sounds are normal.     Neurological: Patient is alert and oriented to person, place and time. Pupils:  Pupils are equal, round, and reactive to light. Skin:  Warm and dry. Labs/Imaging/Diagnostics    Labs:  CBC:  No results for input(s): WBC, RBC, HGB, HCT, MCV, RDW, PLT in the last 72 hours.   CHEMISTRIES:  No results for input(s): NA, K, CL, CO2, BUN, CREATININE, GLUCOSE, CA, PHOS, MG in the last 72 hours. PT/INR:No results for input(s): PROTIME, INR in the last 72 hours. APTT:No results for input(s): APTT in the last 72 hours. LIVER PROFILE:No results for input(s): AST, ALT, BILIDIR, BILITOT, ALKPHOS in the last 72 hours. Imaging Last 24 Hours:  MRI LUMBAR SPINE WO CONTRAST    Result Date: 5/26/2022  INDICATION: 75-year-old female, lumbar stenosis. COMPARISON: None. TECHNIQUE: Multiplanar, multisequence MR imaging of the lumbar spine was performed without No administration of intravenous contrast. FINDINGS: There is exaggeration of the normal lordosis of the columns of the lumbar spine visualized, grade 1 anterolisthesis of L5 over S1. Vertebral augmentation in the L1 vertebral body. Depression of the superior endplate of the L3 and L5 vertebral bodies is seen. Multilevel degenerative endplate changes is seen. The STIR sequence demonstrates unremarkable signal intensity of the bone marrow , no evidence of T2 prolongation within the marrow to suggest acute fracture, edema or infiltrative process. The cord terminates at the level of the L1-L2 intervertebral disc space, unremarkable signal intensity of internal cord, the terminal nerve fibers demonstrate unremarkable contour with no evidence of thickening or clumping. L1-L2 demonstrates degenerative disc changes with hypertrophic changes in the facet joints, no significant narrowing of the spinal canal is seen, with mild narrowing of bilateral neuroforamina. L2-L3 demonstrates degenerative disc changes. Atrophic changes of the facet joints and ligamentum flavum, no significant narrowing of the spinal canal is visualized, mild narrowing of the right neural foramina and mild-to-moderate narrowing of the left neural foramina is seen at this level. L3-L4 demonstrates degenerative disc with hypertrophic changes in the facet joints and ligamentum flavum, no significant narrowing of the spinal canal is visualized. Moderate to severe narrowing of the right neural foramina and moderate narrowing of the left neural foramina is seen at this level. L4-L5 demonstrates extensive degenerative changes, hypertrophic changes in the facet joints with prominent hypertrophic changes in the ligamentum flavum, moderate to severe narrowing of the spinal canal with severe narrowing of the right neural foramina and moderate to severe narrowing of the left neural foramina seen at this level. L5-S1 demonstrates degenerative changes with hypertrophic changes in the facet joints, no significant narrowing of the spinal canal is visualized at this level,  severe narrowing of the right neuroforamina and moderate to severe narrowing of the left neural foramina is seen at this level. Extensive degenerative changes of the lumbar spine visualized most prominent at L4-L5 and L5-S1. Assessment//Plan           Hospital Problems           Last Modified POA    * (Principal) Elevated troponin 5/23/2022 Yes    Dizziness 5/24/2022 Yes    Benign paroxysmal positional vertigo due to bilateral vestibular disorder 5/24/2022 Yes    Maxillary pain 5/24/2022 Yes    Nonintractable headache 5/24/2022 Yes    Fall at home 5/24/2022 Yes    Fever and chills 5/24/2022 Yes    Impaired mobility and activities of daily living-metabolic encephalopathy with primary origins of infectious etiology 5/27/2022 Yes    Lumbar spondylosis 5/27/2022 Yes    Lumbar stenosis with neurogenic claudication 5/27/2022 Yes    Atelectasis, left 5/28/2022 Yes      Encephalopathy  OSIRIS  Dizziness  HTN/HLD  OSIRIS  Assessment & Plan    5/27: Hold lisinopril, IV fluids for acute kidney injury, continue with PT OT. Dizziness resolved. Encephalopathy resolved. Fevers down. No overnight events no new complaints. anticipated discharge tomorrow to rehab. Bmp tomorrow.   5/28: pt is medically stable to be discharged any time to [facility] vs home, no overnight events, dizziness is less no fevers, abx as per ID  5/29: Had episode of dizziness upon standing. Orthostatic was negative. ENT consulted for chronic sinus pain and pressure. This is the first time patient is told me this. Patient stated today that she had a lot of nasal surgery and ENT evaluation in the past for sinuses and also patient complaining of ear pain also as per edna. Patient has been getting IV antibiotics since admission. Spoke with nursing about the care. 5/30: Dizziness is better. Pre-[facility]ERT is pending from [facility]. Appreciate ENT input. Continue current care. Avoid chocolates, salt and caffeine   5/31: Patient was seen and evaluated. Waiting for rehab. No overnight events no new complaints. Continue current care. 6/1: [facility]  did not recommend rehab for the patient recommend long-term care for 1 week before going home. Spoke to . precert pending, pt is in agreement. 6/2: Patient was seen and evaluated. No overnight events. Awaiting for pre-CERT to SNF placement. Continue with current care. Answered all the questions. 6/3: Patient was seen and evaluated. No overnight events awaiting for pre-CERT. Level 2.   Electronically signed by Christiano Rodriguez MD on 5/27/22 at 1:09 PM EDT

## 2022-06-06 ENCOUNTER — TELEPHONE (OUTPATIENT)
Dept: FAMILY MEDICINE CLINIC | Age: 77
End: 2022-06-06

## 2022-06-09 ENCOUNTER — OFFICE VISIT (OUTPATIENT)
Dept: FAMILY MEDICINE CLINIC | Age: 77
End: 2022-06-09
Payer: MEDICARE

## 2022-06-09 VITALS
HEART RATE: 72 BPM | TEMPERATURE: 97.1 F | BODY MASS INDEX: 36.6 KG/M2 | SYSTOLIC BLOOD PRESSURE: 124 MMHG | OXYGEN SATURATION: 98 % | WEIGHT: 186.4 LBS | HEIGHT: 60 IN | DIASTOLIC BLOOD PRESSURE: 82 MMHG | RESPIRATION RATE: 14 BRPM

## 2022-06-09 DIAGNOSIS — R42 DIZZINESS: ICD-10-CM

## 2022-06-09 DIAGNOSIS — M47.816 LUMBAR SPONDYLOSIS: ICD-10-CM

## 2022-06-09 DIAGNOSIS — E78.00 PURE HYPERCHOLESTEROLEMIA: ICD-10-CM

## 2022-06-09 DIAGNOSIS — C90.00 MULTIPLE MYELOMA, REMISSION STATUS UNSPECIFIED (HCC): ICD-10-CM

## 2022-06-09 DIAGNOSIS — Z09 HOSPITAL DISCHARGE FOLLOW-UP: Primary | ICD-10-CM

## 2022-06-09 DIAGNOSIS — Z74.09 IMPAIRED MOBILITY AND ACTIVITIES OF DAILY LIVING: ICD-10-CM

## 2022-06-09 DIAGNOSIS — Z78.9 IMPAIRED MOBILITY AND ACTIVITIES OF DAILY LIVING: ICD-10-CM

## 2022-06-09 DIAGNOSIS — E11.42 TYPE 2 DIABETES MELLITUS WITH DIABETIC POLYNEUROPATHY, WITHOUT LONG-TERM CURRENT USE OF INSULIN (HCC): ICD-10-CM

## 2022-06-09 LAB
ALBUMIN SERPL-MCNC: 4.1 G/DL (ref 3.5–4.6)
ALP BLD-CCNC: 67 U/L (ref 40–130)
ALT SERPL-CCNC: 16 U/L (ref 0–33)
AST SERPL-CCNC: 22 U/L (ref 0–35)
BILIRUB SERPL-MCNC: 0.3 MG/DL (ref 0.2–0.7)
BILIRUBIN DIRECT: <0.2 MG/DL (ref 0–0.4)
BILIRUBIN, INDIRECT: NORMAL MG/DL (ref 0–0.6)
CHOLESTEROL, TOTAL: 110 MG/DL (ref 0–199)
HBA1C MFR BLD: 6.5 % (ref 4.8–5.9)
HDLC SERPL-MCNC: 52 MG/DL (ref 40–59)
LDL CHOLESTEROL CALCULATED: 41 MG/DL (ref 0–129)
TOTAL PROTEIN: 6.4 G/DL (ref 6.3–8)
TRIGL SERPL-MCNC: 87 MG/DL (ref 0–150)

## 2022-06-09 PROCEDURE — 1111F DSCHRG MED/CURRENT MED MERGE: CPT | Performed by: FAMILY MEDICINE

## 2022-06-09 PROCEDURE — 99214 OFFICE O/P EST MOD 30 MIN: CPT | Performed by: FAMILY MEDICINE

## 2022-06-09 ASSESSMENT — ENCOUNTER SYMPTOMS
VOMITING: 0
NAUSEA: 0

## 2022-06-09 NOTE — PROGRESS NOTES
Subjective:      Patient ID: Fatuma Pham is a 64 y.o. female    HPI  Here in follow up from hospital   Review of Systems   Constitutional: Negative for unexpected weight change. Gastrointestinal: Negative for nausea and vomiting. Skin: Negative for rash. Neurological: Positive for dizziness. Reviewed allergy, medical, social, surgical, family and med list changes and updated   Files     Social History     Socioeconomic History    Marital status:      Spouse name: Not on file    Number of children: Not on file    Years of education: Not on file    Highest education level: Not on file   Occupational History    Not on file   Tobacco Use    Smoking status: Former Smoker     Years: 24.00    Smokeless tobacco: Never Used   Substance and Sexual Activity    Alcohol use: Yes     Alcohol/week: 3.0 standard drinks     Types: 3 Glasses of wine per week    Drug use: No    Sexual activity: Not on file   Other Topics Concern    Not on file   Social History Narrative    Lives With: Spouse,  Son (pt never home alone)    Type of Home: House in [address] [place] [address],[address] in basement,Two level (doesn't have to use basement)    Home Access: Stairs to enter with rails- Number of Steps: 6- Rails: Right    Bathroom Shower/Tub: Walk-in shower    Bathroom Equipment: Shower chair,Grab bars in [address]: [facility]:  ( assists with bathing/dressing; pt indep with toileting activities)    Homemaking Assistance:  (spouse and son complete)    Homemaking Responsibilities: No    Ambulation Assistance: Independent (cane/rollator PRN)    Transfer Assistance: Independent    Additional Comments: Pt inconsistent historian.  Pt's spouse arriving mid assessment and confirms home set up and PLOF         Social Determinants of Health     Financial Resource Strain: Low Risk     Difficulty of Paying Living Expenses: Not hard at all   Food Insecurity: No Food Insecurity    Worried About Running Out of Food in the Last Year: Never true    [place] of Food in the Last Year: Never true   Transportation Needs:     Lack of Transportation (Medical): Not on file    Lack of Transportation (Non-Medical): Not on file   Physical Activity:     Days of Exercise per Week: Not on file    Minutes of Exercise per Session: Not on file   Stress:     Feeling of Stress : Not on file   Social Connections:     Frequency of Communication with Friends and Family: Not on file    Frequency of Social Gatherings with Friends and Family: Not on file    Attends Taoist Services: Not on file    Active Member of [address] [place] [address] or Organizations: Not on file    Attends Club or Organization Meetings: Not on file    Marital Status: Not on file   Intimate Partner Violence:     Fear of Current or Ex-Partner: Not on file    Emotionally Abused: Not on file    Physically Abused: Not on file    Sexually Abused: Not on file   Housing Stability:     Unable to Pay for Housing in the Last Year: Not on file    Number of [place] in the Last Year: Not on file    Unstable Housing in the Last Year: Not on file     Current Outpatient Medications   Medication Sig Dispense Refill    sodium chloride (OCEAN, BABY AYR) 0.65 % nasal spray 2 sprays by Nasal route as needed for Congestion 1 each 0    diclofenac sodium (VOLTAREN) 1 % GEL Apply 2 g topically 3 times daily as needed for Pain 2 g 2    amLODIPine (NORVASC) 10 MG tablet Take 1 tablet by mouth daily 30 tablet 3    atorvastatin (LIPITOR) 80 MG tablet Take 1 tablet by mouth daily 90 tablet 0    gabapentin (NEURONTIN) 300 MG capsule Take 2 capsules by mouth at bedtime for 90 days.  180 capsule 0    furosemide (LASIX) 20 MG tablet Take 1 tablet by mouth daily 90 tablet 0    metoprolol tartrate (LOPRESSOR) 50 MG tablet Take 1 tablet by mouth 2 times daily (Patient taking differently: Take 50 mg by mouth daily ) 180 tablet 0    pantoprazole (PROTONIX) 40 MG tablet Take 1 tablet by mouth daily 90 tablet 0    ezetimibe (ZETIA) 10 MG tablet Take 1 tablet by mouth daily 90 tablet 0    blood glucose monitor kit and supplies Test 1 time daily 1 kit 0    blood glucose monitor strips Test 1 time daily 50 strip 2    Lancets MISC 1 each by Does not apply route daily 50 each 5    aspirin 81 MG tablet Take 81 mg by mouth 2 times daily       isosorbide mononitrate (IMDUR) 30 MG CR tablet Take 30 mg by mouth daily       lisinopril (PRINIVIL;ZESTRIL) 20 MG tablet Take 20 mg by mouth daily        No current facility-administered medications for this visit. Family History   Problem Relation Age of Onset   Unk [place] Cancer Mother     Arthritis Mother     Diabetes Mother     Heart Disease Mother     High Blood Pressure Mother     High Cholesterol Mother     Arthritis Father     Diabetes Father     Heart Disease Father     High Blood Pressure Father     High Cholesterol Father      Past Medical History:   Diagnosis Date    Cancer [facility])     Encounter for lumbar puncture 05/25/2022    Completed by Dr. Vandana Garrido - diagnostics sent    Hyperlipidemia     Hypertension     Type II or unspecified type diabetes mellitus without mention of complication, not stated as uncontrolled      Objective:   /82   Pulse 72   Temp 97.1 °F (36.2 °C)   Resp 14   Ht 5' (1.524 m)   Wt 186 lb 6.4 oz (84.6 kg)   SpO2 98%   BMI 36.40 kg/m²     Physical Exam    Assessment:      {No diagnosis found. (Refresh or delete this SmartLink)}      Plan:      No orders of the defined types were placed in this encounter. No orders of the defined types were placed in this encounter. No follow-ups on file. Post-Discharge Transitional Care  Follow Up      Mckenna Mendoza   YOB: 1945    Date of Office Visit:  6/9/2022  Date of Hospital Admission: 5/23/22  Date of Hospital Discharge: 6/3/22  Risk of hospital readmission (high >=14%.  Medium >=10%) :Readmission Risk Score: 7.8 ( )      Care management risk score Rising risk (score 2-5) and Complex Care (Scores >=6): 2     Non face to face  following discharge, date last encounter closed (first attempt may have been earlier): 6/6/2022  8:59 AM    Call initiated 2 business days of discharge: Yes    ASSESSMENT/PLAN:       Medical Decision Making: moderate complexity  No follow-ups on file. On this date 6/9/2022 I have spent 25 minutes reviewing previous notes, test results and face to face with the patient discussing the diagnosis and importance of compliance with the treatment plan as well as documenting on the day of the visit. Subjective:   HPI:  Follow up of Hospital problems/diagnosis(es): here in follow up from recent hospital stay for dizziness and worsening lower back pain and elevated troponin. Does have follow up planned with cardiology and neurosurgery. Inpatient course: Discharge summary reviewed- see chart. Interval history/Current status:stable     Patient Active Problem List   Diagnosis    Multiple myeloma ([facility] [address]    Obese    Ataxia    Hyperlipidemia    Type 2 diabetes mellitus with circulatory disorder (HCC)    Vertebral compression fracture (HCC)    Elevated troponin    Dizziness    Benign paroxysmal positional vertigo due to bilateral vestibular disorder    Maxillary pain    Nonintractable headache    Fall at home    Fever and chills    Impaired mobility and activities of daily living-metabolic encephalopathy with primary origins of infectious etiology    Lumbar spondylosis    Lumbar stenosis with neurogenic claudication    Atelectasis, left       Medications listed as ordered at the time of discharge from hospital     Medication List          Accurate as of June 9, 2022 10:01 AM. If you have any questions, ask your nurse or doctor.             CHANGE how you take these medications    metoprolol tartrate 50 MG tablet  Commonly known as: LOPRESSOR  Take 1 tablet by mouth 2 times daily  What changed: when to take this        CONTINUE taking these medications    amLODIPine 10 MG tablet  Commonly known as: NORVASC  Take 1 tablet by mouth daily     aspirin 81 MG tablet     atorvastatin 80 MG tablet  Commonly known as: LIPITOR  Take 1 tablet by mouth daily     blood glucose monitor kit and supplies  Test 1 time daily     blood glucose test strips  Test 1 time daily     diclofenac sodium 1 % Gel  Commonly known as: VOLTAREN  Apply 2 g topically 3 times daily as needed for Pain     ezetimibe 10 mg tablet  Commonly known as: Zetia  Take 1 tablet by mouth daily     furosemide 20 MG tablet  Commonly known as: Lasix  Take 1 tablet by mouth daily     gabapentin 300 MG capsule  Commonly known as: NEURONTIN  Take 2 capsules by mouth at bedtime for 90 days. isosorbide mononitrate 30 MG extended release tablet  Commonly known as: IMDUR     Lancets Misc  1 each by Does not apply route daily     lisinopril 20 MG tablet  Commonly known as: PRINIVIL;ZESTRIL     pantoprazole 40 MG tablet  Commonly known as: Protonix  Take 1 tablet by mouth daily     sodium chloride 0.65 % nasal spray  Commonly known as: OCEAN, BABY AYR  2 sprays by Nasal route as needed for Congestion              Medications marked \"taking\" at this time  Outpatient Medications Marked as Taking for the 6/9/22 encounter (Office Visit) with Niki Seo MD   Medication Sig Dispense Refill    sodium chloride (OCEAN, BABY AYR) 0.65 % nasal spray 2 sprays by Nasal route as needed for Congestion 1 each 0    diclofenac sodium (VOLTAREN) 1 % GEL Apply 2 g topically 3 times daily as needed for Pain 2 g 2    amLODIPine (NORVASC) 10 MG tablet Take 1 tablet by mouth daily 30 tablet 3    atorvastatin (LIPITOR) 80 MG tablet Take 1 tablet by mouth daily 90 tablet 0    gabapentin (NEURONTIN) 300 MG capsule Take 2 capsules by mouth at bedtime for 90 days.  180 capsule 0    furosemide (LASIX) 20 MG tablet Take 1 tablet by mouth daily 90 tablet 0  metoprolol tartrate (LOPRESSOR) 50 MG tablet Take 1 tablet by mouth 2 times daily (Patient taking differently: Take 50 mg by mouth daily ) 180 tablet 0    pantoprazole (PROTONIX) 40 MG tablet Take 1 tablet by mouth daily 90 tablet 0    ezetimibe (ZETIA) 10 MG tablet Take 1 tablet by mouth daily 90 tablet 0    blood glucose monitor kit and supplies Test 1 time daily 1 kit 0    blood glucose monitor strips Test 1 time daily 50 strip 2    Lancets MISC 1 each by Does not apply route daily 50 each 5    aspirin 81 MG tablet Take 81 mg by mouth 2 times daily       isosorbide mononitrate (IMDUR) 30 MG CR tablet Take 30 mg by mouth daily       lisinopril (PRINIVIL;ZESTRIL) 20 MG tablet Take 20 mg by mouth daily           Medications patient taking as of now reconciled against medications ordered at time of hospital discharge: Yes    A comprehensive review of systems was negative except for what was noted in the HPI. Objective:    /82   Pulse 72   Temp 97.1 °F (36.2 °C)   Resp 14   Ht 5' (1.524 m)   Wt 186 lb 6.4 oz (84.6 kg)   SpO2 98%   BMI 36.40 kg/m²   General Appearance: in no acute distress and alert  Pulmonary/Chest: clear to auscultation bilaterally- no wheezes, rales or rhonchi, normal air movement, no respiratory distress  Cardiovascular: normal rate, normal S1 and S2 and no murmurs  Neurologic: speech normal     Diagnosis Orders   1. Hospital discharge follow-up     2. Lumbar spondylosis     3. Impaired mobility and activities of daily living-metabolic encephalopathy with primary origins of infectious etiology     4. Multiple myeloma, remission status unspecified ([facility] [address]     5.  Dizziness       Orders Placed This Encounter   Procedures   Kashif Segovia MD, Neurology, [place]     Referral Priority:   Routine     Referral Type:   Eval and Treat     Referral Reason:   Specialty Services Required     Referred to Provider:   Sheldon Yost MD     Requested Specialty:   Neurology Number of Visits Requested:   1    [facility] - Trixie Sharma DO, Oncology, [address]     Referral Priority:   Routine     Referral Type:   Eval and Treat     Referral Reason:   Specialty Services Required     Referred to Provider:   Saul Garrett DO     Requested Specialty:   Hematology and Oncology     Number of Visits Requested:   1   continue current meds   F/u with cardiology and neurosurgery as planned   F/u here next month after blood work as planned   An electronic signature was used to authenticate this note.   --Delfino Lopez MD

## 2022-06-12 ASSESSMENT — ENCOUNTER SYMPTOMS
VOMITING: 0
BACK PAIN: 1

## 2022-06-12 NOTE — PROGRESS NOTES
Subjective:      Patient ID: Opal De La Cruz is a 64 y.o. female    Back Pain  This is a chronic problem. Pertinent negatives include no weakness. Here in follow up from recent hospital stay for worsening lower back pain and decreased mobility and elevated troponin. Does have follow up planned with   Cardiology and neurosurgery. Doing somewhat better overall but has continued to have intermittent dizziness. No fever or chills. No cough. Would like referral to see oncology for mm   Review of Systems   Constitutional: Positive for activity change. Gastrointestinal: Negative for vomiting. Musculoskeletal: Positive for arthralgias and back pain. Skin: Negative for rash. Neurological: Negative for weakness. Reviewed allergy, medical, social, surgical, family and med list changes and updated   Files     Social History     Socioeconomic History    Marital status:      Spouse name: None    Number of children: None    Years of education: None    Highest education level: None   Occupational History    None   Tobacco Use    Smoking status: Former Smoker     Years: 24.00    Smokeless tobacco: Never Used   Substance and Sexual Activity    Alcohol use:  Yes     Alcohol/week: 3.0 standard drinks     Types: 3 Glasses of wine per week    Drug use: No    Sexual activity: None   Other Topics Concern    None   Social History Narrative    Lives With: Spouse,  Son (pt never home alone)    Type of Home: House in [address] [place] [address],[address] in basement,Two level (doesn't have to use basement)    Home Access: Stairs to enter with rails- Number of Steps: 6- Rails: Right    Bathroom Shower/Tub: Walk-in shower    Bathroom Equipment: Shower chair,Grab bars in [facility]: [facility]:  ( assists with bathing/dressing; pt indep with toileting activities)    Homemaking Assistance:  (spouse and son complete)    Homemaking Responsibilities: No Ambulation Assistance: Independent (cane/rollator PRN)    Transfer Assistance: Independent    Additional Comments: Pt inconsistent historian. Pt's spouse arriving mid assessment and confirms home set up and PLOF         Social Determinants of Health     Financial Resource Strain: Low Risk     Difficulty of Paying Living Expenses: Not hard at all   Food Insecurity: No Food Insecurity    Worried About Running Out of Food in the Last Year: Never true    [address] in the Last Year: Never true   Transportation Needs:     Lack of Transportation (Medical): Not on file    Lack of Transportation (Non-Medical):  Not on file   Physical Activity:     Days of Exercise per Week: Not on file    Minutes of Exercise per Session: Not on file   Stress:     Feeling of Stress : Not on file   Social Connections:     Frequency of Communication with Friends and Family: Not on file    Frequency of Social Gatherings with Friends and Family: Not on file    Attends [employer] Services: Not on file    Active Member of [address] [place] [address] or Organizations: Not on file    Attends Club or Organization Meetings: Not on file    Marital Status: Not on file   Intimate Partner Violence:     Fear of Current or Ex-Partner: Not on file    Emotionally Abused: Not on file    Physically Abused: Not on file    Sexually Abused: Not on file   Housing Stability:     Unable to Pay for Housing in the Last Year: Not on file    Number of [place] in the Last Year: Not on file    Unstable Housing in the Last Year: Not on file     Current Outpatient Medications   Medication Sig Dispense Refill    sodium chloride (OCEAN, BABY AYR) 0.65 % nasal spray 2 sprays by Nasal route as needed for Congestion 1 each 0    diclofenac sodium (VOLTAREN) 1 % GEL Apply 2 g topically 3 times daily as needed for Pain 2 g 2    amLODIPine (NORVASC) 10 MG tablet Take 1 tablet by mouth daily 30 tablet 3    atorvastatin (LIPITOR) 80 MG tablet Take 1 tablet by mouth daily 90 tablet 0    gabapentin (NEURONTIN) 300 MG capsule Take 2 capsules by mouth at bedtime for 90 days. 180 capsule 0    furosemide (LASIX) 20 MG tablet Take 1 tablet by mouth daily 90 tablet 0    metoprolol tartrate (LOPRESSOR) 50 MG tablet Take 1 tablet by mouth 2 times daily (Patient taking differently: Take 50 mg by mouth daily ) 180 tablet 0    pantoprazole (PROTONIX) 40 MG tablet Take 1 tablet by mouth daily 90 tablet 0    ezetimibe (ZETIA) 10 MG tablet Take 1 tablet by mouth daily 90 tablet 0    blood glucose monitor kit and supplies Test 1 time daily 1 kit 0    blood glucose monitor strips Test 1 time daily 50 strip 2    Lancets MISC 1 each by Does not apply route daily 50 each 5    aspirin 81 MG tablet Take 81 mg by mouth 2 times daily       isosorbide mononitrate (IMDUR) 30 MG CR tablet Take 30 mg by mouth daily       lisinopril (PRINIVIL;ZESTRIL) 20 MG tablet Take 20 mg by mouth daily        No current facility-administered medications for this visit. Family History   Problem Relation Age of Onset   Eloina Me Cancer Mother     Arthritis Mother     Diabetes Mother     Heart Disease Mother     High Blood Pressure Mother     High Cholesterol Mother     Arthritis Father     Diabetes Father     Heart Disease Father     High Blood Pressure Father     High Cholesterol Father      Past Medical History:   Diagnosis Date    Cancer [facility])     Encounter for lumbar puncture 05/25/2022    Completed by Dr. Juan Luis Urias - diagnostics sent    Hyperlipidemia     Hypertension     Type II or unspecified type diabetes mellitus without mention of complication, not stated as uncontrolled      Objective:   /82   Pulse 72   Temp 97.1 °F (36.2 °C)   Resp 14   Ht 5' (1.524 m)   Wt 186 lb 6.4 oz (84.6 kg)   SpO2 98%   BMI 36.40 kg/m²     Physical Exam  Neck:no carotid bruits. No masses. No adenopathy. No thyroid asymmetry. Lungs:clear and equal breath sounds. No wheezes or rales. Heart:rate reg.  1/6 syst murmur across precordium   No gallops       Gen: In no acute distress  Neuro; No facial asymmetries;;     Assessment:       Diagnosis Orders   1. Hospital discharge follow-up  [place] DISCHARGE MEDS RECONCILED W/ CURRENT OUTPATIENT MED LIST   2. Lumbar spondylosis     3. Impaired mobility and activities of daily living-metabolic encephalopathy with primary origins of infectious etiology     4. Multiple myeloma, remission status unspecified ([facility] [address]  SHANTEL Puentes DO, Oncology, [place]   5.  Kashif Denis MD, Neurology, [facility]         Plan:      Orders Placed This Encounter   Procedures   Kashif Walsh MD, Neurology, [facility]     Referral Priority:   Routine     Referral Type:   Eval and Treat     Referral Reason:   Specialty Services Required     Referred to Provider:   Mehnaz Rush MD     Requested Specialty:   Neurology     Number of Visits Requested:   1    SHANTEL Garcia, Oncology, [facility]     Referral Priority:   Routine     Referral Type:   Eval and Treat     Referral Reason:   Specialty Services Required     Referred to Provider:   Sharon Mckay DO     Requested Specialty:   Hematology and Oncology     Number of Visits Requested:   1    [place] DISCHARGE MEDS RECONCILED W/ CURRENT OUTPATIENT MED LIST   f/u with specialities as already planned   Fasting blood work -f/u after done

## 2022-06-13 NOTE — DISCHARGE SUMMARY
Discharge Summary    Date: 6/13/2022  Patient Name: Angely Mcclendon YOB: 1945 Age: 64 y.o. Admit Date: 5/23/2022  Discharge Date: 6/3/2022  Discharge Condition: [address]    Admission Diagnosis  Elevated troponin (R77.8); Fever and chills (R50.9)     Discharge Diagnosis  Principal Problem: Elevated troponinActive Problems: Dizziness Benign paroxysmal positional vertigo due to bilateral vestibular disorder Maxillary pain Nonintractable headache Fall at home Fever and chills Impaired mobility and activities of daily living-metabolic encephalopathy with primary origins of infectious etiology Lumbar spondylosis  Lumbar stenosis with neurogenic claudication  Atelectasis, leftResolved Problems:  * No resolved hospital problems. [facility] Stay  Narrative of Hospital Course:  Patient comes with fever, encephalopathy received IV antibiotics and blood culture was negative for infection was evaluated by infectious disease, did not recommend abx upon discharge. Patient was seen ENT and neurology for dizziness which resolved. encephalopathy resolved. Recommend not to take caffeine, chocolate or salty food. Dizziness most likely vestibular origin, prescribed meclizine upon discharge, insurance approved [facility] and [facility] upon discharge for weakness based on therapy notes    Consultants:  [facility] CONSULT TO INTERVENTIONAL RADIOLOGYIP CONSULT TO PHYSICAL MEDICINE REHABIP CONSULT TO PULMONOLOGYIP CONSULT TO [facility] TO OTOLARYNGOLOGYIP CONSULT TO [facility] TO INTERVENTIONAL RADIOLOGYIP CONSULT TO PHYSICAL MEDICINE REHABIP CONSULT TO OTOLARYNGOLOGY    Surgeries/procedures Performed:       Treatments:            Discharge Plan/Disposition:  Home    Hospital/Incidental Findings Requiring Follow Up:    Patient Instructions:    Diet:    Activity:  For number of days (if applicable):       Other Instructions:    Provider Follow-Up:   No follow-ups on file. Significant Diagnostic Studies:    Recent Labs:  Admission on 05/23/2022, Discharged on 06/03/2022No results displayed because visit has over 200 results. ------------    Radiology last 7 days:  No results found. Pending Labs   Order Current Status  Gram stain CSF Collected (05/25/22 0940)  VDRL, CSF Preliminary result      Discharge Medications    Discharge Medication List as of 6/3/2022 12:25 PMSTART taking these medicationssodium chloride (OCEAN, BABY AYR) 0.65 % nasal spray2 sprays by Nasal route as needed for Congestion, Disp-1 each, R-0Normaldiclofenac sodium (VOLTAREN) 1 % GELApply 2 g topically 3 times daily as needed for Pain, Topical, 3 TIMES DAILY PRN Starting Fri 6/3/2022, Until Sun 7/3/2022 at 2359, For 30 days, Disp-2 g, R-2, NormalamLODIPine (NORVASC) 10 MG tabletTake 1 tablet by mouth daily, Disp-30 tablet, R-3Normalmeclizine (ANTIVERT) 25 MG tabletTake 1 tablet by mouth 2 times daily for 7 days, Disp-14 tablet, R-0Normal    Discharge Medication List as of 6/3/2022 12:25 PM    Discharge Medication List as of 6/3/2022 12:25 PMCONTINUE these medications which have NOT CHANGEDatorvastatin (LIPITOR) 80 MG tabletTake 1 tablet by mouth daily, Disp-90 tablet, R-0Normalgabapentin (NEURONTIN) 300 MG capsuleTake 2 capsules by mouth at bedtime for 90 days. , Disp-180 capsule, R-0Normalfurosemide (LASIX) 20 MG tabletTake 1 tablet by mouth daily, Disp-90 tablet, R-0For ksenichNormalmetoprolol tartrate (LOPRESSOR) 50 MG tabletTake 1 tablet by mouth 2 times daily, Disp-180 tablet, R-0Normalpantoprazole (PROTONIX) 40 MG tabletTake 1 tablet by mouth daily, Disp-90 tablet, R-0Normalezetimibe (ZETIA) 10 MG tabletTake 1 tablet by mouth daily, Disp-90 tablet, R-0Normalblood glucose monitor kit and suppliesTest 1 time daily, Disp-1 kit, R-0, Normalblood glucose monitor stripsTest 1 time daily, Disp-50 strip, R-2, NormalLancets MISCDAILY Starting Wed 4/13/2022, Disp-50 each, R-5, Normalaspirin 81 MG tabletTake 81 mg by mouth 2 times daily Historical Medisosorbide mononitrate (IMDUR) 30 MG CR tabletTake 30 mg by mouth dailylisinopril (PRINIVIL;ZESTRIL) 20 MG tabletTake 20 mg by mouth daily Historical Med    Discharge Medication List as of 6/3/2022 12:25 PM    Time Spent on Discharge:E] minutes were spent in patient examination, evaluation, counseling as well as medication reconciliation, prescriptions for required medications, discharge plan, and follow up.     Electronically signed by Heather Sterling MD on 6/13/22 at 7:40 AM EDT   overtime on dc summary was 45 min  FU with PCP in 1 week

## 2022-06-22 PROBLEM — R79.89 ELEVATED TROPONIN: Status: RESOLVED | Noted: 2022-05-23 | Resolved: 2022-06-22

## 2022-06-22 PROBLEM — R77.8 ELEVATED TROPONIN: Status: RESOLVED | Noted: 2022-05-23 | Resolved: 2022-06-22

## 2022-07-09 NOTE — TELEPHONE ENCOUNTER
requesting medication refill.  Please approve or deny this request.    Rx requested:  Requested Prescriptions     Pending Prescriptions Disp Refills    atorvastatin (LIPITOR) 80 MG tablet [Pharmacy Med Name: ATORVASTATIN CALCIUM 80 MG Tablet] 90 tablet 0     Sig: TAKE 1 TABLET EVERY DAY         Last Office Visit:   6/9/2022      Next Visit Date:  Future Appointments   Date Time Provider Kalpana Bernard   8/2/2022  9:15 AM [facility]   8/5/2022  9:30 AM [facility]   11/9/2022  4:00 PM Tarun Benson MD [facility]

## 2022-07-16 RX ORDER — ATORVASTATIN CALCIUM 80 MG/1
TABLET, FILM COATED ORAL
Qty: 90 TABLET | Refills: 0 | Status: SHIPPED | OUTPATIENT
Start: 2022-07-16

## 2022-07-28 ENCOUNTER — TELEPHONE (OUTPATIENT)
Dept: FAMILY MEDICINE CLINIC | Age: 77
End: 2022-07-28

## 2022-08-05 ENCOUNTER — OFFICE VISIT (OUTPATIENT)
Dept: FAMILY MEDICINE CLINIC | Age: 77
End: 2022-08-05
Payer: MEDICARE

## 2022-08-05 VITALS
TEMPERATURE: 97.8 F | BODY MASS INDEX: 34.74 KG/M2 | RESPIRATION RATE: 14 BRPM | WEIGHT: 184 LBS | SYSTOLIC BLOOD PRESSURE: 138 MMHG | DIASTOLIC BLOOD PRESSURE: 80 MMHG | OXYGEN SATURATION: 95 % | HEART RATE: 68 BPM | HEIGHT: 61 IN

## 2022-08-05 DIAGNOSIS — E11.59 TYPE 2 DIABETES MELLITUS WITH OTHER CIRCULATORY COMPLICATION, WITHOUT LONG-TERM CURRENT USE OF INSULIN (HCC): Primary | ICD-10-CM

## 2022-08-05 DIAGNOSIS — Z76.0 MEDICATION REFILL: ICD-10-CM

## 2022-08-05 PROCEDURE — 99213 OFFICE O/P EST LOW 20 MIN: CPT | Performed by: FAMILY MEDICINE

## 2022-08-05 PROCEDURE — 1036F TOBACCO NON-USER: CPT | Performed by: FAMILY MEDICINE

## 2022-08-05 PROCEDURE — G8417 CALC BMI ABV UP PARAM F/U: HCPCS | Performed by: FAMILY MEDICINE

## 2022-08-05 PROCEDURE — G8427 DOCREV CUR MEDS BY ELIG CLIN: HCPCS | Performed by: FAMILY MEDICINE

## 2022-08-05 PROCEDURE — 1123F ACP DISCUSS/DSCN MKR DOCD: CPT | Performed by: FAMILY MEDICINE

## 2022-08-05 PROCEDURE — 3044F HG A1C LEVEL LT 7.0%: CPT | Performed by: FAMILY MEDICINE

## 2022-08-05 PROCEDURE — 1090F PRES/ABSN URINE INCON ASSESS: CPT | Performed by: FAMILY MEDICINE

## 2022-08-05 PROCEDURE — G8400 PT W/DXA NO RESULTS DOC: HCPCS | Performed by: FAMILY MEDICINE

## 2022-08-05 RX ORDER — ASPIRIN 81 MG/1
81 TABLET ORAL DAILY
Qty: 30 TABLET | Refills: 5 | Status: CANCELLED | OUTPATIENT
Start: 2022-08-05

## 2022-08-05 ASSESSMENT — ENCOUNTER SYMPTOMS
VOMITING: 0
NAUSEA: 0

## 2022-08-05 ASSESSMENT — PATIENT HEALTH QUESTIONNAIRE - PHQ9
SUM OF ALL RESPONSES TO PHQ QUESTIONS 1-9: 0
2. FEELING DOWN, DEPRESSED OR HOPELESS: 0
1. LITTLE INTEREST OR PLEASURE IN DOING THINGS: 0
SUM OF ALL RESPONSES TO PHQ QUESTIONS 1-9: 0
SUM OF ALL RESPONSES TO PHQ9 QUESTIONS 1 & 2: 0

## 2022-08-05 NOTE — PROGRESS NOTES
Subjective:      Patient ID: Dariana Miller is a 64 y.o. female    HPI  Here in follow up of sorts. Has not seen neurology yet. Did see oncology since last time. Off glucophage with sugars around 120 in am.     Review of Systems   Constitutional:  Negative for unexpected weight change. Gastrointestinal:  Negative for nausea and vomiting. Reviewed allergy, medical, social, surgical, family and med list changes and updated   Files     Social History     Socioeconomic History    Marital status:    Tobacco Use    Smoking status: Former     Years: 24.00     Types: Cigarettes    Smokeless tobacco: Never   Substance and Sexual Activity    Alcohol use: Yes     Alcohol/week: 3.0 standard drinks     Types: 3 Glasses of wine per week    Drug use: No   Social History Narrative    Lives With: Spouse,  Son (pt never home alone)    Type of Home: House in [address] [place] [address],[address] in basement,Two level (doesn't have to use basement)    Home Access: Stairs to enter with rails- Number of Steps: 6- Rails: Right    Bathroom Shower/Tub: Walk-in shower    Bathroom Equipment: Shower chair,Grab bars in [facility]: [facility]:  ( assists with bathing/dressing; pt indep with toileting activities)    Homemaking Assistance:  (spouse and son complete)    Homemaking Responsibilities: No    Ambulation Assistance: Independent (cane/rollator PRN)    Transfer Assistance: Independent    Additional Comments: Pt inconsistent historian.  Pt's spouse arriving mid assessment and confirms home set up and PLOF         Social Determinants of Health     Financial Resource Strain: Low Risk     Difficulty of Paying Living Expenses: Not hard at all   Food Insecurity: No Food Insecurity    Worried About Running Out of Food in the Last Year: Never true    Ran Out of Food in the Last Year: Never true     Current Outpatient Medications   Medication Sig Dispense Refill    atorvastatin (LIPITOR) 80 MG tablet TAKE 1 TABLET EVERY DAY 90 tablet 0    sodium chloride (OCEAN, BABY AYR) 0.65 % nasal spray 2 sprays by Nasal route as needed for Congestion 1 each 0    amLODIPine (NORVASC) 10 MG tablet Take 1 tablet by mouth daily 30 tablet 3    furosemide (LASIX) 20 MG tablet Take 1 tablet by mouth daily 90 tablet 0    metoprolol tartrate (LOPRESSOR) 50 MG tablet Take 1 tablet by mouth 2 times daily (Patient taking differently: Take 50 mg by mouth in the morning.) 180 tablet 0    pantoprazole (PROTONIX) 40 MG tablet Take 1 tablet by mouth daily 90 tablet 0    ezetimibe (ZETIA) 10 MG tablet Take 1 tablet by mouth daily 90 tablet 0    blood glucose monitor kit and supplies Test 1 time daily 1 kit 0    blood glucose monitor strips Test 1 time daily 50 strip 2    Lancets MISC 1 each by Does not apply route daily 50 each 5    aspirin 81 MG tablet Take 81 mg by mouth 2 times daily       isosorbide mononitrate (IMDUR) 30 MG CR tablet Take 30 mg by mouth daily       lisinopril (PRINIVIL;ZESTRIL) 20 MG tablet Take 20 mg by mouth daily       diclofenac sodium (VOLTAREN) 1 % GEL Apply 2 g topically 3 times daily as needed for Pain 2 g 2    gabapentin (NEURONTIN) 300 MG capsule Take 2 capsules by mouth at bedtime for 90 days. 180 capsule 0     No current facility-administered medications for this visit.      Family History   Problem Relation Age of Onset    Cancer Mother     Arthritis Mother     Diabetes Mother     Heart Disease Mother     High Blood Pressure Mother     High Cholesterol Mother     Arthritis Father     Diabetes Father     Heart Disease Father     High Blood Pressure Father     High Cholesterol Father      Past Medical History:   Diagnosis Date    Cancer [facility])     Encounter for lumbar puncture 05/25/2022    Completed by Dr. Almanza Peer - diagnostics sent    Hyperlipidemia     Hypertension     Type II or unspecified type diabetes mellitus without mention of complication, not stated as uncontrolled Objective:   /80   Pulse 68   Temp 97.8 °F (36.6 °C)   Resp 14   Ht 5' 1\" (1.549 m)   Wt 184 lb (83.5 kg)   SpO2 95%   BMI 34.77 kg/m²     Physical Exam    Lungs:clear and equal breath sounds. No wheezes or rales. Heart:rate reg. No murmur. No gallops       Gen: In no acute distress     Assessment:       Diagnosis Orders   1. Type 2 diabetes mellitus with other circulatory complication, without long-term current use of insulin (HCC)  Hemoglobin A1C      2.  Medication refill               Plan:    F/u with neurology -referral already done   Continue current meds   Orders Placed This Encounter   Procedures    Hemoglobin A1C     Standing Status:   Future     Standing Expiration Date:   8/5/2023    Non fasting blood work in 4 weeks and f/u after done

## 2022-08-09 ENCOUNTER — OFFICE VISIT (OUTPATIENT)
Dept: CARDIOLOGY CLINIC | Age: 77
End: 2022-08-09
Payer: MEDICARE

## 2022-08-09 VITALS
BODY MASS INDEX: 35.68 KG/M2 | RESPIRATION RATE: 18 BRPM | HEIGHT: 61 IN | OXYGEN SATURATION: 98 % | WEIGHT: 189 LBS | DIASTOLIC BLOOD PRESSURE: 60 MMHG | HEART RATE: 75 BPM | TEMPERATURE: 97.3 F | SYSTOLIC BLOOD PRESSURE: 120 MMHG

## 2022-08-09 DIAGNOSIS — I21.4 NSTEMI (NON-ST ELEVATED MYOCARDIAL INFARCTION) (HCC): ICD-10-CM
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
## 2022-08-09 DIAGNOSIS — S32.000S COMPRESSION FRACTURE OF LUMBAR VERTEBRA, UNSPECIFIED LUMBAR VERTEBRAL LEVEL, SEQUELA: Primary | ICD-10-CM

## 2022-08-09 DIAGNOSIS — E78.5 DYSLIPIDEMIA: ICD-10-CM
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
## 2022-08-09 DIAGNOSIS — E11.59 TYPE 2 DIABETES MELLITUS WITH OTHER CIRCULATORY COMPLICATION, WITHOUT LONG-TERM CURRENT USE OF INSULIN (HCC): ICD-10-CM

## 2022-08-09 DIAGNOSIS — I10 HYPERTENSION, UNSPECIFIED TYPE: ICD-10-CM

## 2022-08-09 DIAGNOSIS — R42 DIZZINESS: ICD-10-CM

## 2022-08-09 DIAGNOSIS — E78.5 HYPERLIPIDEMIA, UNSPECIFIED HYPERLIPIDEMIA TYPE: ICD-10-CM

## 2022-08-09 PROCEDURE — 3044F HG A1C LEVEL LT 7.0%: CPT | Performed by: INTERNAL MEDICINE

## 2022-08-09 PROCEDURE — 1090F PRES/ABSN URINE INCON ASSESS: CPT | Performed by: INTERNAL MEDICINE

## 2022-08-09 PROCEDURE — 1036F TOBACCO NON-USER: CPT | Performed by: INTERNAL MEDICINE

## 2022-08-09 PROCEDURE — G8417 CALC BMI ABV UP PARAM F/U: HCPCS | Performed by: INTERNAL MEDICINE

## 2022-08-09 PROCEDURE — 1123F ACP DISCUSS/DSCN MKR DOCD: CPT | Performed by: INTERNAL MEDICINE

## 2022-08-09 PROCEDURE — G8427 DOCREV CUR MEDS BY ELIG CLIN: HCPCS | Performed by: INTERNAL MEDICINE

## 2022-08-09 PROCEDURE — G8400 PT W/DXA NO RESULTS DOC: HCPCS | Performed by: INTERNAL MEDICINE

## 2022-08-09 PROCEDURE — 99214 OFFICE O/P EST MOD 30 MIN: CPT | Performed by: INTERNAL MEDICINE

## 2022-08-09 RX ORDER — FUROSEMIDE 40 MG/1
40 TABLET ORAL DAILY
Qty: 90 TABLET | Refills: 3 | Status: SHIPPED | OUTPATIENT
Start: 2022-08-09 | End: 2022-08-17 | Stop reason: SDUPTHER

## 2022-08-09 RX ORDER — POTASSIUM CHLORIDE 20 MEQ/1
20 TABLET, EXTENDED RELEASE ORAL DAILY
Qty: 90 TABLET | Refills: 3 | Status: SHIPPED | OUTPATIENT
Start: 2022-08-09 | End: 2022-08-17 | Stop reason: SDUPTHER

## 2022-08-09 ASSESSMENT — ENCOUNTER SYMPTOMS
GASTROINTESTINAL NEGATIVE: 1
WHEEZING: 0
SHORTNESS OF BREATH: 0
COUGH: 0
EYES NEGATIVE: 1
BLOOD IN STOOL: 0
CHEST TIGHTNESS: 0
RESPIRATORY NEGATIVE: 1
STRIDOR: 0
NAUSEA: 0

## 2022-08-09 NOTE — PROGRESS NOTES
Subsequent Progress Note  Patient: Jesica Butts  YOB: 1945  MRN: [id number]    Chief Complaint:  Chief Complaint   Patient presents with    Follow-Up from Hospital     Patient is following up on troponin. CV Data:   Echo EF 65%  Subjective/HPI:  recent hosp for weakness. No cp no sob now. She did have elevated troponin. She has chronic LE edema. EKG:      Former smoker  Lives with  and family  Moved from [place]      Past Medical History:   Diagnosis Date    Cancer [facility])     Encounter for lumbar puncture 2022    Completed by Dr. Iris Moffett - diagnostics sent    Hyperlipidemia     Hypertension     Type II or unspecified type diabetes mellitus without mention of complication, not stated as uncontrolled        Past Surgical History:   Procedure Laterality Date    CORONARY ANGIOPLASTY WITH STENT PLACEMENT      UPPER GASTROINTESTINAL ENDOSCOPY  8/12/15    w/bx,dilation        Family History   Problem Relation Age of Onset    Cancer Mother     Arthritis Mother     Diabetes Mother     Heart Disease Mother     High Blood Pressure Mother     High Cholesterol Mother     Arthritis Father     Diabetes Father     Heart Disease Father     High Blood Pressure Father     High Cholesterol Father        Social History     Socioeconomic History    Marital status:      Spouse name: None    Number of children: None    Years of education: None    Highest education level: None   Tobacco Use    Smoking status: Former     Packs/day: 0.50     Years: 24.00     Pack years: 12.00     Types: Cigarettes     Quit date:      Years since quittin.6    Smokeless tobacco: Never   Substance and Sexual Activity    Alcohol use:  Yes     Alcohol/week: 3.0 standard drinks     Types: 3 Glasses of wine per week    Drug use: No   Social History Narrative    Lives With: Spouse,  Son (pt never home alone)    Type of Home: House in [address] [place] [address],[address] in basement,Two level (doesn't have to use basement)    Home Access: Stairs to enter with rails- Number of Steps: 6- Rails: Right    Bathroom Shower/Tub: Walk-in shower    Bathroom Equipment: Shower chair,Grab bars in shower    Home Equipment: Rollator,Cane    ADL Assistance:  ( assists with bathing/dressing; pt indep with toileting activities)    Homemaking Assistance:  (spouse and son complete)    Homemaking Responsibilities: No    Ambulation Assistance: Independent (cane/rollator PRN)    Transfer Assistance: Independent    Additional Comments: Pt inconsistent historian. Pt's spouse arriving mid assessment and confirms home set up and PLOF         Social Determinants of Health     Financial Resource Strain: Low Risk     Difficulty of Paying Living Expenses: Not hard at all   Food Insecurity: No Food Insecurity    Worried About Running Out of Food in the Last Year: Never true    Ran Out of Food in the Last Year: Never true       Allergies   Allergen Reactions    Eggs Or Egg-Derived Products Nausea Only    Glucophage [Metformin]      diarrhea    Valium [Diazepam]     Zithromax [Azithromycin]        Current Outpatient Medications   Medication Sig Dispense Refill    furosemide (LASIX) 40 MG tablet Take 1 tablet by mouth in the morning. 90 tablet 3    potassium chloride (KLOR-CON M) 20 MEQ extended release tablet Take 1 tablet by mouth in the morning.  90 tablet 3    diclofenac sodium (VOLTAREN) 1 % GEL Apply 2 g topically 3 times daily as needed for Pain 2 g 0    atorvastatin (LIPITOR) 80 MG tablet TAKE 1 TABLET EVERY DAY 90 tablet 0    sodium chloride (OCEAN, BABY AYR) 0.65 % nasal spray 2 sprays by Nasal route as needed for Congestion 1 each 0    metoprolol tartrate (LOPRESSOR) 50 MG tablet Take 1 tablet by mouth 2 times daily (Patient taking differently: Take 50 mg by mouth in the morning.) 180 tablet 0    pantoprazole (PROTONIX) 40 MG tablet Take 1 tablet by mouth daily 90 tablet 0    ezetimibe (ZETIA) 10 MG tablet Take 1 tablet by mouth daily 90 tablet 0    blood glucose monitor kit and supplies Test 1 time daily 1 kit 0    blood glucose monitor strips Test 1 time daily 50 strip 2    Lancets MISC 1 each by Does not apply route daily 50 each 5    aspirin 81 MG tablet Take 81 mg by mouth 2 times daily       isosorbide mononitrate (IMDUR) 30 MG CR tablet Take 30 mg by mouth daily       lisinopril (PRINIVIL;ZESTRIL) 20 MG tablet Take 20 mg by mouth daily       gabapentin (NEURONTIN) 300 MG capsule Take 2 capsules by mouth at bedtime for 90 days. 180 capsule 0     No current facility-administered medications for this visit. Review of Systems:   Review of Systems   Constitutional: Negative. Negative for diaphoresis and fatigue. HENT: Negative. Eyes: Negative. Respiratory: Negative. Negative for cough, chest tightness, shortness of breath, wheezing and stridor. Cardiovascular:  Positive for leg swelling. Negative for chest pain and palpitations. Gastrointestinal: Negative. Negative for blood in stool and nausea. Genitourinary: Negative. Musculoskeletal: Negative. Skin: Negative. Neurological:  Positive for weakness. Negative for dizziness, syncope and light-headedness. Hematological: Negative. Psychiatric/Behavioral: Negative. Physical Examination:    /60 (Site: Left Upper Arm, Position: Sitting, Cuff Size: Large Adult)   Pulse 75   Temp 97.3 °F (36.3 °C) (Temporal)   Resp 18   Ht 5' 1\" (1.549 m)   Wt 189 lb (85.7 kg)   SpO2 98%   BMI 35.71 kg/m²    Physical Exam   Constitutional: She appears healthy. No distress. HENT:   Normal cephalic and Atraumatic   Eyes: Pupils are equal, round, and reactive to light. Neck: Thyroid normal. No JVD present. No neck adenopathy. No thyromegaly present. Cardiovascular: Normal rate, regular rhythm, intact distal pulses and normal pulses. Murmur heard. Pulmonary/Chest: Effort normal and breath sounds normal. She has no wheezes.  She has no rales. She exhibits no tenderness. Abdominal: Soft. Bowel sounds are normal. There is no abdominal tenderness. Musculoskeletal:         General: Edema (2+) present. No tenderness. Normal range of motion. Cervical back: Normal range of motion and neck supple. Neurological: She is alert and oriented to person, place, and time. Skin: Skin is warm. No cyanosis. Nails show no clubbing.      LABS:  CBC:   Lab Results   Component Value Date/Time    WBC 10.9 05/26/2022 01:39 PM    RBC 4.17 05/26/2022 01:39 PM    HGB 13.4 05/26/2022 01:39 PM    HCT 40.6 05/26/2022 01:39 PM    MCV 97.2 05/26/2022 01:39 PM    MCH 32.0 05/26/2022 01:39 PM    MCHC 32.9 05/26/2022 01:39 PM    RDW 15.5 05/26/2022 01:39 PM     05/26/2022 01:39 PM    MPV 8.2 10/12/2015 06:54 AM     Lipids:  Lab Results   Component Value Date    CHOL 110 06/09/2022    CHOL 101 05/24/2022    CHOL 160 03/29/2022     Lab Results   Component Value Date    TRIG 87 06/09/2022    TRIG 53 05/24/2022    TRIG 113 03/29/2022     Lab Results   Component Value Date    HDL 52 06/09/2022    HDL 59 05/24/2022    HDL 39 (L) 03/29/2022     Lab Results   Component Value Date    LDLCALC 41 06/09/2022    LDLCALC 31 05/24/2022    LDLCALC 98 03/29/2022     No results found for: LABVLDL, VLDL  No results found for: CHOLHDLRATIO  CMP:    Lab Results   Component Value Date/Time     05/28/2022 04:52 AM    K 3.5 05/28/2022 04:52 AM    K 3.6 05/26/2022 01:39 PM     05/28/2022 04:52 AM    CO2 21 05/28/2022 04:52 AM    BUN 13 05/28/2022 04:52 AM    CREATININE 1.06 05/28/2022 04:52 AM    GFRAA >60.0 05/28/2022 04:52 AM    LABGLOM 50.0 05/28/2022 04:52 AM    GLUCOSE 118 05/28/2022 04:52 AM    PROT 6.4 06/09/2022 10:24 AM    LABALBU 4.1 06/09/2022 10:24 AM    CALCIUM 8.7 05/28/2022 04:52 AM    BILITOT 0.3 06/09/2022 10:24 AM    ALKPHOS 67 06/09/2022 10:24 AM    AST 22 06/09/2022 10:24 AM    ALT 16 06/09/2022 10:24 AM     BMP:    Lab Results   Component Value Date/Time  05/28/2022 04:52 AM    K 3.5 05/28/2022 04:52 AM    K 3.6 05/26/2022 01:39 PM     05/28/2022 04:52 AM    CO2 21 05/28/2022 04:52 AM    BUN 13 05/28/2022 04:52 AM    LABALBU 4.1 06/09/2022 10:24 AM    CREATININE 1.06 05/28/2022 04:52 AM    CALCIUM 8.7 05/28/2022 04:52 AM    GFRAA >60.0 05/28/2022 04:52 AM    LABGLOM 50.0 05/28/2022 04:52 AM    GLUCOSE 118 05/28/2022 04:52 AM     Magnesium:    Lab Results   Component Value Date/Time    MG 1.6 05/24/2022 04:48 AM     TSH:  Lab Results   Component Value Date    TSH 0.822 10/09/2015       Patient Active Problem List   Diagnosis    Multiple myel[facility]    Obese    Ataxia    Hyperlipidemia    Type 2 diabetes mellitus with circulatory disorder (HCC)    Vertebral compression fracture (HCC)    Dizziness    Benign paroxysmal positional vertigo due to bilateral vestibular disorder    Maxillary pain    Nonintractable headache    Fall at home    Fever and chills    Impaired mobility and activities of daily living-metabolic encephalopathy with primary origins of infectious etiology    Lumbar spondylosis    Lumbar stenosis with neurogenic claudication    Atelectasis, left       Medications Discontinued During This Encounter   Medication Reason    amLODIPine (NORVASC) 10 MG tablet     furosemide (LASIX) 20 MG tablet        Modified Medications    No medications on file       Orders Placed This Encounter   Medications    furosemide (LASIX) 40 MG tablet     Sig: Take 1 tablet by mouth in the morning. Dispense:  90 tablet     Refill:  3    potassium chloride (KLOR-CON M) 20 MEQ extended release tablet     Sig: Take 1 tablet by mouth in the morning. Dispense:  90 tablet     Refill:  3       Assessment/Plan:    1. Compression fracture of lumbar vertebra, unspecified lumbar vertebral level, sequela       2. Type 2 diabetes mellitus with other circulatory complication, without long-term current use of insulin ([facility]       3.  Hyperlipidemia, unspecified hyperlipidemia type  Statin good profile f/u labslow fat diet     4. Fall in home, sequela       5. Dizziness       6. Hypertension, unspecified type  Stable low salt diet continue meds. 7. Dyslipidemia       8. NSTEMI (non-ST elevated myocardial infarction) (HCC)     - NM MYOCARDIAL SPECT REST EXERCISE OR RX; Future     9. LE edema - Advance Lasix to 40 add KCL 20. Low salt diet. Counseling:  Heart Healthy Lifestyle, Improve BMI, Low Salt Diet, Take Precautions to Prevent Falls, and Walk Daily    Return in about 3 months (around 11/9/2022).       Electronically signed by Lenon Brunner, MD on 8/9/2022 at 12:05 PM

## 2022-08-17 DIAGNOSIS — E11.59 TYPE 2 DIABETES MELLITUS WITH OTHER CIRCULATORY COMPLICATION, WITHOUT LONG-TERM CURRENT USE OF INSULIN (HCC): Primary | ICD-10-CM

## 2022-08-17 RX ORDER — FUROSEMIDE 40 MG/1
40 TABLET ORAL DAILY
Qty: 90 TABLET | Refills: 3 | Status: SHIPPED | OUTPATIENT
Start: 2022-08-17 | End: 2022-08-19 | Stop reason: SDUPTHER

## 2022-08-17 RX ORDER — PANTOPRAZOLE SODIUM 40 MG/1
TABLET, DELAYED RELEASE ORAL
Qty: 90 TABLET | Refills: 0 | Status: SHIPPED | OUTPATIENT
Start: 2022-08-17

## 2022-08-17 RX ORDER — BLOOD-GLUCOSE CONTROL, LOW
1 EACH MISCELLANEOUS
Qty: 1 EACH | Refills: 5 | Status: SHIPPED | OUTPATIENT
Start: 2022-08-17 | End: 2022-09-16

## 2022-08-17 RX ORDER — ISOPROPYL ALCOHOL 0.75 G/1
SWAB TOPICAL
COMMUNITY
End: 2022-08-17 | Stop reason: SDUPTHER

## 2022-08-17 RX ORDER — ISOPROPYL ALCOHOL 0.75 G/1
1 SWAB TOPICAL DAILY
Qty: 100 EACH | Refills: 5 | Status: SHIPPED | OUTPATIENT
Start: 2022-08-17

## 2022-08-17 RX ORDER — EZETIMIBE 10 MG/1
TABLET ORAL
Qty: 90 TABLET | Refills: 0 | Status: SHIPPED | OUTPATIENT
Start: 2022-08-17

## 2022-08-17 RX ORDER — POTASSIUM CHLORIDE 20 MEQ/1
20 TABLET, EXTENDED RELEASE ORAL DAILY
Qty: 90 TABLET | Refills: 3 | Status: SHIPPED | OUTPATIENT
Start: 2022-08-17 | End: 2022-08-19 | Stop reason: SDUPTHER

## 2022-08-17 RX ORDER — CALCIUM CITRATE/VITAMIN D3 200MG-6.25
1 TABLET ORAL DAILY
Qty: 100 EACH | Refills: 3 | Status: SHIPPED | OUTPATIENT
Start: 2022-08-17

## 2022-08-17 NOTE — TELEPHONE ENCOUNTER
Please approve or deny this refill request. The order is pended. Thank you.     LOV 8/9/2022    Next Visit Date:  Future Appointments   Date Time Provider Kalpana Bernard   8/18/2022  1:45 PM MD [facility]   8/24/2022 10:30 AM [place] [facility] MED INJECTION ROOM 1 [facility] N[facility] MED [facility] Fac RAD   8/24/2022 11:30 AM [place] NUCLEAR MEDICINE ROOM 2 [facility] Fac RAD   8/24/2022 12:00 PM [facility] STRESS ROOM 1 [facility] STRESS [facility] Fac RAD   8/24/2022  1:30 PM [place] NUCLEAR MEDICINE ROOM 2 [facility] N[facility] MED [facility] Fac RAD   9/2/2022 10:30 AM Doris Fernandes MD [facility]   11/9/2022  4:00 PM Kashif Garland MD [facility]   11/23/2022  1:00 PM Sintia Melo MD [facility]         Verbal order received with read back

## 2022-08-17 NOTE — TELEPHONE ENCOUNTER
8-2-22 9-2-22 ANTICOAGULATION  MANAGEMENT    Tae Arguelles is being discharged from the [facility] ([facility]).    Reason for discharge: warfarin therapy completed    Anticoagulation episode resolved, ACC referral closed and INR Standing order discontinued    If patient needs warfarin management in the future, please send a new referral    Stefany Elena RN

## 2022-08-18 ENCOUNTER — OFFICE VISIT (OUTPATIENT)
Dept: NEUROSURGERY | Age: 77
End: 2022-08-18
Payer: MEDICARE

## 2022-08-18 VITALS
SYSTOLIC BLOOD PRESSURE: 118 MMHG | HEIGHT: 61 IN | TEMPERATURE: 96.6 F | WEIGHT: 184 LBS | BODY MASS INDEX: 34.74 KG/M2 | DIASTOLIC BLOOD PRESSURE: 78 MMHG

## 2022-08-18 DIAGNOSIS — M48.062 LUMBAR STENOSIS WITH NEUROGENIC CLAUDICATION: Primary | ICD-10-CM

## 2022-08-18 PROCEDURE — 1036F TOBACCO NON-USER: CPT | Performed by: NEUROLOGICAL SURGERY

## 2022-08-18 PROCEDURE — G8400 PT W/DXA NO RESULTS DOC: HCPCS | Performed by: NEUROLOGICAL SURGERY

## 2022-08-18 PROCEDURE — 1123F ACP DISCUSS/DSCN MKR DOCD: CPT | Performed by: NEUROLOGICAL SURGERY

## 2022-08-18 PROCEDURE — G8427 DOCREV CUR MEDS BY ELIG CLIN: HCPCS | Performed by: NEUROLOGICAL SURGERY

## 2022-08-18 PROCEDURE — G8417 CALC BMI ABV UP PARAM F/U: HCPCS | Performed by: NEUROLOGICAL SURGERY

## 2022-08-18 PROCEDURE — 99213 OFFICE O/P EST LOW 20 MIN: CPT | Performed by: NEUROLOGICAL SURGERY

## 2022-08-18 PROCEDURE — 1090F PRES/ABSN URINE INCON ASSESS: CPT | Performed by: NEUROLOGICAL SURGERY

## 2022-08-18 ASSESSMENT — ENCOUNTER SYMPTOMS
NAUSEA: 0
EYE PAIN: 0
BACK PAIN: 1
CONSTIPATION: 1
SHORTNESS OF BREATH: 0
DIARRHEA: 1

## 2022-08-18 NOTE — TELEPHONE ENCOUNTER
Please approve or deny this refill request. The order is pended. Thank you.     LOV 8/9/2022    Next Visit Date:  Future Appointments   Date Time Provider Kalpana Bernard   8/24/2022 10:30 AM [place] [facility] MED INJECTION ROOM 1 [facility] Fac RAD   8/24/2022 11:30 AM [place] NUCLEAR MEDICINE ROOM 2 [facility] Fac RAD   8/24/2022 12:00 PM [facility] STRESS ROOM 1 [facility] STRESS [facility] Fac RAD   8/24/2022  1:30 PM [place] NUCLEAR MEDICINE ROOM 2 [facility] N[facility] MED [facility] Fac RAD   9/2/2022 10:30 AM Artemio Sanchez MD [facility]   11/9/2022  4:00 PM Kashif Vidal MD [facility]   11/23/2022  1:00 PM Jennifer Cohn MD [facility]

## 2022-08-19 RX ORDER — POTASSIUM CHLORIDE 20 MEQ/1
20 TABLET, EXTENDED RELEASE ORAL DAILY
Qty: 90 TABLET | Refills: 3 | Status: SHIPPED | OUTPATIENT
Start: 2022-08-19

## 2022-08-19 RX ORDER — FUROSEMIDE 40 MG/1
40 TABLET ORAL DAILY
Qty: 90 TABLET | Refills: 3 | Status: SHIPPED | OUTPATIENT
Start: 2022-08-19

## 2022-08-24 ENCOUNTER — HOSPITAL ENCOUNTER (OUTPATIENT)
Dept: NON INVASIVE DIAGNOSTICS | Age: 77
Discharge: HOME | End: 2022-08-24

## 2022-08-31 ENCOUNTER — TELEMEDICINE (OUTPATIENT)
Dept: PRIMARY CARE CLINIC | Age: 77
End: 2022-08-31
Payer: MEDICARE

## 2022-08-31 DIAGNOSIS — Z71.89 ACP (ADVANCE CARE PLANNING): ICD-10-CM

## 2022-08-31 DIAGNOSIS — R29.6 FALLS FREQUENTLY: ICD-10-CM

## 2022-08-31 DIAGNOSIS — Z00.00 INITIAL MEDICARE ANNUAL WELLNESS VISIT: Primary | ICD-10-CM

## 2022-08-31 PROCEDURE — 1123F ACP DISCUSS/DSCN MKR DOCD: CPT | Performed by: PHYSICIAN ASSISTANT

## 2022-08-31 PROCEDURE — G0438 PPPS, INITIAL VISIT: HCPCS | Performed by: PHYSICIAN ASSISTANT

## 2022-08-31 ASSESSMENT — PATIENT HEALTH QUESTIONNAIRE - PHQ9
SUM OF ALL RESPONSES TO PHQ9 QUESTIONS 1 & 2: 0
2. FEELING DOWN, DEPRESSED OR HOPELESS: 0
SUM OF ALL RESPONSES TO PHQ QUESTIONS 1-9: 0
1. LITTLE INTEREST OR PLEASURE IN DOING THINGS: 0

## 2022-08-31 ASSESSMENT — LIFESTYLE VARIABLES
HOW MANY STANDARD DRINKS CONTAINING ALCOHOL DO YOU HAVE ON A TYPICAL DAY: 1 OR 2
HOW OFTEN DO YOU HAVE A DRINK CONTAINING ALCOHOL: 2-3 TIMES A WEEK

## 2022-08-31 NOTE — PATIENT INSTRUCTIONS
Advance Directives: Care Instructions  Overview  An advance directive is a legal way to state your wishes at the end of your life. It tells your family and your doctor what to do if you can't say what youwant. There are two main types of advance directives. You can change them any timeyour wishes change. Living will. This form tells your family and your doctor your wishes about life support and other treatment. The form is also called a declaration. Medical power of . This form lets you name a person to make treatment decisions for you when you can't speak for yourself. This person is called a health care agent (health care proxy, health care surrogate). The form is also called a durable power of  for health care. If you do not have an advance directive, decisions about your medical care maybe made by a family member, or by a doctor or a  who doesn't know you. It may help to think of an advance directive as a gift to the people who carefor you. If you have one, they won't have to make tough decisions by themselves. Follow-up care is a key part of your treatment and safety. Be sure to make and go to all appointments, and call your doctor if you are having problems. It's also a good idea to know your test results and keep alist of the medicines you take. What should you include in an advance directive? Many states have a unique advance directive form. (It may ask you to address specific issues.) Or you might use a universal form that's approved by manystates. If your form doesn't tell you what to address, it may be hard to know what to include in your advance directive. Use the questions below to help you getstarted. Who do you want to make decisions about your medical care if you are not able to? What life-support measures do you want if you have a serious illness that gets worse over time or can't be cured? What are you most afraid of that might happen?  (Maybe you're afraid of having pain, losing your independence, or being kept alive by machines.)  Where would you prefer to die? (Your home? A hospital? A nursing home?)  Do you want to donate your organs when you die? Do you want certain Episcopalian practices performed before you die? When should you call for help? Be sure to contact your doctor if you have any questions. Where can you learn more? Go to https://Simplilearnpepiceweb.Liftago. org and sign in to your [employer] account. Enter R264 in the Safello box to learn more about \"Advance Directives: Care Instructions. \"     If you do not have an account, please click on the \"Sign Up Now\" link. Current as of: October 18, 2021               Content Version: 13.3  © 2006-2022 Healthwise, Incorporated. Care instructions adapted under license by [facility] ([facility]). If you have questions about a medical condition or this instruction, always ask your healthcare professional. [place] [address] any warranty or liability for your use of this information. Learning About Living Bashir Prader  What is a living will? A living will, also called a declaration, is a legal form. It tells your family and your doctor your wishes when you can't speak for yourself. It's used by the health professionals who will treat you as you near the end of your life or ifyou get seriously hurt or ill. If you put your wishes in writing, your loved ones and others will know what kind of care you want. They won't need to guess. This can ease your mind and behelpful to others. And you can change or cancel your living will at any time. A living will is not the same as an estate or property will. An estate willexplains what you want to happen with your money and property after you die. How do you use it? Keep these facts in mind about how a living will is used. Your living will is used only if you can't speak or make decisions for yourself.  Most often, one or more doctors must certify are some questions to ask yourself as you make your living will. Do you know enough about life support methods that might be used? If not, talk to your doctor so you know what might be done if you can't breathe on your own, your heart stops, or you can't swallow. What things would you still want to be able to do after you receive life-support methods? Would you want to be able to walk? To speak? To eat on your own? To live without the help of machines? Do you want certain Baptism practices performed if you become very ill? If you have a choice, where do you want to be cared for? In your home? At a hospital or nursing home? If you have a choice at the end of your life, where would you prefer to die? At home? In a hospital or nursing home? Somewhere else? Would you prefer to be buried or cremated? Do you want your organs to be donated after you die? What should you do with your living will? Make sure that your family members and your health care agent have copies of your living will (also called a declaration). Give your doctor a copy of your living will. Ask to have it kept as part of your medical record. If you have more than one doctor, make sure that each one has a copy. Put a copy of your living will where it can be easily found. For example, some people may put a copy on their refrigerator door. If you are using a digital copy, be sure your doctor, family members, and health care agent know how to find and access it. Where can you learn more? Go to https://Digital Dandelionlissy.Bee On The Go. org and sign in to your [employer] account. Enter X463 in the playnik box to learn more about \"Learning About Living Veronica Odonnell. \"     If you do not have an account, please click on the \"Sign Up Now\" link. Current as of: October 18, 2021               Content Version: 13.3  © 1348-0283 Healthwise, Incorporated. Care instructions adapted under license by [facility] ([facility]).  If you have questions about a medical condition or this instruction, always ask your healthcare professional. [place] [address] any warranty or liability for your use of this information. Personalized Preventive Plan for Mecca Bains - 8/31/2022  Medicare offers a range of preventive health benefits. Some of the tests and screenings are paid in full while other may be subject to a deductible, co-insurance, and/or copay. Some of these benefits include a comprehensive review of your medical history including lifestyle, illnesses that may run in your family, and various assessments and screenings as appropriate. After reviewing your medical record and screening and assessments performed today your provider may have ordered immunizations, labs, imaging, and/or referrals for you. A list of these orders (if applicable) as well as your Preventive Care list are included within your After Visit Summary for your review. Other Preventive Recommendations:    A preventive eye exam performed by an eye specialist is recommended every 1-2 years to screen for glaucoma; cataracts, macular degeneration, and other eye disorders. A preventive dental visit is recommended every 6 months. Try to get at least 150 minutes of exercise per week or 10,000 steps per day on a pedometer . Order or download the FREE \"Exercise & Physical Activity: Your Everyday Guide\" from [employer]. Call 8-620.431.9196 or search [employer] online. You need 4908-9837 mg of calcium and 5925-0833 IU of vitamin D per day. It is possible to meet your calcium requirement with diet alone, but a vitamin D supplement is usually necessary to meet this goal.  When exposed to the sun, use a sunscreen that protects against both UVA and UVB radiation with an SPF of 30 or greater. Reapply every 2 to 3 hours or after sweating, drying off with a towel, or swimming. Always wear a seat belt when traveling in a car.  Always wear a helmet when riding a bicycle or motorcycle.

## 2022-08-31 NOTE — PROGRESS NOTES
Habits/Nutrition Interventions:  Dental exam overdue:  patient encouraged to make appointment with his/her dentist             Objective      Patient-Reported Vitals  No data recorded          Allergies   Allergen Reactions    Eggs Or Egg-Derived Products Nausea Only    Glucophage [Metformin]      diarrhea    Valium [Diazepam]     Zithromax [Azithromycin]      Prior to Visit Medications    Medication Sig Taking? Authorizing Provider   pantoprazole (PROTONIX) 40 MG tablet TAKE 1 TABLET EVERY DAY  Kendall Crain MD   ezetimibe (ZETIA) 10 MG tablet TAKE 1 TABLET EVERY DAY  Kendall Crain MD   furosemide (LASIX) 40 MG tablet Take 1 tablet by mouth daily  Evin Isaac MD   potassium chloride (KLOR-CON M) 20 MEQ extended release tablet Take 1 tablet by mouth daily  Evin Isaac MD   Alcohol Swabs (B-D SINGLE USE SWABS REGULAR) PADS 1 each by Does not apply route daily  Kendall Crain MD   Blood Glucose Calibration (TRUE METRIX LEVEL 1) Low SOLN 1 Bottle by In Vitro route every 30 days  Kendall Crain MD   blood glucose test strips (TRUE METRIX BLOOD GLUCOSE TEST) strip 1 each by In Vitro route daily As needed. Kendall Crain MD   Blood Glucose Monitoring Suppl (TRUE METRIX METER) w/Device KIT 1 each by Does not apply route daily  Kendall Crain MD   diclofenac sodium (VOLTAREN) 1 % GEL Apply 2 g topically 3 times daily as needed for Pain  Kendall Crain MD   atorvastatin (LIPITOR) 80 MG tablet TAKE 1 TABLET EVERY DAY  Kendall Crain MD   sodium chloride (OCEAN, BABY AYR) 0.65 % nasal spray 2 sprays by Nasal route as needed for Congestion  Ernst Aceves MD   gabapentin (NEURONTIN) 300 MG capsule Take 2 capsules by mouth at bedtime for 90 days. Kendlal Crain MD   metoprolol tartrate (LOPRESSOR) 50 MG tablet Take 1 tablet by mouth 2 times daily  Patient taking differently: Take 50 mg by mouth in the morning.   Kendall Crain MD   blood glucose monitor kit and supplies Test 1 time daily

## 2022-09-01 ENCOUNTER — CLINICAL DOCUMENTATION (OUTPATIENT)
Dept: SPIRITUAL SERVICES | Age: 77
End: 2022-09-01

## 2022-09-01 NOTE — ACP (ADVANCE CARE PLANNING)
Advance Care Planning   Ambulatory ACP Specialist Patient Outreach    Date:  9/1/2022  ACP Specialist:  Dipesh Rodriguez    Outreach call to patient in follow-up to ACP Specialist referral from: Kaushal Jordan    [] PCP  [x] Provider   [] Ambulatory Care Management [] Other for Reason:    [x] Advance Directive Assistance  [x] Code Status Discussion  [x] Complete Portable DNR Order  [] Discuss Goals of Care  [] Complete POST/MOST  [] Early ACP Decision-Making  [] Other    Date Referral Received:  8/31/2022    Today's Outreach:  [x] First   [] Second  [] Third                               Third outreach made by []  phone  [] email []   [employer]     Intervention:  [x] Spoke with Patient  [] Left VM requesting return call      Outcome:  Spoke to patient with , Terrial Gravely, present. Patient agreeable to conversation with ACP Specialist for assistance with advance care planning. Patient requested an in-person appointment for both herself and spouse to complete Advance Directive documents on Thursday, September 15, 2022. Meeting request sent to the [facility]  group requesting outreach to patient and her spouse to determine time and location to meet on 9/15/22. Mailed ACP information packet to patient and spouse for review prior to ACP appointment. Next Step:   [x] ACP scheduled conversation  [] Outreach again in one week               [x] Email / Mail ACP Info Sheets  [x] Email / Mail Advance Directive            [] Close Referral. Routing closure to referring provider/staff and to ACP Specialist . [] Closure Letter mailed to Patient with Invitation to Contact ACP Specialist if/when ready.     Thank you for this referral.

## 2022-09-30 NOTE — TELEPHONE ENCOUNTER
Pharmacy requesting medication refill.  Please approve or deny this request.    Rx requested:  Requested Prescriptions     Pending Prescriptions Disp Refills    gabapentin (NEURONTIN) 300 MG capsule [Pharmacy Med Name: Gabapentin 300 MG Oral Capsule] 180 capsule 0     Sig: TAKE 2 CAPSULES BY MOUTH AT BEDTIME         Last Office Visit:   8/5/2022      Next Visit Date:  Future Appointments   Date Time Provider Kalpana Bernard   11/9/2022  4:00 PM Richmond Ross MD [facility]   11/23/2022  1:00 PM Robert Faulkner MD [facility]

## 2022-10-02 RX ORDER — GABAPENTIN 300 MG/1
CAPSULE ORAL
Qty: 180 CAPSULE | Refills: 0 | OUTPATIENT
Start: 2022-10-02

## 2022-10-28 ENCOUNTER — NURSE ONLY (OUTPATIENT)
Dept: FAMILY MEDICINE CLINIC | Age: 77
End: 2022-10-28
Payer: MEDICARE

## 2022-10-28 DIAGNOSIS — Z23 NEED FOR INFLUENZA VACCINATION: Primary | ICD-10-CM

## 2022-10-28 PROCEDURE — G0008 ADMIN INFLUENZA VIRUS VAC: HCPCS | Performed by: FAMILY MEDICINE

## 2022-10-28 PROCEDURE — 90694 VACC AIIV4 NO PRSRV 0.5ML IM: CPT | Performed by: FAMILY MEDICINE

## 2022-10-28 NOTE — PROGRESS NOTES
Vaccine Information Sheet, \"Influenza - Inactivated\"  given to Malia East, or parent/legal guardian of  Malia East and verbalized understanding. Patient responses:    Have you ever had a reaction to a flu vaccine? No  Are you able to eat eggs without adverse effects? Yes  Do you have any current illness? No  Have you ever had [place] Syndrome? No    Flu vaccine given per order. Please see immunization tab.

## 2022-10-31 RX ORDER — METOPROLOL TARTRATE 50 MG/1
50 TABLET, FILM COATED ORAL 2 TIMES DAILY
Qty: 180 TABLET | Refills: 0 | Status: SHIPPED | OUTPATIENT
Start: 2022-10-31

## 2022-11-05 ENCOUNTER — APPOINTMENT (OUTPATIENT)
Dept: GENERAL RADIOLOGY | Age: 77
End: 2022-11-05
Payer: MEDICARE

## 2022-11-05 ENCOUNTER — HOSPITAL ENCOUNTER (OUTPATIENT)
Age: 77
Setting detail: OBSERVATION
Discharge: HOME | End: 2022-11-06
Attending: FAMILY MEDICINE | Admitting: FAMILY MEDICINE
Payer: MEDICARE

## 2022-11-05 DIAGNOSIS — R07.9 CHEST PAIN, UNSPECIFIED TYPE: ICD-10-CM

## 2022-11-05 DIAGNOSIS — B34.8 RHINOVIRUS: Primary | ICD-10-CM

## 2022-11-05 LAB
ADENOVIRUS BY PCR: NOT DETECTED
ALBUMIN SERPL-MCNC: 3.6 G/DL (ref 3.5–4.6)
ALP BLD-CCNC: 69 U/L (ref 40–130)
ALT SERPL-CCNC: 12 U/L (ref 0–33)
ANION GAP SERPL CALCULATED.3IONS-SCNC: 13 MEQ/L (ref 9–15)
AST SERPL-CCNC: 22 U/L (ref 0–35)
BASOPHILS ABSOLUTE: 0 K/UL (ref 0–0.2)
BASOPHILS RELATIVE PERCENT: 0.5 %
BILIRUB SERPL-MCNC: 1.1 MG/DL (ref 0.2–0.7)
BORDETELLA PARAPERTUSSIS BY PCR: NOT DETECTED
BORDETELLA PERTUSSIS BY PCR: NOT DETECTED
BUN BLDV-MCNC: 12 MG/DL (ref 8–23)
CALCIUM SERPL-MCNC: 9.7 MG/DL (ref 8.5–9.9)
CHLAMYDOPHILIA PNEUMONIAE BY PCR: NOT DETECTED
CHLORIDE BLD-SCNC: 101 MEQ/L (ref 95–107)
CO2: 21 MEQ/L (ref 20–31)
CORONAVIRUS 229E BY PCR: NOT DETECTED
CORONAVIRUS HKU1 BY PCR: NOT DETECTED
CORONAVIRUS NL63 BY PCR: NOT DETECTED
CORONAVIRUS OC43 BY PCR: NOT DETECTED
CREAT SERPL-MCNC: 1.09 MG/DL (ref 0.5–0.9)
EOSINOPHILS ABSOLUTE: 0.1 K/UL (ref 0–0.7)
EOSINOPHILS RELATIVE PERCENT: 1.2 %
GFR SERPL CREATININE-BSD FRML MDRD: 51.6 ML/MIN/{1.73_M2}
GLOBULIN: 2.9 G/DL (ref 2.3–3.5)
GLUCOSE BLD-MCNC: 115 MG/DL (ref 70–99)
HCT VFR BLD CALC: 36.6 % (ref 37–47)
HEMOGLOBIN: 12.3 G/DL (ref 12–16)
HUMAN METAPNEUMOVIRUS BY PCR: NOT DETECTED
HUMAN RHINOVIRUS/ENTEROVIRUS BY PCR: DETECTED
INFLUENZA A BY PCR: NOT DETECTED
INFLUENZA B BY PCR: NOT DETECTED
LACTIC ACID: 1.3 MMOL/L (ref 0.5–2.2)
LYMPHOCYTES ABSOLUTE: 1.1 K/UL (ref 1–4.8)
LYMPHOCYTES RELATIVE PERCENT: 14.9 %
MAGNESIUM: 2 MG/DL (ref 1.7–2.4)
MCH RBC QN AUTO: 32.9 PG (ref 27–31.3)
MCHC RBC AUTO-ENTMCNC: 33.5 % (ref 33–37)
MCV RBC AUTO: 98.1 FL (ref 79.4–94.8)
MONOCYTES ABSOLUTE: 1 K/UL (ref 0.2–0.8)
MONOCYTES RELATIVE PERCENT: 14.2 %
MYCOPLASMA PNEUMONIAE BY PCR: NOT DETECTED
NEUTROPHILS ABSOLUTE: 5 K/UL (ref 1.4–6.5)
NEUTROPHILS RELATIVE PERCENT: 69.2 %
PARAINFLUENZA VIRUS 1 BY PCR: NOT DETECTED
PARAINFLUENZA VIRUS 2 BY PCR: NOT DETECTED
PARAINFLUENZA VIRUS 3 BY PCR: NOT DETECTED
PARAINFLUENZA VIRUS 4 BY PCR: NOT DETECTED
PDW BLD-RTO: 17.4 % (ref 11.5–14.5)
PLATELET # BLD: 196 K/UL (ref 130–400)
POTASSIUM SERPL-SCNC: 4.2 MEQ/L (ref 3.4–4.9)
PRO-BNP: 410 PG/ML
PROCALCITONIN: 0.13 NG/ML (ref 0–0.15)
RBC # BLD: 3.73 M/UL (ref 4.2–5.4)
RESPIRATORY SYNCYTIAL VIRUS BY PCR: NOT DETECTED
SARS-COV-2, PCR: NOT DETECTED
SODIUM BLD-SCNC: 135 MEQ/L (ref 135–144)
TOTAL PROTEIN: 6.5 G/DL (ref 6.3–8)
TROPONIN: 0.03 NG/ML (ref 0–0.01)
TROPONIN: 0.04 NG/ML (ref 0–0.01)
WBC # BLD: 7.2 K/UL (ref 4.8–10.8)

## 2022-11-05 PROCEDURE — 6370000000 HC RX 637 (ALT 250 FOR IP): Performed by: PHYSICIAN ASSISTANT

## 2022-11-05 PROCEDURE — 6360000002 HC RX W HCPCS: Performed by: FAMILY MEDICINE

## 2022-11-05 PROCEDURE — 0202U NFCT DS 22 TRGT SARS-COV-2: CPT

## 2022-11-05 PROCEDURE — 84484 ASSAY OF TROPONIN QUANT: CPT

## 2022-11-05 PROCEDURE — 6360000002 HC RX W HCPCS: Performed by: PHYSICIAN ASSISTANT

## 2022-11-05 PROCEDURE — 94640 AIRWAY INHALATION TREATMENT: CPT

## 2022-11-05 PROCEDURE — G0378 HOSPITAL OBSERVATION PER HR: HCPCS

## 2022-11-05 PROCEDURE — 99285 EMERGENCY DEPT VISIT HI MDM: CPT

## 2022-11-05 PROCEDURE — 94664 DEMO&/EVAL PT USE INHALER: CPT

## 2022-11-05 PROCEDURE — 2580000003 HC RX 258: Performed by: FAMILY MEDICINE

## 2022-11-05 PROCEDURE — 36415 COLL VENOUS BLD VENIPUNCTURE: CPT

## 2022-11-05 PROCEDURE — 83880 ASSAY OF NATRIURETIC PEPTIDE: CPT

## 2022-11-05 PROCEDURE — 96361 HYDRATE IV INFUSION ADD-ON: CPT

## 2022-11-05 PROCEDURE — 96375 TX/PRO/DX INJ NEW DRUG ADDON: CPT

## 2022-11-05 PROCEDURE — 80053 COMPREHEN METABOLIC PANEL: CPT

## 2022-11-05 PROCEDURE — 96372 THER/PROPH/DIAG INJ SC/IM: CPT

## 2022-11-05 PROCEDURE — 96365 THER/PROPH/DIAG IV INF INIT: CPT

## 2022-11-05 PROCEDURE — 93005 ELECTROCARDIOGRAM TRACING: CPT | Performed by: PHYSICIAN ASSISTANT

## 2022-11-05 PROCEDURE — 83605 ASSAY OF LACTIC ACID: CPT

## 2022-11-05 PROCEDURE — 83735 ASSAY OF MAGNESIUM: CPT

## 2022-11-05 PROCEDURE — 2580000003 HC RX 258: Performed by: PHYSICIAN ASSISTANT

## 2022-11-05 PROCEDURE — 71045 X-RAY EXAM CHEST 1 VIEW: CPT

## 2022-11-05 PROCEDURE — 84145 PROCALCITONIN (PCT): CPT

## 2022-11-05 PROCEDURE — 85025 COMPLETE CBC W/AUTO DIFF WBC: CPT

## 2022-11-05 PROCEDURE — 6370000000 HC RX 637 (ALT 250 FOR IP): Performed by: FAMILY MEDICINE

## 2022-11-05 RX ORDER — SODIUM CHLORIDE 0.9 % (FLUSH) 0.9 %
5-40 SYRINGE (ML) INJECTION PRN
Status: DISCONTINUED | OUTPATIENT
Start: 2022-11-05 | End: 2022-11-06 | Stop reason: HOSPADM

## 2022-11-05 RX ORDER — IPRATROPIUM BROMIDE AND ALBUTEROL SULFATE 2.5; .5 MG/3ML; MG/3ML
1 SOLUTION RESPIRATORY (INHALATION) 3 TIMES DAILY
Status: DISCONTINUED | OUTPATIENT
Start: 2022-11-05 | End: 2022-11-06 | Stop reason: HOSPADM

## 2022-11-05 RX ORDER — ORPHENADRINE CITRATE 30 MG/ML
60 INJECTION INTRAMUSCULAR; INTRAVENOUS ONCE
Status: COMPLETED | OUTPATIENT
Start: 2022-11-05 | End: 2022-11-05

## 2022-11-05 RX ORDER — MECOBALAMIN 5000 MCG
5 TABLET,DISINTEGRATING ORAL NIGHTLY
Status: DISCONTINUED | OUTPATIENT
Start: 2022-11-05 | End: 2022-11-06 | Stop reason: HOSPADM

## 2022-11-05 RX ORDER — ASPIRIN 81 MG/1
324 TABLET, CHEWABLE ORAL ONCE
Status: COMPLETED | OUTPATIENT
Start: 2022-11-05 | End: 2022-11-05

## 2022-11-05 RX ORDER — 0.9 % SODIUM CHLORIDE 0.9 %
1000 INTRAVENOUS SOLUTION INTRAVENOUS ONCE
Status: COMPLETED | OUTPATIENT
Start: 2022-11-05 | End: 2022-11-05

## 2022-11-05 RX ORDER — ISOSORBIDE MONONITRATE 30 MG/1
30 TABLET, EXTENDED RELEASE ORAL DAILY
Status: DISCONTINUED | OUTPATIENT
Start: 2022-11-06 | End: 2022-11-06 | Stop reason: HOSPADM

## 2022-11-05 RX ORDER — SODIUM CHLORIDE 9 MG/ML
INJECTION, SOLUTION INTRAVENOUS PRN
Status: DISCONTINUED | OUTPATIENT
Start: 2022-11-05 | End: 2022-11-06 | Stop reason: HOSPADM

## 2022-11-05 RX ORDER — IPRATROPIUM BROMIDE AND ALBUTEROL SULFATE 2.5; .5 MG/3ML; MG/3ML
1 SOLUTION RESPIRATORY (INHALATION) ONCE
Status: COMPLETED | OUTPATIENT
Start: 2022-11-05 | End: 2022-11-05

## 2022-11-05 RX ORDER — SODIUM CHLORIDE 0.9 % (FLUSH) 0.9 %
5-40 SYRINGE (ML) INJECTION EVERY 12 HOURS SCHEDULED
Status: DISCONTINUED | OUTPATIENT
Start: 2022-11-05 | End: 2022-11-06 | Stop reason: HOSPADM

## 2022-11-05 RX ORDER — MAGNESIUM SULFATE IN WATER 40 MG/ML
2000 INJECTION, SOLUTION INTRAVENOUS ONCE
Status: COMPLETED | OUTPATIENT
Start: 2022-11-05 | End: 2022-11-05

## 2022-11-05 RX ORDER — GABAPENTIN 300 MG/1
600 CAPSULE ORAL NIGHTLY
Status: DISCONTINUED | OUTPATIENT
Start: 2022-11-05 | End: 2022-11-06 | Stop reason: HOSPADM

## 2022-11-05 RX ORDER — ACETAMINOPHEN 650 MG/1
650 SUPPOSITORY RECTAL EVERY 6 HOURS PRN
Status: DISCONTINUED | OUTPATIENT
Start: 2022-11-05 | End: 2022-11-06 | Stop reason: HOSPADM

## 2022-11-05 RX ORDER — POLYETHYLENE GLYCOL 3350 17 G/17G
17 POWDER, FOR SOLUTION ORAL DAILY PRN
Status: DISCONTINUED | OUTPATIENT
Start: 2022-11-05 | End: 2022-11-06 | Stop reason: HOSPADM

## 2022-11-05 RX ORDER — ONDANSETRON 4 MG/1
4 TABLET, ORALLY DISINTEGRATING ORAL EVERY 8 HOURS PRN
Status: DISCONTINUED | OUTPATIENT
Start: 2022-11-05 | End: 2022-11-06 | Stop reason: HOSPADM

## 2022-11-05 RX ORDER — ALBUTEROL SULFATE 2.5 MG/3ML
2.5 SOLUTION RESPIRATORY (INHALATION)
Status: DISCONTINUED | OUTPATIENT
Start: 2022-11-05 | End: 2022-11-06 | Stop reason: HOSPADM

## 2022-11-05 RX ORDER — BENZONATATE 100 MG/1
100 CAPSULE ORAL ONCE
Status: COMPLETED | OUTPATIENT
Start: 2022-11-05 | End: 2022-11-05

## 2022-11-05 RX ORDER — ASPIRIN 81 MG/1
81 TABLET, CHEWABLE ORAL DAILY
Status: DISCONTINUED | OUTPATIENT
Start: 2022-11-06 | End: 2022-11-06 | Stop reason: HOSPADM

## 2022-11-05 RX ORDER — FUROSEMIDE 40 MG/1
40 TABLET ORAL DAILY
Status: DISCONTINUED | OUTPATIENT
Start: 2022-11-06 | End: 2022-11-06 | Stop reason: HOSPADM

## 2022-11-05 RX ORDER — ATORVASTATIN CALCIUM 40 MG/1
40 TABLET, FILM COATED ORAL NIGHTLY
Status: DISCONTINUED | OUTPATIENT
Start: 2022-11-05 | End: 2022-11-06 | Stop reason: HOSPADM

## 2022-11-05 RX ORDER — IPRATROPIUM BROMIDE AND ALBUTEROL SULFATE 2.5; .5 MG/3ML; MG/3ML
1 SOLUTION RESPIRATORY (INHALATION)
Status: DISCONTINUED | OUTPATIENT
Start: 2022-11-05 | End: 2022-11-05

## 2022-11-05 RX ORDER — ONDANSETRON 2 MG/ML
4 INJECTION INTRAMUSCULAR; INTRAVENOUS EVERY 6 HOURS PRN
Status: DISCONTINUED | OUTPATIENT
Start: 2022-11-05 | End: 2022-11-06 | Stop reason: HOSPADM

## 2022-11-05 RX ORDER — LISINOPRIL 20 MG/1
20 TABLET ORAL DAILY
Status: DISCONTINUED | OUTPATIENT
Start: 2022-11-06 | End: 2022-11-06 | Stop reason: HOSPADM

## 2022-11-05 RX ORDER — METHYLPREDNISOLONE SODIUM SUCCINATE 125 MG/2ML
125 INJECTION, POWDER, LYOPHILIZED, FOR SOLUTION INTRAMUSCULAR; INTRAVENOUS ONCE
Status: COMPLETED | OUTPATIENT
Start: 2022-11-05 | End: 2022-11-05

## 2022-11-05 RX ORDER — ENOXAPARIN SODIUM 100 MG/ML
40 INJECTION SUBCUTANEOUS DAILY
Status: DISCONTINUED | OUTPATIENT
Start: 2022-11-05 | End: 2022-11-06 | Stop reason: HOSPADM

## 2022-11-05 RX ORDER — METOPROLOL TARTRATE 50 MG/1
50 TABLET, FILM COATED ORAL 2 TIMES DAILY
Status: DISCONTINUED | OUTPATIENT
Start: 2022-11-05 | End: 2022-11-06 | Stop reason: HOSPADM

## 2022-11-05 RX ORDER — PANTOPRAZOLE SODIUM 40 MG/1
40 TABLET, DELAYED RELEASE ORAL DAILY
Status: DISCONTINUED | OUTPATIENT
Start: 2022-11-06 | End: 2022-11-06 | Stop reason: HOSPADM

## 2022-11-05 RX ORDER — PREDNISONE 10 MG/1
40 TABLET ORAL DAILY
Status: DISCONTINUED | OUTPATIENT
Start: 2022-11-05 | End: 2022-11-06 | Stop reason: HOSPADM

## 2022-11-05 RX ORDER — ACETAMINOPHEN 325 MG/1
650 TABLET ORAL EVERY 6 HOURS PRN
Status: DISCONTINUED | OUTPATIENT
Start: 2022-11-05 | End: 2022-11-06 | Stop reason: HOSPADM

## 2022-11-05 RX ADMIN — ALBUTEROL SULFATE 5 MG: 2.5 SOLUTION RESPIRATORY (INHALATION) at 13:40

## 2022-11-05 RX ADMIN — METHYLPREDNISOLONE SODIUM SUCCINATE 125 MG: 125 INJECTION, POWDER, FOR SOLUTION INTRAMUSCULAR; INTRAVENOUS at 11:46

## 2022-11-05 RX ADMIN — MAGNESIUM SULFATE HEPTAHYDRATE 2000 MG: 40 INJECTION, SOLUTION INTRAVENOUS at 13:32

## 2022-11-05 RX ADMIN — ENOXAPARIN SODIUM 40 MG: 100 INJECTION SUBCUTANEOUS at 21:29

## 2022-11-05 RX ADMIN — IPRATROPIUM BROMIDE AND ALBUTEROL SULFATE 1 AMPULE: .5; 2.5 SOLUTION RESPIRATORY (INHALATION) at 13:39

## 2022-11-05 RX ADMIN — ALBUTEROL SULFATE 5 MG: 2.5 SOLUTION RESPIRATORY (INHALATION) at 13:39

## 2022-11-05 RX ADMIN — PREDNISONE 40 MG: 10 TABLET ORAL at 21:26

## 2022-11-05 RX ADMIN — IPRATROPIUM BROMIDE AND ALBUTEROL SULFATE 1 AMPULE: .5; 2.5 SOLUTION RESPIRATORY (INHALATION) at 20:26

## 2022-11-05 RX ADMIN — Medication 5 MG: at 21:27

## 2022-11-05 RX ADMIN — ASPIRIN 324 MG: 81 TABLET, CHEWABLE ORAL at 21:27

## 2022-11-05 RX ADMIN — ATORVASTATIN CALCIUM 40 MG: 40 TABLET, FILM COATED ORAL at 21:27

## 2022-11-05 RX ADMIN — SODIUM CHLORIDE 1000 ML: 9 INJECTION, SOLUTION INTRAVENOUS at 11:48

## 2022-11-05 RX ADMIN — SODIUM CHLORIDE, PRESERVATIVE FREE 10 ML: 5 INJECTION INTRAVENOUS at 21:29

## 2022-11-05 RX ADMIN — BENZONATATE 100 MG: 100 CAPSULE ORAL at 14:03

## 2022-11-05 RX ADMIN — ORPHENADRINE CITRATE 60 MG: 30 INJECTION INTRAMUSCULAR; INTRAVENOUS at 13:29

## 2022-11-05 ASSESSMENT — PAIN DESCRIPTION - DESCRIPTORS
DESCRIPTORS: ACHING
DESCRIPTORS: SQUEEZING;PRESSURE

## 2022-11-05 ASSESSMENT — PAIN DESCRIPTION - LOCATION
LOCATION: CHEST;BACK
LOCATION: BACK
LOCATION: BACK

## 2022-11-05 ASSESSMENT — PAIN DESCRIPTION - ORIENTATION: ORIENTATION: MID

## 2022-11-05 ASSESSMENT — PAIN SCALES - GENERAL
PAINLEVEL_OUTOF10: 5
PAINLEVEL_OUTOF10: 4
PAINLEVEL_OUTOF10: 7
PAINLEVEL_OUTOF10: 4
PAINLEVEL_OUTOF10: 5

## 2022-11-05 ASSESSMENT — PAIN - FUNCTIONAL ASSESSMENT: PAIN_FUNCTIONAL_ASSESSMENT: 0-10

## 2022-11-05 NOTE — ACP (ADVANCE CARE PLANNING)
Advance Care Planning     Advance Care Planning Activator (Inpatient)  Conversation Note      Date of ACP Conversation: 11/5/2022     Conversation Conducted with: Patient with Decision Making Capacity    ACP Activator: Yao Perera RN        Health Care Decision Maker:     Current Designated Health Care Decision Maker:     Primary Decision Maker: Liliya Alford - Spouse - [phone]    Secondary Decision Maker: Matt Moreno - Child - [phone]    Care Preferences    Ventilation: \"If you were in your present state of health and suddenly became very ill and were unable to breathe on your own, what would your preference be about the use of a ventilator (breathing machine) if it were available to you? \"      Would the patient desire the use of ventilator (breathing machine)?: yes    \"If your health worsens and it becomes clear that your chance of recovery is unlikely, what would your preference be about the use of a ventilator (breathing machine) if it were available to you? \"     Would the patient desire the use of ventilator (breathing machine)?: Yes      Resuscitation  \"CPR works best to restart the heart when there is a sudden event, like a heart attack, in someone who is otherwise healthy. Unfortunately, CPR does not typically restart the heart for people who have serious health conditions or who are very sick. \"    \"In the event your heart stopped as a result of an underlying serious health condition, would you want attempts to be made to restart your heart (answer \"yes\" for attempt to resuscitate) or would you prefer a natural death (answer \"no\" for do not attempt to resuscitate)? \" yes       [x] Yes   [] No   Educated Patient / Seymour Grandchild regarding differences between Advance Directives and portable DNR orders.     Length of ACP Conversation in minutes:  10    Conversation Outcomes:  [x] ACP discussion completed  [] Existing advance directive reviewed with patient; no changes to patient's previously recorded wishes  [] New Advance Directive completed  [] Portable Do Not Rescitate prepared for Provider review and signature  [] POLST/POST/MOLST/MOST prepared for Provider review and signature      Follow-up plan:    [] Schedule follow-up conversation to continue planning  [] Referred individual to Provider for additional questions/concerns   [] Advised patient/agent/surrogate to review completed ACP document and update if needed with changes in condition, patient preferences or care setting    [x] This note routed to one or more involved healthcare providers

## 2022-11-05 NOTE — H&P
Hospital Medicine  History and Physical    Patient:  Elyse Maxwell  MRN: [id number]    CHIEF COMPLAINT:    Chief Complaint   Patient presents with    Shortness of Breath     With productive cough, fever, x 1 week       History Obtained From:  patient  Primary Care Physician: Des Casanova MD    HISTORY OF PRESENT ILLNESS:   The patient is a 76 y.o. female who presents with a hx of htn, hld, dm2, presents with cough and subjective fever x 1 week, which is progressively worsening. She complains of fatigue, weakness, and states that she has been trying to fight this at home with no success. She denies cp, n/v/d, and endorses moderate sob with activity. Past Medical History:      Diagnosis Date    Cancer [facility])     Encounter for lumbar puncture 05/25/2022    Completed by Dr. Rosario Smoker - diagnostics sent    Hyperlipidemia     Hypertension     Type II or unspecified type diabetes mellitus without mention of complication, not stated as uncontrolled        Past Surgical History:      Procedure Laterality Date    CORONARY ANGIOPLASTY WITH STENT PLACEMENT      UPPER GASTROINTESTINAL ENDOSCOPY  8/12/15    w/bx,dilation        Medications Prior to Admission:    Prior to Admission medications    Medication Sig Start Date End Date Taking?  Authorizing Provider   pantoprazole (PROTONIX) 40 MG tablet TAKE 1 TABLET EVERY DAY 8/17/22   Des Casanova MD   ezetimibe (ZETIA) 10 MG tablet TAKE 1 TABLET EVERY DAY 8/17/22   Des Casanova MD   metoprolol tartrate (LOPRESSOR) 50 MG tablet Take 1 tablet by mouth 2 times daily 10/31/22   Des Casanova MD   furosemide (LASIX) 40 MG tablet Take 1 tablet by mouth daily 8/19/22   Rafaela Hernadez MD   potassium chloride (KLOR-CON M) 20 MEQ extended release tablet Take 1 tablet by mouth daily 8/19/22   Rafaela Hernadez MD   Alcohol Swabs (B-D SINGLE USE SWABS REGULAR) PADS 1 each by Does not apply route daily 8/17/22   Des Casanova MD   Blood Glucose Calibration (TRUE METRIX LEVEL 1) Low SOLN 1 Bottle by In Vitro route every 30 days 8/17/22 9/16/22  Des Casanova MD   blood glucose test strips (TRUE METRIX BLOOD GLUCOSE TEST) strip 1 each by In Vitro route daily As needed. 8/17/22   Des Casanova MD   Blood Glucose Monitoring Suppl (TRUE METRIX METER) w/Device KIT 1 each by Does not apply route daily 8/17/22   Des Casanova MD   diclofenac sodium (VOLTAREN) 1 % GEL Apply 2 g topically 3 times daily as needed for Pain 8/5/22 9/4/22  Des Casanova MD   atorvastatin (LIPITOR) 80 MG tablet TAKE 1 TABLET EVERY DAY 7/16/22   Des Casanova MD   sodium chloride (OCEAN, BABY AYR) 0.65 % nasal spray 2 sprays by Nasal route as needed for Congestion 6/3/22   Homer Hahn MD   gabapentin (NEURONTIN) 300 MG capsule Take 2 capsules by mouth at bedtime for 90 days. 4/13/22 7/12/22  Des Casanova MD   blood glucose monitor kit and supplies Test 1 time daily 4/13/22   Des Casanova MD   blood glucose monitor strips Test 1 time daily 4/13/22   Des Casanova MD   Lancets MISC 1 each by Does not apply route daily 4/13/22   Des Casanova MD   aspirin 81 MG tablet Take 81 mg by mouth 2 times daily     Historical Provider, MD   isosorbide mononitrate (IMDUR) 30 MG CR tablet Take 30 mg by mouth daily     Historical Provider, MD   lisinopril (PRINIVIL;ZESTRIL) 20 MG tablet Take 20 mg by mouth daily     Historical Provider, MD       Allergies:  Eggs or egg-derived products, Glucophage [metformin], Valium [diazepam], and Zithromax [azithromycin]    Social History:   TOBACCO:   reports that she quit smoking about 40 years ago. Her smoking use included cigarettes. She has a 12.00 pack-year smoking history. She has never used smokeless tobacco.  ETOH:   reports current alcohol use of about 3.0 standard drinks per week.       Family History:       Problem Relation Age of Onset    Cancer Mother     Arthritis Mother     Diabetes Mother     Heart Disease Mother     High Blood Pressure Mother     High Cholesterol Mother     Arthritis Father     Diabetes Father     Heart Disease Father     High Blood Pressure Father     High Cholesterol Father        REVIEW OF SYSTEMS:  Ten systems reviewed and negative except for as above. Physical Exam:    Vitals: BP (!) 149/82   Pulse 95   Temp 98.6 °F (37 °C) (Temporal)   Resp 26   Ht 5' (1.524 m)   Wt 184 lb (83.5 kg)   SpO2 96%   BMI 35.94 kg/m²   Constitutional: alert, appears stated age and cooperative  Skin: Skin color, texture, turgor normal. No rashes or lesions  Eyes:Eye: Normal external eye, conjunctiva, JT. ENT: Head: Normocephalic, no lesions, without obvious abnormality. Neck: no adenopathy, no carotid bruit, no JVD, supple, symmetrical, trachea midline and thyroid not enlarged, symmetric, no tenderness/mass/nodules  Respiratory: clear to auscultation bilaterally  Cardiovascular: regular rate and rhythm, S1, S2 normal, no murmur, click, rub or gallop  Gastrointestinal: soft, non-tender; bowel sounds normal; no masses,  no organomegaly  Genitourinary: Deferred  Musculoskeletal:extremities normal, atraumatic, no cyanosis or edema  Neurologic: Mental status AAOx3 No facial asymmetry or droop. Normal muscle strength b/l. CN II-XII grossly intact. Recent Labs     11/05/22  1115   WBC 7.2   HGB 12.3        Recent Labs     11/05/22  1115      K 4.2      CO2 21   BUN 12   CREATININE 1.09*   GLUCOSE 115*   AST 22   ALT 12   BILITOT 1.1*   ALKPHOS 69     Troponin T:   Recent Labs     11/05/22  1115   TROPONINI 0.039*       -----------------------------------------------------------------   XR CHEST PORTABLE    Result Date: 11/5/2022  EXAMINATION: ONE XRAY VIEW OF THE CHEST 11/5/2022 11:31 am COMPARISON: The previous study performed 05/29/2022.  HISTORY: ORDERING SYSTEM PROVIDED HISTORY: cough TECHNOLOGIST PROVIDED HISTORY: Reason for exam:->cough What reading provider will be dictating this exam?->CRC FINDINGS: Diffuse, increased interstitial markings are again identified throughout both lungs, indicating chronic changes. No acute consolidation or infiltrate is identified. There is again mild blunting of both costophrenic angles, which may represent pleural effusions or pleural reaction/thickening. The cardiac silhouette is within normal limits in size. The thoracic aorta is again atherosclerotic. There is again buckling of the trachea to the right, secondary to a prominent aortic knob. Osteo-degenerative changes are again noted involving the thoracic spine. Vertebroplasty is again noted involving 2 thoracic spine vertebral bodies. Chronic interstitial lung changes again noted, when compared to the previous study performed 05/29/2022. Again rule out small bilateral pleural effusions versus pleural reaction/thickening. Assessment and Plan   Chest pain  Cycle troponins, repeat ekg, has chronic troponin elevation, at baseline. 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 XXXA, Y92.009    Fever and chills R50.9    Impaired mobility and activities of daily living-metabolic encephalopathy with primary origins of infectious etiology Z74.09, Z78.9    Lumbar spondylosis M47.816    Lumbar stenosis with neurogenic claudication M48.062    Atelectasis, left J98.11    Chest pain R07.9       Jeffy Whiting MD, MD  Admitting Hospitalist    Emergency Contact:

## 2022-11-05 NOTE — PROGRESS NOTES
[facility] EMERGENCY [facility] AT [place] Respiratory Therapy Evaluation   Current Order:  DUO Q4 WA      Home Regimen: NO      Ordering Physician: Willy Vines  Re-evaluation Date:  11/8     Diagnosis: RHINO VIRUS      Patient Status: Stable / Unstable + Physician notified    The following MDI Criteria must be met in order to convert aerosol to MDI with spacer. If unable to meet, MDI will be converted to aerosol:  []  Patient able to demonstrate the ability to use MDI effectively  []  Patient alert and cooperative  []  Patient able to take deep breath with 5-10 second hold  []  Medication(s) available in this delivery method   []  Peak flow greater than or equal to 200 ml/min            Current Order Substituted To  (same drug, same frequency)   Aerosol to MDI [] Albuterol Sulfate 0.083% unit dose by aerosol Albuterol Sulfate MDI 2 puffs by inhalation with spacer    [] Levalbuterol 1.25 mg unit dose by aerosol Levalbuterol MDI 2 puffs by inhalation with spacer    [] Levalbuterol 0.63 mg unit dose by aerosol Levalbuterol MDI 2 puffs by inhalation with spacer    [] Ipratropium Bromide 0.02% unit dose by aerosol Ipratropium Bromide MDI 2 puffs by inhalation with spacer    [] Duoneb (Ipratropium + Albuterol) unit dose by aerosol Ipratropium MDI + Albuterol MDI 2 puffs by inhalation w/spacer   MDI to Aerosol [] Albuterol Sulfate MDI Albuterol Sulfate 0.083% unit dose by aerosol    [] Levalbuterol MDI 2 puffs by inhalation Levalbuterol 1.25 mg unit dose by aerosol    [] Ipratropium Bromide MDI by inhalation Ipratropium Bromide 0.02% unit dose by aerosol    [] Combivent (Ipratropium + Albuterol) MDI by inhalation Duoneb (Ipratropium + Albuterol) unit dose by aerosol       Treatment Assessment [Frequency/Schedule]:  Change frequency to: ______________duo tid & alb q2 prn____________________________________per Protocol, P&T, MEC      Points 0 1 2 3 4   Pulmonary Status  Non-Smoker  []   Smoking history   < 20 pack years  []   Smoking history  ?  20 pack

## 2022-11-05 NOTE — PROGRESS NOTES
DVT / VTE PROPHYLAXIS EVALUATION    Estimated Creatinine Clearance: 40 mL/min (A) (based on SCr of 1.09 mg/dL (H)). Recent Labs     11/05/22  1115   BUN 12   CREATININE 1.09*      HGB 12.3   HCT 36.6*     ADMITTING DX OR CHIEF COMPLAINT?  URI  WARFARIN? DOAC'S? no  ANY APPARENT BLEEDING? no  SCHEDULED SURGERY? no     Current order:  Enoxaparin 40 mg SUBQ once daily      Plan:  No intervention recommended, continue current VTE prophylaxis as ordered     Patient Weight (kg)      50.9 and below .9 101-150.9 151-174.9 175 or greater   Estimated   CrCl  (ml/min) 30 or greater []   30 mg   SUBQ daily   [x]   40 mg   SUBQ daily []  30 mg SUBQ   BID  []  40 mg   SUBQ   BID []  60mg SUBQ BID    15-29.9 []  UFH 5000   units SUBQ BID []  30 mg   SUBQ daily [] 30 mg SUBQ   daily []  40 mg SUBQ   daily [] 60 mg SUBQ   daily    Less than 15 or dialysis []  UFH 5000   units SUBQ BID [] UFH 5000 units SUBQ TID []  UFH 7500   units   SUBQ TID         PURNIMA Paiz [facility] [place] [facility]

## 2022-11-05 NOTE — ED PROVIDER NOTES
[address] [facility]  [facility]      Pt Name: Darian Frias  MRN: [id number]  [place] 1945  Date ofevaluation: 11/5/2022  Provider: Sameera Cornejo PA-C    CHIEF COMPLAINT       Chief Complaint   Patient presents with    Shortness of Breath     With productive cough, fever, x 1 week         HISTORY OF PRESENT ILLNESS   (Location/Symptom, Timing/Onset,Context/Setting, Quality, Duration, Modifying Factors, Severity)  Note limiting factors. Darian Frias is a 76 y.o. female who presents to the emergency department  cough-productive, fever body aches congestion x4 days. She has tried multiple otc meds without relief. She feels herself wheezing. She has no h/o copd or asthma. HPI    NursingNotes were reviewed. REVIEW OF SYSTEMS    (2-9 systems for level 4, 10 or more for level 5)     Review of Systems   Constitutional:  Negative for chills, diaphoresis, fatigue and fever. HENT:  Negative for congestion, rhinorrhea and sore throat. Eyes:  Negative for photophobia and pain. Respiratory:  Positive for cough, shortness of breath and wheezing. Cardiovascular:  Negative for chest pain and palpitations. Gastrointestinal:  Negative for abdominal pain, diarrhea, nausea and vomiting. Genitourinary:  Negative for dysuria and flank pain. Musculoskeletal:  Negative for back pain. Skin:  Negative for rash. Neurological:  Negative for dizziness, light-headedness and headaches. Psychiatric/Behavioral: Negative. All other systems reviewed and are negative. Except as noted above the remainder of the review of systems was reviewed and negative.        PAST MEDICAL HISTORY     Past Medical History:   Diagnosis Date    Cancer [facility])     Encounter for lumbar puncture 05/25/2022    Completed by Dr. Russell Jones - diagnostics sent    Hyperlipidemia     Hypertension     Type II or unspecified type diabetes mellitus without mention of complication, not stated as uncontrolled SURGICALHISTORY       Past Surgical History:   Procedure Laterality Date    CORONARY ANGIOPLASTY WITH STENT PLACEMENT      UPPER GASTROINTESTINAL ENDOSCOPY  8/12/15    w/bx,dilation          CURRENT MEDICATIONS       Current Discharge Medication List        CONTINUE these medications which have NOT CHANGED    Details   pantoprazole (PROTONIX) 40 MG tablet TAKE 1 TABLET EVERY DAY  Qty: 90 tablet, Refills: 0      metoprolol tartrate (LOPRESSOR) 50 MG tablet Take 1 tablet by mouth 2 times daily  Qty: 180 tablet, Refills: 0      furosemide (LASIX) 40 MG tablet Take 1 tablet by mouth daily  Qty: 90 tablet, Refills: 3      potassium chloride (KLOR-CON M) 20 MEQ extended release tablet Take 1 tablet by mouth daily  Qty: 90 tablet, Refills: 3      Alcohol Swabs (B-D SINGLE USE SWABS REGULAR) PADS 1 each by Does not apply route daily  Qty: 100 each, Refills: 5    Associated Diagnoses: Type 2 diabetes mellitus with other circulatory complication, without long-term current use of insulin ([facility])      Blood Glucose Calibration (TRUE METRIX LEVEL 1) Low SOLN 1 Bottle by In Vitro route every 30 days  Qty: 1 each, Refills: 5    Associated Diagnoses: Type 2 diabetes mellitus with other circulatory complication, without long-term current use of insulin ([facility])      ! ! blood glucose test strips (TRUE METRIX BLOOD GLUCOSE TEST) strip 1 each by In Vitro route daily As needed.   Qty: 100 each, Refills: 3    Associated Diagnoses: Type 2 diabetes mellitus with other circulatory complication, without long-term current use of insulin ([facility])      Blood Glucose Monitoring Suppl (TRUE METRIX METER) w/Device KIT 1 each by Does not apply route daily  Qty: 100 kit, Refills: 5    Associated Diagnoses: Type 2 diabetes mellitus with other circulatory complication, without long-term current use of insulin ([facility])      diclofenac sodium (VOLTAREN) 1 % GEL Apply 2 g topically 3 times daily as needed for Pain  Qty: 2 g, Refills: 0 atorvastatin (LIPITOR) 80 MG tablet TAKE 1 TABLET EVERY DAY  Qty: 90 tablet, Refills: 0      sodium chloride (OCEAN, BABY AYR) 0.65 % nasal spray 2 sprays by Nasal route as needed for Congestion  Qty: 1 each, Refills: 0      gabapentin (NEURONTIN) 300 MG capsule Take 2 capsules by mouth at bedtime for 90 days. Qty: 180 capsule, Refills: 0      blood glucose monitor kit and supplies Test 1 time daily  Qty: 1 kit, Refills: 0    Comments: Brand per patient preference. May round up to next available package size. Associated Diagnoses: Type 2 diabetes mellitus with diabetic polyneuropathy, without long-term current use of insulin ([address]      ! ! blood glucose monitor strips Test 1 time daily  Qty: 50 strip, Refills: 2    Comments: Brand per patient preference. May round up to next available package size. Associated Diagnoses: Type 2 diabetes mellitus with diabetic polyneuropathy, without long-term current use of insulin ([facility])      Lancets MISC 1 each by Does not apply route daily  Qty: 50 each, Refills: 5    Associated Diagnoses: Type 2 diabetes mellitus with diabetic polyneuropathy, without long-term current use of insulin ([facility])      aspirin 81 MG tablet Take 81 mg by mouth 2 times daily       isosorbide mononitrate (IMDUR) 30 MG CR tablet Take 30 mg by mouth daily       lisinopril (PRINIVIL;ZESTRIL) 20 MG tablet Take 20 mg by mouth daily        ! ! - Potential duplicate medications found. Please discuss with provider.           ALLERGIES     Eggs or egg-derived products, Glucophage [metformin], Valium [diazepam], and Zithromax [azithromycin]    FAMILY HISTORY       Family History   Problem Relation Age of Onset    Cancer Mother     Arthritis Mother     Diabetes Mother     Heart Disease Mother     High Blood Pressure Mother     High Cholesterol Mother     Arthritis Father     Diabetes Father     Heart Disease Father     High Blood Pressure Father     High Cholesterol Father           SOCIAL HISTORY       Social History level 4, 8 or more for level 5)     ED Triage Vitals   BP Temp Temp Source Heart Rate Resp SpO2 Height Weight   11/05/22 1043 11/05/22 1043 11/05/22 1043 11/05/22 1043 11/05/22 1043 11/05/22 1043 11/05/22 1232 11/05/22 1232   (!) 163/81 98.6 °F (37 °C) Temporal 95 20 95 % 5' (1.524 m) 184 lb (83.5 kg)       Physical Exam  Vitals and nursing note reviewed. Constitutional:       General: She is not in acute distress. Appearance: Normal appearance. She is well-developed. She is not diaphoretic. HENT:      Head: Normocephalic and atraumatic. Nose: Nose normal.      Mouth/Throat:      Mouth: Mucous membranes are moist.   Eyes:      General: Lids are normal.      Conjunctiva/sclera: Conjunctivae normal.   Cardiovascular:      Rate and Rhythm: Normal rate and regular rhythm. Pulses: Normal pulses. Heart sounds: Normal heart sounds. Pulmonary:      Effort: Pulmonary effort is normal.      Breath sounds: Decreased breath sounds and wheezing present. Abdominal:      General: Bowel sounds are normal.      Palpations: Abdomen is soft. Tenderness: There is no abdominal tenderness. Musculoskeletal:         General: Normal range of motion. Cervical back: Normal range of motion and neck supple. Lymphadenopathy:      Cervical: No cervical adenopathy. Skin:     General: Skin is warm and dry. Capillary Refill: Capillary refill takes less than 2 seconds. Findings: No rash. Neurological:      Mental Status: She is alert and oriented to person, place, and time. Psychiatric:         Thought Content:  Thought content normal.         Judgment: Judgment normal.       DIAGNOSTIC RESULTS     EKG: All EKG's are interpreted by the Emergency Department Physician who either signs or Co-signsthis chart in the absence of a cardiologist.    Sinr 94bpm no acute st changes no ectopy    Ekg 2 sinus tach 111 with pvc no acute st changes     RADIOLOGY:   Non-plain filmimages such as CT, Ultrasound and MRI are read by the radiologist. Plain radiographic images are visualized and preliminarily interpreted by the emergency physician with the below findings:        Interpretation per the Radiologist below, if available at the time ofthis note:    XR CHEST PORTABLE   Final Result   Chronic interstitial lung changes again noted, when compared to the previous   study performed 05/29/2022. Again rule out small bilateral pleural effusions   versus pleural reaction/thickening.                ED BEDSIDE ULTRASOUND:   Performed by ED Physician - none    LABS:  Labs Reviewed   RESPIRATORY PANEL, MOLECULAR, WITH COVID-19 - Abnormal; Notable for the following components:       Result Value    Human Rhinovirus/Enterovirus by PCR DETECTED (*)     All other components within normal limits   CBC WITH AUTO DIFFERENTIAL - Abnormal; Notable for the following components:    RBC 3.73 (*)     Hematocrit 36.6 (*)     MCV 98.1 (*)     MCH 32.9 (*)     RDW 17.4 (*)     Monocytes Absolute 1.0 (*)     All other components within normal limits   COMPREHENSIVE METABOLIC PANEL - Abnormal; Notable for the following components:    Glucose 115 (*)     Creatinine 1.09 (*)     Est, Glom Filt Rate 51.6 (*)     Total Bilirubin 1.1 (*)     All other components within normal limits   TROPONIN - Abnormal; Notable for the following components:    Troponin 0.039 (*)     All other components within normal limits    Narrative:     CALL  [place]  [facility] tel. [phone],  Troponin called to and read back by [facility] ED, 11/05/2022 13:06, by Berna Ortiz   TROPONIN - Abnormal; Notable for the following components:    Troponin 0.029 (*)     All other components within normal limits    Narrative:     CALL  [facility]  LC1W tel. [phone],  Troponin results called to and read back by Kenia Alcala, 11/05/2022 16:07,  by Francine López   CBC - Abnormal; Notable for the following components:    RBC 3.64 (*)     Hematocrit 35.4 (*)     MCV 97.3 (*)     MCH 33.0 (*)     RDW 17.1 (*) All other components within normal limits   POCT GLUCOSE - Abnormal; Notable for the following components:    POC Glucose 185 (*)     All other components within normal limits   PROCALCITONIN    Narrative:     CALL  [facility]  [facility]ED tel. [phone],  Troponin called to and read back by [facility] ED, 11/05/2022 13:06, by [address]   TROPONIN   TROPONIN       All other labs were within normal range or not returned as of this dictation. EMERGENCY DEPARTMENT COURSE and DIFFERENTIAL DIAGNOSIS/MDM:   Vitals:    Vitals:    11/05/22 1530 11/05/22 1946 11/05/22 2028 11/06/22 0011   BP: (!) 167/78 (!) 157/75  (!) 159/73   Pulse: (!) 113 93  82   Resp: 20 22 20   Temp: 98.4 °F (36.9 °C) 97.9 °F (36.6 °C)  97.9 °F (36.6 °C)   TempSrc: Oral Oral  Oral   SpO2: 95% 97% 94% 96%   Weight: 189 lb 11.2 oz (86 kg)      Height: 5' (1.524 m)              MDM    Patient presents emergency department with upper respiratory symptoms. She does have wheezing on exam which is abnormal for her. She is positive for rhinovirus given magnesium Solu-Medrol and breathing treatments. Repeat evaluation she still has some expiratory wheeze. Discussed admission with hospitalist recommending trying outpatient therapy first.  Discussed this with the patient and will send her home with steroids antibiotics and nebulizer machine with albuterol. As I was typing patient's discharge papers she started to experience chest pain that radiated to her left neck jaw. Repeat EKG was done that shows no ischemic changes repeat troponin was added on. At this time will need to admit for serial troponins as well as a think she would benefit from duo nebs and steroids. Dr. Ramiro Horan accepted. Patient stable ready for admission. REASSESSMENT          CRITICAL CARE TIME   Total Critical Care time was  minutes, excluding separatelyreportable procedures.   There was a high probability ofclinically significant/life threatening deterioration in the patient's condition which required my urgent intervention. CONSULTS:  None    PROCEDURES:  Unless otherwise noted below, none     Procedures    FINAL IMPRESSION      1. Rhinovirus    2. Chest pain, unspecified type          DISPOSITION/PLAN   DISPOSITION Admitted 11/05/2022 02:14:49 PM      PATIENT REFERREDTO:  No follow-up provider specified.     DISCHARGEMEDICATIONS:  Current Discharge Medication List             (Please note that portions of this note were completed with a voice recognition program.  Efforts were made to edit the dictations but occasionally words are mis-transcribed.)    Wilda Quevedo PA-C (electronically signed)  Attending Emergency Physician         Wilda Quevedo PA-C  11/06/22 8092

## 2022-11-05 NOTE — CARE COORDINATION
[facility] EMERGENCY [facility] CENTER AT [place] Case Management Initial Discharge Assessment    Met with Patient to discuss discharge plan. PCP: Debra Garza MD                                Date of Last Visit: last week    [facility] Patient: No        [facility] Notified: no    If no PCP, list provided? N/A    Discharge Planning    Living Arrangements: independently at home    Who do you live with?  and son    Who helps you with your care:  self    If lives at home:     Do you have any barriers navigating in your home? no    Patient can perform ADL? Yes    Current Services (outpatient and in home) :  None    Dialysis: No    Is transportation available to get to your appointments? Yes    DME Equipment:  no    Respiratory equipment: None    Respiratory provider:  no     Pharmacy:  yes [facility]    Consult with Medication Assistance Program?  No      Patient agreeable to [address]? Declined    Patient agreeable to SNF/Rehab? Declined    Other discharge needs identified? N/A    Does Patient Have a High-Risk for Readmission Diagnosis (CHF, PN, MI, COPD)? No      Initial Discharge Plan? (Note: please see concurrent daily documentation for any updates after initial note). Pt denied any d/c needs in ER. Cm to assess for any further d/c needs and referrals.     Readmission Risk              Risk of Unplanned Readmission:  0         Electronically signed by Izabella Lindsey RN on 11/5/2022 at 2:26 PM

## 2022-11-06 VITALS
DIASTOLIC BLOOD PRESSURE: 61 MMHG | HEIGHT: 60 IN | WEIGHT: 189.7 LBS | RESPIRATION RATE: 18 BRPM | BODY MASS INDEX: 37.24 KG/M2 | TEMPERATURE: 97.7 F | SYSTOLIC BLOOD PRESSURE: 109 MMHG | OXYGEN SATURATION: 94 % | HEART RATE: 70 BPM

## 2022-11-06 LAB
GLUCOSE BLD-MCNC: 155 MG/DL (ref 70–99)
GLUCOSE BLD-MCNC: 165 MG/DL (ref 70–99)
GLUCOSE BLD-MCNC: 185 MG/DL (ref 70–99)
HCT VFR BLD CALC: 35.4 % (ref 37–47)
HEMOGLOBIN: 12 G/DL (ref 12–16)
MCH RBC QN AUTO: 33 PG (ref 27–31.3)
MCHC RBC AUTO-ENTMCNC: 33.9 % (ref 33–37)
MCV RBC AUTO: 97.3 FL (ref 79.4–94.8)
PDW BLD-RTO: 17.1 % (ref 11.5–14.5)
PERFORMED ON: ABNORMAL
PLATELET # BLD: 219 K/UL (ref 130–400)
RBC # BLD: 3.64 M/UL (ref 4.2–5.4)
TROPONIN: 0.02 NG/ML (ref 0–0.01)
TROPONIN: 0.02 NG/ML (ref 0–0.01)
WBC # BLD: 6 K/UL (ref 4.8–10.8)

## 2022-11-06 PROCEDURE — 85027 COMPLETE CBC AUTOMATED: CPT

## 2022-11-06 PROCEDURE — 94761 N-INVAS EAR/PLS OXIMETRY MLT: CPT

## 2022-11-06 PROCEDURE — 6370000000 HC RX 637 (ALT 250 FOR IP): Performed by: INTERNAL MEDICINE

## 2022-11-06 PROCEDURE — 96372 THER/PROPH/DIAG INJ SC/IM: CPT

## 2022-11-06 PROCEDURE — 36415 COLL VENOUS BLD VENIPUNCTURE: CPT

## 2022-11-06 PROCEDURE — 93005 ELECTROCARDIOGRAM TRACING: CPT | Performed by: FAMILY MEDICINE

## 2022-11-06 PROCEDURE — 6360000002 HC RX W HCPCS: Performed by: FAMILY MEDICINE

## 2022-11-06 PROCEDURE — 96375 TX/PRO/DX INJ NEW DRUG ADDON: CPT

## 2022-11-06 PROCEDURE — G0378 HOSPITAL OBSERVATION PER HR: HCPCS

## 2022-11-06 PROCEDURE — 2580000003 HC RX 258: Performed by: FAMILY MEDICINE

## 2022-11-06 PROCEDURE — 84484 ASSAY OF TROPONIN QUANT: CPT

## 2022-11-06 PROCEDURE — 6370000000 HC RX 637 (ALT 250 FOR IP): Performed by: FAMILY MEDICINE

## 2022-11-06 PROCEDURE — 94640 AIRWAY INHALATION TREATMENT: CPT

## 2022-11-06 RX ORDER — KETOROLAC TROMETHAMINE 15 MG/ML
15 INJECTION, SOLUTION INTRAMUSCULAR; INTRAVENOUS ONCE
Status: COMPLETED | OUTPATIENT
Start: 2022-11-06 | End: 2022-11-06

## 2022-11-06 RX ORDER — GUAIFENESIN/DEXTROMETHORPHAN 100-10MG/5
5 SYRUP ORAL EVERY 4 HOURS PRN
Status: DISCONTINUED | OUTPATIENT
Start: 2022-11-06 | End: 2022-11-06 | Stop reason: HOSPADM

## 2022-11-06 RX ORDER — PREDNISONE 20 MG/1
40 TABLET ORAL DAILY
Qty: 6 TABLET | Refills: 0 | Status: SHIPPED | OUTPATIENT
Start: 2022-11-06 | End: 2022-11-09

## 2022-11-06 RX ORDER — ALBUTEROL SULFATE 90 UG/1
2 AEROSOL, METERED RESPIRATORY (INHALATION) 4 TIMES DAILY PRN
Qty: 18 G | Refills: 0 | Status: SHIPPED | OUTPATIENT
Start: 2022-11-06

## 2022-11-06 RX ADMIN — METOPROLOL TARTRATE 50 MG: 50 TABLET, FILM COATED ORAL at 09:23

## 2022-11-06 RX ADMIN — METOPROLOL TARTRATE 50 MG: 50 TABLET, FILM COATED ORAL at 00:22

## 2022-11-06 RX ADMIN — SODIUM CHLORIDE, PRESERVATIVE FREE 10 ML: 5 INJECTION INTRAVENOUS at 09:24

## 2022-11-06 RX ADMIN — KETOROLAC TROMETHAMINE 15 MG: 15 INJECTION, SOLUTION INTRAMUSCULAR; INTRAVENOUS at 17:52

## 2022-11-06 RX ADMIN — FUROSEMIDE 40 MG: 40 TABLET ORAL at 09:23

## 2022-11-06 RX ADMIN — GUAIFENESIN SYRUP AND DEXTROMETHORPHAN 5 ML: 100; 10 SYRUP ORAL at 14:47

## 2022-11-06 RX ADMIN — IPRATROPIUM BROMIDE AND ALBUTEROL SULFATE 1 AMPULE: .5; 2.5 SOLUTION RESPIRATORY (INHALATION) at 14:09

## 2022-11-06 RX ADMIN — GABAPENTIN 600 MG: 300 CAPSULE ORAL at 00:21

## 2022-11-06 RX ADMIN — GUAIFENESIN SYRUP AND DEXTROMETHORPHAN 5 ML: 100; 10 SYRUP ORAL at 01:15

## 2022-11-06 RX ADMIN — PANTOPRAZOLE SODIUM 40 MG: 40 TABLET, DELAYED RELEASE ORAL at 09:24

## 2022-11-06 RX ADMIN — ISOSORBIDE MONONITRATE 30 MG: 30 TABLET, EXTENDED RELEASE ORAL at 09:23

## 2022-11-06 RX ADMIN — PREDNISONE 40 MG: 10 TABLET ORAL at 09:24

## 2022-11-06 RX ADMIN — ASPIRIN 81 MG: 81 TABLET, CHEWABLE ORAL at 09:23

## 2022-11-06 RX ADMIN — ENOXAPARIN SODIUM 40 MG: 100 INJECTION SUBCUTANEOUS at 09:24

## 2022-11-06 RX ADMIN — LISINOPRIL 20 MG: 20 TABLET ORAL at 09:23

## 2022-11-06 RX ADMIN — IPRATROPIUM BROMIDE AND ALBUTEROL SULFATE 1 AMPULE: .5; 2.5 SOLUTION RESPIRATORY (INHALATION) at 07:25

## 2022-11-06 ASSESSMENT — ENCOUNTER SYMPTOMS
DIARRHEA: 0
VOMITING: 0
RHINORRHEA: 0
PHOTOPHOBIA: 0
EYE PAIN: 0
ABDOMINAL PAIN: 0
WHEEZING: 1
BACK PAIN: 0
COUGH: 1
SHORTNESS OF BREATH: 1
NAUSEA: 0
SORE THROAT: 0

## 2022-11-06 ASSESSMENT — PAIN DESCRIPTION - LOCATION
LOCATION: BACK;CHEST
LOCATION: BACK

## 2022-11-06 ASSESSMENT — PAIN SCALES - GENERAL
PAINLEVEL_OUTOF10: 0
PAINLEVEL_OUTOF10: 0
PAINLEVEL_OUTOF10: 4
PAINLEVEL_OUTOF10: 2

## 2022-11-06 NOTE — DISCHARGE INSTR - DIET
Good nutrition is important when healing from an illness, injury, or surgery. Follow any nutrition recommendations given to you during your hospital stay. If you were given an oral nutrition supplement while in the hospital, continue to take this supplement at home. You can take it with meals, in-between meals, and/or before bedtime. These supplements can be purchased at most local grocery stores, pharmacies, and chain [employer]-stores. If you have any questions about your diet or nutrition, call the hospital and ask for the dietitian. Heart healthy, low sodium diet.

## 2022-11-06 NOTE — DISCHARGE SUMMARY
Physician Discharge Summary     Patient ID:  Nicci Myers  [id number]  47 y.o.  1945    Admit date: 11/5/2022    Discharge date : 11/06/22     Admitting Physician: Mandie Wang MD     Discharge Physician: Yana Osei MD     Admission Diagnoses: Chest pain [R07.9]    Discharge Diagnoses: acute rhinovirus uri, abnormal troponin    Admission Condition: fair    Discharged Condition: good    Hospital Course:   Chest pain  Cycle troponins, repeat ekg, has chronic troponin elevation, at baseline. URI/acute rhinovirus infxn  Prednisone, duonebs    Consults: none    Significant Diagnostic Studies: as below    Discharge Exam:  /61   Pulse 70   Temp 97.7 °F (36.5 °C) (Oral)   Resp 18   Ht 5' (1.524 m)   Wt 189 lb 11.2 oz (86 kg)   SpO2 94%   BMI 37.05 kg/m²   General appearance: alert, appears stated age, and cooperative  Lungs: clear to auscultation bilaterally  Heart: regular rate and rhythm, S1, S2 normal, no murmur, click, rub or gallop  Abdomen: soft, non-tender; bowel sounds normal; no masses,  no organomegaly  Extremities: extremities normal, atraumatic, no cyanosis or edema  Skin: Skin color, texture, turgor normal. No rashes or lesions    Labs:   Recent Labs     11/05/22  1115 11/06/22  0458   WBC 7.2 6.0   HGB 12.3 12.0   HCT 36.6* 35.4*    219     Recent Labs     11/05/22  1115      K 4.2      CO2 21   BUN 12   CREATININE 1.09*   CALCIUM 9.7     Recent Labs     11/05/22  1115   AST 22   ALT 12   BILITOT 1.1*   ALKPHOS 69     No results for input(s): INR in the last 72 hours.   Recent Labs     11/05/22  1115 11/05/22  1534 11/06/22  0458   TROPONINI 0.039* 0.029* 0.018*  0.020*       Urinalysis:   Lab Results   Component Value Date/Time    NITRU Negative 05/25/2022 06:33 AM    WBCUA 0-2 05/25/2022 06:33 AM    BACTERIA Negative 05/25/2022 06:33 AM    RBCUA 0-2 05/25/2022 06:33 AM    BLOODU Negative 05/25/2022 06:33 AM    SPECGRAV 1.050 05/25/2022 06:33 AM    GLUCOSEU Negative 05/25/2022 06:33 AM       Radiology:   Most recent    Chest CT      WITH CONTRAST:No results found for this or any previous visit. WITHOUT CONTRAST: No results found for this or any previous visit. CXR      2-view: Results for orders placed during the hospital encounter of 05/23/22    XR CHEST (2 VW)    Narrative  EXAMINATION:  CHEST RADIOGRAPH (2 VIEW AP/LATERAL)    Exam Date/Time:  5/29/2022 12:36 PM    Clinical History:   Left basilar atelectasis. Comparison:  5/24/2022      FINDINGS:  Lines, tubes, and devices:  None. Cardiomediastinal silhouette:  Heart size within normal limits. Atherosclerotic calcification of the aortic arch. Lungs and pleura:  Mild bibasilar atelectasis. Trace pleural effusions. No focal consolidation. No pneumothorax. Postoperative changes thoracic kyphoplasty. Impression  Trace pleural effusions and mild bibasilar atelectasis. Portable: Results for orders placed during the hospital encounter of 11/05/22    XR CHEST PORTABLE    Narrative  EXAMINATION:  ONE XRAY VIEW OF THE CHEST    11/5/2022 11:31 am    COMPARISON:  The previous study performed 05/29/2022. HISTORY:  ORDERING SYSTEM PROVIDED HISTORY: cough  TECHNOLOGIST PROVIDED HISTORY:  Reason for exam:->cough  What reading provider will be dictating this exam?->CRC    FINDINGS:  Diffuse, increased interstitial markings are again identified throughout both  lungs, indicating chronic changes. No acute consolidation or infiltrate is  identified. There is again mild blunting of both costophrenic angles, which  may represent pleural effusions or pleural reaction/thickening. The cardiac  silhouette is within normal limits in size. The thoracic aorta is again  atherosclerotic. There is again buckling of the trachea to the right,  secondary to a prominent aortic knob. Osteo-degenerative changes are again  noted involving the thoracic spine.   Vertebroplasty is again noted involving  2 thoracic spine vertebral bodies. Impression  Chronic interstitial lung changes again noted, when compared to the previous  study performed 05/29/2022. Again rule out small bilateral pleural effusions  versus pleural reaction/thickening. Echo No results found for this or any previous visit. Disposition: home    In process/preliminary results:  Outstanding Order Results       Date and Time Order Name Status Description    11/6/2022  1:46 AM EKG 12 lead Preliminary     11/5/2022  2:13 PM EKG 12 Lead Preliminary     11/5/2022  1:34 PM EKG 12 Lead Preliminary             Patient Instructions:   Current Discharge Medication List        START taking these medications    Details   albuterol sulfate HFA (VENTOLIN HFA) 108 (90 Base) MCG/ACT inhaler Inhale 2 puffs into the lungs 4 times daily as needed for Wheezing  Qty: 18 g, Refills: 0      predniSONE (DELTASONE) 20 MG tablet Take 2 tablets by mouth daily for 3 days  Qty: 6 tablet, Refills: 0           CONTINUE these medications which have NOT CHANGED    Details   pantoprazole (PROTONIX) 40 MG tablet TAKE 1 TABLET EVERY DAY  Qty: 90 tablet, Refills: 0      metoprolol tartrate (LOPRESSOR) 50 MG tablet Take 1 tablet by mouth 2 times daily  Qty: 180 tablet, Refills: 0      furosemide (LASIX) 40 MG tablet Take 1 tablet by mouth daily  Qty: 90 tablet, Refills: 3      Alcohol Swabs (B-D SINGLE USE SWABS REGULAR) PADS 1 each by Does not apply route daily  Qty: 100 each, Refills: 5    Associated Diagnoses: Type 2 diabetes mellitus with other circulatory complication, without long-term current use of insulin ([facility])      Blood Glucose Calibration (TRUE METRIX LEVEL 1) Low SOLN 1 Bottle by In Vitro route every 30 days  Qty: 1 each, Refills: 5    Associated Diagnoses: Type 2 diabetes mellitus with other circulatory complication, without long-term current use of insulin ([address]      ! ! blood glucose test strips (TRUE METRIX BLOOD GLUCOSE TEST) strip 1 each by In Vitro route daily As needed. Qty: 100 each, Refills: 3    Associated Diagnoses: Type 2 diabetes mellitus with other circulatory complication, without long-term current use of insulin ([facility])      Blood Glucose Monitoring Suppl (TRUE METRIX METER) w/Device KIT 1 each by Does not apply route daily  Qty: 100 kit, Refills: 5    Associated Diagnoses: Type 2 diabetes mellitus with other circulatory complication, without long-term current use of insulin ([facility])      diclofenac sodium (VOLTAREN) 1 % GEL Apply 2 g topically 3 times daily as needed for Pain  Qty: 2 g, Refills: 0      atorvastatin (LIPITOR) 80 MG tablet TAKE 1 TABLET EVERY DAY  Qty: 90 tablet, Refills: 0      sodium chloride (OCEAN, BABY AYR) 0.65 % nasal spray 2 sprays by Nasal route as needed for Congestion  Qty: 1 each, Refills: 0      gabapentin (NEURONTIN) 300 MG capsule Take 2 capsules by mouth at bedtime for 90 days. Qty: 180 capsule, Refills: 0      blood glucose monitor kit and supplies Test 1 time daily  Qty: 1 kit, Refills: 0    Comments: Brand per patient preference. May round up to next available package size. Associated Diagnoses: Type 2 diabetes mellitus with diabetic polyneuropathy, without long-term current use of insulin ([address]      ! ! blood glucose monitor strips Test 1 time daily  Qty: 50 strip, Refills: 2    Comments: Brand per patient preference. May round up to next available package size. Associated Diagnoses: Type 2 diabetes mellitus with diabetic polyneuropathy, without long-term current use of insulin ([facility])      Lancets MISC 1 each by Does not apply route daily  Qty: 50 each, Refills: 5    Associated Diagnoses: Type 2 diabetes mellitus with diabetic polyneuropathy, without long-term current use of insulin ([facility])      aspirin 81 MG tablet Take 81 mg by mouth 2 times daily       isosorbide mononitrate (IMDUR) 30 MG CR tablet Take 30 mg by mouth daily       lisinopril (PRINIVIL;ZESTRIL) 20 MG tablet Take 20 mg by mouth daily        ! ! - Potential duplicate medications found. Please discuss with provider. STOP taking these medications       potassium chloride (KLOR-CON M) 20 MEQ extended release tablet Comments:   Reason for Stopping:         ezetimibe (ZETIA) 10 MG tablet Comments:   Reason for Stopping:             Activity: activity as tolerated  Diet: regular diet  Wound Care: none needed    Follow-up with PCP 1-2 weeks.     DC time 35 minutes    Signed:  Electronically signed by Kyra Grider MD on 11/6/2022 at 5:08 PM

## 2022-11-06 NOTE — PROGRESS NOTES
1849- Patient received her HS medications. Vitals assessed and recorded. Patient up to the restroom with Rolator. Gown changed after blood sugar assessed. Patient blood sugar was 185 and she was sweating. Moist productive cough and patient requesting cough medicine. Perfect serve sent to Dr. Nakia Shields. 0120- Patient awake and cough medication was given to patient. Call light with patient.  Bed alarm on.    [id number] Daylight Savings Time- Fall Back

## 2022-11-06 NOTE — FLOWSHEET NOTE
Iv and tele removed for discharge. Care plan completed for discharge. Appropriate education addressed in discharge instructions. Instructions completed and reviewed with patient. Verbalize understanding of instructions and follow up. Personal belongings gathered. No complaints. Transport wheelchair called. Family member present.       Electronically signed by Marlyn cMgee RN on 11/6/2022 at 6:29 PM

## 2022-11-07 LAB
EKG ATRIAL RATE: 111 BPM
EKG ATRIAL RATE: 73 BPM
EKG ATRIAL RATE: 94 BPM
EKG P AXIS: 51 DEGREES
EKG P AXIS: 52 DEGREES
EKG P AXIS: 63 DEGREES
EKG P-R INTERVAL: 170 MS
EKG P-R INTERVAL: 182 MS
EKG P-R INTERVAL: 198 MS
EKG Q-T INTERVAL: 308 MS
EKG Q-T INTERVAL: 346 MS
EKG Q-T INTERVAL: 404 MS
EKG QRS DURATION: 74 MS
EKG QRS DURATION: 78 MS
EKG QRS DURATION: 82 MS
EKG QTC CALCULATION (BAZETT): 418 MS
EKG QTC CALCULATION (BAZETT): 432 MS
EKG QTC CALCULATION (BAZETT): 445 MS
EKG R AXIS: -7 DEGREES
EKG R AXIS: 2 DEGREES
EKG R AXIS: 2 DEGREES
EKG T AXIS: 42 DEGREES
EKG T AXIS: 47 DEGREES
EKG T AXIS: 64 DEGREES
EKG VENTRICULAR RATE: 111 BPM
EKG VENTRICULAR RATE: 73 BPM
EKG VENTRICULAR RATE: 94 BPM

## 2022-11-07 PROCEDURE — 93010 ELECTROCARDIOGRAM REPORT: CPT | Performed by: INTERNAL MEDICINE

## 2022-11-08 ENCOUNTER — TELEPHONE (OUTPATIENT)
Dept: FAMILY MEDICINE CLINIC | Age: 77
End: 2022-11-08

## 2022-11-08 NOTE — TELEPHONE ENCOUNTER
Care Transitions Initial Follow Up Call    Outreach made within 2 business days of discharge: Yes    Patient: Mark Omalley Patient : 1945   MRN: [id number]  Reason for Admission: There are no discharge diagnoses documented for the most recent discharge. Discharge Date: 22       Spoke with: Pt    Discharge department/facility:     TCM Interactive Patient Contact:  Was patient able to fill all prescriptions: Yes  Was patient instructed to bring all medications to the follow-up visit: Yes  Is patient taking all medications as directed in the discharge summary?  Yes  Does patient understand their discharge instructions: Yes  Does patient have questions or concerns that need addressed prior to 7-14 day follow up office visit: no    Scheduled appointment with PCP within 7-14 days    Follow Up  Future Appointments   Date Time Provider Kalpana Bernard   2022  4:00 PM Kashif Pretty MD [facility] Neurology -   2022  1:00 PM Solange Duarte MD [address] [place] [address]   2023 11:30 AM James Marquez [address]

## 2022-11-18 ENCOUNTER — OFFICE VISIT (OUTPATIENT)
Dept: FAMILY MEDICINE CLINIC | Age: 77
End: 2022-11-18
Payer: MEDICARE

## 2022-11-18 VITALS
SYSTOLIC BLOOD PRESSURE: 126 MMHG | WEIGHT: 189 LBS | HEIGHT: 60 IN | HEART RATE: 103 BPM | TEMPERATURE: 99.2 F | BODY MASS INDEX: 37.11 KG/M2 | RESPIRATION RATE: 16 BRPM | DIASTOLIC BLOOD PRESSURE: 62 MMHG | OXYGEN SATURATION: 95 %

## 2022-11-18 DIAGNOSIS — J40 BRONCHITIS: Primary | ICD-10-CM

## 2022-11-18 DIAGNOSIS — J06.9 URI WITH COUGH AND CONGESTION: ICD-10-CM

## 2022-11-18 LAB
INFLUENZA A ANTIBODY: NORMAL
INFLUENZA B ANTIBODY: NORMAL
Lab: NORMAL
PERFORMING INSTRUMENT: NORMAL
QC PASS/FAIL: NORMAL
S PYO AG THROAT QL: NORMAL
SARS-COV-2, POC: NORMAL

## 2022-11-18 PROCEDURE — 87880 STREP A ASSAY W/OPTIC: CPT | Performed by: PHYSICIAN ASSISTANT

## 2022-11-18 PROCEDURE — G8484 FLU IMMUNIZE NO ADMIN: HCPCS | Performed by: PHYSICIAN ASSISTANT

## 2022-11-18 PROCEDURE — 1090F PRES/ABSN URINE INCON ASSESS: CPT | Performed by: PHYSICIAN ASSISTANT

## 2022-11-18 PROCEDURE — 1123F ACP DISCUSS/DSCN MKR DOCD: CPT | Performed by: PHYSICIAN ASSISTANT

## 2022-11-18 PROCEDURE — 1036F TOBACCO NON-USER: CPT | Performed by: PHYSICIAN ASSISTANT

## 2022-11-18 PROCEDURE — G8417 CALC BMI ABV UP PARAM F/U: HCPCS | Performed by: PHYSICIAN ASSISTANT

## 2022-11-18 PROCEDURE — 99213 OFFICE O/P EST LOW 20 MIN: CPT | Performed by: PHYSICIAN ASSISTANT

## 2022-11-18 PROCEDURE — G8427 DOCREV CUR MEDS BY ELIG CLIN: HCPCS | Performed by: PHYSICIAN ASSISTANT

## 2022-11-18 PROCEDURE — 87804 INFLUENZA ASSAY W/OPTIC: CPT | Performed by: PHYSICIAN ASSISTANT

## 2022-11-18 PROCEDURE — G8400 PT W/DXA NO RESULTS DOC: HCPCS | Performed by: PHYSICIAN ASSISTANT

## 2022-11-18 PROCEDURE — 87426 SARSCOV CORONAVIRUS AG IA: CPT | Performed by: PHYSICIAN ASSISTANT

## 2022-11-18 RX ORDER — AMOXICILLIN 500 MG/1
500 CAPSULE ORAL 2 TIMES DAILY
Qty: 20 CAPSULE | Refills: 0 | Status: SHIPPED | OUTPATIENT
Start: 2022-11-18 | End: 2022-11-28

## 2022-11-18 RX ORDER — ACYCLOVIR 400 MG/1
TABLET ORAL
COMMUNITY
Start: 2022-10-31

## 2022-11-18 RX ORDER — DEXTROMETHORPHAN HYDROBROMIDE AND PROMETHAZINE HYDROCHLORIDE 15; 6.25 MG/5ML; MG/5ML
5 SYRUP ORAL 4 TIMES DAILY PRN
Qty: 150 ML | Refills: 0 | Status: SHIPPED | OUTPATIENT
Start: 2022-11-18 | End: 2022-11-25

## 2022-11-18 ASSESSMENT — ENCOUNTER SYMPTOMS
VOMITING: 0
COUGH: 1
DIARRHEA: 0
SINUS PRESSURE: 0
SHORTNESS OF BREATH: 0
BACK PAIN: 0
SORE THROAT: 1
ABDOMINAL PAIN: 0
TROUBLE SWALLOWING: 0
CHEST TIGHTNESS: 0

## 2022-11-18 ASSESSMENT — PATIENT HEALTH QUESTIONNAIRE - PHQ9
2. FEELING DOWN, DEPRESSED OR HOPELESS: 0
SUM OF ALL RESPONSES TO PHQ QUESTIONS 1-9: 0
1. LITTLE INTEREST OR PLEASURE IN DOING THINGS: 0
SUM OF ALL RESPONSES TO PHQ9 QUESTIONS 1 & 2: 0
SUM OF ALL RESPONSES TO PHQ QUESTIONS 1-9: 0

## 2022-11-18 NOTE — PROGRESS NOTES
Subjective:      Patient ID: Clemente Mckeon is a 76 y.o. female who presents today for:  Chief Complaint   Patient presents with    Cough     Congestion, sore throat and ear pain had Rhinovirus (22) admitted for 3 days- still has sx       HPI  76year old male who presents with complaints of congestion, cough with green sputum, sore throat and ear pain    Past Medical History:   Diagnosis Date    [facility]     Encounter for lumbar puncture 2022    Completed by Dr. Yoly Lerner - diagnostics sent    Hyperlipidemia     Hypertension     Type II or unspecified type diabetes mellitus without mention of complication, not stated as uncontrolled      Past Surgical History:   Procedure Laterality Date    CORONARY ANGIOPLASTY WITH STENT PLACEMENT      UPPER GASTROINTESTINAL ENDOSCOPY  8/12/15    w/bx,dilation      Social History     Socioeconomic History    Marital status:      Spouse name: Not on file    Number of children: Not on file    Years of education: Not on file    Highest education level: Not on file   Occupational History    Not on file   Tobacco Use    Smoking status: Former     Packs/day: 0.50     Years: 24.00     Pack years: 12.00     Types: Cigarettes     Quit date:      Years since quittin.9    Smokeless tobacco: Never   Substance and Sexual Activity    Alcohol use:  Yes     Alcohol/week: 3.0 standard drinks     Types: 3 Glasses of wine per week    Drug use: No    Sexual activity: Not on file   Other Topics Concern    Not on file   Social History Narrative    Lives With: Spouse,  Son (pt never home alone)    Type of Home: House in [facility] [address]: Laundry in basement,Two level (doesn't have to use basement)    Home Access: Stairs to enter with rails- Number of Steps: 6- Rails: Right    Bathroom Shower/Tub: Walk-in shower    Bathroom Equipment: Shower chair,Grab bars in [facility]: [address]:  ( assists with bathing/dressing; pt indep with toileting activities)    Homemaking Assistance:  (spouse and son complete)    Homemaking Responsibilities: No    Ambulation Assistance: Independent (cane/rollator PRN)    Transfer Assistance: Independent    Additional Comments: Pt inconsistent historian.  Pt's spouse arriving mid assessment and confirms home set up and PLOF         Social Determinants of Health     Financial Resource Strain: Low Risk     Difficulty of Paying Living Expenses: Not hard at all   Food Insecurity: No Food Insecurity    Worried About Running Out of Food in the Last Year: Never true    Ran Out of Food in the Last Year: Never true   Transportation Needs: Not on file   Physical Activity: Insufficiently Active    Days of Exercise per Week: 4 days    Minutes of Exercise per Session: 30 min   Stress: Not on file   Social Connections: Not on file   Intimate Partner Violence: Not on file   Housing Stability: Not on file     Family History   Problem Relation Age of Onset    Cancer Mother     Arthritis Mother     Diabetes Mother     Heart Disease Mother     High Blood Pressure Mother     High Cholesterol Mother     Arthritis Father     Diabetes Father     Heart Disease Father     High Blood Pressure Father     High Cholesterol Father      Allergies   Allergen Reactions    Eggs Or Egg-Derived Products Nausea Only    Glucophage [Metformin]      diarrhea    Valium [Diazepam]     Zithromax [Azithromycin]      Current Outpatient Medications   Medication Sig Dispense Refill    acyclovir (ZOVIRAX) 400 MG tablet       amoxicillin (AMOXIL) 500 MG capsule Take 1 capsule by mouth 2 times daily for 10 days 20 capsule 0    promethazine-dextromethorphan (PROMETHAZINE-DM) 6.25-15 MG/5ML syrup Take 5 mLs by mouth 4 times daily as needed for Cough 150 mL 0    albuterol sulfate HFA (VENTOLIN HFA) 108 (90 Base) MCG/ACT inhaler Inhale 2 puffs into the lungs 4 times daily as needed for Wheezing 18 g 0    metoprolol tartrate (LOPRESSOR) 50 MG tablet Take 1 tablet by mouth 2 times daily 180 tablet 0    furosemide (LASIX) 40 MG tablet Take 1 tablet by mouth daily 90 tablet 3    pantoprazole (PROTONIX) 40 MG tablet TAKE 1 TABLET EVERY DAY 90 tablet 0    Alcohol Swabs (B-D SINGLE USE SWABS REGULAR) PADS 1 each by Does not apply route daily 100 each 5    blood glucose test strips (TRUE METRIX BLOOD GLUCOSE TEST) strip 1 each by In Vitro route daily As needed. 100 each 3    Blood Glucose Monitoring Suppl (TRUE METRIX METER) w/Device KIT 1 each by Does not apply route daily 100 kit 5    atorvastatin (LIPITOR) 80 MG tablet TAKE 1 TABLET EVERY DAY 90 tablet 0    sodium chloride (OCEAN, BABY AYR) 0.65 % nasal spray 2 sprays by Nasal route as needed for Congestion 1 each 0    blood glucose monitor kit and supplies Test 1 time daily 1 kit 0    blood glucose monitor strips Test 1 time daily 50 strip 2    Lancets MISC 1 each by Does not apply route daily 50 each 5    aspirin 81 MG tablet Take 81 mg by mouth 2 times daily       isosorbide mononitrate (IMDUR) 30 MG CR tablet Take 30 mg by mouth daily       lisinopril (PRINIVIL;ZESTRIL) 20 MG tablet Take 20 mg by mouth daily       Blood Glucose Calibration (TRUE METRIX LEVEL 1) Low SOLN 1 Bottle by In Vitro route every 30 days 1 each 5    diclofenac sodium (VOLTAREN) 1 % GEL Apply 2 g topically 3 times daily as needed for Pain 2 g 0    gabapentin (NEURONTIN) 300 MG capsule Take 2 capsules by mouth at bedtime for 90 days. 180 capsule 0     No current facility-administered medications for this visit. Review of Systems   Constitutional:  Negative for activity change, appetite change, chills, fever and unexpected weight change. HENT:  Positive for congestion, ear pain and sore throat. Negative for drooling, nosebleeds, sinus pressure and trouble swallowing. Respiratory:  Positive for cough. Negative for chest tightness and shortness of breath. Cardiovascular:  Negative for chest pain and leg swelling. Gastrointestinal:  Negative for abdominal pain, diarrhea and vomiting. Endocrine: Negative for polydipsia and polyphagia. Genitourinary:  Negative for dysuria, flank pain and frequency. Musculoskeletal:  Negative for back pain and myalgias. Skin:  Negative for pallor and rash. Neurological:  Negative for syncope, weakness and headaches. Hematological:  Does not bruise/bleed easily. Psychiatric/Behavioral:  Negative for agitation, behavioral problems and confusion. All other systems reviewed and are negative. Objective:   /62 (Site: Right Upper Arm, Position: Sitting, Cuff Size: Medium Adult)   Pulse (!) 103   Temp 99.2 °F (37.3 °C) (Temporal)   Resp 16   Ht 5' (1.524 m)   Wt 189 lb (85.7 kg)   SpO2 95%   BMI 36.91 kg/m²     Physical Exam  Vitals and nursing note reviewed. Constitutional:       General: She is awake. She is not in acute distress. Appearance: Normal appearance. She is well-developed and normal weight. She is not ill-appearing, toxic-appearing or diaphoretic. Comments: No photophobia. No phonophobia. HENT:      Head: Normocephalic and atraumatic. No Tobar's sign. Right Ear: External ear normal.      Left Ear: External ear normal.      Nose: Nose normal. No congestion or rhinorrhea. Mouth/Throat:      Mouth: Mucous membranes are moist.      Pharynx: Oropharynx is clear. No oropharyngeal exudate or posterior oropharyngeal erythema. Eyes:      General: No scleral icterus. Right eye: No foreign body or discharge. Left eye: No discharge. Extraocular Movements: Extraocular movements intact. Conjunctiva/sclera: Conjunctivae normal.      Left eye: No exudate. Pupils: Pupils are equal, round, and reactive to light. Neck:      Vascular: No JVD. Trachea: No tracheal deviation. Comments: No meningismus. Cardiovascular:      Rate and Rhythm: Normal rate and regular rhythm. Heart sounds: Normal heart sounds. Heart sounds not distant. No murmur heard. No friction rub. No gallop. Pulmonary:      Effort: Pulmonary effort is normal. No respiratory distress. Breath sounds: Normal breath sounds. No stridor. No wheezing, rhonchi or rales. Chest:      Chest wall: No tenderness. Abdominal:      General: Abdomen is flat. Bowel sounds are normal. There is no distension. Palpations: Abdomen is soft. There is no mass. Tenderness: There is no abdominal tenderness. There is no right CVA tenderness, left CVA tenderness, guarding or rebound. Hernia: No hernia is present. Musculoskeletal:         General: No swelling, tenderness, deformity or signs of injury. Normal range of motion. Cervical back: Normal range of motion and neck supple. No rigidity. Lymphadenopathy:      Head:      Right side of head: No submental adenopathy. Left side of head: No submental adenopathy. Skin:     General: Skin is warm and dry. Capillary Refill: Capillary refill takes less than 2 seconds. Coloration: Skin is not jaundiced or pale. Findings: No bruising, erythema, lesion or rash. Neurological:      General: No focal deficit present. Mental Status: She is alert and oriented to person, place, and time. Mental status is at baseline. Cranial Nerves: No cranial nerve deficit. Sensory: No sensory deficit. Motor: No weakness. Coordination: Coordination normal.      Deep Tendon Reflexes: Reflexes are normal and symmetric. Psychiatric:         Mood and Affect: Mood normal.         Behavior: Behavior normal. Behavior is cooperative. Thought Content: Thought content normal.         Judgment: Judgment normal.       Assessment:       Diagnosis Orders   1. Bronchitis  amoxicillin (AMOXIL) 500 MG capsule    promethazine-dextromethorphan (PROMETHAZINE-DM) 6.25-15 MG/5ML syrup      2.  URI with cough and congestion  POCT COVID-19, Antigen    POCT Influenza A/B    POCT rapid strep A    amoxicillin (AMOXIL) 500 MG capsule    promethazine-dextromethorphan (PROMETHAZINE-DM) 6.25-15 MG/5ML syrup        Results for POC orders placed in visit on 11/18/22   POCT Influenza A/B   Result Value Ref Range    Influenza A Ab neg     Influenza B Ab neg    POCT rapid strep A   Result Value Ref Range    Strep A Ag None Detected None Detected      Plan:     Assessment & Plan   Mckenna was seen today for cough. Diagnoses and all orders for this visit:    Bronchitis  -     amoxicillin (AMOXIL) 500 MG capsule; Take 1 capsule by mouth 2 times daily for 10 days  -     promethazine-dextromethorphan (PROMETHAZINE-DM) 6.25-15 MG/5ML syrup; Take 5 mLs by mouth 4 times daily as needed for Cough    URI with cough and congestion  -     POCT COVID-19, Antigen  -     POCT Influenza A/B  -     POCT rapid strep A  -     amoxicillin (AMOXIL) 500 MG capsule; Take 1 capsule by mouth 2 times daily for 10 days  -     promethazine-dextromethorphan (PROMETHAZINE-DM) 6.25-15 MG/5ML syrup; Take 5 mLs by mouth 4 times daily as needed for Cough    Orders Placed This Encounter   Procedures    POCT COVID-19, Antigen     Order Specific Question:   Is this test for diagnosis or screening? Answer:   Diagnosis of ill patient     Order Specific Question:   Symptomatic for COVID-19 as defined by CDC? Answer:   Yes     Order Specific Question:   Date of Symptom Onset     Answer:   11/2/2022     Order Specific Question:   Hospitalized for COVID-19? Answer:   No     Order Specific Question:   Admitted to ICU for COVID-19? Answer:   No     Order Specific Question:   Employed in healthcare setting? Answer:   No     Order Specific Question:   Resident in a congregate (group) care setting? Answer:   No     Order Specific Question:   Pregnant?      Answer:   No    POCT Influenza A/B    POCT rapid strep A     Orders Placed This Encounter   Medications    amoxicillin (AMOXIL) 500 MG capsule     Sig: Take 1 capsule by mouth 2 times daily for 10 days     Dispense:  20 capsule     Refill:  0    promethazine-dextromethorphan (PROMETHAZINE-DM) 6.25-15 MG/5ML syrup     Sig: Take 5 mLs by mouth 4 times daily as needed for Cough     Dispense:  150 mL     Refill:  0     There are no discontinued medications. Return if symptoms worsen or fail to improve. Reviewed with the patient/family: current clinical status & medications. Side effects of the medication prescribed today, as well as treatment plan/rationale and result expectations have been discussed with the patient/family who expresses understanding. Patient will be discharged home in stable condition. Follow up with PCP to evaluate treatment results or return if symptoms worsen or fail to improve. Discussed signs and symptoms which require immediate follow-up in ED/call to 911. Understanding verbalized. I have reviewed the patient's medical history in detail and updated the computerized patient record.     TIMOTEO Malcolm

## 2022-11-30 DIAGNOSIS — J40 BRONCHITIS: ICD-10-CM

## 2022-11-30 DIAGNOSIS — J06.9 URI WITH COUGH AND CONGESTION: ICD-10-CM

## 2022-11-30 RX ORDER — DEXTROMETHORPHAN HYDROBROMIDE AND PROMETHAZINE HYDROCHLORIDE 15; 6.25 MG/5ML; MG/5ML
SYRUP ORAL
Qty: 150 ML | Refills: 0 | OUTPATIENT
Start: 2022-11-30

## 2022-11-30 NOTE — TELEPHONE ENCOUNTER
requesting medication refill.  Please approve or deny this request.    Rx requested:  Requested Prescriptions     Pending Prescriptions Disp Refills    promethazine-dextromethorphan (PROMETHAZINE-DM) 6.25-15 MG/5ML syrup [Pharmacy Med Name: Promethazine-DM 6.25-15 MG/5ML Oral Syrup] 150 mL 0     Sig: TAKE 5 ML BY MOUTH  4 TIMES DAILY AS NEEDED FOR COUGH         Last Office Visit:   11/18/2022      Next Visit Date:  Future Appointments   Date Time Provider [facility]   3/16/2023  3:00 PM MD Artemio Vicente [facility] Neurology -   4/28/2023 11:30 AM Christian Weller DO [address]

## 2022-12-03 DIAGNOSIS — E11.59 TYPE 2 DIABETES MELLITUS WITH OTHER CIRCULATORY COMPLICATION, WITHOUT LONG-TERM CURRENT USE OF INSULIN (HCC): ICD-10-CM

## 2022-12-03 LAB — HBA1C MFR BLD: 7 % (ref 4.8–5.9)

## 2023-02-07 ENCOUNTER — OFFICE VISIT (OUTPATIENT)
Dept: FAMILY MEDICINE CLINIC | Age: 78
End: 2023-02-07
Payer: MEDICARE

## 2023-02-07 VITALS
OXYGEN SATURATION: 93 % | HEART RATE: 63 BPM | DIASTOLIC BLOOD PRESSURE: 76 MMHG | TEMPERATURE: 97.4 F | SYSTOLIC BLOOD PRESSURE: 152 MMHG | BODY MASS INDEX: 37.11 KG/M2 | HEIGHT: 60 IN | WEIGHT: 189 LBS | RESPIRATION RATE: 15 BRPM

## 2023-02-07 DIAGNOSIS — J40 BRONCHITIS: Primary | ICD-10-CM

## 2023-02-07 LAB
INFLUENZA A ANTIBODY: NEGATIVE
INFLUENZA B ANTIBODY: NEGATIVE
Lab: NORMAL
PERFORMING INSTRUMENT: NORMAL
QC PASS/FAIL: NORMAL
SARS-COV-2, POC: NORMAL

## 2023-02-07 PROCEDURE — 1090F PRES/ABSN URINE INCON ASSESS: CPT | Performed by: NURSE PRACTITIONER

## 2023-02-07 PROCEDURE — G8484 FLU IMMUNIZE NO ADMIN: HCPCS | Performed by: NURSE PRACTITIONER

## 2023-02-07 PROCEDURE — 87426 SARSCOV CORONAVIRUS AG IA: CPT | Performed by: NURSE PRACTITIONER

## 2023-02-07 PROCEDURE — 87804 INFLUENZA ASSAY W/OPTIC: CPT | Performed by: NURSE PRACTITIONER

## 2023-02-07 PROCEDURE — G8417 CALC BMI ABV UP PARAM F/U: HCPCS | Performed by: NURSE PRACTITIONER

## 2023-02-07 PROCEDURE — G8400 PT W/DXA NO RESULTS DOC: HCPCS | Performed by: NURSE PRACTITIONER

## 2023-02-07 PROCEDURE — 1036F TOBACCO NON-USER: CPT | Performed by: NURSE PRACTITIONER

## 2023-02-07 PROCEDURE — 1123F ACP DISCUSS/DSCN MKR DOCD: CPT | Performed by: NURSE PRACTITIONER

## 2023-02-07 PROCEDURE — G8427 DOCREV CUR MEDS BY ELIG CLIN: HCPCS | Performed by: NURSE PRACTITIONER

## 2023-02-07 PROCEDURE — 99213 OFFICE O/P EST LOW 20 MIN: CPT | Performed by: NURSE PRACTITIONER

## 2023-02-07 RX ORDER — DEXTROMETHORPHAN HYDROBROMIDE AND PROMETHAZINE HYDROCHLORIDE 15; 6.25 MG/5ML; MG/5ML
5 SYRUP ORAL 4 TIMES DAILY PRN
Qty: 150 ML | Refills: 0 | Status: SHIPPED | OUTPATIENT
Start: 2023-02-07 | End: 2023-02-14

## 2023-02-07 RX ORDER — AMOXICILLIN 500 MG/1
500 CAPSULE ORAL 2 TIMES DAILY
Qty: 20 CAPSULE | Refills: 0 | Status: SHIPPED | OUTPATIENT
Start: 2023-02-07 | End: 2023-02-17

## 2023-02-07 SDOH — ECONOMIC STABILITY: INCOME INSECURITY: HOW HARD IS IT FOR YOU TO PAY FOR THE VERY BASICS LIKE FOOD, HOUSING, MEDICAL CARE, AND HEATING?: NOT HARD AT ALL

## 2023-02-07 SDOH — ECONOMIC STABILITY: HOUSING INSECURITY
IN THE LAST 12 MONTHS, WAS THERE A TIME WHEN YOU DID NOT HAVE A STEADY PLACE TO SLEEP OR SLEPT IN A SHELTER (INCLUDING NOW)?: NO

## 2023-02-07 SDOH — ECONOMIC STABILITY: FOOD INSECURITY: WITHIN THE PAST 12 MONTHS, THE FOOD YOU BOUGHT JUST DIDN'T LAST AND YOU DIDN'T HAVE MONEY TO GET MORE.: NEVER TRUE

## 2023-02-07 SDOH — ECONOMIC STABILITY: FOOD INSECURITY: WITHIN THE PAST 12 MONTHS, YOU WORRIED THAT YOUR FOOD WOULD RUN OUT BEFORE YOU GOT MONEY TO BUY MORE.: NEVER TRUE

## 2023-02-07 ASSESSMENT — ENCOUNTER SYMPTOMS
SINUS PRESSURE: 0
NAUSEA: 0
TROUBLE SWALLOWING: 0
RHINORRHEA: 1
SORE THROAT: 1
HEMOPTYSIS: 0
WHEEZING: 0
EYE DISCHARGE: 0
CONSTIPATION: 0
HEARTBURN: 0
COUGH: 1
EYE PAIN: 0
EYE ITCHING: 0
DIARRHEA: 0
SINUS PAIN: 0
EYE REDNESS: 0
VOMITING: 0
CHEST TIGHTNESS: 0
SHORTNESS OF BREATH: 0
ABDOMINAL PAIN: 0

## 2023-02-07 ASSESSMENT — PATIENT HEALTH QUESTIONNAIRE - PHQ9
1. LITTLE INTEREST OR PLEASURE IN DOING THINGS: 0
SUM OF ALL RESPONSES TO PHQ QUESTIONS 1-9: 0
SUM OF ALL RESPONSES TO PHQ9 QUESTIONS 1 & 2: 0
SUM OF ALL RESPONSES TO PHQ QUESTIONS 1-9: 0
2. FEELING DOWN, DEPRESSED OR HOPELESS: 0
SUM OF ALL RESPONSES TO PHQ QUESTIONS 1-9: 0
SUM OF ALL RESPONSES TO PHQ QUESTIONS 1-9: 0

## 2023-02-07 ASSESSMENT — VISUAL ACUITY: OU: 1

## 2023-02-07 NOTE — PROGRESS NOTES
Subjective:      Patient ID: Nolvia Oliver is a 76 y.o. female who present today with:      Chief Complaint   Patient presents with    Cough     Patient presents today with a cough, sore throat, fever, sleepy and fatigue for a week. Cough  This is a new problem. The current episode started 1 to 4 weeks ago. The problem has been gradually worsening. The problem occurs constantly. The cough is Productive of sputum. Associated symptoms include a fever, headaches, myalgias, nasal congestion, postnasal drip, rhinorrhea and a sore throat. Pertinent negatives include no chest pain, chills, ear congestion, ear pain, eye redness, heartburn, hemoptysis, rash, shortness of breath, sweats, weight loss or wheezing. The symptoms are aggravated by lying down. She has tried nothing for the symptoms. The treatment provided no relief. Her past medical history is significant for bronchitis. There is no history of asthma, bronchiectasis, COPD, emphysema, environmental allergies or pneumonia.      Past Medical History:   Diagnosis Date    Cancer [facility])     Encounter for lumbar puncture 05/25/2022    Completed by Dr. Jovanna Smith - diagnostics sent    Hyperlipidemia     Hypertension     Type II or unspecified type diabetes mellitus without mention of complication, not stated as uncontrolled      Patient Active Problem List    Diagnosis Date Noted    Bronchitis 11/18/2022    URI with cough and congestion 11/18/2022    Chest pain 11/05/2022    Atelectasis, left     Impaired mobility and activities of daily living-metabolic encephalopathy with primary origins of infectious etiology 05/27/2022    Lumbar spondylosis 05/27/2022    Lumbar stenosis with neurogenic claudication 05/27/2022    Dizziness     Benign paroxysmal positional vertigo due to bilateral vestibular disorder     Maxillary pain     Nonintractable headache     Fall at home     Fever and chills     Hyperlipidemia 10/16/2015    Type 2 diabetes mellitus with circulatory disorder ([facility] 10/16/2015    Vertebral compression fracture ([facility] 10/16/2015    Multiple myeloma ([facility] 2015    Obese 2015    Ataxia 2015     Past Surgical History:   Procedure Laterality Date    CORONARY ANGIOPLASTY WITH STENT PLACEMENT      UPPER GASTROINTESTINAL ENDOSCOPY  8/12/15    w/bx,dilation      Social History     Socioeconomic History    Marital status:      Spouse name: None    Number of children: None    Years of education: None    Highest education level: None   Tobacco Use    Smoking status: Former     Packs/day: 0.50     Years: 24.00     Pack years: 12.00     Types: Cigarettes     Quit date:      Years since quittin.1    Smokeless tobacco: Never   Substance and Sexual Activity    Alcohol use: Yes     Alcohol/week: 3.0 standard drinks     Types: 3 Glasses of wine per week    Drug use: No   Social History Narrative    Lives With: Spouse,  Son (pt never home alone)    Type of Home: House in [address] [place] [address],[address] in basement,Two level (doesn't have to use basement)    Home Access: Stairs to enter with rails- Number of Steps: 6- Rails: Right    Bathroom Shower/Tub: Walk-in shower    Bathroom Equipment: Shower chair,Grab bars in [facility]:  ( assists with bathing/dressing; pt indep with toileting activities)    Homemaking Assistance:  (spouse and son complete)    Homemaking Responsibilities: No    Ambulation Assistance: Independent (cane/rollator PRN)    Transfer Assistance: Independent    Additional Comments: Pt inconsistent historian.  Pt's spouse arriving mid assessment and confirms home set up and PLOF         Social Determinants of Health     Financial Resource Strain: Low Risk     Difficulty of Paying Living Expenses: Not hard at all   Food Insecurity: No Food Insecurity    Worried About [address] in the Last Year: Never true    [address] in the Last Year: Never true Transportation Needs: Unknown    Lack of Transportation (Non-Medical): No   Physical Activity: Insufficiently Active    Days of Exercise per Week: 4 days    Minutes of Exercise per Session: 30 min   Housing Stability: Unknown    Unstable Housing in the Last Year: No     Current Outpatient Medications on File Prior to Visit   Medication Sig Dispense Refill    acyclovir (ZOVIRAX) 400 MG tablet       albuterol sulfate HFA (VENTOLIN HFA) 108 (90 Base) MCG/ACT inhaler Inhale 2 puffs into the lungs 4 times daily as needed for Wheezing 18 g 0    metoprolol tartrate (LOPRESSOR) 50 MG tablet Take 1 tablet by mouth 2 times daily 180 tablet 0    furosemide (LASIX) 40 MG tablet Take 1 tablet by mouth daily 90 tablet 3    pantoprazole (PROTONIX) 40 MG tablet TAKE 1 TABLET EVERY DAY 90 tablet 0    Alcohol Swabs (B-D SINGLE USE SWABS REGULAR) PADS 1 each by Does not apply route daily 100 each 5    blood glucose test strips (TRUE METRIX BLOOD GLUCOSE TEST) strip 1 each by In Vitro route daily As needed.  100 each 3    Blood Glucose Monitoring Suppl (TRUE METRIX METER) w/Device KIT 1 each by Does not apply route daily 100 kit 5    atorvastatin (LIPITOR) 80 MG tablet TAKE 1 TABLET EVERY DAY 90 tablet 0    sodium chloride (OCEAN, BABY AYR) 0.65 % nasal spray 2 sprays by Nasal route as needed for Congestion 1 each 0    blood glucose monitor kit and supplies Test 1 time daily 1 kit 0    blood glucose monitor strips Test 1 time daily 50 strip 2    Lancets MISC 1 each by Does not apply route daily 50 each 5    aspirin 81 MG tablet Take 81 mg by mouth 2 times daily       isosorbide mononitrate (IMDUR) 30 MG CR tablet Take 30 mg by mouth daily       lisinopril (PRINIVIL;ZESTRIL) 20 MG tablet Take 20 mg by mouth daily       Blood Glucose Calibration (TRUE METRIX LEVEL 1) Low SOLN 1 Bottle by In Vitro route every 30 days 1 each 5    diclofenac sodium (VOLTAREN) 1 % GEL Apply 2 g topically 3 times daily as needed for Pain 2 g 0    gabapentin (NEURONTIN) 300 MG capsule Take 2 capsules by mouth at bedtime for 90 days. 180 capsule 0     No current facility-administered medications on file prior to visit. Allergies   Allergen Reactions    Eggs Or Egg-Derived Products Nausea Only    Glucophage [Metformin]      diarrhea    Valium [Diazepam]     Zithromax [Azithromycin]       Review of Systems   Constitutional:  Positive for activity change, fatigue and fever. Negative for appetite change, chills, diaphoresis and weight loss. HENT:  Positive for congestion, postnasal drip, rhinorrhea and sore throat. Negative for ear pain, sinus pressure, sinus pain and trouble swallowing. Eyes:  Negative for pain, discharge, redness and itching. Respiratory:  Positive for cough. Negative for hemoptysis, chest tightness, shortness of breath and wheezing. Cardiovascular:  Negative for chest pain. Gastrointestinal:  Negative for abdominal pain, constipation, diarrhea, heartburn, nausea and vomiting. Musculoskeletal:  Positive for myalgias. Negative for arthralgias. Skin:  Negative for rash. Allergic/Immunologic: Negative for environmental allergies. Neurological:  Positive for headaches. Negative for seizures and syncope. Objective:      Vitals:    02/07/23 1112 02/07/23 1118   BP: (!) 164/72 (!) 152/76   Site: Right Upper Arm Right Upper Arm   Position: Sitting Sitting   Cuff Size: Medium Adult Medium Adult   Pulse: 63    Resp: 15    Temp: 97.4 °F (36.3 °C)    TempSrc: Temporal    SpO2: 93%    Weight: 189 lb (85.7 kg)    Height: 5' (1.524 m)      Physical Exam  Constitutional:       General: She is not in acute distress. Appearance: Normal appearance. She is ill-appearing. HENT:      Head: Normocephalic and atraumatic. Eyes:      General: Lids are normal. Vision grossly intact. Extraocular Movements: Extraocular movements intact. Conjunctiva/sclera: Conjunctivae normal.      Pupils: Pupils are equal, round, and reactive to light. Cardiovascular:      Rate and Rhythm: Normal rate and regular rhythm. Heart sounds: Normal heart sounds. Pulmonary:      Effort: Pulmonary effort is normal.      Breath sounds: Wheezing present. Comments: Scant expiratory wheezing  Lymphadenopathy:      Head:      Right side of head: Submandibular and tonsillar adenopathy present. Left side of head: Submandibular and tonsillar adenopathy present. Cervical: Cervical adenopathy present. Skin:     General: Skin is warm and dry. Findings: No rash. Neurological:      Mental Status: She is alert. Assessment & Plan:     Pedro Sanchez was seen today for cough. Diagnoses and all orders for this visit:    Bronchitis  -     POCT Influenza A/B  -     POCT COVID-19, Antigen  -     amoxicillin (AMOXIL) 500 MG capsule; Take 1 capsule by mouth 2 times daily for 10 days  -     promethazine-dextromethorphan (PROMETHAZINE-DM) 6.25-15 MG/5ML syrup; Take 5 mLs by mouth 4 times daily as needed for Cough    Side effects, adverse effects of the medication prescribed today, as well as treatment plan/ rationale and result expectations have been discussed with the patient who expresses understanding and desires to proceed. Close follow up to evaluate treatment results and for coordination of care. I have reviewed the patient's medical history in detail and updated the computerized patient record. As always, patient is advised that if symptoms worsen in any way they will proceed to the nearest emergency room.      Follow up in 48-72 hours if symptoms persist or worsen and as needed    JOSE Valles - CNP

## 2023-02-09 ENCOUNTER — CLINICAL DOCUMENTATION (OUTPATIENT)
Dept: SPIRITUAL SERVICES | Age: 78
End: 2023-02-09

## 2023-02-09 NOTE — ACP (ADVANCE CARE PLANNING)
Advance Care Planning   Ambulatory ACP Specialist Patient Outreach    Date:  2/9/2023  ACP Specialist:  NORMAN Gann    Outreach call to patient in follow-up to ACP Specialist referral from: Justina Alatorre MD    [] PCP  [x] Provider   [] Ambulatory Care Management [] Other for Reason:    [x] Advance Directive Assistance  [x] Code Status Discussion  [x] Complete Portable DNR Order  [] Discuss Goals of Care  [] Complete POST/MOST  [] Early ACP Decision-Making  [] Other    Date Referral Received: 8/31/22    Today's Outreach:  [] First   [] Second  [x] Third                               Third outreach made by []  phone  [] email []   [employer]     Intervention:  [] Spoke with Patient  [x] Left VM requesting return call      Outcome: ACP Specialist left message for patient.  called patient in September for ACP conversation and patient did not return call. Referral will be closed unless patient returns call. Next Step:   [] ACP scheduled conversation  [] Outreach again in one week               [] Email / Mail ACP Info Sheets  [] Email / Mail Advance Directive            [x] Close Referral. Routing closure to referring provider/staff and to ACP Specialist . [] Closure Letter mailed to Patient with Invitation to Contact ACP Specialist if/when ready.     Thank you for this referral.

## 2023-03-02 PROBLEM — S70.02XA CONTUSION OF LEFT HIP: Status: ACTIVE | Noted: 2022-03-23

## 2023-03-02 PROBLEM — S09.90XA UNSPECIFIED INJURY OF HEAD, INITIAL ENCOUNTER: Status: ACTIVE | Noted: 2022-03-23
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
## 2023-03-02 PROBLEM — S40.012A CONTUSION OF LEFT SHOULDER: Status: ACTIVE | Noted: 2022-03-23

## 2023-03-02 PROBLEM — M25.512 PAIN IN LEFT SHOULDER: Status: ACTIVE | Noted: 2022-03-23

## 2023-03-02 PROBLEM — I25.10 ATHSCL HEART DISEASE OF NATIVE CORONARY ARTERY W/O ANG PCTRS: Status: ACTIVE | Noted: 2022-03-23

## 2023-03-10 RX ORDER — METOPROLOL TARTRATE 50 MG/1
TABLET, FILM COATED ORAL
Qty: 180 TABLET | Refills: 0 | Status: SHIPPED | OUTPATIENT
Start: 2023-03-10

## 2023-03-10 NOTE — TELEPHONE ENCOUNTER
Comments:     Last Office Visit (last PCP visit):   8/5/2022    Next Visit Date:  Future Appointments   Date Time Provider Department Center   3/16/2023  3:00 PM MD BRIDGER Mahoney NEURO Neurology -   4/28/2023 11:30 AM Twila Maher DO MLOX [facility]       **If hasn't been seen in over a year OR hasn't followed up according to last diabetes/ADHD visit, make appointment for patient before sending refill to provider.    Rx requested:  Requested Prescriptions     Pending Prescriptions Disp Refills    metoprolol tartrate (LOPRESSOR) 50 MG tablet [Pharmacy Med Name: METOPROLOL TARTRATE 50 MG Tablet] 180 tablet 0     Sig: TAKE 1 TABLET TWICE DAILY

## 2023-03-14 RX ORDER — FLUTICASONE PROPIONATE 50 MCG
1 SPRAY, SUSPENSION (ML) NASAL DAILY
Qty: 16 G | Refills: 0 | Status: SHIPPED | OUTPATIENT
Start: 2023-03-14

## 2023-03-14 RX ORDER — FLUTICASONE PROPIONATE 50 MCG
1 SPRAY, SUSPENSION (ML) NASAL DAILY
COMMUNITY
End: 2023-03-14 | Stop reason: SDUPTHER

## 2023-03-22 ENCOUNTER — APPOINTMENT (OUTPATIENT)
Dept: GENERAL RADIOLOGY | Age: 78
DRG: 287 | End: 2023-03-22
Payer: MEDICARE

## 2023-03-22 ENCOUNTER — HOSPITAL ENCOUNTER (INPATIENT)
Age: 78
LOS: 1 days | Discharge: HOME | DRG: 287 | End: 2023-03-22
Attending: EMERGENCY MEDICINE | Admitting: INTERNAL MEDICINE
Payer: MEDICARE

## 2023-03-22 ENCOUNTER — APPOINTMENT (OUTPATIENT)
Dept: CARDIAC CATH/INVASIVE PROCEDURES | Age: 78
DRG: 287 | End: 2023-03-22
Payer: MEDICARE

## 2023-03-22 ENCOUNTER — APPOINTMENT (OUTPATIENT)
Dept: CT IMAGING | Age: 78
DRG: 287 | End: 2023-03-22
Payer: MEDICARE

## 2023-03-22 VITALS
DIASTOLIC BLOOD PRESSURE: 70 MMHG | RESPIRATION RATE: 16 BRPM | BODY MASS INDEX: 36.71 KG/M2 | SYSTOLIC BLOOD PRESSURE: 152 MMHG | HEART RATE: 64 BPM | HEIGHT: 60 IN | TEMPERATURE: 97.2 F | OXYGEN SATURATION: 100 % | WEIGHT: 187 LBS

## 2023-03-22 DIAGNOSIS — R07.9 CHEST PAIN, UNSPECIFIED TYPE: Primary | ICD-10-CM

## 2023-03-22 DIAGNOSIS — R77.8 ELEVATED TROPONIN: ICD-10-CM

## 2023-03-22 PROBLEM — I20.89 ANGINA AT REST: Status: ACTIVE | Noted: 2023-03-22

## 2023-03-22 PROBLEM — I20.8 ANGINA AT REST (HCC): Status: ACTIVE | Noted: 2023-03-22

## 2023-03-22 LAB
ALBUMIN SERPL-MCNC: 4.3 G/DL (ref 3.5–4.6)
ALP SERPL-CCNC: 83 U/L (ref 40–130)
ALT SERPL-CCNC: 15 U/L (ref 0–33)
ANION GAP SERPL CALCULATED.3IONS-SCNC: 12 MEQ/L (ref 9–15)
APTT PPP: 27.1 SEC (ref 24.4–36.8)
AST SERPL-CCNC: 19 U/L (ref 0–35)
BASOPHILS # BLD: 0.1 K/UL (ref 0–0.2)
BASOPHILS NFR BLD: 1.3 %
BILIRUB SERPL-MCNC: 0.6 MG/DL (ref 0.2–0.7)
BUN SERPL-MCNC: 25 MG/DL (ref 8–23)
CALCIUM SERPL-MCNC: 10.1 MG/DL (ref 8.5–9.9)
CHLORIDE SERPL-SCNC: 104 MEQ/L (ref 95–107)
CO2 SERPL-SCNC: 27 MEQ/L (ref 20–31)
CREAT SERPL-MCNC: 1.14 MG/DL (ref 0.5–0.9)
EKG ATRIAL RATE: 58 BPM
EKG P AXIS: 63 DEGREES
EKG P-R INTERVAL: 198 MS
EKG Q-T INTERVAL: 416 MS
EKG QRS DURATION: 76 MS
EKG QTC CALCULATION (BAZETT): 408 MS
EKG R AXIS: -7 DEGREES
EKG T AXIS: 42 DEGREES
EKG VENTRICULAR RATE: 58 BPM
EOSINOPHIL # BLD: 0.2 K/UL (ref 0–0.7)
EOSINOPHIL NFR BLD: 3.5 %
ERYTHROCYTE [DISTWIDTH] IN BLOOD BY AUTOMATED COUNT: 15.8 % (ref 11.5–14.5)
GLOBULIN SER CALC-MCNC: 2.4 G/DL (ref 2.3–3.5)
GLUCOSE SERPL-MCNC: 116 MG/DL (ref 70–99)
HCT VFR BLD AUTO: 37.7 % (ref 37–47)
HGB BLD-MCNC: 12.3 G/DL (ref 12–16)
INR PPP: 1
LIPASE SERPL-CCNC: 36 U/L (ref 12–95)
LYMPHOCYTES # BLD: 2.1 K/UL (ref 1–4.8)
LYMPHOCYTES NFR BLD: 36.3 %
MAGNESIUM SERPL-MCNC: 2 MG/DL (ref 1.7–2.4)
MCH RBC QN AUTO: 33.4 PG (ref 27–31.3)
MCHC RBC AUTO-ENTMCNC: 32.7 % (ref 33–37)
MCV RBC AUTO: 102.2 FL (ref 79.4–94.8)
MONOCYTES # BLD: 0.5 K/UL (ref 0.2–0.8)
MONOCYTES NFR BLD: 8.1 %
NEUTROPHILS # BLD: 3 K/UL (ref 1.4–6.5)
NEUTS SEG NFR BLD: 50.8 %
PLATELET # BLD AUTO: 261 K/UL (ref 130–400)
POC CREATININE WHOLE BLOOD: 1.2
POTASSIUM SERPL-SCNC: 4.1 MEQ/L (ref 3.4–4.9)
PROT SERPL-MCNC: 6.7 G/DL (ref 6.3–8)
PROTHROMBIN TIME: 12.8 SEC (ref 12.3–14.9)
RBC # BLD AUTO: 3.69 M/UL (ref 4.2–5.4)
SODIUM SERPL-SCNC: 143 MEQ/L (ref 135–144)
TROPONIN T SERPL-MCNC: 0.04 NG/ML (ref 0–0.01)
TROPONIN T SERPL-MCNC: 0.04 NG/ML (ref 0–0.01)
WBC # BLD AUTO: 5.9 K/UL (ref 4.8–10.8)

## 2023-03-22 PROCEDURE — 70496 CT ANGIOGRAPHY HEAD: CPT

## 2023-03-22 PROCEDURE — 83735 ASSAY OF MAGNESIUM: CPT

## 2023-03-22 PROCEDURE — 93010 ELECTROCARDIOGRAM REPORT: CPT | Performed by: INTERNAL MEDICINE

## 2023-03-22 PROCEDURE — 70498 CT ANGIOGRAPHY NECK: CPT

## 2023-03-22 PROCEDURE — C1769 GUIDE WIRE: HCPCS

## 2023-03-22 PROCEDURE — 2500000003 HC RX 250 WO HCPCS

## 2023-03-22 PROCEDURE — 83690 ASSAY OF LIPASE: CPT

## 2023-03-22 PROCEDURE — 85730 THROMBOPLASTIN TIME PARTIAL: CPT

## 2023-03-22 PROCEDURE — 2709999900 HC NON-CHARGEABLE SUPPLY

## 2023-03-22 PROCEDURE — 2060000000 HC ICU INTERMEDIATE R&B

## 2023-03-22 PROCEDURE — B2111ZZ FLUOROSCOPY OF MULTIPLE CORONARY ARTERIES USING LOW OSMOLAR CONTRAST: ICD-10-PCS | Performed by: INTERNAL MEDICINE

## 2023-03-22 PROCEDURE — 85025 COMPLETE CBC W/AUTO DIFF WBC: CPT

## 2023-03-22 PROCEDURE — 71275 CT ANGIOGRAPHY CHEST: CPT

## 2023-03-22 PROCEDURE — 4A023N7 MEASUREMENT OF CARDIAC SAMPLING AND PRESSURE, LEFT HEART, PERCUTANEOUS APPROACH: ICD-10-PCS | Performed by: INTERNAL MEDICINE

## 2023-03-22 PROCEDURE — 99285 EMERGENCY DEPT VISIT HI MDM: CPT

## 2023-03-22 PROCEDURE — 93458 L HRT ARTERY/VENTRICLE ANGIO: CPT

## 2023-03-22 PROCEDURE — 2580000003 HC RX 258: Performed by: INTERNAL MEDICINE

## 2023-03-22 PROCEDURE — 93005 ELECTROCARDIOGRAM TRACING: CPT | Performed by: EMERGENCY MEDICINE

## 2023-03-22 PROCEDURE — 6370000000 HC RX 637 (ALT 250 FOR IP): Performed by: INTERNAL MEDICINE

## 2023-03-22 PROCEDURE — 85610 PROTHROMBIN TIME: CPT

## 2023-03-22 PROCEDURE — 6370000000 HC RX 637 (ALT 250 FOR IP): Performed by: EMERGENCY MEDICINE

## 2023-03-22 PROCEDURE — 6360000004 HC RX CONTRAST MEDICATION: Performed by: EMERGENCY MEDICINE

## 2023-03-22 PROCEDURE — 94664 DEMO&/EVAL PT USE INHALER: CPT

## 2023-03-22 PROCEDURE — 84484 ASSAY OF TROPONIN QUANT: CPT

## 2023-03-22 PROCEDURE — 36415 COLL VENOUS BLD VENIPUNCTURE: CPT

## 2023-03-22 PROCEDURE — 80053 COMPREHEN METABOLIC PANEL: CPT

## 2023-03-22 PROCEDURE — B2151ZZ FLUOROSCOPY OF LEFT HEART USING LOW OSMOLAR CONTRAST: ICD-10-PCS | Performed by: INTERNAL MEDICINE

## 2023-03-22 PROCEDURE — 6360000004 HC RX CONTRAST MEDICATION: Performed by: INTERNAL MEDICINE

## 2023-03-22 PROCEDURE — 6360000002 HC RX W HCPCS

## 2023-03-22 PROCEDURE — C1894 INTRO/SHEATH, NON-LASER: HCPCS

## 2023-03-22 PROCEDURE — 71045 X-RAY EXAM CHEST 1 VIEW: CPT

## 2023-03-22 RX ORDER — FENTANYL CITRATE 0.05 MG/ML
25 INJECTION, SOLUTION INTRAMUSCULAR; INTRAVENOUS
Status: DISCONTINUED | OUTPATIENT
Start: 2023-03-22 | End: 2023-03-22 | Stop reason: HOSPADM

## 2023-03-22 RX ORDER — LABETALOL HYDROCHLORIDE 5 MG/ML
10 INJECTION, SOLUTION INTRAVENOUS EVERY 30 MIN PRN
Status: DISCONTINUED | OUTPATIENT
Start: 2023-03-22 | End: 2023-03-22 | Stop reason: HOSPADM

## 2023-03-22 RX ORDER — SODIUM CHLORIDE 0.9 % (FLUSH) 0.9 %
5-40 SYRINGE (ML) INJECTION EVERY 12 HOURS SCHEDULED
Status: DISCONTINUED | OUTPATIENT
Start: 2023-03-22 | End: 2023-03-22 | Stop reason: HOSPADM

## 2023-03-22 RX ORDER — CLOPIDOGREL BISULFATE 75 MG/1
75 TABLET ORAL DAILY
COMMUNITY

## 2023-03-22 RX ORDER — NITROGLYCERIN 0.4 MG/1
0.4 TABLET SUBLINGUAL EVERY 5 MIN PRN
Status: DISCONTINUED | OUTPATIENT
Start: 2023-03-22 | End: 2023-03-22 | Stop reason: SDUPTHER

## 2023-03-22 RX ORDER — PANTOPRAZOLE SODIUM 40 MG/1
40 TABLET, DELAYED RELEASE ORAL DAILY
Status: DISCONTINUED | OUTPATIENT
Start: 2023-03-22 | End: 2023-03-22 | Stop reason: HOSPADM

## 2023-03-22 RX ORDER — SODIUM CHLORIDE 0.9 % (FLUSH) 0.9 %
5-40 SYRINGE (ML) INJECTION PRN
Status: DISCONTINUED | OUTPATIENT
Start: 2023-03-22 | End: 2023-03-22 | Stop reason: HOSPADM

## 2023-03-22 RX ORDER — METOPROLOL TARTRATE 50 MG/1
50 TABLET, FILM COATED ORAL 2 TIMES DAILY
Status: DISCONTINUED | OUTPATIENT
Start: 2023-03-22 | End: 2023-03-22 | Stop reason: HOSPADM

## 2023-03-22 RX ORDER — ACETAMINOPHEN 650 MG/1
650 SUPPOSITORY RECTAL EVERY 6 HOURS PRN
Status: DISCONTINUED | OUTPATIENT
Start: 2023-03-22 | End: 2023-03-22 | Stop reason: SDUPTHER

## 2023-03-22 RX ORDER — ACETAMINOPHEN 325 MG/1
650 TABLET ORAL ONCE
Status: COMPLETED | OUTPATIENT
Start: 2023-03-22 | End: 2023-03-22

## 2023-03-22 RX ORDER — DIPHENHYDRAMINE HCL 25 MG
50 TABLET ORAL ONCE
Status: DISCONTINUED | OUTPATIENT
Start: 2023-03-22 | End: 2023-03-22 | Stop reason: HOSPADM

## 2023-03-22 RX ORDER — SODIUM CHLORIDE 9 MG/ML
INJECTION, SOLUTION INTRAVENOUS PRN
Status: DISCONTINUED | OUTPATIENT
Start: 2023-03-22 | End: 2023-03-22 | Stop reason: HOSPADM

## 2023-03-22 RX ORDER — ONDANSETRON 2 MG/ML
4 INJECTION INTRAMUSCULAR; INTRAVENOUS EVERY 6 HOURS PRN
Status: DISCONTINUED | OUTPATIENT
Start: 2023-03-22 | End: 2023-03-22 | Stop reason: HOSPADM

## 2023-03-22 RX ORDER — ACETAMINOPHEN 325 MG/1
650 TABLET ORAL EVERY 6 HOURS PRN
Status: DISCONTINUED | OUTPATIENT
Start: 2023-03-22 | End: 2023-03-22 | Stop reason: SDUPTHER

## 2023-03-22 RX ORDER — ATORVASTATIN CALCIUM 80 MG/1
80 TABLET, FILM COATED ORAL NIGHTLY
Status: DISCONTINUED | OUTPATIENT
Start: 2023-03-22 | End: 2023-03-22 | Stop reason: HOSPADM

## 2023-03-22 RX ORDER — ASPIRIN 81 MG/1
81 TABLET ORAL DAILY
Status: DISCONTINUED | OUTPATIENT
Start: 2023-03-22 | End: 2023-03-22 | Stop reason: HOSPADM

## 2023-03-22 RX ORDER — PREDNISONE 50 MG/1
50 TABLET ORAL ONCE
Status: DISCONTINUED | OUTPATIENT
Start: 2023-03-22 | End: 2023-03-22 | Stop reason: HOSPADM

## 2023-03-22 RX ORDER — ONDANSETRON 4 MG/1
4 TABLET, ORALLY DISINTEGRATING ORAL EVERY 8 HOURS PRN
Status: DISCONTINUED | OUTPATIENT
Start: 2023-03-22 | End: 2023-03-22 | Stop reason: HOSPADM

## 2023-03-22 RX ORDER — SODIUM CHLORIDE 9 MG/ML
INJECTION, SOLUTION INTRAVENOUS CONTINUOUS
Status: DISCONTINUED | OUTPATIENT
Start: 2023-03-22 | End: 2023-03-22 | Stop reason: HOSPADM

## 2023-03-22 RX ORDER — MORPHINE SULFATE 2 MG/ML
2 INJECTION, SOLUTION INTRAMUSCULAR; INTRAVENOUS
Status: DISCONTINUED | OUTPATIENT
Start: 2023-03-22 | End: 2023-03-22 | Stop reason: HOSPADM

## 2023-03-22 RX ORDER — ASPIRIN 81 MG/1
81 TABLET, CHEWABLE ORAL DAILY
Status: DISCONTINUED | OUTPATIENT
Start: 2023-03-22 | End: 2023-03-22 | Stop reason: ALTCHOICE

## 2023-03-22 RX ORDER — FLUTICASONE PROPIONATE 50 MCG
1 SPRAY, SUSPENSION (ML) NASAL DAILY
Status: DISCONTINUED | OUTPATIENT
Start: 2023-03-22 | End: 2023-03-22 | Stop reason: HOSPADM

## 2023-03-22 RX ORDER — NITROGLYCERIN 0.4 MG/1
0.4 TABLET SUBLINGUAL EVERY 5 MIN PRN
Status: DISCONTINUED | OUTPATIENT
Start: 2023-03-22 | End: 2023-03-22 | Stop reason: HOSPADM

## 2023-03-22 RX ORDER — POLYETHYLENE GLYCOL 3350 17 G/17G
17 POWDER, FOR SOLUTION ORAL DAILY PRN
Status: DISCONTINUED | OUTPATIENT
Start: 2023-03-22 | End: 2023-03-22 | Stop reason: HOSPADM

## 2023-03-22 RX ORDER — ALBUTEROL SULFATE 90 UG/1
2 AEROSOL, METERED RESPIRATORY (INHALATION) 4 TIMES DAILY PRN
Status: DISCONTINUED | OUTPATIENT
Start: 2023-03-22 | End: 2023-03-22 | Stop reason: HOSPADM

## 2023-03-22 RX ORDER — CLOPIDOGREL BISULFATE 75 MG/1
75 TABLET ORAL DAILY
Status: DISCONTINUED | OUTPATIENT
Start: 2023-03-22 | End: 2023-03-22 | Stop reason: HOSPADM

## 2023-03-22 RX ORDER — ACETAMINOPHEN 325 MG/1
650 TABLET ORAL EVERY 4 HOURS PRN
Status: DISCONTINUED | OUTPATIENT
Start: 2023-03-22 | End: 2023-03-22 | Stop reason: HOSPADM

## 2023-03-22 RX ORDER — ASPIRIN 81 MG/1
324 TABLET, CHEWABLE ORAL ONCE
Status: COMPLETED | OUTPATIENT
Start: 2023-03-22 | End: 2023-03-22

## 2023-03-22 RX ADMIN — SODIUM CHLORIDE: 9 INJECTION, SOLUTION INTRAVENOUS at 17:30

## 2023-03-22 RX ADMIN — PANTOPRAZOLE SODIUM 40 MG: 40 TABLET, DELAYED RELEASE ORAL at 17:31

## 2023-03-22 RX ADMIN — SODIUM CHLORIDE: 9 INJECTION, SOLUTION INTRAVENOUS at 12:34

## 2023-03-22 RX ADMIN — METOPROLOL TARTRATE 50 MG: 50 TABLET ORAL at 17:31

## 2023-03-22 RX ADMIN — NITROGLYCERIN 0.4 MG: 0.4 TABLET SUBLINGUAL at 06:20

## 2023-03-22 RX ADMIN — CLOPIDOGREL BISULFATE 75 MG: 75 TABLET ORAL at 12:32

## 2023-03-22 RX ADMIN — ASPIRIN 81 MG 324 MG: 81 TABLET ORAL at 05:24

## 2023-03-22 RX ADMIN — ACETAMINOPHEN 650 MG: 325 TABLET ORAL at 05:24

## 2023-03-22 RX ADMIN — NITROGLYCERIN 0.4 MG: 0.4 TABLET SUBLINGUAL at 05:26

## 2023-03-22 RX ADMIN — IOPAMIDOL 150 ML: 755 INJECTION, SOLUTION INTRAVENOUS at 05:55

## 2023-03-22 RX ADMIN — IOPAMIDOL 40 ML: 612 INJECTION, SOLUTION INTRAVENOUS at 13:29

## 2023-03-22 ASSESSMENT — ENCOUNTER SYMPTOMS
VOMITING: 0
GASTROINTESTINAL NEGATIVE: 1
EYES NEGATIVE: 1
BACK PAIN: 0
ABDOMINAL PAIN: 0
PHOTOPHOBIA: 0
SHORTNESS OF BREATH: 0
NAUSEA: 0
WHEEZING: 0
ALLERGIC/IMMUNOLOGIC NEGATIVE: 1
SHORTNESS OF BREATH: 1

## 2023-03-22 ASSESSMENT — PAIN DESCRIPTION - ONSET: ONSET: ON-GOING

## 2023-03-22 ASSESSMENT — PAIN SCALES - GENERAL
PAINLEVEL_OUTOF10: 5
PAINLEVEL_OUTOF10: 4
PAINLEVEL_OUTOF10: 5

## 2023-03-22 ASSESSMENT — PAIN DESCRIPTION - PAIN TYPE
TYPE: ACUTE PAIN
TYPE: ACUTE PAIN

## 2023-03-22 ASSESSMENT — PAIN DESCRIPTION - ORIENTATION
ORIENTATION: LEFT

## 2023-03-22 ASSESSMENT — PAIN DESCRIPTION - DESCRIPTORS
DESCRIPTORS: NUMBNESS
DESCRIPTORS: NUMBNESS

## 2023-03-22 ASSESSMENT — PAIN - FUNCTIONAL ASSESSMENT
PAIN_FUNCTIONAL_ASSESSMENT: 0-10
PAIN_FUNCTIONAL_ASSESSMENT: ACTIVITIES ARE NOT PREVENTED

## 2023-03-22 ASSESSMENT — PAIN DESCRIPTION - LOCATION
LOCATION: ARM;LEG
LOCATION: ARM;LEG

## 2023-03-22 ASSESSMENT — PAIN DESCRIPTION - FREQUENCY: FREQUENCY: CONTINUOUS

## 2023-03-22 ASSESSMENT — HEART SCORE: ECG: 0

## 2023-03-22 NOTE — ED PROVIDER NOTES
components within normal limits   COMPREHENSIVE METABOLIC PANEL - Abnormal; Notable for the following components:    Glucose 116 (*)     BUN 25 (*)     Creatinine 1.14 (*)     Est, Glom Filt Rate 48.8 (*)     Calcium 10.1 (*)     All other components within normal limits   TROPONIN - Abnormal; Notable for the following components:    Troponin 0.039 (*)     All other components within normal limits    Narrative:     Lloyd Fruit  [facility] tel. [id number],  TROP results called to and read back by DR FOX, 03/22/2023 06:01, by [facility]   POCT CREATININE - Normal   LIPASE   MAGNESIUM   PROTIME-INR   APTT   TROPONIN   TROPONIN       All other labs were within normal range or not returned as of this dictation. EMERGENCY DEPARTMENT COURSE and DIFFERENTIAL DIAGNOSIS/MDM:   Vitals:    Vitals:    03/22/23 0459 03/22/23 0620   BP: 139/69 (!) 114/56   Pulse: 61 57   Resp: 14    SpO2: 97%        No significant electrolyte derangement. She has a troponin elevation at baseline. Will trend, no indication for emergent cath at this time  NIH 0, doubt CVA  Medical Decision Making  Amount and/or Complexity of Data Reviewed  Labs: ordered. Radiology: ordered. ECG/medicine tests: ordered. Risk  OTC drugs. Prescription drug management. Decision regarding hospitalization. Patient with known cardiac risk factors presents to the emergency department with chest pain. She has had improvement prior to arrival.  Ekg reassuring    REASSESSMENT      She has had total relief of chest/shoulder/neck/jaw pain status post nitroglycerin  Given elevated HEART score pt req cardiology evaluation  Dr portillo accepts admission      CONSULTS:  None    PROCEDURES:  Unless otherwise noted below, none     Procedures        FINAL IMPRESSION      1. Chest pain, unspecified type    2. Elevated troponin          DISPOSITION/PLAN   DISPOSITION Admitted 03/22/2023 06:38:54 AM      PATIENT REFERRED TO:  No follow-up provider specified.     DISCHARGE

## 2023-03-22 NOTE — CARE COORDINATION
Potential DME:    Patient expects to discharge to: [facility] for transportation at discharge:      Financial    Payor: Alex Villela / Plan: Lurlene Royalty / Product Type: *No Product type* /     Does insurance require precert for SNF: YES    Potential assistance Purchasing Medications: No  Meds-to-Beds request: Yes      [address] [facility] [address] [place] [address]  Phone: [phone] Fax: [phone]    [address] [address] [address] [phone] - F 20-23-41-[address] [facility]  [place] [address]  Phone: [phone] Fax: [phone]    [facility] [address] [place] [address]  [address] [facility] [address]  Phone: [phone] Fax: [phone]      Notes:    Factors facilitating achievement of predicted outcomes: Family support and Motivated    Barriers to discharge: Medical complications    Additional Case Management Notes: PT LIVES AT [address] [place] [address]. INDEPENDENT; USE WALKER AND CANE PRN; NO HOME O2; NO DIALYSIS; DISCUSS DC PLAN WITH PT. PT AGREE WITH DC PLAN HOME WITH . DENIES ANY NEEDS. The Plan for Transition of Care is related to the following treatment goals of Chest pain [R07.9]  Elevated troponin [R77.8]  Chest pain, unspecified type [K68.0]    IF APPLICABLE: The Patient and/or patient representative Sanjay Fowler and her family were provided with a choice of provider and agrees with the discharge plan. Freedom of choice list with basic dialogue that supports the patient's individualized plan of care/goals and shares the quality data associated with the providers was provided to:     Patient Representative Name:       The Patient and/or Patient Representative Agree with the Discharge Plan?       Lanre Gurrola [facility]  Case Management Department  Ph: [phone] Fax: [phone]

## 2023-03-22 NOTE — H&P
Chief Complaint   Patient presents with    Chest Pain     Pain that started in her left shoulder that went up her face and down her left arm. Patient is a 76 y.o. female who presents with a chief complaint of . Patient is followed on a regular basis by Dr. Ivania Ingram MD. Hx of HTN, HLD and DM. Presents with typical angina symptoms at rest improved with nitro. + assocatied radation to neck/jaw and left arm. No N/V. No syncope. Hx of CAD s/p PCI, last [facility] in 2014. Trop are mildly elevated  EKG with SB.      Past Medical History:   Diagnosis Date    Cancer [facility])     Encounter for lumbar puncture 05/25/2022    Completed by Dr. Jama Osgood - diagnostics sent    Hyperlipidemia     Hypertension     Type II or unspecified type diabetes mellitus without mention of complication, not stated as uncontrolled       Patient Active Problem List   Diagnosis    Multiple myeloma ([facility]    Obese    Ataxia    Hyperlipidemia    Type 2 diabetes mellitus with circulatory disorder (HCC)    Vertebral compression fracture (HCC)    Dizziness    Benign paroxysmal positional vertigo due to bilateral vestibular disorder    Maxillary pain    Nonintractable headache    Fall at home    Fever and chills    Impaired mobility and activities of daily living-metabolic encephalopathy with primary origins of infectious etiology    Lumbar spondylosis    Lumbar stenosis with neurogenic claudication    Atelectasis, left    Chest pain    Bronchitis    URI with cough and congestion    Athscl heart disease of native coronary artery w/o ang pctrs    Contusion of left hip    Contusion of left shoulder    Pain in left shoulder    Striking against or struck by other objects, initial encounter    Unspecified injury of head, initial encounter       Past Surgical History:   Procedure Laterality Date    CORONARY ANGIOPLASTY WITH STENT PLACEMENT      UPPER GASTROINTESTINAL ENDOSCOPY  8/12/15    w/bx,dilation        Social History

## 2023-03-22 NOTE — PROGRESS NOTES
2 cc's of air removed from right radial vasc band
2 cc's of air removed from right radial with no bleeding or hematoma.   Taking fluids
Admission assessment complete, VSS, Pt resting in bed,  at bedside, calling light with in reach, Dr Kolby Patton. 1239 Pt off the floor to cath lab.    1600 Pt returned back to her room, VSS, denies pain, right radial bandage CDI, sensation and movement intact in RUE    1829 D/C instructions, meds, and follow up appts reviewed with Pt and her , understanding stated, IV removed, tele monitor returned to monitor room.
Arrived to pre/post from the cath lab and report from [facility]. Vasc  band to right wrist with no bleeding or hematoma. Attached to monitor and vitals are stable.  No complaints at this time and resting comfortably
Attempted to remove 4 cc's of air from right radial band and bleeding occurred.  2 cc's of air replaced
Right radial stable no bleeding or hematoma.   Report called to [employer] on one [address]  Monitor attached and transport notified
Up and ambulated to the restroom
years  []   Pulmonary Disorder  (acute or chronic)  []   Severe or Chronic w/ Exacerbation  []     Surgical Status No [x]   Surgeries     General []   Surgery Lower []   Abdominal Thoracic or []   Upper Abdominal Thoracic with  PulmonaryDisorder  []     Chest X-ray Clear/Not  Ordered     [x]  Chronic Changes  Results Pending  []  Infiltrates, atelectasis, pleural effusion, or edema  []  Infiltrates in more than one lobe []  Infiltrate + Atelectasis, &/or pleural effusion  []    Respiratory Pattern Regular,  RR = 12-20 [x]  Increased,  RR = 21-25 []  BENEDICT, irregular,  or RR = 26-30 []  Decreased FEV1  or RR = 31-35 []  Severe SOB, use  of accessory muscles, or RR ? 35  []    Mental Status Alert, oriented,  Cooperative [x]  Confused but Follows commands []  Lethargic or unable to follow commands []  Obtunded  []  Comatose  []    Breath Sounds Clear to  auscultation  [x]  Decreased unilaterally or  in bases only []  Decreased  bilaterally  []  Crackles or intermittent wheezes []  Wheezes []    Cough Strong, Spontan., & nonproductive [x]  Strong,  spontaneous, &  productive []  Weak,  Nonproductive []  Weak, productive or  with wheezes []  No spontaneous  cough or may require suctioning []    Level of Activity Ambulatory [x]  Ambulatory w/ Assist  []  Non-ambulatory []  Paraplegic []  Quadriplegic []    Total    Score:_____1__     Triage Score:___5_____      Tri       Triage:     1. (>20) Freq: Q3    2. (16-20) Freq: Q4   3. (11-15) Freq: QID & Albuterol Q2 PRN    4. (6-10) Freq: TID & Albuterol Q2 PRN    5. (0-5) Freq Q4prn

## 2023-03-22 NOTE — BRIEF OP NOTE
Section of Cardiology  Adult Brief Cardiac Cath Procedure Note        Procedure(s):  LHC, b/l coronary angio    Pre-operative Diagnosis:  angina    H&P Status: Completed and reviewed. Post-operative Diagnosis:      LV EF of 50%  LM normal   LAD mild disease, patent stent in mid, patent stent in mid to distal with mild to mod ISR. CX large caliber, mild disease  RCA small non dominant. Findings:  See full report    Complications:  none    Primary Proceduralist:   Dr.Wes Lyons DO    Plan    RFM   Max med rx    Dc home.    Full procedure note to follow

## 2023-03-23 LAB
PERFORMED ON: ABNORMAL
POC CREATININE: 1.2 MG/DL (ref 0.6–1.2)
POC SAMPLE TYPE: ABNORMAL

## 2023-04-07 ENCOUNTER — OFFICE VISIT (OUTPATIENT)
Dept: CARDIOLOGY CLINIC | Age: 78
End: 2023-04-07
Payer: MEDICARE

## 2023-04-07 VITALS
SYSTOLIC BLOOD PRESSURE: 122 MMHG | BODY MASS INDEX: 38.94 KG/M2 | OXYGEN SATURATION: 99 % | HEART RATE: 78 BPM | DIASTOLIC BLOOD PRESSURE: 68 MMHG | WEIGHT: 199.4 LBS

## 2023-04-07 DIAGNOSIS — I25.10 CORONARY ARTERY DISEASE DUE TO LIPID RICH PLAQUE: ICD-10-CM

## 2023-04-07 DIAGNOSIS — I21.4 NSTEMI (NON-ST ELEVATED MYOCARDIAL INFARCTION) (HCC): ICD-10-CM

## 2023-04-07 DIAGNOSIS — I10 HYPERTENSION, UNSPECIFIED TYPE: ICD-10-CM

## 2023-04-07 DIAGNOSIS — E78.5 DYSLIPIDEMIA: ICD-10-CM

## 2023-04-07 DIAGNOSIS — I25.83 CORONARY ARTERY DISEASE DUE TO LIPID RICH PLAQUE: ICD-10-CM

## 2023-04-07 DIAGNOSIS — R42 DIZZINESS: ICD-10-CM

## 2023-04-07 DIAGNOSIS — S32.000S COMPRESSION FRACTURE OF LUMBAR VERTEBRA, UNSPECIFIED LUMBAR VERTEBRAL LEVEL, SEQUELA: Primary | ICD-10-CM

## 2023-04-07 DIAGNOSIS — E11.59 TYPE 2 DIABETES MELLITUS WITH OTHER CIRCULATORY COMPLICATION, WITHOUT LONG-TERM CURRENT USE OF INSULIN (HCC): ICD-10-CM

## 2023-04-07 PROCEDURE — 1123F ACP DISCUSS/DSCN MKR DOCD: CPT | Performed by: INTERNAL MEDICINE

## 2023-04-07 PROCEDURE — G8417 CALC BMI ABV UP PARAM F/U: HCPCS | Performed by: INTERNAL MEDICINE

## 2023-04-07 PROCEDURE — G8427 DOCREV CUR MEDS BY ELIG CLIN: HCPCS | Performed by: INTERNAL MEDICINE

## 2023-04-07 PROCEDURE — 99214 OFFICE O/P EST MOD 30 MIN: CPT | Performed by: INTERNAL MEDICINE

## 2023-04-07 PROCEDURE — 1111F DSCHRG MED/CURRENT MED MERGE: CPT | Performed by: INTERNAL MEDICINE

## 2023-04-07 PROCEDURE — 1036F TOBACCO NON-USER: CPT | Performed by: INTERNAL MEDICINE

## 2023-04-07 PROCEDURE — G8400 PT W/DXA NO RESULTS DOC: HCPCS | Performed by: INTERNAL MEDICINE

## 2023-04-07 PROCEDURE — 3078F DIAST BP <80 MM HG: CPT | Performed by: INTERNAL MEDICINE

## 2023-04-07 PROCEDURE — 3074F SYST BP LT 130 MM HG: CPT | Performed by: INTERNAL MEDICINE

## 2023-04-07 PROCEDURE — 1090F PRES/ABSN URINE INCON ASSESS: CPT | Performed by: INTERNAL MEDICINE

## 2023-04-07 RX ORDER — EZETIMIBE 10 MG/1
10 TABLET ORAL DAILY
COMMUNITY
End: 2023-04-07 | Stop reason: ALTCHOICE

## 2023-04-07 ASSESSMENT — ENCOUNTER SYMPTOMS
SHORTNESS OF BREATH: 0
BLOOD IN STOOL: 0
STRIDOR: 0
WHEEZING: 0
NAUSEA: 0
GASTROINTESTINAL NEGATIVE: 1
EYES NEGATIVE: 1
COUGH: 0
RESPIRATORY NEGATIVE: 1
CHEST TIGHTNESS: 0

## 2023-04-07 NOTE — PROGRESS NOTES
Glasses of wine per week    Drug use: No   Social History Narrative    Lives With: Spouse,  Son (pt never home alone)    Type of Home: House in [address] [facility] in basement,Two level (doesn't have to use basement)    Home Access: Stairs to enter with rails- Number of Steps: 6- Rails: Right    Bathroom Shower/Tub: Walk-in shower    Bathroom Equipment: Shower chair,Grab bars in [facility]:  ( assists with bathing/dressing; pt indep with toileting activities)    Homemaking Assistance:  (spouse and son complete)    Homemaking Responsibilities: No    Ambulation Assistance: Independent (cane/rollator PRN)    Transfer Assistance: Independent    Additional Comments: Pt inconsistent historian. Pt's spouse arriving mid assessment and confirms home set up and PLOF         Social Determinants of Health     Financial Resource Strain: Low Risk     Difficulty of Paying Living Expenses: Not hard at all   Food Insecurity: No Food Insecurity    Worried About Running Out of Food in the Last Year: Never true    Ran Out of Food in the Last Year: Never true   Transportation Needs: Unknown    Lack of Transportation (Non-Medical):  No   Physical Activity: Insufficiently Active    Days of Exercise per Week: 4 days    Minutes of Exercise per Session: 30 min   Housing Stability: Unknown    Unstable Housing in the Last Year: No       Allergies   Allergen Reactions    Eggs Or Egg-Derived Products Nausea Only    Glucophage [Metformin]      diarrhea    Valium [Diazepam]     Zithromax [Azithromycin]        Current Outpatient Medications   Medication Sig Dispense Refill    clopidogrel (PLAVIX) 75 MG tablet Take 1 tablet by mouth daily      fluticasone (FLONASE) 50 MCG/ACT nasal spray 1 spray by Each Nostril route daily 16 g 0    metoprolol tartrate (LOPRESSOR) 50 MG tablet TAKE 1 TABLET TWICE DAILY 180 tablet 0    albuterol sulfate HFA (VENTOLIN HFA) 108 (90

## 2023-04-10 DIAGNOSIS — E11.59 TYPE 2 DIABETES MELLITUS WITH OTHER CIRCULATORY COMPLICATION, WITHOUT LONG-TERM CURRENT USE OF INSULIN (HCC): ICD-10-CM

## 2023-04-10 RX ORDER — PANTOPRAZOLE SODIUM 40 MG/1
TABLET, DELAYED RELEASE ORAL
Qty: 90 TABLET | Refills: 0 | Status: SHIPPED | OUTPATIENT
Start: 2023-04-10 | End: 2023-05-05 | Stop reason: SDUPTHER

## 2023-04-10 RX ORDER — FUROSEMIDE 20 MG/1
TABLET ORAL
Qty: 90 TABLET | Refills: 0 | OUTPATIENT
Start: 2023-04-10

## 2023-04-10 RX ORDER — EZETIMIBE 10 MG/1
TABLET ORAL
Qty: 90 TABLET | Refills: 0 | OUTPATIENT
Start: 2023-04-10

## 2023-04-10 RX ORDER — DIPHENHYDRAMINE HCL 25 MG
TABLET ORAL
Qty: 1 KIT | Refills: 0 | Status: SHIPPED | OUTPATIENT
Start: 2023-04-10 | End: 2023-05-05 | Stop reason: ALTCHOICE

## 2023-05-05 ENCOUNTER — OFFICE VISIT (OUTPATIENT)
Dept: FAMILY MEDICINE CLINIC | Age: 78
End: 2023-05-05

## 2023-05-05 VITALS
HEIGHT: 60 IN | WEIGHT: 194.8 LBS | HEART RATE: 66 BPM | BODY MASS INDEX: 38.24 KG/M2 | SYSTOLIC BLOOD PRESSURE: 140 MMHG | DIASTOLIC BLOOD PRESSURE: 68 MMHG | TEMPERATURE: 97.3 F | OXYGEN SATURATION: 95 %

## 2023-05-05 DIAGNOSIS — K21.9 GASTROESOPHAGEAL REFLUX DISEASE, UNSPECIFIED WHETHER ESOPHAGITIS PRESENT: ICD-10-CM

## 2023-05-05 DIAGNOSIS — I10 ESSENTIAL HYPERTENSION: ICD-10-CM

## 2023-05-05 DIAGNOSIS — E78.5 HYPERLIPIDEMIA, UNSPECIFIED HYPERLIPIDEMIA TYPE: ICD-10-CM

## 2023-05-05 DIAGNOSIS — E11.59 TYPE 2 DIABETES MELLITUS WITH OTHER CIRCULATORY COMPLICATION, WITHOUT LONG-TERM CURRENT USE OF INSULIN (HCC): ICD-10-CM

## 2023-05-05 DIAGNOSIS — Z76.89 ENCOUNTER TO ESTABLISH CARE WITH NEW DOCTOR: Primary | ICD-10-CM

## 2023-05-05 DIAGNOSIS — J30.9 ALLERGIC RHINITIS, UNSPECIFIED SEASONALITY, UNSPECIFIED TRIGGER: ICD-10-CM

## 2023-05-05 DIAGNOSIS — M48.062 LUMBAR STENOSIS WITH NEUROGENIC CLAUDICATION: ICD-10-CM

## 2023-05-05 DIAGNOSIS — C90.00 MULTIPLE MYELOMA, REMISSION STATUS UNSPECIFIED (HCC): ICD-10-CM

## 2023-05-05 PROBLEM — J40 BRONCHITIS: Status: RESOLVED | Noted: 2022-11-18 | Resolved: 2023-05-05

## 2023-05-05 PROBLEM — S09.90XA UNSPECIFIED INJURY OF HEAD, INITIAL ENCOUNTER: Status: RESOLVED | Noted: 2022-03-23 | Resolved: 2023-05-05

## 2023-05-05 PROBLEM — J06.9 URI WITH COUGH AND CONGESTION: Status: RESOLVED | Noted: 2022-11-18 | Resolved: 2023-05-05
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
## 2023-05-05 LAB
ALBUMIN SERPL-MCNC: 4.1 G/DL (ref 3.5–4.6)
ALP SERPL-CCNC: 77 U/L (ref 40–130)
ALT SERPL-CCNC: 14 U/L (ref 0–33)
ANION GAP SERPL CALCULATED.3IONS-SCNC: 10 MEQ/L (ref 9–15)
AST SERPL-CCNC: 22 U/L (ref 0–35)
BILIRUB SERPL-MCNC: 0.4 MG/DL (ref 0.2–0.7)
BUN SERPL-MCNC: 19 MG/DL (ref 8–23)
CALCIUM SERPL-MCNC: 9.9 MG/DL (ref 8.5–9.9)
CHLORIDE SERPL-SCNC: 105 MEQ/L (ref 95–107)
CHOLEST SERPL-MCNC: 159 MG/DL (ref 0–199)
CO2 SERPL-SCNC: 27 MEQ/L (ref 20–31)
CREAT SERPL-MCNC: 1.16 MG/DL (ref 0.5–0.9)
GLOBULIN SER CALC-MCNC: 2.4 G/DL (ref 2.3–3.5)
GLUCOSE FASTING: 103 MG/DL (ref 70–99)
HBA1C MFR BLD: 7.2 % (ref 4.8–5.9)
HDLC SERPL-MCNC: 70 MG/DL (ref 40–59)
LDL CHOLESTEROL CALCULATED: 76 MG/DL (ref 0–129)
POTASSIUM SERPL-SCNC: 4.3 MEQ/L (ref 3.4–4.9)
PROT SERPL-MCNC: 6.5 G/DL (ref 6.3–8)
SODIUM SERPL-SCNC: 142 MEQ/L (ref 135–144)
TRIGLYCERIDE, FASTING: 64 MG/DL (ref 0–150)

## 2023-05-05 RX ORDER — TIZANIDINE 4 MG/1
4 TABLET ORAL NIGHTLY PRN
Qty: 90 TABLET | Refills: 1 | Status: SHIPPED | OUTPATIENT
Start: 2023-05-05 | End: 2023-08-03

## 2023-05-05 RX ORDER — LISINOPRIL 10 MG/1
10 TABLET ORAL DAILY
Qty: 90 TABLET | Refills: 1 | Status: SHIPPED | OUTPATIENT
Start: 2023-05-05 | End: 2023-05-05 | Stop reason: DRUGHIGH

## 2023-05-05 RX ORDER — LISINOPRIL 5 MG/1
5 TABLET ORAL DAILY
Qty: 90 TABLET | Refills: 1 | Status: SHIPPED | OUTPATIENT
Start: 2023-05-05 | End: 2023-08-03

## 2023-05-05 RX ORDER — ATORVASTATIN CALCIUM 40 MG/1
40 TABLET, FILM COATED ORAL DAILY
Qty: 90 TABLET | Refills: 1 | Status: SHIPPED | OUTPATIENT
Start: 2023-05-05 | End: 2023-08-03

## 2023-05-05 RX ORDER — METOPROLOL TARTRATE 50 MG/1
50 TABLET, FILM COATED ORAL 2 TIMES DAILY
Qty: 180 TABLET | Refills: 1 | Status: SHIPPED | OUTPATIENT
Start: 2023-05-05

## 2023-05-05 RX ORDER — FLUTICASONE PROPIONATE 50 MCG
1 SPRAY, SUSPENSION (ML) NASAL DAILY
Qty: 3 EACH | Refills: 1 | Status: SHIPPED | OUTPATIENT
Start: 2023-05-05

## 2023-05-05 RX ORDER — PANTOPRAZOLE SODIUM 40 MG/1
40 TABLET, DELAYED RELEASE ORAL DAILY
Qty: 90 TABLET | Refills: 1 | Status: SHIPPED | OUTPATIENT
Start: 2023-05-05

## 2023-05-05 RX ORDER — BLOOD PRESSURE TEST KIT
KIT MISCELLANEOUS
Qty: 1 KIT | Refills: 0 | Status: SHIPPED | OUTPATIENT
Start: 2023-05-05

## 2023-05-05 ASSESSMENT — ENCOUNTER SYMPTOMS
DIARRHEA: 0
EYE DISCHARGE: 0
NAUSEA: 0
WHEEZING: 0
ABDOMINAL PAIN: 0
SORE THROAT: 0
RHINORRHEA: 0
SHORTNESS OF BREATH: 0
VOMITING: 0
BACK PAIN: 1
COUGH: 0

## 2023-05-05 NOTE — PROGRESS NOTES
Subjective  Mckenna Mendoza, 76 y.o. female presents today with:  Chief Complaint   Patient presents with    Establish Care     Was seeing Dr. Doreen Shahid prior. Diabetes     Last A1C wsa 7.0 on 12/3/22. Does not check blood sugar at home. Gastroesophageal Reflux     Controlled at this time. Hyperlipidemia     States takes mediation maybe 3 days a week. Back Pain     States she has chronic back pain. States the pain comes & goes. Would like to discuss getting Tizanidine again to help when she has issues. States she only like to take it at night. Patient with appointment to establish care with new physician. Previously seeing Dr. Doreen Shahid. Patient does follow with Dr. Sourav Sterling from cardiology and [address] [place] [address] for multiple myeloma. Patient with type 2 diabetes mellitus. She is not currently on medication. Her last A1c was 7% in December. She does not check blood sugar at home. Patient also with essential hypertension. She is currently on Lasix 3 mg daily for leg swelling as well as metoprolol tartrate 50 mg twice daily. He was previously on lisinopril 20 mg, however, this was discontinued at that point. She is not sure why. Upon review of her chart, there is no indication as to why this was stopped. She is hypertensive today. She is also on potassium medication. She is only able to take a half a pill about 3 times per week. The pills are large. Patient hyperlipidemia. She is on atorvastatin 40 mg daily. Takes occasionally, maybe 3 times a week. Patient with lumbar stenosis. Uses Voltaren gel as needed for this. She is on gabapentin 600 mg nightly for this. Does seem to be working well. Her oncologist office supplies this for her. She does have occasional muscle spasms in her back. She was previously on tizanidine and would like to get back on this, especially at night. Patient with acid reflux. She is on pantoprazole 40 mg daily. Tolerating well.   Symptoms are

## 2023-06-20 ENCOUNTER — OFFICE VISIT (OUTPATIENT)
Dept: FAMILY MEDICINE CLINIC | Age: 78
End: 2023-06-20
Payer: MEDICARE

## 2023-06-20 VITALS
TEMPERATURE: 97.7 F | WEIGHT: 202.6 LBS | OXYGEN SATURATION: 96 % | SYSTOLIC BLOOD PRESSURE: 117 MMHG | BODY MASS INDEX: 39.78 KG/M2 | HEIGHT: 60 IN | DIASTOLIC BLOOD PRESSURE: 56 MMHG | HEART RATE: 62 BPM

## 2023-06-20 DIAGNOSIS — B37.9 YEAST INFECTION: Primary | ICD-10-CM

## 2023-06-20 DIAGNOSIS — I10 ESSENTIAL HYPERTENSION: ICD-10-CM

## 2023-06-20 DIAGNOSIS — E11.59 TYPE 2 DIABETES MELLITUS WITH OTHER CIRCULATORY COMPLICATION, WITHOUT LONG-TERM CURRENT USE OF INSULIN (HCC): ICD-10-CM

## 2023-06-20 DIAGNOSIS — G56.01 RIGHT CARPAL TUNNEL SYNDROME: ICD-10-CM

## 2023-06-20 PROCEDURE — 1090F PRES/ABSN URINE INCON ASSESS: CPT | Performed by: STUDENT IN AN ORGANIZED HEALTH CARE EDUCATION/TRAINING PROGRAM

## 2023-06-20 PROCEDURE — 1123F ACP DISCUSS/DSCN MKR DOCD: CPT | Performed by: STUDENT IN AN ORGANIZED HEALTH CARE EDUCATION/TRAINING PROGRAM

## 2023-06-20 PROCEDURE — G8417 CALC BMI ABV UP PARAM F/U: HCPCS | Performed by: STUDENT IN AN ORGANIZED HEALTH CARE EDUCATION/TRAINING PROGRAM

## 2023-06-20 PROCEDURE — 3074F SYST BP LT 130 MM HG: CPT | Performed by: STUDENT IN AN ORGANIZED HEALTH CARE EDUCATION/TRAINING PROGRAM

## 2023-06-20 PROCEDURE — G8427 DOCREV CUR MEDS BY ELIG CLIN: HCPCS | Performed by: STUDENT IN AN ORGANIZED HEALTH CARE EDUCATION/TRAINING PROGRAM

## 2023-06-20 PROCEDURE — 1036F TOBACCO NON-USER: CPT | Performed by: STUDENT IN AN ORGANIZED HEALTH CARE EDUCATION/TRAINING PROGRAM

## 2023-06-20 PROCEDURE — 3051F HG A1C>EQUAL 7.0%<8.0%: CPT | Performed by: STUDENT IN AN ORGANIZED HEALTH CARE EDUCATION/TRAINING PROGRAM

## 2023-06-20 PROCEDURE — G8400 PT W/DXA NO RESULTS DOC: HCPCS | Performed by: STUDENT IN AN ORGANIZED HEALTH CARE EDUCATION/TRAINING PROGRAM

## 2023-06-20 PROCEDURE — 99214 OFFICE O/P EST MOD 30 MIN: CPT | Performed by: STUDENT IN AN ORGANIZED HEALTH CARE EDUCATION/TRAINING PROGRAM

## 2023-06-20 PROCEDURE — 3078F DIAST BP <80 MM HG: CPT | Performed by: STUDENT IN AN ORGANIZED HEALTH CARE EDUCATION/TRAINING PROGRAM

## 2023-06-20 RX ORDER — FLUCONAZOLE 150 MG/1
150 TABLET ORAL ONCE
Qty: 1 TABLET | Refills: 1 | Status: SHIPPED | OUTPATIENT
Start: 2023-06-20 | End: 2023-06-20

## 2023-06-20 RX ORDER — LISINOPRIL 2.5 MG/1
2.5 TABLET ORAL DAILY
Qty: 90 TABLET | Refills: 1 | Status: SHIPPED | OUTPATIENT
Start: 2023-06-20 | End: 2023-09-18

## 2023-06-20 ASSESSMENT — ENCOUNTER SYMPTOMS
RHINORRHEA: 0
SORE THROAT: 0
COUGH: 0
EYE DISCHARGE: 0
VOMITING: 0
ABDOMINAL PAIN: 0
SHORTNESS OF BREATH: 0
NAUSEA: 0
DIARRHEA: 0
WHEEZING: 0

## 2023-06-20 NOTE — PROGRESS NOTES
Subjective  Mckenna Mendoza, 76 y.o. female presents today with:  Chief Complaint   Patient presents with    Follow-up    Hypertension     Does not check BP at home. Denies any chest pains, heart palpitations, headaches or SOB. Does have frequent dizziness & almost falls sometimes due to this. Diabetes     Last A1C was 7.2 on 5/5/23. Does not check blood sugar at home. Gastroesophageal Reflux     Controlled at this time. Vaginal Itching     States she was recently given Augmentin by her oncologist for a sinus infection. States it caused her to get a yeast infection. States she stopped taking the Augmentin due to this. States she has been using her face toner to help with the itching. Wrist Pain     States she has right carpal tunnel & her right wrist started bother her last night. States it woke her at 4 AM throbbing. States she did use her Voltaren gel & it did help some with the pain, but pain is still present some. States she used to have a wrist splint, but is unsure what happened to it. Patient with 4-week follow-up appointment. Patient with acute concern today for yeast infection. She states she was recently given Augmentin by her oncologist for sinus infection and she developed vaginal itching and discharge. She states she has been using an over-the-counter facial products to help with the itching. She has not tried any over-the-counter used infection treatments. Patient also with essential hypertension. Blood pressure on the lower end today. She does not check blood pressure at home. She has been having some dizziness associated with the low blood pressure. No chest pain or shortness of breath. She is currently on lisinopril 5 mg daily and metoprolol tartrate 50 mg twice daily. Tolerating well. Patient also with right carpal tunnel syndrome. She did previously use a brace but states that she lost it. Is requesting a once a day.   She states that Voltaren gel did

## 2023-07-14 ENCOUNTER — OFFICE VISIT (OUTPATIENT)
Dept: CARDIOLOGY CLINIC | Age: 78
End: 2023-07-14

## 2023-07-14 VITALS
BODY MASS INDEX: 38.52 KG/M2 | OXYGEN SATURATION: 98 % | SYSTOLIC BLOOD PRESSURE: 128 MMHG | DIASTOLIC BLOOD PRESSURE: 66 MMHG | WEIGHT: 196.2 LBS | HEART RATE: 68 BPM | HEIGHT: 60 IN

## 2023-07-14 DIAGNOSIS — E11.59 TYPE 2 DIABETES MELLITUS WITH OTHER CIRCULATORY COMPLICATION, WITHOUT LONG-TERM CURRENT USE OF INSULIN (HCC): ICD-10-CM

## 2023-07-14 DIAGNOSIS — E78.5 DYSLIPIDEMIA: ICD-10-CM

## 2023-07-14 DIAGNOSIS — R42 VERTIGO: ICD-10-CM

## 2023-07-14 DIAGNOSIS — J32.9 CHRONIC SINUSITIS, UNSPECIFIED LOCATION: ICD-10-CM

## 2023-07-14 DIAGNOSIS — I25.10 CORONARY ARTERY DISEASE DUE TO LIPID RICH PLAQUE: ICD-10-CM

## 2023-07-14 DIAGNOSIS — I21.4 NSTEMI (NON-ST ELEVATED MYOCARDIAL INFARCTION) (HCC): ICD-10-CM

## 2023-07-14 DIAGNOSIS — I10 HYPERTENSION, UNSPECIFIED TYPE: ICD-10-CM

## 2023-07-14 DIAGNOSIS — R42 DIZZINESS: Primary | ICD-10-CM

## 2023-07-14 DIAGNOSIS — E78.5 HYPERLIPIDEMIA, UNSPECIFIED HYPERLIPIDEMIA TYPE: ICD-10-CM

## 2023-07-14 DIAGNOSIS — S32.000S COMPRESSION FRACTURE OF LUMBAR VERTEBRA, UNSPECIFIED LUMBAR VERTEBRAL LEVEL, SEQUELA: Primary | ICD-10-CM

## 2023-07-14 DIAGNOSIS — I25.83 CORONARY ARTERY DISEASE DUE TO LIPID RICH PLAQUE: ICD-10-CM

## 2023-07-14 DIAGNOSIS — R42 DIZZINESS: ICD-10-CM

## 2023-07-14 ASSESSMENT — ENCOUNTER SYMPTOMS
COUGH: 0
BLOOD IN STOOL: 0
WHEEZING: 0
CHEST TIGHTNESS: 0
SHORTNESS OF BREATH: 0
RESPIRATORY NEGATIVE: 1
NAUSEA: 0
EYES NEGATIVE: 1
STRIDOR: 0
GASTROINTESTINAL NEGATIVE: 1

## 2023-07-14 NOTE — PROGRESS NOTES
Subsequent Progress Note  Patient: Dannie Chung  YOB: 1945  MRN: [id number]    Chief Complaint:  Chief Complaint   Patient presents with    Follow-up     Dr Conception Bloch prescribed lisinopril again     Hypertension    Shortness of Breath     Has been on going for months     Dizziness     States her head feels like it has just been spinning. Comes and goes unsure what is going on. CV Data:  5/22 Echo EF 65%  3/23 CTA neck negative   3/22/23 Cath LAD paent mid and distal stent w Mild/Mod ISR. EF 50%  Subjective/HPI:  recent hosp for weakness. No cp no sob now. She did have elevated troponin. She has chronic LE edema. 4/7/23 recent hosp for cp . Cath done. Still with left arm pain no falls no bleed. 7/14/23 dizzy with sinus congestion and frequent infection. No cp no sob no falls no bleed.       EKG:      Former smoker  Lives with  and family  Moved from [place]      Past Medical History:   Diagnosis Date    Cancer [facility])     Encounter for lumbar puncture 05/25/2022    Completed by Dr. Laila Buckner - diagnostics sent    Hyperlipidemia     Hypertension     Type II or unspecified type diabetes mellitus without mention of complication, not stated as uncontrolled        Past Surgical History:   Procedure Laterality Date    CORONARY ANGIOPLASTY WITH STENT PLACEMENT      UPPER GASTROINTESTINAL ENDOSCOPY  8/12/15    w/bx,dilation        Family History   Problem Relation Age of Onset    Cancer Mother     Arthritis Mother     Diabetes Mother     Heart Disease Mother     High Blood Pressure Mother     High Cholesterol Mother     Arthritis Father     Diabetes Father     Heart Disease Father     High Blood Pressure Father     High Cholesterol Father        Social History     Socioeconomic History    Marital status:      Spouse name: None    Number of children: None    Years of education: None    Highest education level: None   Tobacco Use    Smoking status: Former     Packs/day: 0.50

## 2023-08-28 ENCOUNTER — HOSPITAL ENCOUNTER (OUTPATIENT)
Dept: PHYSICAL THERAPY | Age: 78
Setting detail: THERAPIES SERIES
Discharge: HOME | End: 2023-08-28
Payer: MEDICARE

## 2023-08-28 PROCEDURE — 97162 PT EVAL MOD COMPLEX 30 MIN: CPT

## 2023-08-28 NOTE — PROGRESS NOTES
[facility]   PHYSICAL THERAPY   OUTPATIENT EVALUATION    [x] [address] and Therapy    [] [facility]                  Physical Therapy: Initial Evaluation    Patient: Checo Yusuf (78 y.o.     female)   Examination Date:   Plan of Care Certification Period: 2023 to 10/27/23  Progress Note Counter: Eval only   :  1945 ;    Confirmed: Yes MRN: [id number]  CSN: [id number]   Insurance: Payor: Reginaquirino Jack / Plan: Sri Cyndee / Product Type: *No Product type* /   Insurance ID: [id number] - (Medicare Managed) Secondary Insurance (if applicable):  MEDICAID [place]   Referring Physician: Qi Porter MD     PCP: Bladimir Lomas DO Visits to Date/Visits Approved: 1 /      No Show/Cancelled Appts: 0 / 0     Medical Diagnosis: Dizziness and giddiness [R42]    Treatment Diagnosis: impaired balance, impaired LE strength     PERTINENT MEDICAL HISTORY   Patient Assessed for Rehabilitation Services: Yes       Medical History: Chart Reviewed: Yes   Past Medical History:   Diagnosis Date    Cancer ([address] [facility])     Encounter for lumbar puncture 2022    Completed by Dr. Helon Libman - diagnostics sent    Hyperlipidemia     Hypertension     Type II or unspecified type diabetes mellitus without mention of complication, not stated as uncontrolled      Surgical History:   Past Surgical History:   Procedure Laterality Date    CORONARY ANGIOPLASTY WITH STENT PLACEMENT      UPPER GASTROINTESTINAL ENDOSCOPY  8/12/15    w/bx,dilation        Medications:   Current Outpatient Medications:     lisinopril (PRINIVIL;ZESTRIL) 2.5 MG tablet, Take 1 tablet by mouth daily, Disp: 90 tablet, Rfl: 1    diclofenac sodium (VOLTAREN) 1 % GEL, Apply 2 g topically 3 times daily as needed for Pain, Disp: 6 g, Rfl: 1    fluticasone (FLONASE) 50 MCG/ACT nasal spray, 1 spray by Each Nostril route daily, Disp: 3 each, Rfl: 1    metoprolol tartrate (LOPRESSOR) 50 MG tablet,

## 2023-08-28 NOTE — PLAN OF CARE
[address] [facility]     Ph: [phone]  Fax: [phone]    [x] Certification  [] Recertification [x]  Plan of Care  [] Progress Note [] Discharge      Referring Provider: Moiz Jackson MD    From:  Lb Bowie, PT     Patient: Zenaida Cagle (11 y.o. female) : 1945 Date: 2023   Medical Diagnosis: Dizziness and giddiness [R42]    Treatment Diagnosis: impaired balance, impaired LE strength    Plan of Care/Certification Expiration Date: 10/27/23   Progress Report Period from: 2023  to 2023    Visits to Date: 1 No Show: 0 Cancelled Appts: 0    OBJECTIVE:   Short Term Goals=Long term goals    Long Term Goals - Time Frame for Long Term Goals : 4 weeks  Goals Current/ Discharge status Status   Long Term Goal 1: Patient will report 50% decreased dizziness with turning. Patient reports dizziness with quick turns. New   Long Term Goal 2: Patient will be increase bilateral LE strength >/= 4+/5 for improved ambulation. Strength RLE  R Hip Flexion: 3/5  R Hip ABduction: 3/5  R Knee Flexion: 4/5  R Knee Extension: 4+/5  R Ankle Dorsiflexion: 5/5  Strength LLE  L Hip Flexion: 3/5  L Hip ABduction: 3/5  L Knee Flexion: 4/5  L Knee Extension: 4+/5  L Ankle Dorsiflexion: 5/5              New   Long Term Goal 3: Ramirez balance >/= 45/56 to demonstrate improved balance and decrease risk for falls. Outcomes Measures:  Ramirez Balance Score: 23         New   Long Term Goal 4: Patient will ambulate using LRD independently with improved upright posture and bilateral step length. Ambulation  Surface: Carpet  Device: Rollator  Assistance: Modified Independent  Quality of Gait: downward gaze  Gait Deviations: Slow Hilary, Decreased step length, Decreased step height  Distance: clinical distance in department    New   Long Term Goal 5: Patient will be independent with HEP.     Patient needs HEP New

## 2023-09-05 ENCOUNTER — HOSPITAL ENCOUNTER (OUTPATIENT)
Dept: PHYSICAL THERAPY | Age: 78
Setting detail: THERAPIES SERIES
Discharge: HOME | End: 2023-09-05
Payer: MEDICARE

## 2023-09-05 PROCEDURE — 97112 NEUROMUSCULAR REEDUCATION: CPT

## 2023-09-05 PROCEDURE — 97110 THERAPEUTIC EXERCISES: CPT

## 2023-09-05 NOTE — PROGRESS NOTES
[facility]  Outpatient Physical Therapy    Treatment Note        Date: 2023  Patient: Scott Gustafson  : 1945   Confirmed: Yes  MRN: [id number]  Referring Provider: Vicky Payan MD    Medical Diagnosis: Dizziness and giddiness [R42]       Treatment Diagnosis: impaired balance, impaired LE strengthS FOR T    Visit Information:  Insurance: Payor: Jamie Mirzatsman / Plan: [employer] [place] / Product Type: *No Product type* /   PT Visit Information  PT Insurance Information: [employer] Medicare  Total # of Visits Approved: 10 (-10/11/23)  Total # of Visits to Date: 2  Plan of Care/Certification Expiration Date: 10/27/23  No Show: 0  Progress Note Due Date: 23  Canceled Appointment: 0  Progress Note Counter:     Subjective Information:  Subjective: pt states she has not had any dizziness until walking into the clinic today since LV  HEP Compliance:  [] Good [x] Fair [] Poor [] Reports not doing due to:               Pain Screening  Patient Currently in Pain: Denies    Treatment:  Exercises:  Exercises  Exercise 1: 2 way SLR, 2 x  5, Flex/Abd( none on Rt d/t pain)  Exercise 2: bridges x 10  Exercise 3: clamshells x 10  Exercise 4: seated hip abduction/adduction YTB/ball x 15 ea  Exercise 7: 90 deg turns, 180 turns (without dizziness)Dizziness Lt>Rt  Exercise 8: static standing balance:fa/ eo/ec FT, weightshifting, tandem*  Exercise 9: gait drills: /L// March x 2 ea  Exercise 10: ambulating around objects( bolsters), ambulation with turns  Exercise 13: HS stretch seated 30 s x 10  Exercise 20: HEP:Bridges, SLR, HIP ABD/ADD,       *Indicates exercise, modality, or manual techniques to be initiated when appropriate    Assessment:    Body Structures, Functions, Activity Limitations Requiring Skilled Therapeutic Intervention: Decreased functional mobility , Decreased strength, Decreased endurance, Decreased balance, Vestibular Impairment  Assessment: Initiated tx with focus on hip

## 2023-09-07 ENCOUNTER — HOSPITAL ENCOUNTER (OUTPATIENT)
Dept: PHYSICAL THERAPY | Age: 78
Setting detail: THERAPIES SERIES
Discharge: HOME | End: 2023-09-07
Payer: MEDICARE

## 2023-09-07 NOTE — PROGRESS NOTES
Therapy                            Cancellation/No-show Note    Date: 2023  Patient: Saundra Vilchis (86 y.o. female)  : 1945  MRN:  [id number]  Referring Physician: Doug Espinal MD    Medical Diagnosis: Dizziness and giddiness [R42]      Visit Information:  Insurance: Payor: Anmol Thomas / Plan: Bin [place] / Product Type: *No Product type* /   Visits to Date: 2   No Show/Cancelled Appts: 0 / 0      For today's appointment patient:  [x]  Cancelled  []  Rescheduled appointment  []  No-show   []  Called pt to remind of next appointment     Reason given by patient:  [x]  Patient ill  []  Conflicting appointment  []  No transportation    []  Conflict with work  []  No reason given  [x]  Other:  Pt has a cold    [x] Pt has future appointments scheduled, no follow up needed  [] Pt requests to be on hold.     Reason:   If > 2 weeks please discuss with therapist.  [] Therapist to call pt for follow up     Comments:       Signature: Electronically signed by María Elena Loja PTA on 23 at 8:49 AM EDT

## 2023-09-07 NOTE — TELEPHONE ENCOUNTER
Patient requesting medication refill. Please approve or deny this request.    Rx requested:  Requested Prescriptions     Pending Prescriptions Disp Refills    gabapentin (NEURONTIN) 300 MG capsule 180 capsule 0     Sig: Take 2 capsules by mouth at bedtime for 90 days.          Last Office Visit:   6/20/2023      Next Visit Date:  Future Appointments   Date Time Provider [address] [place] [address]   9/12/2023  1:00 PM Katelyn Spring, [facility]   9/20/2023 10:00 AM Kurtis Bolds, DO [facility]   9/20/2023  3:30 PM Kurtis Bolds, DO [facility]   11/15/2023 12:30 PM Marylee Bad, MD [address] [place] [address]   12/21/2023  3:00 PM Marci Oneill MD [facility] Neurology -

## 2023-09-08 RX ORDER — GABAPENTIN 300 MG/1
600 CAPSULE ORAL NIGHTLY
Qty: 180 CAPSULE | Refills: 0 | OUTPATIENT
Start: 2023-09-08 | End: 2023-12-07

## 2023-09-12 ENCOUNTER — HOSPITAL ENCOUNTER (OUTPATIENT)
Dept: PHYSICAL THERAPY | Age: 78
Setting detail: THERAPIES SERIES
Discharge: HOME | End: 2023-09-12
Payer: MEDICARE

## 2023-09-12 NOTE — PROGRESS NOTES
Therapy                            Cancellation/No-show Note    Date: 2023  Patient: Gillian Obrien (89 y.o. female)  : 1945  MRN:  [id number]  Referring Physician: Juan Faulkner MD    Medical Diagnosis: Dizziness and giddiness [R42]      Visit Information:  Insurance: Payor: Harjit Parrish / Plan: [employer] / Product Type: *No Product type* /   Visits to Date: 1   No Show/Cancelled Appts:       For today's appointment patient:  []  Cancelled  []  Rescheduled appointment  [x]  No-show   []  Called pt to remind of next appointment     Reason given by patient:  []  Patient ill  []  Conflicting appointment  []  No transportation    []  Conflict with work  []  No reason given  [x]  Other:  Called pt - 1st attempt someone answered but then hung up, 2nd attempt no voicemail set up    [] Pt has future appointments scheduled, no follow up needed  [] Pt requests to be on hold.     Reason:   If > 2 weeks please discuss with therapist.  [] Therapist to call pt for follow up     Comments:       Signature: Electronically signed by Jayson Rodrigues PT on 23 at 1:29 PM EDT

## 2023-09-20 ENCOUNTER — OFFICE VISIT (OUTPATIENT)
Dept: FAMILY MEDICINE CLINIC | Age: 78
End: 2023-09-20
Payer: MEDICARE

## 2023-09-20 VITALS
SYSTOLIC BLOOD PRESSURE: 124 MMHG | TEMPERATURE: 97.6 F | HEIGHT: 60 IN | BODY MASS INDEX: 40.05 KG/M2 | HEART RATE: 67 BPM | DIASTOLIC BLOOD PRESSURE: 64 MMHG | OXYGEN SATURATION: 98 % | WEIGHT: 204 LBS

## 2023-09-20 VITALS
OXYGEN SATURATION: 98 % | BODY MASS INDEX: 40.05 KG/M2 | DIASTOLIC BLOOD PRESSURE: 64 MMHG | WEIGHT: 204 LBS | HEIGHT: 60 IN | TEMPERATURE: 97.6 F | SYSTOLIC BLOOD PRESSURE: 124 MMHG | HEART RATE: 67 BPM

## 2023-09-20 DIAGNOSIS — N18.31 STAGE 3A CHRONIC KIDNEY DISEASE (HCC): ICD-10-CM

## 2023-09-20 DIAGNOSIS — E11.59 TYPE 2 DIABETES MELLITUS WITH OTHER CIRCULATORY COMPLICATION, WITHOUT LONG-TERM CURRENT USE OF INSULIN (HCC): ICD-10-CM

## 2023-09-20 DIAGNOSIS — J01.90 ACUTE BACTERIAL SINUSITIS: ICD-10-CM

## 2023-09-20 DIAGNOSIS — E11.59 TYPE 2 DIABETES MELLITUS WITH OTHER CIRCULATORY COMPLICATION, WITHOUT LONG-TERM CURRENT USE OF INSULIN (HCC): Primary | ICD-10-CM

## 2023-09-20 DIAGNOSIS — I10 ESSENTIAL HYPERTENSION: ICD-10-CM

## 2023-09-20 DIAGNOSIS — Z23 NEED FOR INFLUENZA VACCINATION: ICD-10-CM

## 2023-09-20 DIAGNOSIS — B96.89 ACUTE BACTERIAL SINUSITIS: ICD-10-CM

## 2023-09-20 DIAGNOSIS — Z00.00 MEDICARE ANNUAL WELLNESS VISIT, SUBSEQUENT: Primary | ICD-10-CM

## 2023-09-20 LAB
CREAT UR-MCNC: 145.4 MG/DL
HBA1C MFR BLD: 6.7 %
MICROALBUMIN UR-MCNC: 2.6 MG/DL
MICROALBUMIN/CREAT UR-RTO: 17.9 MG/G (ref 0–30)

## 2023-09-20 PROCEDURE — G8400 PT W/DXA NO RESULTS DOC: HCPCS | Performed by: STUDENT IN AN ORGANIZED HEALTH CARE EDUCATION/TRAINING PROGRAM

## 2023-09-20 PROCEDURE — G8417 CALC BMI ABV UP PARAM F/U: HCPCS | Performed by: STUDENT IN AN ORGANIZED HEALTH CARE EDUCATION/TRAINING PROGRAM

## 2023-09-20 PROCEDURE — 3074F SYST BP LT 130 MM HG: CPT | Performed by: STUDENT IN AN ORGANIZED HEALTH CARE EDUCATION/TRAINING PROGRAM

## 2023-09-20 PROCEDURE — G0439 PPPS, SUBSEQ VISIT: HCPCS | Performed by: STUDENT IN AN ORGANIZED HEALTH CARE EDUCATION/TRAINING PROGRAM

## 2023-09-20 PROCEDURE — 1123F ACP DISCUSS/DSCN MKR DOCD: CPT | Performed by: STUDENT IN AN ORGANIZED HEALTH CARE EDUCATION/TRAINING PROGRAM

## 2023-09-20 PROCEDURE — G0008 ADMIN INFLUENZA VIRUS VAC: HCPCS | Performed by: STUDENT IN AN ORGANIZED HEALTH CARE EDUCATION/TRAINING PROGRAM

## 2023-09-20 PROCEDURE — 99214 OFFICE O/P EST MOD 30 MIN: CPT | Performed by: STUDENT IN AN ORGANIZED HEALTH CARE EDUCATION/TRAINING PROGRAM

## 2023-09-20 PROCEDURE — 83036 HEMOGLOBIN GLYCOSYLATED A1C: CPT | Performed by: STUDENT IN AN ORGANIZED HEALTH CARE EDUCATION/TRAINING PROGRAM

## 2023-09-20 PROCEDURE — 3078F DIAST BP <80 MM HG: CPT | Performed by: STUDENT IN AN ORGANIZED HEALTH CARE EDUCATION/TRAINING PROGRAM

## 2023-09-20 PROCEDURE — 3044F HG A1C LEVEL LT 7.0%: CPT | Performed by: STUDENT IN AN ORGANIZED HEALTH CARE EDUCATION/TRAINING PROGRAM

## 2023-09-20 PROCEDURE — G8427 DOCREV CUR MEDS BY ELIG CLIN: HCPCS | Performed by: STUDENT IN AN ORGANIZED HEALTH CARE EDUCATION/TRAINING PROGRAM

## 2023-09-20 PROCEDURE — 90694 VACC AIIV4 NO PRSRV 0.5ML IM: CPT | Performed by: STUDENT IN AN ORGANIZED HEALTH CARE EDUCATION/TRAINING PROGRAM

## 2023-09-20 PROCEDURE — 1036F TOBACCO NON-USER: CPT | Performed by: STUDENT IN AN ORGANIZED HEALTH CARE EDUCATION/TRAINING PROGRAM

## 2023-09-20 PROCEDURE — 1090F PRES/ABSN URINE INCON ASSESS: CPT | Performed by: STUDENT IN AN ORGANIZED HEALTH CARE EDUCATION/TRAINING PROGRAM

## 2023-09-20 RX ORDER — AMOXICILLIN AND CLAVULANATE POTASSIUM 875; 125 MG/1; MG/1
1 TABLET, FILM COATED ORAL 2 TIMES DAILY
Qty: 20 TABLET | Refills: 0 | Status: SHIPPED | OUTPATIENT
Start: 2023-09-20 | End: 2023-09-30

## 2023-09-20 ASSESSMENT — ENCOUNTER SYMPTOMS
DIARRHEA: 0
ABDOMINAL PAIN: 0
NAUSEA: 0
WHEEZING: 0
SHORTNESS OF BREATH: 1
RHINORRHEA: 0
COUGH: 0
SINUS PRESSURE: 1
SORE THROAT: 0
VOMITING: 0
EYE DISCHARGE: 0
SINUS PAIN: 1

## 2023-09-20 ASSESSMENT — LIFESTYLE VARIABLES
HOW OFTEN DO YOU HAVE A DRINK CONTAINING ALCOHOL: MONTHLY OR LESS
HOW MANY STANDARD DRINKS CONTAINING ALCOHOL DO YOU HAVE ON A TYPICAL DAY: 1 OR 2

## 2023-09-20 ASSESSMENT — PATIENT HEALTH QUESTIONNAIRE - PHQ9
SUM OF ALL RESPONSES TO PHQ9 QUESTIONS 1 & 2: 0
SUM OF ALL RESPONSES TO PHQ QUESTIONS 1-9: 0
SUM OF ALL RESPONSES TO PHQ QUESTIONS 1-9: 0
1. LITTLE INTEREST OR PLEASURE IN DOING THINGS: 0
SUM OF ALL RESPONSES TO PHQ QUESTIONS 1-9: 0
2. FEELING DOWN, DEPRESSED OR HOPELESS: 0
SUM OF ALL RESPONSES TO PHQ QUESTIONS 1-9: 0

## 2023-09-20 NOTE — PROGRESS NOTES
Subjective  Mckenna Mendoza, 78 y.o. female presents today with:  Chief Complaint   Patient presents with    3 Month Follow-Up    Diabetes    Hypertension    Otalgia     On left ear Sx started 3 days ago       Patient with 3-month follow-up appointment. Patient with type 2 diabetes mellitus. Currently on no medications for this. Diet controlled. She is doing well. She does not check blood sugars at home. Patient with some tension. She is normotensive in the office today. She is on lisinopril 2.5 mg daily, Toprol tartrate 50 mg twice daily. Tolerating well. No side effects to medication. She did stop her Lasix as she is concerned about her kidney function. She has noticed increased swelling. She has gained some weight. She denies any chest pain. She does have some increased shortness of breath. No syncopal episodes. She does follow with cardiology. Patient with stage III chronic kidney disease. This been stable. Patient also with hyperlipidemia. She is on atorvastatin 40 mg daily. Tolerating well. No side effects medication. No muscle aches or pains. Patient also with concerns today for possible ear infection and sinusitis. She has been ill for about a week. Increased facial pressure and pain. Nasal congestion with postnasal drip. Left ear pain. Has tried over-the-counter medications with little help. No fevers or chills. She has no other concerns at this time. Review of Systems   Constitutional:  Negative for chills, fatigue, fever and unexpected weight change. HENT:  Positive for congestion, postnasal drip, sinus pressure and sinus pain. Negative for rhinorrhea and sore throat. Eyes:  Negative for discharge. Respiratory:  Positive for shortness of breath. Negative for cough and wheezing. Cardiovascular:  Positive for leg swelling. Negative for chest pain and palpitations. Gastrointestinal:  Negative for abdominal pain, diarrhea, nausea and vomiting.

## 2023-09-20 NOTE — PROGRESS NOTES
Medicare Annual Wellness Visit    Rosana Frederick is here for Medicare AWV    Assessment & Plan   Medicare annual wellness visit, subsequent  Recommendations for Preventive Services Due: see orders and patient instructions/AVS.  Recommended screening schedule for the next 5-10 years is provided to the patient in written form: see Patient Instructions/AVS.     No follow-ups on file. Subjective       Patient's complete Health Risk Assessment and screening values have been reviewed and are found in Flowsheets. The following problems were reviewed today and where indicated follow up appointments were made and/or referrals ordered. Positive Risk Factor Screenings with Interventions:    Fall Risk:  Do you feel unsteady or are you worried about falling? : (!) yes  2 or more falls in past year?: no  Fall with injury in past year?: no     Interventions:    Patient declines any further evaluation or treatment              Weight and Activity:  Physical Activity: Inactive (9/20/2023)    Exercise Vital Sign     Days of Exercise per Week: 0 days     Minutes of Exercise per Session: 0 min     On average, how many days per week do you engage in moderate to strenuous exercise (like a brisk walk)?: 0 days  Have you lost any weight without trying in the past 3 months?: No  There is no height or weight on file to calculate BMI. (!) Abnormal    Inactivity Interventions:  Patient declined any further interventions or treatment  Obesity Interventions:  Patient declines any further evaluation or treatment          Dentist Screen:  Have you seen the dentist within the past year?: (!) No    Intervention:  Advised to schedule with their dentist     Vision Screen:  Do you have difficulty driving, watching TV, or doing any of your daily activities because of your eyesight?: No  Have you had an eye exam within the past year?: (!) No  No results found.     Interventions:   Patient encouraged to make appointment with their eye

## 2023-09-21 ENCOUNTER — CLINICAL DOCUMENTATION (OUTPATIENT)
Dept: SPIRITUAL SERVICES | Age: 78
End: 2023-09-21

## 2023-09-21 NOTE — ACP (ADVANCE CARE PLANNING)
Advance Care Planning   Ambulatory ACP Specialist Patient Outreach    Date:  9/21/2023    ACP Specialist:  Marilu Gary    Outreach call to patient in follow-up to ACP Specialist referral from:Twila Quezada Both, DO    [x] PCP  [] Provider   [] Ambulatory Care Management [] Other     For:                  [x] Advance Directive Assistance              [] Complete Portable DNR order              [] Complete POST/POLST/MOST              [] Code Status Discussion             [] Discuss Goals of Care             [] Early ACP Decision-Making              [] Other (Specify)    Date Referral Received:9/20/23    Next Step:   [] ACP scheduled conversation  [] Outreach again in one week               [] Email / Mail [address]  [] Email / Mail Advance Directive   [] Closing referral.  Routing closure to referring provider/staff and to ACP Specialist . [] Closure letter mailed to patient with invitation to contact ACP Specialist if / when ready. [] Other (Specify here):         [x] At this time, Healthcare Decision Maker Is:  Advance Care Planning   Healthcare Decision Maker:    Primary Decision Maker: Marika Veeca - Spouse - [phone]    Secondary Decision Maker: Oliverio Max - Child - [phone]    Click here to complete Healthcare Decision Makers including selection of the Healthcare Decision Maker Relationship (ie \"Primary\"). [] Primary agent named in scanned advance directive. [x] Legal Next of Kin. [] Unable to determine legal decision maker at this time. Outreaches:       [x] 1st -  Date:  9/21/23               Intervention:  [x] Spoke with Patient   [] Left Voice mail [] Email / Mail    [] Sweeten  [] Other [phone] :      Outcomes: Scheduled ACP Conversation Call for Tuesday October 3rd at [facility]. Email confirmation sent to Patient with Appointment, Specialist info, and ACP Packet attached.           [] 2nd -  Date:                 Intervention:  [] Spoke

## 2023-10-03 ENCOUNTER — CLINICAL DOCUMENTATION (OUTPATIENT)
Dept: SPIRITUAL SERVICES | Age: 78
End: 2023-10-03

## 2023-10-03 NOTE — ACP (ADVANCE CARE PLANNING)
Advance Care Planning   Ambulatory ACP Specialist Patient Outreach    Date:  10/3/2023    ACP Specialist:  NORMAN Jugn    Outreach call to patient in follow-up to ACP Specialist referral from:Twila Ibarra DO    [x] PCP  [] Provider   [] Ambulatory Care Management [] Other     For:                  [x] Advance Directive Assistance              [] Complete Portable DNR order              [] Complete POST/POLST/MOST              [] Code Status Discussion             [] Discuss Goals of Care             [] Early ACP Decision-Making              [] Other (Specify)    Date Referral Received: 09/20/2023    Next Step:   [x] ACP scheduled conversation  [] Outreach again in one week               [] Email / Mail [address]  [] Email / Mail Advance Directive   [] Closing referral.  Routing closure to referring provider/staff and to ACP Specialist . [] Closure letter mailed to patient with invitation to contact ACP Specialist if / when ready. [] Other (Specify here):         [x] At this time, Healthcare Decision Maker Is:        [] Primary agent named in scanned advance directive. [x] Legal Next of Kin. [] Unable to determine legal decision maker at this time. Outreaches:          [x]  Additional Outreach -  Date:  10/03/2023   (Specify Dates & special circumstances): Outcomes:  ACP specialist made pt's scheduled acp conversation appt phone call. Pt answered but states that she did not get any sleep last night d/t not feeling well & is asking that this appt be rescheduled for another date/time.  Pt has another scheduled appt on Wednesday 10/11 at 9:30am.         Thank you for this referral.

## 2023-10-11 ENCOUNTER — OFFICE VISIT (OUTPATIENT)
Dept: FAMILY MEDICINE CLINIC | Age: 78
End: 2023-10-11
Payer: MEDICARE

## 2023-10-11 VITALS
OXYGEN SATURATION: 96 % | HEART RATE: 67 BPM | BODY MASS INDEX: 40.05 KG/M2 | RESPIRATION RATE: 18 BRPM | WEIGHT: 204 LBS | SYSTOLIC BLOOD PRESSURE: 130 MMHG | DIASTOLIC BLOOD PRESSURE: 68 MMHG | HEIGHT: 60 IN | TEMPERATURE: 97.7 F

## 2023-10-11 DIAGNOSIS — J20.9 ACUTE BRONCHITIS, UNSPECIFIED ORGANISM: ICD-10-CM

## 2023-10-11 DIAGNOSIS — R05.1 ACUTE COUGH: Primary | ICD-10-CM

## 2023-10-11 LAB
Lab: NORMAL
PERFORMING INSTRUMENT: NORMAL
QC PASS/FAIL: NORMAL
SARS-COV-2, POC: NORMAL

## 2023-10-11 PROCEDURE — 1036F TOBACCO NON-USER: CPT | Performed by: NURSE PRACTITIONER

## 2023-10-11 PROCEDURE — 3078F DIAST BP <80 MM HG: CPT | Performed by: NURSE PRACTITIONER

## 2023-10-11 PROCEDURE — G8484 FLU IMMUNIZE NO ADMIN: HCPCS | Performed by: NURSE PRACTITIONER

## 2023-10-11 PROCEDURE — 1123F ACP DISCUSS/DSCN MKR DOCD: CPT | Performed by: NURSE PRACTITIONER

## 2023-10-11 PROCEDURE — 87426 SARSCOV CORONAVIRUS AG IA: CPT | Performed by: NURSE PRACTITIONER

## 2023-10-11 PROCEDURE — G8427 DOCREV CUR MEDS BY ELIG CLIN: HCPCS | Performed by: NURSE PRACTITIONER

## 2023-10-11 PROCEDURE — G8417 CALC BMI ABV UP PARAM F/U: HCPCS | Performed by: NURSE PRACTITIONER

## 2023-10-11 PROCEDURE — 99213 OFFICE O/P EST LOW 20 MIN: CPT | Performed by: NURSE PRACTITIONER

## 2023-10-11 PROCEDURE — 1090F PRES/ABSN URINE INCON ASSESS: CPT | Performed by: NURSE PRACTITIONER

## 2023-10-11 PROCEDURE — G8399 PT W/DXA RESULTS DOCUMENT: HCPCS | Performed by: NURSE PRACTITIONER

## 2023-10-11 PROCEDURE — 3075F SYST BP GE 130 - 139MM HG: CPT | Performed by: NURSE PRACTITIONER

## 2023-10-11 RX ORDER — DOXYCYCLINE HYCLATE 100 MG/1
100 CAPSULE ORAL 2 TIMES DAILY
Qty: 20 CAPSULE | Refills: 0 | Status: SHIPPED | OUTPATIENT
Start: 2023-10-11 | End: 2023-10-21

## 2023-10-11 RX ORDER — PREDNISONE 10 MG/1
30 TABLET ORAL DAILY
Qty: 15 TABLET | Refills: 0 | Status: SHIPPED | OUTPATIENT
Start: 2023-10-11 | End: 2023-10-16

## 2023-10-11 RX ORDER — BENZONATATE 200 MG/1
200 CAPSULE ORAL 3 TIMES DAILY PRN
Qty: 30 CAPSULE | Refills: 0 | Status: SHIPPED | OUTPATIENT
Start: 2023-10-11 | End: 2023-10-21

## 2023-10-11 ASSESSMENT — ENCOUNTER SYMPTOMS
SINUS PRESSURE: 1
ABDOMINAL PAIN: 0
COUGH: 1
SHORTNESS OF BREATH: 0
RHINORRHEA: 1
SINUS PAIN: 0
NAUSEA: 0
CHEST TIGHTNESS: 1

## 2023-10-11 NOTE — PROGRESS NOTES
Subjective  Mckenna Mendoza, 78 y.o. female presents today with:  Chief Complaint   Patient presents with    Cough       HPI  Presents to [facility] for cough and congestion   Started to feel unwell in last couple of weeks   Nasal congestion/drainage, sinus pressure & chills   Cough productive green phlegm   Coughing day and night   Coughing fits   Sleep interrupted d/t cough   C/o chest tightness   Denies SOB   Denies chest pain   Denies N/V/D  Eating and drinking   OTC corcidin                     Past Medical History:   Diagnosis Date    Cancer ([facility])     Encounter for lumbar puncture 05/25/2022    Completed by Dr. Stanislaw Bunch - diagnostics sent    Hyperlipidemia     Hypertension     Type II or unspecified type diabetes mellitus without mention of complication, not stated as uncontrolled       Past Surgical History:   Procedure Laterality Date    CORONARY ANGIOPLASTY WITH STENT PLACEMENT      UPPER GASTROINTESTINAL ENDOSCOPY  8/12/15    w/bx,dilation      Family History   Problem Relation Age of Onset    Cancer Mother     Arthritis Mother     Diabetes Mother     Heart Disease Mother     High Blood Pressure Mother     High Cholesterol Mother     Arthritis Father     Diabetes Father     Heart Disease Father     High Blood Pressure Father     High Cholesterol Father            Review of Systems   Constitutional:  Positive for activity change, chills and fatigue. Negative for appetite change and diaphoresis. HENT:  Positive for congestion, ear pain (left), rhinorrhea and sinus pressure. Negative for sinus pain. Sore throat: resolved. Respiratory:  Positive for cough and chest tightness. Negative for shortness of breath. Cardiovascular:  Negative for chest pain. Gastrointestinal:  Negative for abdominal pain and nausea. Diarrhea: loose on/off. Musculoskeletal:  Negative for myalgias. Neurological:  Negative for headaches. Dizziness: chronic issue. Light-headedness: chronic issue.   Psychiatric/Behavioral:

## 2023-10-26 ENCOUNTER — OFFICE VISIT (OUTPATIENT)
Dept: FAMILY MEDICINE CLINIC | Age: 78
End: 2023-10-26
Payer: MEDICARE

## 2023-10-26 ENCOUNTER — TELEPHONE (OUTPATIENT)
Dept: FAMILY MEDICINE CLINIC | Age: 78
End: 2023-10-26

## 2023-10-26 VITALS
SYSTOLIC BLOOD PRESSURE: 136 MMHG | TEMPERATURE: 97.7 F | OXYGEN SATURATION: 97 % | DIASTOLIC BLOOD PRESSURE: 60 MMHG | HEART RATE: 62 BPM | HEIGHT: 60 IN | RESPIRATION RATE: 13 BRPM | WEIGHT: 203.93 LBS | BODY MASS INDEX: 40.04 KG/M2

## 2023-10-26 DIAGNOSIS — R05.8 OTHER COUGH: ICD-10-CM

## 2023-10-26 DIAGNOSIS — J02.9 SORE THROAT: ICD-10-CM

## 2023-10-26 DIAGNOSIS — B96.89 ACUTE BACTERIAL SINUSITIS: ICD-10-CM

## 2023-10-26 DIAGNOSIS — R05.2 SUBACUTE COUGH: Primary | ICD-10-CM

## 2023-10-26 DIAGNOSIS — J01.90 ACUTE BACTERIAL SINUSITIS: ICD-10-CM

## 2023-10-26 PROCEDURE — 3078F DIAST BP <80 MM HG: CPT

## 2023-10-26 PROCEDURE — 1123F ACP DISCUSS/DSCN MKR DOCD: CPT

## 2023-10-26 PROCEDURE — 3075F SYST BP GE 130 - 139MM HG: CPT

## 2023-10-26 PROCEDURE — G8484 FLU IMMUNIZE NO ADMIN: HCPCS

## 2023-10-26 PROCEDURE — G8399 PT W/DXA RESULTS DOCUMENT: HCPCS

## 2023-10-26 PROCEDURE — G8417 CALC BMI ABV UP PARAM F/U: HCPCS

## 2023-10-26 PROCEDURE — 1036F TOBACCO NON-USER: CPT

## 2023-10-26 PROCEDURE — 1090F PRES/ABSN URINE INCON ASSESS: CPT

## 2023-10-26 PROCEDURE — G8427 DOCREV CUR MEDS BY ELIG CLIN: HCPCS

## 2023-10-26 PROCEDURE — 99213 OFFICE O/P EST LOW 20 MIN: CPT

## 2023-10-26 RX ORDER — FEXOFENADINE HCL 60 MG/1
60 TABLET, FILM COATED ORAL DAILY
Qty: 30 TABLET | Refills: 0 | Status: SHIPPED | OUTPATIENT
Start: 2023-10-26 | End: 2023-11-25

## 2023-10-26 RX ORDER — AMOXICILLIN AND CLAVULANATE POTASSIUM 875; 125 MG/1; MG/1
1 TABLET, FILM COATED ORAL 2 TIMES DAILY
Qty: 14 TABLET | Refills: 0 | Status: SHIPPED | OUTPATIENT
Start: 2023-10-26 | End: 2023-11-02

## 2023-10-26 RX ORDER — DEXTROMETHORPHAN HYDROBROMIDE AND PROMETHAZINE HYDROCHLORIDE 15; 6.25 MG/5ML; MG/5ML
5 SYRUP ORAL 4 TIMES DAILY PRN
Qty: 140 ML | Refills: 0 | Status: SHIPPED | OUTPATIENT
Start: 2023-10-26 | End: 2023-11-02

## 2023-10-26 ASSESSMENT — ENCOUNTER SYMPTOMS
COUGH: 1
SORE THROAT: 1
HEARTBURN: 1
WHEEZING: 1
RHINORRHEA: 0
SINUS PRESSURE: 1
HEMOPTYSIS: 0
SHORTNESS OF BREATH: 1

## 2023-10-26 NOTE — PROGRESS NOTES
Subjective:      Patient ID: Ricky Ya is a 78 y.o. female. Cough  This is a new problem. Episode onset: 1 month ago. The problem has been unchanged. The cough is Productive of sputum. Associated symptoms include chills, heartburn, nasal congestion, postnasal drip, a sore throat, shortness of breath and wheezing. Pertinent negatives include no chest pain, ear pain, fever, hemoptysis or rhinorrhea. Nothing aggravates the symptoms. She has tried oral steroids and prescription cough suppressant for the symptoms. The treatment provided mild relief. Her past medical history is significant for environmental allergies. There is no history of asthma or COPD. Review of Systems   Constitutional:  Positive for chills. Negative for fever. HENT:  Positive for congestion, postnasal drip, sinus pressure and sore throat. Negative for ear discharge, ear pain and rhinorrhea. Respiratory:  Positive for cough, shortness of breath and wheezing. Negative for hemoptysis. Cardiovascular:  Negative for chest pain. Gastrointestinal:  Positive for heartburn. Allergic/Immunologic: Positive for environmental allergies. Objective:   Physical Exam  Constitutional:       Appearance: Normal appearance. HENT:      Head: Normocephalic and atraumatic. Right Ear: Tympanic membrane, ear canal and external ear normal.      Left Ear: Tympanic membrane, ear canal and external ear normal.      Nose: Rhinorrhea present. Rhinorrhea is clear. Right Turbinates: Enlarged. Left Turbinates: Enlarged. Right Sinus: No maxillary sinus tenderness or frontal sinus tenderness. Left Sinus: No maxillary sinus tenderness or frontal sinus tenderness. Mouth/Throat:      Mouth: Mucous membranes are moist.      Pharynx: Oropharynx is clear. Cardiovascular:      Rate and Rhythm: Normal rate and regular rhythm. Pulses: Normal pulses. Heart sounds: Normal heart sounds.    Pulmonary:      Effort: Pulmonary

## 2023-11-10 ENCOUNTER — TELEPHONE (OUTPATIENT)
Dept: FAMILY MEDICINE CLINIC | Age: 78
End: 2023-11-10

## 2023-11-10 DIAGNOSIS — R05.2 SUBACUTE COUGH: Primary | ICD-10-CM

## 2023-11-10 DIAGNOSIS — R05.2 SUBACUTE COUGH: ICD-10-CM

## 2023-11-10 RX ORDER — DEXTROMETHORPHAN HYDROBROMIDE AND PROMETHAZINE HYDROCHLORIDE 15; 6.25 MG/5ML; MG/5ML
5 SYRUP ORAL 4 TIMES DAILY PRN
Qty: 150 ML | Refills: 0 | Status: SHIPPED | OUTPATIENT
Start: 2023-11-10 | End: 2023-11-17

## 2023-11-10 RX ORDER — DEXTROMETHORPHAN HYDROBROMIDE AND PROMETHAZINE HYDROCHLORIDE 15; 6.25 MG/5ML; MG/5ML
SYRUP ORAL
Qty: 140 ML | Refills: 0 | OUTPATIENT
Start: 2023-11-10

## 2023-11-13 NOTE — TELEPHONE ENCOUNTER
Date of Procedure: 2/1/2018    Patient Name: Yolanda Richards  Medical Record Number: [id number]    Post Op Findings:  1). Pancreatic duct disruption at the level of the mid body   2). Pancreatic sphincterotomy   3). Placement of a 5 Fr x 5 cm non modified pancreatic duct stent     Procedure: ERCP  Indications: History of pancreatic cyst. Suspected disrupted pancreatic duct   Patient Profile: This is a 47 year old female that underwent axios stent placement 1/4/2018 for pancreatic cyst. She presents today for attempted ERCP to evaluate for PD disruption   Providers: MD Charleen Buitrago M.D., CARLEY Cordero M.D.    Medications: General anesthesia  Event Tracking     Panel 1     Procedure : ENDOSCOPIC RETROGRADE CHOLANGIOPANCREATOGRAPHY      Event Time In    Procedure Start 1112    Scope In 1112    Cecum Reached     Scope Out 1233    Cecum Surgically Absent     Procedure End 1237           Complications: No immediate complications. Estimated blood loss: Minimal.  ______________________________________________________________________________  Procedure: A history and physical examination were performed. The procedure, indications, potential complications (bleeding, perforation, infection, adverse medication reaction), and alternatives available were explained to the patient who appeared to understand and indicated this. Throughout the procedure, the patient's blood pressure, pulse, and oxygen saturations were monitored continuously. Opportunity for questions was provided and informed consent obtained by the surgical team.       The duodenoscope was introduced through the mouth, and under direct visualization advanced to the second part of duodenum. The ERCP was accomplished without difficulty. The patient tolerated the procedure well.     Findings:  - The  film revealed axios stent with double pig tail catheter within it.   - The major papilla was identified once again with evidence of redundant folds.    -  
Patient calling in asking for Rxs to help with her severe coughing its in her head and throat mucus is coming out but will not settle where she can sleep, Pt also having a problem with incontinents is asking for some medication to help with this as well.    Please send to [facility] on [address] if any questions [phone]     Thank You  
Please let patient know that I did send in a new cough syrup for her.  If she is still having symptoms, recommend being seen in walk-in or to make an appointment.  Thanks  
Spoke with patient & informed. Stated she did get med picked up.   
Initial attempts to cannulate the papilla with the 5-4-3 catheter was difficult due to the folds, peristalsis and redundant folds The papillary orifice was difficult to engage. Attempts were made with the DASH sphincterotome as well. Eventually, the 5-4-3 with an 0.018 guidewire was able to be advanced into the common bile duct. A partial cholangiogram was obtained.  Contrast was injected. Multiple radiographic images were taken and personally interpreted  Ductal flow of contrast was adequate. Image quality was adequate.  The maximum diameter of the common bile duct was 6 mm with partial opacification of the cystic duct.  The 0.018 guidewire was left in place. Subsequently a second 0.018 guidewire was loaded into the 5-4-3. The pancreatic duct was cannulated.  The pancreatic duct measured 4 mm at the head and genu. Towards the mid body there was an obvious leak and  Filling of the cystic cavity.  The 5-4-3 was exchanged for the DASH sphincterotome and the 0.018 guidewire was exchanged for the 0.021 guidewire. A pancreatic sphincterotomy was performed. Subsequently a 5 Fr x 5 cm non modified pancreatic duct stent was placed.        Impression:   1). Pancreatic duct disruption at the level of the mid body   2). Pancreatic sphincterotomy   3). Placement of a 5 Fr x 5 cm non modified pancreatic duct stent     Recommendation:  - Watch for complications, bleeding and post-ERCP pancreatitis.    - Repeat ERCP in 6 weeks     Disposition: Home     Diet: Clears x 4 hours then advance as tolerated       Kenton Adams MD    .    GI STAFF:  Present for entire procedure. Assisted/performed critical portions of the exam. Agree with all segments of dictated report.   Santhosh Cordero MD        
Home

## 2023-11-16 DIAGNOSIS — R05.2 SUBACUTE COUGH: ICD-10-CM

## 2023-11-17 RX ORDER — DEXTROMETHORPHAN HYDROBROMIDE AND PROMETHAZINE HYDROCHLORIDE 15; 6.25 MG/5ML; MG/5ML
SYRUP ORAL
Qty: 150 ML | Refills: 0 | OUTPATIENT
Start: 2023-11-17

## 2023-11-17 NOTE — TELEPHONE ENCOUNTER
Patient called in asking for more cough syrup  as per 's message re: cough syrup  if not improved pt would need to be seen   offered appt in office  pt declined   states she will use the RC this weekend for her cough    harjit

## 2023-11-20 ENCOUNTER — OFFICE VISIT (OUTPATIENT)
Dept: CARDIOLOGY CLINIC | Age: 78
End: 2023-11-20
Payer: MEDICARE

## 2023-11-20 VITALS
BODY MASS INDEX: 40.52 KG/M2 | HEART RATE: 60 BPM | HEIGHT: 60 IN | SYSTOLIC BLOOD PRESSURE: 134 MMHG | DIASTOLIC BLOOD PRESSURE: 62 MMHG | OXYGEN SATURATION: 97 % | WEIGHT: 206.4 LBS

## 2023-11-20 DIAGNOSIS — E78.5 HYPERLIPIDEMIA, UNSPECIFIED HYPERLIPIDEMIA TYPE: ICD-10-CM

## 2023-11-20 DIAGNOSIS — I10 HYPERTENSION, UNSPECIFIED TYPE: ICD-10-CM

## 2023-11-20 DIAGNOSIS — I21.4 NSTEMI (NON-ST ELEVATED MYOCARDIAL INFARCTION) (HCC): ICD-10-CM

## 2023-11-20 DIAGNOSIS — S32.000S COMPRESSION FRACTURE OF LUMBAR VERTEBRA, UNSPECIFIED LUMBAR VERTEBRAL LEVEL, SEQUELA: Primary | ICD-10-CM

## 2023-11-20 DIAGNOSIS — E78.5 DYSLIPIDEMIA: ICD-10-CM

## 2023-11-20 DIAGNOSIS — I25.83 CORONARY ARTERY DISEASE DUE TO LIPID RICH PLAQUE: ICD-10-CM

## 2023-11-20 DIAGNOSIS — E11.59 TYPE 2 DIABETES MELLITUS WITH OTHER CIRCULATORY COMPLICATION, WITHOUT LONG-TERM CURRENT USE OF INSULIN (HCC): ICD-10-CM

## 2023-11-20 DIAGNOSIS — I25.10 CORONARY ARTERY DISEASE DUE TO LIPID RICH PLAQUE: ICD-10-CM

## 2023-11-20 DIAGNOSIS — R42 DIZZINESS: ICD-10-CM

## 2023-11-20 PROCEDURE — G8417 CALC BMI ABV UP PARAM F/U: HCPCS | Performed by: INTERNAL MEDICINE

## 2023-11-20 PROCEDURE — 1036F TOBACCO NON-USER: CPT | Performed by: INTERNAL MEDICINE

## 2023-11-20 PROCEDURE — 1090F PRES/ABSN URINE INCON ASSESS: CPT | Performed by: INTERNAL MEDICINE

## 2023-11-20 PROCEDURE — G8427 DOCREV CUR MEDS BY ELIG CLIN: HCPCS | Performed by: INTERNAL MEDICINE

## 2023-11-20 PROCEDURE — 3075F SYST BP GE 130 - 139MM HG: CPT | Performed by: INTERNAL MEDICINE

## 2023-11-20 PROCEDURE — 3078F DIAST BP <80 MM HG: CPT | Performed by: INTERNAL MEDICINE

## 2023-11-20 PROCEDURE — G8484 FLU IMMUNIZE NO ADMIN: HCPCS | Performed by: INTERNAL MEDICINE

## 2023-11-20 PROCEDURE — G8399 PT W/DXA RESULTS DOCUMENT: HCPCS | Performed by: INTERNAL MEDICINE

## 2023-11-20 PROCEDURE — 1123F ACP DISCUSS/DSCN MKR DOCD: CPT | Performed by: INTERNAL MEDICINE

## 2023-11-20 PROCEDURE — 3044F HG A1C LEVEL LT 7.0%: CPT | Performed by: INTERNAL MEDICINE

## 2023-11-20 PROCEDURE — 99214 OFFICE O/P EST MOD 30 MIN: CPT | Performed by: INTERNAL MEDICINE

## 2023-11-20 RX ORDER — TIZANIDINE 4 MG/1
TABLET ORAL
COMMUNITY
Start: 2023-11-16

## 2023-11-20 RX ORDER — GABAPENTIN 300 MG/1
600 CAPSULE ORAL NIGHTLY
Qty: 180 CAPSULE | Refills: 3 | Status: SHIPPED | OUTPATIENT
Start: 2023-11-20 | End: 2024-11-14

## 2023-11-20 ASSESSMENT — ENCOUNTER SYMPTOMS
RESPIRATORY NEGATIVE: 1
GASTROINTESTINAL NEGATIVE: 1
BLOOD IN STOOL: 0
SHORTNESS OF BREATH: 0
WHEEZING: 0
STRIDOR: 0
COUGH: 0
CHEST TIGHTNESS: 0
NAUSEA: 0
EYES NEGATIVE: 1

## 2023-11-20 NOTE — PROGRESS NOTES
primary origins of infectious etiology    Lumbar spondylosis    Lumbar stenosis with neurogenic claudication    Atelectasis, left    Chest pain    Athscl heart disease of native coronary artery w/o ang pctrs    Contusion of left hip    Contusion of left shoulder    Pain in left shoulder    Angina at rest       Medications Discontinued During This Encounter   Medication Reason    gabapentin (NEURONTIN) 300 MG capsule REORDER         Modified Medications    Modified Medication Previous Medication    GABAPENTIN (NEURONTIN) 300 MG CAPSULE gabapentin (NEURONTIN) 300 MG capsule       Take 2 capsules by mouth at bedtime for 360 days. Take 2 capsules by mouth at bedtime for 90 days. Orders Placed This Encounter   Medications    gabapentin (NEURONTIN) 300 MG capsule     Sig: Take 2 capsules by mouth at bedtime for 360 days. Dispense:  180 capsule     Refill:  3       Assessment/Plan:    1. Compression fracture of lumbar vertebra, unspecified lumbar vertebral level, sequela     2. Type 2 diabetes mellitus with other circulatory complication, without long-term current use of insulin ([address])     3. Hyperlipidemia, unspecified hyperlipidemia type  Statin good profile f/u labslow fat diet     4. Fall in home, sequela     5. Dizziness     6. Hypertension, unspecified type  Stable low salt diet continue meds. 7. Dyslipidemia statin low fat     8. NSTEMI (non-ST elevated myocardial infarction) ([address])   CAD - patent stent  9. LE edema - Advance Lasix to 40 add KCL 20. Low salt diet. - continue same. 10. CATH - LAD SHRUTHI x2 stable w mild/mod ISR- continue meds. Low fat diet      11 dizzy/ sinus - refer to ENT. Counseling:  Heart Healthy Lifestyle, Improve BMI, Low Salt Diet, Take Precautions to Prevent Falls, and Walk Daily    Return in about 4 months (around 3/20/2024).       Electronically signed by Power Medina MD on 11/20/2023 at 1:38 PM

## 2023-12-15 ENCOUNTER — OFFICE VISIT (OUTPATIENT)
Dept: FAMILY MEDICINE CLINIC | Age: 78
End: 2023-12-15
Payer: MEDICARE

## 2023-12-15 VITALS
HEART RATE: 61 BPM | BODY MASS INDEX: 40.44 KG/M2 | HEIGHT: 60 IN | OXYGEN SATURATION: 95 % | WEIGHT: 206 LBS | TEMPERATURE: 97.9 F | DIASTOLIC BLOOD PRESSURE: 70 MMHG | SYSTOLIC BLOOD PRESSURE: 126 MMHG

## 2023-12-15 DIAGNOSIS — R05.1 ACUTE COUGH: ICD-10-CM

## 2023-12-15 DIAGNOSIS — J20.8 VIRAL BRONCHITIS: Primary | ICD-10-CM

## 2023-12-15 LAB
INFLUENZA A ANTIBODY: NORMAL
INFLUENZA B ANTIBODY: NORMAL
Lab: NORMAL
PERFORMING INSTRUMENT: NORMAL
QC PASS/FAIL: NORMAL
SARS-COV-2, POC: NORMAL

## 2023-12-15 PROCEDURE — 87426 SARSCOV CORONAVIRUS AG IA: CPT | Performed by: NURSE PRACTITIONER

## 2023-12-15 PROCEDURE — 3078F DIAST BP <80 MM HG: CPT | Performed by: NURSE PRACTITIONER

## 2023-12-15 PROCEDURE — G8417 CALC BMI ABV UP PARAM F/U: HCPCS | Performed by: NURSE PRACTITIONER

## 2023-12-15 PROCEDURE — 3074F SYST BP LT 130 MM HG: CPT | Performed by: NURSE PRACTITIONER

## 2023-12-15 PROCEDURE — 87804 INFLUENZA ASSAY W/OPTIC: CPT | Performed by: NURSE PRACTITIONER

## 2023-12-15 PROCEDURE — 1036F TOBACCO NON-USER: CPT | Performed by: NURSE PRACTITIONER

## 2023-12-15 PROCEDURE — G8399 PT W/DXA RESULTS DOCUMENT: HCPCS | Performed by: NURSE PRACTITIONER

## 2023-12-15 PROCEDURE — 1123F ACP DISCUSS/DSCN MKR DOCD: CPT | Performed by: NURSE PRACTITIONER

## 2023-12-15 PROCEDURE — 1090F PRES/ABSN URINE INCON ASSESS: CPT | Performed by: NURSE PRACTITIONER

## 2023-12-15 PROCEDURE — G8427 DOCREV CUR MEDS BY ELIG CLIN: HCPCS | Performed by: NURSE PRACTITIONER

## 2023-12-15 PROCEDURE — 99213 OFFICE O/P EST LOW 20 MIN: CPT | Performed by: NURSE PRACTITIONER

## 2023-12-15 PROCEDURE — G8484 FLU IMMUNIZE NO ADMIN: HCPCS | Performed by: NURSE PRACTITIONER

## 2023-12-15 RX ORDER — DEXTROMETHORPHAN HYDROBROMIDE AND PROMETHAZINE HYDROCHLORIDE 15; 6.25 MG/5ML; MG/5ML
5 SYRUP ORAL 4 TIMES DAILY PRN
Qty: 200 ML | Refills: 0 | Status: SHIPPED | OUTPATIENT
Start: 2023-12-15 | End: 2023-12-25

## 2023-12-15 RX ORDER — METHYLPREDNISOLONE 4 MG/1
TABLET ORAL
Qty: 1 KIT | Refills: 0 | Status: SHIPPED | OUTPATIENT
Start: 2023-12-15 | End: 2023-12-21

## 2023-12-15 RX ORDER — ALBUTEROL SULFATE 90 UG/1
2 AEROSOL, METERED RESPIRATORY (INHALATION) 4 TIMES DAILY PRN
Qty: 18 G | Refills: 0 | Status: SHIPPED | OUTPATIENT
Start: 2023-12-15

## 2023-12-15 NOTE — PROGRESS NOTES
Subjective:      Patient ID: Ludin Lira is a 78 y.o. female who presents today for:  Chief Complaint   Patient presents with    Cough     SOB, starting . Patient states diagnosed before with acute bronchitis. HPI  Patient is here with c/o cough that \"started up again yesterday\". Says she has Bronchitis. Says she has some SOB. She has no underlying lung issues. Says she wants cough medication with codeine cause that is the only thing that helps. Past Medical History:   Diagnosis Date    Cancer [facility])     Encounter for lumbar puncture 2022    Completed by Dr. Dick Pulido - diagnostics sent    Hyperlipidemia     Hypertension     Type II or unspecified type diabetes mellitus without mention of complication, not stated as uncontrolled      Past Surgical History:   Procedure Laterality Date    CORONARY ANGIOPLASTY WITH STENT PLACEMENT      UPPER GASTROINTESTINAL ENDOSCOPY  8/12/15    w/bx,dilation      Social History     Socioeconomic History    Marital status:      Spouse name: Not on file    Number of children: Not on file    Years of education: Not on file    Highest education level: Not on file   Occupational History    Not on file   Tobacco Use    Smoking status: Former     Packs/day: 0.50     Years: 24.00     Additional pack years: 0.00     Total pack years: 12.00     Types: Cigarettes     Start date: 1959     Quit date: 1982     Years since quittin.9    Smokeless tobacco: Never    Tobacco comments:     No   Vaping Use    Vaping Use: Never used   Substance and Sexual Activity    Alcohol use:  Yes     Alcohol/week: 3.0 standard drinks of alcohol     Types: 3 Glasses of wine per week    Drug use: No    Sexual activity: Not on file   Other Topics Concern    Not on file   Social History Narrative    Lives With: Spouse,  Son (pt never home alone)    Type of Home: House in [address] [place] [address] Nura: Laundry in basement,Two level (doesn't have to use

## 2024-02-07 DIAGNOSIS — E78.5 HYPERLIPIDEMIA, UNSPECIFIED HYPERLIPIDEMIA TYPE: ICD-10-CM

## 2024-02-07 RX ORDER — FUROSEMIDE 40 MG/1
40 TABLET ORAL DAILY
Qty: 90 TABLET | Refills: 3 | Status: SHIPPED | OUTPATIENT
Start: 2024-02-07

## 2024-02-07 RX ORDER — ATORVASTATIN CALCIUM 40 MG/1
40 TABLET, FILM COATED ORAL DAILY
Qty: 90 TABLET | Refills: 3 | Status: SHIPPED | OUTPATIENT
Start: 2024-02-07

## 2024-02-07 NOTE — TELEPHONE ENCOUNTER
Requesting medication refill. Please approve or deny this request.    Rx requested:  Requested Prescriptions     Pending Prescriptions Disp Refills    furosemide (LASIX) 40 MG tablet [Pharmacy Med Name: FUROSEMIDE 40 MG Tablet] 90 tablet 3     Sig: TAKE 1 TABLET EVERY DAY         Last Office Visit:   11/20/2023      Next Visit Date:  Future Appointments   Date Time Provider Department Center   2/14/2024  1:15 PM Twila Maher DO [facility]   3/21/2024  1:45 PM Eric Saeed MD [place] [facility]               Last refill 08/19/2022. Please approve or deny.

## 2024-02-09 ENCOUNTER — APPOINTMENT (OUTPATIENT)
Dept: GENERAL RADIOLOGY | Age: 79
End: 2024-02-09
Payer: MEDICARE

## 2024-02-09 ENCOUNTER — HOSPITAL ENCOUNTER (EMERGENCY)
Age: 79
Discharge: HOME | End: 2024-02-09
Attending: STUDENT IN AN ORGANIZED HEALTH CARE EDUCATION/TRAINING PROGRAM
Payer: MEDICARE

## 2024-02-09 VITALS
OXYGEN SATURATION: 100 % | TEMPERATURE: 97.6 F | SYSTOLIC BLOOD PRESSURE: 138 MMHG | DIASTOLIC BLOOD PRESSURE: 80 MMHG | HEART RATE: 55 BPM | RESPIRATION RATE: 16 BRPM | WEIGHT: 197 LBS | BODY MASS INDEX: 38.68 KG/M2 | HEIGHT: 60 IN

## 2024-02-09 DIAGNOSIS — I50.23 ACUTE ON CHRONIC SYSTOLIC CONGESTIVE HEART FAILURE (HCC): ICD-10-CM

## 2024-02-09 DIAGNOSIS — R06.02 SHORTNESS OF BREATH: Primary | ICD-10-CM

## 2024-02-09 LAB
ALBUMIN SERPL-MCNC: 4.2 G/DL (ref 3.5–4.6)
ALP SERPL-CCNC: 94 U/L (ref 40–130)
ALT SERPL-CCNC: 12 U/L (ref 0–33)
ANION GAP SERPL CALCULATED.3IONS-SCNC: 9 MEQ/L (ref 9–15)
ANISOCYTOSIS BLD QL SMEAR: ABNORMAL
AST SERPL-CCNC: 15 U/L (ref 0–35)
BASOPHILS # BLD: 0 K/UL (ref 0–0.2)
BASOPHILS NFR BLD: 0.5 %
BILIRUB SERPL-MCNC: 0.4 MG/DL (ref 0.2–0.7)
BNP BLD-MCNC: 741 PG/ML
BUN SERPL-MCNC: 20 MG/DL (ref 8–23)
CALCIUM SERPL-MCNC: 9.8 MG/DL (ref 8.5–9.9)
CHLORIDE SERPL-SCNC: 107 MEQ/L (ref 95–107)
CO2 SERPL-SCNC: 29 MEQ/L (ref 20–31)
CREAT SERPL-MCNC: 1.14 MG/DL (ref 0.5–0.9)
EKG ATRIAL RATE: 57 BPM
EKG P AXIS: 23 DEGREES
EKG P-R INTERVAL: 162 MS
EKG Q-T INTERVAL: 398 MS
EKG QRS DURATION: 72 MS
EKG QTC CALCULATION (BAZETT): 387 MS
EKG R AXIS: -5 DEGREES
EKG T AXIS: 9 DEGREES
EKG VENTRICULAR RATE: 57 BPM
EOSINOPHIL # BLD: 0.1 K/UL (ref 0–0.7)
EOSINOPHIL NFR BLD: 1 %
ERYTHROCYTE [DISTWIDTH] IN BLOOD BY AUTOMATED COUNT: 16.2 % (ref 11.5–14.5)
GLOBULIN SER CALC-MCNC: 2.1 G/DL (ref 2.3–3.5)
GLUCOSE SERPL-MCNC: 135 MG/DL (ref 70–99)
HCT VFR BLD AUTO: 38 % (ref 37–47)
HGB BLD-MCNC: 11.6 G/DL (ref 12–16)
INFLUENZA A BY PCR: NEGATIVE
INFLUENZA B BY PCR: NEGATIVE
LYMPHOCYTES # BLD: 2.3 K/UL (ref 1–4.8)
LYMPHOCYTES NFR BLD: 27.4 %
MACROCYTES BLD QL SMEAR: ABNORMAL
MCH RBC QN AUTO: 32.4 PG (ref 27–31.3)
MCHC RBC AUTO-ENTMCNC: 30.5 % (ref 33–37)
MCV RBC AUTO: 106.1 FL (ref 79.4–94.8)
MONOCYTES # BLD: 0.6 K/UL (ref 0.2–0.8)
MONOCYTES NFR BLD: 7 %
NEUTROPHILS # BLD: 5.3 K/UL (ref 1.4–6.5)
NEUTS SEG NFR BLD: 63.6 %
PLATELET # BLD AUTO: 260 K/UL (ref 130–400)
PLATELET BLD QL SMEAR: ADEQUATE
POTASSIUM SERPL-SCNC: 3.7 MEQ/L (ref 3.4–4.9)
PROT SERPL-MCNC: 6.3 G/DL (ref 6.3–8)
RBC # BLD AUTO: 3.58 M/UL (ref 4.2–5.4)
SARS-COV-2 RDRP RESP QL NAA+PROBE: NOT DETECTED
SLIDE REVIEW: ABNORMAL
SODIUM SERPL-SCNC: 145 MEQ/L (ref 135–144)
TROPONIN, HIGH SENSITIVITY: 58 NG/L (ref 0–19)
TROPONIN, HIGH SENSITIVITY: 62 NG/L (ref 0–19)
WBC # BLD AUTO: 8.3 K/UL (ref 4.8–10.8)

## 2024-02-09 PROCEDURE — 87635 SARS-COV-2 COVID-19 AMP PRB: CPT

## 2024-02-09 PROCEDURE — 83880 ASSAY OF NATRIURETIC PEPTIDE: CPT

## 2024-02-09 PROCEDURE — 71045 X-RAY EXAM CHEST 1 VIEW: CPT

## 2024-02-09 PROCEDURE — 99285 EMERGENCY DEPT VISIT HI MDM: CPT

## 2024-02-09 PROCEDURE — 36415 COLL VENOUS BLD VENIPUNCTURE: CPT

## 2024-02-09 PROCEDURE — 80053 COMPREHEN METABOLIC PANEL: CPT

## 2024-02-09 PROCEDURE — 6360000002 HC RX W HCPCS: Performed by: STUDENT IN AN ORGANIZED HEALTH CARE EDUCATION/TRAINING PROGRAM

## 2024-02-09 PROCEDURE — 84484 ASSAY OF TROPONIN QUANT: CPT

## 2024-02-09 PROCEDURE — 85025 COMPLETE CBC W/AUTO DIFF WBC: CPT

## 2024-02-09 PROCEDURE — 87502 INFLUENZA DNA AMP PROBE: CPT

## 2024-02-09 RX ORDER — FUROSEMIDE 10 MG/ML
20 INJECTION INTRAMUSCULAR; INTRAVENOUS ONCE
Status: COMPLETED | OUTPATIENT
Start: 2024-02-09 | End: 2024-02-09

## 2024-02-09 RX ADMIN — FUROSEMIDE 20 MG: 10 INJECTION, SOLUTION INTRAMUSCULAR; INTRAVENOUS at 14:08

## 2024-02-09 ASSESSMENT — PAIN - FUNCTIONAL ASSESSMENT
PAIN_FUNCTIONAL_ASSESSMENT: NONE - DENIES PAIN

## 2024-02-09 ASSESSMENT — LIFESTYLE VARIABLES
HOW OFTEN DO YOU HAVE A DRINK CONTAINING ALCOHOL: NEVER
HOW MANY STANDARD DRINKS CONTAINING ALCOHOL DO YOU HAVE ON A TYPICAL DAY: PATIENT DOES NOT DRINK

## 2024-02-09 NOTE — DISCHARGE INSTRUCTIONS
Take your Lasix 40 mg/day for the next 5 days, after that you can follow-up with your regular doctor regarding cutting the dose back or maintaining at this dose.  This will help get rid of extra fluid    Take your medication as indicated and prescribed.  If you were given a prescription for prednisone or any other steroid then, take Pepcid (famotidine - over the counter) every day while you are taking the steroids.  If you are a diabetic, you should check your blood sugar more frequently while taking prednisone.  Use you use an inhaler or nebulizer or were given one to use, then use as prescribed, or at minimum every 4 hours while you are having shortness of breath.    If you are given an antibiotic then, make sure you get the prescription filled and take the antibiotics until finished.  Drink plenty of water while taking the antibiotics.  Avoid drinking alcohol or drinks that have caffeine in it while taking antibiotics.       For pain use acetaminophen (Tylenol) or ibuprofen (Motrin / Advil), unless prescribed medications that have acetaminophen or ibuprofen (or similar medications) in it.  You can take over the counter acetaminophen tablets (1 - 2 tablets of the 500-mg strength every 6 hours) or ibuprofen tablets (2 tablets every 4 hours).    PLEASE RETURN TO THE EMERGENCY DEPARTMENT IMMEDIATELY for worsening symptoms of shortness of breath, wheezing, change in the amount of sputum that you cough up or a change in the color of your sputum, using your inhaler more frequently or if your inhaler only lasts up to 2 hours, or if you develop any concerning symptoms such as: high fever not relieved by acetaminophen (Tylenol) and/or ibuprofen (Motrin / Advil), chills, shortness of breath, chest pain, feeling of your heart fluttering or racing, persistent nausea and/or vomiting, vomiting up blood, blood in your stool, loss of consciousness, numbness, weakness or tingling in the arms or legs or change in color of the

## 2024-02-09 NOTE — ED PROVIDER NOTES
felt well and wanted to go home, gave a dose of Lasix 20 mg IV, will increase her dose back to 40 mg daily for 5 days, she already has a follow-up appointment with her regular doctor next Wednesday and can determine needs of Lasix then.  Given return precautions she understands agrees with plan, at this time she does not need antibiotics, all questions answered discharged home in good condition [SF]      ED Course User Index  [SF] Darian Riggins DO       Patient/Guardian requesting discharge. Patient/Guardian was given written and verbal instructions prior to discharge. Patient/Guardian understood and agreed. Patient/Guardian had no further questions.       RADIOLOGY:  XR CHEST PORTABLE   Final Result   No acute process.               EKG  See MDM for my interpretation     All EKG's are interpreted by the Emergency Department Physician who either signs or Co-signs this chart in the absence of a cardiologist.      PROCEDURES:  None    CONSULTS:  None    Critical Care Time:  none    FINAL IMPRESSION      1. Shortness of breath    2. Acute on chronic systolic congestive heart failure (HCC)          DISPOSITION / PLAN     DISPOSITION Decision To Discharge 02/09/2024 02:35:25 PM      PATIENT REFERREDTO:  Twila Maher DO  [address] [id number] [phone]    Go to   as scheduled      DISCHARGE MEDICATIONS:  New Prescriptions    No medications on file       Darian Riggins DO  Emergency Medicine Physician  02/09/24 2:35 PM        (Please note that portions of this note were completed with a voice recognition program.Efforts were made to edit the dictations but occasionally words are mis-transcribed.)        Darian Riggins DO  02/09/24 [id number]

## 2024-02-09 NOTE — DISCHARGE INSTR - COC
[facility] MED Delivery:702479173}    Wound Care Documentation and Therapy:        Elimination:  Continence:   Bowel: {YES / NO:}  Bladder: {YES / NO:}  Urinary Catheter: {Urinary Catheter:688526637}   Colostomy/Ileostomy/Ileal Conduit: {YES / NO:}       Date of Last BM: ***  No intake or output data in the 24 hours ending 24 1450  No intake/output data recorded.    Safety Concerns:     { [facility] Safety Concerns:902344276}    Impairments/Disabilities:      { [facility] Impairments/Disabilities:128859409}    Nutrition Therapy:  Current Nutrition Therapy:   { [facility] Diet List:027026734}    Routes of Feeding: {[facility] Other Feedings:432444259}  Liquids: {Slp liquid thickness:24604}  Daily Fluid Restriction: {[facility] DME Yes amt example:442178543}  Last Modified Barium Swallow with Video (Video Swallowing Test): {Done Not Done Date:510318857}    Treatments at the Time of Hospital Discharge:   Respiratory Treatments: ***  Oxygen Therapy:  {Therapy; copd oxygen:21392}  Ventilator:    {[facility] Vent List:470468155}    Rehab Therapies: {THERAPEUTIC INTERVENTION:4250944716}  Weight Bearing Status/Restrictions: {[facility] Weight Bearin}  Other Medical Equipment (for information only, NOT a DME order):  {EQUIPMENT:179371090}  Other Treatments: ***    Patient's personal belongings (please select all that are sent with patient):  {[facility] DME Belongings:410685957}    RN SIGNATURE:  {Esignature:272729718}    CASE MANAGEMENT/SOCIAL WORK SECTION    Inpatient Status Date: ***    Readmission Risk Assessment Score:  Readmission Risk              Risk of Unplanned Readmission:  0           Discharging to Facility/ Agency   Name:   Address:  Phone:  Fax:    Dialysis Facility (if applicable)   Name:  Address:  Dialysis Schedule:  Phone:  Fax:    / signature: {Esignature:065794219}    PHYSICIAN SECTION    Prognosis: {Prognosis:9514373954}    Condition at Discharge: { Patient Condition:040480524}    Rehab

## 2024-02-09 NOTE — ED TRIAGE NOTES
Patient arrived from home via family with complaint of increasing SOB since yesterday. With chest pain.

## 2024-02-09 NOTE — ED NOTES
D/C instructions given. Verbalized understanding. Denies any further complaints. No acute distress noted. Took patient out via wheelchair with . Calling her son to come and get her.

## 2024-02-12 LAB
EKG ATRIAL RATE: 57 BPM
EKG P AXIS: 23 DEGREES
EKG P-R INTERVAL: 162 MS
EKG Q-T INTERVAL: 398 MS
EKG QRS DURATION: 72 MS
EKG QTC CALCULATION (BAZETT): 387 MS
EKG R AXIS: -5 DEGREES
EKG T AXIS: 9 DEGREES
EKG VENTRICULAR RATE: 57 BPM

## 2024-02-14 ENCOUNTER — OFFICE VISIT (OUTPATIENT)
Dept: FAMILY MEDICINE CLINIC | Age: 79
End: 2024-02-14
Payer: MEDICARE

## 2024-02-14 ENCOUNTER — OFFICE VISIT (OUTPATIENT)
Dept: FAMILY MEDICINE CLINIC | Age: 79
End: 2024-02-14

## 2024-02-14 VITALS
BODY MASS INDEX: 37.95 KG/M2 | WEIGHT: 201 LBS | DIASTOLIC BLOOD PRESSURE: 72 MMHG | TEMPERATURE: 97.3 F | HEIGHT: 61 IN | SYSTOLIC BLOOD PRESSURE: 130 MMHG | OXYGEN SATURATION: 97 % | HEART RATE: 67 BPM

## 2024-02-14 VITALS
WEIGHT: 201 LBS | DIASTOLIC BLOOD PRESSURE: 72 MMHG | HEART RATE: 67 BPM | OXYGEN SATURATION: 97 % | BODY MASS INDEX: 37.95 KG/M2 | SYSTOLIC BLOOD PRESSURE: 130 MMHG | HEIGHT: 61 IN | TEMPERATURE: 97.3 F

## 2024-02-14 DIAGNOSIS — M25.511 ACUTE PAIN OF RIGHT SHOULDER: ICD-10-CM

## 2024-02-14 DIAGNOSIS — E11.59 TYPE 2 DIABETES MELLITUS WITH OTHER CIRCULATORY COMPLICATION, WITHOUT LONG-TERM CURRENT USE OF INSULIN (HCC): ICD-10-CM

## 2024-02-14 DIAGNOSIS — I10 ESSENTIAL HYPERTENSION: ICD-10-CM

## 2024-02-14 DIAGNOSIS — Z00.00 MEDICARE ANNUAL WELLNESS VISIT, SUBSEQUENT: Primary | ICD-10-CM

## 2024-02-14 DIAGNOSIS — J30.9 ALLERGIC RHINITIS, UNSPECIFIED SEASONALITY, UNSPECIFIED TRIGGER: ICD-10-CM

## 2024-02-14 DIAGNOSIS — I50.23 ACUTE ON CHRONIC SYSTOLIC HEART FAILURE (HCC): Primary | ICD-10-CM

## 2024-02-14 DIAGNOSIS — K21.9 GASTROESOPHAGEAL REFLUX DISEASE, UNSPECIFIED WHETHER ESOPHAGITIS PRESENT: ICD-10-CM

## 2024-02-14 LAB — HBA1C MFR BLD: 7.4 %

## 2024-02-14 PROCEDURE — 3075F SYST BP GE 130 - 139MM HG: CPT | Performed by: STUDENT IN AN ORGANIZED HEALTH CARE EDUCATION/TRAINING PROGRAM

## 2024-02-14 PROCEDURE — 3078F DIAST BP <80 MM HG: CPT | Performed by: STUDENT IN AN ORGANIZED HEALTH CARE EDUCATION/TRAINING PROGRAM

## 2024-02-14 PROCEDURE — G8484 FLU IMMUNIZE NO ADMIN: HCPCS | Performed by: STUDENT IN AN ORGANIZED HEALTH CARE EDUCATION/TRAINING PROGRAM

## 2024-02-14 PROCEDURE — G0439 PPPS, SUBSEQ VISIT: HCPCS | Performed by: STUDENT IN AN ORGANIZED HEALTH CARE EDUCATION/TRAINING PROGRAM

## 2024-02-14 PROCEDURE — 1123F ACP DISCUSS/DSCN MKR DOCD: CPT | Performed by: STUDENT IN AN ORGANIZED HEALTH CARE EDUCATION/TRAINING PROGRAM

## 2024-02-14 RX ORDER — ACYCLOVIR 400 MG/1
400 TABLET ORAL
COMMUNITY
Start: 2024-02-07

## 2024-02-14 RX ORDER — FLUTICASONE PROPIONATE 50 MCG
1 SPRAY, SUSPENSION (ML) NASAL DAILY
Qty: 3 EACH | Refills: 1 | Status: SHIPPED | OUTPATIENT
Start: 2024-02-14

## 2024-02-14 RX ORDER — LISINOPRIL 2.5 MG/1
2.5 TABLET ORAL DAILY
Qty: 90 TABLET | Refills: 3 | Status: SHIPPED | OUTPATIENT
Start: 2024-02-14

## 2024-02-14 RX ORDER — PANTOPRAZOLE SODIUM 40 MG/1
40 TABLET, DELAYED RELEASE ORAL DAILY
Qty: 90 TABLET | Refills: 3 | Status: SHIPPED | OUTPATIENT
Start: 2024-02-14

## 2024-02-14 RX ORDER — METOPROLOL TARTRATE 50 MG/1
50 TABLET, FILM COATED ORAL 2 TIMES DAILY
Qty: 180 TABLET | Refills: 3 | Status: SHIPPED | OUTPATIENT
Start: 2024-02-14

## 2024-02-14 NOTE — PROGRESS NOTES
Subjective  Mckenna Mendoza, 78 y.o. female presents today with:  Chief Complaint   Patient presents with    Follow-up    Hypertension     Does not check BP at home. Denies chest pains, heart palpitations, headaches or dizziness. Has had some SOB & edema. Was recently seen in the ER for this & Lasix was increased. Does follow with cardiology.     Diabetes     A1c was 6.7 on 9/20/23. Does not check blood sugar at home.     Hyperlipidemia     Lipid panel done 5/5/23. Takes medication as directed.     Shoulder Pain     Has right shoulder pain that started about a month ago. Denies any injury. ROM is affected. Has not used heat or ice to area. Has used Voltaren & massaging the area. Has not used OTC pain relievers.        Patient with 3-month follow-up appointment.    Patient following up on essential hypertension.  Normotensive in the office today.  She does not check blood pressure at home.  She does follow with cardiology.  She denies any chest pain, palpitations, headaches or dizziness.  She does have occasional shortness of breath.  She was recently seen in the ER for exacerbation of CHF.  Her Lasix dose was increased.  She states swelling is improved although she is still getting it.  She states that shortness of breath did get better as well.    Patient also following up on type 2 diabetes mellitus.  Last A1c was 6.7 in September.  She does not check blood sugar at home.    Patient also with hyperlipidemia.  She is on a statin.  Tolerating well.  No muscle aches or pains.    Patient also with right shoulder pain.  Started about a month ago.  No injury or trauma.  She does have decreased range of motion.  She has not used any heat or ice.  She has used Voltaren and tried massaging the area which has helped a little bit.  She has not taken any over-the-counter pain medications.  She has no other concerns at this time.      Review of Systems   Constitutional:  Negative for chills, fatigue, fever and unexpected weight

## 2024-02-14 NOTE — PATIENT INSTRUCTIONS
may have ordered immunizations, labs, imaging, and/or referrals for you.  A list of these orders (if applicable) as well as your Preventive Care list are included within your After Visit Summary for your review.    Other Preventive Recommendations:    A preventive eye exam performed by an eye specialist is recommended every 1-2 years to screen for glaucoma; cataracts, macular degeneration, and other eye disorders.  A preventive dental visit is recommended every 6 months.  Try to get at least 150 minutes of exercise per week or 10,000 steps per day on a pedometer .  Order or download the FREE \"Exercise & Physical Activity: Your Everyday Guide\" from [employer] [place] [employer]. Call 1-907.339.4665 or search [employer] [place] [employer] online.  You need 0099-7688 mg of calcium and 1436-7484 IU of vitamin D per day. It is possible to meet your calcium requirement with diet alone, but a vitamin D supplement is usually necessary to meet this goal.  When exposed to the sun, use a sunscreen that protects against both UVA and UVB radiation with an SPF of 30 or greater. Reapply every 2 to 3 hours or after sweating, drying off with a towel, or swimming.  Always wear a seat belt when traveling in a car. Always wear a helmet when riding a bicycle or motorcycle.

## 2024-02-14 NOTE — PROGRESS NOTES
Medicare Annual Wellness Visit    Mckenna Mendoaz is here for Medicare AWV    Assessment & Plan   Medicare annual wellness visit, subsequent  Recommendations for Preventive Services Due: see orders and patient instructions/AVS.  Recommended screening schedule for the next 5-10 years is provided to the patient in written form: see Patient Instructions/AVS.     No follow-ups on file.     Subjective   The following acute and/or chronic problems were also addressed today:  See additional note.     Patient's complete Health Risk Assessment and screening values have been reviewed and are found in Flowsheets. The following problems were reviewed today and where indicated follow up appointments were made and/or referrals ordered.    Positive Risk Factor Screenings with Interventions:    Fall Risk:  Do you feel unsteady or are you worried about falling? : (!) yes  2 or more falls in past year?: no  Fall with injury in past year?: no     Interventions:    Reviewed medications, home hazards, visual acuity, and co-morbidities that can increase risk for falls            General HRA Questions:  Select all that apply: (!) New or Increased Fatigue, Anger    Fatigue Interventions:  Patient declined any further interventions or treatment    Anger Interventions:  Patient declined any further interventions or treatment      Activity, Diet, and Weight:  On average, how many days per week do you engage in moderate to strenuous exercise (like a brisk walk)?: 0 days  On average, how many minutes do you engage in exercise at this level?: 0 min    Do you eat balanced/healthy meals regularly?: Yes    Body mass index is 38.61 kg/m². (!) Abnormal      Inactivity Interventions:  Patient declined any further interventions or treatment  Obesity Interventions:  Patient declines any further evaluation or treatment            Dentist Screen:  Have you seen the dentist within the past year?: (!) No    Intervention:  Advised to schedule with their

## 2024-03-08 RX ORDER — TIZANIDINE 4 MG/1
TABLET ORAL
Qty: 30 TABLET | Refills: 2 | Status: SHIPPED | OUTPATIENT
Start: 2024-03-08

## 2024-03-08 NOTE — TELEPHONE ENCOUNTER
MEDICATION REFILL REQUEST     Rx Requested    Requested Prescriptions     Pending Prescriptions Disp Refills    tiZANidine (ZANAFLEX) 4 MG tablet           Patient's Last Office Visit   2/14/2024      Patient's Next Office Visit   Future Appointments   Date Time Provider Department Center   3/21/2024  1:45 PM Eric Saeed MD [facility]   6/14/2024  1:00 PM Twila Maher,  [facility]         Other comments   /

## 2024-03-13 ENCOUNTER — OFFICE VISIT (OUTPATIENT)
Dept: FAMILY MEDICINE CLINIC | Age: 79
End: 2024-03-13
Payer: MEDICARE

## 2024-03-13 VITALS
WEIGHT: 200 LBS | HEART RATE: 76 BPM | HEIGHT: 61 IN | SYSTOLIC BLOOD PRESSURE: 108 MMHG | TEMPERATURE: 97.7 F | DIASTOLIC BLOOD PRESSURE: 62 MMHG | OXYGEN SATURATION: 94 % | BODY MASS INDEX: 37.76 KG/M2

## 2024-03-13 DIAGNOSIS — R05.1 ACUTE COUGH: ICD-10-CM

## 2024-03-13 DIAGNOSIS — J01.90 ACUTE BACTERIAL SINUSITIS: Primary | ICD-10-CM

## 2024-03-13 DIAGNOSIS — B96.89 ACUTE BACTERIAL SINUSITIS: Primary | ICD-10-CM

## 2024-03-13 LAB
INFLUENZA A ANTIBODY: NORMAL
INFLUENZA B ANTIBODY: NORMAL
Lab: NORMAL
PERFORMING INSTRUMENT: NORMAL
QC PASS/FAIL: NORMAL
RSV ANTIGEN: NORMAL
SARS-COV-2, POC: NORMAL

## 2024-03-13 PROCEDURE — 1090F PRES/ABSN URINE INCON ASSESS: CPT | Performed by: NURSE PRACTITIONER

## 2024-03-13 PROCEDURE — G8427 DOCREV CUR MEDS BY ELIG CLIN: HCPCS | Performed by: NURSE PRACTITIONER

## 2024-03-13 PROCEDURE — G8484 FLU IMMUNIZE NO ADMIN: HCPCS | Performed by: NURSE PRACTITIONER

## 2024-03-13 PROCEDURE — 99213 OFFICE O/P EST LOW 20 MIN: CPT | Performed by: NURSE PRACTITIONER

## 2024-03-13 PROCEDURE — 3078F DIAST BP <80 MM HG: CPT | Performed by: NURSE PRACTITIONER

## 2024-03-13 PROCEDURE — G8399 PT W/DXA RESULTS DOCUMENT: HCPCS | Performed by: NURSE PRACTITIONER

## 2024-03-13 PROCEDURE — 1123F ACP DISCUSS/DSCN MKR DOCD: CPT | Performed by: NURSE PRACTITIONER

## 2024-03-13 PROCEDURE — 87426 SARSCOV CORONAVIRUS AG IA: CPT | Performed by: NURSE PRACTITIONER

## 2024-03-13 PROCEDURE — 86756 RESPIRATORY VIRUS ANTIBODY: CPT | Performed by: NURSE PRACTITIONER

## 2024-03-13 PROCEDURE — 87804 INFLUENZA ASSAY W/OPTIC: CPT | Performed by: NURSE PRACTITIONER

## 2024-03-13 PROCEDURE — G8417 CALC BMI ABV UP PARAM F/U: HCPCS | Performed by: NURSE PRACTITIONER

## 2024-03-13 PROCEDURE — 3074F SYST BP LT 130 MM HG: CPT | Performed by: NURSE PRACTITIONER

## 2024-03-13 PROCEDURE — 1036F TOBACCO NON-USER: CPT | Performed by: NURSE PRACTITIONER

## 2024-03-13 RX ORDER — BENZONATATE 200 MG/1
200 CAPSULE ORAL 3 TIMES DAILY PRN
Qty: 21 CAPSULE | Refills: 0 | Status: SHIPPED | OUTPATIENT
Start: 2024-03-13 | End: 2024-03-20

## 2024-03-13 RX ORDER — AMOXICILLIN 875 MG/1
875 TABLET, COATED ORAL 2 TIMES DAILY
Qty: 20 TABLET | Refills: 0 | Status: SHIPPED | OUTPATIENT
Start: 2024-03-13 | End: 2024-03-23

## 2024-03-13 ASSESSMENT — ENCOUNTER SYMPTOMS
SINUS PAIN: 1
ABDOMINAL PAIN: 0
WHEEZING: 0
EYE ITCHING: 0
EYE PAIN: 0
STRIDOR: 0
EYE REDNESS: 0
SINUS PRESSURE: 1
COUGH: 1
EYE DISCHARGE: 0
SORE THROAT: 1
SHORTNESS OF BREATH: 0
RHINORRHEA: 1
TROUBLE SWALLOWING: 0
FACIAL SWELLING: 0
VOICE CHANGE: 0
NAUSEA: 0
CHEST TIGHTNESS: 0
DIARRHEA: 0

## 2024-03-13 NOTE — PROGRESS NOTES
Dispense:  20 tablet     Refill:  0    benzonatate (TESSALON) 200 MG capsule     Sig: Take 1 capsule by mouth 3 times daily as needed for Cough     Dispense:  21 capsule     Refill:  0     There are no discontinued medications.  Return for worsening of condition, if symptoms do not improve in 3-5 days.        Reviewed with the patient/family: current clinical status & medications.  Side effects of the medication prescribed today, as well as treatment plan/rationale and result expectations have been discussed with the patient/family who expresses understanding. Patient will be discharged home in stable condition.    Follow up with PCP to evaluate treatment results or return if symptoms worsen or fail to improve. Discussed signs and symptoms which require immediate follow-up in ED/call to 911.  Understanding verbalized.     I have reviewed the patient's medical history in detail and updated the computerized patient record.    HERNAN ZARATE, JOSE - CNP

## 2024-03-21 ENCOUNTER — TELEPHONE (OUTPATIENT)
Dept: FAMILY MEDICINE CLINIC | Age: 79
End: 2024-03-21

## 2024-03-21 ENCOUNTER — OFFICE VISIT (OUTPATIENT)
Dept: CARDIOLOGY CLINIC | Age: 79
End: 2024-03-21
Payer: MEDICARE

## 2024-03-21 VITALS
DIASTOLIC BLOOD PRESSURE: 62 MMHG | WEIGHT: 200.6 LBS | OXYGEN SATURATION: 95 % | HEART RATE: 67 BPM | BODY MASS INDEX: 37.87 KG/M2 | HEIGHT: 61 IN | SYSTOLIC BLOOD PRESSURE: 126 MMHG

## 2024-03-21 DIAGNOSIS — E66.01 SEVERE OBESITY (BMI 35.0-39.9) WITH COMORBIDITY (HCC): ICD-10-CM

## 2024-03-21 DIAGNOSIS — E78.5 DYSLIPIDEMIA: ICD-10-CM

## 2024-03-21 DIAGNOSIS — S32.000S COMPRESSION FRACTURE OF LUMBAR VERTEBRA, UNSPECIFIED LUMBAR VERTEBRAL LEVEL, SEQUELA: Primary | ICD-10-CM

## 2024-03-21 DIAGNOSIS — I21.4 NSTEMI (NON-ST ELEVATED MYOCARDIAL INFARCTION) (HCC): ICD-10-CM

## 2024-03-21 DIAGNOSIS — I25.83 CORONARY ARTERY DISEASE DUE TO LIPID RICH PLAQUE: ICD-10-CM

## 2024-03-21 DIAGNOSIS — I25.10 CORONARY ARTERY DISEASE DUE TO LIPID RICH PLAQUE: ICD-10-CM

## 2024-03-21 DIAGNOSIS — E78.5 HYPERLIPIDEMIA, UNSPECIFIED HYPERLIPIDEMIA TYPE: ICD-10-CM

## 2024-03-21 DIAGNOSIS — E11.59 TYPE 2 DIABETES MELLITUS WITH OTHER CIRCULATORY COMPLICATION, WITHOUT LONG-TERM CURRENT USE OF INSULIN (HCC): ICD-10-CM

## 2024-03-21 PROCEDURE — 1036F TOBACCO NON-USER: CPT | Performed by: INTERNAL MEDICINE

## 2024-03-21 PROCEDURE — 99214 OFFICE O/P EST MOD 30 MIN: CPT | Performed by: INTERNAL MEDICINE

## 2024-03-21 PROCEDURE — G8417 CALC BMI ABV UP PARAM F/U: HCPCS | Performed by: INTERNAL MEDICINE

## 2024-03-21 PROCEDURE — 3074F SYST BP LT 130 MM HG: CPT | Performed by: INTERNAL MEDICINE

## 2024-03-21 PROCEDURE — 1123F ACP DISCUSS/DSCN MKR DOCD: CPT | Performed by: INTERNAL MEDICINE

## 2024-03-21 PROCEDURE — G8484 FLU IMMUNIZE NO ADMIN: HCPCS | Performed by: INTERNAL MEDICINE

## 2024-03-21 PROCEDURE — 1090F PRES/ABSN URINE INCON ASSESS: CPT | Performed by: INTERNAL MEDICINE

## 2024-03-21 PROCEDURE — G8399 PT W/DXA RESULTS DOCUMENT: HCPCS | Performed by: INTERNAL MEDICINE

## 2024-03-21 PROCEDURE — 3051F HG A1C>EQUAL 7.0%<8.0%: CPT | Performed by: INTERNAL MEDICINE

## 2024-03-21 PROCEDURE — 3078F DIAST BP <80 MM HG: CPT | Performed by: INTERNAL MEDICINE

## 2024-03-21 PROCEDURE — G8427 DOCREV CUR MEDS BY ELIG CLIN: HCPCS | Performed by: INTERNAL MEDICINE

## 2024-03-21 RX ORDER — CEFDINIR 300 MG/1
300 CAPSULE ORAL 2 TIMES DAILY
Qty: 14 CAPSULE | Refills: 0 | Status: SHIPPED | OUTPATIENT
Start: 2024-03-21 | End: 2024-03-28

## 2024-03-21 ASSESSMENT — ENCOUNTER SYMPTOMS
GASTROINTESTINAL NEGATIVE: 1
WHEEZING: 0
NAUSEA: 0
COUGH: 0
BLOOD IN STOOL: 0
RESPIRATORY NEGATIVE: 1
STRIDOR: 0
SHORTNESS OF BREATH: 0
EYES NEGATIVE: 1

## 2024-03-21 NOTE — TELEPHONE ENCOUNTER
Patient states the antibiotic she was prescribed on 3/13 gave her horrible diarrhea so she stopped taking it. She is still having symptoms of a sinus infection so she is inquiring if an alternative can be sent to [facility] on [address].

## 2024-03-21 NOTE — PROGRESS NOTES
Subsequent Progress Note      Patient: Mckenna Mendoza  YOB: 1945  MRN: [id number]    Chief Complaint:  Chief Complaint   Patient presents with    Follow-up     4 month    Hypertension       CV Data:  5/22 Echo EF 65%  3/23 CTA neck negative   5/22 Echo EF 65  3/22/23 Cath LAD patent mid and distal stent w Mild/Mod ISR.  EF 50%       Subjective/HPI:  recent hosp for weakness. No cp no sob now. She did have elevated troponin. She has chronic LE edema.     4/7/23 recent hosp for cp . Cath done. Still with left arm pain no falls no bleed.     7/14/23 dizzy with sinus congestion and frequent infection. No cp no sob no falls no bleed.     11/20/23 recent URI.  Getting better. No cp no sob no falls no bleed.     3/21/24 doing well no cp no sob no falls same LE Edema. Has sinusitis. No bleed takes meds     EKG:      Former smoker  Lives with  and family  Moved from [place]      Past Medical History:   Diagnosis Date    Cancer (HCC)     Encounter for lumbar puncture 05/25/2022    Completed by Dr. Mccormick - diagnostics sent    Hyperlipidemia     Hypertension     Type II or unspecified type diabetes mellitus without mention of complication, not stated as uncontrolled        Past Surgical History:   Procedure Laterality Date    CORONARY ANGIOPLASTY WITH STENT PLACEMENT      UPPER GASTROINTESTINAL ENDOSCOPY  8/12/15    w/bx,dilation        Family History   Problem Relation Age of Onset    Cancer Mother     Arthritis Mother     Diabetes Mother     Heart Disease Mother     High Blood Pressure Mother     High Cholesterol Mother     Arthritis Father     Diabetes Father     Heart Disease Father     High Blood Pressure Father     High Cholesterol Father        Social History     Socioeconomic History    Marital status:      Spouse name: None    Number of children: None    Years of education: None    Highest education level: None   Tobacco Use    Smoking status: Former     Current packs/day: 0.00

## 2024-03-22 RX ORDER — CLOPIDOGREL BISULFATE 75 MG/1
75 TABLET ORAL DAILY
Qty: 90 TABLET | Refills: 3 | Status: SHIPPED | OUTPATIENT
Start: 2024-03-22

## 2024-03-22 NOTE — TELEPHONE ENCOUNTER
Pt requesting medication refill. Please approve or deny this request.    Rx requested:  Requested Prescriptions     Pending Prescriptions Disp Refills    clopidogrel (PLAVIX) 75 MG tablet 90 tablet 3     Sig: Take 1 tablet by mouth daily         Last Office Visit:   3/21/2024      Next Visit Date:  Future Appointments   Date Time Provider Department Center   6/14/2024  1:00 PM Twila Maher DO [facility]   7/25/2024  1:45 PM Eric Saeed MD [facility]

## 2024-05-29 ENCOUNTER — OFFICE VISIT (OUTPATIENT)
Dept: FAMILY MEDICINE CLINIC | Age: 79
End: 2024-05-29
Payer: MEDICARE

## 2024-05-29 VITALS
WEIGHT: 200.62 LBS | SYSTOLIC BLOOD PRESSURE: 126 MMHG | RESPIRATION RATE: 12 BRPM | HEIGHT: 61 IN | HEART RATE: 68 BPM | TEMPERATURE: 97 F | BODY MASS INDEX: 37.88 KG/M2 | OXYGEN SATURATION: 98 % | DIASTOLIC BLOOD PRESSURE: 60 MMHG

## 2024-05-29 DIAGNOSIS — L72.3 INFECTED SEBACEOUS CYST OF SKIN: Primary | ICD-10-CM

## 2024-05-29 DIAGNOSIS — L08.9 INFECTED SEBACEOUS CYST OF SKIN: Primary | ICD-10-CM

## 2024-05-29 DIAGNOSIS — L72.3 SEBACEOUS CYST: ICD-10-CM

## 2024-05-29 PROBLEM — N18.31 STAGE 3A CHRONIC KIDNEY DISEASE (HCC): Status: ACTIVE | Noted: 2024-05-29

## 2024-05-29 PROCEDURE — 99213 OFFICE O/P EST LOW 20 MIN: CPT | Performed by: NURSE PRACTITIONER

## 2024-05-29 PROCEDURE — 3078F DIAST BP <80 MM HG: CPT | Performed by: NURSE PRACTITIONER

## 2024-05-29 PROCEDURE — 1036F TOBACCO NON-USER: CPT | Performed by: NURSE PRACTITIONER

## 2024-05-29 PROCEDURE — G8399 PT W/DXA RESULTS DOCUMENT: HCPCS | Performed by: NURSE PRACTITIONER

## 2024-05-29 PROCEDURE — G8427 DOCREV CUR MEDS BY ELIG CLIN: HCPCS | Performed by: NURSE PRACTITIONER

## 2024-05-29 PROCEDURE — 3074F SYST BP LT 130 MM HG: CPT | Performed by: NURSE PRACTITIONER

## 2024-05-29 PROCEDURE — 1090F PRES/ABSN URINE INCON ASSESS: CPT | Performed by: NURSE PRACTITIONER

## 2024-05-29 PROCEDURE — 1123F ACP DISCUSS/DSCN MKR DOCD: CPT | Performed by: NURSE PRACTITIONER

## 2024-05-29 PROCEDURE — G8417 CALC BMI ABV UP PARAM F/U: HCPCS | Performed by: NURSE PRACTITIONER

## 2024-05-29 RX ORDER — AMOXICILLIN 875 MG/1
875 TABLET, COATED ORAL 2 TIMES DAILY
Qty: 20 TABLET | Refills: 0 | Status: SHIPPED | OUTPATIENT
Start: 2024-05-29 | End: 2024-06-08

## 2024-05-29 ASSESSMENT — ENCOUNTER SYMPTOMS: COLOR CHANGE: 1

## 2024-05-29 NOTE — PROGRESS NOTES
Subjective:      Patient ID: Mckenna Mendoza is a 78 y.o. female who presents today for:  Chief Complaint   Patient presents with    Other     Patient present today with a black head that she removed it and now it has grown about 8 times it size and it is oozing out pus that has a bad odor and it's is sore for the past week. She tried alcohol and hot compress which only help with the soreness a little.        HPI  Patient is here with seb. Cyst on the upper back.   Says it has been there for years and now is more red, warm, and swollen.   Says it did drain some.   She has squeezed it as well.   Says the only medication she can take is amoxicillin.  Past Medical History:   Diagnosis Date    Cancer (HCC)     Encounter for lumbar puncture 2022    Completed by Dr. Mccormick - diagnostics sent    Hyperlipidemia     Hypertension     Type II or unspecified type diabetes mellitus without mention of complication, not stated as uncontrolled      Past Surgical History:   Procedure Laterality Date    CORONARY ANGIOPLASTY WITH STENT PLACEMENT      UPPER GASTROINTESTINAL ENDOSCOPY  8/12/15    w/bx,dilation      Social History     Socioeconomic History    Marital status:      Spouse name: Not on file    Number of children: Not on file    Years of education: Not on file    Highest education level: Not on file   Occupational History    Not on file   Tobacco Use    Smoking status: Former     Current packs/day: 0.00     Average packs/day: 0.5 packs/day for 24.0 years (12.0 ttl pk-yrs)     Types: Cigarettes     Start date: 1959     Quit date: 1982     Years since quittin.4    Smokeless tobacco: Never    Tobacco comments:     No   Vaping Use    Vaping Use: Never used   Substance and Sexual Activity    Alcohol use: Yes     Alcohol/week: 3.0 standard drinks of alcohol     Types: 3 Glasses of wine per week    Drug use: No    Sexual activity: Not on file   Other Topics Concern    Not on file   Social

## 2024-05-29 NOTE — PROGRESS NOTES
GENERAL SURGERY  NEW PATIENT HISTORY AND PHYSICAL NOTE    Pt Name: Mckenna Mendoza  MRN: [id number]    Date: 6/3/2024    Primary Care Physician: Twila Maher DO  Referring Provider: Juliet Garcia NP    Reason for evaluation: Upper back sebaceous cyst      SUBJECTIVE:     History of Chief Complaint:    Mckenna is a 78 y.o. female with a PMH of benign paroxysmal positional vertigo, headaches, HTN, HLD, acute on chronic heart failure, CAD (s/p PCI on plavix), DM, CKD 3a, and ataxia who presents with an upper back sebaceous cyst. She reports having had an upper back mass for at least the past 5 years. She thought it was a blackhead. Notes it occasionally would swell up and she would squeeze some material out of it. It never caused her any trouble until recently when it got quite large 2 weeks ago. She said there was \"nasty\" stuff that came out that was quite malodorous. Started having some liquid light tan weepage from it. Went to the walk in clinic last week and was started on a 10 day course of amoxicillin. Started using warm compresses that seem to take the soreness away. Denies fevers, chills, and night sweats.     Past Medical History:   Diagnosis Date    Cancer (HCC)     Encounter for lumbar puncture 05/25/2022    Completed by Dr. Mccormick - diagnostics sent    Hyperlipidemia     Hypertension     Type II or unspecified type diabetes mellitus without mention of complication, not stated as uncontrolled      Past Surgical History:   Procedure Laterality Date    CORONARY ANGIOPLASTY WITH STENT PLACEMENT      UPPER GASTROINTESTINAL ENDOSCOPY  8/12/15    w/bx,dilation      Prior to Admission medications    Medication Sig Start Date End Date Taking? Authorizing Provider   amoxicillin (AMOXIL) 875 MG tablet Take 1 tablet by mouth 2 times daily for 10 days 5/29/24 6/8/24 Yes Juliet Garcia, JOSE - CNP   clopidogrel (PLAVIX) 75 MG tablet Take 1 tablet by mouth daily 3/22/24  Yes Eric Saeed MD   tiZANidine  Impression: Regular astigmatism, bilateral: H52.223. Plan: Discussed diagnosis in detail with patient. New SRx was given today.

## 2024-06-03 ENCOUNTER — OFFICE VISIT (OUTPATIENT)
Dept: SURGERY | Age: 79
End: 2024-06-03

## 2024-06-03 VITALS
DIASTOLIC BLOOD PRESSURE: 84 MMHG | WEIGHT: 198 LBS | SYSTOLIC BLOOD PRESSURE: 126 MMHG | OXYGEN SATURATION: 96 % | TEMPERATURE: 97.1 F | BODY MASS INDEX: 38.87 KG/M2 | HEIGHT: 60 IN

## 2024-06-03 DIAGNOSIS — L02.91 ABSCESS: Primary | ICD-10-CM

## 2024-06-07 NOTE — PROGRESS NOTES
cavity.    Microbiology results reviewed with patient.  Completed course of amoxicillin-no further antibiotics necessary  Local wound care: continue daily WTD packing changes with 1/4\" nu-gauze  Follow up in clinic in 1 week       I have spent 30 minutes reviewing previous notes, test results and face to face with the patient discussing the diagnosis and importance of compliance with the treatment plan as well as documenting on the day of the visit.    Edith Cook MD   General surgeon    Electronically signed by Edith Cook MD

## 2024-06-09 LAB
CULTURE WOUND: ABNORMAL
CULTURE WOUND: ABNORMAL
ORGANISM: ABNORMAL

## 2024-06-10 ENCOUNTER — OFFICE VISIT (OUTPATIENT)
Dept: SURGERY | Age: 79
End: 2024-06-10

## 2024-06-10 VITALS
HEIGHT: 60 IN | WEIGHT: 196 LBS | HEART RATE: 64 BPM | DIASTOLIC BLOOD PRESSURE: 76 MMHG | TEMPERATURE: 97.3 F | SYSTOLIC BLOOD PRESSURE: 118 MMHG | OXYGEN SATURATION: 98 % | BODY MASS INDEX: 38.48 KG/M2

## 2024-06-10 DIAGNOSIS — Z51.89 VISIT FOR WOUND CHECK: Primary | ICD-10-CM

## 2024-06-10 PROCEDURE — 99024 POSTOP FOLLOW-UP VISIT: CPT | Performed by: SURGERY

## 2024-06-14 ENCOUNTER — OFFICE VISIT (OUTPATIENT)
Dept: FAMILY MEDICINE CLINIC | Age: 79
End: 2024-06-14
Payer: MEDICARE

## 2024-06-14 VITALS
HEART RATE: 57 BPM | WEIGHT: 196 LBS | HEIGHT: 60 IN | SYSTOLIC BLOOD PRESSURE: 110 MMHG | TEMPERATURE: 97.6 F | OXYGEN SATURATION: 96 % | BODY MASS INDEX: 38.48 KG/M2 | DIASTOLIC BLOOD PRESSURE: 70 MMHG

## 2024-06-14 DIAGNOSIS — E78.5 HYPERLIPIDEMIA, UNSPECIFIED HYPERLIPIDEMIA TYPE: ICD-10-CM

## 2024-06-14 DIAGNOSIS — L08.9 INFECTED SEBACEOUS CYST OF SKIN: ICD-10-CM

## 2024-06-14 DIAGNOSIS — M48.062 LUMBAR STENOSIS WITH NEUROGENIC CLAUDICATION: ICD-10-CM

## 2024-06-14 DIAGNOSIS — L72.3 INFECTED SEBACEOUS CYST OF SKIN: ICD-10-CM

## 2024-06-14 DIAGNOSIS — I10 ESSENTIAL HYPERTENSION: Primary | ICD-10-CM

## 2024-06-14 DIAGNOSIS — E11.59 TYPE 2 DIABETES MELLITUS WITH OTHER CIRCULATORY COMPLICATION, WITHOUT LONG-TERM CURRENT USE OF INSULIN (HCC): ICD-10-CM

## 2024-06-14 DIAGNOSIS — C90.00 MULTIPLE MYELOMA, REMISSION STATUS UNSPECIFIED (HCC): ICD-10-CM

## 2024-06-14 DIAGNOSIS — N18.31 STAGE 3A CHRONIC KIDNEY DISEASE (HCC): ICD-10-CM

## 2024-06-14 DIAGNOSIS — R29.898 WEAKNESS OF BOTH LOWER EXTREMITIES: ICD-10-CM

## 2024-06-14 LAB — HBA1C MFR BLD: 7.4 %

## 2024-06-14 PROCEDURE — 3078F DIAST BP <80 MM HG: CPT | Performed by: STUDENT IN AN ORGANIZED HEALTH CARE EDUCATION/TRAINING PROGRAM

## 2024-06-14 PROCEDURE — 99214 OFFICE O/P EST MOD 30 MIN: CPT | Performed by: STUDENT IN AN ORGANIZED HEALTH CARE EDUCATION/TRAINING PROGRAM

## 2024-06-14 PROCEDURE — G8399 PT W/DXA RESULTS DOCUMENT: HCPCS | Performed by: STUDENT IN AN ORGANIZED HEALTH CARE EDUCATION/TRAINING PROGRAM

## 2024-06-14 PROCEDURE — G8417 CALC BMI ABV UP PARAM F/U: HCPCS | Performed by: STUDENT IN AN ORGANIZED HEALTH CARE EDUCATION/TRAINING PROGRAM

## 2024-06-14 PROCEDURE — G8427 DOCREV CUR MEDS BY ELIG CLIN: HCPCS | Performed by: STUDENT IN AN ORGANIZED HEALTH CARE EDUCATION/TRAINING PROGRAM

## 2024-06-14 PROCEDURE — 1123F ACP DISCUSS/DSCN MKR DOCD: CPT | Performed by: STUDENT IN AN ORGANIZED HEALTH CARE EDUCATION/TRAINING PROGRAM

## 2024-06-14 PROCEDURE — 3051F HG A1C>EQUAL 7.0%<8.0%: CPT | Performed by: STUDENT IN AN ORGANIZED HEALTH CARE EDUCATION/TRAINING PROGRAM

## 2024-06-14 PROCEDURE — 1090F PRES/ABSN URINE INCON ASSESS: CPT | Performed by: STUDENT IN AN ORGANIZED HEALTH CARE EDUCATION/TRAINING PROGRAM

## 2024-06-14 PROCEDURE — 1036F TOBACCO NON-USER: CPT | Performed by: STUDENT IN AN ORGANIZED HEALTH CARE EDUCATION/TRAINING PROGRAM

## 2024-06-14 PROCEDURE — 83036 HEMOGLOBIN GLYCOSYLATED A1C: CPT | Performed by: STUDENT IN AN ORGANIZED HEALTH CARE EDUCATION/TRAINING PROGRAM

## 2024-06-14 PROCEDURE — 3074F SYST BP LT 130 MM HG: CPT | Performed by: STUDENT IN AN ORGANIZED HEALTH CARE EDUCATION/TRAINING PROGRAM

## 2024-06-14 RX ORDER — TIZANIDINE 4 MG/1
TABLET ORAL
Qty: 90 TABLET | Refills: 1 | Status: SHIPPED | OUTPATIENT
Start: 2024-06-14

## 2024-06-14 ASSESSMENT — ENCOUNTER SYMPTOMS
BACK PAIN: 1
WHEEZING: 0
RHINORRHEA: 0
VOMITING: 0
EYE DISCHARGE: 0
NAUSEA: 0
SORE THROAT: 0
DIARRHEA: 0
COUGH: 0
SHORTNESS OF BREATH: 0
ABDOMINAL PAIN: 0

## 2024-06-14 NOTE — PROGRESS NOTES
Subjective  Mckenna Mendoza, 78 y.o. female presents today with:  Chief Complaint   Patient presents with    Follow-up    Hypertension     Does not check BP at home. Denies chest pains, heart palpitations or headaches. Has occasional SOB & edema. States she has been having a lot of dizziness lately. Does follow with cardiology, Dr. Saeed.     Diabetes     A1c was 7.4 on 2/14/24. Does not check blood sugar at home.     Gastroesophageal Reflux     Controlled for the most, but does occasionally have some trouble.     Hyperlipidemia     Lipid panel done 5/5/23. Taking medication as directed, no side effects.     Extremity Weakness     States her legs have been feeling a little weak lately & would like to discuss.        Patient with 4-month appointment.    Patient with essential hypertension.  She is normotensive in the office today.  She does not check blood pressure at home.  She does follow with cardiology.  She has been having more dizziness lately.  Nothing new.  She is currently on Lasix 40 mg daily, lisinopril 2.5 mg daily, Toprol tartrate 50 mg twice daily.  Tolerating well.  No side effects or medication.  Regularly sees cardiology.    Patient is with type 2 diabetes mellitus.  Last A1c was 7.4%.  She does not check blood sugar at home.  She is on Jardiance 10 mg daily.  Tolerating well.  No side effects or medication.    Patient is with hyperlipidemia.  She is on atorvastatin 40 mg daily.  Tolerating well.  No muscle aches or pains.    Patient also with lumbar stenosis.  She uses gabapentin 600 mg nightly.  Working well.  No side effects or medication.  She does report that legs have been feeling more weak lately.  She states she has not been walking as much.  She is not interested in doing physical therapy.  She is more interested in walking more.  She is going to work on this.    Patient with multiple myeloma.  Has been following with hematology.  She states has been feeling more tired following treatment but

## 2024-06-16 NOTE — PROGRESS NOTES
GENERAL SURGERY WOUND CHECK VISIT    Pt Name: Mckenna Mendoza  MRN: [id number]  Date: 6/17/2024       SUBJECTIVE:   Mckenna Mendoza is a 78 y.o. female who presents for a follow up wound check. She underwent an infected upper back sebaceous cyst abscess I&D with packing 2 weeks ago. Completed course of amoxicillin last week (Wcx growing Finegoldia magna). Since then, patient reports doing well. The wound is not causing any significant pain. No drainage. Denies fevers, chills, and night sweats.     OBJECTIVE:   CURRENT VITALS: /82   Pulse 74   Ht 1.524 m (5')   Wt 89.4 kg (197 lb)   SpO2 97%   BMI 38.47 kg/m²      GEN: Alert and oriented x3, no acute distress, cooperative   SKIN: Skin color, texture, turgor normal. No rashes or lesions  HEENT: Head is normocephalic, atraumatic. EOMI  NECK: Supple, symmetrical, trachea midline, skin normal  PULM: Chest symmetric, no increased work of breathing or accessory muscle use  CV: Heart regular rate   BACK: upper central back wound that NT (improved), resolved surrounding erythema, no drainage able to be expressed, black pinpoint spot superiorally, no underlying fluctuance (see photo below, taken with verbal permission from the patient)   EXTREMITIES: Warm, dry         ASSESSMENT AND PLAN:   Mckenna Mendoza is a 78 y.o. female who underwent an infected upper back sebaceous cyst abscess I&D with packing 2 weeks ago. Wound is healing well and closing up without any ongoing signs/ symptoms of infection.     Local wound care: continue daily WTD packing changes with 1/4\" nu-gauze ( to continue to do)  Follow up in 1 week      I have spent 10 minutes reviewing previous notes, test results and face to face with the patient discussing the diagnosis and importance of compliance with the treatment plan as well as documenting on the day of the visit.    Edith Cook MD   General surgeon    Electronically signed by Edith Cook MD

## 2024-06-17 ENCOUNTER — OFFICE VISIT (OUTPATIENT)
Dept: SURGERY | Age: 79
End: 2024-06-17
Payer: MEDICARE

## 2024-06-17 VITALS
WEIGHT: 197 LBS | DIASTOLIC BLOOD PRESSURE: 82 MMHG | OXYGEN SATURATION: 97 % | HEIGHT: 60 IN | SYSTOLIC BLOOD PRESSURE: 128 MMHG | BODY MASS INDEX: 38.68 KG/M2 | HEART RATE: 74 BPM

## 2024-06-17 DIAGNOSIS — Z51.89 VISIT FOR WOUND CHECK: Primary | ICD-10-CM

## 2024-06-17 PROCEDURE — 1036F TOBACCO NON-USER: CPT | Performed by: SURGERY

## 2024-06-17 PROCEDURE — 99212 OFFICE O/P EST SF 10 MIN: CPT | Performed by: SURGERY

## 2024-06-17 PROCEDURE — 1090F PRES/ABSN URINE INCON ASSESS: CPT | Performed by: SURGERY

## 2024-06-17 PROCEDURE — 3074F SYST BP LT 130 MM HG: CPT | Performed by: SURGERY

## 2024-06-17 PROCEDURE — G8399 PT W/DXA RESULTS DOCUMENT: HCPCS | Performed by: SURGERY

## 2024-06-17 PROCEDURE — 1123F ACP DISCUSS/DSCN MKR DOCD: CPT | Performed by: SURGERY

## 2024-06-17 PROCEDURE — 3079F DIAST BP 80-89 MM HG: CPT | Performed by: SURGERY

## 2024-06-17 PROCEDURE — G8417 CALC BMI ABV UP PARAM F/U: HCPCS | Performed by: SURGERY

## 2024-06-17 PROCEDURE — G8427 DOCREV CUR MEDS BY ELIG CLIN: HCPCS | Performed by: SURGERY

## 2024-06-23 NOTE — PROGRESS NOTES
GENERAL SURGERY WOUND CHECK VISIT    Pt Name: Mckenna Mendoza  MRN: [id number]  Date: 6/24/2024       SUBJECTIVE:   Mckenna Mendoza is a 78 y.o. female who presents for a follow up wound check. She underwent an infected upper back sebaceous cyst abscess I&D with packing 3 weeks ago. Completed course of amoxicillin (Wcx growing Finegoldia magna).  Upon presentation today, patient is doing well.  Her  stopped packing the lidocaine days ago as it is too superficial for any packing to be placed.  Denies any associated pain.  No drainage, fevers, chills, and night sweats.    OBJECTIVE:   CURRENT VITALS: /80   Pulse 64   Temp 97.6 °F (36.4 °C)   Ht 1.524 m (5')   Wt 89.4 kg (197 lb)   SpO2 98%   BMI 38.47 kg/m²      GEN: Alert and oriented x3, no acute distress, cooperative   SKIN: Skin color, texture, turgor normal. No rashes or lesions  HEENT: Head is normocephalic, atraumatic. EOMI  NECK: Supple, symmetrical, trachea midline, skin normal  PULM: Chest symmetric, no increased work of breathing or accessory muscle use  CV: Heart regular rate   BACK: Nontender upper central back   superficial wound with healthy underlying tissue, no surrounding erythema, no drainage able to be expressed, black pinpoint spot superiorally, no underlying fluctuance, mild surrounding hyperpigmentation (see photo below, taken with verbal permission from the patient)   EXTREMITIES: Warm, dry          ASSESSMENT AND PLAN:   Mckenna Mendoza is a 78 y.o. female who underwent an infected upper back sebaceous cyst abscess I&D with packing 3 weeks ago. Wound is healing well and nearly closed with a superficial wound and healthy underlying tissue.  No ongoing signs/symptoms of infection.      Local wound care: No additional packing, use antibacterial soap with showers, keep clean and dry: Cover with a Band-Aid   Return to clinic in 2 months for possible that mass excision if the wound is completely healed      I have spent 20 minutes

## 2024-06-24 ENCOUNTER — OFFICE VISIT (OUTPATIENT)
Dept: SURGERY | Age: 79
End: 2024-06-24
Payer: MEDICARE

## 2024-06-24 VITALS
DIASTOLIC BLOOD PRESSURE: 80 MMHG | HEIGHT: 60 IN | BODY MASS INDEX: 38.68 KG/M2 | TEMPERATURE: 97.6 F | OXYGEN SATURATION: 98 % | WEIGHT: 197 LBS | HEART RATE: 64 BPM | SYSTOLIC BLOOD PRESSURE: 126 MMHG

## 2024-06-24 DIAGNOSIS — Z51.89 VISIT FOR WOUND CHECK: Primary | ICD-10-CM

## 2024-06-24 PROCEDURE — 1036F TOBACCO NON-USER: CPT | Performed by: SURGERY

## 2024-06-24 PROCEDURE — 3074F SYST BP LT 130 MM HG: CPT | Performed by: SURGERY

## 2024-06-24 PROCEDURE — G8417 CALC BMI ABV UP PARAM F/U: HCPCS | Performed by: SURGERY

## 2024-06-24 PROCEDURE — 3079F DIAST BP 80-89 MM HG: CPT | Performed by: SURGERY

## 2024-06-24 PROCEDURE — 1090F PRES/ABSN URINE INCON ASSESS: CPT | Performed by: SURGERY

## 2024-06-24 PROCEDURE — 1123F ACP DISCUSS/DSCN MKR DOCD: CPT | Performed by: SURGERY

## 2024-06-24 PROCEDURE — G8399 PT W/DXA RESULTS DOCUMENT: HCPCS | Performed by: SURGERY

## 2024-06-24 PROCEDURE — G8427 DOCREV CUR MEDS BY ELIG CLIN: HCPCS | Performed by: SURGERY

## 2024-06-24 PROCEDURE — 99213 OFFICE O/P EST LOW 20 MIN: CPT | Performed by: SURGERY

## 2024-07-11 ENCOUNTER — APPOINTMENT (OUTPATIENT)
Dept: CT IMAGING | Age: 79
End: 2024-07-11
Payer: MEDICARE

## 2024-07-11 ENCOUNTER — HOSPITAL ENCOUNTER (OUTPATIENT)
Age: 79
Setting detail: OBSERVATION
Discharge: HOME | End: 2024-07-12
Attending: EMERGENCY MEDICINE | Admitting: INTERNAL MEDICINE
Payer: MEDICARE

## 2024-07-11 ENCOUNTER — APPOINTMENT (OUTPATIENT)
Dept: GENERAL RADIOLOGY | Age: 79
End: 2024-07-11
Payer: MEDICARE

## 2024-07-11 DIAGNOSIS — R53.1 GENERALIZED WEAKNESS: ICD-10-CM

## 2024-07-11 DIAGNOSIS — R07.9 CHEST PAIN, UNSPECIFIED TYPE: Primary | ICD-10-CM

## 2024-07-11 DIAGNOSIS — R07.89 OTHER CHEST PAIN: ICD-10-CM

## 2024-07-11 DIAGNOSIS — I26.99 PULMONARY EMBOLISM AND INFARCTION (HCC): ICD-10-CM

## 2024-07-11 DIAGNOSIS — R06.02 SHORTNESS OF BREATH: ICD-10-CM

## 2024-07-11 DIAGNOSIS — I26.99 OTHER ACUTE PULMONARY EMBOLISM WITHOUT ACUTE COR PULMONALE (HCC): ICD-10-CM

## 2024-07-11 LAB
ALBUMIN SERPL-MCNC: 4.2 G/DL (ref 3.5–4.6)
ALP SERPL-CCNC: 96 U/L (ref 40–130)
ALT SERPL-CCNC: 14 U/L (ref 0–33)
ANION GAP SERPL CALCULATED.3IONS-SCNC: 12 MEQ/L (ref 9–15)
AST SERPL-CCNC: 17 U/L (ref 0–35)
BASOPHILS # BLD: 0 K/UL (ref 0–0.2)
BASOPHILS NFR BLD: 0.6 %
BILIRUB SERPL-MCNC: 0.7 MG/DL (ref 0.2–0.7)
BNP BLD-MCNC: 318 PG/ML
BUN SERPL-MCNC: 18 MG/DL (ref 8–23)
CALCIUM SERPL-MCNC: 9.9 MG/DL (ref 8.5–9.9)
CHLORIDE SERPL-SCNC: 105 MEQ/L (ref 95–107)
CO2 SERPL-SCNC: 25 MEQ/L (ref 20–31)
CREAT SERPL-MCNC: 1.28 MG/DL (ref 0.5–0.9)
D DIMER PPP FEU-MCNC: 1.54 MG/L FEU (ref 0–0.5)
EOSINOPHIL # BLD: 0 K/UL (ref 0–0.7)
EOSINOPHIL NFR BLD: 0.4 %
ERYTHROCYTE [DISTWIDTH] IN BLOOD BY AUTOMATED COUNT: 17 % (ref 11.5–14.5)
GLOBULIN SER CALC-MCNC: 2.4 G/DL (ref 2.3–3.5)
GLUCOSE BLD-MCNC: 195 MG/DL (ref 70–99)
GLUCOSE SERPL-MCNC: 137 MG/DL (ref 70–99)
HCT VFR BLD AUTO: 39.4 % (ref 37–47)
HGB BLD-MCNC: 12.5 G/DL (ref 12–16)
INR PPP: 0.9
LIPASE SERPL-CCNC: 37 U/L (ref 12–95)
LYMPHOCYTES # BLD: 0.8 K/UL (ref 1–4.8)
LYMPHOCYTES NFR BLD: 12 %
MAGNESIUM SERPL-MCNC: 2.2 MG/DL (ref 1.7–2.4)
MCH RBC QN AUTO: 33.2 PG (ref 27–31.3)
MCHC RBC AUTO-ENTMCNC: 31.7 % (ref 33–37)
MCV RBC AUTO: 104.8 FL (ref 79.4–94.8)
MONOCYTES # BLD: 0.1 K/UL (ref 0.2–0.8)
MONOCYTES NFR BLD: 1.7 %
NEUTROPHILS # BLD: 5.8 K/UL (ref 1.4–6.5)
NEUTS SEG NFR BLD: 84 %
PERFORMED ON: ABNORMAL
PERFORMED ON: ABNORMAL
PLATELET # BLD AUTO: 248 K/UL (ref 130–400)
POC CREATININE: 1.5 MG/DL (ref 0.6–1.2)
POC SAMPLE TYPE: ABNORMAL
POTASSIUM SERPL-SCNC: 4.3 MEQ/L (ref 3.4–4.9)
PROT SERPL-MCNC: 6.6 G/DL (ref 6.3–8)
PROTHROMBIN TIME: 12.8 SEC (ref 12.3–14.9)
RBC # BLD AUTO: 3.76 M/UL (ref 4.2–5.4)
SODIUM SERPL-SCNC: 142 MEQ/L (ref 135–144)
TROPONIN, HIGH SENSITIVITY: 61 NG/L (ref 0–19)
TROPONIN, HIGH SENSITIVITY: 63 NG/L (ref 0–19)
TSH SERPL-MCNC: 3.08 UIU/ML (ref 0.44–3.86)
WBC # BLD AUTO: 6.9 K/UL (ref 4.8–10.8)

## 2024-07-11 PROCEDURE — 2580000003 HC RX 258: Performed by: INTERNAL MEDICINE

## 2024-07-11 PROCEDURE — G0378 HOSPITAL OBSERVATION PER HR: HCPCS

## 2024-07-11 PROCEDURE — 6370000000 HC RX 637 (ALT 250 FOR IP): Performed by: INTERNAL MEDICINE

## 2024-07-11 PROCEDURE — 96372 THER/PROPH/DIAG INJ SC/IM: CPT

## 2024-07-11 PROCEDURE — 84484 ASSAY OF TROPONIN QUANT: CPT

## 2024-07-11 PROCEDURE — 83735 ASSAY OF MAGNESIUM: CPT

## 2024-07-11 PROCEDURE — 71275 CT ANGIOGRAPHY CHEST: CPT

## 2024-07-11 PROCEDURE — 99285 EMERGENCY DEPT VISIT HI MDM: CPT

## 2024-07-11 PROCEDURE — 83880 ASSAY OF NATRIURETIC PEPTIDE: CPT

## 2024-07-11 PROCEDURE — 84443 ASSAY THYROID STIM HORMONE: CPT

## 2024-07-11 PROCEDURE — 93005 ELECTROCARDIOGRAM TRACING: CPT | Performed by: EMERGENCY MEDICINE

## 2024-07-11 PROCEDURE — 85379 FIBRIN DEGRADATION QUANT: CPT

## 2024-07-11 PROCEDURE — 71045 X-RAY EXAM CHEST 1 VIEW: CPT

## 2024-07-11 PROCEDURE — 80053 COMPREHEN METABOLIC PANEL: CPT

## 2024-07-11 PROCEDURE — 6360000004 HC RX CONTRAST MEDICATION: Performed by: EMERGENCY MEDICINE

## 2024-07-11 PROCEDURE — 83690 ASSAY OF LIPASE: CPT

## 2024-07-11 PROCEDURE — 85025 COMPLETE CBC W/AUTO DIFF WBC: CPT

## 2024-07-11 PROCEDURE — 85610 PROTHROMBIN TIME: CPT

## 2024-07-11 PROCEDURE — 6360000002 HC RX W HCPCS: Performed by: INTERNAL MEDICINE

## 2024-07-11 RX ORDER — SODIUM CHLORIDE 0.9 % (FLUSH) 0.9 %
10 SYRINGE (ML) INJECTION EVERY 12 HOURS SCHEDULED
Status: DISCONTINUED | OUTPATIENT
Start: 2024-07-11 | End: 2024-07-12 | Stop reason: HOSPADM

## 2024-07-11 RX ORDER — ACYCLOVIR 400 MG/1
400 TABLET ORAL 2 TIMES DAILY
Status: DISCONTINUED | OUTPATIENT
Start: 2024-07-12 | End: 2024-07-12 | Stop reason: HOSPADM

## 2024-07-11 RX ORDER — METOPROLOL TARTRATE 50 MG/1
50 TABLET, FILM COATED ORAL 2 TIMES DAILY
Status: DISCONTINUED | OUTPATIENT
Start: 2024-07-11 | End: 2024-07-12 | Stop reason: HOSPADM

## 2024-07-11 RX ORDER — DEXTROSE MONOHYDRATE 100 MG/ML
INJECTION, SOLUTION INTRAVENOUS CONTINUOUS PRN
Status: DISCONTINUED | OUTPATIENT
Start: 2024-07-11 | End: 2024-07-12 | Stop reason: HOSPADM

## 2024-07-11 RX ORDER — CLOPIDOGREL BISULFATE 75 MG/1
75 TABLET ORAL DAILY
Status: DISCONTINUED | OUTPATIENT
Start: 2024-07-12 | End: 2024-07-12 | Stop reason: HOSPADM

## 2024-07-11 RX ORDER — ATORVASTATIN CALCIUM 40 MG/1
40 TABLET, FILM COATED ORAL DAILY
Status: DISCONTINUED | OUTPATIENT
Start: 2024-07-12 | End: 2024-07-12 | Stop reason: HOSPADM

## 2024-07-11 RX ORDER — POLYETHYLENE GLYCOL 3350 17 G/17G
17 POWDER, FOR SOLUTION ORAL DAILY PRN
Status: DISCONTINUED | OUTPATIENT
Start: 2024-07-11 | End: 2024-07-12 | Stop reason: HOSPADM

## 2024-07-11 RX ORDER — ENOXAPARIN SODIUM 100 MG/ML
1 INJECTION SUBCUTANEOUS 2 TIMES DAILY
Status: DISCONTINUED | OUTPATIENT
Start: 2024-07-11 | End: 2024-07-12

## 2024-07-11 RX ORDER — LISINOPRIL 5 MG/1
2.5 TABLET ORAL DAILY
Status: DISCONTINUED | OUTPATIENT
Start: 2024-07-12 | End: 2024-07-12 | Stop reason: HOSPADM

## 2024-07-11 RX ORDER — GABAPENTIN 300 MG/1
600 CAPSULE ORAL NIGHTLY
Status: DISCONTINUED | OUTPATIENT
Start: 2024-07-11 | End: 2024-07-12 | Stop reason: HOSPADM

## 2024-07-11 RX ORDER — ACETAMINOPHEN 650 MG/1
650 SUPPOSITORY RECTAL EVERY 6 HOURS PRN
Status: DISCONTINUED | OUTPATIENT
Start: 2024-07-11 | End: 2024-07-12 | Stop reason: HOSPADM

## 2024-07-11 RX ORDER — SODIUM CHLORIDE 0.9 % (FLUSH) 0.9 %
10 SYRINGE (ML) INJECTION PRN
Status: DISCONTINUED | OUTPATIENT
Start: 2024-07-11 | End: 2024-07-12 | Stop reason: HOSPADM

## 2024-07-11 RX ORDER — ENOXAPARIN SODIUM 100 MG/ML
1 INJECTION SUBCUTANEOUS ONCE
Status: DISCONTINUED | OUTPATIENT
Start: 2024-07-11 | End: 2024-07-11

## 2024-07-11 RX ORDER — ACETAMINOPHEN 325 MG/1
650 TABLET ORAL EVERY 6 HOURS PRN
Status: DISCONTINUED | OUTPATIENT
Start: 2024-07-11 | End: 2024-07-12 | Stop reason: HOSPADM

## 2024-07-11 RX ORDER — SODIUM CHLORIDE 9 MG/ML
INJECTION, SOLUTION INTRAVENOUS PRN
Status: DISCONTINUED | OUTPATIENT
Start: 2024-07-11 | End: 2024-07-12 | Stop reason: HOSPADM

## 2024-07-11 RX ORDER — INSULIN LISPRO 100 [IU]/ML
0-4 INJECTION, SOLUTION INTRAVENOUS; SUBCUTANEOUS NIGHTLY
Status: DISCONTINUED | OUTPATIENT
Start: 2024-07-11 | End: 2024-07-12 | Stop reason: HOSPADM

## 2024-07-11 RX ORDER — TIZANIDINE 4 MG/1
4 TABLET ORAL NIGHTLY
Status: DISCONTINUED | OUTPATIENT
Start: 2024-07-11 | End: 2024-07-12 | Stop reason: HOSPADM

## 2024-07-11 RX ORDER — INSULIN LISPRO 100 [IU]/ML
0-4 INJECTION, SOLUTION INTRAVENOUS; SUBCUTANEOUS
Status: DISCONTINUED | OUTPATIENT
Start: 2024-07-11 | End: 2024-07-12 | Stop reason: HOSPADM

## 2024-07-11 RX ORDER — GLUCAGON 1 MG/ML
1 KIT INJECTION PRN
Status: DISCONTINUED | OUTPATIENT
Start: 2024-07-11 | End: 2024-07-12 | Stop reason: HOSPADM

## 2024-07-11 RX ORDER — ONDANSETRON 2 MG/ML
4 INJECTION INTRAMUSCULAR; INTRAVENOUS EVERY 6 HOURS PRN
Status: DISCONTINUED | OUTPATIENT
Start: 2024-07-11 | End: 2024-07-12 | Stop reason: HOSPADM

## 2024-07-11 RX ORDER — PANTOPRAZOLE SODIUM 40 MG/1
40 TABLET, DELAYED RELEASE ORAL
Status: DISCONTINUED | OUTPATIENT
Start: 2024-07-12 | End: 2024-07-12 | Stop reason: HOSPADM

## 2024-07-11 RX ORDER — ONDANSETRON 4 MG/1
4 TABLET, ORALLY DISINTEGRATING ORAL EVERY 8 HOURS PRN
Status: DISCONTINUED | OUTPATIENT
Start: 2024-07-11 | End: 2024-07-12 | Stop reason: HOSPADM

## 2024-07-11 RX ADMIN — Medication 10 ML: at 23:33

## 2024-07-11 RX ADMIN — GABAPENTIN 600 MG: 300 CAPSULE ORAL at 21:51

## 2024-07-11 RX ADMIN — TIZANIDINE 4 MG: 4 TABLET ORAL at 21:52

## 2024-07-11 RX ADMIN — IOPAMIDOL 75 ML: 755 INJECTION, SOLUTION INTRAVENOUS at 14:55

## 2024-07-11 RX ADMIN — ENOXAPARIN SODIUM 90 MG: 100 INJECTION SUBCUTANEOUS at 21:53

## 2024-07-11 RX ADMIN — METOPROLOL TARTRATE 50 MG: 50 TABLET, FILM COATED ORAL at 21:52

## 2024-07-11 ASSESSMENT — LIFESTYLE VARIABLES
HOW MANY STANDARD DRINKS CONTAINING ALCOHOL DO YOU HAVE ON A TYPICAL DAY: PATIENT DOES NOT DRINK
HOW OFTEN DO YOU HAVE A DRINK CONTAINING ALCOHOL: NEVER

## 2024-07-11 ASSESSMENT — PAIN SCALES - GENERAL
PAINLEVEL_OUTOF10: 0

## 2024-07-11 ASSESSMENT — PAIN - FUNCTIONAL ASSESSMENT
PAIN_FUNCTIONAL_ASSESSMENT: NONE - DENIES PAIN
PAIN_FUNCTIONAL_ASSESSMENT: ACTIVITIES ARE NOT PREVENTED

## 2024-07-11 NOTE — FLOWSHEET NOTE
1750- Patient arrived to unit from ER. Patient is awake and alert. Denies any SOB at this time. Patient is from home. Head to toe assessment was completed and documented. Home meds were reviewed with patient and completed    1800- Attending in with patient to discuss plan of care. Blood thinner to be ordered.       Electronically signed by Ashlyn Irizarry RN on 7/11/2024 at 6:43 PM

## 2024-07-11 NOTE — ED PROVIDER NOTES
[facility] ED  eMERGENCY dEPARTMENT eNCOUnter      Pt Name: Mckenna Mendoza  MRN: [id number]  Birthdate 1945  Date of evaluation: 7/11/2024  Provider: Jourdan Raymundo MD    CHIEF COMPLAINT       Chief Complaint   Patient presents with    Chest Pain     Chest pain, dizziness, shortness of breath.          HISTORY OF PRESENT ILLNESS   (Location/Symptom, Timing/Onset,Context/Setting, Quality, Duration, Modifying Factors, Severity)  Note limiting factors.   Mckenna Mendoza is a 78 y.o. female who presents to the emergency department with a complaint of lightheadedness chest pain and shortness of breath started yesterday.  No aggravating or alleviating factors.  Mild chest pain.  Patient states that activity makes the shortness of breath worse rest makes it better.  No other complaints or concerns    HPI    NursingNotes were reviewed.    REVIEW OF SYSTEMS    (2-9 systems for level 4, 10 or more for level 5)     Review of Systems   All other systems reviewed and are negative.      Except as noted above the remainder of the review of systems was reviewed and negative.       PAST MEDICAL HISTORY     Past Medical History:   Diagnosis Date    Cancer (HCC)     Encounter for lumbar puncture 05/25/2022    Completed by Dr. Mccormick - diagnostics sent    Hyperlipidemia     Hypertension     Type II or unspecified type diabetes mellitus without mention of complication, not stated as uncontrolled          SURGICALHISTORY       Past Surgical History:   Procedure Laterality Date    CORONARY ANGIOPLASTY WITH STENT PLACEMENT      CYST REMOVAL  06/03/2024    cyst removed from back    UPPER GASTROINTESTINAL ENDOSCOPY  08/12/2015    w/bx,dilation          CURRENT MEDICATIONS       Previous Medications    ACYCLOVIR (ZOVIRAX) 400 MG TABLET    Take 1 tablet by mouth    ALBUTEROL SULFATE HFA (VENTOLIN HFA) 108 (90 BASE) MCG/ACT INHALER    Inhale 2 puffs into the lungs 4 times daily as needed for Wheezing    ATORVASTATIN  Normocephalic.      Nose: Nose normal.      Mouth/Throat:      Mouth: Mucous membranes are moist.      Pharynx: Oropharynx is clear.   Eyes:      Extraocular Movements: Extraocular movements intact.      Conjunctiva/sclera: Conjunctivae normal.   Cardiovascular:      Rate and Rhythm: Normal rate and regular rhythm.   Pulmonary:      Effort: Pulmonary effort is normal.      Breath sounds: Normal breath sounds.   Musculoskeletal:      Cervical back: Normal range of motion and neck supple.      Right lower leg: Edema present.      Left lower leg: Edema present.   Skin:     General: Skin is warm.      Capillary Refill: Capillary refill takes less than 2 seconds.   Neurological:      General: No focal deficit present.      Mental Status: She is alert and oriented to person, place, and time.   Psychiatric:         Mood and Affect: Mood normal.         Behavior: Behavior normal.         DIAGNOSTIC RESULTS     EKG: All EKG's are interpreted by the Emergency Department Physician who either signs or Co-signsthis chart in the absence of a cardiologist.    My interpretation of the EKG is sinus bradycardia with PACs 55 bpm NJ interval 200 ms QRS 70 ms QT QTc 436 and 417 ms respectively no acute ST elevation or depression no acute ischemic changes noted.        RADIOLOGY:   Non-plain filmimages such as CT, Ultrasound and MRI are read by the radiologist. Plain radiographic images are visualized and preliminarily interpreted by the emergency physician with the below findings:        Interpretation per the Radiologist below, if available at the time ofthis note:    CTA CHEST W WO CONTRAST PE Eval   Final Result   Anterior segment and posterior segment right lower lobe pulmonary arterial   nonocclusive emboli.      Critical results were called by Dr. Jamison Zamora to Dr. Jourdan Raymundo  on   7/11/2024 at 15:48.         XR CHEST PORTABLE   Final Result   1. No acute cardiopulmonary process.   2. Diffuse, chronic interstitial markings

## 2024-07-11 NOTE — H&P
Hospital Medicine  History and Physical    Patient:  Mckenna Mendoza  MRN: [id number]    CHIEF COMPLAINT:    Chief Complaint   Patient presents with    Chest Pain     Chest pain, dizziness, shortness of breath.        History Obtained From:  Patient, EMR  Primary Care Physician: Twila Maher DO    HISTORY OF PRESENT ILLNESS:   The patient is a 78 y.o. female with PMH of MM, HTN, HLD, DMII who presents with chest pain.    Patient was getting ready for her pre chemo treatment when she developed sudden chest pain.  She states she does get lower extremity edema when she eats salty foods and had done so with her L > R being chronic so she was not alarmed.    Patient denies  fevers, chills, sweats, diarrhea or burning micturition.      Past Medical History:      Diagnosis Date    Cancer (HCC)     Encounter for lumbar puncture 05/25/2022    Completed by Dr. Mccormick - diagnostics sent    Hyperlipidemia     Hypertension     Type II or unspecified type diabetes mellitus without mention of complication, not stated as uncontrolled        Past Surgical History:      Procedure Laterality Date    CORONARY ANGIOPLASTY WITH STENT PLACEMENT      CYST REMOVAL  06/03/2024    cyst removed from back    UPPER GASTROINTESTINAL ENDOSCOPY  08/12/2015    w/bx,dilation        Medications Prior to Admission:    Prior to Admission medications    Medication Sig Start Date End Date Taking? Authorizing Provider   tiZANidine (ZANAFLEX) 4 MG tablet TAKE 1 TABLET BY MOUTH NIGHTLY AS NEEDED FOR MUSCLE SPASM 6/14/24   Twila Maher DO   clopidogrel (PLAVIX) 75 MG tablet Take 1 tablet by mouth daily 3/22/24   Eric Saeed MD   acyclovir (ZOVIRAX) 400 MG tablet Take 1 tablet by mouth  Patient not taking: Reported on 7/11/2024 2/7/24   ProviderCammy MD   fluticasone (FLONASE) 50 MCG/ACT nasal spray 1 spray by Each Nostril route daily 2/14/24   Twila Maher DO   lisinopril (PRINIVIL;ZESTRIL) 2.5 MG tablet Take 1 tablet by

## 2024-07-12 ENCOUNTER — APPOINTMENT (OUTPATIENT)
Dept: ULTRASOUND IMAGING | Age: 79
End: 2024-07-12
Payer: MEDICARE

## 2024-07-12 ENCOUNTER — APPOINTMENT (OUTPATIENT)
Age: 79
End: 2024-07-12
Attending: INTERNAL MEDICINE
Payer: MEDICARE

## 2024-07-12 VITALS
RESPIRATION RATE: 18 BRPM | DIASTOLIC BLOOD PRESSURE: 61 MMHG | BODY MASS INDEX: 39.46 KG/M2 | HEIGHT: 60 IN | HEART RATE: 57 BPM | SYSTOLIC BLOOD PRESSURE: 123 MMHG | WEIGHT: 201 LBS | TEMPERATURE: 97.7 F | OXYGEN SATURATION: 100 %

## 2024-07-12 LAB
ANION GAP SERPL CALCULATED.3IONS-SCNC: 12 MEQ/L (ref 9–15)
ANISOCYTOSIS BLD QL SMEAR: ABNORMAL
BASOPHILS # BLD: 0.1 K/UL (ref 0–0.2)
BASOPHILS NFR BLD: 1 %
BUN SERPL-MCNC: 23 MG/DL (ref 8–23)
BURR CELLS: ABNORMAL
CALCIUM SERPL-MCNC: 9.6 MG/DL (ref 8.5–9.9)
CHLORIDE SERPL-SCNC: 107 MEQ/L (ref 95–107)
CO2 SERPL-SCNC: 23 MEQ/L (ref 20–31)
CREAT SERPL-MCNC: 1.28 MG/DL (ref 0.5–0.9)
EKG ATRIAL RATE: 55 BPM
EKG P AXIS: 48 DEGREES
EKG P-R INTERVAL: 200 MS
EKG Q-T INTERVAL: 436 MS
EKG QRS DURATION: 70 MS
EKG QTC CALCULATION (BAZETT): 417 MS
EKG R AXIS: -1 DEGREES
EKG T AXIS: 25 DEGREES
EKG VENTRICULAR RATE: 55 BPM
EOSINOPHIL # BLD: 0.1 K/UL (ref 0–0.7)
EOSINOPHIL NFR BLD: 1 %
ERYTHROCYTE [DISTWIDTH] IN BLOOD BY AUTOMATED COUNT: 17.1 % (ref 11.5–14.5)
ESTIMATED AVERAGE GLUCOSE: 157 MG/DL
GLUCOSE BLD-MCNC: 121 MG/DL (ref 70–99)
GLUCOSE BLD-MCNC: 138 MG/DL (ref 70–99)
GLUCOSE SERPL-MCNC: 118 MG/DL (ref 70–99)
HBA1C MFR BLD: 7.1 % (ref 4–6)
HCT VFR BLD AUTO: 33.7 % (ref 37–47)
HGB BLD-MCNC: 11.1 G/DL (ref 12–16)
LYMPHOCYTES # BLD: 1.3 K/UL (ref 1–4.8)
LYMPHOCYTES NFR BLD: 17 %
MACROCYTES BLD QL SMEAR: ABNORMAL
MCH RBC QN AUTO: 33.3 PG (ref 27–31.3)
MCHC RBC AUTO-ENTMCNC: 32.9 % (ref 33–37)
MCV RBC AUTO: 101.2 FL (ref 79.4–94.8)
MONOCYTES # BLD: 0.3 K/UL (ref 0.2–0.8)
MONOCYTES NFR BLD: 3.9 %
NEUTROPHILS # BLD: 6.1 K/UL (ref 1.4–6.5)
NEUTS SEG NFR BLD: 78 %
NRBC BLD-RTO: 3 /100 WBC
OVALOCYTES BLD QL SMEAR: ABNORMAL
PERFORMED ON: ABNORMAL
PERFORMED ON: ABNORMAL
PLATELET # BLD AUTO: 240 K/UL (ref 130–400)
PLATELET BLD QL SMEAR: ADEQUATE
POIKILOCYTOSIS BLD QL SMEAR: ABNORMAL
POTASSIUM SERPL-SCNC: 4.2 MEQ/L (ref 3.4–4.9)
RBC # BLD AUTO: 3.33 M/UL (ref 4.2–5.4)
SLIDE REVIEW: ABNORMAL
SMUDGE CELLS BLD QL SMEAR: 18.4
SODIUM SERPL-SCNC: 142 MEQ/L (ref 135–144)
WBC # BLD AUTO: 7.8 K/UL (ref 4.8–10.8)

## 2024-07-12 PROCEDURE — 80048 BASIC METABOLIC PNL TOTAL CA: CPT

## 2024-07-12 PROCEDURE — 36415 COLL VENOUS BLD VENIPUNCTURE: CPT

## 2024-07-12 PROCEDURE — 93970 EXTREMITY STUDY: CPT

## 2024-07-12 PROCEDURE — 96372 THER/PROPH/DIAG INJ SC/IM: CPT

## 2024-07-12 PROCEDURE — 2580000003 HC RX 258: Performed by: INTERNAL MEDICINE

## 2024-07-12 PROCEDURE — 93010 ELECTROCARDIOGRAM REPORT: CPT | Performed by: INTERNAL MEDICINE

## 2024-07-12 PROCEDURE — 97162 PT EVAL MOD COMPLEX 30 MIN: CPT

## 2024-07-12 PROCEDURE — 97166 OT EVAL MOD COMPLEX 45 MIN: CPT

## 2024-07-12 PROCEDURE — 85025 COMPLETE CBC W/AUTO DIFF WBC: CPT

## 2024-07-12 PROCEDURE — G0378 HOSPITAL OBSERVATION PER HR: HCPCS

## 2024-07-12 PROCEDURE — 6370000000 HC RX 637 (ALT 250 FOR IP): Performed by: INTERNAL MEDICINE

## 2024-07-12 PROCEDURE — 83036 HEMOGLOBIN GLYCOSYLATED A1C: CPT

## 2024-07-12 PROCEDURE — 6360000002 HC RX W HCPCS: Performed by: INTERNAL MEDICINE

## 2024-07-12 RX ORDER — AMOXICILLIN AND CLAVULANATE POTASSIUM 875; 125 MG/1; MG/1
1 TABLET, FILM COATED ORAL EVERY 12 HOURS SCHEDULED
Qty: 14 TABLET | Refills: 0 | Status: SHIPPED | OUTPATIENT
Start: 2024-07-12 | End: 2024-07-19

## 2024-07-12 RX ORDER — AMOXICILLIN AND CLAVULANATE POTASSIUM 875; 125 MG/1; MG/1
1 TABLET, FILM COATED ORAL EVERY 12 HOURS SCHEDULED
Status: DISCONTINUED | OUTPATIENT
Start: 2024-07-12 | End: 2024-07-12 | Stop reason: HOSPADM

## 2024-07-12 RX ADMIN — Medication 10 ML: at 08:17

## 2024-07-12 RX ADMIN — PANTOPRAZOLE SODIUM 40 MG: 40 TABLET, DELAYED RELEASE ORAL at 05:35

## 2024-07-12 RX ADMIN — CLOPIDOGREL BISULFATE 75 MG: 75 TABLET ORAL at 08:17

## 2024-07-12 RX ADMIN — LISINOPRIL 2.5 MG: 5 TABLET ORAL at 08:17

## 2024-07-12 RX ADMIN — ATORVASTATIN CALCIUM 40 MG: 40 TABLET, FILM COATED ORAL at 08:16

## 2024-07-12 RX ADMIN — ACYCLOVIR 400 MG: 400 TABLET ORAL at 08:16

## 2024-07-12 RX ADMIN — METOPROLOL TARTRATE 50 MG: 50 TABLET, FILM COATED ORAL at 08:16

## 2024-07-12 RX ADMIN — ENOXAPARIN SODIUM 90 MG: 100 INJECTION SUBCUTANEOUS at 08:16

## 2024-07-12 RX ADMIN — AMOXICILLIN AND CLAVULANATE POTASSIUM 1 TABLET: 875; 125 TABLET, FILM COATED ORAL at 15:58

## 2024-07-12 ASSESSMENT — PAIN SCALES - GENERAL
PAINLEVEL_OUTOF10: 0

## 2024-07-12 NOTE — PROGRESS NOTES
Wears glasses at all times  Hearing  Hearing: Exceptions to WFL  Hearing Exceptions: Hard of hearing/hearing concerns;Bilateral hearing aid (Does not wear)   Vision - Basic Assessment  Prior Vision: Wears glasses all the time  Visual History: No significant visual history  Patient Visual Report: No visual complaint reported.  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)    Hand Dominance:  Hand Dominance  Hand Dominance: Right    ADL Status:  ADL  Feeding: Independent  Grooming: Setup  UE Bathing: Setup  LE Bathing: Minimal assistance  UE Dressing: Setup  LE Dressing: Moderate assistance  Toileting: Moderate assistance  Additional Comments: Simulated ADLs as above, limited d/t decreased balance and endurance  Toilet Transfers  Toilet Transfer: Unable to assess  Toilet Transfers Comments: Anticipate close SBA    Functional Mobility:    Transfers  Sit to stand: Stand by assistance  Stand to sit: Stand by assistance    Patient ambulated to bedside chair with Bed rail at SBA level. Slow moving, reaching for bed, table and wall for safety. Rigid, slow moving, increased effort for navigating turns.     Bed Mobility  Bed mobility  Rolling to Left: Stand by assistance  Supine to Sit: Stand by assistance  Sit to Supine: Unable to assess  Bed Mobility Comments: Increased time and effort. Up to chair    Seated and Standing Balance:  Balance  Sitting: Intact  Standing: Impaired  Standing - Static: Good  Standing - Dynamic: Fair    Functional Endurance:  Activity Tolerance  Activity    Independent/Mod I = Pt. is able to perform task with no assistance but may require a device   Stand by assistance = Pt. does not perform task at an independent level but does not need physical assistance, requires verbal cues  Minimal, Moderate, Maximal Assistance = Pt. requires physical assistance (25%, 50%, 75% assist from helper) for task but is able to actively participate in task   Dependent = Pt. requires total assistance with task and is not able to actively participate with task completion     Plan:  Occupational Therapy Plan  Times Per Week: 1-4x  Therapy Duration:  (Length of acute stay)  Current Treatment Recommendations: Balance training, Functional mobility training, Strengthening, Endurance training, Patient/Caregiver education & training, Safety education & training, Pain management, Self-Care / ADL, Home management training, Equipment evaluation, education, & procurement    Goals:   Patient will:    - Improve functional endurance to tolerate/complete 30 mins of ADL's  - Be Mod I in UB ADLs   - Be Mod I in LB ADLs  - Be Mod I in ADL transfers without LOB  - Be Mod I in toileting tasks  - Improve B UE strength and endurance to 4/5 in order to participate in self-care activities as projected.  - Access appropriate D/C site with as few architectural barriers as possible.  - Sequence self-care tasks with no verbal cues for safety    Patient Goal: Patient goals : \"I would like to get home\"     Discussed and agreed upon: Yes Comments:       Therapy Time:   Individual   Time In 1120   Time Out 1133   Minutes 13     Eval: 13 minutes     Electronically signed by:    WYATT Bird,   7/12/2024, 1:47 PM

## 2024-07-12 NOTE — PLAN OF CARE
See OT evaluation for all goals and OT POC. Electronically signed by Janett Louis OTR/L on 7/12/2024 at 1:49 PM

## 2024-07-12 NOTE — PROGRESS NOTES
CLINICAL PHARMACY NOTE: MEDS TO BEDS    Total # of Prescriptions Filled: 2   The following medications were delivered to the patient:  Eliquis 5 mg Tab  Amoxic-Pot Cla 875-125 mg Tab    Additional Documentation:

## 2024-07-12 NOTE — DISCHARGE SUMMARY
Hospital Medicine Discharge Summary    Mckenna Mendoza  :  1945  MRN:  [id number]    Admit date:  2024  Discharge date:  2024    Admitting Physician:  Roderick Coe MD  Primary Care Physician:  Twila Maher,     Discharge Diagnoses:    PE    Chief Complaint   Patient presents with    Chest Pain     Chest pain, dizziness, shortness of breath.      Hospital Course:   Patient with MM presented with a pulmonary embolism.  She did well on lovenox and will be transitioned to eliquis at home per her request as that is what her husbadn takes as well. She did have tenderness on palpation of her sinuses with congestion concerning for a bacterial sinus infection so will be discharged on a short course of augmentin.      Exam on discharge:   /61   Pulse 57   Temp 97.7 °F (36.5 °C)   Resp 18   Ht 1.524 m (5')   Wt 91.2 kg (201 lb)   SpO2 100%   BMI 39.26 kg/m²   General appearance: alert, appears stated age and cooperative  Skin:  No rashes or lesions on exposed skin  HEENT: Head: Normal, normocephalic, atraumatic..   Neck: trachea midline  Lungs: Clear bilateral breath sounds  Heart: RRR  Abdomen: Non tender  Extremities: L > R  Neurologic: Non focal     Patient was seen by the following consultants   Consults:  IP CONSULT TO ONCOLOGY    Significant Diagnostic Studies:    Refer to chart     Please refer to chart if no studies are shown here    Vascular duplex lower extremity venous bilateral    Addendum Date: 2024    ADDENDUM: Incidental note is made of a significant area of significant at its left acute at the stenosis within the proximal left common femoral artery.  Peak systolic velocity at this level was approximately 223 centimeters/second. Correlate clinically and follow-up no     Result Date: 2024  EXAMINATION: DUPLEX VENOUS ULTRASOUND OF THE BILATERAL LOWER EXTREMITIES2024 9:11 am TECHNIQUE: Duplex ultrasound using B-mode/gray scaled imaging and Doppler spectral analysis

## 2024-07-12 NOTE — DISCHARGE SUMMARY
Pt was educated on discharge instructions. All questions were answered. Transport arranged and pt transported to ride at front entrance via wheelchair.

## 2024-07-12 NOTE — PLAN OF CARE
Problem: Discharge Planning  Goal: Discharge to home or other facility with appropriate resources  7/12/2024 0859 by Juliet Tam RN  Outcome: Progressing  7/12/2024 0409 by Susan Polanco RN  Outcome: Progressing  Flowsheets (Taken 7/11/2024 1907)  Discharge to home or other facility with appropriate resources: Refer to discharge planning if patient needs post-hospital services based on physician order or complex needs related to functional status, cognitive ability or social support system     Problem: Safety - Adult  Goal: Free from fall injury  7/12/2024 0859 by Juliet Tam RN  Outcome: Progressing  7/12/2024 0409 by Susan Polanco RN  Outcome: Progressing     Problem: ABCDS Injury Assessment  Goal: Absence of physical injury  7/12/2024 0859 by Juliet Tam RN  Outcome: Progressing  7/12/2024 0409 by Susan Polanco RN  Outcome: Progressing     Problem: Chronic Conditions and Co-morbidities  Goal: Patient's chronic conditions and co-morbidity symptoms are monitored and maintained or improved  Outcome: Progressing

## 2024-07-12 NOTE — ACP (ADVANCE CARE PLANNING)
Advance Care Planning   Healthcare Decision Maker:    Primary Decision Maker: Dmitri Mendoza - Spouse - [phone]    Secondary Decision Maker: Jess Mendoza - Child - [phone]

## 2024-07-12 NOTE — CARE COORDINATION
Case Management Assessment  Initial Evaluation    Date/Time of Evaluation: 7/12/2024 8:20 AM  Assessment Completed by: Heidy Pelayo RN    If patient is discharged prior to next notation, then this note serves as note for discharge by case management.    Patient Name: Mckenna Mendoza                   YOB: 1945  Diagnosis: Shortness of breath [R06.02]  Other chest pain [R07.89]  Pulmonary embolism and infarction (HCC) [I26.99]  Generalized weakness [R53.1]  Other acute pulmonary embolism without acute cor pulmonale (HCC) [I26.99]  Chest pain, unspecified type [R07.9]                   Date / Time: 7/11/2024 12:48 PM    Patient Admission Status: Observation   Readmission Risk (Low < 19, Mod (19-27), High > 27): No data recorded  Current PCP: Twila Maher, DO  PCP verified by CM? Yes    Chart Reviewed: Yes      History Provided by: Patient  Patient Orientation: Alert and Oriented, Person, Place, Situation, Self    Patient Cognition: Alert    Hospitalization in the last 30 days (Readmission):  No    If yes, Readmission Assessment in  Navigator will be completed.    Advance Directives:      Code Status: Full Code   Patient's Primary Decision Maker is: Legal Next of Kin    Primary Decision Maker: Dmitri Mendoza - Spouse - [phone]    Secondary Decision Maker: Jess Mendoza - Child - [phone]    Discharge Planning:    Patient lives with: Spouse/Significant Other, Children, Family Members Type of Home: House  Primary Care Giver: Self  Patient Support Systems include: Spouse/Significant Other, Children, Family Members, Friends/Neighbors   Current services prior to admission: Durable Medical Equipment            Current DME: Mick RAZO            Type of Home Care services:  None    ADLS  Prior functional level: Independent in ADLs/IADLs  Current functional level: Independent in ADLs/IADLs    PT AM-PAC:   /24  OT AM-PAC:   /24    Family can provide assistance at DC: Yes  Would you like  dialogue that supports the patient's individualized plan of care/goals and shares the quality data associated with the providers was provided to: Patient   Patient Representative Name:       The Patient and/or Patient Representative Agree with the Discharge Plan? Yes    Heidy Pelayo RN  Case Management Department

## 2024-07-12 NOTE — PROGRESS NOTES
distress, agreeable to therapy assessment    ROM:  AROM: Within functional limits    Strength:  Strength: Generally decreased, functional    Neuro:  Balance  Sitting - Static: Good  Sitting - Dynamic: Good  Standing - Static: Good;-  Standing - Dynamic: Good;-                      Tone: Normal  Coordination: Within functional limits    Sensation: Impaired (intermittent n/t B hands and feet)    Bed mobility  Rolling to Left: Stand by assistance  Supine to Sit: Stand by assistance  Bed Mobility Comments: Increased time and effort    Transfers  Sit to Stand: Stand by assistance  Stand to Sit: Stand by assistance  Bed to Chair: Stand by assistance  Comment: Slow to complete    Ambulation  Surface: Level tile  Device: No Device  Assistance: Stand by assistance  Quality of Gait: slow pacing with rigid posturing. Furniture surfing PRN  Distance: 5ft bed>chair                   Activity Tolerance  Activity Tolerance: Patient tolerated treatment well    Patient Education  Education Given To: Patient  Education Provided: Role of Therapy;Plan of Care  Education Method: Verbal  Education Outcome: Verbalized understanding;Continued education needed       ASSESSMENT:   Body Structures, Functions, Activity Limitations Requiring Skilled Therapeutic Intervention: Decreased functional mobility ;Decreased ADL status;Decreased tolerance to work activity;Decreased strength;Decreased endurance;Decreased balance  Decision Making: Medium Complexity  History: High  Exam: Med  Clinical Presentation: Med    Therapy Prognosis: Good  Barriers to Learning: none         DISCHARGE RECOMMENDATIONS:  Discharge Recommendations: Continue to assess pending progress, Therapy recommended at discharge    Assessment: Continued PT indicated to progress mobility and facilitate DC at highest level of indep and safety. pt with supportive family. Would benefit from f/u PT upon DC.  Requires PT Follow-Up: Yes       PLAN OF CARE:  Physical Therapy Plan  General  Plan: 1 time a day 3-6 times a week  Current Treatment Recommendations: Strengthening, Functional mobility training, ROM, Balance training, Transfer training, ADL/Self-care training, Endurance training, Gait training, Stair training, Neuromuscular re-education, Home exercise program, Safety education & training, Equipment evaluation, education, & procurement, Patient/Caregiver education & training    Safety Devices  Type of Devices: All fall risk precautions in place    Goals:  Long Term Goals  Long Term Goal 1: Pt to complete bed mobility with indep  Long Term Goal 2: Pt to complete transfers with indep  Long Term Goal 3: Pt to ambulate 50-150ft with LRD and indep  Long Term Goal 4: Pt to manage 5 steps with HR and indep  Long Term Goal 5: Pt to complete HEP indep    AMPAC (6 CLICK) BASIC MOBILITY  AM-PAC Inpatient Mobility Raw Score : 18     Therapy Time:   Individual   Time In 1120   Time Out 1133   Minutes 13           Agueda Garrison, PT, 07/12/24 at 12:21 PM         Definitions for assistance levels  Independent = pt does not require any physical supervision or assistance from another person for activity completion. Device may be needed.  Stand by assistance = pt requires verbal cues or instructions from another person, close to but not touching, to perform the activity  Minimal assistance= pt performs 75% or more of the activity; assistance is required to complete the activity  Moderate assistance= pt performs 50% of the activity; assistance is required to complete the activity  Maximal assistance = pt performs 25% of the activity; assistance is required to complete the activity  Dependent = pt requires total physical assistance to accomplish the task

## 2024-07-14 NOTE — PROGRESS NOTES
Physical Therapy  Facility/Department: [facility] MED SURG W178/W178-01  Physical Therapy Discharge      NAME: Mckenna Mendoza    : 1945 (78 y.o.)  MRN: [id number]    Account: [id number]  Gender: female      Patient has been discharged from acute care hospital. DC patient from current PT program.      Electronically signed by Renu Quezada PT on 24 at 3:29 PM EDT

## 2024-07-15 ENCOUNTER — OFFICE VISIT (OUTPATIENT)
Dept: CARDIOLOGY CLINIC | Age: 79
End: 2024-07-15
Payer: MEDICARE

## 2024-07-15 VITALS
DIASTOLIC BLOOD PRESSURE: 60 MMHG | SYSTOLIC BLOOD PRESSURE: 102 MMHG | WEIGHT: 198.6 LBS | HEART RATE: 73 BPM | BODY MASS INDEX: 38.79 KG/M2 | RESPIRATION RATE: 16 BRPM | OXYGEN SATURATION: 96 %

## 2024-07-15 DIAGNOSIS — E78.5 DYSLIPIDEMIA: ICD-10-CM

## 2024-07-15 DIAGNOSIS — R42 DIZZINESS: ICD-10-CM

## 2024-07-15 DIAGNOSIS — E78.5 HYPERLIPIDEMIA, UNSPECIFIED HYPERLIPIDEMIA TYPE: ICD-10-CM

## 2024-07-15 DIAGNOSIS — I21.4 NSTEMI (NON-ST ELEVATED MYOCARDIAL INFARCTION) (HCC): ICD-10-CM

## 2024-07-15 DIAGNOSIS — I25.83 CORONARY ARTERY DISEASE DUE TO LIPID RICH PLAQUE: ICD-10-CM

## 2024-07-15 DIAGNOSIS — I10 ESSENTIAL HYPERTENSION: Primary | ICD-10-CM

## 2024-07-15 DIAGNOSIS — I10 HYPERTENSION, UNSPECIFIED TYPE: ICD-10-CM

## 2024-07-15 DIAGNOSIS — I25.10 CORONARY ARTERY DISEASE DUE TO LIPID RICH PLAQUE: ICD-10-CM

## 2024-07-15 DIAGNOSIS — I26.99 OTHER PULMONARY EMBOLISM WITHOUT ACUTE COR PULMONALE, UNSPECIFIED CHRONICITY (HCC): ICD-10-CM

## 2024-07-15 DIAGNOSIS — E11.59 TYPE 2 DIABETES MELLITUS WITH OTHER CIRCULATORY COMPLICATION, WITHOUT LONG-TERM CURRENT USE OF INSULIN (HCC): ICD-10-CM

## 2024-07-15 DIAGNOSIS — R07.89 OTHER CHEST PAIN: ICD-10-CM

## 2024-07-15 DIAGNOSIS — S32.000S COMPRESSION FRACTURE OF LUMBAR VERTEBRA, UNSPECIFIED LUMBAR VERTEBRAL LEVEL, SEQUELA: ICD-10-CM

## 2024-07-15 PROCEDURE — G8427 DOCREV CUR MEDS BY ELIG CLIN: HCPCS | Performed by: INTERNAL MEDICINE

## 2024-07-15 PROCEDURE — 3078F DIAST BP <80 MM HG: CPT | Performed by: INTERNAL MEDICINE

## 2024-07-15 PROCEDURE — 1123F ACP DISCUSS/DSCN MKR DOCD: CPT | Performed by: INTERNAL MEDICINE

## 2024-07-15 PROCEDURE — 1090F PRES/ABSN URINE INCON ASSESS: CPT | Performed by: INTERNAL MEDICINE

## 2024-07-15 PROCEDURE — 99214 OFFICE O/P EST MOD 30 MIN: CPT | Performed by: INTERNAL MEDICINE

## 2024-07-15 PROCEDURE — 3074F SYST BP LT 130 MM HG: CPT | Performed by: INTERNAL MEDICINE

## 2024-07-15 PROCEDURE — 1036F TOBACCO NON-USER: CPT | Performed by: INTERNAL MEDICINE

## 2024-07-15 PROCEDURE — 3051F HG A1C>EQUAL 7.0%<8.0%: CPT | Performed by: INTERNAL MEDICINE

## 2024-07-15 PROCEDURE — G8399 PT W/DXA RESULTS DOCUMENT: HCPCS | Performed by: INTERNAL MEDICINE

## 2024-07-15 PROCEDURE — G8417 CALC BMI ABV UP PARAM F/U: HCPCS | Performed by: INTERNAL MEDICINE

## 2024-07-15 ASSESSMENT — ENCOUNTER SYMPTOMS
BLOOD IN STOOL: 0
COUGH: 0
SHORTNESS OF BREATH: 0
GASTROINTESTINAL NEGATIVE: 1
NAUSEA: 0
EYES NEGATIVE: 1
WHEEZING: 0
CHEST TIGHTNESS: 0
STRIDOR: 0
RESPIRATORY NEGATIVE: 1

## 2024-07-15 NOTE — PROGRESS NOTES
Subsequent Progress Note      Patient: Mckenna Mendoza  YOB: 1945  MRN: [id number]    Chief Complaint:  Chief Complaint   Patient presents with    Chest Pain     EKG 7/11/2024    Follow-Up from Hospital       CV Data:  5/22 Echo EF 65%  3/23 CTA neck negative   5/22 Echo EF 65  3/22/23 Cath LAD patent mid and distal stent w Mild/Mod ISR.  EF 50%  7/11/24 CTA Right PE  Multiple Meyloma       Subjective/HPI:  recent hosp for weakness. No cp no sob now. She did have elevated troponin. She has chronic LE edema.     4/7/23 recent hosp for cp . Cath done. Still with left arm pain no falls no bleed.     7/14/23 dizzy with sinus congestion and frequent infection. No cp no sob no falls no bleed.     11/20/23 recent URI.  Getting better. No cp no sob no falls no bleed.     3/21/24 doing well no cp no sob no falls same LE Edema. Has sinusitis. No bleed takes meds     7/15/24 recent hosp for Right sioded stable PE. She did well now on Eliquis Load.   EKG:      Former smoker  Lives with  and family  Moved from [place]      Past Medical History:   Diagnosis Date    Cancer (HCC)     Encounter for lumbar puncture 05/25/2022    Completed by Dr. Mccormick - diagnostics sent    Hyperlipidemia     Hypertension     Type II or unspecified type diabetes mellitus without mention of complication, not stated as uncontrolled        Past Surgical History:   Procedure Laterality Date    CORONARY ANGIOPLASTY WITH STENT PLACEMENT      CYST REMOVAL  06/03/2024    cyst removed from back    UPPER GASTROINTESTINAL ENDOSCOPY  08/12/2015    w/bx,dilation        Family History   Problem Relation Age of Onset    Cancer Mother     Arthritis Mother     Diabetes Mother     Heart Disease Mother     High Blood Pressure Mother     High Cholesterol Mother     Arthritis Father     Diabetes Father     Heart Disease Father     High Blood Pressure Father     High Cholesterol Father        Social History     Socioeconomic History

## 2024-07-25 ENCOUNTER — HOSPITAL ENCOUNTER (EMERGENCY)
Age: 79
Discharge: HOME | End: 2024-07-26
Payer: MEDICARE

## 2024-07-25 DIAGNOSIS — R07.9 CHEST PAIN, UNSPECIFIED TYPE: Primary | ICD-10-CM

## 2024-07-25 DIAGNOSIS — I27.82 OTHER CHRONIC PULMONARY EMBOLISM WITHOUT ACUTE COR PULMONALE (HCC): ICD-10-CM

## 2024-07-25 DIAGNOSIS — R51.9 ACUTE NONINTRACTABLE HEADACHE, UNSPECIFIED HEADACHE TYPE: ICD-10-CM

## 2024-07-25 LAB
APTT PPP: 26.3 SEC (ref 24.4–36.8)
INR PPP: 0.9
PROTHROMBIN TIME: 12.4 SEC (ref 12.3–14.9)

## 2024-07-25 PROCEDURE — 85025 COMPLETE CBC W/AUTO DIFF WBC: CPT

## 2024-07-25 PROCEDURE — 99285 EMERGENCY DEPT VISIT HI MDM: CPT

## 2024-07-25 PROCEDURE — 83735 ASSAY OF MAGNESIUM: CPT

## 2024-07-25 PROCEDURE — 36415 COLL VENOUS BLD VENIPUNCTURE: CPT

## 2024-07-25 PROCEDURE — 81001 URINALYSIS AUTO W/SCOPE: CPT

## 2024-07-25 PROCEDURE — 85610 PROTHROMBIN TIME: CPT

## 2024-07-25 PROCEDURE — 80053 COMPREHEN METABOLIC PANEL: CPT

## 2024-07-25 PROCEDURE — 83880 ASSAY OF NATRIURETIC PEPTIDE: CPT

## 2024-07-25 PROCEDURE — 85730 THROMBOPLASTIN TIME PARTIAL: CPT

## 2024-07-25 PROCEDURE — 84484 ASSAY OF TROPONIN QUANT: CPT

## 2024-07-25 PROCEDURE — 93005 ELECTROCARDIOGRAM TRACING: CPT | Performed by: STUDENT IN AN ORGANIZED HEALTH CARE EDUCATION/TRAINING PROGRAM

## 2024-07-25 PROCEDURE — 0202U NFCT DS 22 TRGT SARS-COV-2: CPT

## 2024-07-25 ASSESSMENT — ENCOUNTER SYMPTOMS
COUGH: 1
SHORTNESS OF BREATH: 1

## 2024-07-26 ENCOUNTER — APPOINTMENT (OUTPATIENT)
Dept: CT IMAGING | Age: 79
End: 2024-07-26
Payer: MEDICARE

## 2024-07-26 VITALS
OXYGEN SATURATION: 97 % | SYSTOLIC BLOOD PRESSURE: 122 MMHG | HEART RATE: 66 BPM | DIASTOLIC BLOOD PRESSURE: 82 MMHG | WEIGHT: 197 LBS | TEMPERATURE: 97.5 F | HEIGHT: 60 IN | RESPIRATION RATE: 18 BRPM | BODY MASS INDEX: 38.68 KG/M2

## 2024-07-26 LAB
ALBUMIN SERPL-MCNC: 4.4 G/DL (ref 3.5–4.6)
ALP SERPL-CCNC: 109 U/L (ref 40–130)
ALT SERPL-CCNC: 17 U/L (ref 0–33)
ANION GAP SERPL CALCULATED.3IONS-SCNC: 12 MEQ/L (ref 9–15)
ANISOCYTOSIS BLD QL SMEAR: ABNORMAL
AST SERPL-CCNC: 22 U/L (ref 0–35)
B PARAP IS1001 DNA NPH QL NAA+NON-PROBE: NOT DETECTED
B PERT.PT PRMT NPH QL NAA+NON-PROBE: NOT DETECTED
BACTERIA URNS QL MICRO: NEGATIVE /HPF
BASOPHILS # BLD: 0 K/UL (ref 0–0.2)
BASOPHILS NFR BLD: 0.8 %
BILIRUB SERPL-MCNC: 0.4 MG/DL (ref 0.2–0.7)
BILIRUB UR QL STRIP: NEGATIVE
BNP BLD-MCNC: 289 PG/ML
BUN SERPL-MCNC: 31 MG/DL (ref 8–23)
C PNEUM DNA NPH QL NAA+NON-PROBE: NOT DETECTED
CALCIUM SERPL-MCNC: 10.8 MG/DL (ref 8.5–9.9)
CHLORIDE SERPL-SCNC: 103 MEQ/L (ref 95–107)
CLARITY UR: CLEAR
CO2 SERPL-SCNC: 28 MEQ/L (ref 20–31)
COLOR UR: YELLOW
CREAT SERPL-MCNC: 1.43 MG/DL (ref 0.5–0.9)
EKG ATRIAL RATE: 65 BPM
EKG P AXIS: 30 DEGREES
EKG P-R INTERVAL: 178 MS
EKG Q-T INTERVAL: 390 MS
EKG QRS DURATION: 72 MS
EKG QTC CALCULATION (BAZETT): 405 MS
EKG R AXIS: -10 DEGREES
EKG T AXIS: 50 DEGREES
EKG VENTRICULAR RATE: 65 BPM
EOSINOPHIL # BLD: 0.4 K/UL (ref 0–0.7)
EOSINOPHIL NFR BLD: 6 %
EPI CELLS #/AREA URNS AUTO: ABNORMAL /HPF (ref 0–5)
ERYTHROCYTE [DISTWIDTH] IN BLOOD BY AUTOMATED COUNT: 17.4 % (ref 11.5–14.5)
FLUAV RNA NPH QL NAA+NON-PROBE: NOT DETECTED
FLUBV RNA NPH QL NAA+NON-PROBE: NOT DETECTED
GLOBULIN SER CALC-MCNC: 2.8 G/DL (ref 2.3–3.5)
GLUCOSE SERPL-MCNC: 126 MG/DL (ref 70–99)
GLUCOSE UR STRIP-MCNC: >=1000 MG/DL
HADV DNA NPH QL NAA+NON-PROBE: NOT DETECTED
HCOV 229E RNA NPH QL NAA+NON-PROBE: NOT DETECTED
HCOV HKU1 RNA NPH QL NAA+NON-PROBE: NOT DETECTED
HCOV NL63 RNA NPH QL NAA+NON-PROBE: NOT DETECTED
HCOV OC43 RNA NPH QL NAA+NON-PROBE: NOT DETECTED
HCT VFR BLD AUTO: 37.7 % (ref 37–47)
HGB BLD-MCNC: 12.3 G/DL (ref 12–16)
HGB UR QL STRIP: ABNORMAL
HMPV RNA NPH QL NAA+NON-PROBE: NOT DETECTED
HPIV1 RNA NPH QL NAA+NON-PROBE: NOT DETECTED
HPIV2 RNA NPH QL NAA+NON-PROBE: NOT DETECTED
HPIV3 RNA NPH QL NAA+NON-PROBE: NOT DETECTED
HPIV4 RNA NPH QL NAA+NON-PROBE: NOT DETECTED
HYALINE CASTS #/AREA URNS AUTO: ABNORMAL /HPF (ref 0–5)
KETONES UR STRIP-MCNC: NEGATIVE MG/DL
LEUKOCYTE ESTERASE UR QL STRIP: NEGATIVE
LYMPHOCYTES # BLD: 2.5 K/UL (ref 1–4.8)
LYMPHOCYTES NFR BLD: 34 %
M PNEUMO DNA NPH QL NAA+NON-PROBE: NOT DETECTED
MACROCYTES BLD QL SMEAR: ABNORMAL
MAGNESIUM SERPL-MCNC: 2.2 MG/DL (ref 1.7–2.4)
MCH RBC QN AUTO: 33.6 PG (ref 27–31.3)
MCHC RBC AUTO-ENTMCNC: 32.6 % (ref 33–37)
MCV RBC AUTO: 103 FL (ref 79.4–94.8)
MONOCYTES # BLD: 0.1 K/UL (ref 0.2–0.8)
MONOCYTES NFR BLD: 1 %
NEUTROPHILS # BLD: 4.4 K/UL (ref 1.4–6.5)
NEUTS SEG NFR BLD: 59 %
NITRITE UR QL STRIP: NEGATIVE
NRBC BLD-RTO: 3 /100 WBC
PERFORMED ON: ABNORMAL
PH UR STRIP: 6.5 [PH] (ref 5–9)
PLATELET # BLD AUTO: 306 K/UL (ref 130–400)
PLATELET BLD QL SMEAR: ADEQUATE
POC CREATININE: 1.7 MG/DL (ref 0.6–1.2)
POC SAMPLE TYPE: ABNORMAL
POIKILOCYTOSIS BLD QL SMEAR: ABNORMAL
POTASSIUM SERPL-SCNC: 4.4 MEQ/L (ref 3.4–4.9)
PROT SERPL-MCNC: 7.2 G/DL (ref 6.3–8)
PROT UR STRIP-MCNC: 30 MG/DL
RBC # BLD AUTO: 3.66 M/UL (ref 4.2–5.4)
RBC #/AREA URNS AUTO: ABNORMAL /HPF (ref 0–5)
RSV RNA NPH QL NAA+NON-PROBE: NOT DETECTED
RV+EV RNA NPH QL NAA+NON-PROBE: NOT DETECTED
SARS-COV-2 RNA NPH QL NAA+NON-PROBE: NOT DETECTED
SLIDE REVIEW: ABNORMAL
SODIUM SERPL-SCNC: 143 MEQ/L (ref 135–144)
SP GR UR STRIP: 1.02 (ref 1–1.03)
TROPONIN, HIGH SENSITIVITY: 54 NG/L (ref 0–19)
TROPONIN, HIGH SENSITIVITY: 58 NG/L (ref 0–19)
TROPONIN, HIGH SENSITIVITY: 59 NG/L (ref 0–19)
URINE REFLEX TO CULTURE: ABNORMAL
UROBILINOGEN UR STRIP-ACNC: 0.2 E.U./DL
WBC # BLD AUTO: 7.4 K/UL (ref 4.8–10.8)
WBC #/AREA URNS AUTO: ABNORMAL /HPF (ref 0–5)

## 2024-07-26 PROCEDURE — 6360000004 HC RX CONTRAST MEDICATION: Performed by: PHYSICIAN ASSISTANT

## 2024-07-26 PROCEDURE — 71275 CT ANGIOGRAPHY CHEST: CPT

## 2024-07-26 PROCEDURE — 84484 ASSAY OF TROPONIN QUANT: CPT

## 2024-07-26 PROCEDURE — 36415 COLL VENOUS BLD VENIPUNCTURE: CPT

## 2024-07-26 PROCEDURE — 2580000003 HC RX 258: Performed by: PHYSICIAN ASSISTANT

## 2024-07-26 PROCEDURE — 70496 CT ANGIOGRAPHY HEAD: CPT

## 2024-07-26 PROCEDURE — 70498 CT ANGIOGRAPHY NECK: CPT

## 2024-07-26 RX ORDER — 0.9 % SODIUM CHLORIDE 0.9 %
1000 INTRAVENOUS SOLUTION INTRAVENOUS ONCE
Status: COMPLETED | OUTPATIENT
Start: 2024-07-26 | End: 2024-07-26

## 2024-07-26 RX ADMIN — IOPAMIDOL 125 ML: 755 INJECTION, SOLUTION INTRAVENOUS at 01:38

## 2024-07-26 RX ADMIN — SODIUM CHLORIDE 1000 ML: 9 INJECTION, SOLUTION INTRAVENOUS at 00:44

## 2024-07-26 NOTE — ED PROVIDER NOTES
Troponins are elevated but at patient's baseline, and are flat/downtrending.  BNP WNL.  Respiratory viral panel negative.  Remainder of laboratory studies are not acutely clinically significant.  Patient had CTA chest, CTA head and neck imaging initiated for her complaints.    Patient initially evaluated and treated by Edgar Ruffin PA-C. Care signed out to me pending CT results.     Reviewed patient's chart.  On my assessment of the patient she remains resting comfortably.  Remains with stable vitals and respiratory status.  CTA head and neck does not show acute intracranial abnormality or large vessel occlusion in the head or neck.  CTA chest shows no acute pulmonary artery embolism.  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  Emphysema changes also noted.    REASSESSMENT   Workup was reassuring patient is appropriate for discharge home with continued return precautions and outpatient follow-up.  Patient states she has an appointment with her PCP tomorrow actually she will follow-up there.      CRITICAL CARE TIME   Total Critical Care time was 0 minutes, excluding separately reportable procedures.  There was a high probability of clinically significant/life threatening deterioration in the patient's condition which required my urgent intervention.      CONSULTS:  None    PROCEDURES:  Unless otherwise noted below, none     Procedures        FINAL IMPRESSION      1. Chest pain, unspecified type    2. Acute nonintractable headache, unspecified headache type          DISPOSITION/PLAN   DISPOSITION Discharge - Pending Orders Complete 07/26/2024 02:46:34 AM      PATIENT REFERRED TO:  No follow-up provider specified.    DISCHARGE MEDICATIONS:  New Prescriptions    No medications on file     Controlled Substances Monitoring:     RX Monitoring Periodic Controlled Substance Monitoring   4/13/2022   1:55 PM No signs of potential drug abuse or diversion identified.       (Please note

## 2024-07-31 ENCOUNTER — TELEPHONE (OUTPATIENT)
Dept: CARDIOLOGY CLINIC | Age: 79
End: 2024-07-31

## 2024-07-31 ENCOUNTER — OFFICE VISIT (OUTPATIENT)
Dept: FAMILY MEDICINE CLINIC | Age: 79
End: 2024-07-31
Payer: MEDICARE

## 2024-07-31 VITALS
HEIGHT: 60 IN | SYSTOLIC BLOOD PRESSURE: 100 MMHG | DIASTOLIC BLOOD PRESSURE: 60 MMHG | TEMPERATURE: 97.5 F | BODY MASS INDEX: 39.27 KG/M2 | WEIGHT: 200 LBS | HEART RATE: 61 BPM | OXYGEN SATURATION: 95 %

## 2024-07-31 DIAGNOSIS — E11.59 TYPE 2 DIABETES MELLITUS WITH OTHER CIRCULATORY COMPLICATION, WITHOUT LONG-TERM CURRENT USE OF INSULIN (HCC): ICD-10-CM

## 2024-07-31 DIAGNOSIS — I26.99 PULMONARY EMBOLISM AND INFARCTION (HCC): Primary | ICD-10-CM

## 2024-07-31 DIAGNOSIS — J01.90 ACUTE SINUSITIS, RECURRENCE NOT SPECIFIED, UNSPECIFIED LOCATION: ICD-10-CM

## 2024-07-31 DIAGNOSIS — J30.9 ALLERGIC RHINITIS, UNSPECIFIED SEASONALITY, UNSPECIFIED TRIGGER: ICD-10-CM

## 2024-07-31 DIAGNOSIS — I50.23 ACUTE ON CHRONIC SYSTOLIC HEART FAILURE (HCC): ICD-10-CM

## 2024-07-31 PROCEDURE — G8417 CALC BMI ABV UP PARAM F/U: HCPCS | Performed by: STUDENT IN AN ORGANIZED HEALTH CARE EDUCATION/TRAINING PROGRAM

## 2024-07-31 PROCEDURE — G8427 DOCREV CUR MEDS BY ELIG CLIN: HCPCS | Performed by: STUDENT IN AN ORGANIZED HEALTH CARE EDUCATION/TRAINING PROGRAM

## 2024-07-31 PROCEDURE — 1036F TOBACCO NON-USER: CPT | Performed by: STUDENT IN AN ORGANIZED HEALTH CARE EDUCATION/TRAINING PROGRAM

## 2024-07-31 PROCEDURE — 3074F SYST BP LT 130 MM HG: CPT | Performed by: STUDENT IN AN ORGANIZED HEALTH CARE EDUCATION/TRAINING PROGRAM

## 2024-07-31 PROCEDURE — 3078F DIAST BP <80 MM HG: CPT | Performed by: STUDENT IN AN ORGANIZED HEALTH CARE EDUCATION/TRAINING PROGRAM

## 2024-07-31 PROCEDURE — 99214 OFFICE O/P EST MOD 30 MIN: CPT | Performed by: STUDENT IN AN ORGANIZED HEALTH CARE EDUCATION/TRAINING PROGRAM

## 2024-07-31 PROCEDURE — G8399 PT W/DXA RESULTS DOCUMENT: HCPCS | Performed by: STUDENT IN AN ORGANIZED HEALTH CARE EDUCATION/TRAINING PROGRAM

## 2024-07-31 PROCEDURE — 1123F ACP DISCUSS/DSCN MKR DOCD: CPT | Performed by: STUDENT IN AN ORGANIZED HEALTH CARE EDUCATION/TRAINING PROGRAM

## 2024-07-31 PROCEDURE — 1090F PRES/ABSN URINE INCON ASSESS: CPT | Performed by: STUDENT IN AN ORGANIZED HEALTH CARE EDUCATION/TRAINING PROGRAM

## 2024-07-31 PROCEDURE — 3051F HG A1C>EQUAL 7.0%<8.0%: CPT | Performed by: STUDENT IN AN ORGANIZED HEALTH CARE EDUCATION/TRAINING PROGRAM

## 2024-07-31 RX ORDER — AMOXICILLIN AND CLAVULANATE POTASSIUM 500; 125 MG/1; MG/1
1 TABLET, FILM COATED ORAL 2 TIMES DAILY
Qty: 20 TABLET | Refills: 0 | Status: SHIPPED | OUTPATIENT
Start: 2024-07-31 | End: 2024-08-10

## 2024-07-31 RX ORDER — FLUTICASONE PROPIONATE 50 MCG
1 SPRAY, SUSPENSION (ML) NASAL DAILY
Qty: 3 EACH | Refills: 3 | Status: SHIPPED | OUTPATIENT
Start: 2024-07-31

## 2024-07-31 NOTE — PROGRESS NOTES
Subjective  Mckenna Mendoza, 78 y.o. female presents today with:  Chief Complaint   Patient presents with    Follow-up     Went to the ER 7/11/24 with chest pains, dizziness & SOB. Was admitted. D/C home 7/12/24. Went to ER again 7/25/24 with chest pains, numbness around mouth & headache. Was D/C home. Had f/u w/Cho 7/15/24. States she is feeling better. Still is having some dizziness. States she wonders if it could be her sinuses that are causing this.        Patient with appointment for hospital follow-up from the ER.  She went to the ER on 711 with chest pain, dizziness and shortness of breath and was admitted.  She was subsequently discharged home on 7/12.  She went to the ER again on 7/25 with chest pain, numbness around her mouth and headache.  She was subsequently discharged home.  She did follow-up with cardiologist on 7/15.  She is still having some occasional dizziness.  She feels that her sinuses are causing this.  She does have a sinus headache with nasal congestion and postnasal drip.  No fevers or chills.  She just does not quite feel like herself.  She does report that chest pain or shortness of breath have resolved.    Patient also with pulmonary embolism and acute on chronic systolic heart failure.  She is on Eliquis 10 mg twice daily and will be transition to 5 mg twice daily.  She states she did not receive enough medication and will be contacting cardiology office to discuss this.  She also remains on Lasix 40 mg daily, lisinopril 2.5 mg daily and metoprolol tartrate 50 mg twice daily.  Has been doing well.    Patient also with type 2 diabetes mellitus.  She is on Jardiance 10 mg daily.  Doing well with this.  No low blood sugars reported    Patient also with allergic rhinitis.  She needs a refill of Flonase.  She has no other concerns at this time.      Review of Systems   Constitutional:  Negative for chills, fatigue, fever and unexpected weight change.   HENT:  Positive for congestion, postnasal

## 2024-07-31 NOTE — TELEPHONE ENCOUNTER
Received My Chart message from patient saying that [facility] did not fill her Eliquis prescription.   Called [facility] to find out if they needed any additional information to be able to fill the prescription.   Per pharmacy tech at [facility], the Eliquis dose was a high dose and the pharmacy needed clarification on whether the patient should be taking Eliquis two 5mg tablets BID. Will send message to Dr. Saeed for clarification. [facility] will need a new prescription sent for correct dose of Eliquis patient should be taking.

## 2024-08-02 ASSESSMENT — ENCOUNTER SYMPTOMS
SINUS PRESSURE: 1
RHINORRHEA: 1
ABDOMINAL PAIN: 0
WHEEZING: 0
SINUS PAIN: 1
NAUSEA: 0
COUGH: 1
VOMITING: 0
EYE DISCHARGE: 0
DIARRHEA: 0
SORE THROAT: 0
SHORTNESS OF BREATH: 0

## 2024-08-02 NOTE — TELEPHONE ENCOUNTER
Attempted to call patient to notify her of clarification of Eliquis with new instructions per Dr. Saeed. No answer, voicemail left for patient to call back to office.

## 2024-08-02 NOTE — TELEPHONE ENCOUNTER
[facility] pharmacy requesting clarification on Eliquis 5 MG.    Eliquis 10 BID 7 days- then 5 days BID.    Fax confirmation from [facility] scanned into media.

## 2024-08-06 NOTE — TELEPHONE ENCOUNTER
Attempted to call patient back to make sure that she received and understood the clarification frequency and dosage of her Eliquis from Dr. Saeed. No answer from patient, voicemail left for patient to call back to office to let us know she received and understood the message.

## 2024-08-06 NOTE — TELEPHONE ENCOUNTER
Patient called back. Reviewed Eliquis instructions with her per Dr. Saeed. Patient verbalized understanding. No additional questions or concerns.

## 2024-08-08 ENCOUNTER — TELEPHONE (OUTPATIENT)
Dept: CARDIOLOGY CLINIC | Age: 79
End: 2024-08-08

## 2024-08-08 NOTE — TELEPHONE ENCOUNTER
Clarified Eliquis dosage again with [facility].   Eliquis 10mg BID x 7 days then Eliquis 5mg BID per Dr. Saeed's previous instructions.

## 2024-08-25 NOTE — PROGRESS NOTES
GENERAL SURGERY   PROCEDURE NOTE    Pt Name: Mckenna Mendoza  MRN: [id number]    Date: 8/26/2024    Primary Care Physician: Twila Maher DO    Reason for follow up: Upper back mass excision      SUBJECTIVE:     History of Chief Complaint:    Mckenna is a 79 y.o. female with a PMH of benign paroxysmal positional vertigo, headaches, HTN, HLD, acute on chronic heart failure, CAD (s/p PCI on plavix), DM, CKD 3a, and ataxia who presents for consideration of an upper back cyst excision. It was I&D'ed with packing placed by myself in the beginning of June as it was actively infected. It has since healed well with no active drainage.  It is not causing her any pain.  She would like it removed.    OBJECTIVE:   CURRENT VITALS: /72   Pulse 62   Temp 98.2 °F (36.8 °C)   Ht 1.524 m (5')   Wt 89.4 kg (197 lb)   SpO2 98%   BMI 38.47 kg/m²      GEN: Alert and oriented x3, no acute distress, cooperative   SKIN: Skin color, texture, turgor normal. No rashes or lesions  HEENT: Head is normocephalic, atraumatic. EOMI  NECK: Supple, symmetrical, trachea midline, skin normal  PULM: Chest symmetric, no increased work of breathing or accessory muscle use  CV: Heart regular rate   BACK: Nontender upper central back mass with dark pinpoint area on superior aspect and slightly open area on inferior aspect with dark material seen (see photo below, taken with verbal permission from the patient)   EXTREMITIES: Warm, dry         ASSESSMENT AND PLAN:   Mckenna Mendoza is a 79 y.o. female with a PMH of benign paroxysmal positional vertigo, headaches, HTN, HLD, acute on chronic heart failure, CAD (s/p PCI on plavix), DM, CKD 3a, and ataxia who presents for upper back cyst excision after it was I&Ded with packing for an infection a couple of months ago.     Consent signed for in-office excision of back mass after R/B/A discussed. Procedure was performed without complication. Local anesthesia was given then a 2 cm vertical incision made  over the mass. The mass was excised and sent for permanent to pathology. The wound cavity was copious irrigated with diluted betadine. Hemostasis was ensured. The incision was closed interrupted deep dermal 3-0 vicryl followed by 6 staples. Educated patient for signs/ symptoms of infection. Patient tolerated procedure well  Return to clinic in 1 week, sooner if need be      Edith Cook MD   General surgeon    Electronically signed by Edith Cook MD

## 2024-08-26 ENCOUNTER — PROCEDURE VISIT (OUTPATIENT)
Dept: SURGERY | Age: 79
End: 2024-08-26

## 2024-08-26 VITALS
BODY MASS INDEX: 38.68 KG/M2 | HEART RATE: 62 BPM | WEIGHT: 197 LBS | OXYGEN SATURATION: 98 % | DIASTOLIC BLOOD PRESSURE: 72 MMHG | TEMPERATURE: 98.2 F | SYSTOLIC BLOOD PRESSURE: 118 MMHG | HEIGHT: 60 IN

## 2024-08-26 DIAGNOSIS — R22.2 MASS OF SKIN OF BACK: ICD-10-CM

## 2024-08-26 DIAGNOSIS — R22.2 MASS OF SKIN OF BACK: Primary | ICD-10-CM

## 2024-09-04 NOTE — PROGRESS NOTES
GENERAL SURGERY WOUND CHECK VISIT    Pt Name: Mckenna Mendoza  MRN: [id number]  Date: 9/5/2024       SUBJECTIVE:   Mckenna Mendoza is a 79 y.o. female who presents for a follow up wound check. She underwent an upper back cyst excision 1.5 weeks ago. Since then, patient reports doing well. The wound is a little itchy from the tape she states. Had one episode of some pus-like drainage early after the procedure but none recently. The wound is not painful. Denies fevers, chills, and night sweats.     OBJECTIVE:   CURRENT VITALS: /82   Pulse 67   Temp 98.4 °F (36.9 °C)   Ht 1.524 m (5')   Wt 88.9 kg (196 lb)   SpO2 99%   BMI 38.28 kg/m²      GEN: Alert and oriented x3, no acute distress, cooperative   SKIN: Skin color, texture, turgor normal. No rashes or lesions  HEENT: Head is normocephalic, atraumatic. EOMI  NECK: Supple, symmetrical, trachea midline, skin normal  PULM: Chest symmetric, no increased work of breathing or accessory muscle use  CV: Heart regular rate   BACK: Central upper back wound c/d/I with staples, no surrounding erythema, able to express a small amount of cloudy ss output from inferior aspect of wound with tiny opening, nontender (see photo below, taken with verbal permission from the patient)   EXTREMITIES: Warm, dry           PATHOLOGY:   FINAL DIAGNOSIS: EXCISION BACK MASS: SKIN AND SUBCUTANEOUS TISSUE WITH INFLAMED EPITHELIAL INCLUSION CYST     ASSESSMENT AND PLAN:   Mckenna Mendoza is a 79 y.o. female who underwent an upper back cyst excision 1.5 weeks ago. Wound is healing well but with some cloudy serosang fluid able to be expressed from the inferior aspect of the wound.     Removed staples and placed steri strip (inferior opening left uncovered). Cover wound with gauze.  Prescribed a 7 day course of doxycycline and culturelle  Pathology results reviewed with patient  Return to clinic in 1 week      Edith Cook MD   General surgeon    Electronically signed by Edith Cook

## 2024-09-05 ENCOUNTER — OFFICE VISIT (OUTPATIENT)
Dept: SURGERY | Age: 79
End: 2024-09-05

## 2024-09-05 VITALS
TEMPERATURE: 98.4 F | DIASTOLIC BLOOD PRESSURE: 82 MMHG | WEIGHT: 196 LBS | HEART RATE: 67 BPM | SYSTOLIC BLOOD PRESSURE: 124 MMHG | OXYGEN SATURATION: 99 % | BODY MASS INDEX: 38.48 KG/M2 | HEIGHT: 60 IN

## 2024-09-05 DIAGNOSIS — Z51.89 VISIT FOR WOUND CHECK: Primary | ICD-10-CM

## 2024-09-05 PROCEDURE — 99024 POSTOP FOLLOW-UP VISIT: CPT | Performed by: SURGERY

## 2024-09-05 RX ORDER — DOXYCYCLINE HYCLATE 100 MG
100 TABLET ORAL 2 TIMES DAILY
Qty: 14 TABLET | Refills: 0 | Status: SHIPPED | OUTPATIENT
Start: 2024-09-05 | End: 2024-09-12

## 2024-09-05 RX ORDER — LACTOBACILLUS RHAMNOSUS GG 10B CELL
1 CAPSULE ORAL DAILY
Qty: 7 CAPSULE | Refills: 0 | Status: SHIPPED | OUTPATIENT
Start: 2024-09-05 | End: 2024-09-12

## 2024-09-11 ENCOUNTER — OFFICE VISIT (OUTPATIENT)
Dept: SURGERY | Age: 79
End: 2024-09-11

## 2024-09-11 VITALS
WEIGHT: 195 LBS | DIASTOLIC BLOOD PRESSURE: 74 MMHG | TEMPERATURE: 98.2 F | SYSTOLIC BLOOD PRESSURE: 122 MMHG | BODY MASS INDEX: 38.28 KG/M2 | HEIGHT: 60 IN

## 2024-09-11 DIAGNOSIS — Z51.89 VISIT FOR WOUND CHECK: Primary | ICD-10-CM

## 2024-10-18 ENCOUNTER — OFFICE VISIT (OUTPATIENT)
Dept: FAMILY MEDICINE CLINIC | Age: 79
End: 2024-10-18

## 2024-10-18 VITALS
HEART RATE: 67 BPM | SYSTOLIC BLOOD PRESSURE: 124 MMHG | TEMPERATURE: 97.5 F | BODY MASS INDEX: 39.07 KG/M2 | WEIGHT: 199 LBS | OXYGEN SATURATION: 96 % | HEIGHT: 60 IN | DIASTOLIC BLOOD PRESSURE: 66 MMHG

## 2024-10-18 DIAGNOSIS — E78.5 HYPERLIPIDEMIA, UNSPECIFIED HYPERLIPIDEMIA TYPE: ICD-10-CM

## 2024-10-18 DIAGNOSIS — C90.00 MULTIPLE MYELOMA, REMISSION STATUS UNSPECIFIED (HCC): ICD-10-CM

## 2024-10-18 DIAGNOSIS — N18.31 STAGE 3A CHRONIC KIDNEY DISEASE (HCC): ICD-10-CM

## 2024-10-18 DIAGNOSIS — Z86.711 HISTORY OF PULMONARY EMBOLISM: ICD-10-CM

## 2024-10-18 DIAGNOSIS — I10 ESSENTIAL HYPERTENSION: Primary | ICD-10-CM

## 2024-10-18 DIAGNOSIS — Z23 NEED FOR INFLUENZA VACCINATION: ICD-10-CM

## 2024-10-18 DIAGNOSIS — E11.59 TYPE 2 DIABETES MELLITUS WITH OTHER CIRCULATORY COMPLICATION, WITHOUT LONG-TERM CURRENT USE OF INSULIN (HCC): ICD-10-CM

## 2024-10-18 PROBLEM — I26.99 PULMONARY EMBOLISM AND INFARCTION (HCC): Status: RESOLVED | Noted: 2024-07-11 | Resolved: 2024-10-18

## 2024-10-18 LAB — HBA1C MFR BLD: 7.1 %

## 2024-10-18 NOTE — PROGRESS NOTES
Vaccine Information Sheet, \"Influenza - Inactivated\"  given to Mckenna Mendoza, or parent/legal guardian of  Mckenna Mendoza and verbalized understanding.    Patient responses:    Have you ever had a reaction to a flu vaccine? No  Are you able to eat eggs without adverse effects?  Yes  Do you have any current illness?  No  Have you ever had Guillian Peabody Syndrome?  No    Flu vaccine given per order. Please see immunization tab.           
GASTROINTESTINAL ENDOSCOPY  08/12/2015    w/bx,dilation      Current Outpatient Medications   Medication Sig Dispense Refill    apixaban (ELIQUIS) 5 MG TABS tablet Take 2 tablets (10mg) twice a day x 1 week, then 1 tablet (5mg) twice a day everyday after. (Patient taking differently: Take 1 tablet by mouth 2 times daily) 180 tablet 3    empagliflozin (JARDIANCE) 10 MG tablet Take 1 tablet by mouth daily 90 tablet 1    fluticasone (FLONASE) 50 MCG/ACT nasal spray 1 spray by Each Nostril route daily 3 each 3    tiZANidine (ZANAFLEX) 4 MG tablet TAKE 1 TABLET BY MOUTH NIGHTLY AS NEEDED FOR MUSCLE SPASM 90 tablet 1    clopidogrel (PLAVIX) 75 MG tablet Take 1 tablet by mouth daily 90 tablet 3    acyclovir (ZOVIRAX) 400 MG tablet Take 1 tablet by mouth      lisinopril (PRINIVIL;ZESTRIL) 2.5 MG tablet Take 1 tablet by mouth daily 90 tablet 3    metoprolol tartrate (LOPRESSOR) 50 MG tablet Take 1 tablet by mouth 2 times daily 180 tablet 3    pantoprazole (PROTONIX) 40 MG tablet Take 1 tablet by mouth daily 90 tablet 3    furosemide (LASIX) 40 MG tablet TAKE 1 TABLET EVERY DAY 90 tablet 3    atorvastatin (LIPITOR) 40 MG tablet TAKE 1 TABLET EVERY DAY 90 tablet 3    gabapentin (NEURONTIN) 300 MG capsule Take 2 capsules by mouth at bedtime for 360 days. 180 capsule 3    albuterol sulfate HFA (VENTOLIN HFA) 108 (90 Base) MCG/ACT inhaler Inhale 2 puffs into the lungs 4 times daily as needed for Wheezing 18 g 0    sodium chloride (OCEAN, BABY AYR) 0.65 % nasal spray 2 sprays by Nasal route as needed for Congestion 1 each 0     No current facility-administered medications for this visit.     Allergies, PMH, Surgical Hx, Family Hx, and Social Hx reviewed and updated.  Health Maintenance reviewed.    Objective    Vitals:    10/18/24 1110   BP: 124/66   Pulse: 67   Temp: 97.5 °F (36.4 °C)   SpO2: 96%   Weight: 90.3 kg (199 lb)   Height: 1.524 m (5')       Physical Exam  Vitals reviewed.   Constitutional:       General:

## 2024-11-16 ENCOUNTER — OFFICE VISIT (OUTPATIENT)
Dept: FAMILY MEDICINE CLINIC | Age: 79
End: 2024-11-16

## 2024-11-16 VITALS
DIASTOLIC BLOOD PRESSURE: 60 MMHG | BODY MASS INDEX: 38.28 KG/M2 | HEART RATE: 69 BPM | HEIGHT: 60 IN | OXYGEN SATURATION: 96 % | WEIGHT: 195 LBS | SYSTOLIC BLOOD PRESSURE: 96 MMHG | TEMPERATURE: 97.2 F

## 2024-11-16 DIAGNOSIS — J06.9 VIRAL URI: Primary | ICD-10-CM

## 2024-11-16 DIAGNOSIS — U07.1 COVID: ICD-10-CM

## 2024-11-16 LAB
Lab: ABNORMAL
PERFORMING INSTRUMENT: ABNORMAL
QC PASS/FAIL: ABNORMAL
SARS-COV-2, POC: DETECTED

## 2024-11-16 RX ORDER — BENZONATATE 100 MG/1
100 CAPSULE ORAL 3 TIMES DAILY PRN
Qty: 12 CAPSULE | Refills: 0 | Status: SHIPPED | OUTPATIENT
Start: 2024-11-16

## 2024-11-16 ASSESSMENT — ENCOUNTER SYMPTOMS
COUGH: 1
SINUS PAIN: 1
VOMITING: 0
BACK PAIN: 0
TROUBLE SWALLOWING: 0
SINUS PRESSURE: 1
ABDOMINAL PAIN: 0
VOICE CHANGE: 0
RHINORRHEA: 1

## 2024-11-16 NOTE — PROGRESS NOTES
[facility] WALK-IN [facility] Clinic Encounter  CHIEF COMPLAINT       Chief Complaint   Patient presents with    Cough     Pt states cough, sinus pressure, ear pain.  positive for covid.        HISTORY OF PRESENT ILLNESS   Mckenna Mendoza is a 79 y.o. female who presents with:  79-year-old female with chronic sinus issues runny nose congestion and facial pressure.  Also admits to chronic but intermittent cough.  Exposed to  this past week diagnosed with COVID 4 days ago.  Patient has been symptomatic since duration of 's illness.  States she has prednisone to use as needed, has steroid nasal spray to use as needed.  Would like something to help with her cough.  Otherwise no other associated symptoms or complaints.      REVIEW OF SYSTEMS     Review of Systems   Constitutional:  Negative for fever.   HENT:  Positive for rhinorrhea, sinus pressure and sinus pain. Negative for trouble swallowing and voice change.    Eyes:  Negative for visual disturbance.   Respiratory:  Positive for cough.    Cardiovascular:  Negative for chest pain.   Gastrointestinal:  Negative for abdominal pain and vomiting.   Genitourinary:  Negative for dysuria.   Musculoskeletal:  Negative for back pain.   Allergic/Immunologic: Negative for immunocompromised state.   Neurological:  Negative for headaches.   Psychiatric/Behavioral:  Negative for behavioral problems.      PAST MEDICAL HISTORY         Diagnosis Date    Cancer ([facility])     Encounter for lumbar puncture 05/25/2022    Completed by Dr. Mccormick - diagnostics sent    Hyperlipidemia     Hypertension     Type II or unspecified type diabetes mellitus without mention of complication, not stated as uncontrolled      SURGICAL HISTORY     Patient  has a past surgical history that includes Coronary angioplasty with stent; Upper gastrointestinal endoscopy (08/12/2015); and cyst removal (06/03/2024).  CURRENT MEDICATIONS       Previous Medications    ACYCLOVIR

## 2024-12-08 NOTE — TELEPHONE ENCOUNTER
Requesting medication refill. Please approve or deny this request.    Rx requested:  Requested Prescriptions     Pending Prescriptions Disp Refills    gabapentin (NEURONTIN) 300 MG capsule [Pharmacy Med Name: Gabapentin Oral Capsule 300 MG] 180 capsule 3     Sig: TAKE 2 CAPSULES AT BEDTIME         Last Office Visit:   7/15/2024      Next Visit Date:  Future Appointments   Date Time Provider Department Center   12/9/2024  1:30 PM Eric Saeed MD [facility]   1/21/2025 10:45 AM Twila Maher DO [facility]               Last refill 11/20/2023. Please approve or deny.

## 2024-12-09 ENCOUNTER — OFFICE VISIT (OUTPATIENT)
Dept: CARDIOLOGY CLINIC | Age: 79
End: 2024-12-09
Payer: MEDICARE

## 2024-12-09 VITALS
DIASTOLIC BLOOD PRESSURE: 62 MMHG | HEART RATE: 67 BPM | WEIGHT: 194 LBS | OXYGEN SATURATION: 98 % | BODY MASS INDEX: 37.89 KG/M2 | SYSTOLIC BLOOD PRESSURE: 122 MMHG

## 2024-12-09 DIAGNOSIS — E78.5 HYPERLIPIDEMIA, UNSPECIFIED HYPERLIPIDEMIA TYPE: ICD-10-CM

## 2024-12-09 DIAGNOSIS — I25.10 CORONARY ARTERY DISEASE DUE TO LIPID RICH PLAQUE: ICD-10-CM

## 2024-12-09 DIAGNOSIS — I10 HYPERTENSION, UNSPECIFIED TYPE: ICD-10-CM

## 2024-12-09 DIAGNOSIS — I25.83 CORONARY ARTERY DISEASE DUE TO LIPID RICH PLAQUE: ICD-10-CM

## 2024-12-09 DIAGNOSIS — U09.9 POST-COVID CHRONIC COUGH: ICD-10-CM

## 2024-12-09 DIAGNOSIS — E11.59 TYPE 2 DIABETES MELLITUS WITH OTHER CIRCULATORY COMPLICATION, WITHOUT LONG-TERM CURRENT USE OF INSULIN (HCC): ICD-10-CM

## 2024-12-09 DIAGNOSIS — R05.3 POST-COVID CHRONIC COUGH: ICD-10-CM

## 2024-12-09 DIAGNOSIS — E78.5 DYSLIPIDEMIA: ICD-10-CM

## 2024-12-09 DIAGNOSIS — S32.000S COMPRESSION FRACTURE OF LUMBAR VERTEBRA, UNSPECIFIED LUMBAR VERTEBRAL LEVEL, SEQUELA: ICD-10-CM

## 2024-12-09 DIAGNOSIS — I10 ESSENTIAL HYPERTENSION: Primary | ICD-10-CM

## 2024-12-09 PROCEDURE — G8399 PT W/DXA RESULTS DOCUMENT: HCPCS | Performed by: INTERNAL MEDICINE

## 2024-12-09 PROCEDURE — 3074F SYST BP LT 130 MM HG: CPT | Performed by: INTERNAL MEDICINE

## 2024-12-09 PROCEDURE — G8482 FLU IMMUNIZE ORDER/ADMIN: HCPCS | Performed by: INTERNAL MEDICINE

## 2024-12-09 PROCEDURE — 1090F PRES/ABSN URINE INCON ASSESS: CPT | Performed by: INTERNAL MEDICINE

## 2024-12-09 PROCEDURE — 1159F MED LIST DOCD IN RCRD: CPT | Performed by: INTERNAL MEDICINE

## 2024-12-09 PROCEDURE — G8427 DOCREV CUR MEDS BY ELIG CLIN: HCPCS | Performed by: INTERNAL MEDICINE

## 2024-12-09 PROCEDURE — 3051F HG A1C>EQUAL 7.0%<8.0%: CPT | Performed by: INTERNAL MEDICINE

## 2024-12-09 PROCEDURE — 1123F ACP DISCUSS/DSCN MKR DOCD: CPT | Performed by: INTERNAL MEDICINE

## 2024-12-09 PROCEDURE — 1036F TOBACCO NON-USER: CPT | Performed by: INTERNAL MEDICINE

## 2024-12-09 PROCEDURE — 99214 OFFICE O/P EST MOD 30 MIN: CPT | Performed by: INTERNAL MEDICINE

## 2024-12-09 PROCEDURE — 3078F DIAST BP <80 MM HG: CPT | Performed by: INTERNAL MEDICINE

## 2024-12-09 PROCEDURE — G8417 CALC BMI ABV UP PARAM F/U: HCPCS | Performed by: INTERNAL MEDICINE

## 2024-12-09 RX ORDER — GABAPENTIN 300 MG/1
CAPSULE ORAL
Qty: 180 CAPSULE | Refills: 3 | Status: SHIPPED | OUTPATIENT
Start: 2024-12-09 | End: 2025-06-07

## 2024-12-09 ASSESSMENT — ENCOUNTER SYMPTOMS
GASTROINTESTINAL NEGATIVE: 1
NAUSEA: 0
RESPIRATORY NEGATIVE: 1
COUGH: 0
STRIDOR: 0
WHEEZING: 0
CHEST TIGHTNESS: 0
EYES NEGATIVE: 1
SHORTNESS OF BREATH: 0
BLOOD IN STOOL: 0

## 2024-12-09 NOTE — PROGRESS NOTES
CAD - patent stent  9. LE edema - Advance Lasix to 40 add KCL 20. Low salt diet. - continue same.     10. CATH - LAD SHRUTHI x2 stable w mild/mod ISR- continue meds. Low fat diet. Dc ASA continue Plavix.     11 dizzy/ sinus - refer to ENT.     12. New PE - Eliquis    13. Recent COVID and persistent Cough  Counseling:  Heart Healthy Lifestyle, Improve BMI, Low Salt Diet, Take Precautions to Prevent Falls, and Walk Daily    Return in about 4 months (around 4/9/2025).      Electronically signed by NICOLA DUARTE MD on 12/9/2024 at 1:42 PM

## 2024-12-17 NOTE — PROGRESS NOTES
VASCULAR MEDICINE AND INTERVENTIONAL RADIOLOGY DEPARTMENT:     Chief Complaint   Patient presents with    bone marrow biopsy     HPI: Mckenna Mendoza, a female of 79 y.o. came to the office 12/18/2024, referred by Dr. García, for bone marrow biopsy. Patient has been following Dr. García for multiple myleoma, she was diagnosed in 2014. She states she has had several bone marrow biopsies in the past in other states. She follows with Dr. Saeed for history of NSTEMI with stents placement about 5 years ago, and history of PE in July 2024.  Currently also following with pulmonology for increased shortness of breath on exertion and intermittent SOB at rest, does states she uses an inhaler.  Confirms that she takes Eliquis and Plavix.  Denies taking OTC ibuprofen/aleve. Denies chest pain.    She denies blood or bleeding disorders, liver disease or past history of stroke.  She is a former smoker, she smoked approx. half a pack to 1 pack/day for 30 years.  She drinks a glass of wine or ~shot of rum w/coke every day with dinner.  Denies drug use.     Medical history: multiple myleoma, history of PE in 7/2024, CKD stage 3, NSTEMI w/stents placement (2017 per chart, patient states about 5 years ago), CAD, HTN, HLD, T2DM, ILAN, OA, DVT    Family History   Problem Relation Age of Onset    Cancer Mother     Arthritis Mother     Diabetes Mother     Heart Disease Mother     High Blood Pressure Mother     High Cholesterol Mother     Arthritis Father     Diabetes Father     Heart Disease Father     High Blood Pressure Father     High Cholesterol Father        Past Surgical History:   Procedure Laterality Date    CORONARY ANGIOPLASTY WITH STENT PLACEMENT      CYST REMOVAL  06/03/2024    cyst removed from back    UPPER GASTROINTESTINAL ENDOSCOPY  08/12/2015    w/bx,dilation         Past Medical History:   Diagnosis Date    Cancer ([facility])     Encounter for lumbar puncture 05/25/2022    Completed by Dr. Mccormick - diagnostics sent

## 2024-12-18 ENCOUNTER — OFFICE VISIT (OUTPATIENT)
Dept: INTERVENTIONAL RADIOLOGY/VASCULAR | Age: 79
End: 2024-12-18

## 2024-12-18 ENCOUNTER — OFFICE VISIT (OUTPATIENT)
Dept: PULMONOLOGY | Age: 79
End: 2024-12-18
Payer: MEDICARE

## 2024-12-18 VITALS
DIASTOLIC BLOOD PRESSURE: 68 MMHG | BODY MASS INDEX: 38.09 KG/M2 | SYSTOLIC BLOOD PRESSURE: 122 MMHG | WEIGHT: 194 LBS | HEIGHT: 60 IN | TEMPERATURE: 97.6 F | HEART RATE: 76 BPM | OXYGEN SATURATION: 98 %

## 2024-12-18 VITALS — DIASTOLIC BLOOD PRESSURE: 68 MMHG | HEART RATE: 64 BPM | SYSTOLIC BLOOD PRESSURE: 122 MMHG | OXYGEN SATURATION: 96 %

## 2024-12-18 DIAGNOSIS — G47.30 SLEEP DISORDER BREATHING: ICD-10-CM

## 2024-12-18 DIAGNOSIS — R05.9 COUGH, UNSPECIFIED TYPE: Primary | ICD-10-CM

## 2024-12-18 DIAGNOSIS — E66.9 OBESITY, UNSPECIFIED CLASS, UNSPECIFIED OBESITY TYPE, UNSPECIFIED WHETHER SERIOUS COMORBIDITY PRESENT: ICD-10-CM

## 2024-12-18 DIAGNOSIS — Z86.39 HX OF HYPERLIPIDEMIA: ICD-10-CM

## 2024-12-18 DIAGNOSIS — C90.00 MULTIPLE MYELOMA, REMISSION STATUS UNSPECIFIED (HCC): Primary | ICD-10-CM

## 2024-12-18 DIAGNOSIS — J40 BRONCHITIS: ICD-10-CM

## 2024-12-18 PROCEDURE — 1090F PRES/ABSN URINE INCON ASSESS: CPT | Performed by: INTERNAL MEDICINE

## 2024-12-18 PROCEDURE — G8427 DOCREV CUR MEDS BY ELIG CLIN: HCPCS | Performed by: INTERNAL MEDICINE

## 2024-12-18 PROCEDURE — G8399 PT W/DXA RESULTS DOCUMENT: HCPCS | Performed by: INTERNAL MEDICINE

## 2024-12-18 PROCEDURE — 1036F TOBACCO NON-USER: CPT | Performed by: INTERNAL MEDICINE

## 2024-12-18 PROCEDURE — 99204 OFFICE O/P NEW MOD 45 MIN: CPT | Performed by: INTERNAL MEDICINE

## 2024-12-18 PROCEDURE — G8417 CALC BMI ABV UP PARAM F/U: HCPCS | Performed by: INTERNAL MEDICINE

## 2024-12-18 PROCEDURE — 3074F SYST BP LT 130 MM HG: CPT | Performed by: INTERNAL MEDICINE

## 2024-12-18 PROCEDURE — 1159F MED LIST DOCD IN RCRD: CPT | Performed by: INTERNAL MEDICINE

## 2024-12-18 PROCEDURE — G8482 FLU IMMUNIZE ORDER/ADMIN: HCPCS | Performed by: INTERNAL MEDICINE

## 2024-12-18 PROCEDURE — 1123F ACP DISCUSS/DSCN MKR DOCD: CPT | Performed by: INTERNAL MEDICINE

## 2024-12-18 PROCEDURE — 1036F TOBACCO NON-USER: CPT | Performed by: NURSE PRACTITIONER

## 2024-12-18 PROCEDURE — 3078F DIAST BP <80 MM HG: CPT | Performed by: INTERNAL MEDICINE

## 2024-12-18 RX ORDER — METHYLPREDNISOLONE 4 MG/1
TABLET ORAL
Qty: 1 KIT | Refills: 0 | Status: SHIPPED | OUTPATIENT
Start: 2024-12-18 | End: 2024-12-24

## 2024-12-18 NOTE — PROGRESS NOTES
NEW PATIENT VISIT-PULMONARY/SLEEP    2024     REFERRING PHYSICIAN:  Twila Maher DO     REASON FOR REFERRAL:  cough    HPI:     Mckenna Mendoza is a 79 y.o. female who was referred to pulmonary clinic for evaluation.     Current symptoms-had COVID couple weeks ago and has been having cough since then.  Cough is a bit better lately but has not gone away.  Cough is productive of clear sputum.  Denies any fever or chills.  Has minimal shortness of breath with activities.     She snores and she has witnessed apneas.  She wakes up gasping for air or choking.  She is tired and sleepy during the day and dozes off easily.  She cannot sleep flat due to difficulty breathing while sleeping.      [place] Sleepiness Scale:   STOP-BAN/8              Past Medical History   Past Medical History:   Diagnosis Date    Cancer (HCC)     Encounter for lumbar puncture 2022    Completed by Dr. Mccormick - diagnostics sent    Hyperlipidemia     Hypertension     Type II or unspecified type diabetes mellitus without mention of complication, not stated as uncontrolled        Past Surgical History  Past Surgical History:   Procedure Laterality Date    CORONARY ANGIOPLASTY WITH STENT PLACEMENT      CYST REMOVAL  2024    cyst removed from back    UPPER GASTROINTESTINAL ENDOSCOPY  2015    w/bx,dilation        Allergies  Allergies   Allergen Reactions    Bacid Diarrhea and Nausea Only    Egg-Derived Products Nausea Only    Glucophage [Metformin]      diarrhea    Valium [Diazepam]     Zithromax [Azithromycin]     Milk-Related Compounds Diarrhea, Nausea And Vomiting and Nausea Only       Medications  Current Outpatient Medications   Medication Sig Dispense Refill    methylPREDNISolone (MEDROL DOSEPACK) 4 MG tablet Take by mouth. 1 kit 0    gabapentin (NEURONTIN) 300 MG capsule TAKE 2 CAPSULES AT BEDTIME 180 capsule 3    benzonatate (TESSALON) 100 MG capsule Take 1 capsule by mouth 3 times daily

## 2024-12-24 ENCOUNTER — TELEPHONE (OUTPATIENT)
Dept: INTERVENTIONAL RADIOLOGY/VASCULAR | Age: 79
End: 2024-12-24

## 2024-12-24 NOTE — TELEPHONE ENCOUNTER
Patient was given this information via phone conversation - voiced understanding  2.  Spoke to Brittany in [facility] to schedule procedure    >  Bone marrow biopsy is scheduled on 1/3/2025 @ 11:00 am   >  You will need to arrive at 10:00 am from home and check in at the Diagnostic Imaging Check In desk.   >  Do not eat or drink after midnight.    >  Make arrangements for transportation, as you should not drive immediately after.  >  Patient to hold the following medications: Aspirin and Plavix for 5 days prior to procedure  - starting 12/29/2024 and Jardiance 1 day prior to procedure - starting 1/2/2025  >  Patient to take metoprolol with a small sip of water the morning of the procedure  >  Patient to have PT/INR and CBC drawn 1 day prior to procedure

## 2025-01-10 ENCOUNTER — PRE-PROCEDURE TELEPHONE (OUTPATIENT)
Dept: INTERVENTIONAL RADIOLOGY/VASCULAR | Age: 80
End: 2025-01-10

## 2025-01-10 NOTE — FLOWSHEET NOTE
0850 pt arrived for bone marrow bx this morning, despite message left by Dr Petersen's office that is was cancelled due to CT scanner issues.  Support offered to pt and her  about the issue.   Pt very pleasant.   Understands she will receive a call later next week with a new bx date.   Pt told she may resume her eliquis at this time.

## 2025-01-15 ENCOUNTER — TELEPHONE (OUTPATIENT)
Dept: INTERVENTIONAL RADIOLOGY/VASCULAR | Age: 80
End: 2025-01-15

## 2025-01-15 NOTE — TELEPHONE ENCOUNTER
Patient was given this information via phone conversation - voiced understanding  2.  Spoke to Brittany in [facility] to schedule procedure    >  Bone marrow biopsy is scheduled on 2/5/2025 @ 1100 am   >  You will need to arrive at 9:30 am from home and check in at the Diagnostic Imaging Check In desk.   >  Do not eat or drink after midnight.    >  Make arrangements for transportation, as you should not drive immediately after.  >  Patient to hold the following medications: Plavix for 5 days prior to procedure  - starting 1/31/2025,  Eliquis for 2 days prior to procedure - starting 2/2/2025 and Jardiance for 1 day prior to procedure - starting 2/4/2025  >  Patient to take metoprolol with a small sip of water the morning of the procedure  >  Patient to have PT/INR and CBC drawn before procedure

## 2025-01-20 ENCOUNTER — TELEPHONE (OUTPATIENT)
Dept: INTERVENTIONAL RADIOLOGY/VASCULAR | Age: 80
End: 2025-01-20

## 2025-01-20 NOTE — TELEPHONE ENCOUNTER
Pre-procedure phone call made to patient. No answer, the following instructions left on voicemail, with radiology phone number to call back with any questions/concerns:      Bone marrow biopsy is scheduled on 1/23/2025 @ 9:00 am   >  You will need to arrive at 8:00 am from home and check in at the Diagnostic Imaging Check In desk.   >  Do not eat or drink after midnight.    >  Make arrangements for transportation, as you should not drive immediately after.  >  Patient to hold the following medications: Plavix for 5 days prior to procedure  - starting 1/18/2025,  Eliquis for 2 days prior to procedure - starting 1/21/2025 and Jardiance for 1 day prior to procedure - starting 1/22/2025  >  Patient to take metoprolol with a small sip of water the morning of the procedure  >  Patient to have PT/INR and CBC drawn 1 day prior to procedure    Also attempted to contact pt's spouse, no answer.    1350 Was able to reach patient on her spouse's phone. Reviewed pre-procedure instructions, but pt states she already took plavix today. Pt also states unable to come weds for labwork, prefers to come early before procedure to have labs drawn.

## 2025-01-30 ENCOUNTER — TELEPHONE (OUTPATIENT)
Dept: INTERVENTIONAL RADIOLOGY/VASCULAR | Age: 80
End: 2025-01-30

## 2025-01-30 NOTE — TELEPHONE ENCOUNTER
Pre procedure instruction reminder call: pt verbalized understanding of the following:  Bone marrow biopsy is scheduled on 2/5/2025 @ 1100 am   >  You will need to arrive at 9:30 am from home and check in at the Diagnostic Imaging Check In desk.   >  Do not eat or drink after midnight.    >  Make arrangements for transportation, as you should not drive immediately after.  >  Patient to hold the following medications:     Plavix for 5 days prior to procedure  - starting 1/31/2025,      Eliquis for 2 days prior to procedure - starting 2/2/2025 and     Jardiance for 1 day prior to procedure - starting 2/4/2025    >  Patient to take metoprolol with a small sip of water the morning of the procedure  >  Patient to have PT/INR and CBC drawn day of procedure with Radiology RN.Electronically signed by Argenis Jacome RN on 1/30/2025 at 11:03 AM

## 2025-01-31 ENCOUNTER — HOSPITAL ENCOUNTER (OUTPATIENT)
Dept: GENERAL RADIOLOGY | Age: 80
Discharge: HOME | End: 2025-01-31
Payer: MEDICARE

## 2025-01-31 ENCOUNTER — OFFICE VISIT (OUTPATIENT)
Dept: FAMILY MEDICINE CLINIC | Age: 80
End: 2025-01-31

## 2025-01-31 DIAGNOSIS — B96.89 BACTERIAL RESPIRATORY INFECTION: ICD-10-CM

## 2025-01-31 DIAGNOSIS — B96.89 BACTERIAL RESPIRATORY INFECTION: Primary | ICD-10-CM

## 2025-01-31 DIAGNOSIS — R05.1 ACUTE COUGH: ICD-10-CM

## 2025-01-31 DIAGNOSIS — R68.83 CHILLS: ICD-10-CM

## 2025-01-31 DIAGNOSIS — R06.09 EXERTIONAL DYSPNEA: ICD-10-CM

## 2025-01-31 DIAGNOSIS — R53.83 FATIGUE, UNSPECIFIED TYPE: ICD-10-CM

## 2025-01-31 DIAGNOSIS — J98.8 BACTERIAL RESPIRATORY INFECTION: Primary | ICD-10-CM

## 2025-01-31 DIAGNOSIS — J10.1 INFLUENZA A: ICD-10-CM

## 2025-01-31 DIAGNOSIS — J98.8 BACTERIAL RESPIRATORY INFECTION: ICD-10-CM

## 2025-01-31 LAB
INFLUENZA A ANTIBODY: POSITIVE
INFLUENZA B ANTIBODY: NEGATIVE
Lab: NORMAL
PERFORMING INSTRUMENT: NORMAL
QC PASS/FAIL: NORMAL
SARS-COV-2, POC: NORMAL

## 2025-01-31 PROCEDURE — 71046 X-RAY EXAM CHEST 2 VIEWS: CPT

## 2025-01-31 RX ORDER — DOXYCYCLINE HYCLATE 100 MG
100 TABLET ORAL 2 TIMES DAILY
Qty: 20 TABLET | Refills: 0 | Status: SHIPPED | OUTPATIENT
Start: 2025-01-31 | End: 2025-02-10

## 2025-01-31 RX ORDER — ALBUTEROL SULFATE 0.83 MG/ML
2.5 SOLUTION RESPIRATORY (INHALATION) 4 TIMES DAILY PRN
Qty: 120 EACH | Refills: 3 | Status: CANCELLED | OUTPATIENT
Start: 2025-01-31

## 2025-01-31 RX ORDER — ALBUTEROL SULFATE 0.83 MG/ML
2.5 SOLUTION RESPIRATORY (INHALATION) ONCE
Status: COMPLETED | OUTPATIENT
Start: 2025-01-31 | End: 2025-01-31

## 2025-01-31 RX ORDER — ALBUTEROL SULFATE 90 UG/1
2 INHALANT RESPIRATORY (INHALATION) EVERY 6 HOURS PRN
Qty: 18 G | Refills: 0 | Status: SHIPPED | OUTPATIENT
Start: 2025-01-31

## 2025-01-31 RX ORDER — OSELTAMIVIR PHOSPHATE 75 MG/1
75 CAPSULE ORAL 2 TIMES DAILY
Status: CANCELLED | OUTPATIENT
Start: 2025-01-31 | End: 2025-02-05

## 2025-01-31 RX ORDER — OSELTAMIVIR PHOSPHATE 6 MG/ML
FOR SUSPENSION ORAL
Qty: 53 ML | Refills: 0 | Status: SHIPPED | OUTPATIENT
Start: 2025-01-31 | End: 2025-02-05

## 2025-01-31 RX ADMIN — ALBUTEROL SULFATE 2.5 MG: 0.83 SOLUTION RESPIRATORY (INHALATION) at 16:13

## 2025-01-31 SDOH — ECONOMIC STABILITY: FOOD INSECURITY: WITHIN THE PAST 12 MONTHS, THE FOOD YOU BOUGHT JUST DIDN'T LAST AND YOU DIDN'T HAVE MONEY TO GET MORE.: NEVER TRUE

## 2025-01-31 SDOH — ECONOMIC STABILITY: FOOD INSECURITY: WITHIN THE PAST 12 MONTHS, YOU WORRIED THAT YOUR FOOD WOULD RUN OUT BEFORE YOU GOT MONEY TO BUY MORE.: NEVER TRUE

## 2025-01-31 ASSESSMENT — ENCOUNTER SYMPTOMS
CHEST TIGHTNESS: 1
RHINORRHEA: 1
SORE THROAT: 1
SHORTNESS OF BREATH: 1
COUGH: 1

## 2025-01-31 ASSESSMENT — PATIENT HEALTH QUESTIONNAIRE - PHQ9
SUM OF ALL RESPONSES TO PHQ QUESTIONS 1-9: 0
SUM OF ALL RESPONSES TO PHQ9 QUESTIONS 1 & 2: 0
2. FEELING DOWN, DEPRESSED OR HOPELESS: NOT AT ALL
SUM OF ALL RESPONSES TO PHQ QUESTIONS 1-9: 0
SUM OF ALL RESPONSES TO PHQ QUESTIONS 1-9: 0
1. LITTLE INTEREST OR PLEASURE IN DOING THINGS: NOT AT ALL
SUM OF ALL RESPONSES TO PHQ QUESTIONS 1-9: 0

## 2025-01-31 NOTE — PROGRESS NOTES
Subjective  Mckenna Mendoza, 79 y.o. female presents today with:  Chief Complaint   Patient presents with    Cough     Cough x2 weeks feeling dizzy, body aches, weakness        HPI  Presents to walk-in clinic for cough & congestion   Reports cough over the last month. Worsened over last couple of weeks.   Exposed to family members with Flu currently   C/o otalgia of left ear, cough, body aches, fatigue, chills, sweating, fatigue, feeling dizzy/lightheaded & sore throat   Cough productive thick green/tan/white phlegm   C/o chest tightness   C/o exertional SOB   Denies emesis or diarrhea   C/o nausea off/on   Low appetite   Hydrating   Sleep interrupted off/on   Not monitoring temp   Corcidin   Active treatment for MM                   Past Medical History:   Diagnosis Date    Cancer (HCC)     Encounter for lumbar puncture 05/25/2022    Completed by Dr. Mccormick - diagnostics sent    Hyperlipidemia     Hypertension     Type II or unspecified type diabetes mellitus without mention of complication, not stated as uncontrolled       Past Surgical History:   Procedure Laterality Date    CORONARY ANGIOPLASTY WITH STENT PLACEMENT      CYST REMOVAL  06/03/2024    cyst removed from back    UPPER GASTROINTESTINAL ENDOSCOPY  08/12/2015    w/bx,dilation      Family History   Problem Relation Age of Onset    Cancer Mother     Arthritis Mother     Diabetes Mother     Heart Disease Mother     High Blood Pressure Mother     High Cholesterol Mother     Arthritis Father     Diabetes Father     Heart Disease Father     High Blood Pressure Father     High Cholesterol Father              Review of Systems   Constitutional:  Positive for activity change, appetite change, chills, diaphoresis and fatigue.   HENT:  Positive for congestion, ear pain, rhinorrhea and sore throat.    Respiratory:  Positive for cough, chest tightness and shortness of breath.    Musculoskeletal:  Positive for myalgias.   Neurological:  Positive for

## 2025-02-01 VITALS
DIASTOLIC BLOOD PRESSURE: 78 MMHG | BODY MASS INDEX: 37.89 KG/M2 | HEART RATE: 72 BPM | OXYGEN SATURATION: 97 % | RESPIRATION RATE: 16 BRPM | TEMPERATURE: 97.9 F | SYSTOLIC BLOOD PRESSURE: 100 MMHG | HEIGHT: 60 IN

## 2025-02-05 ENCOUNTER — PRE-PROCEDURE TELEPHONE (OUTPATIENT)
Dept: INTERVENTIONAL RADIOLOGY/VASCULAR | Age: 80
End: 2025-02-05

## 2025-02-05 NOTE — FLOWSHEET NOTE
Patient called at 0940 when didn't show up for scheduled IR procedure at 0930. Patient answered and states she tested positive for flu A and isn't able to come in today. Reports she is feeling weak and has a bad cough. Would like the office to call her in a few days to reschedule.     Dr. Petersen's office staff notified of above. Electronically signed by Jaylin Obrien RN on 2/5/2025 at 9:46 AM

## 2025-02-06 NOTE — ED TRIAGE NOTES
Pt to ED due to left shoulder, face, and arm pain. Pt states this started on Monday in her shoulder and pain traveled up to the left side of her face and down her left arm. Pt states she felt like her head is spinning as well. Pt is alert and oriented x4. Skin is warm, dry, intact.  Resp are regular and equal. 1.76

## 2025-02-07 ENCOUNTER — OFFICE VISIT (OUTPATIENT)
Age: 80
End: 2025-02-07
Payer: MEDICARE

## 2025-02-07 VITALS
DIASTOLIC BLOOD PRESSURE: 60 MMHG | HEART RATE: 66 BPM | SYSTOLIC BLOOD PRESSURE: 118 MMHG | TEMPERATURE: 97.7 F | HEIGHT: 60 IN | BODY MASS INDEX: 36.71 KG/M2 | WEIGHT: 187 LBS | OXYGEN SATURATION: 98 %

## 2025-02-07 DIAGNOSIS — E78.5 HYPERLIPIDEMIA, UNSPECIFIED HYPERLIPIDEMIA TYPE: ICD-10-CM

## 2025-02-07 DIAGNOSIS — E11.59 TYPE 2 DIABETES MELLITUS WITH OTHER CIRCULATORY COMPLICATION, WITHOUT LONG-TERM CURRENT USE OF INSULIN (HCC): ICD-10-CM

## 2025-02-07 DIAGNOSIS — I10 ESSENTIAL HYPERTENSION: ICD-10-CM

## 2025-02-07 DIAGNOSIS — N18.31 STAGE 3A CHRONIC KIDNEY DISEASE (HCC): ICD-10-CM

## 2025-02-07 DIAGNOSIS — K21.9 GASTROESOPHAGEAL REFLUX DISEASE, UNSPECIFIED WHETHER ESOPHAGITIS PRESENT: ICD-10-CM

## 2025-02-07 DIAGNOSIS — I50.23 ACUTE ON CHRONIC SYSTOLIC HEART FAILURE (HCC): Primary | ICD-10-CM

## 2025-02-07 DIAGNOSIS — C90.00 MULTIPLE MYELOMA, REMISSION STATUS UNSPECIFIED (HCC): ICD-10-CM

## 2025-02-07 DIAGNOSIS — J11.1 INFLUENZA: ICD-10-CM

## 2025-02-07 LAB — HBA1C MFR BLD: 7.3 %

## 2025-02-07 PROCEDURE — 83036 HEMOGLOBIN GLYCOSYLATED A1C: CPT | Performed by: STUDENT IN AN ORGANIZED HEALTH CARE EDUCATION/TRAINING PROGRAM

## 2025-02-07 PROCEDURE — 3074F SYST BP LT 130 MM HG: CPT | Performed by: STUDENT IN AN ORGANIZED HEALTH CARE EDUCATION/TRAINING PROGRAM

## 2025-02-07 PROCEDURE — 1159F MED LIST DOCD IN RCRD: CPT | Performed by: STUDENT IN AN ORGANIZED HEALTH CARE EDUCATION/TRAINING PROGRAM

## 2025-02-07 PROCEDURE — 99214 OFFICE O/P EST MOD 30 MIN: CPT | Performed by: STUDENT IN AN ORGANIZED HEALTH CARE EDUCATION/TRAINING PROGRAM

## 2025-02-07 PROCEDURE — 1160F RVW MEDS BY RX/DR IN RCRD: CPT | Performed by: STUDENT IN AN ORGANIZED HEALTH CARE EDUCATION/TRAINING PROGRAM

## 2025-02-07 PROCEDURE — 1090F PRES/ABSN URINE INCON ASSESS: CPT | Performed by: STUDENT IN AN ORGANIZED HEALTH CARE EDUCATION/TRAINING PROGRAM

## 2025-02-07 PROCEDURE — 1123F ACP DISCUSS/DSCN MKR DOCD: CPT | Performed by: STUDENT IN AN ORGANIZED HEALTH CARE EDUCATION/TRAINING PROGRAM

## 2025-02-07 PROCEDURE — G8417 CALC BMI ABV UP PARAM F/U: HCPCS | Performed by: STUDENT IN AN ORGANIZED HEALTH CARE EDUCATION/TRAINING PROGRAM

## 2025-02-07 PROCEDURE — 3078F DIAST BP <80 MM HG: CPT | Performed by: STUDENT IN AN ORGANIZED HEALTH CARE EDUCATION/TRAINING PROGRAM

## 2025-02-07 PROCEDURE — G8399 PT W/DXA RESULTS DOCUMENT: HCPCS | Performed by: STUDENT IN AN ORGANIZED HEALTH CARE EDUCATION/TRAINING PROGRAM

## 2025-02-07 PROCEDURE — G2211 COMPLEX E/M VISIT ADD ON: HCPCS | Performed by: STUDENT IN AN ORGANIZED HEALTH CARE EDUCATION/TRAINING PROGRAM

## 2025-02-07 PROCEDURE — G8427 DOCREV CUR MEDS BY ELIG CLIN: HCPCS | Performed by: STUDENT IN AN ORGANIZED HEALTH CARE EDUCATION/TRAINING PROGRAM

## 2025-02-07 PROCEDURE — 3051F HG A1C>EQUAL 7.0%<8.0%: CPT | Performed by: STUDENT IN AN ORGANIZED HEALTH CARE EDUCATION/TRAINING PROGRAM

## 2025-02-07 PROCEDURE — 1036F TOBACCO NON-USER: CPT | Performed by: STUDENT IN AN ORGANIZED HEALTH CARE EDUCATION/TRAINING PROGRAM

## 2025-02-07 RX ORDER — ATORVASTATIN CALCIUM 40 MG/1
40 TABLET, FILM COATED ORAL DAILY
Qty: 90 TABLET | Refills: 3 | Status: SHIPPED | OUTPATIENT
Start: 2025-02-07

## 2025-02-07 RX ORDER — PANTOPRAZOLE SODIUM 40 MG/1
40 TABLET, DELAYED RELEASE ORAL DAILY
Qty: 90 TABLET | Refills: 3 | Status: SHIPPED | OUTPATIENT
Start: 2025-02-07

## 2025-02-07 RX ORDER — LISINOPRIL 2.5 MG/1
2.5 TABLET ORAL DAILY
Qty: 90 TABLET | Refills: 3 | Status: SHIPPED | OUTPATIENT
Start: 2025-02-07

## 2025-02-07 RX ORDER — BENZONATATE 100 MG/1
100 CAPSULE ORAL 3 TIMES DAILY PRN
Qty: 30 CAPSULE | Refills: 0 | Status: SHIPPED | OUTPATIENT
Start: 2025-02-07 | End: 2025-02-17

## 2025-02-07 RX ORDER — METOPROLOL TARTRATE 50 MG
50 TABLET ORAL 2 TIMES DAILY
Qty: 180 TABLET | Refills: 3 | Status: SHIPPED | OUTPATIENT
Start: 2025-02-07

## 2025-02-07 NOTE — PROGRESS NOTES
Subjective  Mckenna Mendoza, 79 y.o. female presents today with:  Chief Complaint   Patient presents with    Follow-up    Hypertension     Does not check BP at home.     Diabetes     A1c was 7.1 on 10/18/24. Does not check blood sugar at home.     Chronic Kidney Disease     Labs done 7/25/24.    Hyperlipidemia     Lipid panel done 5/5/23.     Pulmonary Embolism     Taking Eliquis as directed.     Influenza     Was seen at the walk in 1/31/25 & tested positive for the flu. States she continues to not feel well. Is very fatigued & continues to cough. Cough is productive, yellow in color. Still taking antibiotic. Tamiflu made her vomit.        History of Present Illness  The patient is an 79-year-old female who presents for a follow-up visit. She is accompanied by her .    She was unable to tolerate Tamiflu due to adverse reactions. She continues to experience a persistent cough, for which she is not currently receiving any treatment. She has previously used Tessalon Perles, which were effective in managing her cough. She is currently on a course of doxycycline.    She reports gastrointestinal discomfort, including cramping and a burning sensation, which she attributes to the medication. She has not completed the full course of the medication.    She is on Eliquis 5 mg twice daily, atorvastatin 40 mg, metoprolol 50 mg twice daily, lisinopril 2.5 mg, and Jardiance 10 mg. She does not monitor her blood glucose levels at home and reports no urinary tract infections.  She has no other concerns at this time.    Supplemental Information  She experienced an episode of violent sneezing after consuming banana bread, which subsided upon removal of the bread. However, she subsequently developed nausea and vomiting. She has a known intolerance to overripe bananas, which induce nausea and vomiting, but she can tolerate cooked bananas without issue.    ALLERGIES  The patient has no known allergies.        Past Medical History:

## 2025-02-25 ENCOUNTER — TELEPHONE (OUTPATIENT)
Dept: INTERVENTIONAL RADIOLOGY/VASCULAR | Age: 80
End: 2025-02-25

## 2025-02-25 NOTE — TELEPHONE ENCOUNTER
Patient canceled her procedure for 2/7/2025 due to flu.  She asked that we call back in 1 week to see if she is ready to rescheduled.  I called her back 1 week later and patient was still not feeling well.  She asked me to call back in another week.    I called patient today - left voicemail for patient to call office to advise if she is ready to reschedule her bone marrow bx.

## 2025-03-04 ENCOUNTER — TELEPHONE (OUTPATIENT)
Dept: INTERVENTIONAL RADIOLOGY/VASCULAR | Age: 80
End: 2025-03-04

## 2025-03-11 ENCOUNTER — PRE-PROCEDURE TELEPHONE (OUTPATIENT)
Dept: INTERVENTIONAL RADIOLOGY/VASCULAR | Age: 80
End: 2025-03-11

## 2025-03-11 NOTE — FLOWSHEET NOTE
Apt reminder called to patient. The following reviewed:   Procedure is scheduled on 3/12/2025   >  You will need to arrive at 10:00 am from home and check in at the Diagnostic Imaging Check In desk. PT was initially coming early for lab work but states she will have labs completed today and come at 10 tomorrow.   >  Do not eat or drink after midnight.    >  Patient will make arrangements for transportation by son.  >  Patient to hold the following medications: Plavix for 5 days prior to procedure  - starting 3/7/2025, Eliquis for 2 days prior to procedure - starting 3/10/2025, Jardiance for 1 day prior to procedure - starting 3/11/2025. Pt states she took her plavix last dose on 3/9/25 AM but held all other meds as directed. Message to Dr. Petersen to determine if ok or need to reschedule.   >  Patient to take metoprolol with a small sip of water the morning of the procedure  >  Patient to have PT/INR and CBC drawn 1 day prior to procedure. Pt states she is going to have these drawn today. Electronically signed by Shagufta Fontanez RN on 3/11/2025 at 10:08 AM     [facility] CNP would like to reschedule pt. Time slot of Monday march 17th at 1300 approved by REBEKAH and KEVON AGUILA.     Call to patient to reschedule.    Pt agreeable to come for bone marrow bx on 3/17/25 and check in at 1200 at the diagnotic img window.     Pt aware to be NPO and will need  home.     Pt aware she may take her metoprolol with sip of water in AM.     Pt aware that she is to take her plavix today and hold tomorrow the 12th. Pt may take eliquis through Friday and hold on the 15th. Pt to continue to hold vitamins. Pt to hold jardiance 1 day prior.     Pt informed that she will need to have her labwork completed prior to procedure as well.     No questions or concerns at this time. Electronically signed by Shagufta Fontanez RN on 3/11/2025 at 11:57 AM

## 2025-03-12 DIAGNOSIS — C90.00 MULTIPLE MYELOMA, REMISSION STATUS UNSPECIFIED (HCC): ICD-10-CM

## 2025-03-12 DIAGNOSIS — Z86.39 HX OF HYPERLIPIDEMIA: ICD-10-CM

## 2025-03-12 LAB
ERYTHROCYTE [DISTWIDTH] IN BLOOD BY AUTOMATED COUNT: 18.5 % (ref 11.5–14.5)
HCT VFR BLD AUTO: 27.8 % (ref 37–47)
HGB BLD-MCNC: 9 G/DL (ref 12–16)
INR PPP: 1.2
MCH RBC QN AUTO: 35.3 PG (ref 27–31.3)
MCHC RBC AUTO-ENTMCNC: 32.4 % (ref 33–37)
MCV RBC AUTO: 109 FL (ref 79.4–94.8)
PLATELET # BLD AUTO: 196 K/UL (ref 130–400)
PROTHROMBIN TIME: 15.8 SEC (ref 12.3–14.9)
RBC # BLD AUTO: 2.55 M/UL (ref 4.2–5.4)
WBC # BLD AUTO: 4.3 K/UL (ref 4.8–10.8)

## 2025-03-17 ENCOUNTER — HOSPITAL ENCOUNTER (OUTPATIENT)
Dept: CT IMAGING | Age: 80
Discharge: HOME | End: 2025-03-19
Payer: MEDICARE

## 2025-03-17 VITALS
SYSTOLIC BLOOD PRESSURE: 119 MMHG | HEART RATE: 61 BPM | OXYGEN SATURATION: 87 % | WEIGHT: 187 LBS | BODY MASS INDEX: 36.71 KG/M2 | TEMPERATURE: 96.2 F | HEIGHT: 60 IN | DIASTOLIC BLOOD PRESSURE: 58 MMHG | RESPIRATION RATE: 15 BRPM

## 2025-03-17 DIAGNOSIS — C90.00 MULTIPLE MYELOMA, REMISSION STATUS UNSPECIFIED (HCC): ICD-10-CM

## 2025-03-17 PROCEDURE — 6360000002 HC RX W HCPCS: Performed by: RADIOLOGY

## 2025-03-17 PROCEDURE — 88311 DECALCIFY TISSUE: CPT

## 2025-03-17 PROCEDURE — 6370000000 HC RX 637 (ALT 250 FOR IP): Performed by: RADIOLOGY

## 2025-03-17 PROCEDURE — 88342 IMHCHEM/IMCYTCHM 1ST ANTB: CPT

## 2025-03-17 PROCEDURE — 38221 DX BONE MARROW BIOPSIES: CPT

## 2025-03-17 PROCEDURE — 88237 TISSUE CULTURE BONE MARROW: CPT

## 2025-03-17 PROCEDURE — 88185 FLOWCYTOMETRY/TC ADD-ON: CPT

## 2025-03-17 PROCEDURE — 88341 IMHCHEM/IMCYTCHM EA ADD ANTB: CPT

## 2025-03-17 PROCEDURE — 7100000011 HC PHASE II RECOVERY - ADDTL 15 MIN

## 2025-03-17 PROCEDURE — 88264 CHROMOSOME ANALYSIS 20-25: CPT

## 2025-03-17 PROCEDURE — 7100000010 HC PHASE II RECOVERY - FIRST 15 MIN

## 2025-03-17 PROCEDURE — 88305 TISSUE EXAM BY PATHOLOGIST: CPT

## 2025-03-17 PROCEDURE — 2580000003 HC RX 258: Performed by: RADIOLOGY

## 2025-03-17 PROCEDURE — 88184 FLOWCYTOMETRY/ TC 1 MARKER: CPT

## 2025-03-17 PROCEDURE — 88313 SPECIAL STAINS GROUP 2: CPT

## 2025-03-17 RX ORDER — NEOMYCIN/BACITRACIN/POLYMYXINB 3.5-400-5K
OINTMENT (GRAM) TOPICAL PRN
Status: COMPLETED | OUTPATIENT
Start: 2025-03-17 | End: 2025-03-17

## 2025-03-17 RX ORDER — SODIUM CHLORIDE 9 MG/ML
INJECTION, SOLUTION INTRAVENOUS CONTINUOUS PRN
Status: COMPLETED | OUTPATIENT
Start: 2025-03-17 | End: 2025-03-17

## 2025-03-17 RX ORDER — MIDAZOLAM HYDROCHLORIDE 2 MG/2ML
INJECTION, SOLUTION INTRAMUSCULAR; INTRAVENOUS PRN
Status: COMPLETED | OUTPATIENT
Start: 2025-03-17 | End: 2025-03-17

## 2025-03-17 RX ORDER — FENTANYL CITRATE 0.05 MG/ML
INJECTION, SOLUTION INTRAMUSCULAR; INTRAVENOUS PRN
Status: COMPLETED | OUTPATIENT
Start: 2025-03-17 | End: 2025-03-17

## 2025-03-17 RX ORDER — LIDOCAINE HYDROCHLORIDE 20 MG/ML
INJECTION, SOLUTION INFILTRATION; PERINEURAL PRN
Status: COMPLETED | OUTPATIENT
Start: 2025-03-17 | End: 2025-03-17

## 2025-03-17 RX ADMIN — SODIUM CHLORIDE 50 ML/HR: 9 INJECTION, SOLUTION INTRAVENOUS at 13:12

## 2025-03-17 RX ADMIN — MIDAZOLAM HYDROCHLORIDE 0.5 MG: 1 INJECTION, SOLUTION INTRAMUSCULAR; INTRAVENOUS at 13:13

## 2025-03-17 RX ADMIN — BACITRACIN, NEOMYCIN, POLYMYXIN B 1 EACH: 400; 3.5; 5 OINTMENT TOPICAL at 13:28

## 2025-03-17 RX ADMIN — FENTANYL CITRATE 50 MCG: 50 INJECTION INTRAMUSCULAR; INTRAVENOUS at 13:13

## 2025-03-17 RX ADMIN — LIDOCAINE HYDROCHLORIDE 10 ML: 20 INJECTION, SOLUTION INFILTRATION; PERINEURAL at 13:20

## 2025-03-17 NOTE — FLOWSHEET NOTE
1210 - Patient brought back to CT holding, steady gait with her own rollator walker. Settled onto cart, out of clothes and into gown. Denies pain. VSS, on RA.     Pre-procedure checklist completed. Allergies, home medications and history reviewed. Confirms has been NPO since prior to MN and medications have been on hold as instructed.  in waiting room.     1239 - Dr. Petersen at cart side to evaluate patient and sign consent. Procedure explained and questions answered.     1305 - To restroom to void, then into CT for procedure. Electronically signed by Jaylin Obrien RN on 3/17/2025 at 1:10 PM

## 2025-03-17 NOTE — PRE SEDATION
Sedation Pre-Procedure Note    Patient Name: Mckenna Mendoza   YOB: 1945  Room/Bed: Room/bed info not found  Medical Record Number: [id number]  Date: 3/17/2025   Time: 12:43 PM       Indication:  Bone marrow biopsy    Consent: I have discussed with the patient and/or the patient representative the indication, alternatives, and the possible risks and/or complications of the planned procedure and the anesthesia methods. The patient and/or patient representative appear to understand and agree to proceed.    Vital Signs:   Vitals:    03/17/25 1217   BP: (!) 117/57   Pulse: 60   Resp: 11   Temp: (!) 96.2 °F (35.7 °C)   SpO2: 99%       Past Medical History:   has a past medical history of Cancer (HCC), Encounter for lumbar puncture, Hyperlipidemia, Hypertension, and Type II or unspecified type diabetes mellitus without mention of complication, not stated as uncontrolled.    Past Surgical History:   has a past surgical history that includes Coronary angioplasty with stent; Upper gastrointestinal endoscopy (08/12/2015); and cyst removal (06/03/2024).    Medications:   Scheduled Meds:   Continuous Infusions:   PRN Meds:   Home Meds:   Prior to Admission medications    Medication Sig Start Date End Date Taking? Authorizing Provider   metoprolol tartrate (LOPRESSOR) 50 MG tablet Take 1 tablet by mouth 2 times daily 2/7/25  Yes Twila Maher, DO   pantoprazole (PROTONIX) 40 MG tablet Take 1 tablet by mouth daily 2/7/25   Twila Maher, DO   lisinopril (PRINIVIL;ZESTRIL) 2.5 MG tablet Take 1 tablet by mouth daily 2/7/25   Twila Maher, DO   empagliflozin (JARDIANCE) 10 MG tablet Take 1 tablet by mouth daily 2/7/25   Twila Maher, DO   atorvastatin (LIPITOR) 40 MG tablet Take 1 tablet by mouth daily 2/7/25   Twila Maher, DO   albuterol sulfate HFA (PROVENTIL;VENTOLIN;PROAIR) 108 (90 Base) MCG/ACT inhaler Inhale 2 puffs into the lungs every 6 hours as needed for Wheezing 1/31/25   Margot Wilkinson

## 2025-03-17 NOTE — DISCHARGE INSTRUCTIONS
BIOPSY DISCHARGE INSTRUCTIONS    ACTIVITY:   Rest today. Expect to be sore for 1 to 2 days. No heavy bending, lifting , stretching, pushing or pulling for at least 24 hours. Do not lift anything over 10 pounds for the next 24 - 48 hours. Do not drive today.      BATHING:   May shower, bathe, or swim after 24 hours.  Pat the site dry. May remove large band aid after 24 hours.    DIET:   May resume your normal diet.     MEDICATION:  * Resume home medication unless otherwise instructed by your physician.  * (If Applicable) If taking any blood thinners or Aspirin, hold for 24 hours after biopsy.  * May take mild pain reliever, as needed, such as Acetaminophen or Ibuprofen.      COMPLICATIONS RELATED TO BIOPSY:   - Dizziness, weakness, fatigue  - Bruising/firmness/ and/or pain at the site.  - Extreme pain after leaving the hospital.   - Large or heavy bleeding at biopsy site.   IF THESE SYMPTOMS OCCUR AND BECOME SEVERE, REPORT TO THE EMERGENCY ROOM FOR FURTHER EVALUATION.     For the next 48 hours watch site for redness, drainage, swelling , fever (temp above 101 degrees F), or chills.   If these signs of infection occur contact DR. WHITE at [phone].

## 2025-03-17 NOTE — PROGRESS NOTES
1330 Patient returned from CT guided bone marrow biopsy. Patient can move all extremities but slowly. Patient is alert,oriented x 4. Patient can deep breath and cough. Patient resting on cart with eyes closed. Respirations even and non labored. Band aide over the bone marrow biopsy site is clean,dry,intact. Patient denies pain.    1345 Patient remains resting on cart with eyes closed. Respirations are even and un labored.  VSS on monitor. No complaints at this time.    1400 Patient remains resting on cart. Band aide remains clean,dry,intact. Biopsy site soft. No redness,bleeding,oozing,hematoma noted. No complaints of pain. Patient drinking cranberry juice at this time.  at bedside.     1415  at bedside. Discharge instructions printed and reviewed with the patient. Patient verbalizes understanding of discharge instructions. Copy of discharge instructions given to the patient.     1430 IV removed. IV end intact. IV site non red or hard. Patient dressed. Gait slow . Patient taken to front of hospital via wheelchair Transport services privately arranged by patient called  by patient to come and transport patient and  back home.

## 2025-03-17 NOTE — H&P
clearance from patient's cardiologist, Dr. Saeed, to hold Eliquis for 2 days and Plavix for 5 days prior to procedure. Patient is agreeable and reports understanding.    --  Follow up with referring Dr. García for pathology results.  No further follow up care required in IR after procedure completed.     Thank You Dr. García for referral of your patient to our practice for care.     Andreina Cha PA-C   Staff Vascular Medicine and Interventional Radiology Physician Assistant    Please note this report has been partially produced using speech recognition software and contain errors related to that system including grammar, punctuation, spelling, and words/phrases that may seem inappropriate. If there are questions or concerns please feel free to contact me to clarify.

## 2025-03-18 ENCOUNTER — TELEPHONE (OUTPATIENT)
Dept: INTERVENTIONAL RADIOLOGY/VASCULAR | Age: 80
End: 2025-03-18

## 2025-03-18 DIAGNOSIS — M48.062 LUMBAR STENOSIS WITH NEUROGENIC CLAUDICATION: ICD-10-CM

## 2025-03-18 NOTE — TELEPHONE ENCOUNTER
Follow up call post bone marrow bx with Dr. Petersen: pt states \"doing good\" and denies any concerns or questions at this time.Electronically signed by Argenis Jacome RN on 3/18/2025 at 9:28 AM

## 2025-03-18 NOTE — TELEPHONE ENCOUNTER
3 med refills pending per pt request.  [facility].  Pt had no questions or concerns to be addressed at time of call.

## 2025-03-18 NOTE — TELEPHONE ENCOUNTER
Last Office Visit:   2/7/2025    Next Visit Date:  Future Appointments   Date Time Provider Department Center   4/10/2025  1:00 PM Hayde Becker MD [facility]   4/10/2025  2:15 PM Eric Saeed MD [facility]   5/7/2025 12:30 PM Twila Maher DO [facility]

## 2025-03-19 LAB
ACUTE LEUKEMIA MARKERS SPEC-IMP: NORMAL
EVENTS COUNTED SPEC: 29 MARKERS
PROF LEUK/LYMPH PHENO 16 OR MORE MARKERS: NORMAL
PROF LEUK/LYMPH PHENO 29 MARKERS: NORMAL
SOURCE: NORMAL

## 2025-03-19 RX ORDER — GABAPENTIN 300 MG/1
300 CAPSULE ORAL 2 TIMES DAILY
Qty: 180 CAPSULE | Refills: 0 | Status: SHIPPED | OUTPATIENT
Start: 2025-03-19 | End: 2025-09-15

## 2025-03-19 RX ORDER — CLOPIDOGREL BISULFATE 75 MG/1
75 TABLET ORAL DAILY
Qty: 90 TABLET | Refills: 3 | Status: SHIPPED | OUTPATIENT
Start: 2025-03-19

## 2025-03-27 LAB
CHROM ANALY INTERPHASE MAR FISH-IMP: NORMAL
CHROM ANALY INTERPHASE MAR FISH-IMP: NORMAL

## 2025-04-10 ENCOUNTER — OFFICE VISIT (OUTPATIENT)
Age: 80
End: 2025-04-10

## 2025-04-10 VITALS
BODY MASS INDEX: 35.12 KG/M2 | HEIGHT: 61 IN | SYSTOLIC BLOOD PRESSURE: 122 MMHG | HEART RATE: 61 BPM | OXYGEN SATURATION: 98 % | TEMPERATURE: 97.6 F | DIASTOLIC BLOOD PRESSURE: 68 MMHG | WEIGHT: 186 LBS | RESPIRATION RATE: 17 BRPM

## 2025-04-10 VITALS
BODY MASS INDEX: 36.81 KG/M2 | DIASTOLIC BLOOD PRESSURE: 60 MMHG | HEART RATE: 66 BPM | SYSTOLIC BLOOD PRESSURE: 100 MMHG | OXYGEN SATURATION: 94 % | WEIGHT: 193.2 LBS

## 2025-04-10 DIAGNOSIS — E11.59 TYPE 2 DIABETES MELLITUS WITH OTHER CIRCULATORY COMPLICATION, WITHOUT LONG-TERM CURRENT USE OF INSULIN: ICD-10-CM

## 2025-04-10 DIAGNOSIS — I10 HYPERTENSION, UNSPECIFIED TYPE: ICD-10-CM

## 2025-04-10 DIAGNOSIS — E66.9 OBESITY, UNSPECIFIED CLASS, UNSPECIFIED OBESITY TYPE, UNSPECIFIED WHETHER SERIOUS COMORBIDITY PRESENT: ICD-10-CM

## 2025-04-10 DIAGNOSIS — I25.83 CORONARY ARTERY DISEASE DUE TO LIPID RICH PLAQUE: ICD-10-CM

## 2025-04-10 DIAGNOSIS — I25.10 CORONARY ARTERY DISEASE DUE TO LIPID RICH PLAQUE: ICD-10-CM

## 2025-04-10 DIAGNOSIS — G47.30 SLEEP-DISORDERED BREATHING: Primary | ICD-10-CM

## 2025-04-10 DIAGNOSIS — E78.5 HYPERLIPIDEMIA, UNSPECIFIED HYPERLIPIDEMIA TYPE: ICD-10-CM

## 2025-04-10 DIAGNOSIS — I10 ESSENTIAL HYPERTENSION: Primary | ICD-10-CM

## 2025-04-10 DIAGNOSIS — R05.9 COUGH, UNSPECIFIED TYPE: ICD-10-CM

## 2025-04-10 PROCEDURE — G8399 PT W/DXA RESULTS DOCUMENT: HCPCS | Performed by: INTERNAL MEDICINE

## 2025-04-10 PROCEDURE — 3078F DIAST BP <80 MM HG: CPT | Performed by: INTERNAL MEDICINE

## 2025-04-10 PROCEDURE — 1123F ACP DISCUSS/DSCN MKR DOCD: CPT | Performed by: INTERNAL MEDICINE

## 2025-04-10 PROCEDURE — 1036F TOBACCO NON-USER: CPT | Performed by: INTERNAL MEDICINE

## 2025-04-10 PROCEDURE — 1090F PRES/ABSN URINE INCON ASSESS: CPT | Performed by: INTERNAL MEDICINE

## 2025-04-10 PROCEDURE — 1159F MED LIST DOCD IN RCRD: CPT | Performed by: INTERNAL MEDICINE

## 2025-04-10 PROCEDURE — G8417 CALC BMI ABV UP PARAM F/U: HCPCS | Performed by: INTERNAL MEDICINE

## 2025-04-10 PROCEDURE — G8427 DOCREV CUR MEDS BY ELIG CLIN: HCPCS | Performed by: INTERNAL MEDICINE

## 2025-04-10 PROCEDURE — 3074F SYST BP LT 130 MM HG: CPT | Performed by: INTERNAL MEDICINE

## 2025-04-10 PROCEDURE — 99213 OFFICE O/P EST LOW 20 MIN: CPT | Performed by: INTERNAL MEDICINE

## 2025-04-10 RX ORDER — DEXAMETHASONE 4 MG/1
TABLET ORAL
COMMUNITY
Start: 2025-04-09

## 2025-04-10 RX ORDER — SELINEXOR 40 MG/1
TABLET, FILM COATED ORAL
COMMUNITY
Start: 2025-04-03

## 2025-04-10 ASSESSMENT — ENCOUNTER SYMPTOMS
STRIDOR: 0
EYES NEGATIVE: 1
SHORTNESS OF BREATH: 0
WHEEZING: 0
COUGH: 0
CHEST TIGHTNESS: 0
BLOOD IN STOOL: 0
GASTROINTESTINAL NEGATIVE: 1
RESPIRATORY NEGATIVE: 1
NAUSEA: 0

## 2025-04-10 NOTE — PROGRESS NOTES
since quittin.3    Smokeless tobacco: Never    Tobacco comments:     No   Substance Use Topics    Alcohol use: Yes     Alcohol/week: 3.0 standard drinks of alcohol     Types: 3 Glasses of wine per week       Family History  Family History   Problem Relation Age of Onset    Cancer Mother     Arthritis Mother     Diabetes Mother     Heart Disease Mother     High Blood Pressure Mother     High Cholesterol Mother     Arthritis Father     Diabetes Father     Heart Disease Father     High Blood Pressure Father     High Cholesterol Father        Review of Systems  All review of systems has been obtained and negative other than what was mentionedin HPI.   Physical Exam        Vitals:    04/10/25 1347   BP: 122/68   Pulse: 61   Resp: 17   Temp: 97.6 °F (36.4 °C)   TempSrc: Tympanic   SpO2: 98%   Weight: 84.4 kg (186 lb)   Height: 1.543 m (5' 0.75\")          General appearance: Well appearing. No acute distress. AAOX3  Head: Normocephalic, without obvious abnormality, atraumatic   Eyes:Pupils bilateral equal and reactive, EOM intact. Normal sclera and conjunctiva   Nose: Mucosa pink  Throat: Clear,  [place] 3  Neck: Supple, No JVD. Nothyromegaly. Neck is thick  Lungs: Clear bilaterally. No wheezing. No crackles. No use of accessory muscles.   Heart: RRR, S1, S2 normal, no murmur, click, rub or gallop   Abdomen: soft, non-tender, nondistended. Bowel sounds normal. No hernia. No organomegaly.   Extremities: extremities normal, atraumatic, no cyanosis, no edema  Skin: Skin color, texture, turgor normal. No rashes or lesions   Neurological: No focal deficits,cranial nerves grossly intact. No weakness. Sensation normal   Psych: Normal Mood  Musculoskeletal: No joint abnormalities.               Impression:   Diagnosis Orders   1. Sleep-disordered breathing  Home Sleep Study      2. Cough, unspecified type        3. Obesity, unspecified class, unspecified obesity type, unspecified whether serious comorbidity present

## 2025-04-10 NOTE — PROGRESS NOTES
(PROVENTIL;VENTOLIN;PROAIR) 108 (90 Base) MCG/ACT inhaler Inhale 2 puffs into the lungs every 6 hours as needed for Wheezing 18 g 0    apixaban (ELIQUIS) 5 MG TABS tablet Take 2 tablets (10mg) twice a day x 1 week, then 1 tablet (5mg) twice a day everyday after. (Patient taking differently: Take 1 tablet by mouth 2 times daily) 180 tablet 3    fluticasone (FLONASE) 50 MCG/ACT nasal spray 1 spray by Each Nostril route daily 3 each 3    acyclovir (ZOVIRAX) 400 MG tablet Take 1 tablet by mouth      furosemide (LASIX) 40 MG tablet TAKE 1 TABLET EVERY DAY 90 tablet 3    sodium chloride (OCEAN, BABY AYR) 0.65 % nasal spray 2 sprays by Nasal route as needed for Congestion 1 each 0     No current facility-administered medications for this visit.       Review of Systems:   Review of Systems   Constitutional: Negative.  Negative for diaphoresis and fatigue.   HENT: Negative.     Eyes: Negative.    Respiratory: Negative.  Negative for cough, chest tightness, shortness of breath, wheezing and stridor.    Cardiovascular:  Positive for leg swelling. Negative for chest pain and palpitations.   Gastrointestinal: Negative.  Negative for blood in stool and nausea.   Genitourinary: Negative.    Musculoskeletal: Negative.    Skin: Negative.    Neurological:  Positive for weakness. Negative for dizziness, syncope and light-headedness.   Hematological: Negative.    Psychiatric/Behavioral: Negative.           Physical Examination:    /60 (BP Site: Left Upper Arm, Patient Position: Sitting, BP Cuff Size: Medium Adult)   Pulse 66   Wt 87.6 kg (193 lb 3.2 oz)   SpO2 94%   BMI 36.81 kg/m²    Physical Exam   Constitutional: She appears healthy. No distress.   HENT:   Normal cephalic and Atraumatic   Eyes: Pupils are equal, round, and reactive to light.   Neck: Thyroid normal. No JVD present. No neck adenopathy. No thyromegaly present.   Cardiovascular: Normal rate, regular rhythm, intact distal pulses and normal pulses.   Murmur

## 2025-04-24 DIAGNOSIS — J40 BRONCHITIS: ICD-10-CM

## 2025-04-24 DIAGNOSIS — R05.9 COUGH, UNSPECIFIED TYPE: Primary | ICD-10-CM

## 2025-04-30 ENCOUNTER — HOSPITAL ENCOUNTER (OUTPATIENT)
Dept: SLEEP CENTER | Age: 80
Discharge: HOME | End: 2025-05-02
Payer: MEDICARE

## 2025-04-30 DIAGNOSIS — G47.30 SLEEP-DISORDERED BREATHING: ICD-10-CM

## 2025-04-30 PROCEDURE — 95810 POLYSOM 6/> YRS 4/> PARAM: CPT

## 2025-05-04 ENCOUNTER — APPOINTMENT (OUTPATIENT)
Dept: CT IMAGING | Age: 80
End: 2025-05-04
Payer: COMMERCIAL

## 2025-05-04 ENCOUNTER — HOSPITAL ENCOUNTER (OUTPATIENT)
Age: 80
Setting detail: OBSERVATION
Discharge: HOME | End: 2025-05-06
Attending: STUDENT IN AN ORGANIZED HEALTH CARE EDUCATION/TRAINING PROGRAM | Admitting: STUDENT IN AN ORGANIZED HEALTH CARE EDUCATION/TRAINING PROGRAM
Payer: COMMERCIAL

## 2025-05-04 DIAGNOSIS — E86.0 DEHYDRATION: ICD-10-CM

## 2025-05-04 DIAGNOSIS — R11.2 NAUSEA VOMITING AND DIARRHEA: Primary | ICD-10-CM

## 2025-05-04 DIAGNOSIS — R19.7 NAUSEA VOMITING AND DIARRHEA: Primary | ICD-10-CM

## 2025-05-04 PROBLEM — N17.9 AKI (ACUTE KIDNEY INJURY): Status: ACTIVE | Noted: 2025-05-04

## 2025-05-04 LAB
ALBUMIN SERPL-MCNC: 4.2 G/DL (ref 3.5–4.6)
ALP SERPL-CCNC: 59 U/L (ref 40–130)
ALT SERPL-CCNC: 9 U/L (ref 0–33)
ANION GAP SERPL CALCULATED.3IONS-SCNC: 13 MEQ/L (ref 9–15)
ANISOCYTOSIS BLD QL SMEAR: ABNORMAL
AST SERPL-CCNC: 18 U/L (ref 0–35)
BACTERIA URNS QL MICRO: ABNORMAL /HPF
BASOPHILS # BLD: 0 K/UL (ref 0–0.2)
BASOPHILS NFR BLD: 0.5 %
BILIRUB SERPL-MCNC: 0.7 MG/DL (ref 0.2–0.7)
BILIRUB UR QL STRIP: NEGATIVE
BUN SERPL-MCNC: 28 MG/DL (ref 8–23)
BURR CELLS: ABNORMAL
CALCIUM SERPL-MCNC: 10.3 MG/DL (ref 8.5–9.9)
CASTS #/AREA URNS LPF: ABNORMAL /LPF
CHLORIDE SERPL-SCNC: 106 MEQ/L (ref 95–107)
CLARITY UR: ABNORMAL
CO2 SERPL-SCNC: 21 MEQ/L (ref 20–31)
COLOR UR: YELLOW
CREAT SERPL-MCNC: 2 MG/DL (ref 0.5–0.9)
DACRYOCYTES BLD QL SMEAR: ABNORMAL
EOSINOPHIL # BLD: 0 K/UL (ref 0–0.7)
EOSINOPHIL NFR BLD: 0.2 %
EPI CELLS #/AREA URNS AUTO: ABNORMAL /HPF (ref 0–5)
ERYTHROCYTE [DISTWIDTH] IN BLOOD BY AUTOMATED COUNT: 19.1 % (ref 11.5–14.5)
GLOBULIN SER CALC-MCNC: 2.9 G/DL (ref 2.3–3.5)
GLUCOSE SERPL-MCNC: 111 MG/DL (ref 70–99)
GLUCOSE UR STRIP-MCNC: NEGATIVE MG/DL
HCT VFR BLD AUTO: 26.8 % (ref 37–47)
HGB BLD-MCNC: 8.6 G/DL (ref 12–16)
HGB UR QL STRIP: ABNORMAL
HYALINE CASTS #/AREA URNS LPF: ABNORMAL /LPF (ref 0–5)
KETONES UR STRIP-MCNC: ABNORMAL MG/DL
LACTATE BLDV-SCNC: 1.7 MMOL/L (ref 0.5–2.2)
LEUKOCYTE ESTERASE UR QL STRIP: NEGATIVE
LIPASE SERPL-CCNC: 17 U/L (ref 12–95)
LYMPHOCYTES # BLD: 1.8 K/UL (ref 1–4.8)
LYMPHOCYTES NFR BLD: 40 %
MACROCYTES BLD QL SMEAR: ABNORMAL
MCH RBC QN AUTO: 35.5 PG (ref 27–31.3)
MCHC RBC AUTO-ENTMCNC: 32.1 % (ref 33–37)
MCV RBC AUTO: 110.7 FL (ref 79.4–94.8)
MONOCYTES # BLD: 0 K/UL (ref 0.2–0.8)
MONOCYTES NFR BLD: 1 %
NEUTROPHILS # BLD: 2.3 K/UL (ref 1.4–6.5)
NEUTS BAND NFR BLD MANUAL: 2 % (ref 5–11)
NEUTS SEG NFR BLD: 53 %
NITRITE UR QL STRIP: NEGATIVE
NRBC BLD-RTO: 5 /100 WBC
OVALOCYTES BLD QL SMEAR: ABNORMAL
PH UR STRIP: 5.5 [PH] (ref 5–9)
PLATELET # BLD AUTO: 127 K/UL (ref 130–400)
PLATELET BLD QL SMEAR: ABNORMAL
POIKILOCYTOSIS BLD QL SMEAR: ABNORMAL
POTASSIUM SERPL-SCNC: 4.8 MEQ/L (ref 3.4–4.9)
PROCALCITONIN SERPL IA-MCNC: 0.25 NG/ML (ref 0–0.15)
PROT SERPL-MCNC: 7.1 G/DL (ref 6.3–8)
PROT UR STRIP-MCNC: >=300 MG/DL
RBC # BLD AUTO: 2.42 M/UL (ref 4.2–5.4)
SODIUM SERPL-SCNC: 140 MEQ/L (ref 135–144)
SP GR UR STRIP: 1.03 (ref 1–1.03)
URINE REFLEX TO CULTURE: ABNORMAL
UROBILINOGEN UR STRIP-ACNC: 1 E.U./DL
VARIANT LYMPHS NFR BLD: 4 %
WBC # BLD AUTO: 4.1 K/UL (ref 4.8–10.8)
WBC #/AREA URNS AUTO: ABNORMAL /HPF (ref 0–5)

## 2025-05-04 PROCEDURE — G0378 HOSPITAL OBSERVATION PER HR: HCPCS

## 2025-05-04 PROCEDURE — 6360000002 HC RX W HCPCS: Performed by: PHYSICIAN ASSISTANT

## 2025-05-04 PROCEDURE — 83690 ASSAY OF LIPASE: CPT

## 2025-05-04 PROCEDURE — 87040 BLOOD CULTURE FOR BACTERIA: CPT

## 2025-05-04 PROCEDURE — 6370000000 HC RX 637 (ALT 250 FOR IP): Performed by: STUDENT IN AN ORGANIZED HEALTH CARE EDUCATION/TRAINING PROGRAM

## 2025-05-04 PROCEDURE — 96374 THER/PROPH/DIAG INJ IV PUSH: CPT

## 2025-05-04 PROCEDURE — 96361 HYDRATE IV INFUSION ADD-ON: CPT

## 2025-05-04 PROCEDURE — 2500000003 HC RX 250 WO HCPCS: Performed by: STUDENT IN AN ORGANIZED HEALTH CARE EDUCATION/TRAINING PROGRAM

## 2025-05-04 PROCEDURE — 74176 CT ABD & PELVIS W/O CONTRAST: CPT

## 2025-05-04 PROCEDURE — 99285 EMERGENCY DEPT VISIT HI MDM: CPT

## 2025-05-04 PROCEDURE — 81001 URINALYSIS AUTO W/SCOPE: CPT

## 2025-05-04 PROCEDURE — 84145 PROCALCITONIN (PCT): CPT

## 2025-05-04 PROCEDURE — 85025 COMPLETE CBC W/AUTO DIFF WBC: CPT

## 2025-05-04 PROCEDURE — 80053 COMPREHEN METABOLIC PANEL: CPT

## 2025-05-04 PROCEDURE — 2580000003 HC RX 258: Performed by: PHYSICIAN ASSISTANT

## 2025-05-04 PROCEDURE — 36415 COLL VENOUS BLD VENIPUNCTURE: CPT

## 2025-05-04 PROCEDURE — 83605 ASSAY OF LACTIC ACID: CPT

## 2025-05-04 PROCEDURE — 2580000003 HC RX 258: Performed by: STUDENT IN AN ORGANIZED HEALTH CARE EDUCATION/TRAINING PROGRAM

## 2025-05-04 RX ORDER — ACETAMINOPHEN 325 MG/1
650 TABLET ORAL EVERY 6 HOURS PRN
Status: DISCONTINUED | OUTPATIENT
Start: 2025-05-04 | End: 2025-05-06 | Stop reason: HOSPADM

## 2025-05-04 RX ORDER — SODIUM CHLORIDE 9 MG/ML
INJECTION, SOLUTION INTRAVENOUS PRN
Status: DISCONTINUED | OUTPATIENT
Start: 2025-05-04 | End: 2025-05-06 | Stop reason: HOSPADM

## 2025-05-04 RX ORDER — ACETAMINOPHEN 650 MG/1
650 SUPPOSITORY RECTAL EVERY 6 HOURS PRN
Status: DISCONTINUED | OUTPATIENT
Start: 2025-05-04 | End: 2025-05-06 | Stop reason: HOSPADM

## 2025-05-04 RX ORDER — POLYETHYLENE GLYCOL 3350 17 G/17G
17 POWDER, FOR SOLUTION ORAL DAILY PRN
Status: DISCONTINUED | OUTPATIENT
Start: 2025-05-04 | End: 2025-05-06 | Stop reason: HOSPADM

## 2025-05-04 RX ORDER — ONDANSETRON 2 MG/ML
4 INJECTION INTRAMUSCULAR; INTRAVENOUS ONCE
Status: COMPLETED | OUTPATIENT
Start: 2025-05-04 | End: 2025-05-04

## 2025-05-04 RX ORDER — MECOBALAMIN 5000 MCG
5 TABLET,DISINTEGRATING ORAL NIGHTLY PRN
Status: DISCONTINUED | OUTPATIENT
Start: 2025-05-04 | End: 2025-05-06 | Stop reason: HOSPADM

## 2025-05-04 RX ORDER — ONDANSETRON 2 MG/ML
4 INJECTION INTRAMUSCULAR; INTRAVENOUS EVERY 6 HOURS PRN
Status: DISCONTINUED | OUTPATIENT
Start: 2025-05-04 | End: 2025-05-06 | Stop reason: HOSPADM

## 2025-05-04 RX ORDER — SODIUM CHLORIDE 0.9 % (FLUSH) 0.9 %
5-40 SYRINGE (ML) INJECTION EVERY 12 HOURS SCHEDULED
Status: DISCONTINUED | OUTPATIENT
Start: 2025-05-04 | End: 2025-05-06 | Stop reason: HOSPADM

## 2025-05-04 RX ORDER — 0.9 % SODIUM CHLORIDE 0.9 %
1000 INTRAVENOUS SOLUTION INTRAVENOUS ONCE
Status: COMPLETED | OUTPATIENT
Start: 2025-05-04 | End: 2025-05-04

## 2025-05-04 RX ORDER — SODIUM CHLORIDE 0.9 % (FLUSH) 0.9 %
5-40 SYRINGE (ML) INJECTION PRN
Status: DISCONTINUED | OUTPATIENT
Start: 2025-05-04 | End: 2025-05-06 | Stop reason: HOSPADM

## 2025-05-04 RX ORDER — ONDANSETRON 4 MG/1
4 TABLET, ORALLY DISINTEGRATING ORAL EVERY 8 HOURS PRN
Status: DISCONTINUED | OUTPATIENT
Start: 2025-05-04 | End: 2025-05-06 | Stop reason: HOSPADM

## 2025-05-04 RX ORDER — SODIUM CHLORIDE 9 MG/ML
INJECTION, SOLUTION INTRAVENOUS CONTINUOUS
Status: DISCONTINUED | OUTPATIENT
Start: 2025-05-04 | End: 2025-05-05

## 2025-05-04 RX ORDER — ENOXAPARIN SODIUM 100 MG/ML
30 INJECTION SUBCUTANEOUS DAILY
Status: DISCONTINUED | OUTPATIENT
Start: 2025-05-04 | End: 2025-05-05

## 2025-05-04 RX ADMIN — SODIUM CHLORIDE: 0.9 INJECTION, SOLUTION INTRAVENOUS at 23:07

## 2025-05-04 RX ADMIN — Medication 10 ML: at 23:12

## 2025-05-04 RX ADMIN — ONDANSETRON 4 MG: 2 INJECTION, SOLUTION INTRAMUSCULAR; INTRAVENOUS at 15:38

## 2025-05-04 RX ADMIN — Medication 5 MG: at 23:12

## 2025-05-04 RX ADMIN — SODIUM CHLORIDE 1000 ML: 0.9 INJECTION, SOLUTION INTRAVENOUS at 15:42

## 2025-05-04 ASSESSMENT — PAIN - FUNCTIONAL ASSESSMENT
PAIN_FUNCTIONAL_ASSESSMENT: NONE - DENIES PAIN
PAIN_FUNCTIONAL_ASSESSMENT: NONE - DENIES PAIN

## 2025-05-04 ASSESSMENT — LIFESTYLE VARIABLES
HOW MANY STANDARD DRINKS CONTAINING ALCOHOL DO YOU HAVE ON A TYPICAL DAY: 1 OR 2
HOW OFTEN DO YOU HAVE A DRINK CONTAINING ALCOHOL: MONTHLY OR LESS

## 2025-05-04 NOTE — ED TRIAGE NOTES
Pt in with n/v/d for the last 4 days. Pt states concern these symptoms are caused from new cancer treatment medication, weakly Xpovio. Is able to keep some fluids down.

## 2025-05-04 NOTE — H&P
DEPARTMENT OF HOSPITAL MEDICINE    HISTORY AND PHYSICAL    PATIENT NAME:  Mckenna Mendoza    MRN:  [id number]  SERVICE DATE:  5/4/2025   SERVICE TIME:  7:13 PM    Primary Care Physician: Twila Maher DO     SUBJECTIVE  CHIEF COMPLAINT:    Chief Complaint   Patient presents with    Vomiting     N/V/D     HPI:  This is a 79 y.o. female with PMH of HTN, HLD, CAD s/p stents, T2DM, PE on eliquis, and MM who presents with N/V/D. Pt states that her symptoms started 3 days prior to arrival. She denies any evidence of blood in her stool or emesis. She reports very poor PO intake during this time as well. She states she has been on a new treatment for her MM for the last 4 weeks. She takes it weekly with her most recent dose 4 days ago. She states her oncologist was hesitant to start this medication because of it's poor side effect profile but she had been tolerating it well up until this week. She denies fevers, chills, chest pain, or shortness of breath.    PAST MEDICAL HISTORY:    Past Medical History:   Diagnosis Date    Cancer (HCC)     Encounter for lumbar puncture 05/25/2022    Completed by Dr. Mccromick - diagnostics sent    Hyperlipidemia     Hypertension     Type II or unspecified type diabetes mellitus without mention of complication, not stated as uncontrolled      PAST SURGICAL HISTORY:    Past Surgical History:   Procedure Laterality Date    CORONARY ANGIOPLASTY WITH STENT PLACEMENT      CT BIOPSY BONE MARROW N/A 03/17/2025    by Dr. Petersen    CT BIOPSY BONE MARROW  3/17/2025    CT BIOPSY BONE MARROW 3/17/2025 [facility] CT SCAN    CYST REMOVAL  06/03/2024    cyst removed from back    UPPER GASTROINTESTINAL ENDOSCOPY  08/12/2015    w/bx,dilation      FAMILY HISTORY:    Family History   Problem Relation Age of Onset    Cancer Mother     Arthritis Mother     Diabetes Mother     Heart Disease Mother     High Blood Pressure Mother     High Cholesterol Mother     Arthritis Father     Diabetes Father     Heart

## 2025-05-04 NOTE — ED NOTES
Pt presented to ED with c/o of N/V that started on Thursday after taking PO Chemotherapy pill. N/V worsened today per pt.   Pt reported that this was her 4th PO tx after completed IV chemotherapy in April 2025.  Denies pain.  Pts spouse at the bedside.

## 2025-05-04 NOTE — ED PROVIDER NOTES
[facility] EMERGENCY DEPARTMENT  EMERGENCY DEPARTMENT ENCOUNTER      Pt Name: Mckenna Mendoza  MRN: [id number]  Birthdate 1945  Date of evaluation: 5/4/2025  Provider: Naomi Mcneill PA-C  Note Started: 5/4/25 3:42 PM EDT    CHIEF COMPLAINT       Chief Complaint   Patient presents with    Vomiting     N/V/D         HISTORY OF PRESENT ILLNESS   (Location/Symptom, Timing/Onset, Context/Setting, Quality, Duration, Modifying Factors, Severity)  Note limiting factors.   Mckenna Mendoza is a 79 y.o. female who presents to the emergency department n/v/d. This started 4 days ago. Started as diarrhea every 4 hours only a few times today. he has not been able to eat anything. Been drinking lemonade. And had some strawberries. Persistant vomting and nausea. Is on a new chemo oral drug that she takes on Wednesdays. This week was her fourth dose. She denies abdominal pain, fevers, chest pain, sob. Denies dysuria or hematuria.      HPI    Nursing Notes were reviewed.    REVIEW OF SYSTEMS    (2-9 systems for level 4, 10 or more for level 5)     Review of Systems   Constitutional:  Negative for chills, diaphoresis, fatigue and fever.   HENT:  Negative for congestion, rhinorrhea and sore throat.    Eyes:  Negative for photophobia and pain.   Respiratory:  Negative for cough and shortness of breath.    Cardiovascular:  Negative for chest pain and palpitations.   Gastrointestinal:  Positive for diarrhea, nausea and vomiting. Negative for abdominal pain.   Genitourinary:  Negative for dysuria and flank pain.   Musculoskeletal:  Negative for back pain.   Skin:  Negative for rash.   Neurological:  Negative for dizziness, light-headedness and headaches.   Psychiatric/Behavioral: Negative.     All other systems reviewed and are negative.      Except as noted above the remainder of the review of systems was reviewed and negative.       PAST MEDICAL HISTORY     Past Medical History:   Diagnosis Date    Cancer (HCC)     Encounter for lumbar

## 2025-05-05 PROCEDURE — 6370000000 HC RX 637 (ALT 250 FOR IP): Performed by: INTERNAL MEDICINE

## 2025-05-05 PROCEDURE — 6370000000 HC RX 637 (ALT 250 FOR IP): Performed by: STUDENT IN AN ORGANIZED HEALTH CARE EDUCATION/TRAINING PROGRAM

## 2025-05-05 PROCEDURE — G0378 HOSPITAL OBSERVATION PER HR: HCPCS

## 2025-05-05 PROCEDURE — 87324 CLOSTRIDIUM AG IA: CPT

## 2025-05-05 PROCEDURE — 2580000003 HC RX 258: Performed by: INTERNAL MEDICINE

## 2025-05-05 PROCEDURE — 87507 IADNA-DNA/RNA PROBE TQ 12-25: CPT

## 2025-05-05 PROCEDURE — 87449 NOS EACH ORGANISM AG IA: CPT

## 2025-05-05 PROCEDURE — 97162 PT EVAL MOD COMPLEX 30 MIN: CPT

## 2025-05-05 PROCEDURE — 96361 HYDRATE IV INFUSION ADD-ON: CPT

## 2025-05-05 PROCEDURE — 6370000000 HC RX 637 (ALT 250 FOR IP): Performed by: NURSE PRACTITIONER

## 2025-05-05 PROCEDURE — 2500000003 HC RX 250 WO HCPCS: Performed by: STUDENT IN AN ORGANIZED HEALTH CARE EDUCATION/TRAINING PROGRAM

## 2025-05-05 PROCEDURE — 97166 OT EVAL MOD COMPLEX 45 MIN: CPT

## 2025-05-05 RX ORDER — METOPROLOL TARTRATE 50 MG
50 TABLET ORAL 2 TIMES DAILY
Status: DISCONTINUED | OUTPATIENT
Start: 2025-05-05 | End: 2025-05-06 | Stop reason: HOSPADM

## 2025-05-05 RX ORDER — PANTOPRAZOLE SODIUM 40 MG/1
40 TABLET, DELAYED RELEASE ORAL DAILY
Status: DISCONTINUED | OUTPATIENT
Start: 2025-05-05 | End: 2025-05-06 | Stop reason: HOSPADM

## 2025-05-05 RX ORDER — ALBUTEROL SULFATE 90 UG/1
2 INHALANT RESPIRATORY (INHALATION) EVERY 6 HOURS PRN
Status: DISCONTINUED | OUTPATIENT
Start: 2025-05-05 | End: 2025-05-06 | Stop reason: HOSPADM

## 2025-05-05 RX ORDER — CHOLESTYRAMINE LIGHT 4 G/5.7G
4 POWDER, FOR SUSPENSION ORAL 2 TIMES DAILY
Status: DISCONTINUED | OUTPATIENT
Start: 2025-05-05 | End: 2025-05-06 | Stop reason: HOSPADM

## 2025-05-05 RX ORDER — ATORVASTATIN CALCIUM 40 MG/1
40 TABLET, FILM COATED ORAL DAILY
Status: DISCONTINUED | OUTPATIENT
Start: 2025-05-05 | End: 2025-05-06 | Stop reason: HOSPADM

## 2025-05-05 RX ORDER — SODIUM CHLORIDE 9 MG/ML
INJECTION, SOLUTION INTRAVENOUS CONTINUOUS
Status: DISPENSED | OUTPATIENT
Start: 2025-05-05 | End: 2025-05-06

## 2025-05-05 RX ORDER — LOPERAMIDE HYDROCHLORIDE 2 MG/1
2 CAPSULE ORAL 4 TIMES DAILY
Status: DISCONTINUED | OUTPATIENT
Start: 2025-05-05 | End: 2025-05-06 | Stop reason: HOSPADM

## 2025-05-05 RX ORDER — CLOPIDOGREL BISULFATE 75 MG/1
75 TABLET ORAL DAILY
Status: DISCONTINUED | OUTPATIENT
Start: 2025-05-05 | End: 2025-05-06 | Stop reason: HOSPADM

## 2025-05-05 RX ADMIN — APIXABAN 5 MG: 5 TABLET, FILM COATED ORAL at 02:25

## 2025-05-05 RX ADMIN — CLOPIDOGREL BISULFATE 75 MG: 75 TABLET, FILM COATED ORAL at 08:52

## 2025-05-05 RX ADMIN — ACETAMINOPHEN 650 MG: 325 TABLET ORAL at 08:55

## 2025-05-05 RX ADMIN — APIXABAN 5 MG: 5 TABLET, FILM COATED ORAL at 21:09

## 2025-05-05 RX ADMIN — METOPROLOL TARTRATE 50 MG: 50 TABLET, FILM COATED ORAL at 02:25

## 2025-05-05 RX ADMIN — PANTOPRAZOLE SODIUM 40 MG: 40 TABLET, DELAYED RELEASE ORAL at 08:52

## 2025-05-05 RX ADMIN — APIXABAN 5 MG: 5 TABLET, FILM COATED ORAL at 08:52

## 2025-05-05 RX ADMIN — Medication 5 MG: at 21:22

## 2025-05-05 RX ADMIN — LOPERAMIDE HYDROCHLORIDE 2 MG: 2 CAPSULE ORAL at 14:15

## 2025-05-05 RX ADMIN — ATORVASTATIN CALCIUM 40 MG: 40 TABLET, FILM COATED ORAL at 08:52

## 2025-05-05 RX ADMIN — METOPROLOL TARTRATE 50 MG: 50 TABLET, FILM COATED ORAL at 08:52

## 2025-05-05 RX ADMIN — METOPROLOL TARTRATE 50 MG: 50 TABLET, FILM COATED ORAL at 21:10

## 2025-05-05 RX ADMIN — SODIUM CHLORIDE: 0.9 INJECTION, SOLUTION INTRAVENOUS at 21:10

## 2025-05-05 RX ADMIN — Medication 10 ML: at 08:57

## 2025-05-05 RX ADMIN — SODIUM CHLORIDE: 0.9 INJECTION, SOLUTION INTRAVENOUS at 08:57

## 2025-05-05 RX ADMIN — CHOLESTYRAMINE 4 G: 4 POWDER, FOR SUSPENSION ORAL at 21:10

## 2025-05-05 ASSESSMENT — PAIN DESCRIPTION - ORIENTATION: ORIENTATION: RIGHT

## 2025-05-05 ASSESSMENT — ENCOUNTER SYMPTOMS
SHORTNESS OF BREATH: 0
NAUSEA: 1
VOMITING: 0
DIARRHEA: 1
COUGH: 0

## 2025-05-05 ASSESSMENT — PAIN SCALES - GENERAL
PAINLEVEL_OUTOF10: 0
PAINLEVEL_OUTOF10: 5

## 2025-05-05 ASSESSMENT — PAIN DESCRIPTION - LOCATION: LOCATION: LEG

## 2025-05-05 ASSESSMENT — PAIN DESCRIPTION - DESCRIPTORS: DESCRIPTORS: SHOOTING

## 2025-05-05 NOTE — ACP (ADVANCE CARE PLANNING)
Advance Care Planning   Healthcare Decision Maker:    Primary Decision Maker: Dmitri Mendoza - Spouse - [phone]    Secondary Decision Maker: Jess Mendoza - Child - [phone]    Click here to complete Healthcare Decision Makers including selection of the Healthcare Decision Maker Relationship (ie \"Primary\").  Today we documented Decision Maker(s) consistent with Legal Next of Kin hierarchy.       Electronically signed by NORMAN Morris on 5/5/2025 at 10:04 AM

## 2025-05-05 NOTE — CARE COORDINATION
Case Management Assessment  Initial Evaluation    Date/Time of Evaluation: 5/5/2025 10:05 AM  Assessment Completed by: NORMAN Morris    If patient is discharged prior to next notation, then this note serves as note for discharge by case management.    Patient Name: Mckenna Mendoza                   YOB: 1945  Diagnosis: Dehydration [E86.0]  OSIRIS (acute kidney injury) [N17.9]  Nausea vomiting and diarrhea [R11.2, R19.7]                   Date / Time: 5/4/2025  2:33 PM    Patient Admission Status: Observation   Readmission Risk (Low < 19, Mod (19-27), High > 27): No data recorded  Current PCP: Twila Maher, DO  PCP verified by CM? Yes    Chart Reviewed: Yes      History Provided by: Patient, Spouse  Patient Orientation: Alert and Oriented    Patient Cognition: Alert    Hospitalization in the last 30 days (Readmission):  No    If yes, Readmission Assessment in CM Navigator will be completed.    Advance Directives:      Code Status: Full Code   Patient's Primary Decision Maker is: Legal Next of Kin    Primary Decision Maker: Dmitri Mendoza - Spouse - [phone]    Secondary Decision Maker: Jess Mendoza - Child - [phone]    Discharge Planning:    Patient lives with: Spouse/Significant Other, Children, Family Members Type of Home: House  Primary Care Giver: Self  Patient Support Systems include: Spouse/Significant Other, Family Members, Children   Current Financial resources: Medicare  Current community resources: None  Current services prior to admission: None            Current DME:              Type of Home Care services:  None    ADLS  Prior functional level: Independent in ADLs/IADLs  Current functional level: Independent in ADLs/IADLs    PT AM-PAC:   /24  OT AM-PAC:   /24    Family can provide assistance at DC: Yes  Would you like Case Management to discuss the discharge plan with any other family members/significant others, and if so, who? Yes (spouse)  Plans to Return to Present

## 2025-05-05 NOTE — PLAN OF CARE
Problem: Chronic Conditions and Co-morbidities  Goal: Patient's chronic conditions and co-morbidity symptoms are monitored and maintained or improved  5/5/2025 1151 by Jayna Ventura RN  Outcome: Progressing  Flowsheets (Taken 5/5/2025 0830)  Care Plan - Patient's Chronic Conditions and Co-Morbidity Symptoms are Monitored and Maintained or Improved: Monitor and assess patient's chronic conditions and comorbid symptoms for stability, deterioration, or improvement  5/5/2025 0641 by Bernardo Gordillo RN  Outcome: Progressing  Flowsheets (Taken 5/4/2025 2045)  Care Plan - Patient's Chronic Conditions and Co-Morbidity Symptoms are Monitored and Maintained or Improved: Monitor and assess patient's chronic conditions and comorbid symptoms for stability, deterioration, or improvement     Problem: Discharge Planning  Goal: Discharge to home or other facility with appropriate resources  5/5/2025 1151 by Jayna Ventura RN  Outcome: Progressing  Flowsheets (Taken 5/5/2025 0830)  Discharge to home or other facility with appropriate resources: Identify barriers to discharge with patient and caregiver  5/5/2025 0641 by Bernardo Gordillo RN  Outcome: Progressing  Flowsheets  Taken 5/4/2025 2106  Discharge to home or other facility with appropriate resources:   Identify barriers to discharge with patient and caregiver   Arrange for needed discharge resources and transportation as appropriate   Identify discharge learning needs (meds, wound care, etc)   Refer to discharge planning if patient needs post-hospital services based on physician order or complex needs related to functional status, cognitive ability or social support system  Taken 5/4/2025 2045  Discharge to home or other facility with appropriate resources:   Identify barriers to discharge with patient and caregiver   Arrange for needed discharge resources and transportation as appropriate   Identify discharge learning needs (meds, wound care, etc)   Refer to discharge planning

## 2025-05-05 NOTE — PROGRESS NOTES
Physical Therapy Med Surg Initial Assessment  Facility/Department: [address] TELEMETRY  Room: [place] [address]       NAME: Mckenna Mendoza  : 1945 (79 y.o.)  MRN: [id number]  CODE STATUS: Full Code    Date of Service: 2025    Patient Diagnosis(es): Dehydration [E86.0]  OSIRIS (acute kidney injury) [N17.9]  Nausea vomiting and diarrhea [R11.2, R19.7]   Chief Complaint   Patient presents with    Vomiting     N/V/D     Patient Active Problem List    Diagnosis Date Noted    Angina at rest 2023    Chest pain 2022    Atelectasis, left     Impaired mobility and activities of daily living-metabolic encephalopathy with primary origins of infectious etiology 2022    Lumbar spondylosis 2022    Lumbar stenosis with neurogenic claudication 2022    Dizziness     Benign paroxysmal positional vertigo due to bilateral vestibular disorder     Maxillary pain     Nonintractable headache     Fall at home     Athscl heart disease of native coronary artery w/o ang pctrs 2022    Contusion of left hip 2022    Contusion of left shoulder 2022    Pain in left shoulder 2022    OSIRIS (acute kidney injury) 2025    History of pulmonary embolism 10/18/2024    Stage 3a chronic kidney disease (HCC) 2024    Acute on chronic systolic heart failure (HCC) 2024    Hyperlipidemia 10/16/2015    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 Mccormick - diagnostics sent    Hyperlipidemia     Hypertension     Type II or unspecified type diabetes mellitus without mention of complication, not stated as uncontrolled      Past Surgical History:   Procedure Laterality Date    CORONARY

## 2025-05-05 NOTE — CONSULTS
Financial Resource Strain: Low Risk  (2/14/2024)    Overall Financial Resource Strain (CARDIA)     Difficulty of Paying Living Expenses: Not hard at all   Food Insecurity: No Food Insecurity (5/4/2025)    Hunger Vital Sign     Worried About Running Out of Food in the Last Year: Never true     Ran Out of Food in the Last Year: Never true   Transportation Needs: No Transportation Needs (5/4/2025)    PRAPARE - Transportation     Lack of Transportation (Medical): No     Lack of Transportation (Non-Medical): No   Physical Activity: Inactive (2/14/2024)    Exercise Vital Sign     Days of Exercise per Week: 0 days     Minutes of Exercise per Session: 0 min   Stress: Not on file   Social Connections: Unknown (5/15/2023)    Received from [employer]    Social Network     Social Network: Not on file   Intimate Partner Violence: Unknown (4/6/2023)    Received from [employer]    HITS     Physically Hurt: Not on file     Insult or Talk Down To: Not on file     Threaten Physical Harm: Not on file     Scream or Curse: Not on file   Housing Stability: Low Risk  (5/4/2025)    Housing Stability Vital Sign     Unable to Pay for Housing in the Last Year: No     Number of Times Moved in the Last Year: 0     Homeless in the Last Year: No            Current Facility-Administered Medications   Medication Dose Route Frequency Provider Last Rate Last Admin    apixaban (ELIQUIS) tablet 5 mg  5 mg Oral BID Елена Olvera APRN - CNP   5 mg at 05/05/25 0852    albuterol sulfate HFA (PROVENTIL;VENTOLIN;PROAIR) 108 (90 Base) MCG/ACT inhaler 2 puff  2 puff Inhalation Q6H PRN Елена Olvera APRN - CNP        atorvastatin (LIPITOR) tablet 40 mg  40 mg Oral Daily Елена Olvera APRN - CNP   40 mg at 05/05/25 0852    clopidogrel (PLAVIX) tablet 75 mg  75 mg Oral Daily Елена Olvera APRN - CNP   75 mg at 05/05/25 0852    metoprolol tartrate (LOPRESSOR) tablet 50 mg  50 mg

## 2025-05-05 NOTE — PROGRESS NOTES
Progress Note  Date:2025       Room:[place] [id number]  Patient Name:Mckenna Mendoza     YOB: 1945     Age:79 y.o.        Subjective    Subjective:  Symptoms:  She reports weakness and diarrhea.  No shortness of breath, malaise, cough, chest pain, headache, chest pressure, anorexia or anxiety.    Diet:  She reports  nausea.  No vomiting.       Review of Systems   Respiratory:  Negative for cough and shortness of breath.    Cardiovascular:  Negative for chest pain.   Gastrointestinal:  Positive for diarrhea and nausea. Negative for anorexia and vomiting.   Neurological:  Positive for weakness.     Objective         Vitals Last 24 Hours:  TEMPERATURE:  Temp  Av.2 °F (36.8 °C)  Min: 98 °F (36.7 °C)  Max: 98.6 °F (37 °C)  RESPIRATIONS RANGE: Resp  Av.9  Min: 10  Max: 20  PULSE OXIMETRY RANGE: SpO2  Av.9 %  Min: 97 %  Max: 99 %  PULSE RANGE: Pulse  Av.2  Min: 67  Max: 104  BLOOD PRESSURE RANGE: Systolic (24hrs), Av , Min:105 , Max:135   ; Diastolic (24hrs), Av, Min:52, Max:70    I/O (24Hr):  No intake or output data in the 24 hours ending 25 1118  Objective:  General Appearance:  Comfortable, well-appearing and in no acute distress.    Vital signs: (most recent): Blood pressure (!) 105/52, pulse 72, temperature 98 °F (36.7 °C), resp. rate 18, height 1.524 m (5'), weight 79.5 kg (175 lb 4.3 oz), SpO2 98%.    HEENT: Normal HEENT exam.    Lungs:  Breath sounds clear to auscultation.    Heart: S1 normal and S2 normal.    Abdomen: Abdomen is soft.  Bowel sounds are normal.   There is no epigastric area tenderness.     Pulses: Distal pulses are intact.    Neurological: Patient is alert.    Pupils:  Pupils are equal, round, and reactive to light.    Skin:  Warm and dry.      Labs/Imaging/Diagnostics    Labs:  CBC:  Recent Labs     25  1544   WBC 4.1*   RBC 2.42*   HGB 8.6*   HCT 26.8*   .7*   RDW 19.1*   *     CHEMISTRIES:  Recent Labs     25  1544

## 2025-05-05 NOTE — PROGRESS NOTES
05/05/25 0000   RT Protocol   History Pulmonary Disease 0   Respiratory pattern 0   Breath sounds 2   Cough 0   Indications for Bronchodilator Therapy None   Bronchodilator Assessment Score 2

## 2025-05-06 ENCOUNTER — TELEPHONE (OUTPATIENT)
Age: 80
End: 2025-05-06

## 2025-05-06 VITALS
OXYGEN SATURATION: 95 % | SYSTOLIC BLOOD PRESSURE: 119 MMHG | TEMPERATURE: 98.4 F | HEIGHT: 60 IN | RESPIRATION RATE: 18 BRPM | BODY MASS INDEX: 34.41 KG/M2 | DIASTOLIC BLOOD PRESSURE: 64 MMHG | HEART RATE: 70 BPM | WEIGHT: 175.27 LBS

## 2025-05-06 LAB
ADV 40+41 DNA STL QL NAA+NON-PROBE: NOT DETECTED
ANION GAP SERPL CALCULATED.3IONS-SCNC: 8 MEQ/L (ref 9–15)
BUN SERPL-MCNC: 13 MG/DL (ref 8–23)
C CAYETANENSIS DNA STL QL NAA+NON-PROBE: NOT DETECTED
C COLI+JEJ+UPSA DNA STL QL NAA+NON-PROBE: NOT DETECTED
C DIFF TOX A+B STL QL IA: NORMAL
CALCIUM SERPL-MCNC: 8.6 MG/DL (ref 8.5–9.9)
CHLORIDE SERPL-SCNC: 114 MEQ/L (ref 95–107)
CO2 SERPL-SCNC: 19 MEQ/L (ref 20–31)
CREAT SERPL-MCNC: 1.43 MG/DL (ref 0.5–0.9)
CRYPTOSP DNA STL QL NAA+NON-PROBE: NOT DETECTED
E HISTOLYT DNA STL QL NAA+NON-PROBE: NOT DETECTED
EAEC PAA PLAS AGGR+AATA ST NAA+NON-PRB: NOT DETECTED
EC STX1+STX2 GENES STL QL NAA+NON-PROBE: NOT DETECTED
EPEC EAE GENE STL QL NAA+NON-PROBE: NOT DETECTED
ETEC LTA+ST1A+ST1B TOX ST NAA+NON-PROBE: NOT DETECTED
G LAMBLIA DNA STL QL NAA+NON-PROBE: NOT DETECTED
GI PATH DNA+RNA PNL STL NAA+NON-PROBE: NOT DETECTED
GLUCOSE SERPL-MCNC: 99 MG/DL (ref 70–99)
NOROVIRUS GI+II RNA STL QL NAA+NON-PROBE: NOT DETECTED
P SHIGELLOIDES DNA STL QL NAA+NON-PROBE: NOT DETECTED
POTASSIUM SERPL-SCNC: 4.1 MEQ/L (ref 3.4–4.9)
RVA RNA STL QL NAA+NON-PROBE: DETECTED
S ENT+BONG DNA STL QL NAA+NON-PROBE: NOT DETECTED
SAPO I+II+IV+V RNA STL QL NAA+NON-PROBE: NOT DETECTED
SHIGELLA SP+EIEC IPAH ST NAA+NON-PROBE: NOT DETECTED
SODIUM SERPL-SCNC: 141 MEQ/L (ref 135–144)
V CHOL+PARA+VUL DNA STL QL NAA+NON-PROBE: NOT DETECTED
V CHOLERAE DNA STL QL NAA+NON-PROBE: NOT DETECTED
Y ENTEROCOL DNA STL QL NAA+NON-PROBE: NOT DETECTED

## 2025-05-06 PROCEDURE — 96361 HYDRATE IV INFUSION ADD-ON: CPT

## 2025-05-06 PROCEDURE — 6370000000 HC RX 637 (ALT 250 FOR IP): Performed by: INTERNAL MEDICINE

## 2025-05-06 PROCEDURE — 80048 BASIC METABOLIC PNL TOTAL CA: CPT

## 2025-05-06 PROCEDURE — G0378 HOSPITAL OBSERVATION PER HR: HCPCS

## 2025-05-06 PROCEDURE — 2500000003 HC RX 250 WO HCPCS: Performed by: STUDENT IN AN ORGANIZED HEALTH CARE EDUCATION/TRAINING PROGRAM

## 2025-05-06 PROCEDURE — 6370000000 HC RX 637 (ALT 250 FOR IP): Performed by: NURSE PRACTITIONER

## 2025-05-06 PROCEDURE — 36415 COLL VENOUS BLD VENIPUNCTURE: CPT

## 2025-05-06 RX ORDER — LOPERAMIDE HYDROCHLORIDE 2 MG/1
4 TABLET ORAL 4 TIMES DAILY PRN
Qty: 30 TABLET | Refills: 0 | Status: SHIPPED | OUTPATIENT
Start: 2025-05-06 | End: 2025-05-16

## 2025-05-06 RX ADMIN — PANTOPRAZOLE SODIUM 40 MG: 40 TABLET, DELAYED RELEASE ORAL at 10:38

## 2025-05-06 RX ADMIN — CLOPIDOGREL BISULFATE 75 MG: 75 TABLET, FILM COATED ORAL at 10:38

## 2025-05-06 RX ADMIN — ATORVASTATIN CALCIUM 40 MG: 40 TABLET, FILM COATED ORAL at 10:38

## 2025-05-06 RX ADMIN — APIXABAN 5 MG: 5 TABLET, FILM COATED ORAL at 10:38

## 2025-05-06 RX ADMIN — METOPROLOL TARTRATE 50 MG: 50 TABLET, FILM COATED ORAL at 10:38

## 2025-05-06 RX ADMIN — Medication 10 ML: at 10:49

## 2025-05-06 RX ADMIN — CHOLESTYRAMINE 4 G: 4 POWDER, FOR SUSPENSION ORAL at 10:39

## 2025-05-06 NOTE — PLAN OF CARE
Problem: Chronic Conditions and Co-morbidities  Goal: Patient's chronic conditions and co-morbidity symptoms are monitored and maintained or improved  5/6/2025 0047 by Angelita Clifford RN  Outcome: Progressing  Flowsheets (Taken 5/5/2025 2001)  Care Plan - Patient's Chronic Conditions and Co-Morbidity Symptoms are Monitored and Maintained or Improved: Monitor and assess patient's chronic conditions and comorbid symptoms for stability, deterioration, or improvement  5/5/2025 1151 by Jayna Ventura RN  Outcome: Progressing  Flowsheets (Taken 5/5/2025 0830)  Care Plan - Patient's Chronic Conditions and Co-Morbidity Symptoms are Monitored and Maintained or Improved: Monitor and assess patient's chronic conditions and comorbid symptoms for stability, deterioration, or improvement     Problem: Discharge Planning  Goal: Discharge to home or other facility with appropriate resources  Recent Flowsheet Documentation  Taken 5/5/2025 2001 by Angelita Clifford RN  Discharge to home or other facility with appropriate resources: Identify barriers to discharge with patient and caregiver  5/5/2025 1151 by Jayna Ventura RN  Outcome: Progressing  Flowsheets (Taken 5/5/2025 0830)  Discharge to home or other facility with appropriate resources: Identify barriers to discharge with patient and caregiver     Problem: Discharge Planning  Goal: Discharge to home or other facility with appropriate resources  Recent Flowsheet Documentation  Taken 5/5/2025 2001 by Angelita Clifford RN  Discharge to home or other facility with appropriate resources: Identify barriers to discharge with patient and caregiver  5/5/2025 1151 by Jayna Ventura RN  Outcome: Progressing  Flowsheets (Taken 5/5/2025 0830)  Discharge to home or other facility with appropriate resources: Identify barriers to discharge with patient and caregiver     Problem: Safety - Adult  Goal: Free from fall injury  5/6/2025 0047 by Angelita Clifford RN  Outcome: Progressing  5/5/2025 1151 by

## 2025-05-06 NOTE — DISCHARGE SUMMARY
Date: 05/04/2025  Value: 1.0         Ref range: <2.0 E.U./dL       Status: Final  Nitrite, Urine                                Date: 05/04/2025  Value: Negative    Ref range: Negative           Status: Final  Leukocyte Esterase, Urine                     Date: 05/04/2025  Value: Negative    Ref range: Negative           Status: Final  Urine Reflex to Culture                       Date: 05/04/2025  Value: Not Indicated                       Status: Final  Procalcitonin                                 Date: 05/04/2025  Value: 0.25 (H)    Ref range: 0.00 - 0.15 ng/mL  Status: Final                Comment: Suspected Sepsis:  Low likelihood of sepsis  <.50 ng/mL    Increased likelihood of sepsis 0.50-2.00 ng/mL  Antibiotics encouraged    High risk of sepsis/shock   >2.00 ng/mL  Antibiotics strongly encouraged    Suspected Lower Respiratory Tract Infections:  Low likelihood of bacterial infection  <0.24 ng/mL    Increased likelihood of bacterial infection >0.24 ng/mL  Antibiotics encouraged    With successful antibiotic therapy, PCT levels should decrease  rapidly. (Half-life of 24 to 36 hours.)    Procalcitonin values from samples collected within the first  6 hours of systemic infection may still be low.  Retesting may be indicated.  Values from day 1 and day 4 can be entered into the Change in  Procalcitonin Calculator to determine the patient's  Mortality Risk Prognosis  ([email or web address])    In healthy neonates, plasma Procalcitonin (PCT) concentrations  increase gradually after birth, reaching peak values at about  24 hours of age then decrease to normal values below 0.5                          ng/mL  by 48-72 hours of age.    Hyaline Casts, UA                             Date: 05/04/2025  Value: 10-20 (A)   Ref range: 0 - 5 /LPF         Status: Final  Cellular Cast, UA                             Date: 05/04/2025  Value: 3-5 MixCells                     Ref range: None Seen

## 2025-05-06 NOTE — PLAN OF CARE
Problem: Chronic Conditions and Co-morbidities  Goal: Patient's chronic conditions and co-morbidity symptoms are monitored and maintained or improved  5/6/2025 1203 by Jackelyn Singer RN  Outcome: Progressing     Problem: Discharge Planning  Goal: Discharge to home or other facility with appropriate resources  Outcome: Progressing     Problem: Safety - Adult  Goal: Free from fall injury  5/6/2025 1203 by Jackelyn Singer RN  Outcome: Progressing     Problem: ABCDS Injury Assessment  Goal: Absence of physical injury  5/6/2025 1203 by Jackelyn Singer, RN  Outcome: Progressing

## 2025-05-06 NOTE — CARE COORDINATION
MADHUW met with pt and  at bedside. Discussed DC plan with pt. Pt insist on going home. Refuse SNF. Agreed with [facility].   [facility]OC offered.   Pt agreed with [facility].   Referral sent to [facility].   Pending acceptance.     Electronically signed by NORMAN Morris on 5/6/2025 at 10:12 AM

## 2025-05-06 NOTE — CARE COORDINATION
Monitor Summary: SB/SR BBB, 46-63, .2/.12/.48   PT ACCEPTED BY [facility] WITH SOC 5/8

## 2025-05-06 NOTE — TELEPHONE ENCOUNTER
Left VM for Ab[facility] phone - [phone] confirming PCP to follow pt (verbal yes already provided)

## 2025-05-06 NOTE — TELEPHONE ENCOUNTER
Abimbola from [facility] calling in requesting if PCP will follow for   SN PT OT    LOV 2/7/25  Next office visit 5/7/25    Verbal yes given    [place] phone - [phone]

## 2025-05-07 ENCOUNTER — TELEMEDICINE (OUTPATIENT)
Age: 80
End: 2025-05-07

## 2025-05-07 ENCOUNTER — TELEPHONE (OUTPATIENT)
Age: 80
End: 2025-05-07

## 2025-05-07 DIAGNOSIS — J06.9 VIRAL URI: ICD-10-CM

## 2025-05-07 DIAGNOSIS — R13.10 DYSPHAGIA, UNSPECIFIED TYPE: ICD-10-CM

## 2025-05-07 DIAGNOSIS — E86.0 DEHYDRATION: ICD-10-CM

## 2025-05-07 DIAGNOSIS — N17.9 AKI (ACUTE KIDNEY INJURY): ICD-10-CM

## 2025-05-07 DIAGNOSIS — Z09 HOSPITAL DISCHARGE FOLLOW-UP: Primary | ICD-10-CM

## 2025-05-07 NOTE — TELEPHONE ENCOUNTER
Care Transitions Initial Follow Up Call    Outreach made within 2 business days of discharge: Yes    Patient: Mckenna Mendoza Patient : 1945   MRN: [id number]  Reason for Admission: OSIRIS (acute kidney injury)   Discharge Date: 25       Spoke with: PT    Discharge department/facility: [place]    TCM Interactive Patient Contact:  Was patient able to fill all prescriptions: Yes  Was patient instructed to bring all medications to the follow-up visit: Yes  Is patient taking all medications as directed in the discharge summary? Yes  Does patient understand their discharge instructions: Yes  Does patient have questions or concerns that need addressed prior to 7-14 day follow up office visit: yes - pt stated that she caught it at the hospital. Coughing , sore throat , sinus congestion. No fevers     Additional needs identified to be addressed with provider   pt stated that she caught it at the hospital. Coughing , sore throat , sinus congestion. No fevers              Scheduled appointment with PCP within 7-14 days    Follow Up  Future Appointments   Date Time Provider Department Center   2025 12:30 PM Twila Maher DO [facility]   2025  1:00 PM [facility] ROOM 1 [facility]   2025  1:45 PM Hayde Becker MD [facility]   10/9/2025  2:15 PM Eric Saeed MD [facility] Kathryn Dean, MA

## 2025-05-07 NOTE — PROGRESS NOTES
Post-Discharge Transitional Care Follow Up      Mckenna Mendoza   YOB: 1945    Date of Office Visit:  5/7/2025  Date of Hospital Admission: 5/4/25  Date of Hospital Discharge: 5/6/25  Readmission Risk Score(high >=14%. Medium >=10%):No data recorded    Care management risk score Rising risk (score 2-5) and Complex Care (Scores >=6): No Risk Score On File     Non face to face  following discharge, date last encounter closed (first attempt may have been earlier): 05/07/2025     Call initiated 2 business days of discharge: Yes     Below is the assessment and plan developed based on review of pertinent history, physical exam, labs, studies, and medications.  1. Hospital discharge follow-up  Patient appointment for hospital discharge follow-up.  Hospitalization reviewed.  Medication reconciliation completed.  - [place] DISCHARGE MEDS RECONCILED W/ CURRENT OUTPATIENT MED LIST    2. OSIRIS (acute kidney injury)  Improving.  Recheck metabolic panel.  Call with results when return.  Follow-up in 1 month or sooner if needed.  Continue drinking fluids.  If symptoms worsen or change, return to care sooner.  Follow-up as scheduled with hematology/oncology to discuss medication to make sure that this does not happen again.  All questions answered.  - Basic Metabolic Panel; Future    3. Viral URI  Continue to monitor.  Recommend supportive care like she is doing.  Continue to monitor.  Follow-up as scheduled    4. Dehydration  Resolving.  Recheck labs.  Continue to drink plenty of fluids.    5.  Difficulty swallowing  Continue to monitor.  This is a new issue within the last month or so.  She states it is mostly with effervescent drinks that can happen with food and other liquids.  Continue to monitor.  Follow-up in person.  If symptoms acutely worsen or change, return to care sooner.  Consider GI referral if needed      Medical Decision Making: high complexity  No follow-ups on file.         Subjective:     Chief Complaint

## 2025-05-09 LAB
BACTERIA BLD CULT ORG #2: NORMAL
BACTERIA BLD CULT: NORMAL

## 2025-05-09 NOTE — PROGRESS NOTES
Physical Therapy  Facility/Department: [facility] MED SURG W184/W184-01  Physical Therapy Discharge      NAME: Mckenna Mendoza    : 1945 (79 y.o.)  MRN: [id number]    Account: [id number]  Gender: female      Patient has been discharged from acute care hospital. DC patient from current PT program.      Electronically signed by Agueda Garrison PT on 25 at 1:39 PM EDT

## 2025-05-13 ENCOUNTER — APPOINTMENT (OUTPATIENT)
Age: 80
DRG: 377 | End: 2025-05-13
Payer: COMMERCIAL

## 2025-05-13 ENCOUNTER — HOSPITAL ENCOUNTER (INPATIENT)
Age: 80
LOS: 3 days | Discharge: HOME HEALTH | DRG: 377 | End: 2025-05-16
Attending: EMERGENCY MEDICINE | Admitting: INTERNAL MEDICINE
Payer: COMMERCIAL

## 2025-05-13 ENCOUNTER — APPOINTMENT (OUTPATIENT)
Dept: GENERAL RADIOLOGY | Age: 80
DRG: 377 | End: 2025-05-13
Payer: COMMERCIAL

## 2025-05-13 DIAGNOSIS — I95.9 HYPOTENSION, UNSPECIFIED HYPOTENSION TYPE: Primary | ICD-10-CM

## 2025-05-13 DIAGNOSIS — R07.89 OTHER CHEST PAIN: ICD-10-CM

## 2025-05-13 DIAGNOSIS — D64.9 ACUTE ANEMIA: ICD-10-CM

## 2025-05-13 DIAGNOSIS — I20.89 ANGINA AT REST: ICD-10-CM

## 2025-05-13 PROBLEM — K92.2 GI BLEED: Status: ACTIVE | Noted: 2025-05-13

## 2025-05-13 LAB
ABO/RH: NORMAL
ALBUMIN SERPL-MCNC: 3.1 G/DL (ref 3.5–4.6)
ALP SERPL-CCNC: 95 U/L (ref 40–130)
ALT SERPL-CCNC: 11 U/L (ref 0–33)
ANION GAP SERPL CALCULATED.3IONS-SCNC: 13 MEQ/L (ref 9–15)
ANTIBODY SCREEN: NORMAL
AST SERPL-CCNC: 18 U/L (ref 0–35)
B PARAP IS1001 DNA NPH QL NAA+NON-PROBE: NOT DETECTED
B PERT.PT PRMT NPH QL NAA+NON-PROBE: NOT DETECTED
BASOPHILS # BLD: 0 K/UL (ref 0–0.2)
BASOPHILS NFR BLD: 0.2 %
BILIRUB SERPL-MCNC: 0.3 MG/DL (ref 0.2–0.7)
BLOOD GROUP ANTIBODIES SERPL: NORMAL
BUN SERPL-MCNC: 19 MG/DL (ref 8–23)
C PNEUM DNA NPH QL NAA+NON-PROBE: NOT DETECTED
CALCIUM SERPL-MCNC: 8.9 MG/DL (ref 8.5–9.9)
CHLORIDE SERPL-SCNC: 108 MEQ/L (ref 95–107)
CHP ED QC CHECK: YES
CO2 SERPL-SCNC: 18 MEQ/L (ref 20–31)
CREAT SERPL-MCNC: 1.63 MG/DL (ref 0.5–0.9)
DAT IGG: NORMAL
DAT POLY-SP REAG RBC QL: NORMAL
ECHO AO ROOT DIAM: 3 CM
ECHO AO ROOT INDEX: 1.65 CM/M2
ECHO AR MAX VEL PISA: 3.4 M/S
ECHO AV AREA PEAK VELOCITY: 1.2 CM2
ECHO AV AREA PLAN/BSA: 1.1 CM2/M2
ECHO AV AREA PLAN: 2 CM2
ECHO AV AREA VTI: 1.3 CM2
ECHO AV AREA/BSA PEAK VELOCITY: 0.7 CM2/M2
ECHO AV AREA/BSA VTI: 0.7 CM2/M2
ECHO AV CUSP MM: 1.4 CM
ECHO AV MEAN GRADIENT: 7 MMHG
ECHO AV MEAN VELOCITY: 1.2 M/S
ECHO AV PEAK GRADIENT: 13 MMHG
ECHO AV PEAK VELOCITY: 1.9 M/S
ECHO AV REGURGITANT PHT: 389 MS
ECHO AV VELOCITY RATIO: 0.58
ECHO AV VTI: 46 CM
ECHO BSA: 1.9 M2
ECHO EST RA PRESSURE: 15 MMHG
ECHO LA DIAMETER INDEX: 1.26 CM/M2
ECHO LA DIAMETER: 2.3 CM
ECHO LA TO AORTIC ROOT RATIO: 0.77
ECHO LA VOL A-L A2C: 64 ML (ref 22–52)
ECHO LA VOL A-L A4C: 53 ML (ref 22–52)
ECHO LA VOL MOD A2C: 61 ML (ref 22–52)
ECHO LA VOL MOD A4C: 46 ML (ref 22–52)
ECHO LA VOLUME AREA LENGTH: 60 ML
ECHO LA VOLUME INDEX A-L A2C: 35 ML/M2 (ref 16–34)
ECHO LA VOLUME INDEX A-L A4C: 29 ML/M2 (ref 16–34)
ECHO LA VOLUME INDEX AREA LENGTH: 33 ML/M2 (ref 16–34)
ECHO LA VOLUME INDEX MOD A2C: 34 ML/M2 (ref 16–34)
ECHO LA VOLUME INDEX MOD A4C: 25 ML/M2 (ref 16–34)
ECHO LV E' LATERAL VELOCITY: 9.4 CM/S
ECHO LV E' SEPTAL VELOCITY: 7.77 CM/S
ECHO LV EDV A2C: 59 ML
ECHO LV EDV A4C: 56 ML
ECHO LV EDV BP: 60 ML (ref 56–104)
ECHO LV EDV INDEX A4C: 31 ML/M2
ECHO LV EDV INDEX BP: 33 ML/M2
ECHO LV EDV NDEX A2C: 32 ML/M2
ECHO LV EF PHYSICIAN: 60 %
ECHO LV EJECTION FRACTION 3D: 60 %
ECHO LV EJECTION FRACTION A2C: 72 %
ECHO LV EJECTION FRACTION A4C: 61 %
ECHO LV EJECTION FRACTION BIPLANE: 65 % (ref 55–100)
ECHO LV ESV A2C: 17 ML
ECHO LV ESV A4C: 22 ML
ECHO LV ESV BP: 21 ML (ref 19–49)
ECHO LV ESV INDEX A2C: 9 ML/M2
ECHO LV ESV INDEX A4C: 12 ML/M2
ECHO LV ESV INDEX BP: 12 ML/M2
ECHO LV FRACTIONAL SHORTENING: 38 % (ref 28–44)
ECHO LV INTERNAL DIMENSION DIASTOLE INDEX: 2.47 CM/M2
ECHO LV INTERNAL DIMENSION DIASTOLIC: 4.5 CM (ref 3.9–5.3)
ECHO LV INTERNAL DIMENSION SYSTOLIC INDEX: 1.54 CM/M2
ECHO LV INTERNAL DIMENSION SYSTOLIC: 2.8 CM
ECHO LV IVSD: 1 CM (ref 0.6–0.9)
ECHO LV IVSS: 1.1 CM
ECHO LV MASS 2D: 164 G (ref 67–162)
ECHO LV MASS INDEX 2D: 90.1 G/M2 (ref 43–95)
ECHO LV POSTERIOR WALL DIASTOLIC: 1.1 CM (ref 0.6–0.9)
ECHO LV POSTERIOR WALL SYSTOLIC: 1.2 CM
ECHO LV RELATIVE WALL THICKNESS RATIO: 0.49
ECHO LVOT AREA: 2 CM2
ECHO LVOT AV VTI INDEX: 0.63
ECHO LVOT DIAM: 1.6 CM
ECHO LVOT MEAN GRADIENT: 2 MMHG
ECHO LVOT PEAK GRADIENT: 5 MMHG
ECHO LVOT PEAK VELOCITY: 1.1 M/S
ECHO LVOT STROKE VOLUME INDEX: 31.8 ML/M2
ECHO LVOT SV: 57.9 ML
ECHO LVOT VTI: 28.8 CM
ECHO MV A VELOCITY: 1.15 M/S
ECHO MV AREA PHT: 3.7 CM2
ECHO MV AREA VTI: 1.5 CM2
ECHO MV E DECELERATION TIME (DT): 239.3 MS
ECHO MV E VELOCITY: 0.89 M/S
ECHO MV E/A RATIO: 0.77
ECHO MV E/E' LATERAL: 9.47
ECHO MV E/E' RATIO (AVERAGED): 10.46
ECHO MV E/E' SEPTAL: 11.45
ECHO MV LVOT VTI INDEX: 1.32
ECHO MV MAX VELOCITY: 1.2 M/S
ECHO MV MEAN GRADIENT: 2 MMHG
ECHO MV MEAN VELOCITY: 0.7 M/S
ECHO MV PEAK GRADIENT: 5 MMHG
ECHO MV PRESSURE HALF TIME (PHT): 60.1 MS
ECHO MV VTI: 37.9 CM
ECHO PV MAX VELOCITY: 1 M/S
ECHO PV PEAK GRADIENT: 4 MMHG
ECHO RIGHT VENTRICULAR SYSTOLIC PRESSURE (RVSP): 81 MMHG
ECHO RV INTERNAL DIMENSION: 3.7 CM
ECHO RV TAPSE: 2 CM (ref 1.7–?)
ECHO TV REGURGITANT MAX VELOCITY: 4.06 M/S
ECHO TV REGURGITANT PEAK GRADIENT: 66 MMHG
EKG ATRIAL RATE: 77 BPM
EKG ATRIAL RATE: 82 BPM
EKG DIAGNOSIS: NORMAL
EKG DIAGNOSIS: NORMAL
EKG P AXIS: 35 DEGREES
EKG P AXIS: 66 DEGREES
EKG P-R INTERVAL: 180 MS
EKG P-R INTERVAL: 188 MS
EKG Q-T INTERVAL: 382 MS
EKG Q-T INTERVAL: 394 MS
EKG QRS DURATION: 68 MS
EKG QRS DURATION: 68 MS
EKG QTC CALCULATION (BAZETT): 445 MS
EKG QTC CALCULATION (BAZETT): 446 MS
EKG R AXIS: -9 DEGREES
EKG R AXIS: 0 DEGREES
EKG T AXIS: 29 DEGREES
EKG T AXIS: 30 DEGREES
EKG VENTRICULAR RATE: 77 BPM
EKG VENTRICULAR RATE: 82 BPM
EOSINOPHIL # BLD: 0 K/UL (ref 0–0.7)
EOSINOPHIL NFR BLD: 0.4 %
ERYTHROCYTE [DISTWIDTH] IN BLOOD BY AUTOMATED COUNT: 19.3 % (ref 11.5–14.5)
FLUAV RNA NPH QL NAA+NON-PROBE: NOT DETECTED
FLUBV RNA NPH QL NAA+NON-PROBE: NOT DETECTED
GLOBULIN SER CALC-MCNC: 2.2 G/DL (ref 2.3–3.5)
GLUCOSE BLD-MCNC: 200 MG/DL (ref 70–99)
GLUCOSE BLD-MCNC: 85 MG/DL (ref 70–99)
GLUCOSE BLD-MCNC: 87 MG/DL (ref 70–99)
GLUCOSE BLD-MCNC: 91 MG/DL (ref 70–99)
GLUCOSE SERPL-MCNC: 179 MG/DL (ref 70–99)
HADV DNA NPH QL NAA+NON-PROBE: NOT DETECTED
HCOV 229E RNA NPH QL NAA+NON-PROBE: NOT DETECTED
HCOV HKU1 RNA NPH QL NAA+NON-PROBE: NOT DETECTED
HCOV NL63 RNA NPH QL NAA+NON-PROBE: NOT DETECTED
HCOV OC43 RNA NPH QL NAA+NON-PROBE: NOT DETECTED
HCT VFR BLD AUTO: 20.3 % (ref 37–47)
HCT VFR BLD AUTO: 20.6 % (ref 37–47)
HCT VFR BLD AUTO: 25.1 % (ref 37–47)
HCT VFR BLD AUTO: 25.2 % (ref 37–47)
HEMOCCULT STL QL: NEGATIVE
HGB BLD-MCNC: 6.3 G/DL (ref 12–16)
HGB BLD-MCNC: 6.8 G/DL (ref 12–16)
HGB BLD-MCNC: 8.1 G/DL (ref 12–16)
HGB BLD-MCNC: 8.3 G/DL (ref 12–16)
HMPV RNA NPH QL NAA+NON-PROBE: NOT DETECTED
HPIV1 RNA NPH QL NAA+NON-PROBE: NOT DETECTED
HPIV2 RNA NPH QL NAA+NON-PROBE: NOT DETECTED
HPIV3 RNA NPH QL NAA+NON-PROBE: NOT DETECTED
HPIV4 RNA NPH QL NAA+NON-PROBE: NOT DETECTED
LACTATE BLDV-SCNC: 1.1 MMOL/L (ref 0.5–2.2)
LACTIC ACID, SEPSIS: 1.6 MMOL/L (ref 0.5–1.9)
LACTIC ACID, SEPSIS: 2.1 MMOL/L (ref 0.5–1.9)
LYMPHOCYTES # BLD: 3 K/UL (ref 1–4.8)
LYMPHOCYTES NFR BLD: 67.3 %
M PNEUMO DNA NPH QL NAA+NON-PROBE: NOT DETECTED
MAGNESIUM SERPL-MCNC: 2 MG/DL (ref 1.7–2.4)
MCH RBC QN AUTO: 34.8 PG (ref 27–31.3)
MCHC RBC AUTO-ENTMCNC: 31 % (ref 33–37)
MCV RBC AUTO: 112.2 FL (ref 79.4–94.8)
MONOCYTES # BLD: 0.1 K/UL (ref 0.2–0.8)
MONOCYTES NFR BLD: 3.1 %
NEUTROPHILS # BLD: 1.2 K/UL (ref 1.4–6.5)
NEUTS SEG NFR BLD: 27.7 %
PERFORMED ON: ABNORMAL
PERFORMED ON: NORMAL
PLATELET # BLD AUTO: 130 K/UL (ref 130–400)
POTASSIUM SERPL-SCNC: 4.1 MEQ/L (ref 3.4–4.9)
PROCALCITONIN SERPL IA-MCNC: 0.1 NG/ML (ref 0–0.15)
PROT SERPL-MCNC: 5.3 G/DL (ref 6.3–8)
RBC # BLD AUTO: 1.81 M/UL (ref 4.2–5.4)
RSV RNA NPH QL NAA+NON-PROBE: NOT DETECTED
RV+EV RNA NPH QL NAA+NON-PROBE: DETECTED
SARS-COV-2 RNA NPH QL NAA+NON-PROBE: NOT DETECTED
SODIUM SERPL-SCNC: 139 MEQ/L (ref 135–144)
TROPONIN, HIGH SENSITIVITY: 73 NG/L (ref 0–19)
TROPONIN, HIGH SENSITIVITY: 76 NG/L (ref 0–19)
WBC # BLD AUTO: 4.5 K/UL (ref 4.8–10.8)

## 2025-05-13 PROCEDURE — 3E033XZ INTRODUCTION OF VASOPRESSOR INTO PERIPHERAL VEIN, PERCUTANEOUS APPROACH: ICD-10-PCS | Performed by: INTERNAL MEDICINE

## 2025-05-13 PROCEDURE — 99223 1ST HOSP IP/OBS HIGH 75: CPT | Performed by: INTERNAL MEDICINE

## 2025-05-13 PROCEDURE — 99285 EMERGENCY DEPT VISIT HI MDM: CPT

## 2025-05-13 PROCEDURE — 36415 COLL VENOUS BLD VENIPUNCTURE: CPT

## 2025-05-13 PROCEDURE — P9016 RBC LEUKOCYTES REDUCED: HCPCS

## 2025-05-13 PROCEDURE — 86850 RBC ANTIBODY SCREEN: CPT

## 2025-05-13 PROCEDURE — 86901 BLOOD TYPING SEROLOGIC RH(D): CPT

## 2025-05-13 PROCEDURE — 86870 RBC ANTIBODY IDENTIFICATION: CPT

## 2025-05-13 PROCEDURE — 94640 AIRWAY INHALATION TREATMENT: CPT

## 2025-05-13 PROCEDURE — 6360000002 HC RX W HCPCS

## 2025-05-13 PROCEDURE — 2580000003 HC RX 258: Performed by: INTERNAL MEDICINE

## 2025-05-13 PROCEDURE — 2500000003 HC RX 250 WO HCPCS: Performed by: INTERNAL MEDICINE

## 2025-05-13 PROCEDURE — 36430 TRANSFUSION BLD/BLD COMPNT: CPT

## 2025-05-13 PROCEDURE — 0202U NFCT DS 22 TRGT SARS-COV-2: CPT

## 2025-05-13 PROCEDURE — 6370000000 HC RX 637 (ALT 250 FOR IP): Performed by: EMERGENCY MEDICINE

## 2025-05-13 PROCEDURE — 86900 BLOOD TYPING SEROLOGIC ABO: CPT

## 2025-05-13 PROCEDURE — 30233N1 TRANSFUSION OF NONAUTOLOGOUS RED BLOOD CELLS INTO PERIPHERAL VEIN, PERCUTANEOUS APPROACH: ICD-10-PCS | Performed by: INTERNAL MEDICINE

## 2025-05-13 PROCEDURE — 6360000002 HC RX W HCPCS: Performed by: INTERNAL MEDICINE

## 2025-05-13 PROCEDURE — 2580000003 HC RX 258

## 2025-05-13 PROCEDURE — 93005 ELECTROCARDIOGRAM TRACING: CPT

## 2025-05-13 PROCEDURE — 96374 THER/PROPH/DIAG INJ IV PUSH: CPT

## 2025-05-13 PROCEDURE — 80053 COMPREHEN METABOLIC PANEL: CPT

## 2025-05-13 PROCEDURE — 86922 COMPATIBILITY TEST ANTIGLOB: CPT

## 2025-05-13 PROCEDURE — 85018 HEMOGLOBIN: CPT

## 2025-05-13 PROCEDURE — APPSS45 APP SPLIT SHARED TIME 31-45 MINUTES

## 2025-05-13 PROCEDURE — 1210000000 HC MED SURG R&B

## 2025-05-13 PROCEDURE — 93306 TTE W/DOPPLER COMPLETE: CPT | Performed by: INTERNAL MEDICINE

## 2025-05-13 PROCEDURE — 71045 X-RAY EXAM CHEST 1 VIEW: CPT

## 2025-05-13 PROCEDURE — 85025 COMPLETE CBC W/AUTO DIFF WBC: CPT

## 2025-05-13 PROCEDURE — 6360000002 HC RX W HCPCS: Performed by: EMERGENCY MEDICINE

## 2025-05-13 PROCEDURE — 86880 COOMBS TEST DIRECT: CPT

## 2025-05-13 PROCEDURE — 85014 HEMATOCRIT: CPT

## 2025-05-13 PROCEDURE — 83735 ASSAY OF MAGNESIUM: CPT

## 2025-05-13 PROCEDURE — 87040 BLOOD CULTURE FOR BACTERIA: CPT

## 2025-05-13 PROCEDURE — 6370000000 HC RX 637 (ALT 250 FOR IP): Performed by: INTERNAL MEDICINE

## 2025-05-13 PROCEDURE — 93306 TTE W/DOPPLER COMPLETE: CPT

## 2025-05-13 PROCEDURE — 84484 ASSAY OF TROPONIN QUANT: CPT

## 2025-05-13 PROCEDURE — 99291 CRITICAL CARE FIRST HOUR: CPT | Performed by: INTERNAL MEDICINE

## 2025-05-13 PROCEDURE — 83605 ASSAY OF LACTIC ACID: CPT

## 2025-05-13 PROCEDURE — 84145 PROCALCITONIN (PCT): CPT

## 2025-05-13 RX ORDER — LISINOPRIL 5 MG/1
2.5 TABLET ORAL DAILY
Status: DISCONTINUED | OUTPATIENT
Start: 2025-05-13 | End: 2025-05-16 | Stop reason: HOSPADM

## 2025-05-13 RX ORDER — SODIUM CHLORIDE 9 MG/ML
INJECTION, SOLUTION INTRAVENOUS PRN
Status: DISCONTINUED | OUTPATIENT
Start: 2025-05-13 | End: 2025-05-16 | Stop reason: HOSPADM

## 2025-05-13 RX ORDER — ONDANSETRON 4 MG/1
4 TABLET, ORALLY DISINTEGRATING ORAL EVERY 8 HOURS PRN
Status: DISCONTINUED | OUTPATIENT
Start: 2025-05-13 | End: 2025-05-16 | Stop reason: HOSPADM

## 2025-05-13 RX ORDER — FUROSEMIDE 40 MG/1
40 TABLET ORAL DAILY
Status: DISCONTINUED | OUTPATIENT
Start: 2025-05-13 | End: 2025-05-16 | Stop reason: HOSPADM

## 2025-05-13 RX ORDER — METOPROLOL TARTRATE 50 MG
50 TABLET ORAL 2 TIMES DAILY
Status: DISCONTINUED | OUTPATIENT
Start: 2025-05-13 | End: 2025-05-16 | Stop reason: HOSPADM

## 2025-05-13 RX ORDER — MIDODRINE HYDROCHLORIDE 10 MG/1
10 TABLET ORAL ONCE
Status: COMPLETED | OUTPATIENT
Start: 2025-05-13 | End: 2025-05-13

## 2025-05-13 RX ORDER — ACETAMINOPHEN 650 MG/1
650 SUPPOSITORY RECTAL EVERY 6 HOURS PRN
Status: DISCONTINUED | OUTPATIENT
Start: 2025-05-13 | End: 2025-05-16 | Stop reason: HOSPADM

## 2025-05-13 RX ORDER — SODIUM CHLORIDE 9 MG/ML
INJECTION, SOLUTION INTRAVENOUS CONTINUOUS
Status: DISCONTINUED | OUTPATIENT
Start: 2025-05-13 | End: 2025-05-15

## 2025-05-13 RX ORDER — GABAPENTIN 300 MG/1
300 CAPSULE ORAL 2 TIMES DAILY
Status: DISCONTINUED | OUTPATIENT
Start: 2025-05-13 | End: 2025-05-16 | Stop reason: HOSPADM

## 2025-05-13 RX ORDER — SODIUM CHLORIDE 0.9 % (FLUSH) 0.9 %
5-40 SYRINGE (ML) INJECTION EVERY 12 HOURS SCHEDULED
Status: DISCONTINUED | OUTPATIENT
Start: 2025-05-13 | End: 2025-05-16 | Stop reason: HOSPADM

## 2025-05-13 RX ORDER — FLUTICASONE PROPIONATE 50 MCG
1 SPRAY, SUSPENSION (ML) NASAL DAILY PRN
Status: DISCONTINUED | OUTPATIENT
Start: 2025-05-13 | End: 2025-05-16 | Stop reason: HOSPADM

## 2025-05-13 RX ORDER — ALBUTEROL SULFATE 90 UG/1
2 INHALANT RESPIRATORY (INHALATION) EVERY 6 HOURS PRN
Status: DISCONTINUED | OUTPATIENT
Start: 2025-05-13 | End: 2025-05-16 | Stop reason: HOSPADM

## 2025-05-13 RX ORDER — INSULIN LISPRO 100 [IU]/ML
0-8 INJECTION, SOLUTION INTRAVENOUS; SUBCUTANEOUS EVERY 6 HOURS
Status: DISCONTINUED | OUTPATIENT
Start: 2025-05-13 | End: 2025-05-15

## 2025-05-13 RX ORDER — DEXTROSE MONOHYDRATE 100 MG/ML
INJECTION, SOLUTION INTRAVENOUS CONTINUOUS PRN
Status: DISCONTINUED | OUTPATIENT
Start: 2025-05-13 | End: 2025-05-16 | Stop reason: HOSPADM

## 2025-05-13 RX ORDER — SODIUM CHLORIDE 0.9 % (FLUSH) 0.9 %
5-40 SYRINGE (ML) INJECTION PRN
Status: DISCONTINUED | OUTPATIENT
Start: 2025-05-13 | End: 2025-05-16 | Stop reason: HOSPADM

## 2025-05-13 RX ORDER — LOPERAMIDE HYDROCHLORIDE 2 MG/1
4 CAPSULE ORAL 4 TIMES DAILY PRN
Status: DISCONTINUED | OUTPATIENT
Start: 2025-05-13 | End: 2025-05-16 | Stop reason: HOSPADM

## 2025-05-13 RX ORDER — CLOPIDOGREL BISULFATE 75 MG/1
75 TABLET ORAL DAILY
Status: DISCONTINUED | OUTPATIENT
Start: 2025-05-13 | End: 2025-05-16 | Stop reason: HOSPADM

## 2025-05-13 RX ORDER — NOREPINEPHRINE BITARTRATE 0.06 MG/ML
1-100 INJECTION, SOLUTION INTRAVENOUS CONTINUOUS
Status: DISCONTINUED | OUTPATIENT
Start: 2025-05-13 | End: 2025-05-13

## 2025-05-13 RX ORDER — SODIUM CHLORIDE 9 MG/ML
INJECTION, SOLUTION INTRAVENOUS
Status: COMPLETED
Start: 2025-05-13 | End: 2025-05-13

## 2025-05-13 RX ORDER — ATORVASTATIN CALCIUM 40 MG/1
40 TABLET, FILM COATED ORAL DAILY
Status: DISCONTINUED | OUTPATIENT
Start: 2025-05-13 | End: 2025-05-16 | Stop reason: HOSPADM

## 2025-05-13 RX ORDER — ONDANSETRON 2 MG/ML
4 INJECTION INTRAMUSCULAR; INTRAVENOUS EVERY 6 HOURS PRN
Status: DISCONTINUED | OUTPATIENT
Start: 2025-05-13 | End: 2025-05-16 | Stop reason: HOSPADM

## 2025-05-13 RX ORDER — ATROPINE SULFATE 0.1 MG/ML
INJECTION INTRAVENOUS
Status: COMPLETED
Start: 2025-05-13 | End: 2025-05-13

## 2025-05-13 RX ORDER — NALOXONE HYDROCHLORIDE 0.4 MG/ML
0.4 INJECTION, SOLUTION INTRAMUSCULAR; INTRAVENOUS; SUBCUTANEOUS ONCE
Status: COMPLETED | OUTPATIENT
Start: 2025-05-13 | End: 2025-05-13

## 2025-05-13 RX ORDER — NOREPINEPHRINE BITARTRATE 0.06 MG/ML
INJECTION, SOLUTION INTRAVENOUS
Status: DISPENSED
Start: 2025-05-13 | End: 2025-05-13

## 2025-05-13 RX ORDER — ACETAMINOPHEN 325 MG/1
650 TABLET ORAL EVERY 6 HOURS PRN
Status: DISCONTINUED | OUTPATIENT
Start: 2025-05-13 | End: 2025-05-16 | Stop reason: HOSPADM

## 2025-05-13 RX ADMIN — SODIUM CHLORIDE 1000 ML: 0.9 INJECTION, SOLUTION INTRAVENOUS at 01:30

## 2025-05-13 RX ADMIN — SODIUM CHLORIDE 500 ML: 0.9 INJECTION, SOLUTION INTRAVENOUS at 02:29

## 2025-05-13 RX ADMIN — ALBUTEROL SULFATE 2 PUFF: 90 AEROSOL, METERED RESPIRATORY (INHALATION) at 13:16

## 2025-05-13 RX ADMIN — NOREPINEPHRINE BITARTRATE 2 MCG/MIN: 64 SOLUTION INTRAVENOUS at 09:17

## 2025-05-13 RX ADMIN — MIDODRINE HYDROCHLORIDE 10 MG: 10 TABLET ORAL at 01:49

## 2025-05-13 RX ADMIN — SODIUM CHLORIDE: 0.9 INJECTION, SOLUTION INTRAVENOUS at 17:04

## 2025-05-13 RX ADMIN — SODIUM CHLORIDE 80 MG: 9 INJECTION INTRAMUSCULAR; INTRAVENOUS; SUBCUTANEOUS at 03:36

## 2025-05-13 RX ADMIN — ATROPINE SULFATE 1 MG: 0.1 INJECTION INTRAVENOUS at 03:42

## 2025-05-13 RX ADMIN — SODIUM CHLORIDE, PRESERVATIVE FREE 10 ML: 5 INJECTION INTRAVENOUS at 11:48

## 2025-05-13 RX ADMIN — NALXONE HYDROCHLORIDE 0.4 MG: 0.4 INJECTION INTRAMUSCULAR; INTRAVENOUS; SUBCUTANEOUS at 01:44

## 2025-05-13 RX ADMIN — SODIUM CHLORIDE: 0.9 INJECTION, SOLUTION INTRAVENOUS at 03:36

## 2025-05-13 RX ADMIN — SODIUM CHLORIDE 40 MG: 9 INJECTION INTRAMUSCULAR; INTRAVENOUS; SUBCUTANEOUS at 17:05

## 2025-05-13 RX ADMIN — SODIUM CHLORIDE, PRESERVATIVE FREE 10 ML: 5 INJECTION INTRAVENOUS at 21:50

## 2025-05-13 RX ADMIN — NOREPINEPHRINE BITARTRATE 5 MCG/MIN: 64 SOLUTION INTRAVENOUS at 02:37

## 2025-05-13 ASSESSMENT — ENCOUNTER SYMPTOMS
SHORTNESS OF BREATH: 0
ABDOMINAL PAIN: 1
ABDOMINAL PAIN: 0
SINUS PAIN: 0
CHEST TIGHTNESS: 1
EYE PAIN: 0
NAUSEA: 1
COUGH: 0
COUGH: 1
BACK PAIN: 0
VOMITING: 0
BLOOD IN STOOL: 0
EYE REDNESS: 0
SHORTNESS OF BREATH: 1
NAUSEA: 0

## 2025-05-13 ASSESSMENT — PAIN SCALES - GENERAL: PAINLEVEL_OUTOF10: 0

## 2025-05-13 ASSESSMENT — PAIN - FUNCTIONAL ASSESSMENT: PAIN_FUNCTIONAL_ASSESSMENT: NONE - DENIES PAIN

## 2025-05-13 NOTE — H&P
Provider   loperamide (IMODIUM A-D) 2 MG tablet Take 2 tablets by mouth 4 times daily as needed for Diarrhea 5/6/25 5/16/25  Kendall Davison MD   XPOVIO, 40 MG ONCE WEEKLY, 40 MG TBPK  4/3/25   ProviderCammy MD   gabapentin (NEURONTIN) 300 MG capsule Take 1 capsule by mouth in the morning and at bedtime for 180 days. 3/19/25 9/15/25  Twila Maher DO   clopidogrel (PLAVIX) 75 MG tablet Take 1 tablet by mouth daily 3/19/25   Twila Maher DO   tiZANidine (ZANAFLEX) 4 MG tablet TAKE 1 TABLET EVERY NIGHT AS NEEDED FOR MUSCLE SPASM(S) 3/18/25   Twila Maher DO   pantoprazole (PROTONIX) 40 MG tablet Take 1 tablet by mouth daily 2/7/25   Twila Maher DO   metoprolol tartrate (LOPRESSOR) 50 MG tablet Take 1 tablet by mouth 2 times daily 2/7/25   Twila Maher,    lisinopril (PRINIVIL;ZESTRIL) 2.5 MG tablet Take 1 tablet by mouth daily 2/7/25   Twila Maher,    empagliflozin (JARDIANCE) 10 MG tablet Take 1 tablet by mouth daily 2/7/25   Twila Maher,    atorvastatin (LIPITOR) 40 MG tablet Take 1 tablet by mouth daily 2/7/25   Twila Maher,    albuterol sulfate HFA (PROVENTIL;VENTOLIN;PROAIR) 108 (90 Base) MCG/ACT inhaler Inhale 2 puffs into the lungs every 6 hours as needed for Wheezing 1/31/25   Margot Wilkinson APRN - NP   apixaban (ELIQUIS) 5 MG TABS tablet Take 2 tablets (10mg) twice a day x 1 week, then 1 tablet (5mg) twice a day everyday after.  Patient taking differently: Take 1 tablet by mouth 2 times daily 8/6/24   Eric Saeed MD   fluticasone (FLONASE) 50 MCG/ACT nasal spray 1 spray by Each Nostril route daily 7/31/24   Lanzola, Twila L, DO   acyclovir (ZOVIRAX) 400 MG tablet Take 1 tablet by mouth 2/7/24   Provider, MD Cammy   furosemide (LASIX) 40 MG tablet TAKE 1 TABLET EVERY DAY 2/7/24   Eric Saeed MD   sodium chloride (OCEAN, BABY AYR) 0.65 % nasal spray 2 sprays by Nasal route as needed for Congestion 6/3/22   Kendall Davison MD  Performed on ACCU-CHEK    CBC with Auto Differential    Collection Time: 05/13/25  1:33 AM   Result Value Ref Range    WBC 4.5 (L) 4.8 - 10.8 K/uL    RBC 1.81 (L) 4.20 - 5.40 M/uL    Hemoglobin 6.3 (LL) 12.0 - 16.0 g/dL    Hematocrit 20.3 (LL) 37.0 - 47.0 %    .2 (H) 79.4 - 94.8 fL    MCH 34.8 (H) 27.0 - 31.3 pg    MCHC 31.0 (L) 33.0 - 37.0 %    RDW 19.3 (H) 11.5 - 14.5 %    Platelets 130 130 - 400 K/uL    Neutrophils % 27.7 %    Lymphocytes % 67.3 %    Monocytes % 3.1 %    Eosinophils % 0.4 %    Basophils % 0.2 %    Neutrophils Absolute 1.2 (L) 1.4 - 6.5 K/uL    Lymphocytes Absolute 3.0 1.0 - 4.8 K/uL    Monocytes Absolute 0.1 (L) 0.2 - 0.8 K/uL    Eosinophils Absolute 0.0 0.0 - 0.7 K/uL    Basophils Absolute 0.0 0.0 - 0.2 K/uL   Comprehensive Metabolic Panel    Collection Time: 05/13/25  1:33 AM   Result Value Ref Range    Sodium 139 135 - 144 mEq/L    Potassium 4.1 3.4 - 4.9 mEq/L    Chloride 108 (H) 95 - 107 mEq/L    CO2 18 (L) 20 - 31 mEq/L    Anion Gap 13 9 - 15 mEq/L    Glucose 179 (H) 70 - 99 mg/dL    BUN 19 8 - 23 mg/dL    Creatinine 1.63 (H) 0.50 - 0.90 mg/dL    Est, Glom Filt Rate 31.3 (L) >60    Calcium 8.9 8.5 - 9.9 mg/dL    Total Protein 5.3 (L) 6.3 - 8.0 g/dL    Albumin 3.1 (L) 3.5 - 4.6 g/dL    Total Bilirubin 0.3 0.2 - 0.7 mg/dL    Alkaline Phosphatase 95 40 - 130 U/L    ALT 11 0 - 33 U/L    AST 18 0 - 35 U/L    Globulin 2.2 (L) 2.3 - 3.5 g/dL   Lactate, Sepsis    Collection Time: 05/13/25  1:33 AM   Result Value Ref Range    Lactic Acid, Sepsis 2.1 (H) 0.5 - 1.9 mmol/L   Magnesium    Collection Time: 05/13/25  1:33 AM   Result Value Ref Range    Magnesium 2.0 1.7 - 2.4 mg/dL   Procalcitonin    Collection Time: 05/13/25  1:33 AM   Result Value Ref Range    Procalcitonin 0.10 0.00 - 0.15 ng/mL   Respiratory Panel, Molecular, with COVID-19 (Restricted: peds pts or suitable admitted adults)    Collection Time: 05/13/25  1:33 AM    Specimen: Nasopharyngeal   Result Value Ref Range    Adenovirus  by PCR Not Detected Not Detected    Bordetella parapertussis by PCR Not Detected Not Detected    Bordetella pertussis by PCR Not Detected Not Detected    Chlamydophilia pneumoniae by PCR Not Detected Not Detected    Coronavirus 229E by PCR Not Detected Not Detected    Coronavirus HKU1 by PCR Not Detected Not Detected    Coronavirus NL63 by PCR Not Detected Not Detected    Coronavirus OC43 by PCR Not Detected Not Detected    SARS-CoV-2, PCR Not Detected Not Detected    Human Metapneumovirus by PCR Not Detected Not Detected    Human Rhinovirus/Enterovirus by PCR DETECTED (A) Not Detected    Influenza A by PCR Not Detected Not Detected    Influenza B by PCR Not Detected Not Detected    Mycoplasma pneumoniae by PCR Not Detected Not Detected    Parainfluenza Virus 1 by PCR Not Detected Not Detected    Parainfluenza Virus 2 by PCR Not Detected Not Detected    Parainfluenza Virus 3 by PCR Not Detected Not Detected    Parainfluenza Virus 4 by PCR Not Detected Not Detected    Respiratory Syncytial Virus by PCR Not Detected Not Detected   Troponin Now and Q 1 Hour x 2    Collection Time: 05/13/25  1:33 AM   Result Value Ref Range    Troponin, High Sensitivity 76 (HH) 0 - 19 ng/L   POCT Blood Occult Stool    Collection Time: 05/13/25  2:20 AM   Result Value Ref Range    Occult Blood Fecal negative     QC OK? yes        IMAGING:   XR CHEST PORTABLE  Result Date: 5/13/2025  Pulmonary venous congestion.  Left basilar airspace disease, favor atelectasis.        VTE Prophylaxis: SCDs    ASSESSMENT AND PLAN    Principal Problem:    GI bleed  Plan:     GI bleed  Hgb 6.3  History of PE on Eliquis  Endorses fatigue, lightheadedness, shortness of breath and generalized weakness   CT Abd: No acute findings  Received 1  unit PRBCs in ED  Admit to intensive care unit with continuous telemetry  Consult GI  NPO  Pantoprazole GTT   monitor and trend CBC Q6 hours  Check stool for occult blood  Transfuse if Hgb <7.0  Gently IV Hydration x24 h

## 2025-05-13 NOTE — ED TRIAGE NOTES
EMS called for pt being lethargic by   On arrival 50/30 BP  600mcg push dose epi given  Per pt she took a dose of her muscle relaxer prior to bed  1L fluid given

## 2025-05-13 NOTE — ED NOTES
Per  pt was having Trouble breathing, coughing for long periods of time decided to call [phone]. Pt states she took Tizanidine last night.

## 2025-05-13 NOTE — ACP (ADVANCE CARE PLANNING)
Advance Care Planning   Healthcare Decision Maker:    Primary Decision Maker: Dmitri Mendoza - Spouse - [phone]    Secondary Decision Maker: Jess Mendoza - Child - [phone]    Click here to complete Healthcare Decision Makers including selection of the Healthcare Decision Maker Relationship (ie \"Primary\").  Today we documented Decision Maker(s) consistent with Legal Next of Kin hierarchy.       Electronically signed by NORMAN Morris on 5/13/2025 at 10:10 AM

## 2025-05-13 NOTE — CONSULTS
Pulmonary and Critical Care Medicine  Consult Note  Encounter Date: 2025 11:10 AM    Ms. Mckenna Mendoza is a 79 y.o. female  : 1945  Requesting Provider: Kendall Davison MD   Reason for request: ICU management            HISTORY OF PRESENT ILLNESS:    Mckenna Mendoza is a 79 y.o., female who has a past medical history of  has a past medical history of Cancer (HCC), Encounter for lumbar puncture, Hyperlipidemia, Hypertension, and Type II or unspecified type diabetes mellitus without mention of complication, not stated as uncontrolled. that is hospitalized for GI bleed    HPI   Patient presented to the ER with increased shortness of breath over the past 2 weeks and increased cough.  In the ER she was noted to be drowsy she does take muscle relaxers at home she received Narcan with no improvement.  She was found to be hypotensive she was given 500 normal saline bolus and started on Levophed.  She was also found to have a hemoglobin of 6.3 down from 8.6 1-week ago.  Patient does state she has been having dark tarry stools.  Patient is on Eliquis for history of PE.  Also just recently diagnosed with multiple myeloma and she was started on Xpovio weekly.  Patient was on Levophed at 2 mcg/min.  No stool since admission.  Patient is alert and oriented  is at bedside and updated during ICU rounds.  Past Medical History:        Diagnosis Date    Cancer (HCC)     Encounter for lumbar puncture 2022    Completed by Dr. Mccormick - diagnostics sent    Hyperlipidemia     Hypertension     Type II or unspecified type diabetes mellitus without mention of complication, not stated as uncontrolled        Past Surgical History:        Procedure Laterality Date    CORONARY ANGIOPLASTY WITH STENT PLACEMENT      CT BIOPSY BONE MARROW N/A 2025    by Dr. Petersen    CT BIOPSY BONE MARROW  3/17/2025    CT BIOPSY BONE MARROW 3/17/2025 [facility] CT SCAN    CYST REMOVAL  2024    cyst removed from back    UPPER

## 2025-05-13 NOTE — PLAN OF CARE
Problem: Chronic Conditions and Co-morbidities  Goal: Patient's chronic conditions and co-morbidity symptoms are monitored and maintained or improved  Outcome: Progressing  Flowsheets (Taken 5/13/2025 0514 by Shahzad Key RN)  Care Plan - Patient's Chronic Conditions and Co-Morbidity Symptoms are Monitored and Maintained or Improved: Monitor and assess patient's chronic conditions and comorbid symptoms for stability, deterioration, or improvement     Problem: Discharge Planning  Goal: Discharge to home or other facility with appropriate resources  Outcome: Progressing  Flowsheets (Taken 5/13/2025 0514 by Shahzad Key, RN)  Discharge to home or other facility with appropriate resources: Identify barriers to discharge with patient and caregiver     Problem: Safety - Adult  Goal: Free from fall injury  Outcome: Progressing

## 2025-05-13 NOTE — CONSULTS
Inpatient consult to GI  Consult performed by: Dalia Allen MD  Consult ordered by: Delfina Dash MD        Patient Name: Mckenna Mendoza  Admit Date: 2025  1:08 AM  MR #: [id number]  : 1945    Attending Physician: Kendall Davison MD  Reason for consult: Melena    History of Presenting Illness:      Mckenna Mendoza is a 79 y.o. female on hospital day 0 with a history of coronary artery disease, PE on Eliquis, newly diagnosed recently diagnosed multiple myeloma whom we are consulted for anemia.  Patient and patient's spouse currently at bedside reports that patient has been lshort of breath over the last few days.  More pronounced this morning with change in mental status and lethargy after taking Zanaflex.  She also reports history of black stool over the last week or 2.  Patient does report intermittent ibuprofen use for migraine headache.  Patient was evaluated emergency room after being brought by EMS and was found to have hypotension and bradycardia in addition to lethargy and change mental status.  Patient currently awake alert x 3 in the intensive care unit.  She reports that she been having abdominal cramping on and off.  she also was recently diagnosed with viral illness.  At the ED, the patient had hemoglobin checked and was 6.3.  Also had CT scan that showed small hiatal hernia otherwise no acute process.  Patient does not recall recent endoscopic evaluation.  Given the hemoglobin of 6.3 and melena GI consult obtained for further evaluation    History:      Past Medical History:   Diagnosis Date    Cancer (HCC)     Encounter for lumbar puncture 2022    Completed by Dr. Mccormick - diagnostics sent    Hyperlipidemia     Hypertension     Type II or unspecified type diabetes mellitus without mention of complication, not stated as uncontrolled      Past Surgical History:   Procedure Laterality Date    CORONARY ANGIOPLASTY WITH STENT PLACEMENT      CT BIOPSY BONE MARROW N/A 2025    by

## 2025-05-13 NOTE — CARE COORDINATION
Case Management Assessment  Initial Evaluation    Date/Time of Evaluation: 5/13/2025 10:07 AM  Assessment Completed by: NORMAN Morris    If patient is discharged prior to next notation, then this note serves as note for discharge by case management.    Patient Name: Mckenna Mendoza                   YOB: 1945  Diagnosis: GI bleed [K92.2]  Hypotension, unspecified hypotension type [I95.9]  Acute anemia [D64.9]                   Date / Time: 5/13/2025  1:08 AM    Patient Admission Status: Inpatient   Readmission Risk (Low < 19, Mod (19-27), High > 27): Readmission Risk Score: 19.9    Current PCP: Twila Maher, DO  PCP verified by CM? Yes    Chart Reviewed: Yes      History Provided by: Patient  Patient Orientation: Alert and Oriented    Patient Cognition: Alert    Hospitalization in the last 30 days (Readmission):  Yes    If yes, Readmission Assessment in CM Navigator will be completed.    Readmission Assessment  Number of Days since last admission?: 1-7 days  Previous Disposition: Home with Home Health  Who is being Interviewed: Patient  What was the patient's/caregiver's perception as to why they think they needed to return back to the hospital?: Other (Comment) (\" I CAN NOT BREATH.\")  Did you visit your Primary Care Physician after you left the hospital, before you returned this time?: Yes  Did you see a specialist, such as Cardiac, Pulmonary, Orthopedic Physician, etc. after you left the hospital?: Yes  Who advised the patient to return to the hospital?: Self-referral  Does the patient report anything that got in the way of taking their medications?: No  In our efforts to provide the best possible care to you and others like you, can you think of anything that we could have done to help you after you left the hospital the first time, so that you might not have needed to return so soon?: Identify patient's health literacy needs      Advance Directives:      Code Status: Full Code   Patient's  PT DUE TO NO ANSWER. INDEPENDENT AT BASELINE. USE WALKER TO AMBULATE; NO HD; NOT ; NO HOME O2;   PT AGREEABLE WITH [facility] AT [facility].   WOULD LIKE TO RETURN HOME WITH .   PENDING MEDICAL CLEARANCE.     The Plan for Transition of Care is related to the following treatment goals of GI bleed [K92.2]  Hypotension, unspecified hypotension type [I95.9]  Acute anemia [D64.9]    IF APPLICABLE: The Patient and/or patient representative Mckenna and her family were provided with a choice of provider and agrees with the discharge plan. Freedom of choice list with basic dialogue that supports the patient's individualized plan of care/goals and shares the quality data associated with the providers was provided to:     Patient Representative Name:       The Patient and/or Patient Representative Agree with the Discharge Plan?      NORMAN Morris  Case Management Department  Ph:  Fax:

## 2025-05-13 NOTE — ED NOTES
at bedside with pt and this nurse, per  give 500ml Bolus and start levo. Bolus started, levo placed awaiting pharm verify.  aware of pt HX of CHF.    would like for wife to know everything

## 2025-05-13 NOTE — INTERDISCIPLINARY ROUNDS
Spiritual Health History and Assessment/Progress Note  [facility]    Spiritual Health Note     Name: Mckenna Mendoza MRN: [id number]    Age: 79 y.o.     Sex: female   Language: English   Lutheran: Tenriism   GI bleed     Date: 5/13/2025                           Spiritual Assessment began in [facility] ICU            Encounter Overview/Reason: Interdisciplinary rounds  Service Provided For: Patient    Pt, reports resting and coping at time of care encounter with . Pt, reflected on her silvia and spiritual journey as an active Hindu. Pt, shares how her commitment and spiritual disciplines in her silvia have impacted her life.     Pt, alert, oriented to chaplains presence, words silvia, hope, dedication, commitment to her spiritual practices and disciplines.      reports initial care encounter complete.     Silvia, Belief, Meaning:   Patient identifies as spiritual, is connected with a silvia tradition or spiritual practice, and has beliefs or practices that help with coping during difficult times  Family/Friends No family/friends present      Importance and Influence:  Patient has spiritual/personal beliefs that influence decisions regarding their health  Family/Friends No family/friends present    Community:  Patient is connected with a spiritual community and feels well-supported. Support system includes: Spouse/Partner and Children  Family/Friends No family/friends present    Assessment and Plan of Care:     Patient Interventions include: Facilitated expression of thoughts and feelings, Explored spiritual coping/struggle/distress, Engaged in theological reflection, and Affirmed coping skills/support systems  Family/Friends Interventions include: No family/friends present    Patient Plan of Care: Spiritual Care available upon further referral  Family/Friends Plan of Care: No family/friends present    Electronically signed by Chaplain Lindy on 5/13/2025 at 3:13 PM

## 2025-05-13 NOTE — PROGRESS NOTES
Spiritual Health History and Assessment/Progress Note  [facility]    Interdisciplinary rounds,  ,  ,      Name: Mckenna Mendoza MRN: [id number]    Age: 79 y.o.     Sex: female   Language: English   Restorationism: Adventist   GI bleed     Date: 5/13/2025                           Spiritual Assessment began in [facility] ICU            Encounter Overview/Reason: Interdisciplinary rounds  Service Provided For: Patient    Pt, reports resting and coping at time of care encounter with . Pt, reflected on her silvia and spiritual journey as an active Oriental orthodox. Pt, shares how her commitment and spiritual disciplines in her silvia have impacted her life.      Pt, alert, oriented to chaplains presence, words silvia, hope, dedication, commitment to her spiritual practices and disciplines.      Interventions, Inter-Methodist humility respect and honor of Pt's, faiths tradition, beliefs, and spiritual practices, community, connection and belonging, and spiriutality.      reports initial care encounter complete.    Silvia, Belief, Meaning:   Patient identifies as spiritual, is connected with a silvia tradition or spiritual practice, and has beliefs or practices that help with coping during difficult times  Family/Friends No family/friends present      Importance and Influence:  Patient has spiritual/personal beliefs that influence decisions regarding their health  Family/Friends No family/friends present    Community:  Patient is connected with a spiritual community and feels well-supported. Support system includes: Spouse/Partner and Children  Family/Friends No family/friends present    Assessment and Plan of Care:     Patient Interventions include: Facilitated expression of thoughts and feelings, Explored spiritual coping/struggle/distress, Engaged in theological reflection, and Affirmed coping skills/support systems  Family/Friends Interventions include: No family/friends present    Patient Plan of Care:

## 2025-05-13 NOTE — ED PROVIDER NOTES
[facility] EMERGENCY DEPARTMENT  EMERGENCY DEPARTMENT ENCOUNTER      Pt Name: Mckenna Mendoza  MRN: [id number]  Birthdate 1945  Date of evaluation: 5/13/2025  Provider: John Cazares DO  2:54 AM EDT    CHIEF COMPLAINT       Chief Complaint   Patient presents with    Hypotension         HISTORY OF PRESENT ILLNESS   (Location/Symptom, Timing/Onset, Context/Setting, Quality, Duration, Modifying Factors, Severity)  Note limiting factors.   Mckenna Mendoza is a 79 y.o. female who presents to the emergency department via EMS for concern of hypotension and lethargy.  Per patient's  patient took the tizanidine prior to arrival.  She states she has not taken a tizanidine in a while.  Patient was recently diagnosed with multiple myeloma.  History of present illness is limited secondary to the patient's condition.  Patient is on Eliquis and Plavix.    HPI    Nursing Notes were reviewed.    REVIEW OF SYSTEMS    (2-9 systems for level 4, 10 or more for level 5)     Review of Systems   Constitutional:  Negative for chills and fever.   HENT:  Negative for ear pain and sinus pain.    Eyes:  Negative for pain and redness.   Respiratory:  Negative for cough and shortness of breath.    Cardiovascular:  Negative for chest pain.   Gastrointestinal:  Negative for abdominal pain, nausea and vomiting.   Genitourinary:  Negative for dysuria and flank pain.   Musculoskeletal:  Negative for back pain.   Skin:  Negative for rash.   Neurological:  Negative for dizziness and headaches.        Drowsy   Psychiatric/Behavioral:  Negative for confusion. The patient is not nervous/anxious.        Except as noted above the remainder of the review of systems was reviewed and negative.       PAST MEDICAL HISTORY     Past Medical History:   Diagnosis Date    Cancer (HCC)     Encounter for lumbar puncture 05/25/2022    Completed by Dr. Mccormick - diagnostics sent    Hyperlipidemia     Hypertension     Type II or unspecified type diabetes mellitus  05/13/25 0254   e May 13, 2025   0253 Patient [JM]      ED Course User Index  [JM] John Cazares DO         CRITICAL CARE TIME   Total CriticalCare time was 60 minutes, excluding separately reportable procedures. There was a high probability of clinically significant/life threatening deterioration in the patient's condition which required my urgent intervention.     CONSULTS:  None    PROCEDURES:  Unless otherwise noted below, none     Procedures      FINAL IMPRESSION      1. Hypotension, unspecified hypotension type    2. Acute anemia          DISPOSITION/PLAN   DISPOSITION Admitted 05/13/2025 02:47:56 AM      PATIENT REFERRED TO:  No follow-up provider specified.    DISCHARGE MEDICATIONS:  New Prescriptions    No medications on file     Controlled Substances Monitoring:     RX Monitoring Periodic Controlled Substance Monitoring   4/13/2022   1:55 PM No signs of potential drug abuse or diversion identified.       (Please note that portions of this note were completed with a voice recognition program.  Efforts were made to edit the dictations but occasionally words are mis-transcribed.)    John Cazares DO (electronically signed)  Attending Emergency Physician       John Cazares DO  05/13/25 0538

## 2025-05-13 NOTE — CONSULTS
DATE OF CONSULTATION  5/13/2025    CONSULTANT  Dr. Lyons    REQUESTING PHYSICIAN  Kendall Davison MD     PRIMARY CARDIOLOGIST  Dr. Saeed    REASON FOR CONSULTATION  Chief Complaint   Patient presents with    Hypotension       Hospital Day: 0       Patient is a 79 y.o. female with past medical history of PE on Eliquis, hypertension, hyperlipidemia, type 2 diabetes mellitus, multiple myeloma, CAD with remote history of stents who presents with a chief complaint of weakness, hypotension. Patient is followed on a regular basis by Twila Medina DO.     Cardiology was consulted for CAD s/p stent.     Patient presented to the emergency department due to weakness, shortness of breath and hypotension.  Has had some dark stools at home.  Hemoglobin found to be 6.3 status post 1 unit of blood.  Per chart review patient's heart rate dropped to 38-44, 1 dose of atropine given in the ER.  Patient examined in ICU.   at bedside.  She appears to be in no acute distress.  Tested positive for rhinovirus and rotavirus.  + Cough, wheezing, nausea  Patient reports chest tightness x 2 days ago.  States that she had some midsternal chest pain that she describes as tightness.  Previous heart attack felt different compared to this.    Patient states that she had stents placed in [place] about 8 years ago.  No recent stents.  Follows with Dr. Saeed.    Troponin: 76, 73  TTE 2023: EF 55%  Left heart cath 2023:  LV EF of 50%  LM normal  LAD mild disease, patent stent in mid, patent stent in mid to distal with  mild to mod ISR.  CX large caliber, mild disease  RCA small non dominant.    Past Medical History:   Diagnosis Date    Cancer (HCC)     Encounter for lumbar puncture 05/25/2022    Completed by Dr. Mccormick - diagnostics sent    Hyperlipidemia     Hypertension     Type II or unspecified type diabetes mellitus without mention of complication, not stated as uncontrolled       Patient Active Problem List   Diagnosis    Multiple  AVE    Home Layout: Laundry in basement,Two level (doesn't have to use basement)    Home Access: Stairs to enter with rails- Number of Steps: 6- Rails: Right    Bathroom Shower/Tub: Walk-in shower    Bathroom Equipment: Shower chair,Grab bars in shower    Home Equipment: Rollator,Cane    ADL Assistance:  ( assists with bathing/dressing; pt indep with toileting activities)    Homemaking Assistance:  (spouse and son complete)    Homemaking Responsibilities: No    Ambulation Assistance: Independent (cane/rollator PRN)    Transfer Assistance: Independent    Additional Comments: Pt inconsistent historian. Pt's spouse arriving mid assessment and confirms home set up and PLOF         Social Drivers of Health     Financial Resource Strain: Low Risk  (2/14/2024)    Overall Financial Resource Strain (CARDIA)     Difficulty of Paying Living Expenses: Not hard at all   Food Insecurity: No Food Insecurity (5/4/2025)    Hunger Vital Sign     Worried About Running Out of Food in the Last Year: Never true     Ran Out of Food in the Last Year: Never true   Transportation Needs: No Transportation Needs (5/4/2025)    PRAPARE - Transportation     Lack of Transportation (Medical): No     Lack of Transportation (Non-Medical): No   Physical Activity: Inactive (2/14/2024)    Exercise Vital Sign     Days of Exercise per Week: 0 days     Minutes of Exercise per Session: 0 min    Received from [facility]    Social Network    Received from [facility]    HITS   Housing Stability: Low Risk  (5/4/2025)    Housing Stability Vital Sign     Unable to Pay for Housing in the Last Year: No     Number of Times Moved in the Last Year: 0     Homeless in the Last Year: No       Family History   Problem Relation Age of Onset    Cancer Mother     Arthritis Mother     Diabetes Mother     Heart Disease Mother     High Blood Pressure Mother     High Cholesterol Mother     Arthritis Father     Diabetes Father     Heart  Disease Father     High Blood Pressure Father     High Cholesterol Father        Current Facility-Administered Medications   Medication Dose Route Frequency Provider Last Rate Last Admin    norepinephrine-sodium chloride (LEVOPHED) 16-0.9 MG/250ML-% infusion             0.9 % sodium chloride infusion   IntraVENous PRN John Cazares DO        norepinephrine (LEVOPHED) 16 mg in sodium chloride 0.9 % 250 mL infusion (premix)  1-100 mcg/min IntraVENous Continuous Delfina Dash MD 1.9 mL/hr at 05/13/25 0917 2 mcg/min at 05/13/25 0917    sodium chloride flush 0.9 % injection 5-40 mL  5-40 mL IntraVENous 2 times per day Delfina Dash MD        sodium chloride flush 0.9 % injection 5-40 mL  5-40 mL IntraVENous PRN Delfina Dash MD        0.9 % sodium chloride infusion   IntraVENous PRN Delfina Dash MD        ondansetron (ZOFRAN-ODT) disintegrating tablet 4 mg  4 mg Oral Q8H PRN Delfina Dash MD        Or    ondansetron (ZOFRAN) injection 4 mg  4 mg IntraVENous Q6H PRN Delfina Dash MD        acetaminophen (TYLENOL) tablet 650 mg  650 mg Oral Q6H PRN Delfina Dash MD        Or    acetaminophen (TYLENOL) suppository 650 mg  650 mg Rectal Q6H PRN Delfina Dash MD        0.9 % sodium chloride infusion   IntraVENous Continuous Delfina Dash MD 75 mL/hr at 05/13/25 0336 New Bag at 05/13/25 0336    pantoprazole (PROTONIX) 40 mg in sodium chloride (PF) 0.9 % 10 mL injection  40 mg IntraVENous Q12H Delfina Dash MD        glucose chewable tablet 16 g  4 tablet Oral PRN Delfina Dash MD        dextrose bolus 10% 125 mL  125 mL IntraVENous PRN Delfina Dash MD        Or    dextrose bolus 10% 250 mL  250 mL IntraVENous PRN Delfina Dash MD        dextrose 10 % infusion   IntraVENous Continuous PRN Delfina Dash MD        [Held by provider] furosemide (LASIX) tablet 40 mg  40 mg Oral Daily Delfina Dash MD        [Held by provider] apixaban (ELIQUIS) tablet 5

## 2025-05-13 NOTE — ED NOTES
Pt heart dropped to 38-44, states she was SOB. Provider called to bedside. Pads placed on pt, 2L NC given, Atropine admin. EKG done.

## 2025-05-13 NOTE — CARE COORDINATION
Spoke to Daniel from [facility] and pt prior [facility] order was SN,PT/OT. Pt has new order and referral sent earlier.

## 2025-05-13 NOTE — PROGRESS NOTES
0917: levophed decreased to 2mcg/min  11:27: levophed stopped. Pt has had stable BP's with levo at 2mcg/min  1245: blood transfusion started, blood on warmer  1300: no s/s of any reaction  1500: blood transfusion completed. Echo tech at bedside. Pt is calm, denies any needs, denies any pain or SOB

## 2025-05-13 NOTE — INTERDISCIPLINARY ROUNDS
Spiritual Care Services     Summary of Visit:  Completed morning ICU rounds. Pt, alert, aware, and attentive at time of rounds. Family present, jyotsna tradition Mu-ism, Spouse listed as primary care decision maker.     Encounter Summary  Encounter Overview/Reason: Interdisciplinary rounds  Service Provided For: Patient                               Spiritual Assessment/Intervention/Outcomes:                     Care Plan:               Spiritual Care Services   Electronically signed by Chaplain Lindy on 5/13/2025 at 3:25 PM.    To reach a  for emotional and spiritual support, place an EPIC consult request.   If a  is needed immediately, dial “0” and ask to page the on-call .

## 2025-05-13 NOTE — ED NOTES
Pt has Multiple myeloma, will start tx next week. Pt last BM was today, normal color and consistency. Lives at home with , uses rollader to get around at home. Take pills whole. Currently on Elquis. On RA A/Ox4.

## 2025-05-13 NOTE — ED NOTES
Pt provided dory care, new gown and linen. Placed on pur wick. During care MD did rectal exam, stool occult negative. Pt currently placed in Trendelenburg position, BP 83/44 pulse 64. Fluids completed.  at bedside.

## 2025-05-14 ENCOUNTER — ANESTHESIA (OUTPATIENT)
Dept: ENDOSCOPY | Age: 80
End: 2025-05-14
Payer: COMMERCIAL

## 2025-05-14 ENCOUNTER — ANESTHESIA EVENT (OUTPATIENT)
Dept: ENDOSCOPY | Age: 80
End: 2025-05-14
Payer: COMMERCIAL

## 2025-05-14 ENCOUNTER — PREP FOR PROCEDURE (OUTPATIENT)
Dept: GASTROENTEROLOGY | Age: 80
End: 2025-05-14

## 2025-05-14 LAB
ALBUMIN SERPL-MCNC: 3.1 G/DL (ref 3.5–4.6)
ANION GAP SERPL CALCULATED.3IONS-SCNC: 10 MEQ/L (ref 9–15)
ANISOCYTOSIS BLD QL SMEAR: ABNORMAL
APTT PPP: 31.9 SEC (ref 24.4–36.8)
BASOPHILS # BLD: 0 K/UL (ref 0–0.2)
BASOPHILS NFR BLD: 1 %
BUN SERPL-MCNC: 12 MG/DL (ref 8–23)
BURR CELLS BLD QL SMEAR: ABNORMAL
BURR CELLS: ABNORMAL
CALCIUM SERPL-MCNC: 9 MG/DL (ref 8.5–9.9)
CHLORIDE SERPL-SCNC: 113 MEQ/L (ref 95–107)
CO2 SERPL-SCNC: 18 MEQ/L (ref 20–31)
CREAT SERPL-MCNC: 1.16 MG/DL (ref 0.5–0.9)
DACRYOCYTES BLD QL SMEAR: ABNORMAL
EOSINOPHIL # BLD: 0 K/UL (ref 0–0.7)
EOSINOPHIL NFR BLD: 0.7 %
ERYTHROCYTE [DISTWIDTH] IN BLOOD BY AUTOMATED COUNT: 21.9 % (ref 11.5–14.5)
GLUCOSE BLD-MCNC: 82 MG/DL (ref 70–99)
GLUCOSE BLD-MCNC: 85 MG/DL (ref 70–99)
GLUCOSE BLD-MCNC: 86 MG/DL (ref 70–99)
GLUCOSE BLD-MCNC: 93 MG/DL (ref 70–99)
GLUCOSE SERPL-MCNC: 78 MG/DL (ref 70–99)
HCT VFR BLD AUTO: 24 % (ref 37–47)
HCT VFR BLD AUTO: 24.8 % (ref 37–47)
HCT VFR BLD AUTO: 25 % (ref 37–47)
HCT VFR BLD AUTO: 26.2 % (ref 37–47)
HCT VFR BLD AUTO: 26.4 % (ref 37–47)
HGB BLD-MCNC: 8 G/DL (ref 12–16)
HGB BLD-MCNC: 8.1 G/DL (ref 12–16)
HGB BLD-MCNC: 8.2 G/DL (ref 12–16)
HGB BLD-MCNC: 8.4 G/DL (ref 12–16)
HGB BLD-MCNC: 8.5 G/DL (ref 12–16)
HYPOCHROMIA BLD QL SMEAR: ABNORMAL
INR PPP: 1.6
IRON % SATURATION: 68 % (ref 20–55)
IRON: 121 UG/DL (ref 37–145)
LYMPHOCYTES # BLD: 1.2 K/UL (ref 1–4.8)
LYMPHOCYTES NFR BLD: 39 %
MACROCYTES BLD QL SMEAR: ABNORMAL
MCH RBC QN AUTO: 33.8 PG (ref 27–31.3)
MCHC RBC AUTO-ENTMCNC: 32.7 % (ref 33–37)
MCV RBC AUTO: 103.3 FL (ref 79.4–94.8)
MONOCYTES # BLD: 0.1 K/UL (ref 0.2–0.8)
MONOCYTES NFR BLD: 1.9 %
NEUTROPHILS # BLD: 1.7 K/UL (ref 1.4–6.5)
NEUTS SEG NFR BLD: 58 %
NRBC BLD-RTO: 2 /100 WBC
OVALOCYTES BLD QL SMEAR: ABNORMAL
PATH INTERP BLD-IMP: YES
PERFORMED ON: NORMAL
PHOSPHATE SERPL-MCNC: 3.2 MG/DL (ref 2.3–4.8)
PLATELET # BLD AUTO: 122 K/UL (ref 130–400)
PLATELET BLD QL SMEAR: ADEQUATE
POIKILOCYTOSIS BLD QL SMEAR: ABNORMAL
POLYCHROMASIA BLD QL SMEAR: ABNORMAL
POTASSIUM SERPL-SCNC: 4.5 MEQ/L (ref 3.4–4.9)
PROTHROMBIN TIME: 20.2 SEC (ref 12.3–14.9)
RBC # BLD AUTO: 2.4 M/UL (ref 4.2–5.4)
SLIDE REVIEW: ABNORMAL
SMUDGE CELLS BLD QL SMEAR: 17.1
SODIUM SERPL-SCNC: 141 MEQ/L (ref 135–144)
STOMATOCYTES BLD QL SMEAR: ABNORMAL
TOTAL IRON BINDING CAPACITY: 178 UG/DL (ref 250–450)
UNSATURATED IRON BINDING CAPACITY: 57 UG/DL (ref 112–347)
WBC # BLD AUTO: 3 K/UL (ref 4.8–10.8)

## 2025-05-14 PROCEDURE — 6360000002 HC RX W HCPCS: Performed by: INTERNAL MEDICINE

## 2025-05-14 PROCEDURE — 83540 ASSAY OF IRON: CPT

## 2025-05-14 PROCEDURE — 85025 COMPLETE CBC W/AUTO DIFF WBC: CPT

## 2025-05-14 PROCEDURE — 85730 THROMBOPLASTIN TIME PARTIAL: CPT

## 2025-05-14 PROCEDURE — 85014 HEMATOCRIT: CPT

## 2025-05-14 PROCEDURE — 94640 AIRWAY INHALATION TREATMENT: CPT

## 2025-05-14 PROCEDURE — 0DJ08ZZ INSPECTION OF UPPER INTESTINAL TRACT, VIA NATURAL OR ARTIFICIAL OPENING ENDOSCOPIC: ICD-10-PCS | Performed by: INTERNAL MEDICINE

## 2025-05-14 PROCEDURE — 83550 IRON BINDING TEST: CPT

## 2025-05-14 PROCEDURE — 6370000000 HC RX 637 (ALT 250 FOR IP): Performed by: INTERNAL MEDICINE

## 2025-05-14 PROCEDURE — 1210000000 HC MED SURG R&B

## 2025-05-14 PROCEDURE — 2700000000 HC OXYGEN THERAPY PER DAY

## 2025-05-14 PROCEDURE — 85018 HEMOGLOBIN: CPT

## 2025-05-14 PROCEDURE — 7100000011 HC PHASE II RECOVERY - ADDTL 15 MIN: Performed by: INTERNAL MEDICINE

## 2025-05-14 PROCEDURE — 99232 SBSQ HOSP IP/OBS MODERATE 35: CPT | Performed by: INTERNAL MEDICINE

## 2025-05-14 PROCEDURE — 36415 COLL VENOUS BLD VENIPUNCTURE: CPT

## 2025-05-14 PROCEDURE — 6360000002 HC RX W HCPCS

## 2025-05-14 PROCEDURE — 3700000000 HC ANESTHESIA ATTENDED CARE: Performed by: INTERNAL MEDICINE

## 2025-05-14 PROCEDURE — 2709999900 HC NON-CHARGEABLE SUPPLY: Performed by: INTERNAL MEDICINE

## 2025-05-14 PROCEDURE — 2500000003 HC RX 250 WO HCPCS: Performed by: INTERNAL MEDICINE

## 2025-05-14 PROCEDURE — 2580000003 HC RX 258: Performed by: INTERNAL MEDICINE

## 2025-05-14 PROCEDURE — 85610 PROTHROMBIN TIME: CPT

## 2025-05-14 PROCEDURE — 3609017100 HC EGD: Performed by: INTERNAL MEDICINE

## 2025-05-14 PROCEDURE — 99232 SBSQ HOSP IP/OBS MODERATE 35: CPT | Performed by: NURSE PRACTITIONER

## 2025-05-14 PROCEDURE — 6370000000 HC RX 637 (ALT 250 FOR IP)

## 2025-05-14 PROCEDURE — 43235 EGD DIAGNOSTIC BRUSH WASH: CPT | Performed by: INTERNAL MEDICINE

## 2025-05-14 PROCEDURE — 80069 RENAL FUNCTION PANEL: CPT

## 2025-05-14 PROCEDURE — 7100000010 HC PHASE II RECOVERY - FIRST 15 MIN: Performed by: INTERNAL MEDICINE

## 2025-05-14 RX ORDER — NEOMYCIN SULFATE, POLYMYXIN B SULFATE AND HYDROCORTISONE 3.5; 10000; 1 MG/ML; [IU]/ML; MG/ML
3 SOLUTION AURICULAR (OTIC) EVERY 6 HOURS SCHEDULED
Status: DISCONTINUED | OUTPATIENT
Start: 2025-05-14 | End: 2025-05-15

## 2025-05-14 RX ORDER — IPRATROPIUM BROMIDE AND ALBUTEROL SULFATE 2.5; .5 MG/3ML; MG/3ML
1 SOLUTION RESPIRATORY (INHALATION) ONCE
Status: COMPLETED | OUTPATIENT
Start: 2025-05-14 | End: 2025-05-14

## 2025-05-14 RX ORDER — SODIUM CHLORIDE 0.9 % (FLUSH) 0.9 %
5-40 SYRINGE (ML) INJECTION PRN
Status: CANCELLED | OUTPATIENT
Start: 2025-05-14

## 2025-05-14 RX ORDER — SODIUM CHLORIDE 9 MG/ML
INJECTION, SOLUTION INTRAVENOUS PRN
Status: CANCELLED | OUTPATIENT
Start: 2025-05-14

## 2025-05-14 RX ORDER — SODIUM CHLORIDE 9 MG/ML
INJECTION, SOLUTION INTRAVENOUS CONTINUOUS
Status: CANCELLED | OUTPATIENT
Start: 2025-05-14

## 2025-05-14 RX ORDER — IPRATROPIUM BROMIDE AND ALBUTEROL SULFATE 2.5; .5 MG/3ML; MG/3ML
SOLUTION RESPIRATORY (INHALATION)
Status: COMPLETED
Start: 2025-05-14 | End: 2025-05-14

## 2025-05-14 RX ORDER — PROPOFOL 10 MG/ML
INJECTION, EMULSION INTRAVENOUS
Status: DISCONTINUED | OUTPATIENT
Start: 2025-05-14 | End: 2025-05-14 | Stop reason: SDUPTHER

## 2025-05-14 RX ORDER — SODIUM CHLORIDE 0.9 % (FLUSH) 0.9 %
5-40 SYRINGE (ML) INJECTION EVERY 12 HOURS SCHEDULED
Status: CANCELLED | OUTPATIENT
Start: 2025-05-14

## 2025-05-14 RX ORDER — IPRATROPIUM BROMIDE AND ALBUTEROL SULFATE 2.5; .5 MG/3ML; MG/3ML
1 SOLUTION RESPIRATORY (INHALATION)
Status: DISCONTINUED | OUTPATIENT
Start: 2025-05-14 | End: 2025-05-15

## 2025-05-14 RX ORDER — LIDOCAINE HYDROCHLORIDE 20 MG/ML
INJECTION, SOLUTION INFILTRATION; PERINEURAL
Status: DISCONTINUED | OUTPATIENT
Start: 2025-05-14 | End: 2025-05-14 | Stop reason: SDUPTHER

## 2025-05-14 RX ORDER — BUDESONIDE 0.5 MG/2ML
0.5 INHALANT ORAL
Status: DISCONTINUED | OUTPATIENT
Start: 2025-05-14 | End: 2025-05-16 | Stop reason: HOSPADM

## 2025-05-14 RX ADMIN — PROPOFOL 20 MG: 10 INJECTION, EMULSION INTRAVENOUS at 12:20

## 2025-05-14 RX ADMIN — SODIUM CHLORIDE 40 MG: 9 INJECTION INTRAMUSCULAR; INTRAVENOUS; SUBCUTANEOUS at 16:09

## 2025-05-14 RX ADMIN — SODIUM CHLORIDE: 0.9 INJECTION, SOLUTION INTRAVENOUS at 16:18

## 2025-05-14 RX ADMIN — LIDOCAINE HYDROCHLORIDE 3 ML: 20 INJECTION, SOLUTION INFILTRATION; PERINEURAL at 12:18

## 2025-05-14 RX ADMIN — PROPOFOL 80 MG: 10 INJECTION, EMULSION INTRAVENOUS at 12:18

## 2025-05-14 RX ADMIN — BUDESONIDE 500 MCG: 0.5 SUSPENSION RESPIRATORY (INHALATION) at 19:58

## 2025-05-14 RX ADMIN — SODIUM CHLORIDE, PRESERVATIVE FREE 10 ML: 5 INJECTION INTRAVENOUS at 16:09

## 2025-05-14 RX ADMIN — IPRATROPIUM BROMIDE AND ALBUTEROL SULFATE 1 DOSE: .5; 2.5 SOLUTION RESPIRATORY (INHALATION) at 19:58

## 2025-05-14 RX ADMIN — NEOMYCIN SULFATE, POLYMYXIN B SULFATE, HYDROCORTISONE 3 DROP: 3.5; 10000; 1 SOLUTION/ DROPS AURICULAR (OTIC) at 17:15

## 2025-05-14 RX ADMIN — IPRATROPIUM BROMIDE AND ALBUTEROL SULFATE 1 DOSE: .5; 2.5 SOLUTION RESPIRATORY (INHALATION) at 12:35

## 2025-05-14 RX ADMIN — SODIUM CHLORIDE 40 MG: 9 INJECTION INTRAMUSCULAR; INTRAVENOUS; SUBCUTANEOUS at 02:49

## 2025-05-14 RX ADMIN — PROPOFOL 50 MG: 10 INJECTION, EMULSION INTRAVENOUS at 12:21

## 2025-05-14 RX ADMIN — SODIUM CHLORIDE: 0.9 INJECTION, SOLUTION INTRAVENOUS at 05:24

## 2025-05-14 RX ADMIN — CARBAMIDE PEROXIDE 6.5% 5 DROP: 6.5 LIQUID AURICULAR (OTIC) at 17:16

## 2025-05-14 RX ADMIN — IPRATROPIUM BROMIDE AND ALBUTEROL SULFATE 1 DOSE: 2.5; .5 SOLUTION RESPIRATORY (INHALATION) at 12:35

## 2025-05-14 ASSESSMENT — ENCOUNTER SYMPTOMS
COUGH: 0
DIARRHEA: 0
VOMITING: 0
NAUSEA: 0
SHORTNESS OF BREATH: 0

## 2025-05-14 ASSESSMENT — PAIN - FUNCTIONAL ASSESSMENT
PAIN_FUNCTIONAL_ASSESSMENT: NONE - DENIES PAIN
PAIN_FUNCTIONAL_ASSESSMENT: 0-10
PAIN_FUNCTIONAL_ASSESSMENT: NONE - DENIES PAIN
PAIN_FUNCTIONAL_ASSESSMENT: NONE - DENIES PAIN

## 2025-05-14 ASSESSMENT — PAIN SCALES - GENERAL: PAINLEVEL_OUTOF10: 0

## 2025-05-14 NOTE — PROGRESS NOTES
[facility]  Occupational Therapy        NAME:  Mckenna Mendoza  ROOM: W484/W484-01  :  1945  DATE: 2025    Attempted to see Mckenna Mendoza at 1022 on this date for:   [x]  Initial Evaluation   []  Treatment       Patient was unable to be seen due to:   [x] Off unit for testing/procedure - at EGD per RN. Pt also has bedrest order, RN aware and to clarify with MD.   [] Patient refused, stating \"    [] Therapy on hold due to     [] Nursing deferred due to    [] Other:      Discussed with MADAY Singh    Electronically signed by NICK Bird/L on 25 at 10:22 AM EDT

## 2025-05-14 NOTE — PLAN OF CARE
Problem: Chronic Conditions and Co-morbidities  Goal: Patient's chronic conditions and co-morbidity symptoms are monitored and maintained or improved  5/13/2025 2221 by Sara Atkins RN  Outcome: Progressing  Flowsheets (Taken 5/13/2025 1151 by Arlen Mora RN)  Care Plan - Patient's Chronic Conditions and Co-Morbidity Symptoms are Monitored and Maintained or Improved:   Monitor and assess patient's chronic conditions and comorbid symptoms for stability, deterioration, or improvement   Collaborate with multidisciplinary team to address chronic and comorbid conditions and prevent exacerbation or deterioration   Update acute care plan with appropriate goals if chronic or comorbid symptoms are exacerbated and prevent overall improvement and discharge  5/13/2025 1149 by Arlen Mora RN  Outcome: Progressing  Flowsheets  Taken 5/13/2025 0828 by Arlen Mora RN  Care Plan - Patient's Chronic Conditions and Co-Morbidity Symptoms are Monitored and Maintained or Improved:   Monitor and assess patient's chronic conditions and comorbid symptoms for stability, deterioration, or improvement   Collaborate with multidisciplinary team to address chronic and comorbid conditions and prevent exacerbation or deterioration   Update acute care plan with appropriate goals if chronic or comorbid symptoms are exacerbated and prevent overall improvement and discharge  Taken 5/13/2025 0514 by Shahzad Key RN  Care Plan - Patient's Chronic Conditions and Co-Morbidity Symptoms are Monitored and Maintained or Improved: Monitor and assess patient's chronic conditions and comorbid symptoms for stability, deterioration, or improvement

## 2025-05-14 NOTE — PROGRESS NOTES
Physician Progress Note      PATIENT:               SAPNA HOOVER  CSN #:                  [id number]  :                       1945  ADMIT DATE:       2025 1:08 AM  DISCH DATE:  RESPONDING  PROVIDER #:        Kendall Davison MD          QUERY TEXT:    Based on your medical judgment, please clarify these findings and document if   any of the following are being evaluated and/or treated:    The clinical indicators include:  -BP 66/44-74/48, MAPS 44/47. Comprehensive VS Flowsheet  -IVFB 1.5 L, IV Levophed. MAR  -Lactic acid 2.1, sCr/GFR 1.63/31.3, WBC 4.5, RBC 1.81, HGB 6.3, HCT 20.3,   stool negative for occult blood. Lab Results  -recent diagnosis of multiple myeloma. Takes Eluis and Plavix. Dr. Cazares ED   Physician Note   -Transfused 1 unit PRBC. Blood Bank Reports   Options provided:  -- Hemorrhagic Shock  -- Hypovolemic Shock  -- Hypovolemia without Shock  -- Hypotension without Shock  -- Other - I will add my own diagnosis  -- Disagree - Not applicable / Not valid  -- Disagree - Clinically unable to determine / Unknown  -- Refer to Clinical Documentation Reviewer    PROVIDER RESPONSE TEXT:    This patient is in hemorrhagic shock.    Query created by: Estefany Bradley on 2025 1:54 PM      QUERY TEXT:    A mental status change is documented in the  Dr. Cazares ED physician   note.  Please specify the underlying cause:    The clinical indicators include:  -hypotension & lethargy, took dose of tizanidine at home; Drowsy, negative for   confusion. Somnolent.  Dr. Cazares ED Physician Note  -LOC: Responds to Voice. Drowsy. improved to LOC: Alert. Oriented x4. after   admission. Nursing Assessment Flowsheets   -IVF< IV Atropine, IV Narcan, IV Levophed, IVFB 1.5L. MAR   -HR 78-35, RR 18-24, BP 66/44-74/48, MAPS 44-47. Comprehensive VS Flowsheet     -sCr/GFR 1.63/31.3, lactic acid 2.1, Troponin 76-73, WBC 4.5, RBC 1.81, HGB   6.3, HCT 20.3, stool negative for occult blood,

## 2025-05-14 NOTE — PROGRESS NOTES
Physical Therapy Missed Treatment   Facility/Department: [facility] MED SURG W484/W484-01    NAME: Mckenna Mendoza    : 1945 (79 y.o.)  MRN: [id number]    Account: [id number]  Gender: female      Attempted to see pt for eval. Pt states he did not have PT /OT needs at this time and is planning to be discharged this date to home. He reports no concerns at this time       Faye Brown, PT, 25 at 2:00 PM

## 2025-05-14 NOTE — PROGRESS NOTES
Progress Note  Date:2025       Room:[place] 812084-01  Patient Name:Mckenna Mendoza     YOB: 1945     Age:79 y.o.        Subjective    Subjective:  Symptoms:  No shortness of breath, malaise, cough, chest pain, weakness, headache, chest pressure, anorexia, diarrhea or anxiety.    Diet:  No nausea or vomiting.       Review of Systems   Respiratory:  Negative for cough and shortness of breath.    Cardiovascular:  Negative for chest pain.   Gastrointestinal:  Negative for anorexia, diarrhea, nausea and vomiting.   Neurological:  Negative for weakness.     Objective         Vitals Last 24 Hours:  TEMPERATURE:  Temp  Av °F (36.7 °C)  Min: 97.6 °F (36.4 °C)  Max: 99.1 °F (37.3 °C)  RESPIRATIONS RANGE: Resp  Av.7  Min: 15  Max: 29  PULSE OXIMETRY RANGE: SpO2  Av.7 %  Min: 89 %  Max: 98 %  PULSE RANGE: Pulse  Av.7  Min: 55  Max: 90  BLOOD PRESSURE RANGE: Systolic (24hrs), Av , Min:89 , Max:163   ; Diastolic (24hrs), Av, Min:28, Max:113    I/O (24Hr):    Intake/Output Summary (Last 24 hours) at 2025 0952  Last data filed at 2025 0527  Gross per 24 hour   Intake 2457.33 ml   Output 1650 ml   Net 807.33 ml     Objective:  General Appearance:  Comfortable, well-appearing and in no acute distress.    Vital signs: (most recent): Blood pressure (!) 127/54, pulse 90, temperature 99.1 °F (37.3 °C), temperature source Oral, resp. rate 16, height 1.524 m (5'), weight 85.1 kg (187 lb 11.2 oz), SpO2 95%.    HEENT: Normal HEENT exam.    Lungs:  There are decreased breath sounds.    Heart: S1 normal and S2 normal.    Abdomen: Abdomen is soft.  Bowel sounds are normal.   There is no epigastric area or suprapubic area tenderness.     Pulses: Distal pulses are intact.    Neurological: Patient is alert.    Pupils:  Pupils are equal, round, and reactive to light.      Labs/Imaging/Diagnostics    Labs:  CBC:  Recent Labs     25  0133 25  0917 25  0250 25  0625  markings bilaterally.  Left basilar airspace disease with trace left pleural effusion.  No pneumothorax bilaterally.  Biapical scarring. Normal cardiac silhouette. No acute osseous abnormality.     Pulmonary venous congestion.  Left basilar airspace disease, favor atelectasis.     Assessment//Plan           Hospital Problems           Last Modified POA    * (Principal) GI bleed 5/13/2025 Yes    Hypotension 5/13/2025 Yes    Acute anemia 5/13/2025 Yes   Severe anemia Hgb 6.3 s/p 1 unit of PRBCs in ED -- repeat Hgb 6.8  Melena   OSIRIS on CKD   Hypotension-- currently on pressors   Rhinovirus infection   Rotavirus infection  Assessment & Plan  5/14: Endoscopy today and possible discharge tomorrow home.  Continue current care.  Off pressors transfuse to keep hemoglobin above 7 no bleeding.  Out of ICU. Fu pt/ot TCT 55 min  Electronically signed by Kendall Davison MD on 5/14/25 at 9:52 AM EDT

## 2025-05-14 NOTE — PROGRESS NOTES
Pt transferred from ICU. VSS. Placed on 2L NC. A&Ox4. Skin WDL. Swelling to BLE. Audible wheezes noted. Tele #[id number] NSR. External Catheter in place. Blood glucose 91. Denies pain. Ice chips provided. Pt  at bedside. Denies any other needs at this time. Safety maintained. Call light within reach.

## 2025-05-14 NOTE — PROGRESS NOTES
[facility]  Occupational Therapy        NAME:  Mckenna Mendoza  ROOM: W484/W484-01  :  1945  DATE: 2025    Attempted to see Mckenna Mendoza at 1409 on this date for:   [x]  Initial Evaluation   []  Treatment       Patient was unable to be seen due to:   [] Off unit for testing/procedure    [x] Patient declined, stating \"I was off floor all morning, I just got back from my procedure. I'm so tired. I would like to do it but can we wait to tomorrow so I can take a nap?\" Explained importance of mobility, pt states she understands and is willing to possibly get up with nursing later this PM. Hold OT eval this date.   [] Therapy on hold due to     [] Nursing deferred due to    [] Other:      Discussed with MADAY Singh    Electronically signed by NICK Bird/L on 25 at 2:13 PM EDT

## 2025-05-14 NOTE — FLOWSHEET NOTE
Dr. García office called and they are not open at this time.Per patient request, she wanted Dr. García called and given a medical update. Dr. García not currently on call and unable to get a hold of time. Will have RN attempt to give medical update to Dr. García tomorrow.

## 2025-05-14 NOTE — PROGRESS NOTES
Physical Therapy Missed Treatment   Facility/Department: [facility] MED SURG W484/W484-01    NAME: Mckenna Mendoza    : 1945 (79 y.o.)  MRN: [id number]    Account: [id number]  Gender: female      Pt declines PT eval due to fatigue following procedure this date.  Nursing staff notified.      Will follow and attempt PT evaluation again at earliest availability.       Faye Brown, PT, 25 at 2:15 PM

## 2025-05-14 NOTE — ANESTHESIA POSTPROCEDURE EVALUATION
Department of Anesthesiology  Postprocedure Note    Patient: Mckenna Mendoza  MRN: [id number]  YOB: 1945  Date of evaluation: 5/14/2025    Procedure Summary       Date: 05/14/25 Room / Location: [facility] OR 02 / [facility]    Anesthesia Start: 1213 Anesthesia Stop: 1243    Procedure: ESOPHAGOGASTRODUODENOSCOPY Diagnosis:       Melena      Anemia, unspecified type      (Melena [K92.1])      (Anemia, unspecified type [D64.9])    Surgeons: Dalia Allen MD Responsible Provider: Jackie Roberts APRN - CRNA    Anesthesia Type: MAC ASA Status: 4            Anesthesia Type: No value filed.    Sharron Phase I: Sharron Score: 10    Sharron Phase II: Sharron Score: 5    Anesthesia Post Evaluation    Patient location during evaluation: bedside  Patient participation: complete - patient participated  Level of consciousness: awake and awake and alert  Airway patency: patent  Nausea & Vomiting: no nausea and no vomiting  Cardiovascular status: blood pressure returned to baseline and hemodynamically stable  Respiratory status: airway suctioned and T-piece  Hydration status: euvolemic  Pain management: adequate        No notable events documented.

## 2025-05-14 NOTE — ANESTHESIA PRE PROCEDURE
Department of Anesthesiology  Preprocedure Note       Name:  Mckenna Mendoza   Age:  79 y.o.  :  1945                                          MRN:  [id number]         Date:  2025      Surgeon: Surgeon(s):  Dalia Allen MD    Procedure: Procedure(s):  ESOPHAGOGASTRODUODENOSCOPY    Medications prior to admission:   Prior to Admission medications    Medication Sig Start Date End Date Taking? Authorizing Provider   loperamide (IMODIUM A-D) 2 MG tablet Take 2 tablets by mouth 4 times daily as needed for Diarrhea 25 Yes Kendall Davison MD   gabapentin (NEURONTIN) 300 MG capsule Take 1 capsule by mouth in the morning and at bedtime for 180 days. 3/19/25 9/15/25 Yes Twila Maher DO   clopidogrel (PLAVIX) 75 MG tablet Take 1 tablet by mouth daily 3/19/25  Yes Twila Maher DO   tiZANidine (ZANAFLEX) 4 MG tablet TAKE 1 TABLET EVERY NIGHT AS NEEDED FOR MUSCLE SPASM(S) 3/18/25  Yes Twila Maher DO   pantoprazole (PROTONIX) 40 MG tablet Take 1 tablet by mouth daily 25  Yes Twila Maher,    metoprolol tartrate (LOPRESSOR) 50 MG tablet Take 1 tablet by mouth 2 times daily 25  Yes Twila Maher DO   lisinopril (PRINIVIL;ZESTRIL) 2.5 MG tablet Take 1 tablet by mouth daily 25  Yes Twila Maher DO   empagliflozin (JARDIANCE) 10 MG tablet Take 1 tablet by mouth daily 25  Yes Twila Maher,    atorvastatin (LIPITOR) 40 MG tablet Take 1 tablet by mouth daily 25  Yes Twila Maher,    albuterol sulfate HFA (PROVENTIL;VENTOLIN;PROAIR) 108 (90 Base) MCG/ACT inhaler Inhale 2 puffs into the lungs every 6 hours as needed for Wheezing 25  Yes Margot Wilkinson APRN - NP   apixaban (ELIQUIS) 5 MG TABS tablet Take 2 tablets (10mg) twice a day x 1 week, then 1 tablet (5mg) twice a day everyday after.  Patient taking differently: Take 1 tablet by mouth 2 times daily 24  Yes Eric Saeed MD   fluticasone (FLONASE) 50 MCG/ACT nasal spray 1 spray by

## 2025-05-14 NOTE — PROGRESS NOTES
INPATIENT PROGRESS NOTES    PATIENT NAME: Mckenna Mendoza  MRN: [id number]  SERVICE DATE:  May 14, 2025   SERVICE TIME:  2:21 PM      PRIMARY SERVICE: Pulmonary Disease    CHIEF COMPLAINTS: Cough    INTERVAL HPI: Patient seen and examined at bedside, Interval Notes, orders reviewed. Nursing notes noted    Patient report feeling better, shortness of breath improved, she has cough with clear phlegm, no chest pain, no hemoptysis, she is on 2 L O2 saturation 96%.    New information updated in the note today, rest of the examination did not change compared to yesterday.    Review of system:     GI Abdominal pain No  Skin Rash No    Social History     Tobacco Use    Smoking status: Former     Current packs/day: 0.00     Average packs/day: 0.5 packs/day for 24.0 years (12.0 ttl pk-yrs)     Types: Cigarettes     Start date: 1959     Quit date: 1982     Years since quittin.3    Smokeless tobacco: Never    Tobacco comments:     No   Substance Use Topics    Alcohol use: Yes     Alcohol/week: 3.0 standard drinks of alcohol     Types: 3 Glasses of wine per week         Problem Relation Age of Onset    Cancer Mother     Arthritis Mother     Diabetes Mother     Heart Disease Mother     High Blood Pressure Mother     High Cholesterol Mother     Arthritis Father     Diabetes Father     Heart Disease Father     High Blood Pressure Father     High Cholesterol Father          OBJECTIVE    Body mass index is 36.52 kg/m².    PHYSICAL EXAM:  Vitals:  /77   Pulse 93   Temp 98.4 °F (36.9 °C) (Oral)   Resp 18   Ht 1.524 m (5')   Wt 84.8 kg (187 lb)   SpO2 96%   BMI 36.52 kg/m²     General: alert, cooperative, no distress  Head: normocephalic, atraumatic  Eyes:No gross abnormalities.  ENT:  MMM no lesions  Neck:  supple and no masses  Chest : Wheezing, diminished air movement, nontender, tympanic, few rales bilaterally  Heart:: Heart sounds are normal.  Regular rate and rhythm without murmur, gallop or rub.  ABD:

## 2025-05-14 NOTE — CARE COORDINATION
MET W/PT TO ASSESS NEEDS AND DISCUSS DISCHARGE PLAN WHICH IS HOME W/[facility] AND [facility]. PT STATES SHE SPOKE W/DR. MAI'S OFFICE REGARDING UPCOMING BONE MARROW TRANSPLANT FOR MULTIPLE MYELOMA. PT/OT EVALUATIONS ARE PENDING.

## 2025-05-14 NOTE — PROGRESS NOTES
Patient assessment and vitals complete and per flowsheets. No sign of bleeding at this time. Pt npo, pt and  apprised of plan of care, verbalized understanding. Has no needs at this time.

## 2025-05-14 NOTE — PROGRESS NOTES
Gastroenterology Progress Note    Mckenna Mendoza is a 79 y.o. female patient.  Hospitalization Day:1    Chief C/O: Anemia and melena    SUBJECTIVE: Seen and examined, no acute events overnight, hemoglobin 8.2, no melanotic stool, has been n.p.o. for EGD today, Plavix and Eliquis are on hold.    ROS:  Gastrointestinal ROS: Reports epigastric/abdominal pain, change in bowel habits, or black or bloody stools    Physical    VITALS:  BP (!) 162/71   Pulse 87   Temp 98.7 °F (37.1 °C) (Temporal)   Resp 18   Ht 1.524 m (5')   Wt 84.8 kg (187 lb)   SpO2 97%   BMI 36.52 kg/m²   TEMPERATURE:  Current - Temp: 98.7 °F (37.1 °C); Max - Temp  Av.1 °F (36.7 °C)  Min: 97.6 °F (36.4 °C)  Max: 99.1 °F (37.3 °C)    General:  Alert and oriented,  No apparent distress  Skin- without jaundice  Eyes: anicteric sclera  Cardiac: RRR, Nl s1s2, without murmurs  Lungs CTA Bilaterally, normal effort  Abdomen soft, ND, NT, no HSM, Bowel sounds normal  Ext: without edema  Neuro: no asterixis     Data    Data Review:    Recent Labs     25  0917 25  0250 25  0625 25  0822   WBC 4.5*  --   --  3.0*  --    HGB 6.3*   < > 8.0* 8.1* 8.2*   HCT 20.3*   < > 24.0* 24.8* 25.0*   .2*  --   --  103.3*  --      --   --  122*  --     < > = values in this interval not displayed.     Recent Labs     25  0625    141   K 4.1 4.5   * 113*   CO2 18* 18*   PHOS  --  3.2   BUN 19 12   CREATININE 1.63* 1.16*     Recent Labs     25   AST 18   ALT 11   BILITOT 0.3   ALKPHOS 95     No results for input(s): \"LIPASE\", \"AMYLASE\" in the last 72 hours.  Recent Labs     25  0625   PROTIME 20.2*   INR 1.6           ASSESSMENT:  80 y/o female admitted for fatigue, anemia, and melena in the context of Eliquis and Plavix.  Reportedly had been short of breath over the last several days was recently diagnosed with multiple myeloma also reported intermittent melena over

## 2025-05-15 LAB
ALBUMIN SERPL-MCNC: 2.9 G/DL (ref 3.5–4.6)
ANION GAP SERPL CALCULATED.3IONS-SCNC: 10 MEQ/L (ref 9–15)
BASOPHILS # BLD: 0 K/UL (ref 0–0.2)
BASOPHILS NFR BLD: 1 %
BUN SERPL-MCNC: 10 MG/DL (ref 8–23)
CALCIUM SERPL-MCNC: 8.8 MG/DL (ref 8.5–9.9)
CHLORIDE SERPL-SCNC: 109 MEQ/L (ref 95–107)
CO2 SERPL-SCNC: 19 MEQ/L (ref 20–31)
CREAT SERPL-MCNC: 1.03 MG/DL (ref 0.5–0.9)
EOSINOPHIL # BLD: 0 K/UL (ref 0–0.7)
EOSINOPHIL NFR BLD: 1.2 %
ERYTHROCYTE [DISTWIDTH] IN BLOOD BY AUTOMATED COUNT: 21 % (ref 11.5–14.5)
GLUCOSE BLD-MCNC: 142 MG/DL (ref 70–99)
GLUCOSE BLD-MCNC: 144 MG/DL (ref 70–99)
GLUCOSE BLD-MCNC: 235 MG/DL (ref 70–99)
GLUCOSE BLD-MCNC: 87 MG/DL (ref 70–99)
GLUCOSE BLD-MCNC: 90 MG/DL (ref 70–99)
GLUCOSE SERPL-MCNC: 87 MG/DL (ref 70–99)
HCT VFR BLD AUTO: 22.6 % (ref 37–47)
HCT VFR BLD AUTO: 22.9 % (ref 37–47)
HCT VFR BLD AUTO: 24.2 % (ref 37–47)
HCT VFR BLD AUTO: 26.4 % (ref 37–47)
HCT VFR BLD AUTO: 27.6 % (ref 37–47)
HGB BLD-MCNC: 7.4 G/DL (ref 12–16)
HGB BLD-MCNC: 7.5 G/DL (ref 12–16)
HGB BLD-MCNC: 8.2 G/DL (ref 12–16)
HGB BLD-MCNC: 8.8 G/DL (ref 12–16)
HGB BLD-MCNC: 8.9 G/DL (ref 12–16)
LYMPHOCYTES # BLD: 1.1 K/UL (ref 1–4.8)
LYMPHOCYTES NFR BLD: 41 %
MCH RBC QN AUTO: 34.1 PG (ref 27–31.3)
MCHC RBC AUTO-ENTMCNC: 32.7 % (ref 33–37)
MCV RBC AUTO: 104.1 FL (ref 79.4–94.8)
METAMYELOCYTES NFR BLD MANUAL: 1 %
MONOCYTES # BLD: 0.1 K/UL (ref 0.2–0.8)
MONOCYTES NFR BLD: 2.9 %
NEUTROPHILS # BLD: 1.4 K/UL (ref 1.4–6.5)
NEUTS SEG NFR BLD: 54 %
NRBC BLD-RTO: 2 /100 WBC
PERFORMED ON: ABNORMAL
PERFORMED ON: NORMAL
PERFORMED ON: NORMAL
PHOSPHATE SERPL-MCNC: 2.8 MG/DL (ref 2.3–4.8)
PLATELET # BLD AUTO: 119 K/UL (ref 130–400)
PLATELET BLD QL SMEAR: ABNORMAL
POTASSIUM SERPL-SCNC: 4.1 MEQ/L (ref 3.4–4.9)
RBC # BLD AUTO: 2.17 M/UL (ref 4.2–5.4)
SLIDE REVIEW: ABNORMAL
SODIUM SERPL-SCNC: 138 MEQ/L (ref 135–144)
WBC # BLD AUTO: 2.6 K/UL (ref 4.8–10.8)

## 2025-05-15 PROCEDURE — 99222 1ST HOSP IP/OBS MODERATE 55: CPT | Performed by: PHYSICAL MEDICINE & REHABILITATION

## 2025-05-15 PROCEDURE — 6360000002 HC RX W HCPCS: Performed by: INTERNAL MEDICINE

## 2025-05-15 PROCEDURE — 6370000000 HC RX 637 (ALT 250 FOR IP): Performed by: INTERNAL MEDICINE

## 2025-05-15 PROCEDURE — 99233 SBSQ HOSP IP/OBS HIGH 50: CPT | Performed by: INTERNAL MEDICINE

## 2025-05-15 PROCEDURE — 94761 N-INVAS EAR/PLS OXIMETRY MLT: CPT

## 2025-05-15 PROCEDURE — 36415 COLL VENOUS BLD VENIPUNCTURE: CPT

## 2025-05-15 PROCEDURE — 99232 SBSQ HOSP IP/OBS MODERATE 35: CPT | Performed by: NURSE PRACTITIONER

## 2025-05-15 PROCEDURE — 2700000000 HC OXYGEN THERAPY PER DAY

## 2025-05-15 PROCEDURE — 99232 SBSQ HOSP IP/OBS MODERATE 35: CPT | Performed by: INTERNAL MEDICINE

## 2025-05-15 PROCEDURE — 1210000000 HC MED SURG R&B

## 2025-05-15 PROCEDURE — 85018 HEMOGLOBIN: CPT

## 2025-05-15 PROCEDURE — 80069 RENAL FUNCTION PANEL: CPT

## 2025-05-15 PROCEDURE — 85014 HEMATOCRIT: CPT

## 2025-05-15 PROCEDURE — 2500000003 HC RX 250 WO HCPCS: Performed by: INTERNAL MEDICINE

## 2025-05-15 PROCEDURE — 2580000003 HC RX 258: Performed by: INTERNAL MEDICINE

## 2025-05-15 PROCEDURE — 97162 PT EVAL MOD COMPLEX 30 MIN: CPT

## 2025-05-15 PROCEDURE — 85025 COMPLETE CBC W/AUTO DIFF WBC: CPT

## 2025-05-15 PROCEDURE — 97166 OT EVAL MOD COMPLEX 45 MIN: CPT

## 2025-05-15 PROCEDURE — 94640 AIRWAY INHALATION TREATMENT: CPT

## 2025-05-15 RX ORDER — PREDNISONE 20 MG/1
40 TABLET ORAL DAILY
Status: DISCONTINUED | OUTPATIENT
Start: 2025-05-15 | End: 2025-05-16 | Stop reason: HOSPADM

## 2025-05-15 RX ORDER — PREDNISONE 20 MG/1
40 TABLET ORAL DAILY
Status: DISCONTINUED | OUTPATIENT
Start: 2025-05-15 | End: 2025-05-15

## 2025-05-15 RX ORDER — FUROSEMIDE 10 MG/ML
40 INJECTION INTRAMUSCULAR; INTRAVENOUS ONCE
Status: COMPLETED | OUTPATIENT
Start: 2025-05-15 | End: 2025-05-15

## 2025-05-15 RX ORDER — INSULIN LISPRO 100 [IU]/ML
0-8 INJECTION, SOLUTION INTRAVENOUS; SUBCUTANEOUS
Status: DISCONTINUED | OUTPATIENT
Start: 2025-05-15 | End: 2025-05-16 | Stop reason: HOSPADM

## 2025-05-15 RX ORDER — NEOMYCIN SULFATE, POLYMYXIN B SULFATE AND HYDROCORTISONE 3.5; 10000; 1 MG/ML; [IU]/ML; MG/ML
3 SOLUTION AURICULAR (OTIC) EVERY 6 HOURS SCHEDULED
Status: DISCONTINUED | OUTPATIENT
Start: 2025-05-15 | End: 2025-05-16 | Stop reason: HOSPADM

## 2025-05-15 RX ORDER — IPRATROPIUM BROMIDE AND ALBUTEROL SULFATE 2.5; .5 MG/3ML; MG/3ML
1 SOLUTION RESPIRATORY (INHALATION) EVERY 4 HOURS PRN
Status: DISCONTINUED | OUTPATIENT
Start: 2025-05-15 | End: 2025-05-16 | Stop reason: HOSPADM

## 2025-05-15 RX ADMIN — PREDNISONE 40 MG: 20 TABLET ORAL at 13:30

## 2025-05-15 RX ADMIN — BUDESONIDE 500 MCG: 0.5 SUSPENSION RESPIRATORY (INHALATION) at 20:00

## 2025-05-15 RX ADMIN — NEOMYCIN SULFATE, POLYMYXIN B SULFATE, HYDROCORTISONE 3 DROP: 3.5; 10000; 1 SOLUTION/ DROPS AURICULAR (OTIC) at 00:16

## 2025-05-15 RX ADMIN — SODIUM CHLORIDE 40 MG: 9 INJECTION INTRAMUSCULAR; INTRAVENOUS; SUBCUTANEOUS at 17:39

## 2025-05-15 RX ADMIN — BUDESONIDE 500 MCG: 0.5 SUSPENSION RESPIRATORY (INHALATION) at 07:11

## 2025-05-15 RX ADMIN — SODIUM CHLORIDE 40 MG: 9 INJECTION INTRAMUSCULAR; INTRAVENOUS; SUBCUTANEOUS at 05:57

## 2025-05-15 RX ADMIN — NEOMYCIN SULFATE, POLYMYXIN B SULFATE, HYDROCORTISONE 3 DROP: 3.5; 10000; 1 SOLUTION/ DROPS AURICULAR (OTIC) at 21:32

## 2025-05-15 RX ADMIN — NEOMYCIN SULFATE, POLYMYXIN B SULFATE, HYDROCORTISONE 3 DROP: 3.5; 10000; 1 SOLUTION/ DROPS AURICULAR (OTIC) at 13:30

## 2025-05-15 RX ADMIN — FUROSEMIDE 40 MG: 10 INJECTION, SOLUTION INTRAMUSCULAR; INTRAVENOUS at 08:13

## 2025-05-15 RX ADMIN — INSULIN LISPRO 2 UNITS: 100 INJECTION, SOLUTION INTRAVENOUS; SUBCUTANEOUS at 21:32

## 2025-05-15 RX ADMIN — NEOMYCIN SULFATE, POLYMYXIN B SULFATE, HYDROCORTISONE 3 DROP: 3.5; 10000; 1 SOLUTION/ DROPS AURICULAR (OTIC) at 05:57

## 2025-05-15 RX ADMIN — SODIUM CHLORIDE: 0.9 INJECTION, SOLUTION INTRAVENOUS at 05:57

## 2025-05-15 RX ADMIN — SODIUM CHLORIDE, PRESERVATIVE FREE 10 ML: 5 INJECTION INTRAVENOUS at 08:13

## 2025-05-15 ASSESSMENT — ENCOUNTER SYMPTOMS
ABDOMINAL PAIN: 0
NAUSEA: 0
SHORTNESS OF BREATH: 0
VOMITING: 0
COUGH: 0
DIARRHEA: 0

## 2025-05-15 NOTE — PROGRESS NOTES
Chief Complaint   Patient presents with    Hypotension       SUBJECTIVE:  Pt denies chest pain, dyspnea.  No new issues overnight.  Status post EGD which was negative.  No source of bleeding identified    Past Medical History:   Diagnosis Date    Cancer (HCC)     Encounter for lumbar puncture 05/25/2022    Completed by Dr. Mccormick - diagnostics sent    Hyperlipidemia     Hypertension     Type II or unspecified type diabetes mellitus without mention of complication, not stated as uncontrolled      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HCC)    Vertebral compression fracture (HCC)    Dizziness    Benign paroxysmal positional vertigo due to bilateral vestibular disorder    Maxillary pain    Nonintractable headache    Fall at home    Impaired mobility and activities of daily living-metabolic encephalopathy with primary origins of infectious etiology    Lumbar spondylosis    Lumbar stenosis with neurogenic claudication    Atelectasis, left    Chest pain    Athscl heart disease of native coronary artery w/o ang pctrs    Contusion of left hip    Contusion of left shoulder    Pain in left shoulder    Angina at rest    Acute on chronic systolic heart failure (HCC)    Stage 3a chronic kidney disease (HCC)    History of pulmonary embolism    OSIRIS (acute kidney injury)    GI bleed    Acute anemia    Hypotension       Current Facility-Administered Medications   Medication Dose Route Frequency Provider Last Rate Last Admin    ipratropium 0.5 mg-albuterol 2.5 mg (DUONEB) nebulizer solution 1 Dose  1 Dose Inhalation Q4H PRN Kendall Davison MD        insulin lispro (HUMALOG,ADMELOG) injection vial 0-8 Units  0-8 Units SubCUTAneous 4x Daily AC & HS Kendall Davison MD        neomycin-polymyxin-hydrocortisone (CORTISPORIN) otic solution 3 drop  3 drop Both Ears 4 times per day Kendall Davison MD

## 2025-05-15 NOTE — PROGRESS NOTES
[facility] OCCUPATIONAL THERAPY EVALUATION - ACUTE     NAME: Mckenna Mendoza  : 1945 (79 y.o.)  MRN: [id number]  CODE STATUS: Full Code  [facility]    Date of Service: 5/15/2025    Patient Diagnosis(es): GI bleed [K92.2]  Hypotension, unspecified hypotension type [I95.9]  Acute anemia [D64.9]   Patient Active Problem List    Diagnosis Date Noted    Hypotension 2025    Angina at rest 2023    Chest pain 2022    Atelectasis, left     Impaired mobility and activities of daily living-metabolic encephalopathy with primary origins of infectious etiology 2022    Lumbar spondylosis 2022    Lumbar stenosis with neurogenic claudication 2022    Dizziness     Benign paroxysmal positional vertigo due to bilateral vestibular disorder     Maxillary pain     Nonintractable headache     Fall at home     Athscl heart disease of native coronary artery w/o ang pctrs 2022    Contusion of left hip 2022    Contusion of left shoulder 2022    Pain in left shoulder 2022    GI bleed 2025    Acute anemia 2025    OSIRIS (acute kidney injury) 2025    History of pulmonary embolism 10/18/2024    Stage 3a chronic kidney disease (HCC) 2024    Acute on chronic systolic heart failure (HCC) 2024    Hyperlipidemia 10/16/2015    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 2015      Hypotension, unspecified hypotension type  Acute anemia  Angina at rest  Other chest pain    Past Medical History:   Diagnosis Date    Cancer (HCC)     Encounter for lumbar puncture 2022    Completed by Dr. Mccormick - diagnostics sent    Hyperlipidemia     Hypertension     Osteoarthritis     Type II or unspecified type diabetes mellitus without mention of complication, not stated as  assist from helper) for task but is able to actively participate in task   Dependent = Pt. requires total assistance with task and is not able to actively participate with task completion     Plan:  Occupational Therapy Plan  Times Per Week: 1-4x/wk  Current Treatment Recommendations: Strengthening, Balance training, Functional mobility training, Endurance training, Neuromuscular re-education, Self-Care / ADL    Goals:   Patient will:    - Improve functional endurance to tolerate/complete 15-25 mins of ADL's  - Be supervision in UB ADLs   - Be Supervision in LB ADLs  - Be Supervision in ADL transfers without LOB  - Be Supervision in toileting tasks  - Improve bilateral UE strength and endurance to Fair+ in order to participate in self-care activities as projected.    Patient Goal: Patient goals : To return to home      Discussed and agreed upon: Yes Comments:       Therapy Time:   Individual   Time In 1350   Time Out 1410   Minutes 20          Eval: 20 minutes     Electronically signed by:    WYATT Trujillo,   5/15/2025, 2:52 PM Electronically signed by WYATT Trujillo on 5/15/25 at 2:56 PM EDT

## 2025-05-15 NOTE — PROGRESS NOTES
Physical Therapy Med Surg Initial Assessment  Facility/Department: [address] MED SURG UNIT  Room: [place] [id number]       NAME: Mckenna Mendoza  : 1945 (79 y.o.)  MRN: [id number]  CODE STATUS: Full Code    Date of Service: 5/15/2025    Patient Diagnosis(es): GI bleed [K92.2]  Hypotension, unspecified hypotension type [I95.9]  Acute anemia [D64.9]   Chief Complaint   Patient presents with    Hypotension     Patient Active Problem List    Diagnosis Date Noted    Hypotension 2025    Angina at rest 2023    Chest pain 2022    Atelectasis, left     Impaired mobility and activities of daily living-metabolic encephalopathy with primary origins of infectious etiology 2022    Lumbar spondylosis 2022    Lumbar stenosis with neurogenic claudication 2022    Dizziness     Benign paroxysmal positional vertigo due to bilateral vestibular disorder     Maxillary pain     Nonintractable headache     Fall at home     Athscl heart disease of native coronary artery w/o ang pctrs 2022    Contusion of left hip 2022    Contusion of left shoulder 2022    Pain in left shoulder 2022    GI bleed 2025    Acute anemia 2025    OSIRIS (acute kidney injury) 2025    History of pulmonary embolism 10/18/2024    Stage 3a chronic kidney disease (HCC) 2024    Acute on chronic systolic heart failure (HCC) 2024    Hyperlipidemia 10/16/2015    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 Mccormick - diagnostics sent    Hyperlipidemia     Hypertension     Type II or unspecified type diabetes mellitus without mention of complication, not stated as uncontrolled   evaluation, education, & procurement    Safety Devices  Type of Devices:  (with PCA following session)    Goals:  Long Term Goals  Long Term Goal 1: indep bed mobility  Long Term Goal 2: SBA sit to stand  Long Term Goal 3: SBA gait with rollator or ww 50 feet  Long Term Goal 4: pt able to stand 2 min with UE support with SBA    AMPAC (6 CLICK) BASIC MOBILITY  AM-PAC Inpatient Mobility Raw Score : 16     Therapy Time:   Individual   Time In 0935   Time Out 0954   Minutes 19           Faye Brown, PT, 05/15/25 at 10:39 AM         Definitions for assistance levels  Independent = pt does not require any physical supervision or assistance from another person for activity completion. Device may be needed.  Stand by assistance = pt requires verbal cues or instructions from another person, close to but not touching, to perform the activity  Minimal assistance= pt performs 75% or more of the activity; assistance is required to complete the activity  Moderate assistance= pt performs 50% of the activity; assistance is required to complete the activity  Maximal assistance = pt performs 25% of the activity; assistance is required to complete the activity  Dependent = pt requires total physical assistance to accomplish the task

## 2025-05-15 NOTE — CARE COORDINATION
[facility] Pre-Admission Screening Document      Patient Name: Mckenna Mendoza       MRN: [id number]    : 1945    Age: 79 y.o.  Gender: female   Payor: Payor: [employer] [place] / [facility] [place] [facility] / Product Type: *No Product type* /   MSSP: No    Admitted from: [facility] Floor: 4W  Attending Care Provider: Kendall Davison MD  Inpatient Rehab Referring Care Provider: Dr. Davison  Primary Care Provider: Twila Maher DO  Inpatient Treatment Team including Consults: Treatment Team:   Kendall Davison MD Zaizafoun, Manaf, MD Holiday, Robert CANCINO, Dalia Hernandez MD Bennett, Kathryn A, Dalia Manzanares MD Kluge, Hannah S, Heidy Portillo, W  Abimbola Correia    Reason for Hospitalization:   1. Hypotension, unspecified hypotension type    2. Acute anemia    3. Angina at rest    4. Other chest pain      Chief Complaint   Patient presents with    Hypotension     Isolation:Droplet    Hospital Course:  Admit Date: 2025  1:08 AM  Inpatient Rehab Referral Date: 5/15/2025  Narrative of hospital course/history of present illness: 79 y.o. female patient who presented to ER  d/t hypotension and lethargy. Labs obtained showing concerning hemoglobin level of 6.3 requiring PRBCs transfusion. Bradycardia noted while in ER and EKG showed Mobitz type ! Heart block. Atropine given. Patient admitted to ICU with need for Levo. Cardiology and GI consulted.   EGD completed and did not show any fresh or old blood but did reveal 3cm hiatal hernia.     Internal Medicine:  Severe anemia Hgb 6.3 s/p 1 unit of PRBCs in ED -- repeat Hgb 6.8  Melena   OSIRIS on CKD   Hypotension-- currently on pressors   Rhinovirus infection   Rotavirus infection  Otitis media  Assessment & Plan  : Endoscopy today and possible discharge tomorrow home.  Continue current care.  Off pressors transfuse to keep hemoglobin above 7 no bleeding.  Out of ICU. Fu  assistance;Increased time to complete (05/15/25 1447)  UE Dressing: Setup (05/15/25 1447)  LE Dressing: Increased time to complete;Minimal assistance (05/15/25 1447)  Putting On/Taking Off Footwear: Minimal assistance;Increased time to complete (05/15/25 1447)  Toileting: Unable to assess (Comment) (05/15/25 1447)               Speech Language Pathology n/t           Diet/Swallow:  Regular              Rehab evaluation plan: Recommend IRF initially, however, medical plans changed including starting chemo tx as early as Monday. Patient wanting to proceed with chemo and IRF unable to accommodate. Patient to return home with family and [facility]    Reviewer's Signature: Electronically signed by Nina Campos on 5/15/2025 at 3:45 PM

## 2025-05-15 NOTE — PROGRESS NOTES
Patient assessment and vitals complete and per flowsheets. Patient lungs wheezy and rhonchi noted, lasix per emar given, no needs at this time.

## 2025-05-15 NOTE — PROGRESS NOTES
Progress Note  Date:5/15/2025       Room:[place] 640184-01  Patient Name:Mckenna Mendoza     YOB: 1945     Age:79 y.o.        Subjective    Subjective:  Symptoms:  No shortness of breath, malaise, cough, chest pain, weakness, headache, chest pressure, anorexia, diarrhea or anxiety.    Diet:  No nausea or vomiting.       Review of Systems   Respiratory:  Negative for cough and shortness of breath.    Cardiovascular:  Negative for chest pain.   Gastrointestinal:  Negative for anorexia, diarrhea, nausea and vomiting.   Neurological:  Negative for weakness.     Objective         Vitals Last 24 Hours:  TEMPERATURE:  Temp  Av.3 °F (36.8 °C)  Min: 97.5 °F (36.4 °C)  Max: 98.7 °F (37.1 °C)  RESPIRATIONS RANGE: Resp  Av.2  Min: 16  Max: 22  PULSE OXIMETRY RANGE: SpO2  Av.7 %  Min: 96 %  Max: 100 %  PULSE RANGE: Pulse  Av.4  Min: 87  Max: 114  BLOOD PRESSURE RANGE: Systolic (24hrs), Av , Min:121 , Max:162   ; Diastolic (24hrs), Av, Min:65, Max:79    I/O (24Hr):    Intake/Output Summary (Last 24 hours) at 5/15/2025 0928  Last data filed at 5/15/2025 0508  Gross per 24 hour   Intake 1709.52 ml   Output --   Net 1709.52 ml     Objective:  General Appearance:  Comfortable, well-appearing and in no acute distress.    Vital signs: (most recent): Blood pressure (!) 143/65, pulse 90, temperature 98.4 °F (36.9 °C), temperature source Oral, resp. rate 16, height 1.524 m (5'), weight 84.8 kg (187 lb), SpO2 97%.    HEENT: Normal HEENT exam.    Lungs:  There are decreased breath sounds.    Heart: S1 normal and S2 normal.    Abdomen: Abdomen is soft.  Bowel sounds are normal.   There is no epigastric area or suprapubic area tenderness.     Pulses: Distal pulses are intact.    Neurological: Patient is alert.    Pupils:  Pupils are equal, round, and reactive to light.      Labs/Imaging/Diagnostics    Labs:  CBC:  Recent Labs     25  0133 25  0917 25  0625 25  0822 25  1428

## 2025-05-15 NOTE — PROGRESS NOTES
Gastroenterology Progress Note    Mckenna Mendoza is a 79 y.o. female patient.  Hospitalization Day:2    Chief C/O: melena anemia    SUBJECTIVE: Seen and examined, s/p EGD with 3 cm hiatal hernia, normal stomach, normal duodenum, no fresh or old blood identified.  Hemoglobin 8.9, no overt bleeding overnight, has been tolerating clear liquids.    ROS:  Gastrointestinal ROS: no abdominal pain, change in bowel habits, or black or bloody stools    Physical    VITALS:  BP (!) 143/65   Pulse 90   Temp 98.4 °F (36.9 °C) (Oral)   Resp 16   Ht 1.524 m (5')   Wt 84.8 kg (187 lb)   SpO2 97%   BMI 36.52 kg/m²   TEMPERATURE:  Current - Temp: 98.4 °F (36.9 °C); Max - Temp  Av.2 °F (36.8 °C)  Min: 97.5 °F (36.4 °C)  Max: 98.6 °F (37 °C)    General:  Alert and oriented,  No apparent distress  Skin- without jaundice  Eyes: anicteric sclera  Cardiac: RRR, Nl s1s2, without murmurs  Lungs CTA Bilaterally, normal effort  Abdomen soft, ND, NT, no HSM, Bowel sounds normal  Ext: without edema  Neuro: no asterixis     Data    Data Review:    Recent Labs     25  0917 25  0822 05/14/25  2118 05/15/25  0313 05/15/25  0920   WBC 4.5*  --  3.0*  --   --  2.6*  --    HGB 6.3*   < > 8.1*   < > 8.4* 7.4*  7.5* 8.9*   HCT 20.3*   < > 24.8*   < > 26.2* 22.6*  22.9* 27.6*   .2*  --  103.3*  --   --  104.1*  --      --  122*  --   --  119*  --     < > = values in this interval not displayed.     Recent Labs     05/13/25  0133 05/14/25  0625 05/15/25  0313    141 138   K 4.1 4.5 4.1   * 113* 109*   CO2 18* 18* 19*   PHOS  --  3.2 2.8   BUN 19 12 10   CREATININE 1.63* 1.16* 1.03*     Recent Labs     25  0133   AST 18   ALT 11   BILITOT 0.3   ALKPHOS 95     No results for input(s): \"LIPASE\", \"AMYLASE\" in the last 72 hours.  Recent Labs     25  0625   PROTIME 20.2*   INR 1.6     Endoscopic hx:  EGD Dr Allen 25  Impression:         - A 3 cm paraesophageal  mellitus, recently diagnosed multiple myeloma, history of coronary artery disease with remote stent placement while in [place], chronic kidney disease    Thank you for allowing me to participate in the care of your patient.  Please feel free to contact me with any concerns.    Heidy Navarro, APRN - CNP

## 2025-05-15 NOTE — CONSULTS
Physical Medicine & Rehabilitation  Consult Note      Admitting Physician: Kendall Dvaison MD    Primary Care Provider: Twila Maher DO     Reason for Consult:  Asses rehab needs, promote physical and mental function, analyze level of care to determine rehab needs, improve ability to actively participate in the rehabilitation process, and decrease likelihood of re-admit to the hospital after discharge.      History of Present Illness:    Mckenna Mendoza is a 79 y.o. female admitted to [facility] on 5/13/2025.      Patient was admitted with hypotension and severe anemia found to have a hemoglobin of 6.3 requiring transfusions of at least 2 units of packed red blood cells.  She was admitted under the care of the hospitalist with GI cardiology and critical care consulting.  She required an ICU stay and titration of Levophed to keep her blood pressures elevated.      Workup including EGD ED was negative for bleeding but positive for hiatal hernia.  Patient has a history of multiple myeloma as cause of her anemia.  She recently was diagnosed with rhinovirus.      Fatigue  This is a recurrent problem. The current episode started in the past 7 days. The problem has been unchanged. Associated symptoms include fatigue. Pertinent negatives include no abdominal pain, anorexia, chest pain, coughing, nausea, vomiting or weakness.         I reviewed recent nursing notes discussed care with acute care providers, \" Patient lungs wheezy and rhonchi noted, lasix per emar given, no needs at this time  \".   Events from the previous 24 hours reviewed and discussed .      Their inpatient work up has included:    Imaging:  Imaging and other studies reviewed and discussed with patient and staff    EGD  5/14/2025 Procedure:        - The Gastroscope was introduced through the mouth and advanced to           the second part of the duodenum.        -  The patient tolerated the procedure well. Findings:        - A 3 cm  paraesophageal hiatal hernia was found        -  The entire examined stomach was normal.        - Lax lower esophageal sphincter on retroflexed view        -  The examined duodenum was normal.        -  No old or fresh blood seen        - No PUD or AVM noted Impression:        - A 3 cm paraesophageal hiatal hernia identified        -  Normal stomach.        -  Normal examined duodenum.        - Lax lower esophageal sphincter on retroflexed view        -  No old or fresh blood seen Recommendation:        -  Continue present medications.        -  Full liquid diet       - K92.1, Melena (includes Hematochezia)        - K44.9, Diaphragmatic hernia without obstruction or gangrene CPT(R) - 2023 copyright American Medical Association.    -ANSHU SAWYER     Echo (TTE)  5/13/2025    Left Ventricle: Normal left ventricular systolic function with a visually estimated EF of 60 - 65%. Left ventricle size is normal. Normal wall thickness. Normal wall motion. Diastolic dysfunction present with normal LV EF.   Right Ventricle: Right ventricle size is normal. Normal systolic function.   Aortic Valve: Mild sclerosis of the aortic valve cusps.   Mitral Valve: Mildly thickened leaflets. Mild regurgitation.   Tricuspid Valve: Moderate regurgitation with a centrally directed jet. Severely elevated RVSP 81, consistent with severe pulmonary hypertension.   Left Atrium: Left atrium is mildly dilated.   Pericardium: Small (<1 cm) pericardial effusion present. No indication of cardiac tamponade. Evidence includes no chamber collapse.   Image quality is adequate.     XR CHEST  5/13/2025  Increased interstitial markings bilaterally.  Left basilar airspace disease with trace left pleural effusion.  No pneumothorax bilaterally.  Biapical scarring. Normal cardiac silhouette. No acute osseous abnormality.     Pulmonary venous congestion.  Left basilar airspace disease, favor atelectasis.       CT ABDOMEN PELVIS  5/4/2025    1. No acute  hydration and nutrition by adding Vitamin B12 shot times one, adding Protein supplements and push PO fluids.    Treat and monitor for higher level cognitive deficits, focus on difficulty with sequencing and problem-solving.    Focus on higher-level balance and falls risk issues focusing on balance training and monitoring for orthostasis.      Above recommendations are indicated to address medical complexity and need for appropriate rehab services.  Will tailor individual care and rehab plan per individuals needs re improve immune system and blood count.    Focus of today's plan-await medical stability and pre-CERT with care source      Required Certification Data (potential inpatient rehabilitation facility patient's only)    Deficits:weakness, nutrition, mobility, impaired gait, high risk for falls, decreased endurance, debility, balance, ADL's, and pain limiting function    Disability:mobility, locomotion, and self care    Potential barriers to progress/discharge:complex medical conditions and complex social situations         It was my pleasure to evaluate Mckenna Mendoza today.  Please call [phone] with questions.    Renu Holman DO     FAAPMR

## 2025-05-15 NOTE — CARE COORDINATION
Inpatient Rehab referral received. Met with patient to discuss [facility] rehab. Patient reports she would like to stay in the [place] area and does want [facility]. Patient wanting therapy prior to return home. Reviewed and explained [facility] program and requirements, including 3 hours of intense therapy daily, anticipated length of stay, weekly team meetings and goal of discharge to home. Patient reports she has been in a rehab in the past and expresses her familiarity with the operations. Patient reports to requiring assistance from  at times. Lives at home with , son/ DIL and grandson. Son provides transportation. Patient aware of needing any work up completed as well as OT eval prior to sending IRF request to insurance. Patient further aware that insurance does require prior auth approval. Patient currently wearing o2 and denies needing it at home. Discussed that medical monitoring will continue on rehab and physicians will be able to follow while admitted as well.   Electronically signed by Nina Campos on 5/15/2025 at 11:50 AM

## 2025-05-15 NOTE — CARE COORDINATION
CM CALLED ROOM AND SPOKE TO PT ABOUT AMPAC OF 16.  PT IS OPEN TO REHAB/SNF.  PT WOULD LIKE TO STAY IN [place] AREA.  REHAB REFERRAL TO [facility], PENDING ACCEPTANCE,PRECERT.     AFTER SPEAKING WITH NURSING, ONC WANTS TO START CHEMO ASAP AND FEELS REHAB WILL BE A DISRUPTION, SO PLAN NOW FOR [facility], PENDING ACCEPTANCE.  PENNIE FROM REHAB UPDATED.

## 2025-05-15 NOTE — CARE COORDINATION
Received call from 4W CM who updated that oncologist wanting to start chemo tx as soon as Monday and IRF admission interrupts that. At this time, per CM, plan now for patient to return home with [facility].  Electronically signed by Nina Campos on 5/15/2025 at 2:43 PM

## 2025-05-15 NOTE — PROGRESS NOTES
INPATIENT PROGRESS NOTES    PATIENT NAME: Mckenna Mendoza  MRN: [id number]  SERVICE DATE:  May 15, 2025   SERVICE TIME:  12:14 PM      PRIMARY SERVICE: Pulmonary Disease    CHIEF COMPLAINTS: Cough    INTERVAL HPI: Patient seen and examined at bedside, Interval Notes, orders reviewed. Nursing notes noted    Feeling better, shortness of breath improved, still has coughing with clear phlegm, no chest pain, she is on 2 L O2 saturation 97%, no fever    New information updated in the note today, rest of the examination did not change compared to yesterday.    Review of system:     GI Abdominal pain No  Skin Rash No    Social History     Tobacco Use    Smoking status: Former     Current packs/day: 0.00     Average packs/day: 0.5 packs/day for 24.0 years (12.0 ttl pk-yrs)     Types: Cigarettes     Start date: 1959     Quit date: 1982     Years since quittin.3    Smokeless tobacco: Never    Tobacco comments:     No   Substance Use Topics    Alcohol use: Yes     Alcohol/week: 3.0 standard drinks of alcohol     Types: 3 Glasses of wine per week         Problem Relation Age of Onset    Cancer Mother     Arthritis Mother     Diabetes Mother     Heart Disease Mother     High Blood Pressure Mother     High Cholesterol Mother     Arthritis Father     Diabetes Father     Heart Disease Father     High Blood Pressure Father     High Cholesterol Father          OBJECTIVE    Body mass index is 36.52 kg/m².    PHYSICAL EXAM:  Vitals:  BP (!) 143/65   Pulse 90   Temp 98.4 °F (36.9 °C) (Oral)   Resp 16   Ht 1.524 m (5')   Wt 84.8 kg (187 lb)   SpO2 97%   BMI 36.52 kg/m²     General: alert, cooperative, no distress  Head: normocephalic, atraumatic  Eyes:No gross abnormalities.  ENT:  MMM no lesions  Neck:  supple and no masses  Chest : Good air movement, minimal wheezing, no rales, nontender, tympanic  Heart:: Heart sounds are normal.  Regular rate and rhythm without murmur, gallop or rub.  ABD:  symmetric, soft,  clopidogrel  75 mg Oral Daily    [Held by provider] Selinexor (40 MG Once Weekly)  40 mg Oral Weekly       PRN Meds:ipratropium 0.5 mg-albuterol 2.5 mg, sodium chloride, sodium chloride flush, sodium chloride, ondansetron **OR** ondansetron, acetaminophen **OR** acetaminophen, glucose, dextrose bolus **OR** dextrose bolus, dextrose, [Held by provider] loperamide, albuterol sulfate HFA, sodium chloride, fluticasone    Radiology  Chest x-ray films reviewed by me no infiltrate        IMPRESSION AND SUGGESTION:  Patient is at risk due to   Rhinovirus bronchitis  Probable asthma with acute exacerbation  History of PE on Eliquis    Recommendation  Continue budesonide nebs  DuoNeb every 6 hours  Prednisone 40 mg p.o. daily  Monitor blood sugar watch for hyperglycemia  Watch volume status maintain euvolemic  Lasix as needed  Monitor and replace electrolyte as needed         Electronically signed by Theresa House MD,  FCCP   on 5/15/2025 at 12:14 PM

## 2025-05-15 NOTE — PROGRESS NOTES
05/15/25 0100   RT Protocol   History Pulmonary Disease 0   Respiratory pattern 0   Breath sounds 2   Cough 0   Indications for Bronchodilator Therapy On home bronchodilators   Bronchodilator Assessment Score 2

## 2025-05-16 ENCOUNTER — TELEPHONE (OUTPATIENT)
Age: 80
End: 2025-05-16

## 2025-05-16 VITALS
HEIGHT: 60 IN | HEART RATE: 75 BPM | DIASTOLIC BLOOD PRESSURE: 70 MMHG | TEMPERATURE: 98.6 F | OXYGEN SATURATION: 98 % | BODY MASS INDEX: 36.71 KG/M2 | SYSTOLIC BLOOD PRESSURE: 149 MMHG | WEIGHT: 187 LBS | RESPIRATION RATE: 18 BRPM

## 2025-05-16 PROBLEM — J20.6 ACUTE BRONCHITIS DUE TO RHINOVIRUS: Status: ACTIVE | Noted: 2025-05-16

## 2025-05-16 LAB
ACANTHOCYTES BLD QL SMEAR: ABNORMAL
ALBUMIN SERPL-MCNC: 3 G/DL (ref 3.5–4.6)
ANION GAP SERPL CALCULATED.3IONS-SCNC: 10 MEQ/L (ref 9–15)
ANISOCYTOSIS BLD QL SMEAR: ABNORMAL
BASOPHILS # BLD: 0 K/UL (ref 0–0.2)
BASOPHILS NFR BLD: 0.4 %
BLOOD BANK DISPENSE STATUS: NORMAL
BLOOD BANK DISPENSE STATUS: NORMAL
BLOOD BANK PRODUCT CODE: NORMAL
BLOOD BANK PRODUCT CODE: NORMAL
BPU ID: NORMAL
BPU ID: NORMAL
BUN SERPL-MCNC: 9 MG/DL (ref 8–23)
CALCIUM SERPL-MCNC: 9.3 MG/DL (ref 8.5–9.9)
CHLORIDE SERPL-SCNC: 109 MEQ/L (ref 95–107)
CO2 SERPL-SCNC: 22 MEQ/L (ref 20–31)
CREAT SERPL-MCNC: 1.02 MG/DL (ref 0.5–0.9)
DACRYOCYTES BLD QL SMEAR: ABNORMAL
DESCRIPTION BLOOD BANK: NORMAL
DESCRIPTION BLOOD BANK: NORMAL
EOSINOPHIL # BLD: 0 K/UL (ref 0–0.7)
EOSINOPHIL NFR BLD: 0 %
ERYTHROCYTE [DISTWIDTH] IN BLOOD BY AUTOMATED COUNT: 19.8 % (ref 11.5–14.5)
GLUCOSE BLD-MCNC: 131 MG/DL (ref 70–99)
GLUCOSE BLD-MCNC: 148 MG/DL (ref 70–99)
GLUCOSE SERPL-MCNC: 142 MG/DL (ref 70–99)
HCT VFR BLD AUTO: 23.5 % (ref 37–47)
HGB BLD-MCNC: 7.9 G/DL (ref 12–16)
LYMPHOCYTES # BLD: 0.4 K/UL (ref 1–4.8)
LYMPHOCYTES NFR BLD: 18 %
MACROCYTES BLD QL SMEAR: ABNORMAL
MCH RBC QN AUTO: 33.9 PG (ref 27–31.3)
MCHC RBC AUTO-ENTMCNC: 33.6 % (ref 33–37)
MCV RBC AUTO: 100.9 FL (ref 79.4–94.8)
METAMYELOCYTES NFR BLD MANUAL: 1 %
MONOCYTES # BLD: 0 K/UL (ref 0.2–0.8)
MONOCYTES NFR BLD: 3.3 %
NEUTROPHILS # BLD: 1.9 K/UL (ref 1.4–6.5)
NEUTS SEG NFR BLD: 80 %
OVALOCYTES BLD QL SMEAR: ABNORMAL
PATH INTERP BLD-IMP: NORMAL
PERFORMED ON: ABNORMAL
PERFORMED ON: ABNORMAL
PHOSPHATE SERPL-MCNC: 2.9 MG/DL (ref 2.3–4.8)
PLASMA CELLS NFR BLD: 1 %
PLATELET # BLD AUTO: 127 K/UL (ref 130–400)
PLATELET BLD QL SMEAR: ABNORMAL
POIKILOCYTOSIS BLD QL SMEAR: ABNORMAL
POTASSIUM SERPL-SCNC: 4 MEQ/L (ref 3.4–4.9)
RBC # BLD AUTO: 2.33 M/UL (ref 4.2–5.4)
SLIDE REVIEW: ABNORMAL
SODIUM SERPL-SCNC: 141 MEQ/L (ref 135–144)
WBC # BLD AUTO: 2.4 K/UL (ref 4.8–10.8)

## 2025-05-16 PROCEDURE — 97535 SELF CARE MNGMENT TRAINING: CPT

## 2025-05-16 PROCEDURE — 6370000000 HC RX 637 (ALT 250 FOR IP): Performed by: PHYSICAL MEDICINE & REHABILITATION

## 2025-05-16 PROCEDURE — 99232 SBSQ HOSP IP/OBS MODERATE 35: CPT | Performed by: INTERNAL MEDICINE

## 2025-05-16 PROCEDURE — 6370000000 HC RX 637 (ALT 250 FOR IP): Performed by: INTERNAL MEDICINE

## 2025-05-16 PROCEDURE — 6360000002 HC RX W HCPCS: Performed by: INTERNAL MEDICINE

## 2025-05-16 PROCEDURE — 6360000002 HC RX W HCPCS: Performed by: PHYSICAL MEDICINE & REHABILITATION

## 2025-05-16 PROCEDURE — 85025 COMPLETE CBC W/AUTO DIFF WBC: CPT

## 2025-05-16 PROCEDURE — 6360000002 HC RX W HCPCS: Performed by: NURSE PRACTITIONER

## 2025-05-16 PROCEDURE — 2700000000 HC OXYGEN THERAPY PER DAY

## 2025-05-16 PROCEDURE — 94761 N-INVAS EAR/PLS OXIMETRY MLT: CPT

## 2025-05-16 PROCEDURE — 94667 MNPJ CHEST WALL 1ST: CPT

## 2025-05-16 PROCEDURE — 2500000003 HC RX 250 WO HCPCS: Performed by: INTERNAL MEDICINE

## 2025-05-16 PROCEDURE — 2580000003 HC RX 258: Performed by: NURSE PRACTITIONER

## 2025-05-16 PROCEDURE — 80069 RENAL FUNCTION PANEL: CPT

## 2025-05-16 PROCEDURE — 94640 AIRWAY INHALATION TREATMENT: CPT

## 2025-05-16 PROCEDURE — 2580000003 HC RX 258: Performed by: INTERNAL MEDICINE

## 2025-05-16 PROCEDURE — 99233 SBSQ HOSP IP/OBS HIGH 50: CPT | Performed by: PHYSICAL MEDICINE & REHABILITATION

## 2025-05-16 PROCEDURE — 94150 VITAL CAPACITY TEST: CPT

## 2025-05-16 PROCEDURE — 99231 SBSQ HOSP IP/OBS SF/LOW 25: CPT | Performed by: NURSE PRACTITIONER

## 2025-05-16 RX ORDER — NEOMYCIN SULFATE, POLYMYXIN B SULFATE AND HYDROCORTISONE 3.5; 10000; 1 MG/ML; [IU]/ML; MG/ML
3 SOLUTION AURICULAR (OTIC) 4 TIMES DAILY
Qty: 1 EACH | Refills: 0 | Status: SHIPPED | OUTPATIENT
Start: 2025-05-16 | End: 2025-05-26

## 2025-05-16 RX ORDER — LIDOCAINE 4 G/G
3 PATCH TOPICAL DAILY
Status: DISCONTINUED | OUTPATIENT
Start: 2025-05-16 | End: 2025-05-16 | Stop reason: HOSPADM

## 2025-05-16 RX ORDER — VITAMIN B COMPLEX
2000 TABLET ORAL
Status: DISCONTINUED | OUTPATIENT
Start: 2025-05-16 | End: 2025-05-16 | Stop reason: HOSPADM

## 2025-05-16 RX ORDER — CYANOCOBALAMIN 1000 UG/ML
1000 INJECTION, SOLUTION INTRAMUSCULAR; SUBCUTANEOUS WEEKLY
Status: DISCONTINUED | OUTPATIENT
Start: 2025-05-16 | End: 2025-05-16 | Stop reason: HOSPADM

## 2025-05-16 RX ORDER — CHOLECALCIFEROL (VITAMIN D3) 50 MCG
2000 TABLET ORAL
Qty: 60 TABLET | Refills: 5 | Status: SHIPPED | OUTPATIENT
Start: 2025-05-16

## 2025-05-16 RX ORDER — PREDNISONE 20 MG/1
40 TABLET ORAL DAILY
Qty: 6 TABLET | Refills: 0 | Status: ON HOLD | OUTPATIENT
Start: 2025-05-17 | End: 2025-05-22 | Stop reason: HOSPADM

## 2025-05-16 RX ORDER — LOPERAMIDE HYDROCHLORIDE 2 MG/1
4 CAPSULE ORAL ONCE
Status: COMPLETED | OUTPATIENT
Start: 2025-05-16 | End: 2025-05-16

## 2025-05-16 RX ORDER — UBIDECARENONE 100 MG
100 CAPSULE ORAL DAILY
Status: DISCONTINUED | OUTPATIENT
Start: 2025-05-16 | End: 2025-05-16 | Stop reason: HOSPADM

## 2025-05-16 RX ADMIN — LOPERAMIDE HYDROCHLORIDE 4 MG: 2 CAPSULE ORAL at 10:57

## 2025-05-16 RX ADMIN — IPRATROPIUM BROMIDE AND ALBUTEROL SULFATE 1 DOSE: 2.5; .5 SOLUTION RESPIRATORY (INHALATION) at 07:18

## 2025-05-16 RX ADMIN — NEOMYCIN SULFATE, POLYMYXIN B SULFATE, HYDROCORTISONE 3 DROP: 3.5; 10000; 1 SOLUTION/ DROPS AURICULAR (OTIC) at 09:14

## 2025-05-16 RX ADMIN — CYANOCOBALAMIN 1000 MCG: 1000 INJECTION, SOLUTION INTRAMUSCULAR; SUBCUTANEOUS at 10:58

## 2025-05-16 RX ADMIN — SODIUM CHLORIDE 40 MG: 9 INJECTION INTRAMUSCULAR; INTRAVENOUS; SUBCUTANEOUS at 03:26

## 2025-05-16 RX ADMIN — PREDNISONE 40 MG: 20 TABLET ORAL at 09:11

## 2025-05-16 RX ADMIN — Medication 100 MG: at 10:58

## 2025-05-16 RX ADMIN — BUDESONIDE 500 MCG: 0.5 SUSPENSION RESPIRATORY (INHALATION) at 07:18

## 2025-05-16 RX ADMIN — SODIUM CHLORIDE 40 MG: 9 INJECTION INTRAMUSCULAR; INTRAVENOUS; SUBCUTANEOUS at 11:06

## 2025-05-16 RX ADMIN — SODIUM CHLORIDE, PRESERVATIVE FREE 10 ML: 5 INJECTION INTRAVENOUS at 09:12

## 2025-05-16 RX ADMIN — NEOMYCIN SULFATE, POLYMYXIN B SULFATE, HYDROCORTISONE 3 DROP: 3.5; 10000; 1 SOLUTION/ DROPS AURICULAR (OTIC) at 03:26

## 2025-05-16 NOTE — PROGRESS NOTES
INPATIENT PROGRESS NOTES    PATIENT NAME: Mckenna Mendoza  MRN: [id number]  SERVICE DATE:  May 16, 2025   SERVICE TIME:  1:25 PM      PRIMARY SERVICE: Pulmonary Disease    CHIEF COMPLAIN: Cough      INTERVAL HPI: Patient seen and examined at bedside, Interval Notes, orders reviewed. Nursing notes noted  Patient is feeling better.  Shortness of breath improved.  Has been coughing mostly clear mucus.  No chest pain.  On 2 L O2 via nasal cannula O2 saturation 98%.         OBJECTIVE   I/O:24HR INTAKE/OUTPUT:    Intake/Output Summary (Last 24 hours) at 5/16/2025 1325  Last data filed at 5/16/2025 1125  Gross per 24 hour   Intake 521.13 ml   Output 2050 ml   Net -1528.87 ml     05/15 0701 - 05/16 0700  In: 511.1 [P.O.:400; I.V.:111.1]  Out: 2050 [Urine:2050]  Body mass index is 36.52 kg/m².    PHYSICAL EXAM:  Vitals:  /76   Pulse 71   Temp 97.9 °F (36.6 °C) (Oral)   Resp 18   Ht 1.524 m (5')   Wt 84.8 kg (187 lb)   SpO2 98%   BMI 36.52 kg/m²     General: Alert, awake .comfortable in bed, No distress.  Head: Atraumatic , Normocephalic   Eyes: PERRL. No sclera icterus. No conjunctival injection. No discharge   ENT: No nasal  discharge. Pharynx clear.  Neck:  Trachea midline. No thyromegaly, no JVD, No cervical adenopathy.  Chest : Bilaterally symmetrical ,Normal effort,  No accessory muscle use  Lung : Diminished breath sound bilaterally No Rales. No wheezing. No rhonchi.   Heart:: Normal rate. Regular rhythm. No mumur ,  Rub or gallop  ABD: Non-tender. Non-distended. No masses. No organmegaly. Normal bowel sounds. No hernia.  Ext : No Pitting both leg , No Cyanosis No clubbing  Neuro: no focal weakness    Labs:  CBC:   Recent Labs     05/14/25  0625 05/14/25  0822 05/15/25  0313 05/15/25  0920 05/15/25  1443 05/15/25  2051 05/16/25  0338   WBC 3.0*  --  2.6*  --   --   --  2.4*   HGB 8.1*   < > 7.4*  7.5*   < > 8.8* 8.2* 7.9*   HCT 24.8*   < > 22.6*  22.9*   < > 26.4* 24.2* 23.5*   *  --  119*  --   --    --  127*    < > = values in this interval not displayed.     BMP:    Recent Labs     05/14/25  0625 05/15/25  0313 05/16/25  0338    138 141   K 4.5 4.1 4.0   * 109* 109*   CO2 18* 19* 22   BUN 12 10 9   CREATININE 1.16* 1.03* 1.02*   GLUCOSE 78 87 142*   CALCIUM 9.0 8.8 9.3   PHOS 3.2 2.8 2.9     HEPATIC: No results for input(s): \"AST\", \"ALT\", \"BILITOT\", \"ALKPHOS\" in the last 72 hours.    Invalid input(s): \"ALB\"      MV Settings:        No results for input(s): \"PHART\", \"ZIR2WLT\", \"PO2ART\", \"KSR1TES\", \"BEART\", \"U1RAVZIE\" in the last 72 hours.    O2 Device: Nasal cannula  O2 Flow Rate (L/min): 2 L/min       MEDICATIONS during current hospitalization:    Continuous Infusions:   sodium chloride      sodium chloride      dextrose         Scheduled Meds:   pantoprazole (PROTONIX) 40 mg in sodium chloride (PF) 0.9 % 10 mL injection  40 mg IntraVENous Daily    Vitamin D  2,000 Units Oral Dinner    cyanocobalamin  1,000 mcg IntraMUSCular Weekly    coenzyme Q10  100 mg Oral Daily    lidocaine  3 patch TransDERmal Daily    insulin lispro  0-8 Units SubCUTAneous 4x Daily AC & HS    neomycin-polymyxin-hydrocortisone  3 drop Both Ears 4 times per day    predniSONE  40 mg Oral Daily    budesonide  0.5 mg Nebulization BID RT    sodium chloride flush  5-40 mL IntraVENous 2 times per day    [Held by provider] furosemide  40 mg Oral Daily    [Held by provider] apixaban  5 mg Oral BID    [Held by provider] metoprolol tartrate  50 mg Oral BID    [Held by provider] lisinopril  2.5 mg Oral Daily    [Held by provider] empagliflozin  10 mg Oral Daily    [Held by provider] atorvastatin  40 mg Oral Daily    [Held by provider] gabapentin  300 mg Oral BID    [Held by provider] clopidogrel  75 mg Oral Daily    [Held by provider] Selinexor (40 MG Once Weekly)  40 mg Oral Weekly       PRN Meds:ipratropium 0.5 mg-albuterol 2.5 mg, sodium chloride, sodium chloride flush, sodium chloride, ondansetron **OR** ondansetron, acetaminophen  5/4/2025  EXAMINATION: CT OF THE ABDOMEN AND PELVIS WITHOUT CONTRAST 5/4/2025 4:43 pm TECHNIQUE: CT of the abdomen and pelvis was performed without the administration of intravenous contrast. Multiplanar reformatted images are provided for review. Automated exposure control, iterative reconstruction, and/or weight based adjustment of the mA/kV was utilized to reduce the radiation dose to as low as reasonably achievable. COMPARISON: None. HISTORY: ORDERING SYSTEM PROVIDED HISTORY: n/v/d TECHNOLOGIST PROVIDED HISTORY: Additional Contrast?->None Reason for exam:->n/v/d Decision Support Exception - unselect if not a suspected or confirmed emergency medical condition->Emergency Medical Condition (MA) What reading provider will be dictating this exam?->CRC FINDINGS: Unless otherwise indicated or stated incidental findings do not require dedicated follow-up imaging. Lower Chest: Lung bases are clear. Organs: Limited evaluation of solid organs and soft tissues due to lack of intravenous contrast.   Liver without focal lesion.  Gallbladder unremarkable. Pancreas and spleen unremarkable.  Adrenals without nodule. Kidneys without suspicious renal lesion and no hydronephrosis. GI/Bowel: Small hiatal hernia.  No small bowel obstructive process.  No significant inflammatory findings of bowel.  Colonic segments unremarkable for inflammation.  Gas Pelvis: No suspicious pelvic lesion or bulky pelvic adenopathy/free fluid. Peritoneum/Retroperitoneum: No bulky retroperitoneal adenopathy. No suspicious peritoneal or mesenteric process Vasculature: Grossly normal caliber of abdominal aorta and vasculature Bones/Soft Tissues: No acute osseous or soft tissue findings.  Status post L1 kyphoplasty.     1. No acute intra-abdominal or pelvic process.  Specifically, no evidence for small bowel obstructive process or mechanical obstructive process of bowel. 2. Small hiatal hernia. 3. Status post L1 kyphoplasty.         IMPRESSION AND

## 2025-05-16 NOTE — CARE COORDINATION
CM ENTERED ROOM TO DISCUSS DC PLANNING.  PT AWARE PLAN FOR DC TODAY.  PT WILL DC HOME WITH [facility], AND START CHEMO MONDAY.  MODESTO MADE AWARE.

## 2025-05-16 NOTE — PROGRESS NOTES
Shift assessment complete. VSS. A&Ox4. Patient is calm and cooperative. Denies having nausea or dyspnea on exertion. On 2L NC as needed. Lung sounds are diminished. Crackles to lung bases audible. No complaints of chest pain. C/O lower back pain+discomfort. Lidocaine patches applied. Abdomen is soft and round. Bowel sounds are active. Tolerating diet. Medications given per MAR. BLE edema present 2+. Pedal pulses palpable. Patient up in room x SB assist with walker. Currently in bed with call light in reach. Bed in lowest position. Alarm on bed. No needs at this time. Care ongoing.  - IM B12 shot administered L deltoid  Electronically signed by Dominick Alvarenga RN on 5/16/2025 at 3:25 PM

## 2025-05-16 NOTE — PROGRESS NOTES
79 y.o. female admitted to [facility] on 5/13/2025 with hypotension and severe anemia found to have a hemoglobin of 6.3 requiring transfusions of at least 2 units of packed red blood cells.  She was admitted under the care of the hospitalist with GI cardiology and critical care consulting.  She required an ICU stay and titration of Levophed to keep her blood pressures elevated.       Workup including EGD ED was negative for bleeding but positive for hiatal hernia.  Patient has a history of multiple myeloma as cause of her anemia.  She recently was diagnosed with rhinovirus.  Her rhinovirus symptoms are improving.      Subjective:  The patient complains of moderate acute on chronic progressive fatigue and generalized weakness partially relieved by rest, PT, OT and meds   and exacerbated by exertion and recent anemia and diagnosis of multiple myeloma.      I am concerned about patient’s medical complexities including:  Principal Problem:    GI bleed  Active Problems:    Hypotension    Impaired mobility and activities of daily living-metabolic encephalopathy with primary origins of infectious etiology    Pain in left shoulder    Acute anemia  Resolved Problems:    * No resolved hospital problems. *      .    Controlled Substance Monitoring:    Acute and Chronic Pain Monitoring:   RX Monitoring Acute Pain Prescriptions Periodic Controlled Substance Monitoring Chronic Pain > 50 MEDD   5/15/2025  12:11 PM Prescription exceeds daily limit for a specific reason. See comments or note.;Severe pain not adequately treated with lower dose.;Not required given exclusionary diagnoses... Possible medication side effects, risk of tolerance/dependence & alternative treatments discussed.;No signs of potential drug abuse or diversion identified.;Assessed functional status (ability to engage in work or other purposeful activities, the pain intensity and its interference with activities of daily living, quality of family

## 2025-05-16 NOTE — PROGRESS NOTES
Gastroenterology Progress Note    Mckenna Mendoza is a 79 y.o. female patient.  Hospitalization Day:3    Chief C/O: Melena, anemia    SUBJECTIVE: Seen and examined, no acute events, hemoglobin 7.9, no overt bleeding, tolerating regular diet, no abdominal pain no nausea or vomiting.    ROS:  Gastrointestinal ROS: no abdominal pain, change in bowel habits, or black or bloody stools    Physical    VITALS:  /76   Pulse 71   Temp 97.9 °F (36.6 °C) (Oral)   Resp 18   Ht 1.524 m (5')   Wt 84.8 kg (187 lb)   SpO2 98%   BMI 36.52 kg/m²   TEMPERATURE:  Current - Temp: 97.9 °F (36.6 °C); Max - Temp  Av.3 °F (36.8 °C)  Min: 97.9 °F (36.6 °C)  Max: 98.8 °F (37.1 °C)    General:  Alert and oriented,  No apparent distress  Skin- without jaundice  Eyes: anicteric sclera  Cardiac: RRR, Nl s1s2, without murmurs  Lungs CTA Bilaterally, normal effort  Abdomen soft, ND, NT, no HSM, Bowel sounds normal  Ext: without edema  Neuro: no asterixis     Data    Data Review:    Recent Labs     25  0625 25  0822 05/15/25  0313 05/15/25  0920 05/15/25  1443 05/15/25  2051 05/16/25  0338   WBC 3.0*  --  2.6*  --   --   --  2.4*   HGB 8.1*   < > 7.4*  7.5*   < > 8.8* 8.2* 7.9*   HCT 24.8*   < > 22.6*  22.9*   < > 26.4* 24.2* 23.5*   .3*  --  104.1*  --   --   --  100.9*   *  --  119*  --   --   --  127*    < > = values in this interval not displayed.     Recent Labs     25  0625 05/15/25  0313 05/16/25  0338    138 141   K 4.5 4.1 4.0   * 109* 109*   CO2 18* 19* 22   PHOS 3.2 2.8 2.9   BUN 12 10 9   CREATININE 1.16* 1.03* 1.02*     No results for input(s): \"AST\", \"ALT\", \"BILIDIR\", \"BILITOT\", \"ALKPHOS\" in the last 72 hours.    Invalid input(s): \"ALB\"  No results for input(s): \"LIPASE\", \"AMYLASE\" in the last 72 hours.  Recent Labs     25  0625   PROTIME 20.2*   INR 1.6     Endoscopic hx:  EGD Dr Allen 25  Impression:         - A 3 cm paraesophageal hiatal hernia identified          -  Normal stomach.         -  Normal examined duodenum.         - Lax lower esophageal sphincter on retroflexed view         -  No old or fresh blood seen      ASSESSMENT:  78 y/o female admitted for fatigue, anemia, and melena in the context of Eliquis and Plavix.  Reportedly had been short of breath over the last several days was recently diagnosed with multiple myeloma also reported intermittent melena over the last week or 2 has been using ibuprofen on occasion but not consistently or on a daily basis.  She does have associated generalized abdominal pain, no history of chronic heartburn and has not been on a PPI.  No recent endoscopic history.  Patient was initially admitted to the intensive care unit requiring vasopressors this has since been discontinued and she was transferred to the regular medical floor.   5/15/25 s/p EGD with 3 cm hiatal hernia, normal stomach, normal duodenum, no fresh or old blood identified.  Hemoglobin 8.9, no overt bleeding overnight, has been tolerating clear liquids.  5/16/25 no acute events, hemoglobin 7.9, no overt bleeding, tolerating regular diet, no abdominal pain no nausea or vomiting.      PLAN :  1.  Melena, anemia:  Hemoglobin 7.9 no further melena  EGD without fresh or old blood identified, patient has declined to proceed with colonoscopy.  Continue PPI daily  Ok to advance diet as tolerated  No recent colonoscopy, given the anemia/melena and need for long-term anticoagulation and antiplatelet therapy, discussed proceeding with colonoscopy including risks benefits indications and outcomes, patient has declined to proceed with colonoscopy at this time  2-Associated medical conditions: Include but not limited to history of PE on Eliquis currently on hold, diabetes mellitus, recently diagnosed multiple myeloma, history of coronary artery disease with remote stent placement while in [place], chronic kidney disease  GI will S/O please call if needed    Thank you for

## 2025-05-16 NOTE — TELEPHONE ENCOUNTER
Spoke with patient to schedule a hospital follow up, patient stated that she is going to wait to talk to Dr. Willard, because he may start her on chemo.

## 2025-05-16 NOTE — PLAN OF CARE
Problem: Chronic Conditions and Co-morbidities  Goal: Patient's chronic conditions and co-morbidity symptoms are monitored and maintained or improved  Outcome: Progressing  Flowsheets (Taken 5/15/2025 1928)  Care Plan - Patient's Chronic Conditions and Co-Morbidity Symptoms are Monitored and Maintained or Improved: Monitor and assess patient's chronic conditions and comorbid symptoms for stability, deterioration, or improvement     Problem: Discharge Planning  Goal: Discharge to home or other facility with appropriate resources  Outcome: Progressing  Flowsheets (Taken 5/15/2025 1928)  Discharge to home or other facility with appropriate resources: Identify barriers to discharge with patient and caregiver     Problem: Safety - Adult  Goal: Free from fall injury  Outcome: Progressing     Problem: ABCDS Injury Assessment  Goal: Absence of physical injury  Outcome: Progressing     Problem: Pain  Goal: Verbalizes/displays adequate comfort level or baseline comfort level  Outcome: Progressing

## 2025-05-16 NOTE — DISCHARGE SUMMARY
Discharge Summary    Date: 5/16/2025  Patient Name: Mckenna Mendoza    YOB: 1945     Age: 79 y.o.    Admit Date: 5/13/2025  Discharge Date: 5/16/2025  Discharge Condition: Fair    Admission Diagnosis  GI bleed [K92.2];Hypotension, unspecified hypotension type [I95.9];Acute anemia [D64.9]      Discharge Diagnosis  Principal Problem:    GI bleed  Active Problems:    Hypotension    Impaired mobility and activities of daily living-metabolic encephalopathy with primary origins of infectious etiology    Pain in left shoulder    Acute anemia  Resolved Problems:    * No resolved hospital problems. *      Hospital Stay  Narrative of Hospital Course:  Patient was admitted and treated for the following conditions, status post blood transfusion.  Patient has history of PE on anticoagulation.  Anticoagulant has been held.  Patient starting new chemo drug on Monday.  Patient did not want to go acute rehab due to that . EGD showed :  A 3 cm paraesophageal hiatal hernia was found         -  The entire examined stomach was normal.         - Lax lower esophageal sphincter on retroflexed view         -  The examined duodenum was normal.         -  No old or fresh blood seen         - No PUD or AVM noted              Severe anemia Hgb 6.3 s/p 1 unit of PRBCs in ED -- repeat Hgb 6.8  Melena   OSIRIS on CKD   Hypotension-- currently on pressors   Rhinovirus infection with respiratory manifestation  Rotavirus infection  Otitis media      Consultants:  IP CONSULT TO GI  IP CONSULT TO CRITICAL CARE  IP CONSULT TO CARDIOLOGY  IP CONSULT TO CARDIOLOGY  IP CONSULT TO HOME CARE NEEDS  IP CONSULT TO REHAB/TCU ADMISSION COORDINATOR    Surgeries/procedures Performed:      Treatments:            Discharge Plan/Disposition:  Home    Hospital/Incidental Findings Requiring Follow Up:    Patient Instructions:    Diet:    Activity:  For number of days (if applicable):      Other Instructions:    Provider Follow-Up:   No follow-ups on file.

## 2025-05-16 NOTE — PROGRESS NOTES
Chief Complaint   Patient presents with    Hypotension       SUBJECTIVE:  Pt denies chest pain, dyspnea.  No new issues overnight.  Status post EGD which was negative.  No source of bleeding identified    5/16/2025: No new issues overnight.  Renal function improved.  Hemoglobin is 7.9    Past Medical History:   Diagnosis Date    Cancer (HCC)     Encounter for lumbar puncture 05/25/2022    Completed by Dr. Mccormick - diagnostics sent    Hyperlipidemia     Hypertension     Osteoarthritis     Type II or unspecified type diabetes mellitus without mention of complication, not stated as uncontrolled      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HCC)    Vertebral compression fracture (HCC)    Dizziness    Benign paroxysmal positional vertigo due to bilateral vestibular disorder    Maxillary pain    Nonintractable headache    Fall at home    Impaired mobility and activities of daily living-metabolic encephalopathy with primary origins of infectious etiology    Lumbar spondylosis    Lumbar stenosis with neurogenic claudication    Atelectasis, left    Chest pain    Athscl heart disease of native coronary artery w/o ang pctrs    Contusion of left hip    Contusion of left shoulder    Pain in left shoulder    Angina at rest    Acute on chronic systolic heart failure (HCC)    Stage 3a chronic kidney disease (HCC)    History of pulmonary embolism    OSIRIS (acute kidney injury)    GI bleed    Acute anemia    Hypotension       Current Facility-Administered Medications   Medication Dose Route Frequency Provider Last Rate Last Admin    ipratropium 0.5 mg-albuterol 2.5 mg (DUONEB) nebulizer solution 1 Dose  1 Dose Inhalation Q4H PRN Kendall Davison MD        insulin lispro (HUMALOG,ADMELOG) injection vial 0-8 Units  0-8 Units SubCUTAneous 4x Daily AC & HS Kendall Davison MD   2 Units at 05/15/25 3472  Value Ref Range    POC Glucose 144 (H) 70 - 99 mg/dl    Performed on ACCU-CHEK    Hemoglobin and Hematocrit    Collection Time: 05/15/25  2:43 PM   Result Value Ref Range    Hemoglobin 8.8 (L) 12.0 - 16.0 g/dL    Hematocrit 26.4 (L) 37.0 - 47.0 %   POCT Glucose    Collection Time: 05/15/25  4:07 PM   Result Value Ref Range    POC Glucose 142 (H) 70 - 99 mg/dl    Performed on ACCU-CHEK    POCT Glucose    Collection Time: 05/15/25  8:35 PM   Result Value Ref Range    POC Glucose 235 (H) 70 - 99 mg/dl    Performed on ACCU-CHEK    Hemoglobin and Hematocrit    Collection Time: 05/15/25  8:51 PM   Result Value Ref Range    Hemoglobin 8.2 (L) 12.0 - 16.0 g/dL    Hematocrit 24.2 (L) 37.0 - 47.0 %   CBC with Auto Differential    Collection Time: 05/16/25  3:38 AM   Result Value Ref Range    WBC 2.4 (L) 4.8 - 10.8 K/uL    RBC 2.33 (L) 4.20 - 5.40 M/uL    Hemoglobin 7.9 (L) 12.0 - 16.0 g/dL    Hematocrit 23.5 (L) 37.0 - 47.0 %    .9 (H) 79.4 - 94.8 fL    MCH 33.9 (H) 27.0 - 31.3 pg    MCHC 33.6 33.0 - 37.0 %    RDW 19.8 (H) 11.5 - 14.5 %    Platelets 127 (L) 130 - 400 K/uL    PLATELET SLIDE REVIEW Decreased     SLIDE REVIEW see below     Neutrophils % 80.0 %    Lymphocytes % 18.0 %    Monocytes % 3.3 %    Eosinophils % 0.0 %    Basophils % 0.4 %    Neutrophils Absolute 1.9 1.4 - 6.5 K/uL    Lymphocytes Absolute 0.4 (L) 1.0 - 4.8 K/uL    Monocytes Absolute 0.0 (L) 0.2 - 0.8 K/uL    Eosinophils Absolute 0.0 0.0 - 0.7 K/uL    Basophils Absolute 0.0 0.0 - 0.2 K/uL    Metamyelocytes Relative 1 %    Plasma Cells Relative 1 (A) %    Anisocytosis 2+     Macrocytes 2+     Poikilocytes 3+     Acanthocytes 1+     Ovalocytes 2+     Tear Drop Cells 1+    Renal Function Panel    Collection Time: 05/16/25  3:38 AM   Result Value Ref Range    Sodium 141 135 - 144 mEq/L    Potassium 4.0 3.4 - 4.9 mEq/L    Chloride 109 (H) 95 - 107 mEq/L    CO2 22 20 - 31 mEq/L    Anion Gap 10 9 - 15 mEq/L    Glucose 142 (H) 70 - 99 mg/dL    BUN 9 8 - 23

## 2025-05-16 NOTE — PROGRESS NOTES
1600: OK for d/c per pulm with f/u in 2 weeks. OK for d/c per holiday with request for GI follow up and to see cards in 4 weeks. Discharge instructions provided to patient and spouse. Verbalized understanding of follow up appointments per AVS, medications, and reasons to return to ED/call physician. All questions answered. Copy of discharge instructions provided. IV removed without complication. Pt tolerated well. Catheter intact, dressing applied. No drainage noted.     Electronically signed by Bianca Foster RN on 5/16/2025 at 4:20 PM

## 2025-05-16 NOTE — PROGRESS NOTES
CLINICAL PHARMACY NOTE: MEDS TO BEDS    Total # of Prescriptions Filled: 1   The following medications were delivered to the patient:  Prednisone 20 mg tab    Additional Documentation:

## 2025-05-16 NOTE — PROGRESS NOTES
Physical Therapy Med Surg Daily Treatment Note  Facility/Department: [address] MED SURG UNIT  Room: [place] [address]       NAME: Mckenna Mendoza  : 1945 (79 y.o.)  MRN: [id number]  CODE STATUS: Full Code    Date of Service: 2025    Patient Diagnosis(es): GI bleed [K92.2]  Hypotension, unspecified hypotension type [I95.9]  Acute anemia [D64.9]   Chief Complaint   Patient presents with    Hypotension     Patient Active Problem List    Diagnosis Date Noted    Hypotension 2025    Angina at rest 2023    Chest pain 2022    Atelectasis, left     Impaired mobility and activities of daily living-metabolic encephalopathy with primary origins of infectious etiology 2022    Lumbar spondylosis 2022    Lumbar stenosis with neurogenic claudication 2022    Dizziness     Benign paroxysmal positional vertigo due to bilateral vestibular disorder     Maxillary pain     Nonintractable headache     Fall at home     Athscl heart disease of native coronary artery w/o ang pctrs 2022    Contusion of left hip 2022    Contusion of left shoulder 2022    Pain in left shoulder 2022    GI bleed 2025    Acute anemia 2025    OSIRIS (acute kidney injury) 2025    History of pulmonary embolism 10/18/2024    Stage 3a chronic kidney disease (HCC) 2024    Acute on chronic systolic heart failure (HCC) 2024    Hyperlipidemia 10/16/2015    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 Mccormick - diagnostics sent    Hyperlipidemia     Hypertension     Osteoarthritis     Type II or unspecified type diabetes mellitus without mention of complication, not stated as

## 2025-05-16 NOTE — PROGRESS NOTES
CLINICAL PHARMACY NOTE: MEDS TO BEDS    Total # of Prescriptions Filled: 1   The following medications were delivered to the patient:  Neomycin-Polymyxin-HC 1% SOLN    Additional Documentation:

## 2025-05-18 LAB
BACTERIA BLD CULT ORG #2: NORMAL
BACTERIA BLD CULT: NORMAL

## 2025-05-18 NOTE — PROGRESS NOTES
Physical Therapy  Facility/Department: [facility] MED SURG W484/W484-01  Physical Therapy Discharge      NAME: Mckenna Mendoza    : 1945 (79 y.o.)  MRN: [id number]    Account: [id number]  Gender: female      Patient has been discharged from acute care hospital. DC patient from current PT program.      Electronically signed by Renu Quezada PT on 25 at 9:32 AM EDT

## 2025-05-20 ENCOUNTER — HOSPITAL ENCOUNTER (OUTPATIENT)
Age: 80
Setting detail: OBSERVATION
Discharge: HOME | End: 2025-05-22
Attending: INTERNAL MEDICINE | Admitting: INTERNAL MEDICINE
Payer: COMMERCIAL

## 2025-05-20 PROBLEM — C90.00 MULTIPLE MYELOMA NOT HAVING ACHIEVED REMISSION (HCC): Status: ACTIVE | Noted: 2025-05-20

## 2025-05-20 PROBLEM — C90.00 MULTIPLE MYELOMA WITHOUT REMISSION (HCC): Status: ACTIVE | Noted: 2025-05-20

## 2025-05-20 LAB
ACANTHOCYTES BLD QL SMEAR: ABNORMAL
ALBUMIN SERPL-MCNC: 3.4 G/DL (ref 3.5–4.6)
ALP SERPL-CCNC: 90 U/L (ref 40–130)
ALT SERPL-CCNC: 9 U/L (ref 0–33)
ANION GAP SERPL CALCULATED.3IONS-SCNC: 11 MEQ/L (ref 9–15)
ANISOCYTOSIS BLD QL SMEAR: ABNORMAL
AST SERPL-CCNC: 12 U/L (ref 0–35)
BASOPHILS # BLD: 0 K/UL (ref 0–0.2)
BASOPHILS NFR BLD: 0.9 %
BILIRUB DIRECT SERPL-MCNC: 0.2 MG/DL (ref 0–0.4)
BILIRUB INDIRECT SERPL-MCNC: 0.2 MG/DL (ref 0–0.6)
BILIRUB SERPL-MCNC: 0.4 MG/DL (ref 0.2–0.7)
BUN SERPL-MCNC: 12 MG/DL (ref 8–23)
BURR CELLS: ABNORMAL
CALCIUM SERPL-MCNC: 9.1 MG/DL (ref 8.5–9.9)
CHLORIDE SERPL-SCNC: 104 MEQ/L (ref 95–107)
CO2 SERPL-SCNC: 20 MEQ/L (ref 20–31)
CREAT SERPL-MCNC: 1.24 MG/DL (ref 0.5–0.9)
DACRYOCYTES BLD QL SMEAR: ABNORMAL
EOSINOPHIL # BLD: 0 K/UL (ref 0–0.7)
EOSINOPHIL NFR BLD: 0 %
ERYTHROCYTE [DISTWIDTH] IN BLOOD BY AUTOMATED COUNT: 19.9 % (ref 11.5–14.5)
GLUCOSE BLD-MCNC: 288 MG/DL (ref 70–99)
GLUCOSE SERPL-MCNC: 214 MG/DL (ref 70–99)
HCT VFR BLD AUTO: 27.6 % (ref 37–47)
HGB BLD-MCNC: 8.6 G/DL (ref 12–16)
LYMPHOCYTES # BLD: 0.6 K/UL (ref 1–4.8)
LYMPHOCYTES NFR BLD: 24 %
MACROCYTES BLD QL SMEAR: ABNORMAL
MCH RBC QN AUTO: 33.2 PG (ref 27–31.3)
MCHC RBC AUTO-ENTMCNC: 31.2 % (ref 33–37)
MCV RBC AUTO: 106.6 FL (ref 79.4–94.8)
METAMYELOCYTES NFR BLD MANUAL: 1 %
MONOCYTES # BLD: 0 K/UL (ref 0.2–0.8)
MONOCYTES NFR BLD: 3.1 %
MYELOCYTES NFR BLD MANUAL: 1 %
NEUTROPHILS # BLD: 1.7 K/UL (ref 1.4–6.5)
NEUTS SEG NFR BLD: 74 %
NEUTS VAC BLD QL SMEAR: ABNORMAL
NRBC BLD-RTO: 3 /100 WBC
OVALOCYTES BLD QL SMEAR: ABNORMAL
PERFORMED ON: ABNORMAL
PLATELET # BLD AUTO: 161 K/UL (ref 130–400)
PLATELET BLD QL SMEAR: ADEQUATE
POIKILOCYTOSIS BLD QL SMEAR: ABNORMAL
POLYCHROMASIA BLD QL SMEAR: ABNORMAL
POTASSIUM SERPL-SCNC: 4.5 MEQ/L (ref 3.4–4.9)
PROT SERPL-MCNC: 6 G/DL (ref 6.3–8)
RBC # BLD AUTO: 2.59 M/UL (ref 4.2–5.4)
SLIDE REVIEW: ABNORMAL
SMUDGE CELLS BLD QL SMEAR: 9.6
SODIUM SERPL-SCNC: 135 MEQ/L (ref 135–144)
WBC # BLD AUTO: 2.3 K/UL (ref 4.8–10.8)

## 2025-05-20 PROCEDURE — G0378 HOSPITAL OBSERVATION PER HR: HCPCS

## 2025-05-20 PROCEDURE — 83036 HEMOGLOBIN GLYCOSYLATED A1C: CPT

## 2025-05-20 PROCEDURE — 80048 BASIC METABOLIC PNL TOTAL CA: CPT

## 2025-05-20 PROCEDURE — 6370000000 HC RX 637 (ALT 250 FOR IP): Performed by: INTERNAL MEDICINE

## 2025-05-20 PROCEDURE — 96366 THER/PROPH/DIAG IV INF ADDON: CPT

## 2025-05-20 PROCEDURE — 36415 COLL VENOUS BLD VENIPUNCTURE: CPT

## 2025-05-20 PROCEDURE — 85025 COMPLETE CBC W/AUTO DIFF WBC: CPT

## 2025-05-20 PROCEDURE — 2500000003 HC RX 250 WO HCPCS: Performed by: INTERNAL MEDICINE

## 2025-05-20 PROCEDURE — 2580000003 HC RX 258: Performed by: NURSE PRACTITIONER

## 2025-05-20 PROCEDURE — 96365 THER/PROPH/DIAG IV INF INIT: CPT

## 2025-05-20 PROCEDURE — 2500000003 HC RX 250 WO HCPCS: Performed by: NURSE PRACTITIONER

## 2025-05-20 PROCEDURE — G0379 DIRECT REFER HOSPITAL OBSERV: HCPCS

## 2025-05-20 PROCEDURE — 80076 HEPATIC FUNCTION PANEL: CPT

## 2025-05-20 RX ORDER — INSULIN LISPRO 100 [IU]/ML
0-4 INJECTION, SOLUTION INTRAVENOUS; SUBCUTANEOUS
Status: DISCONTINUED | OUTPATIENT
Start: 2025-05-20 | End: 2025-05-22 | Stop reason: HOSPADM

## 2025-05-20 RX ORDER — ENOXAPARIN SODIUM 100 MG/ML
40 INJECTION SUBCUTANEOUS DAILY
Status: DISCONTINUED | OUTPATIENT
Start: 2025-05-20 | End: 2025-05-22 | Stop reason: HOSPADM

## 2025-05-20 RX ORDER — ACETAMINOPHEN 325 MG/1
650 TABLET ORAL EVERY 6 HOURS PRN
Status: DISCONTINUED | OUTPATIENT
Start: 2025-05-20 | End: 2025-05-22 | Stop reason: HOSPADM

## 2025-05-20 RX ORDER — ONDANSETRON 2 MG/ML
4 INJECTION INTRAMUSCULAR; INTRAVENOUS EVERY 6 HOURS PRN
Status: DISCONTINUED | OUTPATIENT
Start: 2025-05-20 | End: 2025-05-22 | Stop reason: HOSPADM

## 2025-05-20 RX ORDER — SODIUM CHLORIDE 0.9 % (FLUSH) 0.9 %
5-40 SYRINGE (ML) INJECTION PRN
Status: DISCONTINUED | OUTPATIENT
Start: 2025-05-20 | End: 2025-05-22 | Stop reason: HOSPADM

## 2025-05-20 RX ORDER — ACETAMINOPHEN 650 MG/1
650 SUPPOSITORY RECTAL EVERY 6 HOURS PRN
Status: CANCELLED | OUTPATIENT
Start: 2025-05-20

## 2025-05-20 RX ORDER — ACETAMINOPHEN 650 MG/1
650 SUPPOSITORY RECTAL EVERY 6 HOURS PRN
Status: DISCONTINUED | OUTPATIENT
Start: 2025-05-20 | End: 2025-05-22 | Stop reason: HOSPADM

## 2025-05-20 RX ORDER — SODIUM CHLORIDE 9 MG/ML
INJECTION, SOLUTION INTRAVENOUS PRN
Status: DISCONTINUED | OUTPATIENT
Start: 2025-05-20 | End: 2025-05-22 | Stop reason: HOSPADM

## 2025-05-20 RX ORDER — ACYCLOVIR 400 MG/1
400 TABLET ORAL 2 TIMES DAILY
Status: DISCONTINUED | OUTPATIENT
Start: 2025-05-20 | End: 2025-05-22 | Stop reason: HOSPADM

## 2025-05-20 RX ORDER — PANTOPRAZOLE SODIUM 40 MG/1
40 TABLET, DELAYED RELEASE ORAL DAILY
Status: DISCONTINUED | OUTPATIENT
Start: 2025-05-20 | End: 2025-05-22 | Stop reason: HOSPADM

## 2025-05-20 RX ORDER — POTASSIUM CHLORIDE 7.45 MG/ML
10 INJECTION INTRAVENOUS PRN
Status: DISCONTINUED | OUTPATIENT
Start: 2025-05-20 | End: 2025-05-22 | Stop reason: HOSPADM

## 2025-05-20 RX ORDER — GABAPENTIN 300 MG/1
300 CAPSULE ORAL 2 TIMES DAILY
Status: DISCONTINUED | OUTPATIENT
Start: 2025-05-20 | End: 2025-05-22 | Stop reason: HOSPADM

## 2025-05-20 RX ORDER — ATORVASTATIN CALCIUM 40 MG/1
40 TABLET, FILM COATED ORAL
Status: DISCONTINUED | OUTPATIENT
Start: 2025-05-20 | End: 2025-05-22 | Stop reason: HOSPADM

## 2025-05-20 RX ORDER — MAGNESIUM SULFATE IN WATER 40 MG/ML
2000 INJECTION, SOLUTION INTRAVENOUS PRN
Status: DISCONTINUED | OUTPATIENT
Start: 2025-05-20 | End: 2025-05-22 | Stop reason: HOSPADM

## 2025-05-20 RX ORDER — SODIUM CHLORIDE 0.9 % (FLUSH) 0.9 %
5-40 SYRINGE (ML) INJECTION EVERY 12 HOURS SCHEDULED
Status: DISCONTINUED | OUTPATIENT
Start: 2025-05-20 | End: 2025-05-22 | Stop reason: HOSPADM

## 2025-05-20 RX ORDER — ONDANSETRON 4 MG/1
4 TABLET, ORALLY DISINTEGRATING ORAL EVERY 8 HOURS PRN
Status: DISCONTINUED | OUTPATIENT
Start: 2025-05-20 | End: 2025-05-22 | Stop reason: HOSPADM

## 2025-05-20 RX ORDER — POLYETHYLENE GLYCOL 3350 17 G/17G
17 POWDER, FOR SOLUTION ORAL DAILY PRN
Status: CANCELLED | OUTPATIENT
Start: 2025-05-20

## 2025-05-20 RX ORDER — POLYETHYLENE GLYCOL 3350 17 G/17G
17 POWDER, FOR SOLUTION ORAL DAILY PRN
Status: DISCONTINUED | OUTPATIENT
Start: 2025-05-20 | End: 2025-05-22 | Stop reason: HOSPADM

## 2025-05-20 RX ORDER — SODIUM CHLORIDE 9 MG/ML
INJECTION, SOLUTION INTRAVENOUS PRN
Status: CANCELLED | OUTPATIENT
Start: 2025-05-20

## 2025-05-20 RX ORDER — SODIUM CHLORIDE 9 MG/ML
INJECTION, SOLUTION INTRAVENOUS CONTINUOUS
Status: DISCONTINUED | OUTPATIENT
Start: 2025-05-21 | End: 2025-05-21

## 2025-05-20 RX ORDER — DEXTROSE MONOHYDRATE 100 MG/ML
INJECTION, SOLUTION INTRAVENOUS CONTINUOUS PRN
Status: DISCONTINUED | OUTPATIENT
Start: 2025-05-20 | End: 2025-05-22 | Stop reason: HOSPADM

## 2025-05-20 RX ORDER — INSULIN LISPRO 100 [IU]/ML
0.05 INJECTION, SOLUTION INTRAVENOUS; SUBCUTANEOUS
Status: DISCONTINUED | OUTPATIENT
Start: 2025-05-20 | End: 2025-05-20 | Stop reason: SDUPTHER

## 2025-05-20 RX ORDER — METOPROLOL TARTRATE 50 MG
50 TABLET ORAL 2 TIMES DAILY
Status: DISCONTINUED | OUTPATIENT
Start: 2025-05-20 | End: 2025-05-22 | Stop reason: HOSPADM

## 2025-05-20 RX ORDER — SODIUM CHLORIDE 0.9 % (FLUSH) 0.9 %
5-40 SYRINGE (ML) INJECTION PRN
Status: CANCELLED | OUTPATIENT
Start: 2025-05-20

## 2025-05-20 RX ORDER — POTASSIUM CHLORIDE 1500 MG/1
40 TABLET, EXTENDED RELEASE ORAL PRN
Status: DISCONTINUED | OUTPATIENT
Start: 2025-05-20 | End: 2025-05-22 | Stop reason: HOSPADM

## 2025-05-20 RX ORDER — SODIUM CHLORIDE 0.9 % (FLUSH) 0.9 %
5-40 SYRINGE (ML) INJECTION EVERY 12 HOURS SCHEDULED
Status: CANCELLED | OUTPATIENT
Start: 2025-05-20

## 2025-05-20 RX ORDER — ACETAMINOPHEN 325 MG/1
650 TABLET ORAL EVERY 6 HOURS PRN
Status: CANCELLED | OUTPATIENT
Start: 2025-05-20

## 2025-05-20 RX ORDER — GLUCAGON 1 MG/ML
1 KIT INJECTION PRN
Status: DISCONTINUED | OUTPATIENT
Start: 2025-05-20 | End: 2025-05-22 | Stop reason: HOSPADM

## 2025-05-20 RX ORDER — ENOXAPARIN SODIUM 100 MG/ML
40 INJECTION SUBCUTANEOUS DAILY
Status: CANCELLED | OUTPATIENT
Start: 2025-05-21

## 2025-05-20 RX ADMIN — SODIUM CHLORIDE, PRESERVATIVE FREE 10 ML: 5 INJECTION INTRAVENOUS at 20:37

## 2025-05-20 RX ADMIN — TOCILIZUMAB-AAZG 678 MG: 400 INJECTION, SOLUTION, CONCENTRATE INTRAVENOUS at 18:55

## 2025-05-20 RX ADMIN — GABAPENTIN 300 MG: 300 CAPSULE ORAL at 20:32

## 2025-05-20 RX ADMIN — ATORVASTATIN CALCIUM 40 MG: 40 TABLET, FILM COATED ORAL at 20:32

## 2025-05-20 RX ADMIN — INSULIN LISPRO 2 UNITS: 100 INJECTION, SOLUTION INTRAVENOUS; SUBCUTANEOUS at 21:49

## 2025-05-20 RX ADMIN — METOPROLOL TARTRATE 50 MG: 50 TABLET, FILM COATED ORAL at 20:32

## 2025-05-20 RX ADMIN — ACYCLOVIR 400 MG: 400 TABLET ORAL at 20:32

## 2025-05-20 RX ADMIN — PANTOPRAZOLE SODIUM 40 MG: 40 TABLET, DELAYED RELEASE ORAL at 20:42

## 2025-05-20 ASSESSMENT — PAIN SCALES - GENERAL: PAINLEVEL_OUTOF10: 0

## 2025-05-20 NOTE — H&P
Hospital Medicine  History and Physical    Patient:  Mckenna Mednoza  MRN: [id number]    CHIEF COMPLAINT:  No chief complaint on file.      History Obtained From:  Patient, EMR  Primary Care Physician: Twila Maher DO    HISTORY OF PRESENT ILLNESS:   The patient is a 79 y.o. female with PMH of MM, HTN, HLD, DMII who presents with concerns of neurotoxicity.    The patient is and was doing well; she had a treatment for her MM today and says she actually felt better since then.  Oncology requested admission due to severe neurotoxic potential and need for monitoring as well as tocilizumab for prevention.      Patient denies chest pain, shortness of breath, fevers, chills, sweats, diarrhea or burning micturition.      Past Medical History:      Diagnosis Date    Cancer (HCC)     Encounter for lumbar puncture 05/25/2022    Completed by Dr. Mccormick - diagnostics sent    Hyperlipidemia     Hypertension     Osteoarthritis     Type II or unspecified type diabetes mellitus without mention of complication, not stated as uncontrolled        Past Surgical History:      Procedure Laterality Date    CORONARY ANGIOPLASTY WITH STENT PLACEMENT      CT BIOPSY BONE MARROW N/A 03/17/2025    by Dr. Petersen    CT BIOPSY BONE MARROW  3/17/2025    CT BIOPSY BONE MARROW 3/17/2025 [facility] CT SCAN    CYST REMOVAL  06/03/2024    cyst removed from back    UPPER GASTROINTESTINAL ENDOSCOPY  08/12/2015    w/bx,dilation     UPPER GASTROINTESTINAL ENDOSCOPY N/A 5/14/2025    ESOPHAGOGASTRODUODENOSCOPY performed by Dalia Allen MD at [facility] CENTER       Medications Prior to Admission:    Prior to Admission medications    Medication Sig Start Date End Date Taking? Authorizing Provider   neomycin-polymyxin-hydrocortisone (CORTISPORIN) 3.5-87292-6 otic solution Place 3 drops into both ears 4 times daily for 10 days 5/16/25 5/26/25 Yes Kendall Davison MD   Vitamin D (CHOLECALCIFEROL) 50 MCG (2000 UT) TABS tablet Take 1 tablet by mouth Daily

## 2025-05-20 NOTE — CONSULTS
Hematology/Oncology Consult  Encounter Date: 2025 5:03 PM    Ms. Mckenna Mendoza is a 79 y.o. female  : 1945  MRN: [id number]  Acct Number: [id number]  Requesting Provider: Roderick Coe MD    Reason for request:  Monitor CRS events and ICANS from [employer]    CONSULTANT: Sol Rhodes, APRN - CNP    HPI: The patient has refractory lambda restricted light chain myeloma with multiple bone lesions. She has been heavily treated with chemotherapy. She has progression of multiple myeloma and started Xpovio 2025, but tolerated poorly. She started alterative treatment with [employer]rexf[employer] today. Due to potential neurotoxicity, including ICANS and cytokine release syndrome (CRS), patient required hospitalization for close monitoring.       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HCC)    Vertebral compression fracture (HCC)    Dizziness    Benign paroxysmal positional vertigo due to bilateral vestibular disorder    Maxillary pain    Nonintractable headache    Fall at home    Impaired mobility and activities of daily living-metabolic encephalopathy with primary origins of infectious etiology    Lumbar spondylosis    Lumbar stenosis with neurogenic claudication    Atelectasis, left    Chest pain    Athscl heart disease of native coronary artery w/o ang pctrs    Contusion of left hip    Contusion of left shoulder    Pain in left shoulder    Angina at rest    Acute on chronic systolic heart failure (HCC)    Stage 3a chronic kidney disease (HCC)    History of pulmonary embolism    OSIRIS (acute kidney injury)    GI bleed    Acute anemia    Hypotension    Acute bronchitis due to Rhinovirus    Multiple myeloma not having achieved remission (HCC)    Multiple myeloma without remission (HCC)     Past Medical History:   Diagnosis Date    Cancer (HCC)     Encounter for lumbar puncture  mildly dilated.   Pericardium: Small (<1 cm) pericardial effusion present. No indication of cardiac tamponade. Evidence includes no chamber collapse.   Image quality is adequate.     XR CHEST PORTABLE  Result Date: 5/13/2025  EXAMINATION: ONE XRAY VIEW OF THE CHEST 5/13/2025 1:28 am COMPARISON: Chest radiograph 07/11/2024 HISTORY: ORDERING SYSTEM PROVIDED HISTORY: Sepsis TECHNOLOGIST PROVIDED HISTORY: Reason for exam:->Sepsis What reading provider will be dictating this exam?->CRC FINDINGS: Increased interstitial markings bilaterally.  Left basilar airspace disease with trace left pleural effusion.  No pneumothorax bilaterally.  Biapical scarring. Normal cardiac silhouette. No acute osseous abnormality.     Pulmonary venous congestion.  Left basilar airspace disease, favor atelectasis.     CT ABDOMEN PELVIS WO CONTRAST Additional Contrast? None  Result Date: 5/4/2025  EXAMINATION: CT OF THE ABDOMEN AND PELVIS WITHOUT CONTRAST 5/4/2025 4:43 pm TECHNIQUE: CT of the abdomen and pelvis was performed without the administration of intravenous contrast. Multiplanar reformatted images are provided for review. Automated exposure control, iterative reconstruction, and/or weight based adjustment of the mA/kV was utilized to reduce the radiation dose to as low as reasonably achievable. COMPARISON: None. HISTORY: ORDERING SYSTEM PROVIDED HISTORY: n/v/d TECHNOLOGIST PROVIDED HISTORY: Additional Contrast?->None Reason for exam:->n/v/d Decision Support Exception - unselect if not a suspected or confirmed emergency medical condition->Emergency Medical Condition (MA) What reading provider will be dictating this exam?->CRC FINDINGS: Unless otherwise indicated or stated incidental findings do not require dedicated follow-up imaging. Lower Chest: Lung bases are clear. Organs: Limited evaluation of solid organs and soft tissues due to lack of intravenous contrast.   Liver without focal lesion.  Gallbladder unremarkable. Pancreas and

## 2025-05-21 LAB
ALBUMIN SERPL-MCNC: 3.1 G/DL (ref 3.5–4.6)
ALP SERPL-CCNC: 75 U/L (ref 40–130)
ALT SERPL-CCNC: 7 U/L (ref 0–33)
ANION GAP SERPL CALCULATED.3IONS-SCNC: 7 MEQ/L (ref 9–15)
ANION GAP SERPL CALCULATED.3IONS-SCNC: 9 MEQ/L (ref 9–15)
ANISOCYTOSIS BLD QL SMEAR: ABNORMAL
APTT PPP: 29.9 SEC (ref 24.4–36.8)
AST SERPL-CCNC: 7 U/L (ref 0–35)
BASOPHILS # BLD: 0 K/UL (ref 0–0.2)
BASOPHILS NFR BLD: 1 %
BILIRUB DIRECT SERPL-MCNC: 0.2 MG/DL (ref 0–0.4)
BILIRUB INDIRECT SERPL-MCNC: 0.1 MG/DL (ref 0–0.6)
BILIRUB SERPL-MCNC: 0.3 MG/DL (ref 0.2–0.7)
BUN SERPL-MCNC: 11 MG/DL (ref 8–23)
BUN SERPL-MCNC: 11 MG/DL (ref 8–23)
BURR CELLS: ABNORMAL
CALCIUM SERPL-MCNC: 8.5 MG/DL (ref 8.5–9.9)
CALCIUM SERPL-MCNC: 8.6 MG/DL (ref 8.5–9.9)
CHLORIDE SERPL-SCNC: 110 MEQ/L (ref 95–107)
CHLORIDE SERPL-SCNC: 111 MEQ/L (ref 95–107)
CO2 SERPL-SCNC: 22 MEQ/L (ref 20–31)
CO2 SERPL-SCNC: 24 MEQ/L (ref 20–31)
CREAT SERPL-MCNC: 1.1 MG/DL (ref 0.5–0.9)
CREAT SERPL-MCNC: 1.14 MG/DL (ref 0.5–0.9)
DACRYOCYTES BLD QL SMEAR: ABNORMAL
EOSINOPHIL # BLD: 0 K/UL (ref 0–0.7)
EOSINOPHIL NFR BLD: 0 %
ERYTHROCYTE [DISTWIDTH] IN BLOOD BY AUTOMATED COUNT: 19.6 % (ref 11.5–14.5)
GLUCOSE BLD-MCNC: 141 MG/DL (ref 70–99)
GLUCOSE BLD-MCNC: 180 MG/DL (ref 70–99)
GLUCOSE BLD-MCNC: 208 MG/DL (ref 70–99)
GLUCOSE SERPL-MCNC: 138 MG/DL (ref 70–99)
GLUCOSE SERPL-MCNC: 140 MG/DL (ref 70–99)
HCT VFR BLD AUTO: 22.3 % (ref 37–47)
HGB BLD-MCNC: 7.2 G/DL (ref 12–16)
HYPOCHROMIA BLD QL SMEAR: ABNORMAL
INR PPP: 1.3
LYMPHOCYTES # BLD: 0.4 K/UL (ref 1–4.8)
LYMPHOCYTES NFR BLD: 14 %
MACROCYTES BLD QL SMEAR: ABNORMAL
MAGNESIUM SERPL-MCNC: 1.6 MG/DL (ref 1.7–2.4)
MCH RBC QN AUTO: 34 PG (ref 27–31.3)
MCHC RBC AUTO-ENTMCNC: 32.3 % (ref 33–37)
MCV RBC AUTO: 105.2 FL (ref 79.4–94.8)
MONOCYTES # BLD: 0 K/UL (ref 0.2–0.8)
MONOCYTES NFR BLD: 3.4 %
NEUTROPHILS # BLD: 2 K/UL (ref 1.4–6.5)
NEUTS SEG NFR BLD: 82 %
NRBC BLD-RTO: 2 /100 WBC
OVALOCYTES BLD QL SMEAR: ABNORMAL
PERFORMED ON: ABNORMAL
PHOSPHATE SERPL-MCNC: 2.3 MG/DL (ref 2.3–4.8)
PLATELET # BLD AUTO: 176 K/UL (ref 130–400)
PLATELET BLD QL SMEAR: ADEQUATE
POIKILOCYTOSIS BLD QL SMEAR: ABNORMAL
POLYCHROMASIA BLD QL SMEAR: ABNORMAL
POTASSIUM SERPL-SCNC: 4.1 MEQ/L (ref 3.4–4.9)
POTASSIUM SERPL-SCNC: 4.1 MEQ/L (ref 3.4–4.9)
PROT SERPL-MCNC: 5.2 G/DL (ref 6.3–8)
PROTHROMBIN TIME: 16.3 SEC (ref 12.3–14.9)
RBC # BLD AUTO: 2.12 M/UL (ref 4.2–5.4)
SLIDE REVIEW: ABNORMAL
SMUDGE CELLS BLD QL SMEAR: 6.9
SODIUM SERPL-SCNC: 141 MEQ/L (ref 135–144)
SODIUM SERPL-SCNC: 142 MEQ/L (ref 135–144)
VARIANT LYMPHS NFR BLD: 3 %
WBC # BLD AUTO: 2.4 K/UL (ref 4.8–10.8)

## 2025-05-21 PROCEDURE — 36415 COLL VENOUS BLD VENIPUNCTURE: CPT

## 2025-05-21 PROCEDURE — 80076 HEPATIC FUNCTION PANEL: CPT

## 2025-05-21 PROCEDURE — 85730 THROMBOPLASTIN TIME PARTIAL: CPT

## 2025-05-21 PROCEDURE — 92610 EVALUATE SWALLOWING FUNCTION: CPT

## 2025-05-21 PROCEDURE — 2500000003 HC RX 250 WO HCPCS: Performed by: INTERNAL MEDICINE

## 2025-05-21 PROCEDURE — 99291 CRITICAL CARE FIRST HOUR: CPT | Performed by: INTERNAL MEDICINE

## 2025-05-21 PROCEDURE — 83735 ASSAY OF MAGNESIUM: CPT

## 2025-05-21 PROCEDURE — 85610 PROTHROMBIN TIME: CPT

## 2025-05-21 PROCEDURE — 96367 TX/PROPH/DG ADDL SEQ IV INF: CPT

## 2025-05-21 PROCEDURE — 6370000000 HC RX 637 (ALT 250 FOR IP): Performed by: INTERNAL MEDICINE

## 2025-05-21 PROCEDURE — G0378 HOSPITAL OBSERVATION PER HR: HCPCS

## 2025-05-21 PROCEDURE — 80048 BASIC METABOLIC PNL TOTAL CA: CPT

## 2025-05-21 PROCEDURE — 85025 COMPLETE CBC W/AUTO DIFF WBC: CPT

## 2025-05-21 PROCEDURE — 84100 ASSAY OF PHOSPHORUS: CPT

## 2025-05-21 PROCEDURE — 6360000002 HC RX W HCPCS: Performed by: INTERNAL MEDICINE

## 2025-05-21 PROCEDURE — 96372 THER/PROPH/DIAG INJ SC/IM: CPT

## 2025-05-21 PROCEDURE — 2580000003 HC RX 258: Performed by: INTERNAL MEDICINE

## 2025-05-21 PROCEDURE — 6370000000 HC RX 637 (ALT 250 FOR IP): Performed by: NURSE PRACTITIONER

## 2025-05-21 RX ORDER — 0.9 % SODIUM CHLORIDE 0.9 %
1000 INTRAVENOUS SOLUTION INTRAVENOUS ONCE
Status: COMPLETED | OUTPATIENT
Start: 2025-05-21 | End: 2025-05-21

## 2025-05-21 RX ADMIN — SODIUM CHLORIDE 1000 ML: 0.9 INJECTION, SOLUTION INTRAVENOUS at 02:45

## 2025-05-21 RX ADMIN — ACYCLOVIR 400 MG: 400 TABLET ORAL at 20:30

## 2025-05-21 RX ADMIN — GABAPENTIN 300 MG: 300 CAPSULE ORAL at 07:41

## 2025-05-21 RX ADMIN — ACYCLOVIR 400 MG: 400 TABLET ORAL at 07:41

## 2025-05-21 RX ADMIN — SODIUM CHLORIDE: 0.9 INJECTION, SOLUTION INTRAVENOUS at 13:37

## 2025-05-21 RX ADMIN — INSULIN LISPRO 1 UNITS: 100 INJECTION, SOLUTION INTRAVENOUS; SUBCUTANEOUS at 20:35

## 2025-05-21 RX ADMIN — ATORVASTATIN CALCIUM 40 MG: 40 TABLET, FILM COATED ORAL at 20:30

## 2025-05-21 RX ADMIN — GABAPENTIN 300 MG: 300 CAPSULE ORAL at 20:30

## 2025-05-21 RX ADMIN — METOPROLOL TARTRATE 50 MG: 50 TABLET, FILM COATED ORAL at 20:30

## 2025-05-21 RX ADMIN — INSULIN LISPRO 1 UNITS: 100 INJECTION, SOLUTION INTRAVENOUS; SUBCUTANEOUS at 10:49

## 2025-05-21 RX ADMIN — TIZANIDINE 4 MG: 4 TABLET ORAL at 15:08

## 2025-05-21 RX ADMIN — ENOXAPARIN SODIUM 40 MG: 100 INJECTION SUBCUTANEOUS at 07:41

## 2025-05-21 RX ADMIN — TIZANIDINE 4 MG: 4 TABLET ORAL at 01:00

## 2025-05-21 RX ADMIN — PANTOPRAZOLE SODIUM 40 MG: 40 TABLET, DELAYED RELEASE ORAL at 07:41

## 2025-05-21 RX ADMIN — MAGNESIUM SULFATE HEPTAHYDRATE 2000 MG: 40 INJECTION, SOLUTION INTRAVENOUS at 06:19

## 2025-05-21 RX ADMIN — SODIUM CHLORIDE, PRESERVATIVE FREE 10 ML: 5 INJECTION INTRAVENOUS at 20:31

## 2025-05-21 RX ADMIN — SODIUM CHLORIDE 75 ML/HR: 0.9 INJECTION, SOLUTION INTRAVENOUS at 00:08

## 2025-05-21 ASSESSMENT — PAIN SCALES - GENERAL
PAINLEVEL_OUTOF10: 4
PAINLEVEL_OUTOF10: 0
PAINLEVEL_OUTOF10: 0

## 2025-05-21 NOTE — ACP (ADVANCE CARE PLANNING)
Advance Care Planning   Healthcare Decision Maker:    Primary Decision Maker: Dmitri Mendoza - Spouse - [phone]    Secondary Decision Maker: Jess Mendoza - Child - [phone]    Click here to complete Healthcare Decision Makers including selection of the Healthcare Decision Maker Relationship (ie \"Primary\").

## 2025-05-21 NOTE — PROGRESS NOTES
tolerance prior to d/c  Goal 2: Pt will participate in an instrumental procedure to assess oropharyngeal function and determine least restrictive diet.  Long-term Goals  Timeframe for Long-term Goals: 1 week  Goal 1: Pt will tolerate recommended diet with no adverse outcomes.    Safety Devices  Safety Devices  Safety Devices in place: Yes  Type of devices: Bed alarm in place;Call light within reach;Nurse notified  Restraints Initially in Place: No    Pain Assessment  Patient does not c/o pain.    Pain Re-assessment  Patient does not c/o pain.    Dysphagia Outcomes Severity Scale  Dysphagia Outcome Severity Scale: Level 6: Within functional limits/Modified independence    Therapy Time  SLP Individual Minutes  Time In: 1340  Time Out: 1400  Minutes: 20              Signature: Electronically signed by DERRELL Roberto on 5/21/2025 at 2:30 PM

## 2025-05-21 NOTE — PROGRESS NOTES
1900: Bedside report received from Maria De Jesus NASSAR. Pt is newly admitted to ICU prior to shift change. IV Tocilizumab infusing at this time. Pt AO x4, no complaints or questions during bedside report. Ordered q2 temp and CRS Neurotoxicity checks.     2000: Medications administered as orderd and assessment completed at this time. Please review MAR and Flowsheet. Pt is ordered Accuchecks, but does not have and insulin coverage ordered. Pt states that she take Jardiance at home, and that she also takes Melatonin at bedtime but does not have any orders this medication. Perfect uBiome sent to Dr Dash about above medication concern. Dr. Dash called this RN and gave orders for Low scale Humalog coverage with accuchecks, and melatonin. Please see orders.     0200 Neurotoxicity CRS assessment completed. Pt BP decreased to MAP <60, O2 stats dropping to 88-89% while resting with eyes closed. Pt still able to answer questions appropriately. 2L NC placed on patient, perfect serve sent to Dr Dash on above status. Telephone order received for IV bolus. Please see Orders and MAR

## 2025-05-21 NOTE — PROGRESS NOTES
Hematology/Oncology  Nurse Practitioner Progress Note        CHIEF COMPLAINT/HPI:  Patient being monitored for potential neurologic toxicity and CRS..     REVIEW OF SYSTEMS:    Unremarkable except for symptoms mentioned in HPI.    Current Inpatient Medications:    Current Facility-Administered Medications: sodium chloride flush 0.9 % injection 5-40 mL, 5-40 mL, IntraVENous, 2 times per day  sodium chloride flush 0.9 % injection 5-40 mL, 5-40 mL, IntraVENous, PRN  0.9 % sodium chloride infusion, , IntraVENous, PRN  potassium chloride (KLOR-CON M) extended release tablet 40 mEq, 40 mEq, Oral, PRN **OR** potassium bicarbonate (EFFER-K/K-LYTE) disintegrating tablet 50 mEq, 50 mEq, Oral, PRN **OR** potassium chloride 10 mEq/100 mL IVPB (Peripheral Line), 10 mEq, IntraVENous, PRN  magnesium sulfate 2000 mg in 50 mL IVPB premix, 2,000 mg, IntraVENous, PRN  enoxaparin (LOVENOX) injection 40 mg, 40 mg, SubCUTAneous, Daily  ondansetron (ZOFRAN-ODT) disintegrating tablet 4 mg, 4 mg, Oral, Q8H PRN **OR** ondansetron (ZOFRAN) injection 4 mg, 4 mg, IntraVENous, Q6H PRN  polyethylene glycol (GLYCOLAX) packet 17 g, 17 g, Oral, Daily PRN  acetaminophen (TYLENOL) tablet 650 mg, 650 mg, Oral, Q6H PRN **OR** acetaminophen (TYLENOL) suppository 650 mg, 650 mg, Rectal, Q6H PRN  glucose chewable tablet 16 g, 4 tablet, Oral, PRN  dextrose bolus 10% 125 mL, 125 mL, IntraVENous, PRN **OR** dextrose bolus 10% 250 mL, 250 mL, IntraVENous, PRN  glucagon injection 1 mg, 1 mg, SubCUTAneous, PRN  dextrose 10 % infusion, , IntraVENous, Continuous PRN  acyclovir (ZOVIRAX) tablet 400 mg, 400 mg, Oral, BID  atorvastatin (LIPITOR) tablet 40 mg, 40 mg, Oral, QHS  gabapentin (NEURONTIN) capsule 300 mg, 300 mg, Oral, BID  metoprolol tartrate (LOPRESSOR) tablet 50 mg, 50 mg, Oral, BID  pantoprazole (PROTONIX) tablet 40 mg, 40 mg, Oral, Daily  sodium chloride (OCEAN, BABY AYR) 0.65 % nasal spray 2 spray, 2 spray, Nasal, PRN  tiZANidine (ZANAFLEX) tablet 4

## 2025-05-21 NOTE — CONSULTS
Pulmonary and Critical Care Medicine  Consult Note  Encounter Date: 2025 8:36 AM    Ms. Mckenna Mendoza is a 79 y.o. female  : 1945  Requesting Provider: Roderick Coe MD    Reason for request: ICU management            HISTORY OF PRESENT ILLNESS:    Patient is 79 y.o. presents with multiple myeloma, she received Elrexfio yesterday, sent to ICU for monitoring, she is doing good has no complaint, no chest pain, no shortness of breath, appetite is good, no diarrhea, no nausea or vomiting, no skin rash and no fever.  She is on 2 L O2 saturation 98%, no fever          Past Medical History:        Diagnosis Date    Cancer (HCC)     Encounter for lumbar puncture 2022    Completed by Dr. Mccormick - diagnostics sent    Hyperlipidemia     Hypertension     Osteoarthritis     Type II or unspecified type diabetes mellitus without mention of complication, not stated as uncontrolled        Past Surgical History:        Procedure Laterality Date    CORONARY ANGIOPLASTY WITH STENT PLACEMENT      CT BIOPSY BONE MARROW N/A 2025    by Dr. Petersen    CT BIOPSY BONE MARROW  3/17/2025    CT BIOPSY BONE MARROW 3/17/2025 [facility] [place] [facility]    CYST REMOVAL  2024    cyst removed from back    UPPER GASTROINTESTINAL ENDOSCOPY  2015    w/bx,dilation     UPPER GASTROINTESTINAL ENDOSCOPY N/A 2025    ESOPHAGOGASTRODUODENOSCOPY performed by Dalia Allen MD at [facility]       Social History:     reports that she quit smoking about 43 years ago. Her smoking use included cigarettes. She started smoking about 66 years ago. She has a 12 pack-year smoking history. She has never used smokeless tobacco. She reports current alcohol use of about 3.0 standard drinks of alcohol per week. She reports that she does not use drugs.    Family History:       Problem Relation Age of Onset    Cancer Mother     Arthritis Mother     Diabetes Mother     Heart Disease Mother     High Blood Pressure Mother     High

## 2025-05-21 NOTE — INTERDISCIPLINARY ROUNDS
Spiritual Care Services     Summary of Visit:    The  participated in the interdisciplinary rounds. The pt was sitting in the chair, pt was upbeat. The  will follow up.    Encounter Summary  Encounter Overview/Reason: Interdisciplinary rounds  Encounter Code:  Assessment by  services  Service Provided For: Patient                               Spiritual Assessment/Intervention/Outcomes:                     Care Plan:                 Spiritual Care Services   Electronically signed by Chaplain Sonia on 5/21/2025 at 12:49 PM.    To reach a  for emotional and spiritual support, place an EPIC consult request.   If a  is needed immediately, dial “0” and ask to page the on-call .

## 2025-05-21 NOTE — PLAN OF CARE
Problem: Chronic Conditions and Co-morbidities  Goal: Patient's chronic conditions and co-morbidity symptoms are monitored and maintained or improved  Outcome: Progressing     Problem: Discharge Planning  Goal: Discharge to home or other facility with appropriate resources  Outcome: Progressing     Problem: Neurosensory - Adult  Goal: Achieves stable or improved neurological status  Outcome: Progressing     Problem: Respiratory - Adult  Goal: Achieves optimal ventilation and oxygenation  Outcome: Progressing     Problem: Cardiovascular - Adult  Goal: Maintains optimal cardiac output and hemodynamic stability  Outcome: Progressing  Goal: Absence of cardiac dysrhythmias or at baseline  Outcome: Progressing     Problem: Skin/Tissue Integrity - Adult  Goal: Skin integrity remains intact  Description: 1.  Monitor for areas of redness and/or skin breakdown2.  Assess vascular access sites hourly3.  Every 4-6 hours minimum:  Change oxygen saturation probe site4.  Every 4-6 hours:  If on nasal continuous positive airway pressure, respiratory therapy assess nares and determine need for appliance change or resting period  Outcome: Progressing     Problem: Musculoskeletal - Adult  Goal: Return mobility to safest level of function  Outcome: Progressing     Problem: Gastrointestinal - Adult  Goal: Minimal or absence of nausea and vomiting  Outcome: Progressing  Goal: Maintains or returns to baseline bowel function  Outcome: Progressing  Goal: Maintains adequate nutritional intake  Outcome: Progressing     Problem: Genitourinary - Adult  Goal: Absence of urinary retention  Outcome: Progressing     Problem: Infection - Adult  Goal: Absence of infection at discharge  Outcome: Progressing     Problem: Metabolic/Fluid and Electrolytes - Adult  Goal: Electrolytes maintained within normal limits  Outcome: Progressing  Goal: Hemodynamic stability and optimal renal function maintained  Outcome: Progressing  Goal: Glucose maintained

## 2025-05-21 NOTE — PROGRESS NOTES
Hospitalist Progress Note      PCP: Twila Maher DO    Date of Admission: 5/20/2025    Chief Complaint:    No chief complaint on file.    Subjective:  Patient denies fevers, chills, sweats, CP, SOB, diarrhea or burning micturition.  Feels like when she is swallowing it 'bubbles' a bit.   12 point ROS negative other than mentioned above       Medications:  Reviewed    Infusion Medications    sodium chloride      dextrose      sodium chloride 75 mL/hr at 05/21/25 1111     Scheduled Medications    sodium chloride flush  5-40 mL IntraVENous 2 times per day    enoxaparin  40 mg SubCUTAneous Daily    acyclovir  400 mg Oral BID    atorvastatin  40 mg Oral QHS    gabapentin  300 mg Oral BID    metoprolol tartrate  50 mg Oral BID    pantoprazole  40 mg Oral Daily    tiZANidine  4 mg Oral Nightly    insulin lispro  0-4 Units SubCUTAneous 4x Daily AC & HS     PRN Meds: sodium chloride flush, sodium chloride, potassium chloride **OR** potassium alternative oral replacement **OR** potassium chloride, magnesium sulfate, ondansetron **OR** ondansetron, polyethylene glycol, acetaminophen **OR** acetaminophen, glucose, dextrose bolus **OR** dextrose bolus, glucagon (rDNA), dextrose, sodium chloride, melatonin      Intake/Output Summary (Last 24 hours) at 5/21/2025 1151  Last data filed at 5/21/2025 1111  Gross per 24 hour   Intake 2344.83 ml   Output 300 ml   Net 2044.83 ml       Exam:    BP (!) 133/56   Pulse 69   Temp 97.2 °F (36.2 °C)   Resp 13   Ht 1.524 m (5')   Wt 84.2 kg (185 lb 11.2 oz)   SpO2 98%   BMI 36.27 kg/m²     General appearance: No apparent distress, appears stated age and cooperative.  HEENT:  Conjunctivae/corneas clear.  Neck: Trachea midline.  Respiratory:  Normal respiratory effort. Clear to auscultation  Cardiovascular: Regular rate and rhythm  Abdomen: Soft, non-tender, non-distended with normal bowel sounds.  Musculoskeletal: No clubbing, cyanosis or edema bilaterally  Neuro: Non Focal.     Labs:

## 2025-05-21 NOTE — CARE COORDINATION
Case Management Assessment  Initial Evaluation    Date/Time of Evaluation: 5/21/2025 10:12 AM  Assessment Completed by: Faye Raines    If patient is discharged prior to next notation, then this note serves as note for discharge by case management.    Patient Name: Mckenna Mendoza                   YOB: 1945  Diagnosis: Multiple myeloma (HCC) [C90.00]  Multiple myeloma not having achieved remission (HCC) [C90.00]  Multiple myeloma without remission (HCC) [C90.00]                   Date / Time: 5/20/2025  4:44 PM    Patient Admission Status: Observation   Readmission Risk (Low < 19, Mod (19-27), High > 27): Readmission Risk Score: 19.8    Current PCP: Twila Maher, DO  PCP verified by CM? Yes    Chart Reviewed: Yes      History Provided by: Patient, Spouse  Patient Orientation: Alert and Oriented, Person, Place, Situation, Self    Patient Cognition: Alert    Hospitalization in the last 30 days (Readmission):  Yes    If yes, Readmission Assessment in CM Navigator will be completed.    Advance Directives:      Code Status: Full Code   Patient's Primary Decision Maker is: Legal Next of Kin    Primary Decision Maker: Dmitri Mendoza - Spouse - [phone]    Secondary Decision Maker: Jess Mendoza - Child - [phone]    Discharge Planning:    Patient lives with: Spouse/Significant Other Type of Home: House  Primary Care Giver: Self  Patient Support Systems include: Spouse/Significant Other, Children, Family Members, Home Care Staff   Current Financial resources:    Current community resources:    Current services prior to admission: None            Current DME:              Type of Home Care services:  None    ADLS  Prior functional level: Assistance with the following:, Cooking, Housework, Shopping  Current functional level: Other (see comment) (Pt not seen up at this time)    PT AM-PAC:   /24  OT AM-PAC:   /24    Family can provide assistance at DC: Yes  Would you like Case Management to

## 2025-05-22 VITALS
HEART RATE: 73 BPM | RESPIRATION RATE: 18 BRPM | WEIGHT: 185.7 LBS | DIASTOLIC BLOOD PRESSURE: 98 MMHG | OXYGEN SATURATION: 98 % | TEMPERATURE: 97.9 F | SYSTOLIC BLOOD PRESSURE: 120 MMHG | HEIGHT: 60 IN | BODY MASS INDEX: 36.46 KG/M2

## 2025-05-22 LAB
ANION GAP SERPL CALCULATED.3IONS-SCNC: 7 MEQ/L (ref 9–15)
ANISOCYTOSIS BLD QL SMEAR: ABNORMAL
BASOPHILS # BLD: 0 K/UL (ref 0–0.2)
BASOPHILS NFR BLD: 1 %
BUN SERPL-MCNC: 7 MG/DL (ref 8–23)
CALCIUM SERPL-MCNC: 8.5 MG/DL (ref 8.5–9.9)
CHLORIDE SERPL-SCNC: 109 MEQ/L (ref 95–107)
CO2 SERPL-SCNC: 24 MEQ/L (ref 20–31)
CREAT SERPL-MCNC: 1.1 MG/DL (ref 0.5–0.9)
DACRYOCYTES BLD QL SMEAR: ABNORMAL
EOSINOPHIL # BLD: 0.1 K/UL (ref 0–0.7)
EOSINOPHIL NFR BLD: 5 %
ERYTHROCYTE [DISTWIDTH] IN BLOOD BY AUTOMATED COUNT: 20 % (ref 11.5–14.5)
ESTIMATED AVERAGE GLUCOSE: 143 MG/DL
GLUCOSE SERPL-MCNC: 88 MG/DL (ref 70–99)
HBA1C MFR BLD: 6.6 % (ref 4–6)
HCT VFR BLD AUTO: 24.9 % (ref 37–47)
HGB BLD-MCNC: 8 G/DL (ref 12–16)
LYMPHOCYTES # BLD: 0.4 K/UL (ref 1–4.8)
LYMPHOCYTES NFR BLD: 27 %
MACROCYTES BLD QL SMEAR: ABNORMAL
MCH RBC QN AUTO: 33.8 PG (ref 27–31.3)
MCHC RBC AUTO-ENTMCNC: 32.1 % (ref 33–37)
MCV RBC AUTO: 105.1 FL (ref 79.4–94.8)
MONOCYTES # BLD: 0 K/UL (ref 0.2–0.8)
MONOCYTES NFR BLD: 1 %
NEUTROPHILS # BLD: 1 K/UL (ref 1.4–6.5)
NEUTS SEG NFR BLD: 66 %
NRBC BLD-RTO: 6 /100 WBC
OVALOCYTES BLD QL SMEAR: ABNORMAL
PLATELET # BLD AUTO: 166 K/UL (ref 130–400)
PLATELET BLD QL SMEAR: ADEQUATE
POIKILOCYTOSIS BLD QL SMEAR: ABNORMAL
POLYCHROMASIA BLD QL SMEAR: ABNORMAL
POTASSIUM SERPL-SCNC: 3.7 MEQ/L (ref 3.4–4.9)
RBC # BLD AUTO: 2.37 M/UL (ref 4.2–5.4)
SLIDE REVIEW: ABNORMAL
SMUDGE CELLS BLD QL SMEAR: 5.8
SODIUM SERPL-SCNC: 140 MEQ/L (ref 135–144)
WBC # BLD AUTO: 1.5 K/UL (ref 4.8–10.8)

## 2025-05-22 PROCEDURE — 99232 SBSQ HOSP IP/OBS MODERATE 35: CPT | Performed by: INTERNAL MEDICINE

## 2025-05-22 PROCEDURE — 6360000002 HC RX W HCPCS: Performed by: INTERNAL MEDICINE

## 2025-05-22 PROCEDURE — 2700000000 HC OXYGEN THERAPY PER DAY

## 2025-05-22 PROCEDURE — 85025 COMPLETE CBC W/AUTO DIFF WBC: CPT

## 2025-05-22 PROCEDURE — 96372 THER/PROPH/DIAG INJ SC/IM: CPT

## 2025-05-22 PROCEDURE — 6370000000 HC RX 637 (ALT 250 FOR IP): Performed by: NURSE PRACTITIONER

## 2025-05-22 PROCEDURE — 6370000000 HC RX 637 (ALT 250 FOR IP): Performed by: INTERNAL MEDICINE

## 2025-05-22 PROCEDURE — 36415 COLL VENOUS BLD VENIPUNCTURE: CPT

## 2025-05-22 PROCEDURE — 80048 BASIC METABOLIC PNL TOTAL CA: CPT

## 2025-05-22 PROCEDURE — G0378 HOSPITAL OBSERVATION PER HR: HCPCS

## 2025-05-22 RX ADMIN — ACYCLOVIR 400 MG: 400 TABLET ORAL at 08:44

## 2025-05-22 RX ADMIN — PANTOPRAZOLE SODIUM 40 MG: 40 TABLET, DELAYED RELEASE ORAL at 08:44

## 2025-05-22 RX ADMIN — TIZANIDINE 4 MG: 4 TABLET ORAL at 08:44

## 2025-05-22 RX ADMIN — GABAPENTIN 300 MG: 300 CAPSULE ORAL at 08:44

## 2025-05-22 RX ADMIN — METOPROLOL TARTRATE 50 MG: 50 TABLET, FILM COATED ORAL at 08:44

## 2025-05-22 RX ADMIN — ENOXAPARIN SODIUM 40 MG: 100 INJECTION SUBCUTANEOUS at 08:44

## 2025-05-22 ASSESSMENT — PAIN SCALES - GENERAL
PAINLEVEL_OUTOF10: 0

## 2025-05-22 NOTE — PROGRESS NOTES
Hematology/Oncology  Nurse Practitioner Progress Note        CHIEF COMPLAINT/HPI:  No acute events overnight. No fevers. Vitals stable.     REVIEW OF SYSTEMS:    Unremarkable except for symptoms mentioned in HPI.    Current Inpatient Medications:    Current Facility-Administered Medications: tiZANidine (ZANAFLEX) tablet 4 mg, 4 mg, Oral, Daily  sodium chloride flush 0.9 % injection 5-40 mL, 5-40 mL, IntraVENous, 2 times per day  sodium chloride flush 0.9 % injection 5-40 mL, 5-40 mL, IntraVENous, PRN  0.9 % sodium chloride infusion, , IntraVENous, PRN  potassium chloride (KLOR-CON M) extended release tablet 40 mEq, 40 mEq, Oral, PRN **OR** potassium bicarbonate (EFFER-K/K-LYTE) disintegrating tablet 50 mEq, 50 mEq, Oral, PRN **OR** potassium chloride 10 mEq/100 mL IVPB (Peripheral Line), 10 mEq, IntraVENous, PRN  magnesium sulfate 2000 mg in 50 mL IVPB premix, 2,000 mg, IntraVENous, PRN  enoxaparin (LOVENOX) injection 40 mg, 40 mg, SubCUTAneous, Daily  ondansetron (ZOFRAN-ODT) disintegrating tablet 4 mg, 4 mg, Oral, Q8H PRN **OR** ondansetron (ZOFRAN) injection 4 mg, 4 mg, IntraVENous, Q6H PRN  polyethylene glycol (GLYCOLAX) packet 17 g, 17 g, Oral, Daily PRN  acetaminophen (TYLENOL) tablet 650 mg, 650 mg, Oral, Q6H PRN **OR** acetaminophen (TYLENOL) suppository 650 mg, 650 mg, Rectal, Q6H PRN  glucose chewable tablet 16 g, 4 tablet, Oral, PRN  dextrose bolus 10% 125 mL, 125 mL, IntraVENous, PRN **OR** dextrose bolus 10% 250 mL, 250 mL, IntraVENous, PRN  glucagon injection 1 mg, 1 mg, SubCUTAneous, PRN  dextrose 10 % infusion, , IntraVENous, Continuous PRN  acyclovir (ZOVIRAX) tablet 400 mg, 400 mg, Oral, BID  atorvastatin (LIPITOR) tablet 40 mg, 40 mg, Oral, QHS  gabapentin (NEURONTIN) capsule 300 mg, 300 mg, Oral, BID  metoprolol tartrate (LOPRESSOR) tablet 50 mg, 50 mg, Oral, BID  pantoprazole (PROTONIX) tablet 40 mg, 40 mg, Oral, Daily  sodium chloride (OCEAN, BABY AYR) 0.65 % nasal spray 2 spray, 2 spray,      -  The examined duodenum was normal.        -  No old or fresh blood seen        - No PUD or AVM noted Impression:        - A 3 cm paraesophageal hiatal hernia identified        -  Normal stomach.        -  Normal examined duodenum.        - Lax lower esophageal sphincter on retroflexed view        -  No old or fresh blood seen Recommendation:        -  Continue present medications.        -  Full liquid diet        -  Further recommendations per inpatient team Procedure Code(s):        - 21050, Esophagogastroduodenoscopy, flexible, transoral;           diagnostic, including collection of specimen(s) by brushing or           washing, when performed (separate procedure) Diagnosis Code(s):        - K92.1, Melena (includes Hematochezia)        - K44.9, Diaphragmatic hernia without obstruction or gangrene CPT(R) - 2023 copyright [employer]. All Rights Reserved.       The CPT codes, CCI edits and ICD codes generated are intended as       suggestions and were generated based on input data.  These codes are       preliminary and upon  review may be revised to meet current       compliance and payer requirements.  The provider is responsible for       the final determination of appropriate codes, and modifiers. ANSHU SAWYER This document has been electronically signed. Note Initiated:5/14/2025 Note Completed:5/14/2025 12:30 PM    Echo (TTE) complete (PRN contrast/bubble/strain/3D)  Result Date: 5/13/2025    Left Ventricle: Normal left ventricular systolic function with a visually estimated EF of 60 - 65%. Left ventricle size is normal. Normal wall thickness. Normal wall motion. Diastolic dysfunction present with normal LV EF.   Right Ventricle: Right ventricle size is normal. Normal systolic function.   Aortic Valve: Mild sclerosis of the aortic valve cusps.   Mitral Valve: Mildly thickened leaflets. Mild regurgitation.   Tricuspid Valve: Moderate regurgitation with a centrally directed jet.

## 2025-05-22 NOTE — PROGRESS NOTES
1900: received bedside report from Dorothy NASSAR. Pt to have Neurotoxicity CRS checks q2 per Onc. at bedside, per unit manager ok for him to stay the night. Recliner with linen and blankets provided. Pt Aox4, call light within reach,no needs at this time.     2000: Assessment complete and evening meds administered as ordered. Please review MAR and Flowsheet. No needs expressed at this time, call light within reach.

## 2025-05-22 NOTE — PROGRESS NOTES
Spiritual Health History and Assessment/Progress Note  [facility]    Interdisciplinary rounds,  ,  ,      Name: Mckenna Mendoza MRN: [id number]    Age: 79 y.o.     Sex: female   Language: English   Sikh: Jewish   Multiple myeloma not having achieved remission (HCC)     Date: 5/22/2025                           Spiritual Assessment continued in [facility] ICU            Encounter Overview/Reason: Interdisciplinary rounds  Service Provided For: Patient    REFERRAL:  Rounding   ASSESSMENT: The pt was awake, sitting up in bed, family was present, the pt and family were open to having the  visit. The pt report that she is going to be discharged, family is excited.   INTERVENTION:  Supportive presence, active listening.  OUTCOME: pt and family were appreciative for the support  PLAN: pt is being discharged.    Silvia, Belief, Meaning:   Patient identifies as spiritual, is connected with a silvia tradition or spiritual practice, and has beliefs or practices that help with coping during difficult times  Family/Friends identify as spiritual, are connected with a silvia tradition or spiritual practice, and have beliefs or practices that help with coping during difficult times      Importance and Influence:  Family/Friends have spiritual/personal beliefs that influence decisions regarding the patient's health    Community:  Patient is connected with a spiritual community and feels well-supported. Support system includes: Children, Silvia Community, Friends, and Extended family  Family/Friends are connected with a spiritual community: and feel well-supported. Support system includes: Children, Silvia Community, Friends, and Extended family    Assessment and Plan of Care:     Patient Interventions include: Facilitated expression of thoughts and feelings, Explored spiritual coping/struggle/distress, Engaged in theological reflection, and Affirmed coping skills/support systems  Family/Friends Interventions  include: Facilitated expression of thoughts and feelings, Explored spiritual coping/struggle/distress, Engaged in theological reflection, and Affirmed coping skills/support systems    Patient Plan of Care: Spiritual Care available upon further referral  Family/Friends Plan of Care: Spiritual Care available upon further referral    Electronically signed by Chaplain Sonia on 5/22/2025 at 1:13 PM

## 2025-05-22 NOTE — PLAN OF CARE
Problem: Chronic Conditions and Co-morbidities  Goal: Patient's chronic conditions and co-morbidity symptoms are monitored and maintained or improved  Outcome: Progressing  Flowsheets  Taken 5/21/2025 2000 by Anna Brown RN  Care Plan - Patient's Chronic Conditions and Co-Morbidity Symptoms are Monitored and Maintained or Improved:   Monitor and assess patient's chronic conditions and comorbid symptoms for stability, deterioration, or improvement   Collaborate with multidisciplinary team to address chronic and comorbid conditions and prevent exacerbation or deterioration   Update acute care plan with appropriate goals if chronic or comorbid symptoms are exacerbated and prevent overall improvement and discharge  Taken 5/21/2025 0800 by Marivel Valladares RN  Care Plan - Patient's Chronic Conditions and Co-Morbidity Symptoms are Monitored and Maintained or Improved: Monitor and assess patient's chronic conditions and comorbid symptoms for stability, deterioration, or improvement     Problem: Discharge Planning  Goal: Discharge to home or other facility with appropriate resources  Outcome: Progressing  Flowsheets  Taken 5/21/2025 2000 by Anna Brown RN  Discharge to home or other facility with appropriate resources:   Identify barriers to discharge with patient and caregiver   Arrange for needed discharge resources and transportation as appropriate   Identify discharge learning needs (meds, wound care, etc)   Refer to discharge planning if patient needs post-hospital services based on physician order or complex needs related to functional status, cognitive ability or social support system  Taken 5/21/2025 0800 by Marivel Valladares, RN  Discharge to home or other facility with appropriate resources: Identify barriers to discharge with patient and caregiver     Problem: Neurosensory - Adult  Goal: Achieves stable or improved neurological status  Outcome: Progressing  Flowsheets (Taken 5/21/2025

## 2025-05-22 NOTE — PROGRESS NOTES
Pulmonary & Critical Care Medicine ICU Progress Note  Chief complaint : Multiple myeloma    Subjunctive/24 hour events :   Patient seen and examined during multidisciplinary rounds with RN, charge nurse, RT, pharmacy, dietitian, and social service.       Doing good, no complaint, no issues overnight, no skin rash, no chest pain, no shortness of breath.  She is on room air saturation 97%.        New information updated in the note today, rest of the examination did not change compared to yesterday.  Social History     Tobacco Use    Smoking status: Former     Current packs/day: 0.00     Average packs/day: 0.5 packs/day for 24.0 years (12.0 ttl pk-yrs)     Types: Cigarettes     Start date: 1959     Quit date: 1982     Years since quittin.4    Smokeless tobacco: Never    Tobacco comments:     No   Substance Use Topics    Alcohol use: Yes     Alcohol/week: 3.0 standard drinks of alcohol     Types: 3 Glasses of wine per week         Problem Relation Age of Onset    Cancer Mother     Arthritis Mother     Diabetes Mother     Heart Disease Mother     High Blood Pressure Mother     High Cholesterol Mother     Arthritis Father     Diabetes Father     Heart Disease Father     High Blood Pressure Father     High Cholesterol Father        No results for input(s): \"PHART\", \"QCO8RDE\", \"PO2ART\" in the last 72 hours.    MV Settings:     / / /            IV:   sodium chloride      dextrose         Vitals:  BP (!) 146/46   Pulse 69   Temp 97.5 °F (36.4 °C)   Resp 24   Ht 1.524 m (5')   Wt 84.2 kg (185 lb 11.2 oz)   SpO2 91%   BMI 36.27 kg/m²    Tmax:        Intake/Output Summary (Last 24 hours) at 2025 0753  Last data filed at 2025 0400  Gross per 24 hour   Intake 841.51 ml   Output 1050 ml   Net -208.49 ml       EXAM:  General: alert, cooperative, no distress  Head: normocephalic, atraumatic  Eyes:No gross abnormalities.  ENT:  MMM no lesions  Neck:  supple and no masses  Chest : clear to auscultation

## 2025-05-22 NOTE — INTERDISCIPLINARY ROUNDS
Spiritual Care Services     Summary of Visit:    The  participated in the interdisciplinary rounds. The pt is awake, and responsive,  responsive, family is present, and the pt will be discharged. the  will follow up.    Encounter Summary  Encounter Overview/Reason: Interdisciplinary rounds  Encounter Code:  Assessment by  services  Service Provided For: Patient                               Spiritual Assessment/Intervention/Outcomes:                     Care Plan:                 Spiritual Care Services   Electronically signed by Chaplain Sonia on 5/22/2025 at 1:11 PM.    To reach a  for emotional and spiritual support, place an EPIC consult request.   If a  is needed immediately, dial “0” and ask to page the on-call .     Detail Level: Generalized

## 2025-05-22 NOTE — PLAN OF CARE
Problem: Chronic Conditions and Co-morbidities  Goal: Patient's chronic conditions and co-morbidity symptoms are monitored and maintained or improved  5/22/2025 0928 by Cindi Campbell RN  Outcome: Progressing  5/21/2025 2155 by Anna Brown RN  Outcome: Progressing  Flowsheets  Taken 5/21/2025 2000 by Anna Brown RN  Care Plan - Patient's Chronic Conditions and Co-Morbidity Symptoms are Monitored and Maintained or Improved:   Monitor and assess patient's chronic conditions and comorbid symptoms for stability, deterioration, or improvement   Collaborate with multidisciplinary team to address chronic and comorbid conditions and prevent exacerbation or deterioration   Update acute care plan with appropriate goals if chronic or comorbid symptoms are exacerbated and prevent overall improvement and discharge  Taken 5/21/2025 0800 by Marivel Valladares RN  Care Plan - Patient's Chronic Conditions and Co-Morbidity Symptoms are Monitored and Maintained or Improved: Monitor and assess patient's chronic conditions and comorbid symptoms for stability, deterioration, or improvement     Problem: Discharge Planning  Goal: Discharge to home or other facility with appropriate resources  5/22/2025 0928 by Cindi Campbell RN  Outcome: Progressing  5/21/2025 2155 by Anna Brown RN  Outcome: Progressing  Flowsheets  Taken 5/21/2025 2000 by Anna Brown RN  Discharge to home or other facility with appropriate resources:   Identify barriers to discharge with patient and caregiver   Arrange for needed discharge resources and transportation as appropriate   Identify discharge learning needs (meds, wound care, etc)   Refer to discharge planning if patient needs post-hospital services based on physician order or complex needs related to functional status, cognitive ability or social support system  Taken 5/21/2025 0800 by Marievl Valladares, RN  Discharge to home or other facility with  urine output, blood pressure (other measures as available)  Taken 5/21/2025 0800 by Marivel Valladares RN  Hemodynamic stability and optimal renal function maintained: Monitor labs and assess for signs and symptoms of volume excess or deficit  Goal: Glucose maintained within prescribed range  5/22/2025 0928 by Cindi Campbell RN  Outcome: Progressing  5/21/2025 2155 by Anna Brown RN  Outcome: Progressing  Flowsheets  Taken 5/21/2025 2000 by Anna Brown RN  Glucose maintained within prescribed range:   Monitor blood glucose as ordered   Assess for signs and symptoms of hyperglycemia and hypoglycemia   Administer ordered medications to maintain glucose within target range  Taken 5/21/2025 0800 by Marivel Valladares RN  Glucose maintained within prescribed range: Monitor blood glucose as ordered     Problem: Hematologic - Adult  Goal: Maintains hematologic stability  5/22/2025 0928 by Cindi Campbell RN  Outcome: Progressing  5/21/2025 2155 by Anna Brown RN  Outcome: Progressing  Flowsheets  Taken 5/21/2025 2000 by Anna Brown RN  Maintains hematologic stability:   Assess for signs and symptoms of bleeding or hemorrhage   Monitor labs for bleeding or clotting disorders   Administer blood products/factors as ordered  Taken 5/21/2025 0800 by Marivel Valladares RN  Maintains hematologic stability: Assess for signs and symptoms of bleeding or hemorrhage     Problem: Safety - Adult  Goal: Free from fall injury  5/22/2025 0928 by Cindi Campbell RN  Outcome: Progressing  5/21/2025 2155 by Anna Brown RN  Outcome: Progressing     Problem: Skin/Tissue Integrity  Goal: Skin integrity remains intact  Description: 1.  Monitor for areas of redness and/or skin breakdown2.  Assess vascular access sites hourly3.  Every 4-6 hours minimum:  Change oxygen saturation probe site4.  Every 4-6 hours:  If on nasal continuous positive airway pressure, respiratory therapy assess

## 2025-05-23 ENCOUNTER — HOSPITAL ENCOUNTER (OUTPATIENT)
Age: 80
Setting detail: OBSERVATION
Discharge: HOME | End: 2025-05-24
Admitting: INTERNAL MEDICINE
Payer: COMMERCIAL

## 2025-05-23 LAB
GLUCOSE BLD-MCNC: 174 MG/DL (ref 70–99)
PERFORMED ON: ABNORMAL

## 2025-05-23 PROCEDURE — 6360000002 HC RX W HCPCS: Performed by: INTERNAL MEDICINE

## 2025-05-23 PROCEDURE — 83036 HEMOGLOBIN GLYCOSYLATED A1C: CPT

## 2025-05-23 PROCEDURE — G0378 HOSPITAL OBSERVATION PER HR: HCPCS

## 2025-05-23 PROCEDURE — 96372 THER/PROPH/DIAG INJ SC/IM: CPT

## 2025-05-23 PROCEDURE — 2500000003 HC RX 250 WO HCPCS: Performed by: INTERNAL MEDICINE

## 2025-05-23 PROCEDURE — G0379 DIRECT REFER HOSPITAL OBSERV: HCPCS

## 2025-05-23 PROCEDURE — 36415 COLL VENOUS BLD VENIPUNCTURE: CPT

## 2025-05-23 PROCEDURE — 6370000000 HC RX 637 (ALT 250 FOR IP): Performed by: INTERNAL MEDICINE

## 2025-05-23 RX ORDER — NEOMYCIN SULFATE, POLYMYXIN B SULFATE AND HYDROCORTISONE 3.5; 10000; 1 MG/ML; [IU]/ML; MG/ML
3 SOLUTION AURICULAR (OTIC) 4 TIMES DAILY
Status: DISCONTINUED | OUTPATIENT
Start: 2025-05-23 | End: 2025-05-23

## 2025-05-23 RX ORDER — ATORVASTATIN CALCIUM 40 MG/1
40 TABLET, FILM COATED ORAL DAILY
Status: DISCONTINUED | OUTPATIENT
Start: 2025-05-23 | End: 2025-05-24 | Stop reason: HOSPADM

## 2025-05-23 RX ORDER — GLUCAGON 1 MG/ML
1 KIT INJECTION PRN
Status: DISCONTINUED | OUTPATIENT
Start: 2025-05-23 | End: 2025-05-24 | Stop reason: HOSPADM

## 2025-05-23 RX ORDER — SODIUM CHLORIDE 0.9 % (FLUSH) 0.9 %
5-40 SYRINGE (ML) INJECTION EVERY 12 HOURS SCHEDULED
Status: DISCONTINUED | OUTPATIENT
Start: 2025-05-23 | End: 2025-05-24 | Stop reason: HOSPADM

## 2025-05-23 RX ORDER — ACETAMINOPHEN 650 MG/1
650 SUPPOSITORY RECTAL EVERY 6 HOURS PRN
Status: DISCONTINUED | OUTPATIENT
Start: 2025-05-23 | End: 2025-05-24 | Stop reason: HOSPADM

## 2025-05-23 RX ORDER — INSULIN LISPRO 100 [IU]/ML
0-8 INJECTION, SOLUTION INTRAVENOUS; SUBCUTANEOUS
Status: DISCONTINUED | OUTPATIENT
Start: 2025-05-23 | End: 2025-05-24 | Stop reason: HOSPADM

## 2025-05-23 RX ORDER — SODIUM CHLORIDE 0.9 % (FLUSH) 0.9 %
5-40 SYRINGE (ML) INJECTION PRN
Status: DISCONTINUED | OUTPATIENT
Start: 2025-05-23 | End: 2025-05-24 | Stop reason: HOSPADM

## 2025-05-23 RX ORDER — GABAPENTIN 300 MG/1
300 CAPSULE ORAL 2 TIMES DAILY
Status: DISCONTINUED | OUTPATIENT
Start: 2025-05-23 | End: 2025-05-24 | Stop reason: HOSPADM

## 2025-05-23 RX ORDER — ACYCLOVIR 400 MG/1
400 TABLET ORAL DAILY
Status: DISCONTINUED | OUTPATIENT
Start: 2025-05-23 | End: 2025-05-24 | Stop reason: HOSPADM

## 2025-05-23 RX ORDER — POLYETHYLENE GLYCOL 3350 17 G/17G
17 POWDER, FOR SOLUTION ORAL DAILY PRN
Status: DISCONTINUED | OUTPATIENT
Start: 2025-05-23 | End: 2025-05-24 | Stop reason: HOSPADM

## 2025-05-23 RX ORDER — NEOMYCIN SULFATE, POLYMYXIN B SULFATE AND HYDROCORTISONE 3.5; 10000; 1 MG/ML; [IU]/ML; MG/ML
3 SOLUTION AURICULAR (OTIC) 4 TIMES DAILY
Status: DISCONTINUED | OUTPATIENT
Start: 2025-05-23 | End: 2025-05-24 | Stop reason: HOSPADM

## 2025-05-23 RX ORDER — PANTOPRAZOLE SODIUM 40 MG/1
40 TABLET, DELAYED RELEASE ORAL DAILY
Status: DISCONTINUED | OUTPATIENT
Start: 2025-05-23 | End: 2025-05-24 | Stop reason: HOSPADM

## 2025-05-23 RX ORDER — FLUTICASONE PROPIONATE 50 MCG
1 SPRAY, SUSPENSION (ML) NASAL DAILY PRN
Status: DISCONTINUED | OUTPATIENT
Start: 2025-05-23 | End: 2025-05-24 | Stop reason: HOSPADM

## 2025-05-23 RX ORDER — DEXTROSE MONOHYDRATE 100 MG/ML
INJECTION, SOLUTION INTRAVENOUS CONTINUOUS PRN
Status: DISCONTINUED | OUTPATIENT
Start: 2025-05-23 | End: 2025-05-24 | Stop reason: HOSPADM

## 2025-05-23 RX ORDER — ACETAMINOPHEN 325 MG/1
650 TABLET ORAL EVERY 6 HOURS PRN
Status: DISCONTINUED | OUTPATIENT
Start: 2025-05-23 | End: 2025-05-24 | Stop reason: HOSPADM

## 2025-05-23 RX ORDER — METOPROLOL TARTRATE 50 MG
50 TABLET ORAL 2 TIMES DAILY
Status: DISCONTINUED | OUTPATIENT
Start: 2025-05-23 | End: 2025-05-24 | Stop reason: HOSPADM

## 2025-05-23 RX ORDER — VITAMIN B COMPLEX
2000 TABLET ORAL
Status: DISCONTINUED | OUTPATIENT
Start: 2025-05-23 | End: 2025-05-24 | Stop reason: HOSPADM

## 2025-05-23 RX ORDER — ENOXAPARIN SODIUM 100 MG/ML
40 INJECTION SUBCUTANEOUS DAILY
Status: DISCONTINUED | OUTPATIENT
Start: 2025-05-23 | End: 2025-05-24 | Stop reason: HOSPADM

## 2025-05-23 RX ORDER — FUROSEMIDE 40 MG/1
40 TABLET ORAL DAILY
Status: DISCONTINUED | OUTPATIENT
Start: 2025-05-24 | End: 2025-05-24 | Stop reason: HOSPADM

## 2025-05-23 RX ORDER — ENOXAPARIN SODIUM 100 MG/ML
40 INJECTION SUBCUTANEOUS DAILY
Status: DISCONTINUED | OUTPATIENT
Start: 2025-05-23 | End: 2025-05-23

## 2025-05-23 RX ORDER — SODIUM CHLORIDE 9 MG/ML
INJECTION, SOLUTION INTRAVENOUS PRN
Status: DISCONTINUED | OUTPATIENT
Start: 2025-05-23 | End: 2025-05-24 | Stop reason: HOSPADM

## 2025-05-23 RX ORDER — POLYETHYLENE GLYCOL 3350 17 G/17G
17 POWDER, FOR SOLUTION ORAL DAILY PRN
Status: DISCONTINUED | OUTPATIENT
Start: 2025-05-23 | End: 2025-05-23

## 2025-05-23 RX ORDER — MAGNESIUM SULFATE IN WATER 40 MG/ML
2000 INJECTION, SOLUTION INTRAVENOUS PRN
Status: DISCONTINUED | OUTPATIENT
Start: 2025-05-23 | End: 2025-05-24 | Stop reason: HOSPADM

## 2025-05-23 RX ADMIN — ENOXAPARIN SODIUM 40 MG: 100 INJECTION SUBCUTANEOUS at 20:06

## 2025-05-23 RX ADMIN — PANTOPRAZOLE SODIUM 40 MG: 40 TABLET, DELAYED RELEASE ORAL at 20:06

## 2025-05-23 RX ADMIN — ATORVASTATIN CALCIUM 40 MG: 40 TABLET, FILM COATED ORAL at 20:06

## 2025-05-23 RX ADMIN — ACYCLOVIR 400 MG: 400 TABLET ORAL at 20:07

## 2025-05-23 RX ADMIN — GABAPENTIN 300 MG: 300 CAPSULE ORAL at 20:10

## 2025-05-23 RX ADMIN — SODIUM CHLORIDE, PRESERVATIVE FREE 10 ML: 5 INJECTION INTRAVENOUS at 20:07

## 2025-05-23 RX ADMIN — METOPROLOL TARTRATE 50 MG: 50 TABLET, FILM COATED ORAL at 20:07

## 2025-05-23 RX ADMIN — Medication 2000 UNITS: at 20:07

## 2025-05-23 RX ADMIN — NEOMYCIN SULFATE, POLYMYXIN B SULFATE, HYDROCORTISONE 3 DROP: 3.5; 10000; 1 SOLUTION/ DROPS AURICULAR (OTIC) at 21:14

## 2025-05-23 ASSESSMENT — PAIN SCALES - GENERAL
PAINLEVEL_OUTOF10: 0

## 2025-05-23 NOTE — DISCHARGE SUMMARY
Hospital Medicine Discharge Summary    Mckenna Mendoza  :  1945  MRN:  [id number]    Admit date:  2025  Discharge date:  25    Admitting Physician:  Roderick Coe MD  Primary Care Physician:  Twila Maher DO      Discharge Diagnoses:    MM not in remission    No chief complaint on file.    Hospital Course:   Patient with MM presented from oncology as she had elrexfio and they wanted her monitored and to receive Tocilizumab. Monitored for 48 hours then discharged per oncology recommendations    Exam on discharge:   BP (!) 120/98   Pulse 73   Temp 97.9 °F (36.6 °C) (Oral)   Resp 18   Ht 1.524 m (5')   Wt 84.2 kg (185 lb 11.2 oz)   SpO2 98%   BMI 36.27 kg/m²   General appearance: No apparent distress, appears stated age and cooperative.  HEENT:  Conjunctivae/corneas clear.  Neck:  Trachea midline.  Respiratory:  Normal respiratory effort. Clear to auscultation  Cardiovascular: Regular rate and rhythm   Abdomen: Soft, non-tender, non-distended with normal bowel sounds.  Musculoskeletal: No clubbing, cyanosis or edema bilaterally.  Neuro: Non Focal.     Patient was seen by the following consultants   Consults:  IP CONSULT TO ONCOLOGY  IP CONSULT TO CRITICAL CARE    Significant Diagnostic Studies:    Refer to chart     Please refer to chart if no studies are shown here    No results found.    Discharge Medications:         Medication List        CONTINUE taking these medications      acyclovir 400 MG tablet  Commonly known as: ZOVIRAX     albuterol sulfate  (90 Base) MCG/ACT inhaler  Commonly known as: PROVENTIL;VENTOLIN;PROAIR  Inhale 2 puffs into the lungs every 6 hours as needed for Wheezing     atorvastatin 40 MG tablet  Commonly known as: LIPITOR  Take 1 tablet by mouth daily     fluticasone 50 MCG/ACT nasal spray  Commonly known as: FLONASE  1 spray by Each Nostril route daily     furosemide 40 MG tablet  Commonly known as: LASIX  TAKE 1 TABLET EVERY DAY     gabapentin 300 MG  capsule  Commonly known as: NEURONTIN  Take 1 capsule by mouth in the morning and at bedtime for 180 days.     metoprolol tartrate 50 MG tablet  Commonly known as: LOPRESSOR  Take 1 tablet by mouth 2 times daily     neomycin-polymyxin-hydrocortisone 3.5-84160-0 otic solution  Commonly known as: CORTISPORIN  Place 3 drops into both ears 4 times daily for 10 days     pantoprazole 40 MG tablet  Commonly known as: PROTONIX  Take 1 tablet by mouth daily     sodium chloride 0.65 % nasal spray  Commonly known as: OCEAN, BABY AYR  2 sprays by Nasal route as needed for Congestion     tiZANidine 4 MG tablet  Commonly known as: ZANAFLEX  TAKE 1 TABLET EVERY NIGHT AS NEEDED FOR MUSCLE SPASM(S)     vitamin D 50 MCG (2000 UT) Tabs tablet  Commonly known as: CHOLECALCIFEROL  Take 1 tablet by mouth Daily with supper            STOP taking these medications      predniSONE 20 MG tablet  Commonly known as: DELTASONE              Disposition:   If discharged to Home, Any [facility] needs that were indicated and/or required as been addressed and set up by Social Work.     Condition at discharge: good     Activity: activity as tolerated    Total time taken for discharging this patient: 40 minutes. Greater than 70% of time was spent focused exclusively on this patient. Time was taken to review chart, discuss plans with consultants, reconciling medications, discussing plan answering questions with patient.     Signed:  Roderick Coe MD  5/23/2025, 5:40 PM  ----------------------------------------------------------------------------------------------------------------------    Mckenna Mendoza

## 2025-05-23 NOTE — CONSULTS
Hematology/Oncology Consult  Encounter Date: 2025 4:46 PM    Ms. Mckenna Mendoza is a 79 y.o. female  : 1945  MRN: [id number]  Acct Number: [id number]  Requesting Provider: Roderick Coe MD    Reason for request: Monitor CRS and ICANS      CONSULTANT: Sol Rhodes, JOSE - CNP    HPI: Patient has refractory lambda restricted light chain myeloma with multiple bone lesions. She has progression of multiple myeloma and started treatment with Xpovio 2025, but tolerated poorly. She started alterative treatment with Elrexfio 2025 requiring 48 hour observation. She received second dose of Elrexfio today and will need 24 hour observation to monitor for potential neurotoxicity, including ICANS and cytokine release syndrome (CRS).      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[facility])    Vertebral compression fracture (HCC)    Dizziness    Benign paroxysmal positional vertigo due to bilateral vestibular disorder    Maxillary pain    Nonintractable headache    Fall at home    Impaired mobility and activities of daily living-metabolic encephalopathy with primary origins of infectious etiology    Lumbar spondylosis    Lumbar stenosis with neurogenic claudication    Atelectasis, left    Chest pain    Athscl heart disease of native coronary artery w/o ang pctrs    Contusion of left hip    Contusion of left shoulder    Pain in left shoulder    Angina at rest    Acute on chronic systolic heart failure (HCC)    Stage 3a chronic kidney disease (HCC)    History of pulmonary embolism    OSIRIS (acute kidney injury)    GI bleed    Acute anemia    Hypotension    Acute bronchitis due to Rhinovirus    Multiple myeloma not having achieved remission (HCC)    Multiple myeloma without remission (HCC)     Past Medical History:   Diagnosis Date    Cancer (HCC)     Encounter for lumbar

## 2025-05-23 NOTE — H&P
Hospital Medicine  History and Physical    Patient:  Mckenna Mendoza  MRN: [id number]    CHIEF COMPLAINT:  No chief complaint on file.      History Obtained From:  Patient, EMR  Primary Care Physician: Twila Maher DO    HISTORY OF PRESENT ILLNESS:   The patient is a 79 y.o. female with PMH of MM, HTN, HLD, DMII who presents with concerns of neurotoxicity.    The patient is and was doing well; she had a secondary treatment for her MM today and oncology requested admission due to severe neurotoxic potential and need for monitoring as well as tocilizumab for prevention.      Patient denies chest pain, shortness of breath, fevers, chills, sweats, diarrhea or burning micturition.      Past Medical History:      Diagnosis Date    Cancer (HCC)     Encounter for lumbar puncture 05/25/2022    Completed by Dr. Mccormick - diagnostics sent    Hyperlipidemia     Hypertension     Osteoarthritis     Type II or unspecified type diabetes mellitus without mention of complication, not stated as uncontrolled        Past Surgical History:      Procedure Laterality Date    CORONARY ANGIOPLASTY WITH STENT PLACEMENT      CT BIOPSY BONE MARROW N/A 03/17/2025    by Dr. Petersen    CT BIOPSY BONE MARROW  3/17/2025    CT BIOPSY BONE MARROW 3/17/2025 [facility] CT SCAN    CYST REMOVAL  06/03/2024    cyst removed from back    UPPER GASTROINTESTINAL ENDOSCOPY  08/12/2015    w/bx,dilation     UPPER GASTROINTESTINAL ENDOSCOPY N/A 5/14/2025    ESOPHAGOGASTRODUODENOSCOPY performed by Dalia Allen MD at [facility]       Medications Prior to Admission:    Prior to Admission medications    Medication Sig Start Date End Date Taking? Authorizing Provider   gabapentin (NEURONTIN) 300 MG capsule Take 1 capsule by mouth in the morning and at bedtime for 180 days. 3/19/25 9/15/25 Yes Twila Maher DO   pantoprazole (PROTONIX) 40 MG tablet Take 1 tablet by mouth daily 2/7/25  Yes Twila Maher DO   metoprolol tartrate (LOPRESSOR) 50 MG

## 2025-05-23 NOTE — PROGRESS NOTES
15:45pm [facility] via [facility] to ICU 17. Alert and oriented x4. No neuro deficits at this time. Neutropenic precautions.  at bedside. VSS MP-SR. No complaints at this time

## 2025-05-23 NOTE — PROGRESS NOTES
Spiritual Health History and Assessment/Progress Note  [facility]    Interdisciplinary rounds,  , Adjustment to illness, Life Adjustments,      Name: Mckenna Mendoza MRN: [id number]    Age: 79 y.o.     Sex: female   Language: English   Alevism: Alevism   Multiple myeloma not having achieved remission (HCC)     Date: 5/23/2025            Total Time Calculated: 15 min              Spiritual Assessment began in [facility]        Referral/Consult From: Physician   Encounter Overview/Reason: Interdisciplinary rounds  Service Provided For: Patient    REFERRAL:  Rounding    ASSESSMENT: The pt was awake, alert, and sitting up on the bed. No family was in the room, but the pt was open to having the  sit and visit, the pt shared much of their story, the shared their physical setbacks, their concerns, and goals of care. The pt also requested for prayers to spoken on their behalf.    INTERVENTION:  The  provided supportive presence, moments of silence, active listening, theological reflection, and explored the pt's concerns and goals of care, and provided prayers for the pt according to their silvia.    OUTCOME:  The pt was appreciative for the visit and prayers.    PLAN:  The  will continue to provide support for the pt as it is needed.    Silvia, Belief, Meaning:   Patient identifies as spiritual, is connected with a silvia tradition or spiritual practice, and has beliefs or practices that help with coping during difficult times  Family/Friends identify as spiritual, are connected with a silvia tradition or spiritual practice, and have beliefs or practices that help with coping during difficult times      Importance and Influence:  Patient has spiritual/personal beliefs that influence decisions regarding their health  Family/Friends have spiritual/personal beliefs that influence decisions regarding the patient's health    Community:  Patient is connected with a spiritual community and

## 2025-05-24 VITALS
DIASTOLIC BLOOD PRESSURE: 85 MMHG | RESPIRATION RATE: 19 BRPM | SYSTOLIC BLOOD PRESSURE: 147 MMHG | WEIGHT: 185.85 LBS | HEIGHT: 60 IN | TEMPERATURE: 97.6 F | OXYGEN SATURATION: 94 % | BODY MASS INDEX: 36.49 KG/M2 | HEART RATE: 68 BPM

## 2025-05-24 LAB
ALBUMIN SERPL-MCNC: 3.2 G/DL (ref 3.5–4.6)
ALP SERPL-CCNC: 67 U/L (ref 40–130)
ALT SERPL-CCNC: 10 U/L (ref 0–33)
ANION GAP SERPL CALCULATED.3IONS-SCNC: 10 MEQ/L (ref 9–15)
AST SERPL-CCNC: 9 U/L (ref 0–35)
BASOPHILS # BLD: 0 K/UL (ref 0–0.2)
BASOPHILS NFR BLD: 0 %
BILIRUB SERPL-MCNC: 0.5 MG/DL (ref 0.2–0.7)
BUN SERPL-MCNC: 10 MG/DL (ref 8–23)
CALCIUM SERPL-MCNC: 8.7 MG/DL (ref 8.5–9.9)
CHLORIDE SERPL-SCNC: 108 MEQ/L (ref 95–107)
CO2 SERPL-SCNC: 23 MEQ/L (ref 20–31)
CREAT SERPL-MCNC: 1.03 MG/DL (ref 0.5–0.9)
EOSINOPHIL # BLD: 0 K/UL (ref 0–0.7)
EOSINOPHIL NFR BLD: 0 %
ERYTHROCYTE [DISTWIDTH] IN BLOOD BY AUTOMATED COUNT: 19.9 % (ref 11.5–14.5)
ESTIMATED AVERAGE GLUCOSE: 146 MG/DL
GLOBULIN SER CALC-MCNC: 2 G/DL (ref 2.3–3.5)
GLUCOSE BLD-MCNC: 133 MG/DL (ref 70–99)
GLUCOSE SERPL-MCNC: 121 MG/DL (ref 70–99)
HBA1C MFR BLD: 6.7 % (ref 4–6)
HCT VFR BLD AUTO: 24.4 % (ref 37–47)
HGB BLD-MCNC: 7.9 G/DL (ref 12–16)
LYMPHOCYTES # BLD: 0.2 K/UL (ref 1–4.8)
LYMPHOCYTES NFR BLD: 9 %
MCH RBC QN AUTO: 33.3 PG (ref 27–31.3)
MCHC RBC AUTO-ENTMCNC: 32.4 % (ref 33–37)
MCV RBC AUTO: 103 FL (ref 79.4–94.8)
MONOCYTES # BLD: 0 K/UL (ref 0.2–0.8)
MONOCYTES NFR BLD: 4 %
NEUTROPHILS # BLD: 2.3 K/UL (ref 1.4–6.5)
NEUTS SEG NFR BLD: 91 %
NRBC BLD-RTO: 5 /100 WBC
PERFORMED ON: ABNORMAL
PLATELET # BLD AUTO: 168 K/UL (ref 130–400)
PLATELET BLD QL SMEAR: ADEQUATE
POTASSIUM SERPL-SCNC: 3.9 MEQ/L (ref 3.4–4.9)
PROT SERPL-MCNC: 5.2 G/DL (ref 6.3–8)
RBC # BLD AUTO: 2.37 M/UL (ref 4.2–5.4)
SLIDE REVIEW: ABNORMAL
SODIUM SERPL-SCNC: 141 MEQ/L (ref 135–144)
WBC # BLD AUTO: 2.5 K/UL (ref 4.8–10.8)

## 2025-05-24 PROCEDURE — 36415 COLL VENOUS BLD VENIPUNCTURE: CPT

## 2025-05-24 PROCEDURE — G0378 HOSPITAL OBSERVATION PER HR: HCPCS

## 2025-05-24 PROCEDURE — 99223 1ST HOSP IP/OBS HIGH 75: CPT | Performed by: INTERNAL MEDICINE

## 2025-05-24 PROCEDURE — 80053 COMPREHEN METABOLIC PANEL: CPT

## 2025-05-24 PROCEDURE — 85025 COMPLETE CBC W/AUTO DIFF WBC: CPT

## 2025-05-24 PROCEDURE — 6370000000 HC RX 637 (ALT 250 FOR IP): Performed by: INTERNAL MEDICINE

## 2025-05-24 PROCEDURE — 2500000003 HC RX 250 WO HCPCS: Performed by: INTERNAL MEDICINE

## 2025-05-24 RX ADMIN — SODIUM CHLORIDE, PRESERVATIVE FREE 10 ML: 5 INJECTION INTRAVENOUS at 09:04

## 2025-05-24 RX ADMIN — NEOMYCIN SULFATE, POLYMYXIN B SULFATE, HYDROCORTISONE 3 DROP: 3.5; 10000; 1 SOLUTION/ DROPS AURICULAR (OTIC) at 08:54

## 2025-05-24 RX ADMIN — ACETAMINOPHEN 650 MG: 325 TABLET ORAL at 15:06

## 2025-05-24 RX ADMIN — NEOMYCIN SULFATE, POLYMYXIN B SULFATE, HYDROCORTISONE 3 DROP: 3.5; 10000; 1 SOLUTION/ DROPS AURICULAR (OTIC) at 15:01

## 2025-05-24 RX ADMIN — FUROSEMIDE 40 MG: 40 TABLET ORAL at 08:53

## 2025-05-24 RX ADMIN — GABAPENTIN 300 MG: 300 CAPSULE ORAL at 08:54

## 2025-05-24 RX ADMIN — ATORVASTATIN CALCIUM 40 MG: 40 TABLET, FILM COATED ORAL at 08:54

## 2025-05-24 RX ADMIN — ACYCLOVIR 400 MG: 400 TABLET ORAL at 08:54

## 2025-05-24 RX ADMIN — PANTOPRAZOLE SODIUM 40 MG: 40 TABLET, DELAYED RELEASE ORAL at 08:54

## 2025-05-24 RX ADMIN — METOPROLOL TARTRATE 50 MG: 50 TABLET, FILM COATED ORAL at 08:54

## 2025-05-24 ASSESSMENT — PAIN SCALES - GENERAL: PAINLEVEL_OUTOF10: 4

## 2025-05-24 NOTE — PROGRESS NOTES
Pt is awake alert and oriented x 3. Assited to bathroom and back  to bed. No SOB  noted, gait steady.

## 2025-05-24 NOTE — PLAN OF CARE
Problem: Chronic Conditions and Co-morbidities  Goal: Patient's chronic conditions and co-morbidity symptoms are monitored and maintained or improved  Outcome: Progressing  Flowsheets (Taken 5/24/2025 0400)  Care Plan - Patient's Chronic Conditions and Co-Morbidity Symptoms are Monitored and Maintained or Improved:   Monitor and assess patient's chronic conditions and comorbid symptoms for stability, deterioration, or improvement   Collaborate with multidisciplinary team to address chronic and comorbid conditions and prevent exacerbation or deterioration   Update acute care plan with appropriate goals if chronic or comorbid symptoms are exacerbated and prevent overall improvement and discharge     Problem: Discharge Planning  Goal: Discharge to home or other facility with appropriate resources  Outcome: Progressing  Flowsheets (Taken 5/24/2025 0400)  Discharge to home or other facility with appropriate resources: Identify barriers to discharge with patient and caregiver     Problem: Safety - Adult  Goal: Free from fall injury  Outcome: Progressing

## 2025-05-24 NOTE — CONSULTS
Pulmonary and Critical Care Medicine  Consult Note  Encounter Date: 2025 9:06 AM    Ms. Mckenna Mendoza is a 79 y.o. female  : 1945  Requesting Provider: Roderick Coe MD    Reason for request: ICU management            HISTORY OF PRESENT ILLNESS:    Patient is 79 y.o. presents with multiple myeloma, she received Elrexfio yesterday second dose, sent to ICU for monitoring, she is doing good has no complaint, no chest pain, no shortness of breath, appetite is good, no diarrhea, no nausea or vomiting, no skin rash and no fever.  She is on room air saturation 100%.          Past Medical History:        Diagnosis Date    Cancer (HCC)     Encounter for lumbar puncture 2022    Completed by Dr. Mccormick - diagnostics sent    Hyperlipidemia     Hypertension     Osteoarthritis     Type II or unspecified type diabetes mellitus without mention of complication, not stated as uncontrolled        Past Surgical History:        Procedure Laterality Date    CORONARY ANGIOPLASTY WITH STENT PLACEMENT      CT BIOPSY BONE MARROW N/A 2025    by Dr. Petersen    CT BIOPSY BONE MARROW  3/17/2025    CT BIOPSY BONE MARROW 3/17/2025 [facility] CT SCAN    CYST REMOVAL  2024    cyst removed from back    UPPER GASTROINTESTINAL ENDOSCOPY  2015    w/bx,dilation     UPPER GASTROINTESTINAL ENDOSCOPY N/A 2025    ESOPHAGOGASTRODUODENOSCOPY performed by Dalia Allen MD at [facility]       Social History:     reports that she quit smoking about 43 years ago. Her smoking use included cigarettes. She started smoking about 66 years ago. She has a 12 pack-year smoking history. She has never used smokeless tobacco. She reports current alcohol use of about 3.0 standard drinks of alcohol per week. She reports that she does not use drugs.    Family History:       Problem Relation Age of Onset    Cancer Mother     Arthritis Mother     Diabetes Mother     Heart Disease Mother     High Blood Pressure Mother

## 2025-05-24 NOTE — PROGRESS NOTES
Hematology/Oncology  Nurse Practitioner Progress Note        CHIEF COMPLAINT/HPI: Patient is doing well. No evidence of CRS or ICANS.    REVIEW OF SYSTEMS:    Unremarkable except for symptoms mentioned in HPI.    Current Inpatient Medications:    Current Facility-Administered Medications: sodium chloride flush 0.9 % injection 5-40 mL, 5-40 mL, IntraVENous, 2 times per day  sodium chloride flush 0.9 % injection 5-40 mL, 5-40 mL, IntraVENous, PRN  0.9 % sodium chloride infusion, , IntraVENous, PRN  magnesium sulfate 2000 mg in 50 mL IVPB premix, 2,000 mg, IntraVENous, PRN  acetaminophen (TYLENOL) tablet 650 mg, 650 mg, Oral, Q6H PRN **OR** acetaminophen (TYLENOL) suppository 650 mg, 650 mg, Rectal, Q6H PRN  sodium chloride flush 0.9 % injection 5-40 mL, 5-40 mL, IntraVENous, 2 times per day  sodium chloride flush 0.9 % injection 5-40 mL, 5-40 mL, IntraVENous, PRN  0.9 % sodium chloride infusion, , IntraVENous, PRN  enoxaparin (LOVENOX) injection 40 mg, 40 mg, SubCUTAneous, Daily  polyethylene glycol (GLYCOLAX) packet 17 g, 17 g, Oral, Daily PRN  acetaminophen (TYLENOL) tablet 650 mg, 650 mg, Oral, Q6H PRN **OR** acetaminophen (TYLENOL) suppository 650 mg, 650 mg, Rectal, Q6H PRN  acyclovir (ZOVIRAX) tablet 400 mg, 400 mg, Oral, Daily  atorvastatin (LIPITOR) tablet 40 mg, 40 mg, Oral, Daily  gabapentin (NEURONTIN) capsule 300 mg, 300 mg, Oral, BID  metoprolol tartrate (LOPRESSOR) tablet 50 mg, 50 mg, Oral, BID  pantoprazole (PROTONIX) tablet 40 mg, 40 mg, Oral, Daily  sodium chloride (OCEAN, BABY AYR) 0.65 % nasal spray 2 spray, 2 spray, Nasal, PRN  tiZANidine (ZANAFLEX) tablet 4 mg, 4 mg, Oral, Q6H PRN  fluticasone (FLONASE) 50 MCG/ACT nasal spray 1 spray, 1 spray, Each Nostril, Daily PRN  furosemide (LASIX) tablet 40 mg, 40 mg, Oral, Daily  Vitamin D (CHOLECALCIFEROL) tablet 2,000 Units, 2,000 Units, Oral, Dinner  glucose chewable tablet 16 g, 4 tablet, Oral, PRN  dextrose bolus 10% 125 mL, 125 mL, IntraVENous, PRN  FINDINGS: Unless otherwise indicated or stated incidental findings do not require dedicated follow-up imaging. Lower Chest: Lung bases are clear. Organs: Limited evaluation of solid organs and soft tissues due to lack of intravenous contrast.   Liver without focal lesion.  Gallbladder unremarkable. Pancreas and spleen unremarkable.  Adrenals without nodule. Kidneys without suspicious renal lesion and no hydronephrosis. GI/Bowel: Small hiatal hernia.  No small bowel obstructive process.  No significant inflammatory findings of bowel.  Colonic segments unremarkable for inflammation.  Gas Pelvis: No suspicious pelvic lesion or bulky pelvic adenopathy/free fluid. Peritoneum/Retroperitoneum: No bulky retroperitoneal adenopathy. No suspicious peritoneal or mesenteric process Vasculature: Grossly normal caliber of abdominal aorta and vasculature Bones/Soft Tissues: No acute osseous or soft tissue findings.  Status post L1 kyphoplasty.     1. No acute intra-abdominal or pelvic process.  Specifically, no evidence for small bowel obstructive process or mechanical obstructive process of bowel. 2. Small hiatal hernia. 3. Status post L1 kyphoplasty.       ASSESSMENT AND PLAN:      No acute events. Patient remains stable with no evidence of CRS or ICANS. Ok for discharge today after 24 hour observation is complete around 4pm.       Electronically signed by JOSE Weston CNP on 5/24/25 at 1:43 PM EDT

## 2025-05-24 NOTE — PROGRESS NOTES
at bedside, discharge instruction given and pt verbalized understanding. Awaiting son to come back for ride home.

## 2025-05-24 NOTE — DISCHARGE SUMMARY
Hospital Medicine Discharge Summary    Mckenna Mendoza  :  1945  MRN:  [id number]    Admit date:  2025  Discharge date:  2025    Admitting Physician:  Roderick Coe MD  Primary Care Physician:  Twila Maher DO      Discharge Diagnoses:    MM s/p Elrexfio     No chief complaint on file.    Hospital Course:   Patient with MM presented to be monitored for possible neurotoxicity of Elrexfio.  Did well overnight and plan on d/c today per oncology recommendations     Exam on discharge:   BP (!) 155/56   Pulse 72   Temp 97.6 °F (36.4 °C)   Resp 19   Ht 1.524 m (5')   Wt 84.3 kg (185 lb 13.6 oz)   SpO2 99%   BMI 36.30 kg/m²   General appearance: alert, appears stated age and cooperative  Skin:  No rashes or lesions on exposed skin  HEENT: Head: Normal, normocephalic, atraumatic..   Neck: trachea midline  Lungs: Clear bilateral breath sounds  Heart: RRR  Abdomen: Non tender  Extremities: No peripheral edema  Neurologic: Non focal     Patient was seen by the following consultants   Consults:  IP CONSULT TO CRITICAL CARE  IP CONSULT TO ONCOLOGY    Significant Diagnostic Studies:    Refer to chart     Please refer to chart if no studies are shown here    No results found.    Discharge Medications:         Medication List        CONTINUE taking these medications      acyclovir 400 MG tablet  Commonly known as: ZOVIRAX     albuterol sulfate  (90 Base) MCG/ACT inhaler  Commonly known as: PROVENTIL;VENTOLIN;PROAIR  Inhale 2 puffs into the lungs every 6 hours as needed for Wheezing     atorvastatin 40 MG tablet  Commonly known as: LIPITOR  Take 1 tablet by mouth daily     fluticasone 50 MCG/ACT nasal spray  Commonly known as: FLONASE  1 spray by Each Nostril route daily     furosemide 40 MG tablet  Commonly known as: LASIX  TAKE 1 TABLET EVERY DAY     gabapentin 300 MG capsule  Commonly known as: NEURONTIN  Take 1 capsule by mouth in the morning and at bedtime for 180 days.     metoprolol tartrate

## 2025-05-26 NOTE — PROGRESS NOTES
Physician Progress Note      PATIENT:               SAPNA HOOVER  CSN #:                  [id number]  :                       1945  ADMIT DATE:       2025 1:08 AM  DISCH DATE:        2025 4:51 PM  RESPONDING  PROVIDER #:        Kendall Davison MD          QUERY TEXT:    \"Patient has a history of multiple myeloma as cause of her anemia.\" is   documented in the Dr. Holman 5/15 consult note. Please specify the following:    The clinical indicators include:  -WBC 3-2.4, RBC 2.4-2.33, Platelets 119-127. Lab Results  -\"Patient has a history of multiple myeloma as cause of her anemia.\" Dr. Holman 5/15 Consult note &  PN  -2 units PRBC transfused. Blood Bank Reports  Options provided:  -- pancytopenia related to multiple myeloma  -- Pancytopenia related to, Document cause of pancytopenia  -- Other - I will add my own diagnosis  -- Disagree - Not applicable / Not valid  -- Disagree - Clinically unable to determine / Unknown  -- Refer to Clinical Documentation Reviewer    PROVIDER RESPONSE TEXT:    Patient has pancytopenia related to multiple myeloma.    Query created by: Estefany Bradley on 2025 11:08 AM      Electronically signed by:  Kendall Davison MD 2025 1:01 PM

## 2025-05-27 ENCOUNTER — TELEPHONE (OUTPATIENT)
Age: 80
End: 2025-05-27

## 2025-05-27 NOTE — TELEPHONE ENCOUNTER
Care Transitions Initial Follow Up Call    Outreach made within 2 business days of discharge: Yes    Patient: Mckenna Mendoza Patient : 1945   MRN: [id number]  Reason for Admission: Multiple myeloma without remission (HCC)   Discharge Date: 25       Spoke with: ZULLY X1    Discharge department/facility: [facility]        Scheduled appointment with PCP within 7-14 days    Follow Up  Future Appointments   Date Time Provider Department Center   6/3/2025 10:00 AM Twila Maher DO [facility]   2025  1:45 PM Hayde Becker MD [facility]   2025  9:15 AM Robert Lyons DO [facility]   10/9/2025  2:15 PM Eric Saeed MD [facility] Kathryn Dean, MA

## 2025-05-28 ENCOUNTER — TELEPHONE (OUTPATIENT)
Age: 80
End: 2025-05-28

## 2025-05-28 NOTE — TELEPHONE ENCOUNTER
Care Transitions Initial Follow Up Call    Outreach made within 2 business days of discharge: Yes    Patient: Mckenna Mendoza Patient : 1945   MRN: [id number]  Reason for Admission: Multiple myeloma without remission (HCC)   Discharge Date: 25       Spoke with: ZULLY X1    Discharge department/facility: [facility]        Scheduled appointment with PCP within 7-14 days    Follow Up  Future Appointments   Date Time Provider Department Center   6/3/2025 10:00 AM [facility] DO [facility]   2025  1:45 PM Hayde Becker MD [facility]   2025  9:15 AM Robert Lyons DO [facility]   10/9/2025  2:15 PM Eric Saeed MD [facility] Kathryn Dean, MA

## 2025-05-28 NOTE — TELEPHONE ENCOUNTER
Loco from [facility] called office w/ messg for Dr Maher.    He states he did a PT eval yesterday and will follow pt.  T w 3 and then 1w 2    Loco had no questions or concerns to be addressed at time of call.

## 2025-05-28 NOTE — TELEPHONE ENCOUNTER
Adalgisa valencia [facility] called in requesting a script be sent in for a Jus 3 meter and sensors to the [facility] on [address]    Patient's fingers are sore from hospital testing and does not want to test herself by pricking her fingers.    Please advise.

## 2025-05-30 ENCOUNTER — CLINICAL DOCUMENTATION (OUTPATIENT)
Dept: RADIATION ONCOLOGY | Age: 80
End: 2025-05-30

## 2025-05-30 NOTE — PROGRESS NOTES
TOMI met with pt as she was receiving chemotherapy infusion today and she requested information about transportation vouchers.  Pt has [employer]. She states she typically arranges transportation through [employer], but they will not allow her to schedule with less than 2 days notice. She states she was scheduled yesterday for today's treatment due to medical needs. (She states her son drove her today before he left for work). Discussed she pursue this issue with her insurance provider, as they sometimes make exceptions in for such conditions. Also, provided her with contact information for Heidy Tee,  at [facility] as she may be an additional resource.  SW business card provided to pt, and she was invited to contact [facility] with further question or concern, to which she was agreeable.

## 2025-05-30 NOTE — TELEPHONE ENCOUNTER
Gina from [facility] called to report that the patient wants to put home care on hold while she goes through Chemo. She told them she would call when she is ready. Gina said it should be about 2 weeks.    Patient will lose some pt visits in the month of June. She will have 3-4 visits left for home care.    Any questions, please call Gina at [phone]

## 2025-06-03 ENCOUNTER — TELEPHONE (OUTPATIENT)
Age: 80
End: 2025-06-03

## 2025-06-03 LAB
EKG ATRIAL RATE: 77 BPM
EKG ATRIAL RATE: 82 BPM
EKG DIAGNOSIS: NORMAL
EKG DIAGNOSIS: NORMAL
EKG P AXIS: 35 DEGREES
EKG P AXIS: 66 DEGREES
EKG P-R INTERVAL: 180 MS
EKG P-R INTERVAL: 188 MS
EKG Q-T INTERVAL: 382 MS
EKG Q-T INTERVAL: 394 MS
EKG QRS DURATION: 68 MS
EKG QRS DURATION: 68 MS
EKG QTC CALCULATION (BAZETT): 445 MS
EKG QTC CALCULATION (BAZETT): 446 MS
EKG R AXIS: -9 DEGREES
EKG R AXIS: 0 DEGREES
EKG T AXIS: 29 DEGREES
EKG T AXIS: 30 DEGREES
EKG VENTRICULAR RATE: 77 BPM
EKG VENTRICULAR RATE: 82 BPM

## 2025-06-03 NOTE — TELEPHONE ENCOUNTER
TI to pcp Dr Gunnar Landers from [facility] called office stating they are letting Dr Maher know they have been providing pt w/ C PT and skilled nursing services since 5/19/25.    Pt is asking to hold for 2 wk due to pt receiving extensive chemo Tx at this time and pt agrees to pick back up on 6/16/25 w/ another PT re-eval per Leesa.

## 2025-06-07 PROBLEM — G47.30 SLEEP-DISORDERED BREATHING: Status: ACTIVE | Noted: 2022-05-27

## 2025-06-10 ENCOUNTER — OFFICE VISIT (OUTPATIENT)
Age: 80
End: 2025-06-10
Payer: COMMERCIAL

## 2025-06-10 VITALS
OXYGEN SATURATION: 96 % | HEART RATE: 81 BPM | WEIGHT: 185 LBS | HEIGHT: 60 IN | TEMPERATURE: 97.6 F | DIASTOLIC BLOOD PRESSURE: 52 MMHG | BODY MASS INDEX: 36.32 KG/M2 | SYSTOLIC BLOOD PRESSURE: 110 MMHG

## 2025-06-10 DIAGNOSIS — R60.0 BILATERAL LEG EDEMA: ICD-10-CM

## 2025-06-10 DIAGNOSIS — C90.00 MULTIPLE MYELOMA, REMISSION STATUS UNSPECIFIED (HCC): Primary | ICD-10-CM

## 2025-06-10 DIAGNOSIS — Z91.81 AT HIGH RISK FOR FALLS: ICD-10-CM

## 2025-06-10 PROCEDURE — 1090F PRES/ABSN URINE INCON ASSESS: CPT | Performed by: STUDENT IN AN ORGANIZED HEALTH CARE EDUCATION/TRAINING PROGRAM

## 2025-06-10 PROCEDURE — 99215 OFFICE O/P EST HI 40 MIN: CPT | Performed by: STUDENT IN AN ORGANIZED HEALTH CARE EDUCATION/TRAINING PROGRAM

## 2025-06-10 PROCEDURE — G8427 DOCREV CUR MEDS BY ELIG CLIN: HCPCS | Performed by: STUDENT IN AN ORGANIZED HEALTH CARE EDUCATION/TRAINING PROGRAM

## 2025-06-10 PROCEDURE — 1159F MED LIST DOCD IN RCRD: CPT | Performed by: STUDENT IN AN ORGANIZED HEALTH CARE EDUCATION/TRAINING PROGRAM

## 2025-06-10 PROCEDURE — G8399 PT W/DXA RESULTS DOCUMENT: HCPCS | Performed by: STUDENT IN AN ORGANIZED HEALTH CARE EDUCATION/TRAINING PROGRAM

## 2025-06-10 PROCEDURE — 1123F ACP DISCUSS/DSCN MKR DOCD: CPT | Performed by: STUDENT IN AN ORGANIZED HEALTH CARE EDUCATION/TRAINING PROGRAM

## 2025-06-10 PROCEDURE — 1036F TOBACCO NON-USER: CPT | Performed by: STUDENT IN AN ORGANIZED HEALTH CARE EDUCATION/TRAINING PROGRAM

## 2025-06-10 PROCEDURE — G8417 CALC BMI ABV UP PARAM F/U: HCPCS | Performed by: STUDENT IN AN ORGANIZED HEALTH CARE EDUCATION/TRAINING PROGRAM

## 2025-06-10 PROCEDURE — 3078F DIAST BP <80 MM HG: CPT | Performed by: STUDENT IN AN ORGANIZED HEALTH CARE EDUCATION/TRAINING PROGRAM

## 2025-06-10 PROCEDURE — 1160F RVW MEDS BY RX/DR IN RCRD: CPT | Performed by: STUDENT IN AN ORGANIZED HEALTH CARE EDUCATION/TRAINING PROGRAM

## 2025-06-10 PROCEDURE — 1111F DSCHRG MED/CURRENT MED MERGE: CPT | Performed by: STUDENT IN AN ORGANIZED HEALTH CARE EDUCATION/TRAINING PROGRAM

## 2025-06-10 PROCEDURE — G2211 COMPLEX E/M VISIT ADD ON: HCPCS | Performed by: STUDENT IN AN ORGANIZED HEALTH CARE EDUCATION/TRAINING PROGRAM

## 2025-06-10 PROCEDURE — 3074F SYST BP LT 130 MM HG: CPT | Performed by: STUDENT IN AN ORGANIZED HEALTH CARE EDUCATION/TRAINING PROGRAM

## 2025-06-10 RX ORDER — SULFAMETHOXAZOLE AND TRIMETHOPRIM 800; 160 MG/1; MG/1
TABLET ORAL
COMMUNITY
Start: 2025-05-19

## 2025-06-10 NOTE — PROGRESS NOTES
Subjective  Mckenna Mendoza, 79 y.o. female presents today with:  Chief Complaint   Patient presents with    Follow-Up from Hospital     Has been in the hospital a few time recently. Most recently was admitted 5/23/25 following treatment for multiple myeloma. Was D/C home 5/24/25. States she will be going for another treatment on 6/13/25. States she is doing well other than she is having a lot of weakness in her legs. States she is hardly able to walk.  states her feet are also very swollen. States this started when leaving hospital. She reports she does have SOB. Denies chest pains.      History of Present Illness  The patient is an 79-year-old female who presents for a follow-up visit. She is accompanied by her .  She was in the hospital from May 23 to 24.  She is not eligible for TCM, she had to reschedule her appointment.    She has been experiencing persistent leg swelling since her discharge from the hospital on 05/24/2025, which has escalated to the point where she is unable to wear her shoes. The swelling extends down to her ankles. She reports feeling fatigued and has been receiving home health care, physical therapy, and other services, which she finds overwhelming. She has been using a cane and a rollator with a seat for mobility. Additionally, she reports that her hip gives way when she walks.  She is scheduled to see cardiology later this week.    She is currently on a daily regimen of furosemide but has not been adhering to the prescribed second dose due to forgetfulness. She recalls that during her hospitalization, she was administered a significant amount of fluids as part of her treatment for multiple myeloma, which included steroids.     She is scheduled for her fourth cancer treatment on Friday, with the first two treatments requiring hospital stays and the third necessitating an overnight stay. She has been taken off Eliquis.    She also notes that she has had some issues in her

## 2025-06-10 NOTE — PROGRESS NOTES
Advance Care Planning     Advance Care Planning (ACP) Physician/NP/PA Conversation    Date of Conversation: 6/10/2025  Conducted with: Patient with Decision Making Capacity  Other persons present: Spouse Keon    Patient declined questionnaire

## 2025-06-27 DIAGNOSIS — R19.7 DIARRHEA, UNSPECIFIED TYPE: Primary | ICD-10-CM

## 2025-06-27 RX ORDER — DIPHENHYDRAMINE HCL 25 MG
25 TABLET ORAL ONCE
Status: CANCELLED | OUTPATIENT
Start: 2025-07-01 | End: 2025-07-01

## 2025-06-27 RX ORDER — ACETAMINOPHEN 325 MG/1
650 TABLET ORAL ONCE
Status: CANCELLED | OUTPATIENT
Start: 2025-07-01 | End: 2025-07-01

## 2025-06-30 PROCEDURE — 36415 COLL VENOUS BLD VENIPUNCTURE: CPT

## 2025-06-30 PROCEDURE — 86901 BLOOD TYPING SEROLOGIC RH(D): CPT

## 2025-06-30 PROCEDURE — 86870 RBC ANTIBODY IDENTIFICATION: CPT

## 2025-06-30 PROCEDURE — 86880 COOMBS TEST DIRECT: CPT

## 2025-06-30 PROCEDURE — 86850 RBC ANTIBODY SCREEN: CPT

## 2025-06-30 PROCEDURE — 86922 COMPATIBILITY TEST ANTIGLOB: CPT

## 2025-06-30 PROCEDURE — 86900 BLOOD TYPING SEROLOGIC ABO: CPT

## 2025-07-01 ENCOUNTER — HOSPITAL ENCOUNTER (OUTPATIENT)
Dept: INFUSION THERAPY | Age: 80
Setting detail: INFUSION SERIES
Discharge: HOME | End: 2025-07-01
Payer: MEDICARE

## 2025-07-01 VITALS
DIASTOLIC BLOOD PRESSURE: 60 MMHG | HEART RATE: 84 BPM | RESPIRATION RATE: 18 BRPM | SYSTOLIC BLOOD PRESSURE: 98 MMHG | TEMPERATURE: 97.9 F | OXYGEN SATURATION: 97 %

## 2025-07-01 DIAGNOSIS — D64.9 ACUTE ANEMIA: ICD-10-CM

## 2025-07-01 DIAGNOSIS — C90.00 MULTIPLE MYELOMA WITHOUT REMISSION (HCC): ICD-10-CM

## 2025-07-01 DIAGNOSIS — C90.00 MULTIPLE MYELOMA NOT HAVING ACHIEVED REMISSION (HCC): ICD-10-CM

## 2025-07-01 DIAGNOSIS — C90.00 MULTIPLE MYELOMA, REMISSION STATUS UNSPECIFIED (HCC): ICD-10-CM

## 2025-07-01 DIAGNOSIS — R19.7 DIARRHEA, UNSPECIFIED TYPE: Primary | ICD-10-CM

## 2025-07-01 LAB
ABO/RH: NORMAL
ANTIBODY SCREEN: NORMAL
BLOOD BANK DISPENSE STATUS: NORMAL
BLOOD BANK PRODUCT CODE: NORMAL
BLOOD GROUP ANTIBODIES SERPL: NORMAL
BPU ID: NORMAL
DAT IGG: NORMAL
DAT POLY-SP REAG RBC QL: NORMAL
DESCRIPTION BLOOD BANK: NORMAL

## 2025-07-01 PROCEDURE — P9016 RBC LEUKOCYTES REDUCED: HCPCS

## 2025-07-01 PROCEDURE — 6370000000 HC RX 637 (ALT 250 FOR IP)

## 2025-07-01 PROCEDURE — 36430 TRANSFUSION BLD/BLD COMPNT: CPT

## 2025-07-01 PROCEDURE — 2580000003 HC RX 258

## 2025-07-01 RX ORDER — ACETAMINOPHEN 325 MG/1
650 TABLET ORAL ONCE
Status: COMPLETED | OUTPATIENT
Start: 2025-07-01 | End: 2025-07-01

## 2025-07-01 RX ORDER — SODIUM CHLORIDE 9 MG/ML
INJECTION, SOLUTION INTRAVENOUS
Status: COMPLETED
Start: 2025-07-01 | End: 2025-07-01

## 2025-07-01 RX ORDER — DIPHENHYDRAMINE HCL 25 MG
25 TABLET ORAL ONCE
Status: COMPLETED | OUTPATIENT
Start: 2025-07-01 | End: 2025-07-01

## 2025-07-01 RX ORDER — ACETAMINOPHEN 325 MG/1
650 TABLET ORAL ONCE
Status: CANCELLED | OUTPATIENT
Start: 2025-07-01 | End: 2025-07-01

## 2025-07-01 RX ORDER — DIPHENHYDRAMINE HCL 25 MG
25 TABLET ORAL ONCE
Status: CANCELLED | OUTPATIENT
Start: 2025-07-01 | End: 2025-07-01

## 2025-07-01 RX ADMIN — ACETAMINOPHEN 650 MG: 325 TABLET ORAL at 10:43

## 2025-07-01 RX ADMIN — SODIUM CHLORIDE 100 ML: 9 INJECTION, SOLUTION INTRAVENOUS at 10:44

## 2025-07-01 RX ADMIN — DIPHENHYDRAMINE HCL 25 MG: 25 TABLET ORAL at 10:43

## 2025-07-01 NOTE — PROGRESS NOTES
Patient arrived to the unit via wheelchair. Vital signs taken and stable. Patient oriented to room and call light. Call light within reach.    1030 - IV placed without complications. Brisk blood return noted. Pt tolerated well. Call light within reach.     1043 - Blood consent signed and placed in chart. Premedications given, pt tolerated well. Pt educated on signs and symptoms of medication reactions. Patient verbalized understanding, no further questions. 30 minute observation started.     1130 - 1 unit PRBC verified with MADAY Puente. Blood transfusion initiated. Nurse at bedside for 15 minutes.     1145 - 15 minute observation complete. Pt without signs/symptoms of transfusion reaction. PRBC increased per protocol. Call light within reach.     1330 - Blood transfusion complete. Patient without signs or symptoms of adverse reactions at this time. 30 minute observation started. Call light within reach.     1406 - Observation complete. Denies any adverse reactions at this time. IV removed without complication. Pt tolerated well. Catheter appears intact, dressing applied. No drainage noted. Patient left the unit via wheelchair. All equipment used in the care for this patient has been cleaned.

## 2025-07-07 ENCOUNTER — TELEPHONE (OUTPATIENT)
Age: 80
End: 2025-07-07

## 2025-07-25 ENCOUNTER — APPOINTMENT (OUTPATIENT)
Dept: CT IMAGING | Age: 80
DRG: 947 | End: 2025-07-25
Payer: MEDICARE

## 2025-07-25 ENCOUNTER — HOSPITAL ENCOUNTER (INPATIENT)
Age: 80
LOS: 5 days | Discharge: NURSING, REHAB OR LONG-TERM CARE | DRG: 947 | End: 2025-07-30
Attending: INTERNAL MEDICINE | Admitting: INTERNAL MEDICINE
Payer: MEDICARE

## 2025-07-25 ENCOUNTER — APPOINTMENT (OUTPATIENT)
Dept: GENERAL RADIOLOGY | Age: 80
DRG: 947 | End: 2025-07-25
Payer: MEDICARE

## 2025-07-25 DIAGNOSIS — Z85.79 HX OF MULTIPLE MYELOMA: ICD-10-CM

## 2025-07-25 DIAGNOSIS — R79.89 ELEVATED TROPONIN: ICD-10-CM

## 2025-07-25 DIAGNOSIS — D61.82 MYELOPHTHISIC ANEMIA (HCC): ICD-10-CM

## 2025-07-25 DIAGNOSIS — D72.819 LEUKOPENIA, UNSPECIFIED TYPE: ICD-10-CM

## 2025-07-25 DIAGNOSIS — N17.9 AKI (ACUTE KIDNEY INJURY): Primary | ICD-10-CM

## 2025-07-25 DIAGNOSIS — R29.898 WEAKNESS OF BOTH LOWER EXTREMITIES: ICD-10-CM

## 2025-07-25 PROBLEM — C90.00 METASTATIC MULTIPLE MYELOMA TO BONE (HCC): Status: ACTIVE | Noted: 2025-07-25

## 2025-07-25 LAB
ACANTHOCYTES BLD QL SMEAR: ABNORMAL
ALBUMIN SERPL-MCNC: 4.3 G/DL (ref 3.5–4.6)
ALP SERPL-CCNC: 74 U/L (ref 40–130)
ALT SERPL-CCNC: 6 U/L (ref 0–33)
ANION GAP SERPL CALCULATED.3IONS-SCNC: 19 MEQ/L (ref 9–15)
ANISOCYTOSIS BLD QL SMEAR: ABNORMAL
AST SERPL-CCNC: 14 U/L (ref 0–35)
BASOPHILS # BLD: 0.1 K/UL (ref 0–0.2)
BASOPHILS NFR BLD: 4 %
BILIRUB SERPL-MCNC: 1 MG/DL (ref 0.2–0.7)
BUN SERPL-MCNC: 32 MG/DL (ref 8–23)
CALCIUM SERPL-MCNC: 9.9 MG/DL (ref 8.5–9.9)
CHLORIDE SERPL-SCNC: 95 MEQ/L (ref 95–107)
CO2 SERPL-SCNC: 23 MEQ/L (ref 20–31)
CREAT SERPL-MCNC: 2.84 MG/DL (ref 0.5–0.9)
EKG ATRIAL RATE: 92 BPM
EKG DIAGNOSIS: NORMAL
EKG P AXIS: 38 DEGREES
EKG P-R INTERVAL: 170 MS
EKG Q-T INTERVAL: 380 MS
EKG QRS DURATION: 74 MS
EKG QTC CALCULATION (BAZETT): 469 MS
EKG R AXIS: -16 DEGREES
EKG T AXIS: 57 DEGREES
EKG VENTRICULAR RATE: 92 BPM
EOSINOPHIL # BLD: 0.1 K/UL (ref 0–0.7)
EOSINOPHIL NFR BLD: 4 %
ERYTHROCYTE [DISTWIDTH] IN BLOOD BY AUTOMATED COUNT: 21 % (ref 11.5–14.5)
GLOBULIN SER CALC-MCNC: 2 G/DL (ref 2.3–3.5)
GLUCOSE BLD-MCNC: 130 MG/DL (ref 70–99)
GLUCOSE BLD-MCNC: 163 MG/DL (ref 70–99)
GLUCOSE SERPL-MCNC: 223 MG/DL (ref 70–99)
HCT VFR BLD AUTO: 27 % (ref 37–47)
HGB BLD-MCNC: 8.7 G/DL (ref 12–16)
LYMPHOCYTES # BLD: 0.6 K/UL (ref 1–4.8)
LYMPHOCYTES NFR BLD: 36 %
MAGNESIUM SERPL-MCNC: 1.9 MG/DL (ref 1.7–2.4)
MCH RBC QN AUTO: 35.4 PG (ref 27–31.3)
MCHC RBC AUTO-ENTMCNC: 32.2 % (ref 33–37)
MCV RBC AUTO: 109.8 FL (ref 79.4–94.8)
METAMYELOCYTES NFR BLD MANUAL: 1 %
MICROCYTES BLD QL SMEAR: ABNORMAL
MONOCYTES # BLD: 0 K/UL (ref 0.2–0.8)
MONOCYTES NFR BLD: 1.9 %
NEUTROPHILS # BLD: 0.8 K/UL (ref 1.4–6.5)
NEUTS SEG NFR BLD: 53 %
NRBC BLD-RTO: 2 /100 WBC
PERFORMED ON: ABNORMAL
PERFORMED ON: ABNORMAL
PLATELET # BLD AUTO: 118 K/UL (ref 130–400)
PLATELET BLD QL SMEAR: ABNORMAL
POIKILOCYTOSIS BLD QL SMEAR: ABNORMAL
POTASSIUM SERPL-SCNC: 3.9 MEQ/L (ref 3.4–4.9)
PROT SERPL-MCNC: 6.3 G/DL (ref 6.3–8)
RBC # BLD AUTO: 2.46 M/UL (ref 4.2–5.4)
SLIDE REVIEW: ABNORMAL
SMUDGE CELLS BLD QL SMEAR: 6.7
SODIUM SERPL-SCNC: 137 MEQ/L (ref 135–144)
TROPONIN, HIGH SENSITIVITY: 154 NG/L (ref 0–19)
TROPONIN, HIGH SENSITIVITY: 181 NG/L (ref 0–19)
TSH REFLEX: 4 UIU/ML (ref 0.44–3.86)
VARIANT LYMPHS NFR BLD: 1 %
WBC # BLD AUTO: 1.5 K/UL (ref 4.8–10.8)

## 2025-07-25 PROCEDURE — 99285 EMERGENCY DEPT VISIT HI MDM: CPT

## 2025-07-25 PROCEDURE — 2580000003 HC RX 258: Performed by: PHYSICIAN ASSISTANT

## 2025-07-25 PROCEDURE — 87150 DNA/RNA AMPLIFIED PROBE: CPT

## 2025-07-25 PROCEDURE — 93010 ELECTROCARDIOGRAM REPORT: CPT | Performed by: INTERNAL MEDICINE

## 2025-07-25 PROCEDURE — 2500000003 HC RX 250 WO HCPCS: Performed by: INTERNAL MEDICINE

## 2025-07-25 PROCEDURE — 85025 COMPLETE CBC W/AUTO DIFF WBC: CPT

## 2025-07-25 PROCEDURE — 71045 X-RAY EXAM CHEST 1 VIEW: CPT

## 2025-07-25 PROCEDURE — 84484 ASSAY OF TROPONIN QUANT: CPT

## 2025-07-25 PROCEDURE — 93005 ELECTROCARDIOGRAM TRACING: CPT | Performed by: PHYSICIAN ASSISTANT

## 2025-07-25 PROCEDURE — 87040 BLOOD CULTURE FOR BACTERIA: CPT

## 2025-07-25 PROCEDURE — 84443 ASSAY THYROID STIM HORMONE: CPT

## 2025-07-25 PROCEDURE — 80053 COMPREHEN METABOLIC PANEL: CPT

## 2025-07-25 PROCEDURE — 70450 CT HEAD/BRAIN W/O DYE: CPT

## 2025-07-25 PROCEDURE — 1210000000 HC MED SURG R&B

## 2025-07-25 PROCEDURE — 2580000003 HC RX 258: Performed by: INTERNAL MEDICINE

## 2025-07-25 PROCEDURE — 36415 COLL VENOUS BLD VENIPUNCTURE: CPT

## 2025-07-25 PROCEDURE — 83735 ASSAY OF MAGNESIUM: CPT

## 2025-07-25 PROCEDURE — 6360000002 HC RX W HCPCS: Performed by: INTERNAL MEDICINE

## 2025-07-25 RX ORDER — ONDANSETRON 2 MG/ML
4 INJECTION INTRAMUSCULAR; INTRAVENOUS EVERY 6 HOURS PRN
Status: DISCONTINUED | OUTPATIENT
Start: 2025-07-25 | End: 2025-07-25

## 2025-07-25 RX ORDER — FLUCONAZOLE 200 MG/1
400 TABLET ORAL DAILY
Status: ON HOLD | COMMUNITY
Start: 2025-07-02

## 2025-07-25 RX ORDER — POLYETHYLENE GLYCOL 3350 17 G/17G
17 POWDER, FOR SOLUTION ORAL DAILY PRN
Status: DISCONTINUED | OUTPATIENT
Start: 2025-07-25 | End: 2025-07-30 | Stop reason: HOSPADM

## 2025-07-25 RX ORDER — 0.9 % SODIUM CHLORIDE 0.9 %
1000 INTRAVENOUS SOLUTION INTRAVENOUS ONCE
Status: COMPLETED | OUTPATIENT
Start: 2025-07-25 | End: 2025-07-25

## 2025-07-25 RX ORDER — DEXTROSE MONOHYDRATE 100 MG/ML
INJECTION, SOLUTION INTRAVENOUS CONTINUOUS PRN
Status: DISCONTINUED | OUTPATIENT
Start: 2025-07-25 | End: 2025-07-28 | Stop reason: SDUPTHER

## 2025-07-25 RX ORDER — SODIUM CHLORIDE 0.9 % (FLUSH) 0.9 %
5-40 SYRINGE (ML) INJECTION PRN
Status: DISCONTINUED | OUTPATIENT
Start: 2025-07-25 | End: 2025-07-30 | Stop reason: HOSPADM

## 2025-07-25 RX ORDER — ONDANSETRON 4 MG/1
4 TABLET, ORALLY DISINTEGRATING ORAL EVERY 8 HOURS PRN
Status: DISCONTINUED | OUTPATIENT
Start: 2025-07-25 | End: 2025-07-25

## 2025-07-25 RX ORDER — GLUCAGON 1 MG/ML
1 KIT INJECTION PRN
Status: DISCONTINUED | OUTPATIENT
Start: 2025-07-25 | End: 2025-07-28

## 2025-07-25 RX ORDER — ACETAMINOPHEN 325 MG/1
650 TABLET ORAL EVERY 6 HOURS PRN
Status: DISCONTINUED | OUTPATIENT
Start: 2025-07-25 | End: 2025-07-30 | Stop reason: HOSPADM

## 2025-07-25 RX ORDER — SODIUM CHLORIDE 9 MG/ML
INJECTION, SOLUTION INTRAVENOUS PRN
Status: DISCONTINUED | OUTPATIENT
Start: 2025-07-25 | End: 2025-07-30 | Stop reason: HOSPADM

## 2025-07-25 RX ORDER — HEPARIN SODIUM 5000 [USP'U]/ML
5000 INJECTION, SOLUTION INTRAVENOUS; SUBCUTANEOUS EVERY 8 HOURS SCHEDULED
Status: DISCONTINUED | OUTPATIENT
Start: 2025-07-25 | End: 2025-07-30 | Stop reason: HOSPADM

## 2025-07-25 RX ORDER — INSULIN LISPRO 100 [IU]/ML
0-4 INJECTION, SOLUTION INTRAVENOUS; SUBCUTANEOUS
Status: DISCONTINUED | OUTPATIENT
Start: 2025-07-25 | End: 2025-07-28

## 2025-07-25 RX ORDER — METOLAZONE 5 MG/1
5 TABLET ORAL EVERY OTHER DAY
Status: ON HOLD | COMMUNITY
Start: 2025-07-11

## 2025-07-25 RX ORDER — ACETAMINOPHEN 650 MG/1
650 SUPPOSITORY RECTAL EVERY 6 HOURS PRN
Status: DISCONTINUED | OUTPATIENT
Start: 2025-07-25 | End: 2025-07-30 | Stop reason: HOSPADM

## 2025-07-25 RX ORDER — SODIUM CHLORIDE, SODIUM LACTATE, POTASSIUM CHLORIDE, CALCIUM CHLORIDE 600; 310; 30; 20 MG/100ML; MG/100ML; MG/100ML; MG/100ML
INJECTION, SOLUTION INTRAVENOUS CONTINUOUS
Status: DISPENSED | OUTPATIENT
Start: 2025-07-25 | End: 2025-07-26

## 2025-07-25 RX ORDER — SODIUM CHLORIDE 0.9 % (FLUSH) 0.9 %
5-40 SYRINGE (ML) INJECTION EVERY 12 HOURS SCHEDULED
Status: DISCONTINUED | OUTPATIENT
Start: 2025-07-25 | End: 2025-07-30 | Stop reason: HOSPADM

## 2025-07-25 RX ADMIN — SODIUM CHLORIDE 1000 ML: 0.9 INJECTION, SOLUTION INTRAVENOUS at 09:29

## 2025-07-25 RX ADMIN — SODIUM CHLORIDE, SODIUM LACTATE, POTASSIUM CHLORIDE, AND CALCIUM CHLORIDE: .6; .31; .03; .02 INJECTION, SOLUTION INTRAVENOUS at 15:37

## 2025-07-25 RX ADMIN — HEPARIN SODIUM 5000 UNITS: 5000 INJECTION INTRAVENOUS; SUBCUTANEOUS at 14:10

## 2025-07-25 RX ADMIN — HEPARIN SODIUM 5000 UNITS: 5000 INJECTION INTRAVENOUS; SUBCUTANEOUS at 22:38

## 2025-07-25 RX ADMIN — SODIUM CHLORIDE, PRESERVATIVE FREE 10 ML: 5 INJECTION INTRAVENOUS at 22:45

## 2025-07-25 ASSESSMENT — PAIN - FUNCTIONAL ASSESSMENT: PAIN_FUNCTIONAL_ASSESSMENT: NONE - DENIES PAIN

## 2025-07-25 NOTE — PLAN OF CARE
Problem: Discharge Planning  Goal: Discharge to home or other facility with appropriate resources  Outcome: Progressing     Problem: Chronic Conditions and Co-morbidities  Goal: Patient's chronic conditions and co-morbidity symptoms are monitored and maintained or improved  Outcome: Progressing     Problem: ABCDS Injury Assessment  Goal: Absence of physical injury  Outcome: Progressing     Problem: Skin/Tissue Integrity  Goal: Skin integrity remains intact  Description: 1.  Monitor for areas of redness and/or skin breakdown  2.  Assess vascular access sites hourly  3.  Every 4-6 hours minimum:  Change oxygen saturation probe site  4.  Every 4-6 hours:  If on nasal continuous positive airway pressure, respiratory therapy assess nares and determine need for appliance change or resting period  Outcome: Progressing     Problem: Safety - Adult  Goal: Free from fall injury  Outcome: Progressing

## 2025-07-25 NOTE — ED PROVIDER NOTES
[facility] EMERGENCY DEPARTMENT  EMERGENCY DEPARTMENT ENCOUNTER      Pt Name: Mckenna Mendoza  MRN: [id number]  Birthdate 1945  Date of evaluation: 7/25/2025  Provider: Moshe Carpenter PA-C  Note Started: 7/25/25 8:41 AM EDT    CHIEF COMPLAINT       Chief Complaint   Patient presents with    Extremity Weakness         HISTORY OF PRESENT ILLNESS   (Location/Symptom, Timing/Onset, Context/Setting, Quality, Duration, Modifying Factors, Severity)  Note limiting factors.   Mckenna Mendoza is a 79 y.o. female who presents to the emergency department with a complaint of lower extremity weakness, patient states for the last 3 days she has been having difficulty with ambulation, she states that she feels as if her lower extremities are giving out on her, she states that she has to stop frequently while walking, but states this morning when trying to get out of bed, she just did not have the strength, and felt as if she was going to fall.  She came to the ED for further evaluation, she denies any chest pain or shortness of breath, no headache, no numbness or tingling, no bowel or bladder functions, denies any acute injury, no recent illness for her.  Patient does have previous history of multiple myeloma, she states she was sent to see her oncologist today for her weekly injections, but due to her weakness came to the ED instead.  She denies any chest pain at this time, she denies any pain, 0 out of 10.  Past medical history significant for hyperlipidemia, type 2 diabetes, hypertension, multiple myeloma, osteoarthritis.    HPI    Nursing Notes were reviewed.    REVIEW OF SYSTEMS    (2-9 systems for level 4, 10 or more for level 5)     Review of Systems   Constitutional:  Negative for activity change and appetite change.   HENT:  Negative for congestion, ear discharge, ear pain, nosebleeds, rhinorrhea and sore throat.    Eyes:  Negative for discharge.   Respiratory:  Negative for shortness of breath.    Cardiovascular:

## 2025-07-25 NOTE — ED NOTES
Pt presents to ER from home with bilateral lower extremity weakness that has progressed since last Friday. Pt does have multiple myeloma and receives treatment (shot) on Monday and Friday. She was supposed to go get treatment today and was unable to due to being here. Pt does normally have lower leg swelling but per her and spouse in the room it is better then it normally is. Pt is A&Ox4, warm and dry with vitals stable.

## 2025-07-26 LAB
A BAUMANNII DNA BLD POS QL NAA+NON-PROBE: NOT DETECTED
ACANTHOCYTES BLD QL SMEAR: ABNORMAL
ANION GAP SERPL CALCULATED.3IONS-SCNC: 12 MEQ/L (ref 9–15)
ANISOCYTOSIS BLD QL SMEAR: ABNORMAL
BACTERIA BLD CULT: ABNORMAL
BACTERIA URNS QL MICRO: NEGATIVE /HPF
BASOPHILS # BLD: 0 K/UL (ref 0–0.2)
BASOPHILS NFR BLD: 4 %
BILIRUB UR QL STRIP: NEGATIVE
BUN SERPL-MCNC: 26 MG/DL (ref 8–23)
BURR CELLS: ABNORMAL
C ALBICANS DNA BLD POS QL NAA+NON-PROBE: NOT DETECTED
C AURIS DNA BLD POS QL NAA+PROBE: NOT DETECTED
C GLABRATA DNA BLD POS QL NAA+NON-PROBE: NOT DETECTED
C KRUSEI DNA BLD POS QL NAA+NON-PROBE: NOT DETECTED
C PARAP DNA BLD POS QL NAA+NON-PROBE: NOT DETECTED
C TROPICLS DNA BLD POS QL NAA+NON-PROBE: NOT DETECTED
CALCIUM SERPL-MCNC: 9.2 MG/DL (ref 8.5–9.9)
CHLORIDE SERPL-SCNC: 100 MEQ/L (ref 95–107)
CLARITY UR: CLEAR
CO2 SERPL-SCNC: 26 MEQ/L (ref 20–31)
COLOR UR: YELLOW
CREAT SERPL-MCNC: 2.06 MG/DL (ref 0.5–0.9)
CRYPTOCOCCUS NEOFORMANS/GATTII BY PCR: NOT DETECTED
DACRYOCYTES BLD QL SMEAR: ABNORMAL
DOHLE BOD BLD QL SMEAR: ABNORMAL
E CLOAC COMP DNA BLD POS NAA+NON-PROBE: NOT DETECTED
E COLI DNA BLD POS QL NAA+NON-PROBE: NOT DETECTED
E FAECALIS DNA BLD POS QL NAA+PROBE: NOT DETECTED
E FAECIUM DNA BLD POS QL NAA+PROBE: NOT DETECTED
ENTEROBACT DNA BLD POS QL NAA+NON-PROBE: NOT DETECTED
ENTEROCOC DNA BLD POS QL NAA+NON-PROBE: NOT DETECTED
EOSINOPHIL # BLD: 0.2 K/UL (ref 0–0.7)
EOSINOPHIL NFR BLD: 15 %
EPI CELLS #/AREA URNS AUTO: NORMAL /HPF (ref 0–5)
ERYTHROCYTE [DISTWIDTH] IN BLOOD BY AUTOMATED COUNT: 20.6 % (ref 11.5–14.5)
GLUCOSE BLD-MCNC: 139 MG/DL (ref 70–99)
GLUCOSE BLD-MCNC: 148 MG/DL (ref 70–99)
GLUCOSE BLD-MCNC: 223 MG/DL (ref 70–99)
GLUCOSE BLD-MCNC: 281 MG/DL (ref 70–99)
GLUCOSE SERPL-MCNC: 112 MG/DL (ref 70–99)
GLUCOSE UR STRIP-MCNC: >=1000 MG/DL
GN BLD CULTURE PNL BLD POS NAA+PROBE: NOT DETECTED
GP B STREP DNA BLD POS QL NAA+NON-PROBE: NOT DETECTED
HCT VFR BLD AUTO: 22 % (ref 37–47)
HGB BLD-MCNC: 7.5 G/DL (ref 12–16)
HGB UR QL STRIP: NEGATIVE
HYALINE CASTS #/AREA URNS AUTO: NORMAL /HPF (ref 0–5)
K OXYTOCA DNA BLD POS QL NAA+NON-PROBE: NOT DETECTED
K PNEUMON DNA SPEC QL NAA+PROBE: NOT DETECTED
K. AEROGENES DNA SPEC QL NAA+PROBE: NOT DETECTED
KETONES UR STRIP-MCNC: NEGATIVE MG/DL
L MONOCYTOG DNA BLD POS QL NAA+NON-PROBE: NOT DETECTED
LEUKOCYTE ESTERASE UR QL STRIP: NEGATIVE
LYMPHOCYTES # BLD: 0.3 K/UL (ref 1–4.8)
LYMPHOCYTES NFR BLD: 30 %
MACROCYTES BLD QL SMEAR: ABNORMAL
MAGNESIUM SERPL-MCNC: 1.9 MG/DL (ref 1.7–2.4)
MCH RBC QN AUTO: 36.4 PG (ref 27–31.3)
MCHC RBC AUTO-ENTMCNC: 34.1 % (ref 33–37)
MCV RBC AUTO: 106.8 FL (ref 79.4–94.8)
MECA BLD POS QL NAA+NON-PROBE: DETECTED
MONOCYTES # BLD: 0 K/UL (ref 0.2–0.8)
MONOCYTES NFR BLD: 5.2 %
MYELOCYTES NFR BLD MANUAL: 1 %
N MEN DNA BLD POS QL NAA+NON-PROBE: NOT DETECTED
NEUTROPHILS # BLD: 0.5 K/UL (ref 1.4–6.5)
NEUTS SEG NFR BLD: 50 %
NITRITE UR QL STRIP: NEGATIVE
NRBC BLD-RTO: 3 /100 WBC
OVALOCYTES BLD QL SMEAR: ABNORMAL
P AERUGINOSA DNA BLD POS NAA+NON-PROBE: NOT DETECTED
PATH INTERP BLD-IMP: YES
PERFORMED ON: ABNORMAL
PH UR STRIP: 7 [PH] (ref 5–9)
PLATELET # BLD AUTO: 97 K/UL (ref 130–400)
PLATELET BLD QL SMEAR: ABNORMAL
POIKILOCYTOSIS BLD QL SMEAR: ABNORMAL
POTASSIUM SERPL-SCNC: 3.1 MEQ/L (ref 3.4–4.9)
PROT UR STRIP-MCNC: 30 MG/DL
PROTEUS SP DNA BLD POS QL NAA+NON-PROBE: NOT DETECTED
RBC # BLD AUTO: 2.06 M/UL (ref 4.2–5.4)
RBC #/AREA URNS AUTO: NORMAL /HPF (ref 0–5)
S AUREUS DNA BLD POS QL NAA+NON-PROBE: NOT DETECTED
S AUREUS+CONS DNA BLD POS NAA+NON-PROBE: DETECTED
S EPIDERMIDIS DNA BLD POS QL NAA+PROBE: DETECTED
S LUGDUNENSIS DNA BLD POS QL NAA+PROBE: NOT DETECTED
S MALTOPH DNA BLD POS QL NAA+PROBE: NOT DETECTED
S MARCESCENS DNA BLD POS NAA+NON-PROBE: NOT DETECTED
S PNEUM DNA BLD POS QL NAA+NON-PROBE: NOT DETECTED
S PYO DNA BLD POS QL NAA+NON-PROBE: NOT DETECTED
SALMONELLA DNA BLD POS QL NAA+PROBE: NOT DETECTED
SODIUM SERPL-SCNC: 138 MEQ/L (ref 135–144)
SP GR UR STRIP: 1.02 (ref 1–1.03)
STREPTOCOCCUS DNA BLD POS NAA+NON-PROBE: NOT DETECTED
URINE REFLEX TO CULTURE: ABNORMAL
UROBILINOGEN UR STRIP-ACNC: 1 E.U./DL
VARIANT LYMPHS NFR BLD: 1 %
WBC # BLD AUTO: 1 K/UL (ref 4.8–10.8)
WBC #/AREA URNS AUTO: NORMAL /HPF (ref 0–5)

## 2025-07-26 PROCEDURE — 86403 PARTICLE AGGLUT ANTBDY SCRN: CPT

## 2025-07-26 PROCEDURE — 1210000000 HC MED SURG R&B

## 2025-07-26 PROCEDURE — 85025 COMPLETE CBC W/AUTO DIFF WBC: CPT

## 2025-07-26 PROCEDURE — 2500000003 HC RX 250 WO HCPCS: Performed by: INTERNAL MEDICINE

## 2025-07-26 PROCEDURE — 83735 ASSAY OF MAGNESIUM: CPT

## 2025-07-26 PROCEDURE — 80048 BASIC METABOLIC PNL TOTAL CA: CPT

## 2025-07-26 PROCEDURE — 81001 URINALYSIS AUTO W/SCOPE: CPT

## 2025-07-26 PROCEDURE — 36415 COLL VENOUS BLD VENIPUNCTURE: CPT

## 2025-07-26 PROCEDURE — 97162 PT EVAL MOD COMPLEX 30 MIN: CPT

## 2025-07-26 PROCEDURE — 6360000002 HC RX W HCPCS: Performed by: INTERNAL MEDICINE

## 2025-07-26 PROCEDURE — 6370000000 HC RX 637 (ALT 250 FOR IP): Performed by: INTERNAL MEDICINE

## 2025-07-26 PROCEDURE — 2580000003 HC RX 258: Performed by: INTERNAL MEDICINE

## 2025-07-26 RX ORDER — FLUCONAZOLE 100 MG/1
400 TABLET ORAL DAILY
Status: DISCONTINUED | OUTPATIENT
Start: 2025-07-26 | End: 2025-07-29

## 2025-07-26 RX ORDER — ALBUTEROL SULFATE 90 UG/1
2 INHALANT RESPIRATORY (INHALATION) EVERY 6 HOURS PRN
Status: DISCONTINUED | OUTPATIENT
Start: 2025-07-26 | End: 2025-07-30 | Stop reason: HOSPADM

## 2025-07-26 RX ORDER — SULFAMETHOXAZOLE AND TRIMETHOPRIM 800; 160 MG/1; MG/1
1 TABLET ORAL
Status: DISCONTINUED | OUTPATIENT
Start: 2025-07-28 | End: 2025-07-29

## 2025-07-26 RX ORDER — METOPROLOL TARTRATE 25 MG/1
25 TABLET, FILM COATED ORAL 2 TIMES DAILY
Status: DISCONTINUED | OUTPATIENT
Start: 2025-07-26 | End: 2025-07-30 | Stop reason: HOSPADM

## 2025-07-26 RX ORDER — ACYCLOVIR 400 MG/1
400 TABLET ORAL DAILY
Status: DISCONTINUED | OUTPATIENT
Start: 2025-07-26 | End: 2025-07-30 | Stop reason: HOSPADM

## 2025-07-26 RX ORDER — VITAMIN B COMPLEX
2000 TABLET ORAL
Status: DISCONTINUED | OUTPATIENT
Start: 2025-07-26 | End: 2025-07-30 | Stop reason: HOSPADM

## 2025-07-26 RX ORDER — FLUTICASONE PROPIONATE 50 MCG
1 SPRAY, SUSPENSION (ML) NASAL DAILY PRN
Status: DISCONTINUED | OUTPATIENT
Start: 2025-07-26 | End: 2025-07-30 | Stop reason: HOSPADM

## 2025-07-26 RX ORDER — POTASSIUM CHLORIDE 1500 MG/1
40 TABLET, EXTENDED RELEASE ORAL EVERY 4 HOURS
Status: DISCONTINUED | OUTPATIENT
Start: 2025-07-26 | End: 2025-07-26

## 2025-07-26 RX ORDER — PANTOPRAZOLE SODIUM 40 MG/1
40 TABLET, DELAYED RELEASE ORAL DAILY
Status: DISCONTINUED | OUTPATIENT
Start: 2025-07-26 | End: 2025-07-30 | Stop reason: HOSPADM

## 2025-07-26 RX ORDER — GABAPENTIN 300 MG/1
300 CAPSULE ORAL 2 TIMES DAILY
Status: DISCONTINUED | OUTPATIENT
Start: 2025-07-26 | End: 2025-07-30 | Stop reason: HOSPADM

## 2025-07-26 RX ORDER — SODIUM CHLORIDE, SODIUM LACTATE, POTASSIUM CHLORIDE, CALCIUM CHLORIDE 600; 310; 30; 20 MG/100ML; MG/100ML; MG/100ML; MG/100ML
INJECTION, SOLUTION INTRAVENOUS CONTINUOUS
Status: DISPENSED | OUTPATIENT
Start: 2025-07-26 | End: 2025-07-26

## 2025-07-26 RX ORDER — ATORVASTATIN CALCIUM 40 MG/1
40 TABLET, FILM COATED ORAL DAILY
Status: DISCONTINUED | OUTPATIENT
Start: 2025-07-26 | End: 2025-07-30 | Stop reason: HOSPADM

## 2025-07-26 RX ADMIN — FILGRASTIM-AAFI 300 MCG: 300 INJECTION, SOLUTION SUBCUTANEOUS at 17:23

## 2025-07-26 RX ADMIN — HEPARIN SODIUM 5000 UNITS: 5000 INJECTION INTRAVENOUS; SUBCUTANEOUS at 21:13

## 2025-07-26 RX ADMIN — METOPROLOL TARTRATE 25 MG: 25 TABLET, FILM COATED ORAL at 10:15

## 2025-07-26 RX ADMIN — INSULIN LISPRO 2 UNITS: 100 INJECTION, SOLUTION INTRAVENOUS; SUBCUTANEOUS at 21:14

## 2025-07-26 RX ADMIN — SODIUM CHLORIDE, PRESERVATIVE FREE 10 ML: 5 INJECTION INTRAVENOUS at 10:17

## 2025-07-26 RX ADMIN — METHYLPREDNISOLONE SODIUM SUCCINATE 60 MG: 125 INJECTION INTRAMUSCULAR; INTRAVENOUS at 10:16

## 2025-07-26 RX ADMIN — HEPARIN SODIUM 5000 UNITS: 5000 INJECTION INTRAVENOUS; SUBCUTANEOUS at 06:32

## 2025-07-26 RX ADMIN — GABAPENTIN 300 MG: 300 CAPSULE ORAL at 10:15

## 2025-07-26 RX ADMIN — PANTOPRAZOLE SODIUM 40 MG: 40 TABLET, DELAYED RELEASE ORAL at 10:15

## 2025-07-26 RX ADMIN — INSULIN LISPRO 1 UNITS: 100 INJECTION, SOLUTION INTRAVENOUS; SUBCUTANEOUS at 17:22

## 2025-07-26 RX ADMIN — POTASSIUM CHLORIDE 40 MEQ: 1500 TABLET, EXTENDED RELEASE ORAL at 12:04

## 2025-07-26 RX ADMIN — SODIUM CHLORIDE, SODIUM LACTATE, POTASSIUM CHLORIDE, AND CALCIUM CHLORIDE: .6; .31; .03; .02 INJECTION, SOLUTION INTRAVENOUS at 10:26

## 2025-07-26 RX ADMIN — ATORVASTATIN CALCIUM 40 MG: 40 TABLET, FILM COATED ORAL at 10:15

## 2025-07-26 RX ADMIN — POTASSIUM BICARBONATE 40 MEQ: 782 TABLET, EFFERVESCENT ORAL at 17:25

## 2025-07-26 RX ADMIN — Medication 2000 UNITS: at 17:21

## 2025-07-26 RX ADMIN — SODIUM CHLORIDE, PRESERVATIVE FREE 10 ML: 5 INJECTION INTRAVENOUS at 21:15

## 2025-07-26 RX ADMIN — METOPROLOL TARTRATE 25 MG: 25 TABLET, FILM COATED ORAL at 21:13

## 2025-07-26 RX ADMIN — GABAPENTIN 300 MG: 300 CAPSULE ORAL at 21:13

## 2025-07-26 RX ADMIN — METHYLPREDNISOLONE SODIUM SUCCINATE 60 MG: 125 INJECTION INTRAMUSCULAR; INTRAVENOUS at 17:21

## 2025-07-26 RX ADMIN — ACYCLOVIR 400 MG: 400 TABLET ORAL at 10:15

## 2025-07-26 RX ADMIN — HEPARIN SODIUM 5000 UNITS: 5000 INJECTION INTRAVENOUS; SUBCUTANEOUS at 16:09

## 2025-07-26 RX ADMIN — FLUCONAZOLE 400 MG: 100 TABLET ORAL at 17:21

## 2025-07-26 NOTE — PLAN OF CARE
Problem: Discharge Planning  Goal: Discharge to home or other facility with appropriate resources  7/26/2025 1038 by Isatu Guillermo RN  Outcome: Progressing  7/26/2025 0545 by Gomez, Grisel, RN  Outcome: Progressing     Problem: Chronic Conditions and Co-morbidities  Goal: Patient's chronic conditions and co-morbidity symptoms are monitored and maintained or improved  7/26/2025 1038 by Isatu Guillermo RN  Outcome: Progressing  7/26/2025 0545 by Gomez, Grisel, RN  Outcome: Progressing     Problem: ABCDS Injury Assessment  Goal: Absence of physical injury  7/26/2025 1038 by Isatu Guillermo RN  Outcome: Progressing  7/26/2025 0545 by Gomez, Grisel, RN  Outcome: Progressing     Problem: Skin/Tissue Integrity  Goal: Skin integrity remains intact  Description: 1.  Monitor for areas of redness and/or skin breakdown  2.  Assess vascular access sites hourly  3.  Every 4-6 hours minimum:  Change oxygen saturation probe site  4.  Every 4-6 hours:  If on nasal continuous positive airway pressure, respiratory therapy assess nares and determine need for appliance change or resting period  7/26/2025 1038 by Isatu Giullermo RN  Outcome: Progressing  7/26/2025 0545 by Gomez, Grisel, RN  Outcome: Progressing     Problem: Safety - Adult  Goal: Free from fall injury  7/26/2025 1038 by Isatu Guillermo RN  Outcome: Progressing  7/26/2025 0545 by Gomez, Grisel, RN  Outcome: Progressing

## 2025-07-26 NOTE — RT PROTOCOL NOTE
RT Inhaler-Nebulizer Bronchodilator Protocol Note    There is a bronchodilator order in the chart from a provider indicating to follow the RT Bronchodilator Protocol and there is an “Initiate RT Inhaler-Nebulizer Bronchodilator Protocol” order as well (see protocol at bottom of note).    CXR Findings:  XR CHEST PORTABLE  Result Date: 7/25/2025  CT of the brain: No acute intracranial abnormality.  Multiple lytic lesions are seen in the skull consistent with the history of multiple myeloma. Air-fluid level seen in the left maxillary sinus concerning for acute sinusitis. Chest x-ray: No acute cardiopulmonary process       The findings from the last RT Protocol Assessment were as follows:   History Pulmonary Disease: None or smoker <15 pack years  Respiratory Pattern: Regular pattern and RR 12-20 bpm  Breath Sounds: Clear breath sounds  Cough: Strong, spontaneous, non-productive  Indication for Bronchodilator Therapy: On home bronchodilators  Bronchodilator Assessment Score: 0    Aerosolized bronchodilator medication orders have been revised according to the RT Inhaler-Nebulizer Bronchodilator Protocol below.    Respiratory Therapist to perform RT Therapy Protocol Assessment initially then follow the protocol.  Repeat RT Therapy Protocol Assessment PRN for score 0-3 or on second treatment, BID, and PRN for scores above 3.    No Indications - adjust the frequency to every 6 hours PRN wheezing or bronchospasm, if no treatments needed after 48 hours then discontinue using Per Protocol order mode.     If indication present, adjust the RT bronchodilator orders based on the Bronchodilator Assessment Score as indicated below.  Use Inhaler orders unless patient has one or more of the following: on home nebulizer, not able to hold breath for 10 seconds, is not alert and oriented, cannot activate and use MDI correctly, or respiratory rate 25 breaths per minute or more, then use the equivalent nebulizer order(s) with same

## 2025-07-27 LAB
ACANTHOCYTES BLD QL SMEAR: ABNORMAL
ANION GAP SERPL CALCULATED.3IONS-SCNC: 10 MEQ/L (ref 9–15)
ANISOCYTOSIS BLD QL SMEAR: ABNORMAL
BASOPHILS # BLD: 0 K/UL (ref 0–0.2)
BASOPHILS NFR BLD: 1 %
BUN SERPL-MCNC: 23 MG/DL (ref 8–23)
CALCIUM SERPL-MCNC: 9.2 MG/DL (ref 8.5–9.9)
CHLORIDE SERPL-SCNC: 102 MEQ/L (ref 95–107)
CO2 SERPL-SCNC: 26 MEQ/L (ref 20–31)
CREAT SERPL-MCNC: 1.66 MG/DL (ref 0.5–0.9)
DACRYOCYTES BLD QL SMEAR: ABNORMAL
EOSINOPHIL # BLD: 0 K/UL (ref 0–0.7)
EOSINOPHIL NFR BLD: 0 %
ERYTHROCYTE [DISTWIDTH] IN BLOOD BY AUTOMATED COUNT: 20 % (ref 11.5–14.5)
GLUCOSE BLD-MCNC: 193 MG/DL (ref 70–99)
GLUCOSE BLD-MCNC: 194 MG/DL (ref 70–99)
GLUCOSE BLD-MCNC: 227 MG/DL (ref 70–99)
GLUCOSE BLD-MCNC: 322 MG/DL (ref 70–99)
GLUCOSE SERPL-MCNC: 181 MG/DL (ref 70–99)
HCT VFR BLD AUTO: 22.6 % (ref 37–47)
HGB BLD-MCNC: 7.7 G/DL (ref 12–16)
LYMPHOCYTES # BLD: 0.2 K/UL (ref 1–4.8)
LYMPHOCYTES NFR BLD: 8 %
MACROCYTES BLD QL SMEAR: ABNORMAL
MCH RBC QN AUTO: 36.3 PG (ref 27–31.3)
MCHC RBC AUTO-ENTMCNC: 34.1 % (ref 33–37)
MCV RBC AUTO: 106.6 FL (ref 79.4–94.8)
MONOCYTES # BLD: 0 K/UL (ref 0.2–0.8)
MONOCYTES NFR BLD: 1 %
NEUTROPHILS # BLD: 1.9 K/UL (ref 1.4–6.5)
NEUTS BAND NFR BLD MANUAL: 1 % (ref 5–11)
NEUTS SEG NFR BLD: 89 %
NRBC BLD-RTO: 1 /100 WBC
OVALOCYTES BLD QL SMEAR: ABNORMAL
PELGER HUET CELLS BLD QL SMEAR: ABNORMAL %
PERFORMED ON: ABNORMAL
PLATELET # BLD AUTO: 102 K/UL (ref 130–400)
PLATELET BLD QL SMEAR: ABNORMAL
POIKILOCYTOSIS BLD QL SMEAR: ABNORMAL
POTASSIUM SERPL-SCNC: 3.7 MEQ/L (ref 3.4–4.9)
RBC # BLD AUTO: 2.12 M/UL (ref 4.2–5.4)
SODIUM SERPL-SCNC: 138 MEQ/L (ref 135–144)
WBC # BLD AUTO: 2.1 K/UL (ref 4.8–10.8)

## 2025-07-27 PROCEDURE — 1210000000 HC MED SURG R&B

## 2025-07-27 PROCEDURE — 36415 COLL VENOUS BLD VENIPUNCTURE: CPT

## 2025-07-27 PROCEDURE — 6360000002 HC RX W HCPCS: Performed by: INTERNAL MEDICINE

## 2025-07-27 PROCEDURE — 2500000003 HC RX 250 WO HCPCS: Performed by: INTERNAL MEDICINE

## 2025-07-27 PROCEDURE — 6370000000 HC RX 637 (ALT 250 FOR IP): Performed by: INTERNAL MEDICINE

## 2025-07-27 PROCEDURE — 85025 COMPLETE CBC W/AUTO DIFF WBC: CPT

## 2025-07-27 PROCEDURE — 80048 BASIC METABOLIC PNL TOTAL CA: CPT

## 2025-07-27 PROCEDURE — 2700000000 HC OXYGEN THERAPY PER DAY

## 2025-07-27 RX ADMIN — METHYLPREDNISOLONE SODIUM SUCCINATE 60 MG: 125 INJECTION INTRAMUSCULAR; INTRAVENOUS at 01:17

## 2025-07-27 RX ADMIN — SODIUM CHLORIDE, PRESERVATIVE FREE 10 ML: 5 INJECTION INTRAVENOUS at 20:29

## 2025-07-27 RX ADMIN — ACYCLOVIR 400 MG: 400 TABLET ORAL at 08:02

## 2025-07-27 RX ADMIN — Medication 2000 UNITS: at 17:52

## 2025-07-27 RX ADMIN — INSULIN LISPRO 3 UNITS: 100 INJECTION, SOLUTION INTRAVENOUS; SUBCUTANEOUS at 20:56

## 2025-07-27 RX ADMIN — FILGRASTIM-AAFI 300 MCG: 300 INJECTION, SOLUTION SUBCUTANEOUS at 17:52

## 2025-07-27 RX ADMIN — GABAPENTIN 300 MG: 300 CAPSULE ORAL at 08:03

## 2025-07-27 RX ADMIN — HEPARIN SODIUM 5000 UNITS: 5000 INJECTION INTRAVENOUS; SUBCUTANEOUS at 15:00

## 2025-07-27 RX ADMIN — HEPARIN SODIUM 5000 UNITS: 5000 INJECTION INTRAVENOUS; SUBCUTANEOUS at 20:23

## 2025-07-27 RX ADMIN — SODIUM CHLORIDE, PRESERVATIVE FREE 10 ML: 5 INJECTION INTRAVENOUS at 08:06

## 2025-07-27 RX ADMIN — PANTOPRAZOLE SODIUM 40 MG: 40 TABLET, DELAYED RELEASE ORAL at 08:03

## 2025-07-27 RX ADMIN — METHYLPREDNISOLONE SODIUM SUCCINATE 60 MG: 125 INJECTION INTRAMUSCULAR; INTRAVENOUS at 17:51

## 2025-07-27 RX ADMIN — HEPARIN SODIUM 5000 UNITS: 5000 INJECTION INTRAVENOUS; SUBCUTANEOUS at 05:36

## 2025-07-27 RX ADMIN — FLUCONAZOLE 400 MG: 100 TABLET ORAL at 08:02

## 2025-07-27 RX ADMIN — METOPROLOL TARTRATE 25 MG: 25 TABLET, FILM COATED ORAL at 20:23

## 2025-07-27 RX ADMIN — METOPROLOL TARTRATE 25 MG: 25 TABLET, FILM COATED ORAL at 08:03

## 2025-07-27 RX ADMIN — METHYLPREDNISOLONE SODIUM SUCCINATE 60 MG: 125 INJECTION INTRAMUSCULAR; INTRAVENOUS at 11:26

## 2025-07-27 RX ADMIN — ATORVASTATIN CALCIUM 40 MG: 40 TABLET, FILM COATED ORAL at 08:03

## 2025-07-27 RX ADMIN — INSULIN LISPRO 2 UNITS: 100 INJECTION, SOLUTION INTRAVENOUS; SUBCUTANEOUS at 13:17

## 2025-07-27 RX ADMIN — GABAPENTIN 300 MG: 300 CAPSULE ORAL at 20:23

## 2025-07-27 RX ADMIN — INSULIN LISPRO 1 UNITS: 100 INJECTION, SOLUTION INTRAVENOUS; SUBCUTANEOUS at 17:52

## 2025-07-27 RX ADMIN — INSULIN LISPRO 1 UNITS: 100 INJECTION, SOLUTION INTRAVENOUS; SUBCUTANEOUS at 08:03

## 2025-07-27 NOTE — PLAN OF CARE
Problem: Discharge Planning  Goal: Discharge to home or other facility with appropriate resources  7/26/2025 2356 by Lisette Selby RN  Outcome: Progressing  7/26/2025 1038 by Isatu Guillermo RN  Outcome: Progressing     Problem: Chronic Conditions and Co-morbidities  Goal: Patient's chronic conditions and co-morbidity symptoms are monitored and maintained or improved  7/26/2025 2356 by Lisette Selby RN  Outcome: Progressing  7/26/2025 1038 by Isatu Guillermo RN  Outcome: Progressing     Problem: ABCDS Injury Assessment  Goal: Absence of physical injury  7/26/2025 2356 by Lisette Selby RN  Outcome: Progressing  7/26/2025 1038 by Isatu Guillermo RN  Outcome: Progressing     Problem: Skin/Tissue Integrity  Goal: Skin integrity remains intact  Description: 1.  Monitor for areas of redness and/or skin breakdown  2.  Assess vascular access sites hourly  3.  Every 4-6 hours minimum:  Change oxygen saturation probe site  4.  Every 4-6 hours:  If on nasal continuous positive airway pressure, respiratory therapy assess nares and determine need for appliance change or resting period  7/26/2025 2356 by Lisette Selby RN  Outcome: Progressing  7/26/2025 1038 by Isatu Guillermo RN  Outcome: Progressing     Problem: Safety - Adult  Goal: Free from fall injury  7/26/2025 2356 by Lisette Selby RN  Outcome: Progressing  7/26/2025 1038 by Isatu Guillermo RN  Outcome: Progressing     Problem: Physical Therapy - Adult  Goal: By Discharge: Performs mobility at highest level of function for planned discharge setting.  See evaluation for individualized goals.  7/26/2025 1146 by Shobha Arthur, PT  Outcome: Progressing

## 2025-07-28 ENCOUNTER — APPOINTMENT (OUTPATIENT)
Dept: ULTRASOUND IMAGING | Age: 80
DRG: 947 | End: 2025-07-28
Payer: MEDICARE

## 2025-07-28 PROBLEM — N17.9 ACUTE KIDNEY FAILURE, UNSPECIFIED: Status: ACTIVE | Noted: 2025-05-19

## 2025-07-28 LAB
ANION GAP SERPL CALCULATED.3IONS-SCNC: 14 MEQ/L (ref 9–15)
ANISOCYTOSIS BLD QL SMEAR: ABNORMAL
BASOPHILS # BLD: 0 K/UL (ref 0–0.2)
BASOPHILS NFR BLD: 0.5 %
BUN SERPL-MCNC: 27 MG/DL (ref 8–23)
CALCIUM SERPL-MCNC: 8.9 MG/DL (ref 8.5–9.9)
CHLORIDE SERPL-SCNC: 100 MEQ/L (ref 95–107)
CO2 SERPL-SCNC: 23 MEQ/L (ref 20–31)
CREAT SERPL-MCNC: 1.57 MG/DL (ref 0.5–0.9)
CULTURE, BLOOD ID SENSITIVITY: ABNORMAL
CULTURE, BLOOD ID SENSITIVITY: ABNORMAL
DACRYOCYTES BLD QL SMEAR: ABNORMAL
EOSINOPHIL # BLD: 0 K/UL (ref 0–0.7)
EOSINOPHIL NFR BLD: 0 %
ERYTHROCYTE [DISTWIDTH] IN BLOOD BY AUTOMATED COUNT: 20 % (ref 11.5–14.5)
GLUCOSE BLD-MCNC: 160 MG/DL (ref 70–99)
GLUCOSE BLD-MCNC: 220 MG/DL (ref 70–99)
GLUCOSE BLD-MCNC: 231 MG/DL (ref 70–99)
GLUCOSE BLD-MCNC: 314 MG/DL (ref 70–99)
GLUCOSE SERPL-MCNC: 187 MG/DL (ref 70–99)
HCT VFR BLD AUTO: 22.6 % (ref 37–47)
HGB BLD-MCNC: 7.6 G/DL (ref 12–16)
HYPOCHROMIA BLD QL SMEAR: ABNORMAL
LYMPHOCYTES # BLD: 0.1 K/UL (ref 1–4.8)
LYMPHOCYTES NFR BLD: 4 %
MACROCYTES BLD QL SMEAR: ABNORMAL
MAGNESIUM SERPL-MCNC: 1.8 MG/DL (ref 1.7–2.4)
MCH RBC QN AUTO: 35.8 PG (ref 27–31.3)
MCHC RBC AUTO-ENTMCNC: 33.6 % (ref 33–37)
MCV RBC AUTO: 106.6 FL (ref 79.4–94.8)
MONOCYTES # BLD: 0 K/UL (ref 0.2–0.8)
MONOCYTES NFR BLD: 2.7 %
NEUTROPHILS # BLD: 2.1 K/UL (ref 1.4–6.5)
NEUTS SEG NFR BLD: 96 %
NRBC BLD-RTO: 2 /100 WBC
ORGANISM: ABNORMAL
OVALOCYTES BLD QL SMEAR: ABNORMAL
PERFORMED ON: ABNORMAL
PLATELET # BLD AUTO: 97 K/UL (ref 130–400)
PLATELET BLD QL SMEAR: ABNORMAL
POIKILOCYTOSIS BLD QL SMEAR: ABNORMAL
POLYCHROMASIA BLD QL SMEAR: ABNORMAL
POTASSIUM SERPL-SCNC: 3.5 MEQ/L (ref 3.4–4.9)
RBC # BLD AUTO: 2.12 M/UL (ref 4.2–5.4)
SLIDE REVIEW: ABNORMAL
SMUDGE CELLS BLD QL SMEAR: 2.9
SODIUM SERPL-SCNC: 137 MEQ/L (ref 135–144)
STOMATOCYTES BLD QL SMEAR: ABNORMAL
URATE SERPL-MCNC: 10.1 MG/DL (ref 2.4–5.7)
WBC # BLD AUTO: 2.2 K/UL (ref 4.8–10.8)

## 2025-07-28 PROCEDURE — 1210000000 HC MED SURG R&B

## 2025-07-28 PROCEDURE — 6360000002 HC RX W HCPCS: Performed by: INTERNAL MEDICINE

## 2025-07-28 PROCEDURE — 76775 US EXAM ABDO BACK WALL LIM: CPT

## 2025-07-28 PROCEDURE — 80048 BASIC METABOLIC PNL TOTAL CA: CPT

## 2025-07-28 PROCEDURE — 97166 OT EVAL MOD COMPLEX 45 MIN: CPT

## 2025-07-28 PROCEDURE — 2580000003 HC RX 258: Performed by: INTERNAL MEDICINE

## 2025-07-28 PROCEDURE — 6370000000 HC RX 637 (ALT 250 FOR IP): Performed by: INTERNAL MEDICINE

## 2025-07-28 PROCEDURE — 85025 COMPLETE CBC W/AUTO DIFF WBC: CPT

## 2025-07-28 PROCEDURE — 2500000003 HC RX 250 WO HCPCS: Performed by: INTERNAL MEDICINE

## 2025-07-28 PROCEDURE — 36415 COLL VENOUS BLD VENIPUNCTURE: CPT

## 2025-07-28 PROCEDURE — 87040 BLOOD CULTURE FOR BACTERIA: CPT

## 2025-07-28 PROCEDURE — 83735 ASSAY OF MAGNESIUM: CPT

## 2025-07-28 PROCEDURE — 84550 ASSAY OF BLOOD/URIC ACID: CPT

## 2025-07-28 RX ORDER — POTASSIUM CHLORIDE 1500 MG/1
20 TABLET, EXTENDED RELEASE ORAL ONCE
Status: COMPLETED | OUTPATIENT
Start: 2025-07-28 | End: 2025-07-28

## 2025-07-28 RX ORDER — SODIUM CHLORIDE 9 MG/ML
INJECTION, SOLUTION INTRAVENOUS CONTINUOUS
Status: DISCONTINUED | OUTPATIENT
Start: 2025-07-28 | End: 2025-07-30

## 2025-07-28 RX ORDER — GLUCAGON 1 MG/ML
1 KIT INJECTION PRN
Status: DISCONTINUED | OUTPATIENT
Start: 2025-07-28 | End: 2025-07-30 | Stop reason: HOSPADM

## 2025-07-28 RX ORDER — INSULIN LISPRO 100 [IU]/ML
0-8 INJECTION, SOLUTION INTRAVENOUS; SUBCUTANEOUS
Status: DISCONTINUED | OUTPATIENT
Start: 2025-07-28 | End: 2025-07-30 | Stop reason: HOSPADM

## 2025-07-28 RX ORDER — DEXTROSE MONOHYDRATE 100 MG/ML
INJECTION, SOLUTION INTRAVENOUS CONTINUOUS PRN
Status: DISCONTINUED | OUTPATIENT
Start: 2025-07-28 | End: 2025-07-28

## 2025-07-28 RX ORDER — INSULIN GLARGINE 100 [IU]/ML
15 INJECTION, SOLUTION SUBCUTANEOUS DAILY
Status: DISCONTINUED | OUTPATIENT
Start: 2025-07-28 | End: 2025-07-29

## 2025-07-28 RX ORDER — INSULIN LISPRO 100 [IU]/ML
6 INJECTION, SOLUTION INTRAVENOUS; SUBCUTANEOUS
Status: DISCONTINUED | OUTPATIENT
Start: 2025-07-28 | End: 2025-07-30 | Stop reason: HOSPADM

## 2025-07-28 RX ADMIN — INSULIN GLARGINE 15 UNITS: 100 INJECTION, SOLUTION SUBCUTANEOUS at 10:21

## 2025-07-28 RX ADMIN — Medication 2000 UNITS: at 16:50

## 2025-07-28 RX ADMIN — METHYLPREDNISOLONE SODIUM SUCCINATE 60 MG: 125 INJECTION INTRAMUSCULAR; INTRAVENOUS at 01:06

## 2025-07-28 RX ADMIN — METOPROLOL TARTRATE 25 MG: 25 TABLET, FILM COATED ORAL at 08:53

## 2025-07-28 RX ADMIN — INSULIN LISPRO 1 UNITS: 100 INJECTION, SOLUTION INTRAVENOUS; SUBCUTANEOUS at 08:54

## 2025-07-28 RX ADMIN — METHYLPREDNISOLONE SODIUM SUCCINATE 60 MG: 125 INJECTION INTRAMUSCULAR; INTRAVENOUS at 08:53

## 2025-07-28 RX ADMIN — METOPROLOL TARTRATE 25 MG: 25 TABLET, FILM COATED ORAL at 20:58

## 2025-07-28 RX ADMIN — SULFAMETHOXAZOLE AND TRIMETHOPRIM 1 TABLET: 800; 160 TABLET ORAL at 08:52

## 2025-07-28 RX ADMIN — INSULIN LISPRO 2 UNITS: 100 INJECTION, SOLUTION INTRAVENOUS; SUBCUTANEOUS at 20:58

## 2025-07-28 RX ADMIN — SODIUM CHLORIDE, PRESERVATIVE FREE 10 ML: 5 INJECTION INTRAVENOUS at 08:55

## 2025-07-28 RX ADMIN — GABAPENTIN 300 MG: 300 CAPSULE ORAL at 08:52

## 2025-07-28 RX ADMIN — INSULIN LISPRO 6 UNITS: 100 INJECTION, SOLUTION INTRAVENOUS; SUBCUTANEOUS at 11:32

## 2025-07-28 RX ADMIN — POTASSIUM CHLORIDE 20 MEQ: 1500 TABLET, EXTENDED RELEASE ORAL at 10:21

## 2025-07-28 RX ADMIN — INSULIN LISPRO 6 UNITS: 100 INJECTION, SOLUTION INTRAVENOUS; SUBCUTANEOUS at 16:50

## 2025-07-28 RX ADMIN — HEPARIN SODIUM 5000 UNITS: 5000 INJECTION INTRAVENOUS; SUBCUTANEOUS at 15:05

## 2025-07-28 RX ADMIN — SODIUM CHLORIDE, PRESERVATIVE FREE 10 ML: 5 INJECTION INTRAVENOUS at 20:58

## 2025-07-28 RX ADMIN — METHYLPREDNISOLONE SODIUM SUCCINATE 60 MG: 125 INJECTION INTRAMUSCULAR; INTRAVENOUS at 16:50

## 2025-07-28 RX ADMIN — PANTOPRAZOLE SODIUM 40 MG: 40 TABLET, DELAYED RELEASE ORAL at 08:52

## 2025-07-28 RX ADMIN — HEPARIN SODIUM 5000 UNITS: 5000 INJECTION INTRAVENOUS; SUBCUTANEOUS at 05:32

## 2025-07-28 RX ADMIN — SODIUM CHLORIDE: 0.9 INJECTION, SOLUTION INTRAVENOUS at 10:20

## 2025-07-28 RX ADMIN — GABAPENTIN 300 MG: 300 CAPSULE ORAL at 20:57

## 2025-07-28 RX ADMIN — ATORVASTATIN CALCIUM 40 MG: 40 TABLET, FILM COATED ORAL at 08:52

## 2025-07-28 RX ADMIN — HEPARIN SODIUM 5000 UNITS: 5000 INJECTION INTRAVENOUS; SUBCUTANEOUS at 20:57

## 2025-07-28 RX ADMIN — FLUCONAZOLE 400 MG: 100 TABLET ORAL at 08:52

## 2025-07-28 RX ADMIN — ACYCLOVIR 400 MG: 400 TABLET ORAL at 08:52

## 2025-07-28 NOTE — PLAN OF CARE
Problem: Discharge Planning  Goal: Discharge to home or other facility with appropriate resources  7/27/2025 2137 by Lisette Selby RN  Outcome: Progressing  7/27/2025 1526 by Akil Bahena RN  Outcome: Progressing     Problem: Chronic Conditions and Co-morbidities  Goal: Patient's chronic conditions and co-morbidity symptoms are monitored and maintained or improved  7/27/2025 2137 by Lisette Selby RN  Outcome: Progressing  7/27/2025 1526 by Akil Bahena RN  Outcome: Progressing     Problem: ABCDS Injury Assessment  Goal: Absence of physical injury  7/27/2025 2137 by Lisette Selby RN  Outcome: Progressing  7/27/2025 1526 by Akil Bahena RN  Outcome: Progressing     Problem: Skin/Tissue Integrity  Goal: Skin integrity remains intact  Description: 1.  Monitor for areas of redness and/or skin breakdown  2.  Assess vascular access sites hourly  3.  Every 4-6 hours minimum:  Change oxygen saturation probe site  4.  Every 4-6 hours:  If on nasal continuous positive airway pressure, respiratory therapy assess nares and determine need for appliance change or resting period  7/27/2025 2137 by Lisette Selby RN  Outcome: Progressing  7/27/2025 1526 by Akil Bahena RN  Outcome: Progressing     Problem: Safety - Adult  Goal: Free from fall injury  7/27/2025 2137 by Lisette Selby RN  Outcome: Progressing  7/27/2025 1526 by Akil Bahena RN  Outcome: Progressing

## 2025-07-28 NOTE — PLAN OF CARE
See OT evaluation for all goals and OT POC. Electronically signed by Carleen Degroot OT on 7/28/2025 at 10:33 AM

## 2025-07-28 NOTE — ACP (ADVANCE CARE PLANNING)
Advance Care Planning   Healthcare Decision Maker:    Primary Decision Maker: Dmitri Mendoza - Spouse - [phone]    Secondary Decision Maker: Jess Mendoza - Child - [phone]    Click here to complete Healthcare Decision Makers including selection of the Healthcare Decision Maker Relationship (ie \"Primary\").  Today we documented Decision Maker(s) consistent with Legal Next of Kin hierarchy.

## 2025-07-28 NOTE — CONSULTS
3.4 - 4.9 mEq/L    Chloride 100 95 - 107 mEq/L    CO2 26 20 - 31 mEq/L    Anion Gap 12 9 - 15 mEq/L    Glucose 112 (H) 70 - 99 mg/dL    BUN 26 (H) 8 - 23 mg/dL    Creatinine 2.06 (H) 0.50 - 0.90 mg/dL    Est, Glom Filt Rate 23.6 (L) >60    Calcium 9.2 8.5 - 9.9 mg/dL   Magnesium    Collection Time: 07/26/25  5:53 AM   Result Value Ref Range    Magnesium 1.9 1.7 - 2.4 mg/dL   POCT Glucose    Collection Time: 07/26/25  8:17 AM   Result Value Ref Range    POC Glucose 139 (H) 70 - 99 mg/dl    Performed on ACCU-CHEK      Recent Labs     07/26/25  0553   GLUCOSE 112*        Pathology:     RADIOLOGY RESULTS:  XR CHEST PORTABLE  Result Date: 7/25/2025  EXAMINATION: CT OF THE HEAD WITHOUT CONTRAST; ONE XRAY VIEW OF THE CHEST  7/25/2025 9:35 am; 7/25/2025 9:24 am TECHNIQUE: CT of the head was performed without the administration of intravenous contrast. Automated exposure control, iterative reconstruction, and/or weight based adjustment of the mA/kV was utilized to reduce the radiation dose to as low as reasonably achievable. COMPARISON: May 24, 2022 HISTORY: ORDERING SYSTEM PROVIDED HISTORY: weakness TECHNOLOGIST PROVIDED HISTORY: Reason for exam:->weakness Has a \"code stroke\" or \"stroke alert\" been called?->No Decision Support Exception - unselect if not a suspected or confirmed emergency medical condition->Emergency Medical Condition (MA) What reading provider will be dictating this exam?->CRC; ORDERING SYSTEM PROVIDED HISTORY: weakness TECHNOLOGIST PROVIDED HISTORY: Reason for exam:->weakness What reading provider will be dictating this exam?->CRC FINDINGS: CT of the brain: BRAIN/VENTRICLES: There is no acute intracranial hemorrhage, mass effect or midline shift.  No abnormal extra-axial fluid collection.  The gray-white differentiation is maintained without evidence of an acute infarct. Ventricular prominence is again seen.  Periventricular and subcortical white matter changes are also again visualized.. ORBITS: The 
for neurogenic bowel bladder, postoperative pain, titration of opiate and high risk medications,   blood pressure and blood sugar control.    It is my opinion that they will be able to tolerate and benefit from 3 hours of therapy a day.  I reviewed the various options re: levels of care with the patient and family.  Please see pre-admission screen note for further details. I discussed acute rehab with the patient and verify that the patient is able and willing to participate in 3 hours of therapy a day.  Rehab and Acute Care Case Management has also reinforced this expectation.  This patient requires multidisciplinary rehabilitation treatment, including daily care and management from a PM&R physician, 24-hour rehabilitation nursing, Physical Therapy, Occupational Therapy, rehabilitation psychology, consideration of speech and language pathology, recreational therapy, nutritional services, and a rehabilitation .  I feel that it is reasonable to plan for a discharge to home setting after acute rehab.            Specialized nursing care to focus on:  Bowel and bladder issues-Monitor for urinary retention-check PVRs, bladder scan--cath if no void.  Wound risk and management   -pressure relief protocols-side to side turns  IVF medication administration      Monitor endurance and if necessary spread therapy out over a 7-day window-adding scheduled rest breaks when needed.  Focus on energy conservation.  Monitor heart rate and   cardiac medications effects on heart rate and blood pressure before, during and after therapy.  Progress toward endurance training with pulse ox monitoring for saturation and heart rate.    continue to monitor closely for dehydration-- Improve hydration and nutrition by adding Vitamin B12 shot times one, adding Protein supplements and push PO fluids.    Treat and monitor for higher level cognitive deficits, focus on difficulty with sequencing and problem-solving.    Focus on

## 2025-07-29 LAB
ACANTHOCYTES BLD QL SMEAR: ABNORMAL
ANION GAP SERPL CALCULATED.3IONS-SCNC: 9 MEQ/L (ref 9–15)
ANISOCYTOSIS BLD QL SMEAR: ABNORMAL
BASOPHILS # BLD: 0 K/UL (ref 0–0.2)
BASOPHILS NFR BLD: 1 %
BUN SERPL-MCNC: 27 MG/DL (ref 8–23)
BURR CELLS: ABNORMAL
CALCIUM SERPL-MCNC: 8.4 MG/DL (ref 8.5–9.9)
CHLORIDE SERPL-SCNC: 105 MEQ/L (ref 95–107)
CO2 SERPL-SCNC: 26 MEQ/L (ref 20–31)
CREAT SERPL-MCNC: 1.55 MG/DL (ref 0.5–0.9)
DACRYOCYTES BLD QL SMEAR: ABNORMAL
EOSINOPHIL # BLD: 0 K/UL (ref 0–0.7)
EOSINOPHIL NFR BLD: 0 %
ERYTHROCYTE [DISTWIDTH] IN BLOOD BY AUTOMATED COUNT: 20.3 % (ref 11.5–14.5)
FERRITIN: 695 NG/ML
GLUCOSE BLD-MCNC: 139 MG/DL (ref 70–99)
GLUCOSE BLD-MCNC: 252 MG/DL (ref 70–99)
GLUCOSE BLD-MCNC: 252 MG/DL (ref 70–99)
GLUCOSE BLD-MCNC: 365 MG/DL (ref 70–99)
GLUCOSE SERPL-MCNC: 177 MG/DL (ref 70–99)
HCT VFR BLD AUTO: 21.1 % (ref 37–47)
HGB BLD-MCNC: 7.1 G/DL (ref 12–16)
IRON % SATURATION: 66 % (ref 20–55)
IRON: 149 UG/DL (ref 37–145)
LYMPHOCYTES # BLD: 0.1 K/UL (ref 1–4.8)
LYMPHOCYTES NFR BLD: 3 %
MACROCYTES BLD QL SMEAR: ABNORMAL
MAGNESIUM SERPL-MCNC: 2 MG/DL (ref 1.7–2.4)
MCH RBC QN AUTO: 36.2 PG (ref 27–31.3)
MCHC RBC AUTO-ENTMCNC: 33.6 % (ref 33–37)
MCV RBC AUTO: 107.7 FL (ref 79.4–94.8)
METAMYELOCYTES NFR BLD MANUAL: 1 %
MONOCYTES # BLD: 0 K/UL (ref 0.2–0.8)
MONOCYTES NFR BLD: 3.9 %
NEUTROPHILS # BLD: 1.9 K/UL (ref 1.4–6.5)
NEUTS SEG NFR BLD: 96 %
NRBC BLD-RTO: 5 /100 WBC
OVALOCYTES BLD QL SMEAR: ABNORMAL
PATH INTERP BLD-IMP: NO
PATH INTERP BLD-IMP: NORMAL
PERFORMED ON: ABNORMAL
PLATELET # BLD AUTO: 99 K/UL (ref 130–400)
PLATELET BLD QL SMEAR: ABNORMAL
POIKILOCYTOSIS BLD QL SMEAR: ABNORMAL
POTASSIUM SERPL-SCNC: 3.5 MEQ/L (ref 3.4–4.9)
RBC # BLD AUTO: 1.96 M/UL (ref 4.2–5.4)
SODIUM SERPL-SCNC: 140 MEQ/L (ref 135–144)
TOTAL IRON BINDING CAPACITY: 225 UG/DL (ref 250–450)
UNSATURATED IRON BINDING CAPACITY: 76 UG/DL (ref 112–347)
WBC # BLD AUTO: 2 K/UL (ref 4.8–10.8)

## 2025-07-29 PROCEDURE — 6360000002 HC RX W HCPCS: Performed by: INTERNAL MEDICINE

## 2025-07-29 PROCEDURE — 80048 BASIC METABOLIC PNL TOTAL CA: CPT

## 2025-07-29 PROCEDURE — 83735 ASSAY OF MAGNESIUM: CPT

## 2025-07-29 PROCEDURE — 6370000000 HC RX 637 (ALT 250 FOR IP): Performed by: INTERNAL MEDICINE

## 2025-07-29 PROCEDURE — 82728 ASSAY OF FERRITIN: CPT

## 2025-07-29 PROCEDURE — 83550 IRON BINDING TEST: CPT

## 2025-07-29 PROCEDURE — 85025 COMPLETE CBC W/AUTO DIFF WBC: CPT

## 2025-07-29 PROCEDURE — 2500000003 HC RX 250 WO HCPCS: Performed by: INTERNAL MEDICINE

## 2025-07-29 PROCEDURE — 97535 SELF CARE MNGMENT TRAINING: CPT

## 2025-07-29 PROCEDURE — 2580000003 HC RX 258: Performed by: INTERNAL MEDICINE

## 2025-07-29 PROCEDURE — 83540 ASSAY OF IRON: CPT

## 2025-07-29 PROCEDURE — 1210000000 HC MED SURG R&B

## 2025-07-29 PROCEDURE — 36415 COLL VENOUS BLD VENIPUNCTURE: CPT

## 2025-07-29 RX ORDER — DEXAMETHASONE 4 MG/1
4 TABLET ORAL EVERY 12 HOURS SCHEDULED
Status: DISCONTINUED | OUTPATIENT
Start: 2025-07-29 | End: 2025-07-30 | Stop reason: HOSPADM

## 2025-07-29 RX ORDER — INSULIN GLARGINE 100 [IU]/ML
20 INJECTION, SOLUTION SUBCUTANEOUS DAILY
Status: DISCONTINUED | OUTPATIENT
Start: 2025-07-29 | End: 2025-07-30 | Stop reason: HOSPADM

## 2025-07-29 RX ORDER — SULFAMETHOXAZOLE AND TRIMETHOPRIM 400; 80 MG/1; MG/1
1 TABLET ORAL
Status: DISCONTINUED | OUTPATIENT
Start: 2025-07-30 | End: 2025-07-30 | Stop reason: HOSPADM

## 2025-07-29 RX ORDER — POTASSIUM CHLORIDE 750 MG/1
30 TABLET, EXTENDED RELEASE ORAL ONCE
Status: COMPLETED | OUTPATIENT
Start: 2025-07-29 | End: 2025-07-29

## 2025-07-29 RX ORDER — FLUCONAZOLE 100 MG/1
200 TABLET ORAL DAILY
Status: DISCONTINUED | OUTPATIENT
Start: 2025-07-30 | End: 2025-07-30 | Stop reason: HOSPADM

## 2025-07-29 RX ADMIN — GABAPENTIN 300 MG: 300 CAPSULE ORAL at 21:17

## 2025-07-29 RX ADMIN — INSULIN LISPRO 6 UNITS: 100 INJECTION, SOLUTION INTRAVENOUS; SUBCUTANEOUS at 09:37

## 2025-07-29 RX ADMIN — HEPARIN SODIUM 5000 UNITS: 5000 INJECTION INTRAVENOUS; SUBCUTANEOUS at 13:23

## 2025-07-29 RX ADMIN — HEPARIN SODIUM 5000 UNITS: 5000 INJECTION INTRAVENOUS; SUBCUTANEOUS at 05:21

## 2025-07-29 RX ADMIN — METHYLPREDNISOLONE SODIUM SUCCINATE 60 MG: 125 INJECTION INTRAMUSCULAR; INTRAVENOUS at 01:03

## 2025-07-29 RX ADMIN — ATORVASTATIN CALCIUM 40 MG: 40 TABLET, FILM COATED ORAL at 09:38

## 2025-07-29 RX ADMIN — FILGRASTIM-AAFI 300 MCG: 480 INJECTION, SOLUTION SUBCUTANEOUS at 18:06

## 2025-07-29 RX ADMIN — FLUCONAZOLE 400 MG: 100 TABLET ORAL at 09:37

## 2025-07-29 RX ADMIN — DEXAMETHASONE 4 MG: 4 TABLET ORAL at 21:17

## 2025-07-29 RX ADMIN — ACYCLOVIR 400 MG: 400 TABLET ORAL at 09:38

## 2025-07-29 RX ADMIN — INSULIN LISPRO 6 UNITS: 100 INJECTION, SOLUTION INTRAVENOUS; SUBCUTANEOUS at 13:23

## 2025-07-29 RX ADMIN — SODIUM CHLORIDE, PRESERVATIVE FREE 10 ML: 5 INJECTION INTRAVENOUS at 21:17

## 2025-07-29 RX ADMIN — METOPROLOL TARTRATE 25 MG: 25 TABLET, FILM COATED ORAL at 21:17

## 2025-07-29 RX ADMIN — GABAPENTIN 300 MG: 300 CAPSULE ORAL at 09:38

## 2025-07-29 RX ADMIN — METHYLPREDNISOLONE SODIUM SUCCINATE 60 MG: 125 INJECTION INTRAMUSCULAR; INTRAVENOUS at 09:37

## 2025-07-29 RX ADMIN — SODIUM CHLORIDE, PRESERVATIVE FREE 10 ML: 5 INJECTION INTRAVENOUS at 09:38

## 2025-07-29 RX ADMIN — INSULIN LISPRO 4 UNITS: 100 INJECTION, SOLUTION INTRAVENOUS; SUBCUTANEOUS at 13:22

## 2025-07-29 RX ADMIN — INSULIN LISPRO 8 UNITS: 100 INJECTION, SOLUTION INTRAVENOUS; SUBCUTANEOUS at 09:36

## 2025-07-29 RX ADMIN — HEPARIN SODIUM 5000 UNITS: 5000 INJECTION INTRAVENOUS; SUBCUTANEOUS at 21:17

## 2025-07-29 RX ADMIN — INSULIN LISPRO 4 UNITS: 100 INJECTION, SOLUTION INTRAVENOUS; SUBCUTANEOUS at 21:20

## 2025-07-29 RX ADMIN — INSULIN GLARGINE 20 UNITS: 100 INJECTION, SOLUTION SUBCUTANEOUS at 09:36

## 2025-07-29 RX ADMIN — Medication 2000 UNITS: at 18:06

## 2025-07-29 RX ADMIN — SODIUM CHLORIDE: 0.9 INJECTION, SOLUTION INTRAVENOUS at 01:02

## 2025-07-29 RX ADMIN — POTASSIUM CHLORIDE 30 MEQ: 750 TABLET, FILM COATED, EXTENDED RELEASE ORAL at 09:38

## 2025-07-29 RX ADMIN — POLYETHYLENE GLYCOL 3350 17 G: 17 POWDER, FOR SOLUTION ORAL at 13:23

## 2025-07-29 RX ADMIN — METOPROLOL TARTRATE 25 MG: 25 TABLET, FILM COATED ORAL at 09:38

## 2025-07-29 RX ADMIN — SODIUM CHLORIDE: 0.9 INJECTION, SOLUTION INTRAVENOUS at 18:08

## 2025-07-29 RX ADMIN — PANTOPRAZOLE SODIUM 40 MG: 40 TABLET, DELAYED RELEASE ORAL at 09:39

## 2025-07-29 NOTE — PLAN OF CARE
Problem: Discharge Planning  Goal: Discharge to home or other facility with appropriate resources  7/29/2025 1050 by Arlen Mora, RN  Outcome: Progressing  Flowsheets (Taken 7/29/2025 0935)  Discharge to home or other facility with appropriate resources:   Identify barriers to discharge with patient and caregiver   Arrange for needed discharge resources and transportation as appropriate   Identify discharge learning needs (meds, wound care, etc)   Arrange for interpreters to assist at discharge as needed   Refer to discharge planning if patient needs post-hospital services based on physician order or complex needs related to functional status, cognitive ability or social support system  7/29/2025 0151 by Lisette Selby RN  Outcome: Progressing     Problem: Chronic Conditions and Co-morbidities  Goal: Patient's chronic conditions and co-morbidity symptoms are monitored and maintained or improved  7/29/2025 1050 by Arlen Mora RN  Outcome: Progressing  Flowsheets (Taken 7/29/2025 0935)  Care Plan - Patient's Chronic Conditions and Co-Morbidity Symptoms are Monitored and Maintained or Improved:   Monitor and assess patient's chronic conditions and comorbid symptoms for stability, deterioration, or improvement   Collaborate with multidisciplinary team to address chronic and comorbid conditions and prevent exacerbation or deterioration   Update acute care plan with appropriate goals if chronic or comorbid symptoms are exacerbated and prevent overall improvement and discharge  7/29/2025 0151 by Lisette Selby RN  Outcome: Progressing     Problem: ABCDS Injury Assessment  Goal: Absence of physical injury  7/29/2025 1050 by Arlen Mora RN  Outcome: Progressing  7/29/2025 0151 by Lisette Selby, RN  Outcome: Progressing     Problem: Skin/Tissue Integrity  Goal: Skin integrity remains intact  Description: 1.  Monitor for areas of redness and/or skin breakdown  2.  Assess vascular access sites hourly  3.

## 2025-07-29 NOTE — DISCHARGE INSTR - COC
Continuity of Care Form    Patient Name: Mckenna Mendoza   :  1945  MRN:  [id number]    Admit date:  2025  Discharge date:  ***    Code Status Order: Full Code   Advance Directives:     Admitting Physician:  Zion Horne DO  PCP: Twila Maher DO    Discharging Nurse: ***  Discharging Hospital Unit/Room#: W494/W494-01  Discharging Unit Phone Number: ***    Emergency Contact:   Extended Emergency Contact Information  Primary Emergency Contact: Dmitri Mendoza  Address: [address] [place], [place] [id number] [facility]  Home Phone: [phone]  Mobile Phone: [phone]  Relation: Spouse   needed? No  Secondary Emergency Contact: Emelia Mendozaney  Address: [address] [place], [place] [address] [facility]  Home Phone: [phone]  Mobile Phone: [phone]  Relation: Child    Past Surgical History:  Past Surgical History:   Procedure Laterality Date    CORONARY ANGIOPLASTY WITH STENT PLACEMENT      CT BIOPSY BONE MARROW N/A 2025    by Dr. Petersen    CT BIOPSY BONE MARROW  3/17/2025    CT BIOPSY BONE MARROW 3/17/2025 [facility] CT SCAN    CYST REMOVAL  2024    cyst removed from back    UPPER GASTROINTESTINAL ENDOSCOPY  2015    w/bx,dilation     UPPER GASTROINTESTINAL ENDOSCOPY N/A 2025    ESOPHAGOGASTRODUODENOSCOPY performed by Dalia Allen MD at [facility]       Immunization History:   Immunization History   Administered Date(s) Administered    COVID-19, MODERNA Booster BLUE border, (age 18y+), IM, 50mcg/0.25mL 2022    Influenza Virus Vaccine 2007, 10/04/2010, 10/01/2015, 11/15/2019, 10/26/2021    Influenza, FLUAD, (age 65 y+), IM, Quadv, 0.5mL 11/15/2019, 10/26/2021, 10/28/2022, 2023    Influenza, FLUAD, (age 65 y+), IM, Trivalent PF, 0.5mL 10/18/2024    Influenza, FLUCELVAX, (age 6 mo+), MDCK, Quadv PF, 0.5mL 10/20/2017    Influenza, FLUMIST, (age 2-49 yr), Trivalent Live, INTRANASAL,

## 2025-07-30 ENCOUNTER — HOSPITAL ENCOUNTER (INPATIENT)
Age: 80
LOS: 10 days | Discharge: HOME | End: 2025-08-09
Attending: PHYSICAL MEDICINE & REHABILITATION | Admitting: PHYSICAL MEDICINE & REHABILITATION
Payer: MEDICARE

## 2025-07-30 VITALS
RESPIRATION RATE: 18 BRPM | DIASTOLIC BLOOD PRESSURE: 65 MMHG | TEMPERATURE: 97.9 F | HEIGHT: 60 IN | WEIGHT: 174 LBS | OXYGEN SATURATION: 96 % | HEART RATE: 72 BPM | BODY MASS INDEX: 34.16 KG/M2 | SYSTOLIC BLOOD PRESSURE: 112 MMHG

## 2025-07-30 DIAGNOSIS — H81.13 BENIGN PAROXYSMAL POSITIONAL VERTIGO DUE TO BILATERAL VESTIBULAR DISORDER: Primary | ICD-10-CM

## 2025-07-30 LAB
ANION GAP SERPL CALCULATED.3IONS-SCNC: 10 MEQ/L (ref 9–15)
ANISOCYTOSIS BLD QL SMEAR: ABNORMAL
BACTERIA BLD CULT ORG #2: NORMAL
BASOPHILS # BLD: 0 K/UL (ref 0–0.2)
BASOPHILS NFR BLD: 0.4 %
BUN SERPL-MCNC: 25 MG/DL (ref 8–23)
CALCIUM SERPL-MCNC: 8.2 MG/DL (ref 8.5–9.9)
CHLORIDE SERPL-SCNC: 106 MEQ/L (ref 95–107)
CO2 SERPL-SCNC: 25 MEQ/L (ref 20–31)
CREAT SERPL-MCNC: 1.35 MG/DL (ref 0.5–0.9)
DACRYOCYTES BLD QL SMEAR: ABNORMAL
DOHLE BOD BLD QL SMEAR: ABNORMAL
EOSINOPHIL # BLD: 0 K/UL (ref 0–0.7)
EOSINOPHIL NFR BLD: 0 %
ERYTHROCYTE [DISTWIDTH] IN BLOOD BY AUTOMATED COUNT: 20.3 % (ref 11.5–14.5)
GLUCOSE BLD-MCNC: 201 MG/DL (ref 70–99)
GLUCOSE BLD-MCNC: 204 MG/DL (ref 70–99)
GLUCOSE BLD-MCNC: 223 MG/DL (ref 70–99)
GLUCOSE BLD-MCNC: 296 MG/DL (ref 70–99)
GLUCOSE SERPL-MCNC: 171 MG/DL (ref 70–99)
HCT VFR BLD AUTO: 21.3 % (ref 37–47)
HGB BLD-MCNC: 7.2 G/DL (ref 12–16)
HYPOCHROMIA BLD QL SMEAR: ABNORMAL
LYMPHOCYTES # BLD: 0.2 K/UL (ref 1–4.8)
LYMPHOCYTES NFR BLD: 9 %
MACROCYTES BLD QL SMEAR: ABNORMAL
MCH RBC QN AUTO: 36.4 PG (ref 27–31.3)
MCHC RBC AUTO-ENTMCNC: 33.8 % (ref 33–37)
MCV RBC AUTO: 107.6 FL (ref 79.4–94.8)
MONOCYTES # BLD: 0.1 K/UL (ref 0.2–0.8)
MONOCYTES NFR BLD: 1.9 %
MYELOCYTES NFR BLD MANUAL: 1 %
NEUTROPHILS # BLD: 2.3 K/UL (ref 1.4–6.5)
NEUTS SEG NFR BLD: 88 %
NRBC BLD-RTO: 4 /100 WBC
OVALOCYTES BLD QL SMEAR: ABNORMAL
PERFORMED ON: ABNORMAL
PLATELET # BLD AUTO: 87 K/UL (ref 130–400)
PLATELET BLD QL SMEAR: ABNORMAL
POIKILOCYTOSIS BLD QL SMEAR: ABNORMAL
POLYCHROMASIA BLD QL SMEAR: ABNORMAL
POTASSIUM SERPL-SCNC: 3.8 MEQ/L (ref 3.4–4.9)
RBC # BLD AUTO: 1.98 M/UL (ref 4.2–5.4)
SLIDE REVIEW: ABNORMAL
SMUDGE CELLS BLD QL SMEAR: 2.9
SODIUM SERPL-SCNC: 141 MEQ/L (ref 135–144)
WBC # BLD AUTO: 2.6 K/UL (ref 4.8–10.8)

## 2025-07-30 PROCEDURE — 6360000002 HC RX W HCPCS: Performed by: INTERNAL MEDICINE

## 2025-07-30 PROCEDURE — 97116 GAIT TRAINING THERAPY: CPT

## 2025-07-30 PROCEDURE — 6370000000 HC RX 637 (ALT 250 FOR IP): Performed by: INTERNAL MEDICINE

## 2025-07-30 PROCEDURE — 1180000000 HC REHAB R&B

## 2025-07-30 PROCEDURE — 80048 BASIC METABOLIC PNL TOTAL CA: CPT

## 2025-07-30 PROCEDURE — 36415 COLL VENOUS BLD VENIPUNCTURE: CPT

## 2025-07-30 PROCEDURE — 97535 SELF CARE MNGMENT TRAINING: CPT

## 2025-07-30 PROCEDURE — 85025 COMPLETE CBC W/AUTO DIFF WBC: CPT

## 2025-07-30 RX ORDER — INSULIN LISPRO 100 [IU]/ML
6 INJECTION, SOLUTION INTRAVENOUS; SUBCUTANEOUS
Status: CANCELLED | OUTPATIENT
Start: 2025-07-30

## 2025-07-30 RX ORDER — GABAPENTIN 300 MG/1
300 CAPSULE ORAL 2 TIMES DAILY
Status: CANCELLED | OUTPATIENT
Start: 2025-07-30

## 2025-07-30 RX ORDER — GLUCAGON 1 MG/ML
1 KIT INJECTION PRN
Status: DISCONTINUED | OUTPATIENT
Start: 2025-07-30 | End: 2025-08-09 | Stop reason: HOSPADM

## 2025-07-30 RX ORDER — FLUCONAZOLE 100 MG/1
200 TABLET ORAL DAILY
Status: CANCELLED | OUTPATIENT
Start: 2025-07-31

## 2025-07-30 RX ORDER — ATORVASTATIN CALCIUM 40 MG/1
40 TABLET, FILM COATED ORAL DAILY
Status: CANCELLED | OUTPATIENT
Start: 2025-07-31

## 2025-07-30 RX ORDER — FLUTICASONE PROPIONATE 50 MCG
1 SPRAY, SUSPENSION (ML) NASAL DAILY PRN
Status: DISCONTINUED | OUTPATIENT
Start: 2025-07-30 | End: 2025-08-09 | Stop reason: HOSPADM

## 2025-07-30 RX ORDER — ALBUTEROL SULFATE 90 UG/1
2 INHALANT RESPIRATORY (INHALATION) EVERY 6 HOURS PRN
Status: DISCONTINUED | OUTPATIENT
Start: 2025-07-30 | End: 2025-08-09 | Stop reason: HOSPADM

## 2025-07-30 RX ORDER — ACYCLOVIR 400 MG/1
400 TABLET ORAL DAILY
Status: CANCELLED | OUTPATIENT
Start: 2025-07-31

## 2025-07-30 RX ORDER — GLUCAGON 1 MG/ML
1 KIT INJECTION PRN
Status: CANCELLED | OUTPATIENT
Start: 2025-07-30

## 2025-07-30 RX ORDER — PANTOPRAZOLE SODIUM 40 MG/1
40 TABLET, DELAYED RELEASE ORAL DAILY
Status: CANCELLED | OUTPATIENT
Start: 2025-07-31

## 2025-07-30 RX ORDER — METOPROLOL TARTRATE 25 MG/1
25 TABLET, FILM COATED ORAL 2 TIMES DAILY
Status: CANCELLED | OUTPATIENT
Start: 2025-07-30

## 2025-07-30 RX ORDER — VITAMIN B COMPLEX
2000 TABLET ORAL
Status: CANCELLED | OUTPATIENT
Start: 2025-07-30

## 2025-07-30 RX ORDER — ACETAMINOPHEN 650 MG/1
650 SUPPOSITORY RECTAL EVERY 6 HOURS PRN
Status: DISCONTINUED | OUTPATIENT
Start: 2025-07-30 | End: 2025-08-06

## 2025-07-30 RX ORDER — PANTOPRAZOLE SODIUM 40 MG/1
40 TABLET, DELAYED RELEASE ORAL DAILY
Status: DISCONTINUED | OUTPATIENT
Start: 2025-07-31 | End: 2025-08-09 | Stop reason: HOSPADM

## 2025-07-30 RX ORDER — ATORVASTATIN CALCIUM 40 MG/1
40 TABLET, FILM COATED ORAL DAILY
Status: DISCONTINUED | OUTPATIENT
Start: 2025-07-31 | End: 2025-08-09 | Stop reason: HOSPADM

## 2025-07-30 RX ORDER — INSULIN GLARGINE 100 [IU]/ML
20 INJECTION, SOLUTION SUBCUTANEOUS DAILY
Status: CANCELLED | OUTPATIENT
Start: 2025-07-31

## 2025-07-30 RX ORDER — ACETAMINOPHEN 325 MG/1
650 TABLET ORAL EVERY 6 HOURS PRN
Status: DISCONTINUED | OUTPATIENT
Start: 2025-07-30 | End: 2025-08-09 | Stop reason: HOSPADM

## 2025-07-30 RX ORDER — DEXAMETHASONE 4 MG/1
4 TABLET ORAL EVERY 12 HOURS SCHEDULED
Status: CANCELLED | OUTPATIENT
Start: 2025-07-30

## 2025-07-30 RX ORDER — METOPROLOL TARTRATE 25 MG/1
25 TABLET, FILM COATED ORAL 2 TIMES DAILY
Status: DISCONTINUED | OUTPATIENT
Start: 2025-07-30 | End: 2025-08-09 | Stop reason: HOSPADM

## 2025-07-30 RX ORDER — HEPARIN SODIUM 5000 [USP'U]/ML
5000 INJECTION, SOLUTION INTRAVENOUS; SUBCUTANEOUS EVERY 8 HOURS SCHEDULED
Status: DISCONTINUED | OUTPATIENT
Start: 2025-07-30 | End: 2025-08-09 | Stop reason: HOSPADM

## 2025-07-30 RX ORDER — ACETAMINOPHEN 325 MG/1
650 TABLET ORAL EVERY 4 HOURS PRN
Status: DISCONTINUED | OUTPATIENT
Start: 2025-07-30 | End: 2025-08-09 | Stop reason: HOSPADM

## 2025-07-30 RX ORDER — FLUCONAZOLE 100 MG/1
200 TABLET ORAL DAILY
Status: DISCONTINUED | OUTPATIENT
Start: 2025-07-31 | End: 2025-08-09 | Stop reason: HOSPADM

## 2025-07-30 RX ORDER — BISACODYL 10 MG
10 SUPPOSITORY, RECTAL RECTAL DAILY PRN
Status: DISCONTINUED | OUTPATIENT
Start: 2025-07-30 | End: 2025-08-09 | Stop reason: HOSPADM

## 2025-07-30 RX ORDER — SULFAMETHOXAZOLE AND TRIMETHOPRIM 400; 80 MG/1; MG/1
1 TABLET ORAL
Status: DISCONTINUED | OUTPATIENT
Start: 2025-08-01 | End: 2025-08-09 | Stop reason: HOSPADM

## 2025-07-30 RX ORDER — POLYETHYLENE GLYCOL 3350 17 G/17G
17 POWDER, FOR SOLUTION ORAL DAILY PRN
Status: CANCELLED | OUTPATIENT
Start: 2025-07-30

## 2025-07-30 RX ORDER — SODIUM PHOSPHATE, DIBASIC AND SODIUM PHOSPHATE, MONOBASIC 7; 19 G/230ML; G/230ML
1 ENEMA RECTAL DAILY PRN
Status: DISCONTINUED | OUTPATIENT
Start: 2025-07-30 | End: 2025-08-09 | Stop reason: HOSPADM

## 2025-07-30 RX ORDER — HEPARIN SODIUM 5000 [USP'U]/ML
5000 INJECTION, SOLUTION INTRAVENOUS; SUBCUTANEOUS EVERY 8 HOURS SCHEDULED
Status: CANCELLED | OUTPATIENT
Start: 2025-07-30

## 2025-07-30 RX ORDER — VITAMIN B COMPLEX
2000 TABLET ORAL
Status: DISCONTINUED | OUTPATIENT
Start: 2025-07-30 | End: 2025-08-09 | Stop reason: HOSPADM

## 2025-07-30 RX ORDER — INSULIN LISPRO 100 [IU]/ML
0-8 INJECTION, SOLUTION INTRAVENOUS; SUBCUTANEOUS
Status: DISCONTINUED | OUTPATIENT
Start: 2025-07-30 | End: 2025-08-09 | Stop reason: HOSPADM

## 2025-07-30 RX ORDER — ACETAMINOPHEN 650 MG/1
650 SUPPOSITORY RECTAL EVERY 6 HOURS PRN
Status: CANCELLED | OUTPATIENT
Start: 2025-07-30

## 2025-07-30 RX ORDER — INSULIN GLARGINE 100 [IU]/ML
20 INJECTION, SOLUTION SUBCUTANEOUS DAILY
Status: DISCONTINUED | OUTPATIENT
Start: 2025-07-31 | End: 2025-08-05

## 2025-07-30 RX ORDER — ALBUTEROL SULFATE 90 UG/1
2 INHALANT RESPIRATORY (INHALATION) EVERY 6 HOURS PRN
Status: CANCELLED | OUTPATIENT
Start: 2025-07-30

## 2025-07-30 RX ORDER — GABAPENTIN 300 MG/1
300 CAPSULE ORAL 2 TIMES DAILY
Status: DISCONTINUED | OUTPATIENT
Start: 2025-07-30 | End: 2025-08-09 | Stop reason: HOSPADM

## 2025-07-30 RX ORDER — ACETAMINOPHEN 325 MG/1
650 TABLET ORAL EVERY 6 HOURS PRN
Status: CANCELLED | OUTPATIENT
Start: 2025-07-30

## 2025-07-30 RX ORDER — INSULIN LISPRO 100 [IU]/ML
0-8 INJECTION, SOLUTION INTRAVENOUS; SUBCUTANEOUS
Status: CANCELLED | OUTPATIENT
Start: 2025-07-30

## 2025-07-30 RX ORDER — SULFAMETHOXAZOLE AND TRIMETHOPRIM 400; 80 MG/1; MG/1
1 TABLET ORAL
Status: CANCELLED | OUTPATIENT
Start: 2025-08-01

## 2025-07-30 RX ORDER — INSULIN LISPRO 100 [IU]/ML
6 INJECTION, SOLUTION INTRAVENOUS; SUBCUTANEOUS
Status: DISCONTINUED | OUTPATIENT
Start: 2025-07-30 | End: 2025-08-09 | Stop reason: HOSPADM

## 2025-07-30 RX ORDER — FLUTICASONE PROPIONATE 50 MCG
1 SPRAY, SUSPENSION (ML) NASAL DAILY PRN
Status: CANCELLED | OUTPATIENT
Start: 2025-07-30

## 2025-07-30 RX ORDER — ACYCLOVIR 400 MG/1
400 TABLET ORAL DAILY
Status: DISCONTINUED | OUTPATIENT
Start: 2025-07-31 | End: 2025-08-09 | Stop reason: HOSPADM

## 2025-07-30 RX ORDER — DEXAMETHASONE 4 MG/1
4 TABLET ORAL EVERY 12 HOURS SCHEDULED
Status: DISCONTINUED | OUTPATIENT
Start: 2025-07-30 | End: 2025-08-09 | Stop reason: HOSPADM

## 2025-07-30 RX ORDER — POLYETHYLENE GLYCOL 3350 17 G/17G
17 POWDER, FOR SOLUTION ORAL DAILY PRN
Status: DISCONTINUED | OUTPATIENT
Start: 2025-07-30 | End: 2025-08-09 | Stop reason: HOSPADM

## 2025-07-30 RX ADMIN — GABAPENTIN 300 MG: 300 CAPSULE ORAL at 20:23

## 2025-07-30 RX ADMIN — HEPARIN SODIUM 5000 UNITS: 5000 INJECTION INTRAVENOUS; SUBCUTANEOUS at 05:39

## 2025-07-30 RX ADMIN — METOPROLOL TARTRATE 25 MG: 25 TABLET, FILM COATED ORAL at 20:24

## 2025-07-30 RX ADMIN — INSULIN LISPRO 6 UNITS: 100 INJECTION, SOLUTION INTRAVENOUS; SUBCUTANEOUS at 11:55

## 2025-07-30 RX ADMIN — ATORVASTATIN CALCIUM 40 MG: 40 TABLET, FILM COATED ORAL at 08:14

## 2025-07-30 RX ADMIN — INSULIN LISPRO 2 UNITS: 100 INJECTION, SOLUTION INTRAVENOUS; SUBCUTANEOUS at 08:51

## 2025-07-30 RX ADMIN — SULFAMETHOXAZOLE AND TRIMETHOPRIM 1 TABLET: 400; 80 TABLET ORAL at 08:14

## 2025-07-30 RX ADMIN — INSULIN LISPRO 6 UNITS: 100 INJECTION, SOLUTION INTRAVENOUS; SUBCUTANEOUS at 17:48

## 2025-07-30 RX ADMIN — FLUCONAZOLE 200 MG: 100 TABLET ORAL at 08:13

## 2025-07-30 RX ADMIN — DEXAMETHASONE 4 MG: 4 TABLET ORAL at 20:24

## 2025-07-30 RX ADMIN — INSULIN GLARGINE 20 UNITS: 100 INJECTION, SOLUTION SUBCUTANEOUS at 08:50

## 2025-07-30 RX ADMIN — METOPROLOL TARTRATE 25 MG: 25 TABLET, FILM COATED ORAL at 08:14

## 2025-07-30 RX ADMIN — ACYCLOVIR 400 MG: 400 TABLET ORAL at 08:13

## 2025-07-30 RX ADMIN — HEPARIN SODIUM 5000 UNITS: 5000 INJECTION INTRAVENOUS; SUBCUTANEOUS at 20:23

## 2025-07-30 RX ADMIN — GABAPENTIN 300 MG: 300 CAPSULE ORAL at 08:14

## 2025-07-30 RX ADMIN — HEPARIN SODIUM 5000 UNITS: 5000 INJECTION INTRAVENOUS; SUBCUTANEOUS at 14:11

## 2025-07-30 RX ADMIN — INSULIN LISPRO 4 UNITS: 100 INJECTION, SOLUTION INTRAVENOUS; SUBCUTANEOUS at 11:55

## 2025-07-30 RX ADMIN — INSULIN LISPRO 6 UNITS: 100 INJECTION, SOLUTION INTRAVENOUS; SUBCUTANEOUS at 08:50

## 2025-07-30 RX ADMIN — Medication 2000 UNITS: at 17:48

## 2025-07-30 RX ADMIN — PANTOPRAZOLE SODIUM 40 MG: 40 TABLET, DELAYED RELEASE ORAL at 08:14

## 2025-07-30 RX ADMIN — INSULIN LISPRO 2 UNITS: 100 INJECTION, SOLUTION INTRAVENOUS; SUBCUTANEOUS at 20:24

## 2025-07-30 RX ADMIN — DEXAMETHASONE 4 MG: 4 TABLET ORAL at 08:13

## 2025-07-30 NOTE — CARE COORDINATION
Call received from Scooby, nurse reviewer with [facility] who advised medical director does not feel clinicals support IRF request and AR is being denied at this time. Optional p2p being offered with deadline to schedule 7/30. [phone].  Electronically signed by Nina Campos on 7/29/2025 at 3:11 PM    Call placed to [facility] p[facility]p scheduling line and had to leave  with request to schedule p2p. Did request availability today if possible. Received return call from Cherri (at 1634h), p2p specialist with [facility] who advised there is no more availability for today but there is as early as 11A tomorrow 7/30. P2P scheduled for 11A, 7/30. [facility] medical director will call PM&R physician.  Electronically signed by Nina Campos on 7/29/2025 at 5:25 PM    
Case Management Assessment  Initial Evaluation    Date/Time of Evaluation: 7/28/2025 11:10 AM  Assessment Completed by: Heidy Martinez    If patient is discharged prior to next notation, then this note serves as note for discharge by case management.    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 Yes    Chart Reviewed: Yes      History Provided by: Patient  Patient Orientation: Alert and Oriented    Patient Cognition: Alert    Hospitalization in the last 30 days (Readmission):  No    If yes, Readmission Assessment in CM Navigator will be completed.    Advance Directives:      Code Status: Full Code   Patient's Primary Decision Maker is: Legal Next of Kin    Primary Decision Maker: Dmitri Mendoza - Spouse - [phone]    Secondary Decision Maker: Jess Mendoza - Child - [phone]    Discharge Planning:    Patient lives with: Family Members Type of Home: House  Primary Care Giver: Self  Patient Support Systems include: Spouse/Significant Other   Current Financial resources:    Current community resources:    Current services prior to admission: Home Care            Current DME:              Type of Home Care services:  [employer] Services    ADLS  Prior functional level: Independent in ADLs/IADLs  Current functional level: Assistance with the following:    PT AM-PAC: 10 /24  OT AM-PAC: 16 /24    Family can provide assistance at DC: Yes  Would you like Case Management to discuss the 
Inpatient Rehab referral received. Met with patient and  to discuss [facility] rehab. Have met with patient in the past to discuss rehab on a prior encounter, however, plans changed and recommendations were for patient to start chemo asap. Reviewed and explained [facility] Inpatient Rehab program and requirements, including 3 hours of intense therapy daily, anticipated length of stay, weekly team meetings and goal of discharge to home. Advised on the need to know onc plan and AR unable to accommodate chemo for which patient and  verbalized their understanding to. Patient reports she does not want to stop treatment (depending upon physician recommendations) as every time it is stopped , she has to start all over again. Patient reports currently she gets an injection Mondays and Fridays. Pending recommendations, patient would [facility], however, plans have been discussed with alternative option as well. Patient and  made aware of needing insurance approval which is not guaranteed of obtaining for AR.  Electronically signed by Nina Campos on 7/28/2025 at 10:24 AM    
[facility] Pre-Admission Screening Document      Patient Name: Mckenna Mendoza       MRN: [id number]    : 1945    Age: 79 y.o.  Gender: female   Payor: Payor: [employer] MEDICARE / Plan: [employer] GOLD PLUS HMO / Product Type: *No Product type* /   MSSP: No    Admitted from: [facility] Floor: 4W  Attending Care Provider: Kayleen Trimble MD  Inpatient Rehab Referring Care Provider: Dr. Trimble  Primary Care Provider: Twila Maher DO  Inpatient Treatment Team including Consults: Treatment Team:   Kayleen Trimble MD Collins, Sienna N, APRN - CNP  Dominick Alvarenga, MADAY Marroquin, MADAY Jordan Heather, DO Brown, Jennifer, Heidy Vinson    Reason for Hospitalization:   1. OSIRIS (acute kidney injury)    2. Myelophthisic anemia (HCC)    3. Leukopenia, unspecified type    4. Hx of multiple myeloma    5. Elevated troponin    6. Weakness of both lower extremities      Chief Complaint   Patient presents with    Extremity Weakness     Isolation:Neutropenic    Hospital Course:  Admit Date: 2025  8:23 AM  Inpatient Rehab Referral Date: 2025  Narrative of hospital course/history of present illness: 79 y.o. female patient with metastatic multiple myeloma currently undergoing tx who presented to ER  with c/o generalized weakness. No falls, however, patient reports to feeling as though she could've fallen r/t the weakness. Admitted with weakness and OSIRIS. Oncology consulted.    Internal Medicine:  1.  Lower extremity weakness suspect secondary to multiple myeloma and antineoplastic therapy  - Appreciate oncology: Symptoms likely neurotoxicity from antineoplastic therapy.  Solu-Medrol started  - PT/OT     2.  OSIRIS secondary to dehydration related to diuretics, antineoplastic therapy  - S/p IVF.  Improving.  Continue to hold diuretics     3.  Type 2 diabetes with hyperglycemia exacerbated steroids:  - SSI     Multiple myeloma with lytic bone 
PER PENNIE, STILL AWAITING CALL FROM INSURANCE WITH OFFICIAL APPROVAL. ONCE SHE HAS THAT, SHE CAN UPDATE WITH BED ASSIGNMENT. AUM UPDATED.   
PT IS MEDICALLY READY TO DC.  P2P TODAY AY 11AM, IF DENIED, CM HAS SEVERAL REFERRALS TO  AGENCIES.     [facility] HAS ACCEPTED.     P2P WAS APPROVED, UPDATED HH.   
Per chart review patient chemo treatment on hold.  Per Anna with [facility] review of note patient has been accepted to [facility] and pre cert started.  Updated [facility] via [facility]. Discharge plan [facility] rehab pending pre cert.  
Received call from Scooby with [facility] who informed IRF request approved s/p p2p. Patient approved for entire LOS. No  required but admission notification within 24hrs of admit required and can be faxed to [phone]. Discharge confirmation and disposition required within 24 hrs of dc. Should Scooby need to be contacted, he can be reached at [phone], ext [phone].  Electronically signed by Nina Campos on 7/30/2025 at 2:08 PM    Auth# [id number].  Electronically signed by Nina Campos on 7/30/2025 at 2:10 PM    
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 On solumedrol. Has physical therapy. Able to walk from bed to wall. Might need to go to Rehab on 2nd floor. Discussed that bispecifics will be on hold until she gets a bit stronger.WBC at 2200.\" Spoke with PM&R physician. Ok to start IRF precert as ONC note taken as chemo tx on hold in the mean time. Reference# [id number]. Will follow for response.  Electronically signed by Nina Campos on 7/28/2025 at 4:45 PM       
This LSW visited patient and patients  at bedside this am.   [facility] is requiring Peer to Peer review.  Peer to Peer to be completed today at 11:00am.  I notified patient and  of the above information.     Electronically signed by NORMAN Benjamin on 7/30/25 at 10:10 AM EDT   This LSW was notified that patients Peer to Peer insurance review has been overturned.  Patient will transfer to [facility] program.  Electronically signed by NORMAN Benjamin on 7/30/25 at 11:44 AM EDT   This LSW was notified that patient will transfer to [place] [address]/[facility] program.  I notified patient at bedside.  Electronically signed by NORMAN Benjamin on 7/30/25 at 1:54 PM EDT   
glucagon (rDNA), dextrose bolus **OR** dextrose bolus, albuterol sulfate HFA, fluticasone, tiZANidine, sodium chloride flush, sodium chloride, polyethylene glycol, acetaminophen **OR** acetaminophen    Allergies:  Allergies   Allergen Reactions    Bacid Diarrhea and Nausea Only    Egg-Derived Products Nausea Only    Glucophage [Metformin]      diarrhea    Penicillins     Valium [Diazepam]     Zithromax [Azithromycin]     Zofran [Ondansetron]     Milk-Related Compounds Diarrhea, Nausea And Vomiting and Nausea Only         Most Recent Vitals, Height and Weight  /60   Pulse 65   Temp 97.7 °F (36.5 °C) (Oral)   Resp 18   Ht 1.524 m (5')   Wt 78.9 kg (174 lb)   SpO2 93%   BMI 33.98 kg/m²     Weight Bearing Restrictions: None       Current Diet Order: ADULT DIET; Regular    Skin: Intact   Wound Care Documentation:          Lungs:   Respiratory  Respiratory Quality/Effort: Unlabored  Chest Assessment: Chest expansion symmetrical, Trachea midline  L Breath Sounds: Clear  R Breath Sounds: Clear      Cognition and Behavior:  Language Preference (if other than English):      Alertness/Behavior  Neuro (WDL): Within Defined Limits  Level of Consciousness: Alert (0)  History of Falling: Yes      Short Term Memory Deficits     History of Falling: Yes    Safety          CURRENT FUNCTIONAL LEVEL:  Physical Therapy  Bed mobility:  Bed mobility  Rolling to Right: Stand by assistance (07/29/25 1552)  Supine to Sit: Stand by assistance (07/30/25 0912)  Sit to Supine: Stand by assistance (07/29/25 1552)  Scooting: Stand by assistance (07/26/25 1138)  Bed Mobility Comments: HOB @30° increased timed to completion with Reported dizziness at EOB (07/30/25 0912)  Transfers:  Transfers  Sit to Stand: Stand by assistance;Contact guard assistance (07/30/25 0913)  Stand to Sit: Minimal Assistance;Contact guard assistance (07/30/25 0913)  Comment: Decreased eccentric control with increased use of walker and poor hand placement

## 2025-07-30 NOTE — PLAN OF CARE
Problem: Discharge Planning  Goal: Discharge to home or other facility with appropriate resources  7/30/2025 1247 by Carmella Castrejon RN  Outcome: Progressing  7/30/2025 0102 by Lisette Selby RN  Outcome: Progressing     Problem: Chronic Conditions and Co-morbidities  Goal: Patient's chronic conditions and co-morbidity symptoms are monitored and maintained or improved  7/30/2025 1247 by Carmella Castrejon RN  Outcome: Progressing  7/30/2025 0102 by Lisette Selby RN  Outcome: Progressing     Problem: ABCDS Injury Assessment  Goal: Absence of physical injury  7/30/2025 1247 by Carmella Castrejon RN  Outcome: Progressing  7/30/2025 0102 by Lisette Selby RN  Outcome: Progressing     Problem: Skin/Tissue Integrity  Goal: Skin integrity remains intact  Description: 1.  Monitor for areas of redness and/or skin breakdown  2.  Assess vascular access sites hourly  3.  Every 4-6 hours minimum:  Change oxygen saturation probe site  4.  Every 4-6 hours:  If on nasal continuous positive airway pressure, respiratory therapy assess nares and determine need for appliance change or resting period  7/30/2025 1247 by Carmella Castrejon RN  Outcome: Progressing  7/30/2025 0102 by Lisette Selby RN  Outcome: Progressing     Problem: Safety - Adult  Goal: Free from fall injury  7/30/2025 1247 by Carmella Castrejon RN  Outcome: Progressing  7/30/2025 0102 by Lisette Selby RN  Outcome: Progressing

## 2025-07-30 NOTE — DISCHARGE SUMMARY
[facility] Hospital Medicine Discharge Summary    Mckenna Mendoza  :  1945  MRN:  [id number]    Admit date:  2025    Discharge date:  2025    Admitting Physician:  Zion Horne DO  Primary Care Physician:  Twila Maher DO    Discharge Diagnoses:    Principal Problem:    Metastatic multiple myeloma to bone (HCC)  Active Problems:    Impaired mobility and activities of daily living  Resolved Problems:    * No resolved hospital problems. *    Chief Complaint   Patient presents with    Extremity Weakness     Condition: improved   Activity: no restrictions  Diet: regular  Disposition: [facility]  Functional Status: ambulatory with assistance    Hospital Course:   Very nice 79-year-old female with multiple myeloma and lytic lesions, pancytopenia secondary to malignancy and antineoplastic therapy was hospitalized after presenting with lower extremity weakness that been going on for 10 days.  This was felt to be secondary to neurotoxicity from her antineoplastic therapy.  She also had OSIRIS and admission related to dehydration.  She was started on steroids per recommendation of oncology who will continue to follow her while in acute rehab.    Her OSIRIS improved with IV fluids.  Her home diuretics continued to be held and could potentially be discontinued upon discharge.    Significant Findings:   OSIRIS    Exam on Discharge:   /60   Pulse 65   Temp 97.7 °F (36.5 °C) (Oral)   Resp 18   Ht 1.524 m (5')   Wt 78.9 kg (174 lb)   SpO2 93%   BMI 33.98 kg/m²   General appearance: alert, cooperative and no distress  Mental Status: oriented to person, place and time and normal affect  Lungs: clear to auscultation bilaterally, normal effort  Heart: regular rate and rhythm, no murmur  Abdomen: soft, nontender, nondistended, bowel sounds present, no masses  Extremities: no edema, redness, tenderness in the calves  Skin: no gross lesions, rashes    Discharge Medication List:

## 2025-07-31 LAB
GLUCOSE BLD-MCNC: 165 MG/DL (ref 70–99)
GLUCOSE BLD-MCNC: 169 MG/DL (ref 70–99)
GLUCOSE BLD-MCNC: 203 MG/DL (ref 70–99)
GLUCOSE BLD-MCNC: 289 MG/DL (ref 70–99)
PERFORMED ON: ABNORMAL

## 2025-07-31 PROCEDURE — 6370000000 HC RX 637 (ALT 250 FOR IP): Performed by: INTERNAL MEDICINE

## 2025-07-31 PROCEDURE — 97162 PT EVAL MOD COMPLEX 30 MIN: CPT

## 2025-07-31 PROCEDURE — 97530 THERAPEUTIC ACTIVITIES: CPT

## 2025-07-31 PROCEDURE — 6360000002 HC RX W HCPCS: Performed by: INTERNAL MEDICINE

## 2025-07-31 PROCEDURE — 1180000000 HC REHAB R&B

## 2025-07-31 PROCEDURE — 97166 OT EVAL MOD COMPLEX 45 MIN: CPT

## 2025-07-31 RX ORDER — SENNOSIDES 8.6 MG/1
1 TABLET ORAL 2 TIMES DAILY
Status: DISCONTINUED | OUTPATIENT
Start: 2025-07-31 | End: 2025-08-09 | Stop reason: HOSPADM

## 2025-07-31 RX ORDER — POLYETHYLENE GLYCOL 3350 17 G/17G
17 POWDER, FOR SOLUTION ORAL 2 TIMES DAILY
Status: DISCONTINUED | OUTPATIENT
Start: 2025-07-31 | End: 2025-08-09 | Stop reason: HOSPADM

## 2025-07-31 RX ADMIN — FLUCONAZOLE 200 MG: 100 TABLET ORAL at 09:01

## 2025-07-31 RX ADMIN — Medication 2000 UNITS: at 18:34

## 2025-07-31 RX ADMIN — METOPROLOL TARTRATE 25 MG: 25 TABLET, FILM COATED ORAL at 21:07

## 2025-07-31 RX ADMIN — POLYETHYLENE GLYCOL 3350 17 G: 17 POWDER, FOR SOLUTION ORAL at 13:47

## 2025-07-31 RX ADMIN — INSULIN GLARGINE 20 UNITS: 100 INJECTION, SOLUTION SUBCUTANEOUS at 09:02

## 2025-07-31 RX ADMIN — GABAPENTIN 300 MG: 300 CAPSULE ORAL at 21:07

## 2025-07-31 RX ADMIN — SENNOSIDES 8.6 MG: 8.6 TABLET, FILM COATED ORAL at 13:47

## 2025-07-31 RX ADMIN — INSULIN LISPRO 6 UNITS: 100 INJECTION, SOLUTION INTRAVENOUS; SUBCUTANEOUS at 09:02

## 2025-07-31 RX ADMIN — METOPROLOL TARTRATE 25 MG: 25 TABLET, FILM COATED ORAL at 09:01

## 2025-07-31 RX ADMIN — INSULIN LISPRO 6 UNITS: 100 INJECTION, SOLUTION INTRAVENOUS; SUBCUTANEOUS at 13:48

## 2025-07-31 RX ADMIN — DEXAMETHASONE 4 MG: 4 TABLET ORAL at 09:01

## 2025-07-31 RX ADMIN — ACYCLOVIR 400 MG: 400 TABLET ORAL at 09:00

## 2025-07-31 RX ADMIN — HEPARIN SODIUM 5000 UNITS: 5000 INJECTION INTRAVENOUS; SUBCUTANEOUS at 13:48

## 2025-07-31 RX ADMIN — ATORVASTATIN CALCIUM 40 MG: 40 TABLET, FILM COATED ORAL at 09:00

## 2025-07-31 RX ADMIN — INSULIN LISPRO 4 UNITS: 100 INJECTION, SOLUTION INTRAVENOUS; SUBCUTANEOUS at 21:07

## 2025-07-31 RX ADMIN — HEPARIN SODIUM 5000 UNITS: 5000 INJECTION INTRAVENOUS; SUBCUTANEOUS at 06:17

## 2025-07-31 RX ADMIN — DEXAMETHASONE 4 MG: 4 TABLET ORAL at 21:07

## 2025-07-31 RX ADMIN — HEPARIN SODIUM 5000 UNITS: 5000 INJECTION INTRAVENOUS; SUBCUTANEOUS at 21:07

## 2025-07-31 RX ADMIN — INSULIN LISPRO 2 UNITS: 100 INJECTION, SOLUTION INTRAVENOUS; SUBCUTANEOUS at 09:02

## 2025-07-31 RX ADMIN — GABAPENTIN 300 MG: 300 CAPSULE ORAL at 09:01

## 2025-07-31 RX ADMIN — INSULIN LISPRO 6 UNITS: 100 INJECTION, SOLUTION INTRAVENOUS; SUBCUTANEOUS at 18:33

## 2025-07-31 RX ADMIN — PANTOPRAZOLE SODIUM 40 MG: 40 TABLET, DELAYED RELEASE ORAL at 09:01

## 2025-08-01 LAB
GLUCOSE BLD-MCNC: 159 MG/DL (ref 70–99)
GLUCOSE BLD-MCNC: 204 MG/DL (ref 70–99)
GLUCOSE BLD-MCNC: 209 MG/DL (ref 70–99)
GLUCOSE BLD-MCNC: 236 MG/DL (ref 70–99)
PERFORMED ON: ABNORMAL

## 2025-08-01 PROCEDURE — 6370000000 HC RX 637 (ALT 250 FOR IP): Performed by: INTERNAL MEDICINE

## 2025-08-01 PROCEDURE — 97535 SELF CARE MNGMENT TRAINING: CPT

## 2025-08-01 PROCEDURE — 97530 THERAPEUTIC ACTIVITIES: CPT

## 2025-08-01 PROCEDURE — 6360000002 HC RX W HCPCS: Performed by: INTERNAL MEDICINE

## 2025-08-01 PROCEDURE — 1180000000 HC REHAB R&B

## 2025-08-01 PROCEDURE — 97116 GAIT TRAINING THERAPY: CPT

## 2025-08-01 PROCEDURE — 97110 THERAPEUTIC EXERCISES: CPT

## 2025-08-01 RX ADMIN — SENNOSIDES 8.6 MG: 8.6 TABLET, FILM COATED ORAL at 08:07

## 2025-08-01 RX ADMIN — INSULIN LISPRO 2 UNITS: 100 INJECTION, SOLUTION INTRAVENOUS; SUBCUTANEOUS at 17:15

## 2025-08-01 RX ADMIN — DEXAMETHASONE 4 MG: 4 TABLET ORAL at 21:22

## 2025-08-01 RX ADMIN — PANTOPRAZOLE SODIUM 40 MG: 40 TABLET, DELAYED RELEASE ORAL at 08:07

## 2025-08-01 RX ADMIN — ATORVASTATIN CALCIUM 40 MG: 40 TABLET, FILM COATED ORAL at 08:07

## 2025-08-01 RX ADMIN — FILGRASTIM-AAFI 300 MCG: 300 INJECTION, SOLUTION SUBCUTANEOUS at 19:38

## 2025-08-01 RX ADMIN — INSULIN LISPRO 6 UNITS: 100 INJECTION, SOLUTION INTRAVENOUS; SUBCUTANEOUS at 13:19

## 2025-08-01 RX ADMIN — GABAPENTIN 300 MG: 300 CAPSULE ORAL at 08:07

## 2025-08-01 RX ADMIN — Medication 2000 UNITS: at 17:15

## 2025-08-01 RX ADMIN — ACYCLOVIR 400 MG: 400 TABLET ORAL at 08:07

## 2025-08-01 RX ADMIN — INSULIN LISPRO 2 UNITS: 100 INJECTION, SOLUTION INTRAVENOUS; SUBCUTANEOUS at 13:19

## 2025-08-01 RX ADMIN — DEXAMETHASONE 4 MG: 4 TABLET ORAL at 08:07

## 2025-08-01 RX ADMIN — INSULIN GLARGINE 20 UNITS: 100 INJECTION, SOLUTION SUBCUTANEOUS at 08:08

## 2025-08-01 RX ADMIN — INSULIN LISPRO 6 UNITS: 100 INJECTION, SOLUTION INTRAVENOUS; SUBCUTANEOUS at 08:09

## 2025-08-01 RX ADMIN — HEPARIN SODIUM 5000 UNITS: 5000 INJECTION INTRAVENOUS; SUBCUTANEOUS at 05:53

## 2025-08-01 RX ADMIN — GABAPENTIN 300 MG: 300 CAPSULE ORAL at 21:21

## 2025-08-01 RX ADMIN — SULFAMETHOXAZOLE AND TRIMETHOPRIM 1 TABLET: 400; 80 TABLET ORAL at 08:07

## 2025-08-01 RX ADMIN — METOPROLOL TARTRATE 25 MG: 25 TABLET, FILM COATED ORAL at 08:07

## 2025-08-01 RX ADMIN — INSULIN LISPRO 6 UNITS: 100 INJECTION, SOLUTION INTRAVENOUS; SUBCUTANEOUS at 17:15

## 2025-08-01 RX ADMIN — HEPARIN SODIUM 5000 UNITS: 5000 INJECTION INTRAVENOUS; SUBCUTANEOUS at 21:21

## 2025-08-01 RX ADMIN — HEPARIN SODIUM 5000 UNITS: 5000 INJECTION INTRAVENOUS; SUBCUTANEOUS at 13:18

## 2025-08-01 RX ADMIN — FLUCONAZOLE 200 MG: 100 TABLET ORAL at 08:12

## 2025-08-01 RX ADMIN — METOPROLOL TARTRATE 25 MG: 25 TABLET, FILM COATED ORAL at 21:21

## 2025-08-01 RX ADMIN — INSULIN LISPRO 2 UNITS: 100 INJECTION, SOLUTION INTRAVENOUS; SUBCUTANEOUS at 21:21

## 2025-08-01 NOTE — PROGRESS NOTES
Subjective:  The patient complains of moderate to severe acute on chronic progressive fatigue and weakness  partially relieved by rest, PT, OT and meds   and exacerbated by exertion and recent  .      I am concerned about patient’s medical complexities including:  Principal Problem:    Metastatic multiple myeloma to bone (HCC)  Active Problems:    Impaired mobility and activities of daily living  Resolved Problems:    * No resolved hospital problems. *      .    Controlled Substance Monitoring:    Acute and Chronic Pain Monitoring:   RX Monitoring Acute Pain Prescriptions Periodic Controlled Substance Monitoring Chronic Pain > 50 MEDD   5/15/2025  12:11 PM Prescription exceeds daily limit for a specific reason. See comments or note.;Severe pain not adequately treated with lower dose.;Not required given exclusionary diagnoses... Possible medication side effects, risk of tolerance/dependence & alternative treatments discussed.;No signs of potential drug abuse or diversion identified.;Assessed functional status (ability to engage in work or other purposeful activities, the pain intensity and its interference with activities of daily living, quality of family life and social activities, and the physical activity);Obtaining appropriate analgesic effect of treatment. Re-evaluated the status of the patient's underlying condition causing pain.           Reviewed recent nursing note and discussed current status and planned care with acute care providers, \"see notes \".    ROS x10:  The patient also complains of severely impaired mobility and activities of daily living.  Otherwise no new problems with vision, hearing, nose, mouth, throat, dermal, cardiovascular, GI, , pulmonary, musculoskeletal, psychiatric or neurological.        Vital signs:  BP (!) 104/58   Pulse 68   Temp 98.1 °F (36.7 °C) (Oral)   Resp 18   Ht 1.524 m (5')   Wt 78.9 kg (174 lb)   SpO2 93%   BMI 33.98 kg/m²   I/O:   PO/Intake:    fair PO 
   07/26/25 0800   RT Protocol   History Pulmonary Disease 0   Respiratory pattern 0   Breath sounds 0   Cough 0   Indications for Bronchodilator Therapy On home bronchodilators   Bronchodilator Assessment Score 0       
  Physician Progress Note      PATIENT:               SAPNA HOOVER  CSN #:                  [id number]  :                       1945  ADMIT DATE:       2025 8:23 AM  DISCH DATE:  RESPONDING  PROVIDER #:        Kayleen Trimble MD          QUERY TEXT:    Noted documentation of weakness likely due to ICANS documented by Dr. Appiah.    Based on your medical judgment, please clarify these findings and document if   any of the following are being evaluated and/or treated:    The clinical indicators include:  -weakness  -able to walk but needs help  -likely ICANS  -multiple myeloma  -sloumedrol  Options provided:  -- Likely ICANS confirmed present on admission  -- Likely ICANS ruled out after study  -- Other - I will add my own diagnosis  -- Disagree - Not applicable / Not valid  -- Disagree - Clinically unable to determine / Unknown  -- Refer to Clinical Documentation Reviewer    PROVIDER RESPONSE TEXT:    Provider disagreed with this query.    Query created by: Sarah Hill on 2025 8:51 AM      Electronically signed by:  Kayleen Trimble MD 2025 8:54 AM          
0950 Micro called 1/2 blood cultures positive for Staph Epi. Dr. Horne made aware.   
1315: Pt to 494. Son at bedside, requesting to spend night. Pt from home with son and grandson, where she uses a walker and rollator. No recent falls, but states she feels like she could have. Up with standby assist. No complaints of SOB, dizziness, pain, or nausea at this time. Call light within reach. Bed alarm on. Safety maintained.    Electronically signed by Bianca Foster RN on 7/25/2025 at 1:20 PM    
Follow-up on the patient.  Patient is feeling well or.  No chest pain.  No abdominal pain.  No diarrhea.    No distress.  Chest is clear, heart is regular.  Abdomen soft    *OSIRIS, likely hemodynamically mediated, responded to IV fluid  UA is bland.  No RBC.  Small amount of protein.  No strong suspicion for nephritis or nephrotic  Continue IV fluid infusion  .  Renal ultrasound rule out obstructive uropathy    *Functional impairment  Nonfocal.  PT OT treatment.  Discussion with the patient and her family to discharge to a rehab or SNF.  We will discuss with case management.    *Multiple myeloma with pancytopenia.  Patient is on Zovirax, Diflucan, Solu-Medrol and Bactrim as per hematologist  For care to hematologist    *Hyperglycemia secondary to Solu-Medrol initiated by hematologist  Increased sliding scale coverage.  Lantus subcutaneously    *Few other medical issues not listed above.  Incidental finding seen on labs and imaging.  To be addressed when time and condition are appropriate.  Could be addressed electively postdischarge from acute, skilled care to be handled by PCP in collaboration with needed outpatient providers.    I instructed the patient to ask PCP to obtain hospital record entirely to follow-up on needed medical care, PCP to obtain hospital record entirely to follow-up on needed medical care, outpatient monitoring, surveillance, needed diagnostic and therapeutic intervention in the outpatient setting in collaboration with other needed outpatient providers.  
Hematology/Oncology   Progress Note        CHIEF COMPLAINT/HPI:  Follow up of pancytopenia and weakness . Weakness slightly beter. On solumedrol. WBC at 2100 . On filgrastim. May allow patient to walk to bathroom with help (  in room). She has myeloma on bispecific treatment.    REVIEW OF SYSTEMS:    Unremarkable except for symptoms mentioned in HPI.    Current Inpatient Medications:    Current Facility-Administered Medications   Medication Dose Route Frequency Provider Last Rate Last Admin    filgrastim-aafi (NIVESTYM) injection 300 mcg  300 mcg SubCUTAneous QPM Marcelino Appiah MD        acyclovir (ZOVIRAX) tablet 400 mg  400 mg Oral Daily Zion Horne DO   400 mg at 07/27/25 0802    albuterol sulfate HFA (PROVENTIL;VENTOLIN;PROAIR) 108 (90 Base) MCG/ACT inhaler 2 puff  2 puff Inhalation Q6H PRN Zion Horen DO        atorvastatin (LIPITOR) tablet 40 mg  40 mg Oral Daily Zion Horne DO   40 mg at 07/27/25 0803    fluconazole (DIFLUCAN) tablet 400 mg  400 mg Oral Daily Zion Horne DO   400 mg at 07/27/25 0802    fluticasone (FLONASE) 50 MCG/ACT nasal spray 1 spray  1 spray Each Nostril Daily PRN Zion Horne DO        gabapentin (NEURONTIN) capsule 300 mg  300 mg Oral BID OzzieZion cobian, DO   300 mg at 07/27/25 0803    metoprolol tartrate (LOPRESSOR) tablet 25 mg  25 mg Oral BID Zion Horne DO   25 mg at 07/27/25 0803    pantoprazole (PROTONIX) tablet 40 mg  40 mg Oral Daily Zion Horne, DO   40 mg at 07/27/25 0803    [START ON 7/28/2025] sulfamethoxazole-trimethoprim (BACTRIM DS;SEPTRA DS) 800-160 MG per tablet 1 tablet  1 tablet Oral Q MWF Zion Horne DO        tiZANidine (ZANAFLEX) tablet 4 mg  4 mg Oral Q8H PRN Zion Horne DO        Vitamin D (CHOLECALCIFEROL) tablet 2,000 Units  2,000 Units Oral Dinner Zion Horne DO   2,000 Units at 07/26/25 1721    methylPREDNISolone sodium succ (SOLU-MEDROL) 60 mg in bacteriostatic water 2 mL injection  60 mg 
Hematology/Oncology   Progress Note        CHIEF COMPLAINT/HPI:  Follow up of pancytopenia from treatment for myeloma. WBC at 2000. Hemoglobin at 7.1 . On PT and able to walk to the door. Change solumedrol to oral decadron. Continue filgrastim.    REVIEW OF SYSTEMS:    Unremarkable except for symptoms mentioned in HPI.    Current Inpatient Medications:    Current Facility-Administered Medications   Medication Dose Route Frequency Provider Last Rate Last Admin    insulin glargine (LANTUS) injection vial 20 Units  20 Units SubCUTAneous Daily Kayleen Trimble MD   20 Units at 07/29/25 0936    [START ON 7/30/2025] fluconazole (DIFLUCAN) tablet 200 mg  200 mg Oral Daily Kayleen Trimble MD        [START ON 7/30/2025] sulfamethoxazole-trimethoprim (BACTRIM;SEPTRA) 400-80 MG per tablet 1 tablet  1 tablet Oral Q MWF Kayleen Trimble MD        dexAMETHasone (DECADRON) tablet 4 mg  4 mg Oral 2 times per day LitMarcelino hunt MD        filgrastim-aafi (NIVESTYM) injection 300 mcg  300 mcg SubCUTAneous Once Marcelino Appiah MD        insulin lispro (HUMALOG,ADMELOG) injection vial 6 Units  6 Units SubCUTAneous TID WC Kayleen Trimble MD   6 Units at 07/29/25 1323    glucose chewable tablet 16 g  4 tablet Oral PRN Kayleen Trimble MD        glucagon injection 1 mg  1 mg SubCUTAneous PRN Kayleen Trimble MD        insulin lispro (HUMALOG,ADMELOG) injection vial 0-8 Units  0-8 Units SubCUTAneous 4x Daily AC & HS Kayleen Trimble MD   4 Units at 07/29/25 1322    0.9 % sodium chloride infusion   IntraVENous Continuous Kayleen Trimble MD 70 mL/hr at 07/29/25 0539 Rate Verify at 07/29/25 0539    dextrose bolus 10% 125 mL  125 mL IntraVENous PRN Kayleen Trimble MD        Or    dextrose bolus 10% 250 mL  250 mL IntraVENous PRN Kayleen Trimble MD        acyclovir (ZOVIRAX) tablet 400 mg  400 mg Oral Daily Zion Horne R, DO   400 mg at 07/29/25 0938    albuterol sulfate HFA (PROVENTIL;VENTOLIN;PROAIR) 108 (90 Base) MCG/ACT inhaler 2 puff  2 
Hematology/Oncology   Progress Note        CHIEF COMPLAINT/HPI:  Follow up of pancytopenia. WBC at 2600 after filgrastim. On oral decadron from solumedrol.     REVIEW OF SYSTEMS:    Unremarkable except for symptoms mentioned in HPI.    Current Inpatient Medications:    Current Facility-Administered Medications   Medication Dose Route Frequency Provider Last Rate Last Admin    insulin glargine (LANTUS) injection vial 20 Units  20 Units SubCUTAneous Daily Kayleen Trimble MD   20 Units at 07/30/25 0850    fluconazole (DIFLUCAN) tablet 200 mg  200 mg Oral Daily Kayleen Trimble MD   200 mg at 07/30/25 0813    sulfamethoxazole-trimethoprim (BACTRIM;SEPTRA) 400-80 MG per tablet 1 tablet  1 tablet Oral Q MWF Kayleen Trimble MD   1 tablet at 07/30/25 0814    dexAMETHasone (DECADRON) tablet 4 mg  4 mg Oral 2 times per day Marcelino Appiah MD   4 mg at 07/30/25 0813    insulin lispro (HUMALOG,ADMELOG) injection vial 6 Units  6 Units SubCUTAneous TID WC Kayleen Trimble MD   6 Units at 07/30/25 1155    glucose chewable tablet 16 g  4 tablet Oral PRN Kayleen Trimble MD        glucagon injection 1 mg  1 mg SubCUTAneous PRN Kayleen Trimble MD        insulin lispro (HUMALOG,ADMELOG) injection vial 0-8 Units  0-8 Units SubCUTAneous 4x Daily AC & HS Kayleen Trimble MD   4 Units at 07/30/25 1155    dextrose bolus 10% 125 mL  125 mL IntraVENous PRN Kayleen Trimble MD        Or    dextrose bolus 10% 250 mL  250 mL IntraVENous PRN Kayleen Trimble MD        acyclovir (ZOVIRAX) tablet 400 mg  400 mg Oral Daily Ozzie Zion R, DO   400 mg at 07/30/25 0813    albuterol sulfate HFA (PROVENTIL;VENTOLIN;PROAIR) 108 (90 Base) MCG/ACT inhaler 2 puff  2 puff Inhalation Q6H PRN Ozzie, Zion R, DO        atorvastatin (LIPITOR) tablet 40 mg  40 mg Oral Daily Ozzie, Zion R, DO   40 mg at 07/30/25 0814    fluticasone (FLONASE) 50 MCG/ACT nasal spray 1 spray  1 spray Each Nostril Daily PRN Zion Horne, DO        gabapentin (NEURONTIN) 
Hematology/Oncology   Progress Note        CHIEF COMPLAINT/HPI:  Follow up of weakness from bispecifics. On solumedrol. Has physical therapy. Able to walk from bed to wall. Might need to go to Rehab on 2nd floor. Discussed that bispecifics will be on hold until she gets a bit stronger.WBC at 2200.     REVIEW OF SYSTEMS:    Unremarkable except for symptoms mentioned in HPI.    Current Inpatient Medications:    Current Facility-Administered Medications   Medication Dose Route Frequency Provider Last Rate Last Admin    insulin lispro (HUMALOG,ADMELOG) injection vial 6 Units  6 Units SubCUTAneous TID WC Kayleen Trimble MD   6 Units at 07/28/25 1132    glucose chewable tablet 16 g  4 tablet Oral PRN Kayleen Trimble MD        glucagon injection 1 mg  1 mg SubCUTAneous PRN Kayleen Trimble MD        insulin glargine (LANTUS) injection vial 15 Units  15 Units SubCUTAneous Daily Kayleen Trimble MD   15 Units at 07/28/25 1021    insulin lispro (HUMALOG,ADMELOG) injection vial 0-8 Units  0-8 Units SubCUTAneous 4x Daily AC & HS Kayleen Trimble MD   6 Units at 07/28/25 1132    0.9 % sodium chloride infusion   IntraVENous Continuous Kayleen Trimble MD 70 mL/hr at 07/28/25 1020 New Bag at 07/28/25 1020    dextrose bolus 10% 125 mL  125 mL IntraVENous PRN Kayleen Trimble MD        Or    dextrose bolus 10% 250 mL  250 mL IntraVENous PRN Kayleen Trimble MD        acyclovir (ZOVIRAX) tablet 400 mg  400 mg Oral Daily Zion Horne DO   400 mg at 07/28/25 0852    albuterol sulfate HFA (PROVENTIL;VENTOLIN;PROAIR) 108 (90 Base) MCG/ACT inhaler 2 puff  2 puff Inhalation Q6H PRN Zion Horne R, DO        atorvastatin (LIPITOR) tablet 40 mg  40 mg Oral Daily Zion Horne, DO   40 mg at 07/28/25 0852    fluconazole (DIFLUCAN) tablet 400 mg  400 mg Oral Daily Zion Horne, DO   400 mg at 07/28/25 0852    fluticasone (FLONASE) 50 MCG/ACT nasal spray 1 spray  1 spray Each Nostril Daily PRN Zion Horne,         gabapentin 
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]  Elevated troponin [R79.89]  OSIRIS (acute kidney injury) [N17.9]  Metastatic multiple myeloma to bone (HCC) [C90.00]  Hx of multiple myeloma [Z85.79]  Weakness of both lower extremities [R29.898]  Leukopenia, unspecified type [D72.819]   Patient Active Problem List    Diagnosis Date Noted    Hypotension 2025    Angina at rest 2023    Chest pain 2022    Atelectasis, left     Impaired mobility and activities of daily living 2022    Lumbar spondylosis 2022    Lumbar stenosis with neurogenic claudication 2022    Dizziness     Benign paroxysmal positional vertigo due to bilateral vestibular disorder     Maxillary pain     Nonintractable headache     Fall at home     Athscl heart disease of native coronary artery w/o ang pctrs 2022    Contusion of left hip 2022    Contusion of left shoulder 2022    Pain in left shoulder 2022    Metastatic multiple myeloma to bone (HCC) 2025    Diarrhea 2025    Multiple myeloma not having achieved remission (HCC) 2025    Multiple myeloma without remission (HCC) 2025    Acute kidney failure, unspecified 2025    Acute bronchitis due to Rhinovirus 2025    GI bleed 2025    Acute anemia 2025    OSIRIS (acute kidney injury) 2025    History of pulmonary embolism 10/18/2024    Stage 3a chronic kidney disease (HCC) 2024    Acute on chronic systolic heart failure (HCC) 2024    Hyperlipidemia 10/16/2015    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 
[facility] MED SURG TREATMENT NOTE     Date: 2025  Patient Name: Mckenna Mendoza        MRN: [id number]  Account: [id number]   : 1945  (79 y.o.)  Room: [place] [address]    Chart Review:    Restrictions  Restrictions/Precautions  Restrictions/Precautions: Fall Risk, Contact Precautions (Neutropenic)     Safety:  Safety Devices  Type of Devices: Call light within reach;Left in chair;Chair alarm in place;All fall risk precautions in place    Patient's birthday verified: Yes    Subjective:    Subjective: \"I really just want to rest before I go to rehab but I would like to use the restroom\"       Pain at start of treatment: No    Pain at end of treatment: No      Objective:    ADL Status:  ADL  Grooming: Stand by assistance  Toileting: Stand by assistance;Increased time to complete  Toileting Skilled Clinical Factors: pt urinates on toilet - requests that purewick is removed. increased time to complete. no clothing other than gown to manage. pt able to complete dory hygiene.  Toilet Transfers  Toilet - Technique: Ambulating  Toilet Transfer: Contact guard assistance  Toilet Transfers Comments: touching A to ensure safety with steadying balance. no LOB. slow stacey.      Therapy key for assistance levels -   Independent/Mod I = Pt. is able to perform task with no assistance but may require a device   Stand by assistance = Pt. does not perform task at an independent level but does not need physical assistance, requires verbal cues  Minimal, Moderate, Maximal Assistance = Pt. requires physical assistance (25%, 50%, 75% assist from helper) for task but is able to actively participate in task   Dependent = Pt. requires total assistance with task and is not able to actively participate with task completion      Observation:  Observation/Palpation  Observation: Pt sitting in bedside chair at start of session. Pleasant and agreeable.         Functional Mobility:  Patient ambulated to/from bathroom 
Patient continues to feel better day of today.  No new symptoms.  Increase strength.    No distress.  Chest is clear, heart is regular.  Abdomen soft    *OSIRIS, likely hemodynamically mediated, responded to IV fluid  UA is bland.  No RBC.  Small amount of protein.  No strong suspicion for nephritis or nephrotic  Continue IV fluid infusion  Renal ultrasound does not show any obstructive uropathy.  Discontinue IV fluid this evening    *Functional impairment  Nonfocal.  PT OT treatment.  Discussion with the patient and her family to discharge to a rehab or SNF.  We will discuss with case management.    *Multiple myeloma with pancytopenia.  Patient is on Zovirax, Diflucan, Solu-Medrol and Bactrim as per hematologist  For care to hematologist    *Hyperglycemia secondary to Solu-Medrol initiated by hematologist  Increased sliding scale coverage.  Added Lantus subcutaneously    *Few other medical issues not listed above.  Incidental finding seen on labs and imaging.  To be addressed when time and condition are appropriate.  Could be addressed electively postdischarge from acute, skilled care to be handled by PCP in collaboration with needed outpatient providers.    I instructed the patient to ask PCP to obtain hospital record entirely to follow-up on needed medical care, PCP to obtain hospital record entirely to follow-up on needed medical care, outpatient monitoring, surveillance, needed diagnostic and therapeutic intervention in the outpatient setting in collaboration with other needed outpatient providers.    I discussed her case with her  and case management team  
Physical Therapy  Facility/Department: [facility] MED SURG W494/W494-01  Physical Therapy Discharge      NAME: Mckenna Mendoza    : 1945 (79 y.o.)  MRN: [id number]    Account: [id number]  Gender: female      Patient has been discharged from acute care hospital. DC patient from current PT program.      Electronically signed by Agueda Garrison PT on 25 at 3:56 PM EDT    
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]  Elevated troponin [R79.89]  OSIRIS (acute kidney injury) [N17.9]  Metastatic multiple myeloma to bone (HCC) [C90.00]  Hx of multiple myeloma [Z85.79]  Weakness of both lower extremities [R29.898]  Leukopenia, unspecified type [D72.819]   Chief Complaint   Patient presents with    Extremity Weakness     Patient Active Problem List    Diagnosis Date Noted    Hypotension 2025    Angina at rest 2023    Chest pain 2022    Atelectasis, left     Impaired mobility and activities of daily living 2022    Lumbar spondylosis 2022    Lumbar stenosis with neurogenic claudication 2022    Dizziness     Benign paroxysmal positional vertigo due to bilateral vestibular disorder     Maxillary pain     Nonintractable headache     Fall at home     Athscl heart disease of native coronary artery w/o ang pctrs 2022    Contusion of left hip 2022    Contusion of left shoulder 2022    Pain in left shoulder 2022    Metastatic multiple myeloma to bone (HCC) 2025    Diarrhea 2025    Multiple myeloma not having achieved remission (HCC) 2025    Multiple myeloma without remission (HCC) 2025    Acute kidney failure, unspecified 2025    Acute bronchitis due to Rhinovirus 2025    GI bleed 2025    Acute anemia 2025    OSIRIS (acute kidney injury) 2025    History of pulmonary embolism 10/18/2024    Stage 3a chronic kidney disease (HCC) 2024    Acute on chronic systolic heart failure (HCC) 2024    Hyperlipidemia 10/16/2015    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]  Elevated troponin [R79.89]  OSIRIS (acute kidney injury) [N17.9]  Metastatic multiple myeloma to bone (HCC) [C90.00]  Hx of multiple myeloma [Z85.79]  Weakness of both lower extremities [R29.898]  Leukopenia, unspecified type [D72.819]   Chief Complaint   Patient presents with    Extremity Weakness     Patient Active Problem List    Diagnosis Date Noted    Hypotension 2025    Angina at rest 2023    Chest pain 2022    Atelectasis, left     Impaired mobility and activities of daily living 2022    Lumbar spondylosis 2022    Lumbar stenosis with neurogenic claudication 2022    Dizziness     Benign paroxysmal positional vertigo due to bilateral vestibular disorder     Maxillary pain     Nonintractable headache     Fall at home     Athscl heart disease of native coronary artery w/o ang pctrs 2022    Contusion of left hip 2022    Contusion of left shoulder 2022    Pain in left shoulder 2022    Metastatic multiple myeloma to bone (HCC) 2025    Diarrhea 2025    Multiple myeloma not having achieved remission (HCC) 2025    Multiple myeloma without remission (HCC) 2025    Acute kidney failure, unspecified 2025    Acute bronchitis due to Rhinovirus 2025    GI bleed 2025    Acute anemia 2025    OSIRIS (acute kidney injury) 2025    History of pulmonary embolism 10/18/2024    Stage 3a chronic kidney disease (HCC) 2024    Acute on chronic systolic heart failure (HCC) 2024    Hyperlipidemia 10/16/2015    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]  Elevated troponin [R79.89]  OSRIIS (acute kidney injury) [N17.9]  Metastatic multiple myeloma to bone (HCC) [C90.00]  Hx of multiple myeloma [Z85.79]  Weakness of both lower extremities [R29.898]  Leukopenia, unspecified type [D72.819]   Chief Complaint   Patient presents with    Extremity Weakness     Patient Active Problem List    Diagnosis Date Noted    Hypotension 2025    Angina at rest 2023    Chest pain 2022    Atelectasis, left     Sleep-disordered breathing 2022    Lumbar spondylosis 2022    Lumbar stenosis with neurogenic claudication 2022    Dizziness     Benign paroxysmal positional vertigo due to bilateral vestibular disorder     Maxillary pain     Nonintractable headache     Fall at home     Athscl heart disease of native coronary artery w/o ang pctrs 2022    Contusion of left hip 2022    Contusion of left shoulder 2022    Pain in left shoulder 2022    Metastatic multiple myeloma to bone (HCC) 2025    Diarrhea 2025    Multiple myeloma not having achieved remission (HCC) 2025    Multiple myeloma without remission (HCC) 2025    Acute bronchitis due to Rhinovirus 2025    GI bleed 2025    Acute anemia 2025    OSIRIS (acute kidney injury) 2025    History of pulmonary embolism 10/18/2024    Stage 3a chronic kidney disease (HCC) 2024    Acute on chronic systolic heart failure (HCC) 2024    Hyperlipidemia 10/16/2015    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    Multiple myeloma (HCC) 2015    Obese 2015    Essential 
Physical Therapy Missed Treatment   Facility/Department: [facility] MED SURG W494/W494-01    NAME: Mckenna Mendoza    : 1945 (79 y.o.)  MRN: [id number]    Account: [id number]  Gender: female    Chart reviewed, attempted PT at 0940. Patient unavailable 2° to:    [] Hold per nsg request    [x] Pt declined \"I understand all that but right now I need a nap.\" Pt agreeable for PT to return at later time/date.     [] Pt.. off floor for test/procedure.     [] Pt. Unavailable        Will attempt PT treatment again at earliest convenience.      Electronically signed by Isauro Pillai PTA on 25 at 9:52 AM EDT    
Physical Therapy Missed Treatment   Facility/Department: [facility] MED SURG W494/W494-01    NAME: Mckenna Mendoza    : 1945 (79 y.o.)  MRN: [id number]    Account: [id number]  Gender: female      PT evaluation and treatment orders received. Chart reviewed. PT evaluation attempted. Pt graciously declining this PM - is very fatigued and requesting to rest for the day. Nursing staff notified.      Will follow and attempt PT evaluation again at earliest availability.       Agueda Garrison, PT, 25 at 3:49 PM    
Physician Progress Note    7/27/2025   1:39 PM    Name:  Mckenna Mendoza  MRN:    [id number]     IP Day: 2     Admit Date: 7/25/2025  8:23 AM  PCP: Twila Maher DO    Code Status:  Full Code    Assessment and Plan:        1.  Lower extremity weakness suspect secondary to multiple myeloma and antineoplastic therapy  - Appreciate oncology: Symptoms likely neurotoxicity from antineoplastic therapy.  Solu-Medrol started  - PT/OT     2.  OSIRIS secondary to dehydration related to diuretics, antineoplastic therapy  - S/p IVF.  Improving.  Continue to hold diuretics    3.  Type 2 diabetes with hyperglycemia exacerbated steroids:  - SSI     Multiple myeloma with lytic bone lesions  Pancytopenia secondary to malignancy and antineoplastic therapy     DVT prophylaxis  Full code    Electronically signed by Zion Horne DO on 7/27/2025 at 1:39 PM    Subjective:     Feeling dizzy from time to time    Current Facility-Administered Medications   Medication Dose Route Frequency Provider Last Rate Last Admin    acyclovir (ZOVIRAX) tablet 400 mg  400 mg Oral Daily Zion Horne DO   400 mg at 07/27/25 0802    albuterol sulfate HFA (PROVENTIL;VENTOLIN;PROAIR) 108 (90 Base) MCG/ACT inhaler 2 puff  2 puff Inhalation Q6H PRN Zion Horne DO        atorvastatin (LIPITOR) tablet 40 mg  40 mg Oral Daily Zion Horne DO   40 mg at 07/27/25 0803    fluconazole (DIFLUCAN) tablet 400 mg  400 mg Oral Daily Zion Horne DO   400 mg at 07/27/25 0802    fluticasone (FLONASE) 50 MCG/ACT nasal spray 1 spray  1 spray Each Nostril Daily PRN Zion Horne DO        gabapentin (NEURONTIN) capsule 300 mg  300 mg Oral BID Zion Horne DO   300 mg at 07/27/25 0803    metoprolol tartrate (LOPRESSOR) tablet 25 mg  25 mg Oral BID Zion Horne DO   25 mg at 07/27/25 0803    pantoprazole (PROTONIX) tablet 40 mg  40 mg Oral Daily Zion Horne DO   40 mg at 07/27/25 0803    [START ON 7/28/2025] sulfamethoxazole-trimethoprim 
Renal Adjustment Per Protocol:     Bactrim DS q MWF changed to Bactrim SS q MWF based on      Recent Labs     07/29/25  0509   CREATININE 1.55*   Estimated Creatinine Clearance: 26 mL/min (A) (based on SCr of 1.55 mg/dL (H)).  .    
Renal Adjustment Per Protocol:     Fluconazole 400 mg Daily changed to 200 mg Daily based on      Recent Labs     07/29/25  0509   CREATININE 1.55*   Estimated Creatinine Clearance: 26 mL/min (A) (based on SCr of 1.55 mg/dL (H)).  .    
Said nurse called rehab and spoke with  and got nurse's extension that will be receiving patient. Said nurse was told to wait to call back because she just got a phone call. Said nurse gave 10 minutes and called back with no answer, will attempt again.   
Shift assessment complete, see flowsheets. Pt is AxOx4. Meds given per mar. Pt denies any need at this time. Call light within reach.    Electronically signed by Isatu Guillermo RN on 7/26/2025 at 10:40 AM    
Shift assessment complete. VSS. A&Ox4. Patient is calm and cooperative. Denies having chest pain, nausea, or dyspnea on exertion. On room air. Lung sounds are clear. Congested cough present (Patient states she has had this for years). Abdomen is soft and round. Bowel sounds are active. Tolerating diet. Medications given per MAR. BLE swollen 1+ R>L. Patient assisted OOB with PT this morning. Utilized Front wheel walker. Currently in bed with call light in reach. External catheter in place: Pt voiding well. Bed in lowest position. Alarm on bed. No needs at this time. Care ongoing.   -  at bedside: Updated on POC  Electronically signed by Dominick Alvarenga RN on 7/28/2025 at 11:10 AM   
pantoprazole (PROTONIX) tablet 40 mg  40 mg Oral Daily Zion Horne DO   40 mg at 07/26/25 1015    [START ON 7/28/2025] sulfamethoxazole-trimethoprim (BACTRIM DS;SEPTRA DS) 800-160 MG per tablet 1 tablet  1 tablet Oral Q MWF Zion Horne R DO        tiZANidine (ZANAFLEX) tablet 4 mg  4 mg Oral Q8H PRN Zion Horne, DO        Vitamin D (CHOLECALCIFEROL) tablet 2,000 Units  2,000 Units Oral Dinner Zion Horne DO        filgrastim-aafi (NIVESTYM) injection 300 mcg  300 mcg SubCUTAneous QPM Litam, Marcelino REDDY MD        methylPREDNISolone sodium succ (SOLU-MEDROL) 60 mg in bacteriostatic water 2 mL injection  60 mg IntraVENous Q8H Litam, Marcelino REDDY MD   60 mg at 07/26/25 1016    potassium chloride (KLOR-CON M) extended release tablet 40 mEq  40 mEq Oral Q4H Zion Horne DO   40 mEq at 07/26/25 1204    sodium chloride flush 0.9 % injection 5-40 mL  5-40 mL IntraVENous 2 times per day Zion Horne, DO   10 mL at 07/26/25 1017    sodium chloride flush 0.9 % injection 5-40 mL  5-40 mL IntraVENous PRN Zion Horne, DO        0.9 % sodium chloride infusion   IntraVENous PRN Zion Horne, DO        heparin (porcine) injection 5,000 Units  5,000 Units SubCUTAneous 3 times per day Zion Horne, DO   5,000 Units at 07/26/25 0632    polyethylene glycol (GLYCOLAX) packet 17 g  17 g Oral Daily PRN Zion Horne R, DO        acetaminophen (TYLENOL) tablet 650 mg  650 mg Oral Q6H PRN Zion Horne, DO        Or    acetaminophen (TYLENOL) suppository 650 mg  650 mg Rectal Q6H PRN Zion Horne R, DO        glucose chewable tablet 16 g  4 tablet Oral PRN Ozzie, Neal R, DO        dextrose bolus 10% 125 mL  125 mL IntraVENous PRN Ozzie, Zion R, DO        Or    dextrose bolus 10% 250 mL  250 mL IntraVENous PRN Ozzie, Zion R, DO        glucagon injection 1 mg  1 mg SubCUTAneous PRN Ozzie, Zion R, DO        dextrose 10 % infusion   IntraVENous Continuous PRN Ozzie, Zion R, DO

## 2025-08-02 LAB
ANION GAP SERPL CALCULATED.3IONS-SCNC: 11 MEQ/L (ref 9–15)
ANISOCYTOSIS BLD QL SMEAR: ABNORMAL
BACTERIA BLD CULT ORG #2: NORMAL
BACTERIA BLD CULT: NORMAL
BACTERIA URNS QL MICRO: NEGATIVE /HPF
BASOPHILS # BLD: 0 K/UL (ref 0–0.2)
BASOPHILS NFR BLD: 0.5 %
BILIRUB UR QL STRIP: NEGATIVE
BUN SERPL-MCNC: 26 MG/DL (ref 8–23)
CALCIUM SERPL-MCNC: 8.7 MG/DL (ref 8.5–9.9)
CHLORIDE SERPL-SCNC: 102 MEQ/L (ref 95–107)
CLARITY UR: CLEAR
CO2 SERPL-SCNC: 25 MEQ/L (ref 20–31)
COLOR UR: YELLOW
CREAT SERPL-MCNC: 1.13 MG/DL (ref 0.5–0.9)
DACRYOCYTES BLD QL SMEAR: ABNORMAL
EOSINOPHIL # BLD: 0 K/UL (ref 0–0.7)
EOSINOPHIL NFR BLD: 0.1 %
EPI CELLS #/AREA URNS AUTO: NORMAL /HPF (ref 0–5)
ERYTHROCYTE [DISTWIDTH] IN BLOOD BY AUTOMATED COUNT: 20.9 % (ref 11.5–14.5)
GLUCOSE BLD-MCNC: 173 MG/DL (ref 70–99)
GLUCOSE BLD-MCNC: 178 MG/DL (ref 70–99)
GLUCOSE BLD-MCNC: 241 MG/DL (ref 70–99)
GLUCOSE BLD-MCNC: 271 MG/DL (ref 70–99)
GLUCOSE SERPL-MCNC: 169 MG/DL (ref 70–99)
GLUCOSE UR STRIP-MCNC: >=1000 MG/DL
HCT VFR BLD AUTO: 21.1 % (ref 37–47)
HGB BLD-MCNC: 7.1 G/DL (ref 12–16)
HGB UR QL STRIP: NEGATIVE
HYALINE CASTS #/AREA URNS AUTO: NORMAL /HPF (ref 0–5)
KETONES UR STRIP-MCNC: NEGATIVE MG/DL
LEUKOCYTE ESTERASE UR QL STRIP: NEGATIVE
LYMPHOCYTES # BLD: 0.4 K/UL (ref 1–4.8)
LYMPHOCYTES NFR BLD: 5 %
MACROCYTES BLD QL SMEAR: ABNORMAL
MCH RBC QN AUTO: 36.2 PG (ref 27–31.3)
MCHC RBC AUTO-ENTMCNC: 33.6 % (ref 33–37)
MCV RBC AUTO: 107.7 FL (ref 79.4–94.8)
METAMYELOCYTES NFR BLD MANUAL: 1 %
MICROCYTES BLD QL SMEAR: ABNORMAL
MONOCYTES # BLD: 0.1 K/UL (ref 0.2–0.8)
MONOCYTES NFR BLD: 0.9 %
NEUTROPHILS # BLD: 7.3 K/UL (ref 1.4–6.5)
NEUTS BAND NFR BLD MANUAL: 1 % (ref 5–11)
NEUTS SEG NFR BLD: 92 %
NITRITE UR QL STRIP: NEGATIVE
NRBC BLD-RTO: 3 /100 WBC
OVALOCYTES BLD QL SMEAR: ABNORMAL
PERFORMED ON: ABNORMAL
PH UR STRIP: 5.5 [PH] (ref 5–9)
PLATELET # BLD AUTO: 99 K/UL (ref 130–400)
PLATELET BLD QL SMEAR: ABNORMAL
POIKILOCYTOSIS BLD QL SMEAR: ABNORMAL
POTASSIUM SERPL-SCNC: 4.4 MEQ/L (ref 3.4–4.9)
PROT UR STRIP-MCNC: 30 MG/DL
RBC # BLD AUTO: 1.96 M/UL (ref 4.2–5.4)
RBC #/AREA URNS AUTO: NORMAL /HPF (ref 0–5)
SLIDE REVIEW: ABNORMAL
SODIUM SERPL-SCNC: 138 MEQ/L (ref 135–144)
SP GR UR STRIP: 1.02 (ref 1–1.03)
URINE REFLEX TO CULTURE: ABNORMAL
UROBILINOGEN UR STRIP-ACNC: 1 E.U./DL
WBC # BLD AUTO: 7.8 K/UL (ref 4.8–10.8)
WBC #/AREA URNS AUTO: NORMAL /HPF (ref 0–5)

## 2025-08-02 PROCEDURE — 81001 URINALYSIS AUTO W/SCOPE: CPT

## 2025-08-02 PROCEDURE — 85025 COMPLETE CBC W/AUTO DIFF WBC: CPT

## 2025-08-02 PROCEDURE — 97110 THERAPEUTIC EXERCISES: CPT

## 2025-08-02 PROCEDURE — 97116 GAIT TRAINING THERAPY: CPT

## 2025-08-02 PROCEDURE — 97535 SELF CARE MNGMENT TRAINING: CPT

## 2025-08-02 PROCEDURE — 1180000000 HC REHAB R&B

## 2025-08-02 PROCEDURE — 36415 COLL VENOUS BLD VENIPUNCTURE: CPT

## 2025-08-02 PROCEDURE — 6360000002 HC RX W HCPCS: Performed by: INTERNAL MEDICINE

## 2025-08-02 PROCEDURE — 6370000000 HC RX 637 (ALT 250 FOR IP): Performed by: INTERNAL MEDICINE

## 2025-08-02 PROCEDURE — 80048 BASIC METABOLIC PNL TOTAL CA: CPT

## 2025-08-02 PROCEDURE — 97530 THERAPEUTIC ACTIVITIES: CPT

## 2025-08-02 RX ADMIN — ATORVASTATIN CALCIUM 40 MG: 40 TABLET, FILM COATED ORAL at 10:30

## 2025-08-02 RX ADMIN — HEPARIN SODIUM 5000 UNITS: 5000 INJECTION INTRAVENOUS; SUBCUTANEOUS at 15:07

## 2025-08-02 RX ADMIN — INSULIN LISPRO 6 UNITS: 100 INJECTION, SOLUTION INTRAVENOUS; SUBCUTANEOUS at 12:59

## 2025-08-02 RX ADMIN — PANTOPRAZOLE SODIUM 40 MG: 40 TABLET, DELAYED RELEASE ORAL at 10:30

## 2025-08-02 RX ADMIN — INSULIN GLARGINE 20 UNITS: 100 INJECTION, SOLUTION SUBCUTANEOUS at 10:29

## 2025-08-02 RX ADMIN — METOPROLOL TARTRATE 25 MG: 25 TABLET, FILM COATED ORAL at 10:30

## 2025-08-02 RX ADMIN — ACYCLOVIR 400 MG: 400 TABLET ORAL at 10:30

## 2025-08-02 RX ADMIN — HEPARIN SODIUM 5000 UNITS: 5000 INJECTION INTRAVENOUS; SUBCUTANEOUS at 21:59

## 2025-08-02 RX ADMIN — GABAPENTIN 300 MG: 300 CAPSULE ORAL at 10:30

## 2025-08-02 RX ADMIN — DEXAMETHASONE 4 MG: 4 TABLET ORAL at 10:30

## 2025-08-02 RX ADMIN — INSULIN LISPRO 6 UNITS: 100 INJECTION, SOLUTION INTRAVENOUS; SUBCUTANEOUS at 17:19

## 2025-08-02 RX ADMIN — METOPROLOL TARTRATE 25 MG: 25 TABLET, FILM COATED ORAL at 21:58

## 2025-08-02 RX ADMIN — SENNOSIDES 8.6 MG: 8.6 TABLET, FILM COATED ORAL at 10:30

## 2025-08-02 RX ADMIN — POLYETHYLENE GLYCOL 3350 17 G: 17 POWDER, FOR SOLUTION ORAL at 10:29

## 2025-08-02 RX ADMIN — INSULIN LISPRO 6 UNITS: 100 INJECTION, SOLUTION INTRAVENOUS; SUBCUTANEOUS at 10:29

## 2025-08-02 RX ADMIN — GABAPENTIN 300 MG: 300 CAPSULE ORAL at 21:58

## 2025-08-02 RX ADMIN — FLUCONAZOLE 200 MG: 100 TABLET ORAL at 10:30

## 2025-08-02 RX ADMIN — Medication 2000 UNITS: at 17:19

## 2025-08-02 RX ADMIN — INSULIN LISPRO 4 UNITS: 100 INJECTION, SOLUTION INTRAVENOUS; SUBCUTANEOUS at 17:20

## 2025-08-02 RX ADMIN — HEPARIN SODIUM 5000 UNITS: 5000 INJECTION INTRAVENOUS; SUBCUTANEOUS at 05:54

## 2025-08-02 RX ADMIN — DEXAMETHASONE 4 MG: 4 TABLET ORAL at 21:58

## 2025-08-03 LAB
GLUCOSE BLD-MCNC: 209 MG/DL (ref 70–99)
GLUCOSE BLD-MCNC: 227 MG/DL (ref 70–99)
GLUCOSE BLD-MCNC: 288 MG/DL (ref 70–99)
GLUCOSE BLD-MCNC: 330 MG/DL (ref 70–99)
PERFORMED ON: ABNORMAL

## 2025-08-03 PROCEDURE — 97116 GAIT TRAINING THERAPY: CPT

## 2025-08-03 PROCEDURE — 6370000000 HC RX 637 (ALT 250 FOR IP): Performed by: INTERNAL MEDICINE

## 2025-08-03 PROCEDURE — 1180000000 HC REHAB R&B

## 2025-08-03 PROCEDURE — 6360000002 HC RX W HCPCS: Performed by: INTERNAL MEDICINE

## 2025-08-03 PROCEDURE — 97110 THERAPEUTIC EXERCISES: CPT

## 2025-08-03 RX ADMIN — HEPARIN SODIUM 5000 UNITS: 5000 INJECTION INTRAVENOUS; SUBCUTANEOUS at 21:57

## 2025-08-03 RX ADMIN — INSULIN LISPRO 2 UNITS: 100 INJECTION, SOLUTION INTRAVENOUS; SUBCUTANEOUS at 16:57

## 2025-08-03 RX ADMIN — DEXAMETHASONE 4 MG: 4 TABLET ORAL at 09:18

## 2025-08-03 RX ADMIN — METOPROLOL TARTRATE 25 MG: 25 TABLET, FILM COATED ORAL at 09:19

## 2025-08-03 RX ADMIN — ACYCLOVIR 400 MG: 400 TABLET ORAL at 09:19

## 2025-08-03 RX ADMIN — INSULIN LISPRO 6 UNITS: 100 INJECTION, SOLUTION INTRAVENOUS; SUBCUTANEOUS at 11:59

## 2025-08-03 RX ADMIN — INSULIN GLARGINE 20 UNITS: 100 INJECTION, SOLUTION SUBCUTANEOUS at 09:18

## 2025-08-03 RX ADMIN — INSULIN LISPRO 6 UNITS: 100 INJECTION, SOLUTION INTRAVENOUS; SUBCUTANEOUS at 16:57

## 2025-08-03 RX ADMIN — Medication 2000 UNITS: at 16:57

## 2025-08-03 RX ADMIN — SENNOSIDES 8.6 MG: 8.6 TABLET, FILM COATED ORAL at 09:19

## 2025-08-03 RX ADMIN — FLUCONAZOLE 200 MG: 100 TABLET ORAL at 09:19

## 2025-08-03 RX ADMIN — ATORVASTATIN CALCIUM 40 MG: 40 TABLET, FILM COATED ORAL at 09:19

## 2025-08-03 RX ADMIN — GABAPENTIN 300 MG: 300 CAPSULE ORAL at 21:57

## 2025-08-03 RX ADMIN — PANTOPRAZOLE SODIUM 40 MG: 40 TABLET, DELAYED RELEASE ORAL at 09:19

## 2025-08-03 RX ADMIN — INSULIN LISPRO 4 UNITS: 100 INJECTION, SOLUTION INTRAVENOUS; SUBCUTANEOUS at 11:59

## 2025-08-03 RX ADMIN — DEXAMETHASONE 4 MG: 4 TABLET ORAL at 21:58

## 2025-08-03 RX ADMIN — INSULIN LISPRO 6 UNITS: 100 INJECTION, SOLUTION INTRAVENOUS; SUBCUTANEOUS at 21:57

## 2025-08-03 RX ADMIN — HEPARIN SODIUM 5000 UNITS: 5000 INJECTION INTRAVENOUS; SUBCUTANEOUS at 06:09

## 2025-08-03 RX ADMIN — HEPARIN SODIUM 5000 UNITS: 5000 INJECTION INTRAVENOUS; SUBCUTANEOUS at 13:25

## 2025-08-03 RX ADMIN — GABAPENTIN 300 MG: 300 CAPSULE ORAL at 09:22

## 2025-08-03 RX ADMIN — INSULIN LISPRO 6 UNITS: 100 INJECTION, SOLUTION INTRAVENOUS; SUBCUTANEOUS at 09:18

## 2025-08-03 RX ADMIN — METOPROLOL TARTRATE 25 MG: 25 TABLET, FILM COATED ORAL at 21:58

## 2025-08-04 LAB
ANION GAP SERPL CALCULATED.3IONS-SCNC: 9 MEQ/L (ref 9–15)
ANISOCYTOSIS BLD QL SMEAR: ABNORMAL
BASOPHILS # BLD: 0 K/UL (ref 0–0.2)
BASOPHILS NFR BLD: 0.6 %
BNP BLD-MCNC: 628 PG/ML
BUN SERPL-MCNC: 26 MG/DL (ref 8–23)
CALCIUM SERPL-MCNC: 9.3 MG/DL (ref 8.5–9.9)
CHLORIDE SERPL-SCNC: 102 MEQ/L (ref 95–107)
CO2 SERPL-SCNC: 26 MEQ/L (ref 20–31)
CREAT SERPL-MCNC: 1.01 MG/DL (ref 0.5–0.9)
DACRYOCYTES BLD QL SMEAR: ABNORMAL
DOHLE BOD BLD QL SMEAR: ABNORMAL
EOSINOPHIL # BLD: 0 K/UL (ref 0–0.7)
EOSINOPHIL NFR BLD: 0 %
ERYTHROCYTE [DISTWIDTH] IN BLOOD BY AUTOMATED COUNT: 20.9 % (ref 11.5–14.5)
GLUCOSE BLD-MCNC: 172 MG/DL (ref 70–99)
GLUCOSE BLD-MCNC: 205 MG/DL (ref 70–99)
GLUCOSE BLD-MCNC: 235 MG/DL (ref 70–99)
GLUCOSE BLD-MCNC: 239 MG/DL (ref 70–99)
GLUCOSE SERPL-MCNC: 196 MG/DL (ref 70–99)
HCT VFR BLD AUTO: 22.9 % (ref 37–47)
HGB BLD-MCNC: 7.7 G/DL (ref 12–16)
HYPOCHROMIA BLD QL SMEAR: ABNORMAL
LYMPHOCYTES # BLD: 0.2 K/UL (ref 1–4.8)
LYMPHOCYTES NFR BLD: 3 %
MACROCYTES BLD QL SMEAR: ABNORMAL
MCH RBC QN AUTO: 36.5 PG (ref 27–31.3)
MCHC RBC AUTO-ENTMCNC: 33.6 % (ref 33–37)
MCV RBC AUTO: 108.5 FL (ref 79.4–94.8)
MICROCYTES BLD QL SMEAR: ABNORMAL
MONOCYTES # BLD: 0.1 K/UL (ref 0.2–0.8)
MONOCYTES NFR BLD: 2 %
NEUTROPHILS # BLD: 4.9 K/UL (ref 1.4–6.5)
NEUTS BAND NFR BLD MANUAL: 2 % (ref 5–11)
NEUTS SEG NFR BLD: 93 %
NRBC BLD-RTO: 5 /100 WBC
OVALOCYTES BLD QL SMEAR: ABNORMAL
PERFORMED ON: ABNORMAL
PLATELET # BLD AUTO: 102 K/UL (ref 130–400)
PLATELET BLD QL SMEAR: ABNORMAL
POIKILOCYTOSIS BLD QL SMEAR: ABNORMAL
POLYCHROMASIA BLD QL SMEAR: ABNORMAL
POTASSIUM SERPL-SCNC: 4.6 MEQ/L (ref 3.4–4.9)
RBC # BLD AUTO: 2.11 M/UL (ref 4.2–5.4)
SLIDE REVIEW: ABNORMAL
SODIUM SERPL-SCNC: 137 MEQ/L (ref 135–144)
TOXIC GRANULATION: ABNORMAL
WBC # BLD AUTO: 5.2 K/UL (ref 4.8–10.8)

## 2025-08-04 PROCEDURE — 36415 COLL VENOUS BLD VENIPUNCTURE: CPT

## 2025-08-04 PROCEDURE — 1180000000 HC REHAB R&B

## 2025-08-04 PROCEDURE — 85025 COMPLETE CBC W/AUTO DIFF WBC: CPT

## 2025-08-04 PROCEDURE — 83880 ASSAY OF NATRIURETIC PEPTIDE: CPT

## 2025-08-04 PROCEDURE — 80048 BASIC METABOLIC PNL TOTAL CA: CPT

## 2025-08-04 PROCEDURE — 6370000000 HC RX 637 (ALT 250 FOR IP): Performed by: INTERNAL MEDICINE

## 2025-08-04 PROCEDURE — 6360000002 HC RX W HCPCS: Performed by: INTERNAL MEDICINE

## 2025-08-04 PROCEDURE — 97535 SELF CARE MNGMENT TRAINING: CPT

## 2025-08-04 PROCEDURE — 97110 THERAPEUTIC EXERCISES: CPT

## 2025-08-04 PROCEDURE — 97530 THERAPEUTIC ACTIVITIES: CPT

## 2025-08-04 PROCEDURE — 97116 GAIT TRAINING THERAPY: CPT

## 2025-08-04 RX ADMIN — INSULIN GLARGINE 20 UNITS: 100 INJECTION, SOLUTION SUBCUTANEOUS at 08:48

## 2025-08-04 RX ADMIN — METOPROLOL TARTRATE 25 MG: 25 TABLET, FILM COATED ORAL at 22:27

## 2025-08-04 RX ADMIN — HEPARIN SODIUM 5000 UNITS: 5000 INJECTION INTRAVENOUS; SUBCUTANEOUS at 05:41

## 2025-08-04 RX ADMIN — INSULIN LISPRO 6 UNITS: 100 INJECTION, SOLUTION INTRAVENOUS; SUBCUTANEOUS at 08:48

## 2025-08-04 RX ADMIN — SULFAMETHOXAZOLE AND TRIMETHOPRIM 1 TABLET: 400; 80 TABLET ORAL at 08:48

## 2025-08-04 RX ADMIN — ACYCLOVIR 400 MG: 400 TABLET ORAL at 08:48

## 2025-08-04 RX ADMIN — GABAPENTIN 300 MG: 300 CAPSULE ORAL at 08:49

## 2025-08-04 RX ADMIN — GABAPENTIN 300 MG: 300 CAPSULE ORAL at 22:25

## 2025-08-04 RX ADMIN — INSULIN LISPRO 2 UNITS: 100 INJECTION, SOLUTION INTRAVENOUS; SUBCUTANEOUS at 08:47

## 2025-08-04 RX ADMIN — INSULIN LISPRO 2 UNITS: 100 INJECTION, SOLUTION INTRAVENOUS; SUBCUTANEOUS at 12:06

## 2025-08-04 RX ADMIN — DEXAMETHASONE 4 MG: 4 TABLET ORAL at 22:25

## 2025-08-04 RX ADMIN — HEPARIN SODIUM 5000 UNITS: 5000 INJECTION INTRAVENOUS; SUBCUTANEOUS at 22:25

## 2025-08-04 RX ADMIN — PANTOPRAZOLE SODIUM 40 MG: 40 TABLET, DELAYED RELEASE ORAL at 08:49

## 2025-08-04 RX ADMIN — Medication 2000 UNITS: at 17:18

## 2025-08-04 RX ADMIN — DEXAMETHASONE 4 MG: 4 TABLET ORAL at 08:49

## 2025-08-04 RX ADMIN — POLYETHYLENE GLYCOL 3350 17 G: 17 POWDER, FOR SOLUTION ORAL at 08:49

## 2025-08-04 RX ADMIN — SENNOSIDES 8.6 MG: 8.6 TABLET, FILM COATED ORAL at 08:48

## 2025-08-04 RX ADMIN — METOPROLOL TARTRATE 25 MG: 25 TABLET, FILM COATED ORAL at 08:49

## 2025-08-04 RX ADMIN — INSULIN LISPRO 6 UNITS: 100 INJECTION, SOLUTION INTRAVENOUS; SUBCUTANEOUS at 12:06

## 2025-08-04 RX ADMIN — FLUCONAZOLE 200 MG: 100 TABLET ORAL at 08:49

## 2025-08-04 RX ADMIN — HEPARIN SODIUM 5000 UNITS: 5000 INJECTION INTRAVENOUS; SUBCUTANEOUS at 15:25

## 2025-08-04 RX ADMIN — ATORVASTATIN CALCIUM 40 MG: 40 TABLET, FILM COATED ORAL at 08:48

## 2025-08-04 RX ADMIN — INSULIN LISPRO 2 UNITS: 100 INJECTION, SOLUTION INTRAVENOUS; SUBCUTANEOUS at 22:24

## 2025-08-04 RX ADMIN — INSULIN LISPRO 6 UNITS: 100 INJECTION, SOLUTION INTRAVENOUS; SUBCUTANEOUS at 17:18

## 2025-08-05 LAB
GLUCOSE BLD-MCNC: 172 MG/DL (ref 70–99)
GLUCOSE BLD-MCNC: 208 MG/DL (ref 70–99)
GLUCOSE BLD-MCNC: 325 MG/DL (ref 70–99)
GLUCOSE BLD-MCNC: 347 MG/DL (ref 70–99)
PERFORMED ON: ABNORMAL

## 2025-08-05 PROCEDURE — 1180000000 HC REHAB R&B

## 2025-08-05 PROCEDURE — 6360000002 HC RX W HCPCS: Performed by: INTERNAL MEDICINE

## 2025-08-05 PROCEDURE — 97110 THERAPEUTIC EXERCISES: CPT

## 2025-08-05 PROCEDURE — 97535 SELF CARE MNGMENT TRAINING: CPT

## 2025-08-05 PROCEDURE — 6370000000 HC RX 637 (ALT 250 FOR IP): Performed by: INTERNAL MEDICINE

## 2025-08-05 PROCEDURE — 97116 GAIT TRAINING THERAPY: CPT

## 2025-08-05 PROCEDURE — 97530 THERAPEUTIC ACTIVITIES: CPT

## 2025-08-05 RX ORDER — INSULIN GLARGINE 100 [IU]/ML
10 INJECTION, SOLUTION SUBCUTANEOUS ONCE
Status: COMPLETED | OUTPATIENT
Start: 2025-08-05 | End: 2025-08-05

## 2025-08-05 RX ORDER — INSULIN GLARGINE 100 [IU]/ML
25 INJECTION, SOLUTION SUBCUTANEOUS DAILY
Status: DISCONTINUED | OUTPATIENT
Start: 2025-08-06 | End: 2025-08-07

## 2025-08-05 RX ADMIN — INSULIN LISPRO 8 UNITS: 100 INJECTION, SOLUTION INTRAVENOUS; SUBCUTANEOUS at 11:57

## 2025-08-05 RX ADMIN — HEPARIN SODIUM 5000 UNITS: 5000 INJECTION INTRAVENOUS; SUBCUTANEOUS at 05:42

## 2025-08-05 RX ADMIN — INSULIN GLARGINE 10 UNITS: 100 INJECTION, SOLUTION SUBCUTANEOUS at 11:56

## 2025-08-05 RX ADMIN — SENNOSIDES 8.6 MG: 8.6 TABLET, FILM COATED ORAL at 08:39

## 2025-08-05 RX ADMIN — GABAPENTIN 300 MG: 300 CAPSULE ORAL at 22:04

## 2025-08-05 RX ADMIN — INSULIN LISPRO 6 UNITS: 100 INJECTION, SOLUTION INTRAVENOUS; SUBCUTANEOUS at 17:15

## 2025-08-05 RX ADMIN — METOPROLOL TARTRATE 25 MG: 25 TABLET, FILM COATED ORAL at 08:43

## 2025-08-05 RX ADMIN — INSULIN LISPRO 6 UNITS: 100 INJECTION, SOLUTION INTRAVENOUS; SUBCUTANEOUS at 08:38

## 2025-08-05 RX ADMIN — METOPROLOL TARTRATE 25 MG: 25 TABLET, FILM COATED ORAL at 22:04

## 2025-08-05 RX ADMIN — INSULIN GLARGINE 20 UNITS: 100 INJECTION, SOLUTION SUBCUTANEOUS at 08:37

## 2025-08-05 RX ADMIN — PANTOPRAZOLE SODIUM 40 MG: 40 TABLET, DELAYED RELEASE ORAL at 08:39

## 2025-08-05 RX ADMIN — DEXAMETHASONE 4 MG: 4 TABLET ORAL at 08:39

## 2025-08-05 RX ADMIN — ACYCLOVIR 400 MG: 400 TABLET ORAL at 08:39

## 2025-08-05 RX ADMIN — ATORVASTATIN CALCIUM 40 MG: 40 TABLET, FILM COATED ORAL at 08:39

## 2025-08-05 RX ADMIN — FLUCONAZOLE 200 MG: 100 TABLET ORAL at 08:39

## 2025-08-05 RX ADMIN — HEPARIN SODIUM 5000 UNITS: 5000 INJECTION INTRAVENOUS; SUBCUTANEOUS at 22:04

## 2025-08-05 RX ADMIN — INSULIN LISPRO 6 UNITS: 100 INJECTION, SOLUTION INTRAVENOUS; SUBCUTANEOUS at 11:57

## 2025-08-05 RX ADMIN — Medication 2000 UNITS: at 16:06

## 2025-08-05 RX ADMIN — HEPARIN SODIUM 5000 UNITS: 5000 INJECTION INTRAVENOUS; SUBCUTANEOUS at 15:11

## 2025-08-05 RX ADMIN — GABAPENTIN 300 MG: 300 CAPSULE ORAL at 08:39

## 2025-08-05 RX ADMIN — INSULIN LISPRO 2 UNITS: 100 INJECTION, SOLUTION INTRAVENOUS; SUBCUTANEOUS at 08:36

## 2025-08-05 RX ADMIN — INSULIN LISPRO 6 UNITS: 100 INJECTION, SOLUTION INTRAVENOUS; SUBCUTANEOUS at 17:16

## 2025-08-05 RX ADMIN — DEXAMETHASONE 4 MG: 4 TABLET ORAL at 22:04

## 2025-08-06 PROBLEM — E11.65 HYPERGLYCEMIA DUE TO TYPE 2 DIABETES MELLITUS (HCC): Status: ACTIVE | Noted: 2025-08-06

## 2025-08-06 LAB
GLUCOSE BLD-MCNC: 188 MG/DL (ref 70–99)
GLUCOSE BLD-MCNC: 194 MG/DL (ref 70–99)
GLUCOSE BLD-MCNC: 196 MG/DL (ref 70–99)
GLUCOSE BLD-MCNC: 290 MG/DL (ref 70–99)
PERFORMED ON: ABNORMAL

## 2025-08-06 PROCEDURE — 6370000000 HC RX 637 (ALT 250 FOR IP): Performed by: INTERNAL MEDICINE

## 2025-08-06 PROCEDURE — 1180000000 HC REHAB R&B

## 2025-08-06 PROCEDURE — 97530 THERAPEUTIC ACTIVITIES: CPT

## 2025-08-06 PROCEDURE — 6360000002 HC RX W HCPCS: Performed by: INTERNAL MEDICINE

## 2025-08-06 PROCEDURE — 97535 SELF CARE MNGMENT TRAINING: CPT

## 2025-08-06 PROCEDURE — 97116 GAIT TRAINING THERAPY: CPT

## 2025-08-06 PROCEDURE — 99232 SBSQ HOSP IP/OBS MODERATE 35: CPT | Performed by: PHYSICAL MEDICINE & REHABILITATION

## 2025-08-06 PROCEDURE — 97110 THERAPEUTIC EXERCISES: CPT

## 2025-08-06 RX ADMIN — INSULIN LISPRO 6 UNITS: 100 INJECTION, SOLUTION INTRAVENOUS; SUBCUTANEOUS at 17:31

## 2025-08-06 RX ADMIN — PANTOPRAZOLE SODIUM 40 MG: 40 TABLET, DELAYED RELEASE ORAL at 08:00

## 2025-08-06 RX ADMIN — INSULIN LISPRO 6 UNITS: 100 INJECTION, SOLUTION INTRAVENOUS; SUBCUTANEOUS at 08:02

## 2025-08-06 RX ADMIN — INSULIN LISPRO 2 UNITS: 100 INJECTION, SOLUTION INTRAVENOUS; SUBCUTANEOUS at 08:02

## 2025-08-06 RX ADMIN — INSULIN LISPRO 4 UNITS: 100 INJECTION, SOLUTION INTRAVENOUS; SUBCUTANEOUS at 21:44

## 2025-08-06 RX ADMIN — METOPROLOL TARTRATE 25 MG: 25 TABLET, FILM COATED ORAL at 08:11

## 2025-08-06 RX ADMIN — Medication 2000 UNITS: at 17:31

## 2025-08-06 RX ADMIN — HEPARIN SODIUM 5000 UNITS: 5000 INJECTION INTRAVENOUS; SUBCUTANEOUS at 14:31

## 2025-08-06 RX ADMIN — FLUCONAZOLE 200 MG: 100 TABLET ORAL at 08:00

## 2025-08-06 RX ADMIN — HEPARIN SODIUM 5000 UNITS: 5000 INJECTION INTRAVENOUS; SUBCUTANEOUS at 21:46

## 2025-08-06 RX ADMIN — INSULIN LISPRO 2 UNITS: 100 INJECTION, SOLUTION INTRAVENOUS; SUBCUTANEOUS at 17:31

## 2025-08-06 RX ADMIN — METOPROLOL TARTRATE 25 MG: 25 TABLET, FILM COATED ORAL at 21:46

## 2025-08-06 RX ADMIN — GABAPENTIN 300 MG: 300 CAPSULE ORAL at 21:46

## 2025-08-06 RX ADMIN — ACYCLOVIR 400 MG: 400 TABLET ORAL at 08:00

## 2025-08-06 RX ADMIN — HEPARIN SODIUM 5000 UNITS: 5000 INJECTION INTRAVENOUS; SUBCUTANEOUS at 06:10

## 2025-08-06 RX ADMIN — INSULIN LISPRO 2 UNITS: 100 INJECTION, SOLUTION INTRAVENOUS; SUBCUTANEOUS at 12:30

## 2025-08-06 RX ADMIN — SULFAMETHOXAZOLE AND TRIMETHOPRIM 1 TABLET: 400; 80 TABLET ORAL at 08:00

## 2025-08-06 RX ADMIN — ATORVASTATIN CALCIUM 40 MG: 40 TABLET, FILM COATED ORAL at 08:00

## 2025-08-06 RX ADMIN — DEXAMETHASONE 4 MG: 4 TABLET ORAL at 21:45

## 2025-08-06 RX ADMIN — INSULIN GLARGINE 25 UNITS: 100 INJECTION, SOLUTION SUBCUTANEOUS at 08:02

## 2025-08-06 RX ADMIN — SENNOSIDES 8.6 MG: 8.6 TABLET, FILM COATED ORAL at 08:00

## 2025-08-06 RX ADMIN — GABAPENTIN 300 MG: 300 CAPSULE ORAL at 08:00

## 2025-08-06 RX ADMIN — INSULIN LISPRO 6 UNITS: 100 INJECTION, SOLUTION INTRAVENOUS; SUBCUTANEOUS at 12:31

## 2025-08-06 RX ADMIN — DEXAMETHASONE 4 MG: 4 TABLET ORAL at 08:00

## 2025-08-07 LAB
ANION GAP SERPL CALCULATED.3IONS-SCNC: 9 MEQ/L (ref 9–15)
ANISOCYTOSIS BLD QL SMEAR: ABNORMAL
BASOPHILS # BLD: 0 K/UL (ref 0–0.2)
BASOPHILS NFR BLD: 0.5 %
BUN SERPL-MCNC: 29 MG/DL (ref 8–23)
CALCIUM SERPL-MCNC: 9.4 MG/DL (ref 8.5–9.9)
CHLORIDE SERPL-SCNC: 101 MEQ/L (ref 95–107)
CO2 SERPL-SCNC: 23 MEQ/L (ref 20–31)
CREAT SERPL-MCNC: 1.16 MG/DL (ref 0.5–0.9)
DACRYOCYTES BLD QL SMEAR: ABNORMAL
EOSINOPHIL # BLD: 0 K/UL (ref 0–0.7)
EOSINOPHIL NFR BLD: 0 %
ERYTHROCYTE [DISTWIDTH] IN BLOOD BY AUTOMATED COUNT: 21.8 % (ref 11.5–14.5)
GLUCOSE BLD-MCNC: 197 MG/DL (ref 70–99)
GLUCOSE BLD-MCNC: 301 MG/DL (ref 70–99)
GLUCOSE BLD-MCNC: 360 MG/DL (ref 70–99)
GLUCOSE BLD-MCNC: 388 MG/DL (ref 70–99)
GLUCOSE SERPL-MCNC: 217 MG/DL (ref 70–99)
HCT VFR BLD AUTO: 23.6 % (ref 37–47)
HGB BLD-MCNC: 7.8 G/DL (ref 12–16)
LYMPHOCYTES # BLD: 0.2 K/UL (ref 1–4.8)
LYMPHOCYTES NFR BLD: 4 %
MACROCYTES BLD QL SMEAR: ABNORMAL
MCH RBC QN AUTO: 37 PG (ref 27–31.3)
MCHC RBC AUTO-ENTMCNC: 33.1 % (ref 33–37)
MCV RBC AUTO: 111.8 FL (ref 79.4–94.8)
MONOCYTES # BLD: 0 K/UL (ref 0.2–0.8)
MONOCYTES NFR BLD: 5.5 %
MYELOCYTES NFR BLD MANUAL: 1 %
NEUTROPHILS # BLD: 4.2 K/UL (ref 1.4–6.5)
NEUTS SEG NFR BLD: 95 %
NRBC BLD-RTO: 18 /100 WBC
OVALOCYTES BLD QL SMEAR: ABNORMAL
PERFORMED ON: ABNORMAL
PLATELET # BLD AUTO: 104 K/UL (ref 130–400)
PLATELET BLD QL SMEAR: ABNORMAL
POIKILOCYTOSIS BLD QL SMEAR: ABNORMAL
POLYCHROMASIA BLD QL SMEAR: ABNORMAL
POTASSIUM SERPL-SCNC: 4.7 MEQ/L (ref 3.4–4.9)
RBC # BLD AUTO: 2.11 M/UL (ref 4.2–5.4)
SLIDE REVIEW: ABNORMAL
SMUDGE CELLS BLD QL SMEAR: 6.9
SODIUM SERPL-SCNC: 133 MEQ/L (ref 135–144)
WBC # BLD AUTO: 4.4 K/UL (ref 4.8–10.8)

## 2025-08-07 PROCEDURE — 6370000000 HC RX 637 (ALT 250 FOR IP): Performed by: INTERNAL MEDICINE

## 2025-08-07 PROCEDURE — 36415 COLL VENOUS BLD VENIPUNCTURE: CPT

## 2025-08-07 PROCEDURE — 80048 BASIC METABOLIC PNL TOTAL CA: CPT

## 2025-08-07 PROCEDURE — 97110 THERAPEUTIC EXERCISES: CPT

## 2025-08-07 PROCEDURE — 99233 SBSQ HOSP IP/OBS HIGH 50: CPT | Performed by: PHYSICAL MEDICINE & REHABILITATION

## 2025-08-07 PROCEDURE — 85025 COMPLETE CBC W/AUTO DIFF WBC: CPT

## 2025-08-07 PROCEDURE — 97535 SELF CARE MNGMENT TRAINING: CPT

## 2025-08-07 PROCEDURE — 97530 THERAPEUTIC ACTIVITIES: CPT

## 2025-08-07 PROCEDURE — 97116 GAIT TRAINING THERAPY: CPT

## 2025-08-07 PROCEDURE — 1180000000 HC REHAB R&B

## 2025-08-07 PROCEDURE — 6360000002 HC RX W HCPCS: Performed by: INTERNAL MEDICINE

## 2025-08-07 RX ORDER — INSULIN GLARGINE 100 [IU]/ML
30 INJECTION, SOLUTION SUBCUTANEOUS DAILY
Status: DISCONTINUED | OUTPATIENT
Start: 2025-08-08 | End: 2025-08-09

## 2025-08-07 RX ADMIN — INSULIN LISPRO 8 UNITS: 100 INJECTION, SOLUTION INTRAVENOUS; SUBCUTANEOUS at 20:52

## 2025-08-07 RX ADMIN — INSULIN LISPRO 6 UNITS: 100 INJECTION, SOLUTION INTRAVENOUS; SUBCUTANEOUS at 08:29

## 2025-08-07 RX ADMIN — INSULIN LISPRO 8 UNITS: 100 INJECTION, SOLUTION INTRAVENOUS; SUBCUTANEOUS at 17:08

## 2025-08-07 RX ADMIN — INSULIN LISPRO 6 UNITS: 100 INJECTION, SOLUTION INTRAVENOUS; SUBCUTANEOUS at 12:07

## 2025-08-07 RX ADMIN — DEXAMETHASONE 4 MG: 4 TABLET ORAL at 08:27

## 2025-08-07 RX ADMIN — HEPARIN SODIUM 5000 UNITS: 5000 INJECTION INTRAVENOUS; SUBCUTANEOUS at 14:36

## 2025-08-07 RX ADMIN — GABAPENTIN 300 MG: 300 CAPSULE ORAL at 08:28

## 2025-08-07 RX ADMIN — DEXAMETHASONE 4 MG: 4 TABLET ORAL at 20:52

## 2025-08-07 RX ADMIN — GABAPENTIN 300 MG: 300 CAPSULE ORAL at 20:52

## 2025-08-07 RX ADMIN — INSULIN LISPRO 6 UNITS: 100 INJECTION, SOLUTION INTRAVENOUS; SUBCUTANEOUS at 17:09

## 2025-08-07 RX ADMIN — INSULIN LISPRO 2 UNITS: 100 INJECTION, SOLUTION INTRAVENOUS; SUBCUTANEOUS at 08:29

## 2025-08-07 RX ADMIN — ACYCLOVIR 400 MG: 400 TABLET ORAL at 08:27

## 2025-08-07 RX ADMIN — PANTOPRAZOLE SODIUM 40 MG: 40 TABLET, DELAYED RELEASE ORAL at 08:27

## 2025-08-07 RX ADMIN — ATORVASTATIN CALCIUM 40 MG: 40 TABLET, FILM COATED ORAL at 08:28

## 2025-08-07 RX ADMIN — INSULIN GLARGINE 25 UNITS: 100 INJECTION, SOLUTION SUBCUTANEOUS at 08:29

## 2025-08-07 RX ADMIN — Medication 2000 UNITS: at 17:08

## 2025-08-07 RX ADMIN — HEPARIN SODIUM 5000 UNITS: 5000 INJECTION INTRAVENOUS; SUBCUTANEOUS at 20:53

## 2025-08-07 RX ADMIN — HEPARIN SODIUM 5000 UNITS: 5000 INJECTION INTRAVENOUS; SUBCUTANEOUS at 05:45

## 2025-08-07 RX ADMIN — FLUCONAZOLE 200 MG: 100 TABLET ORAL at 08:28

## 2025-08-07 RX ADMIN — METOPROLOL TARTRATE 25 MG: 25 TABLET, FILM COATED ORAL at 08:28

## 2025-08-07 RX ADMIN — METOPROLOL TARTRATE 25 MG: 25 TABLET, FILM COATED ORAL at 20:52

## 2025-08-08 LAB
GLUCOSE BLD-MCNC: 184 MG/DL (ref 70–99)
GLUCOSE BLD-MCNC: 216 MG/DL (ref 70–99)
GLUCOSE BLD-MCNC: 236 MG/DL (ref 70–99)
GLUCOSE BLD-MCNC: 296 MG/DL (ref 70–99)
PERFORMED ON: ABNORMAL

## 2025-08-08 PROCEDURE — 1180000000 HC REHAB R&B

## 2025-08-08 PROCEDURE — 6370000000 HC RX 637 (ALT 250 FOR IP): Performed by: INTERNAL MEDICINE

## 2025-08-08 PROCEDURE — 97530 THERAPEUTIC ACTIVITIES: CPT

## 2025-08-08 PROCEDURE — 97110 THERAPEUTIC EXERCISES: CPT

## 2025-08-08 PROCEDURE — 6360000002 HC RX W HCPCS: Performed by: INTERNAL MEDICINE

## 2025-08-08 PROCEDURE — 97535 SELF CARE MNGMENT TRAINING: CPT

## 2025-08-08 PROCEDURE — 97116 GAIT TRAINING THERAPY: CPT

## 2025-08-08 PROCEDURE — 99232 SBSQ HOSP IP/OBS MODERATE 35: CPT | Performed by: PHYSICAL MEDICINE & REHABILITATION

## 2025-08-08 RX ORDER — INSULIN GLARGINE 100 [IU]/ML
30 INJECTION, SOLUTION SUBCUTANEOUS NIGHTLY
Qty: 5 ADJUSTABLE DOSE PRE-FILLED PEN SYRINGE | Refills: 3 | Status: SHIPPED | OUTPATIENT
Start: 2025-08-08 | End: 2025-08-09

## 2025-08-08 RX ORDER — METOPROLOL TARTRATE 25 MG/1
25 TABLET, FILM COATED ORAL 2 TIMES DAILY
Qty: 60 TABLET | Refills: 3 | Status: SHIPPED | OUTPATIENT
Start: 2025-08-08

## 2025-08-08 RX ADMIN — FLUCONAZOLE 200 MG: 100 TABLET ORAL at 09:28

## 2025-08-08 RX ADMIN — INSULIN LISPRO 6 UNITS: 100 INJECTION, SOLUTION INTRAVENOUS; SUBCUTANEOUS at 09:40

## 2025-08-08 RX ADMIN — HEPARIN SODIUM 5000 UNITS: 5000 INJECTION INTRAVENOUS; SUBCUTANEOUS at 06:38

## 2025-08-08 RX ADMIN — DEXAMETHASONE 4 MG: 4 TABLET ORAL at 21:08

## 2025-08-08 RX ADMIN — GABAPENTIN 300 MG: 300 CAPSULE ORAL at 21:08

## 2025-08-08 RX ADMIN — INSULIN LISPRO 6 UNITS: 100 INJECTION, SOLUTION INTRAVENOUS; SUBCUTANEOUS at 17:38

## 2025-08-08 RX ADMIN — METOPROLOL TARTRATE 25 MG: 25 TABLET, FILM COATED ORAL at 09:27

## 2025-08-08 RX ADMIN — ATORVASTATIN CALCIUM 40 MG: 40 TABLET, FILM COATED ORAL at 09:27

## 2025-08-08 RX ADMIN — HEPARIN SODIUM 5000 UNITS: 5000 INJECTION INTRAVENOUS; SUBCUTANEOUS at 14:39

## 2025-08-08 RX ADMIN — ACYCLOVIR 400 MG: 400 TABLET ORAL at 09:30

## 2025-08-08 RX ADMIN — INSULIN LISPRO 2 UNITS: 100 INJECTION, SOLUTION INTRAVENOUS; SUBCUTANEOUS at 17:37

## 2025-08-08 RX ADMIN — INSULIN LISPRO 6 UNITS: 100 INJECTION, SOLUTION INTRAVENOUS; SUBCUTANEOUS at 11:49

## 2025-08-08 RX ADMIN — Medication 2000 UNITS: at 17:37

## 2025-08-08 RX ADMIN — HEPARIN SODIUM 5000 UNITS: 5000 INJECTION INTRAVENOUS; SUBCUTANEOUS at 21:08

## 2025-08-08 RX ADMIN — INSULIN LISPRO 4 UNITS: 100 INJECTION, SOLUTION INTRAVENOUS; SUBCUTANEOUS at 21:08

## 2025-08-08 RX ADMIN — DEXAMETHASONE 4 MG: 4 TABLET ORAL at 09:29

## 2025-08-08 RX ADMIN — GABAPENTIN 300 MG: 300 CAPSULE ORAL at 09:30

## 2025-08-08 RX ADMIN — SULFAMETHOXAZOLE AND TRIMETHOPRIM 1 TABLET: 400; 80 TABLET ORAL at 09:29

## 2025-08-08 RX ADMIN — METOPROLOL TARTRATE 25 MG: 25 TABLET, FILM COATED ORAL at 21:08

## 2025-08-08 RX ADMIN — INSULIN LISPRO 2 UNITS: 100 INJECTION, SOLUTION INTRAVENOUS; SUBCUTANEOUS at 11:48

## 2025-08-08 RX ADMIN — SENNOSIDES 8.6 MG: 8.6 TABLET, FILM COATED ORAL at 09:29

## 2025-08-08 RX ADMIN — PANTOPRAZOLE SODIUM 40 MG: 40 TABLET, DELAYED RELEASE ORAL at 09:29

## 2025-08-08 RX ADMIN — INSULIN LISPRO 2 UNITS: 100 INJECTION, SOLUTION INTRAVENOUS; SUBCUTANEOUS at 09:39

## 2025-08-08 ASSESSMENT — PAIN SCALES - GENERAL
PAINLEVEL_OUTOF10: 0
PAINLEVEL_OUTOF10: 0

## 2025-08-09 VITALS
WEIGHT: 180.2 LBS | BODY MASS INDEX: 35.38 KG/M2 | HEART RATE: 63 BPM | OXYGEN SATURATION: 97 % | RESPIRATION RATE: 17 BRPM | HEIGHT: 60 IN | SYSTOLIC BLOOD PRESSURE: 139 MMHG | TEMPERATURE: 97.9 F | DIASTOLIC BLOOD PRESSURE: 63 MMHG

## 2025-08-09 LAB
GLUCOSE BLD-MCNC: 217 MG/DL (ref 70–99)
GLUCOSE BLD-MCNC: 249 MG/DL (ref 70–99)
PERFORMED ON: ABNORMAL
PERFORMED ON: ABNORMAL

## 2025-08-09 PROCEDURE — 6360000002 HC RX W HCPCS: Performed by: INTERNAL MEDICINE

## 2025-08-09 PROCEDURE — 6370000000 HC RX 637 (ALT 250 FOR IP): Performed by: INTERNAL MEDICINE

## 2025-08-09 PROCEDURE — 99239 HOSP IP/OBS DSCHRG MGMT >30: CPT | Performed by: PHYSICAL MEDICINE & REHABILITATION

## 2025-08-09 RX ORDER — INSULIN GLARGINE 100 [IU]/ML
35 INJECTION, SOLUTION SUBCUTANEOUS DAILY
Status: DISCONTINUED | OUTPATIENT
Start: 2025-08-10 | End: 2025-08-09 | Stop reason: HOSPADM

## 2025-08-09 RX ORDER — INSULIN GLARGINE 100 [IU]/ML
35 INJECTION, SOLUTION SUBCUTANEOUS NIGHTLY
Qty: 5 ADJUSTABLE DOSE PRE-FILLED PEN SYRINGE | Refills: 3 | Status: SHIPPED | OUTPATIENT
Start: 2025-08-09

## 2025-08-09 RX ORDER — DEXAMETHASONE 4 MG/1
4 TABLET ORAL EVERY 12 HOURS SCHEDULED
Qty: 20 TABLET | Refills: 0 | Status: SHIPPED | OUTPATIENT
Start: 2025-08-09 | End: 2025-08-19

## 2025-08-09 RX ADMIN — GABAPENTIN 300 MG: 300 CAPSULE ORAL at 09:27

## 2025-08-09 RX ADMIN — FLUCONAZOLE 200 MG: 100 TABLET ORAL at 09:28

## 2025-08-09 RX ADMIN — DEXAMETHASONE 4 MG: 4 TABLET ORAL at 09:27

## 2025-08-09 RX ADMIN — INSULIN LISPRO 6 UNITS: 100 INJECTION, SOLUTION INTRAVENOUS; SUBCUTANEOUS at 09:28

## 2025-08-09 RX ADMIN — ACYCLOVIR 400 MG: 400 TABLET ORAL at 09:28

## 2025-08-09 RX ADMIN — PANTOPRAZOLE SODIUM 40 MG: 40 TABLET, DELAYED RELEASE ORAL at 09:27

## 2025-08-09 RX ADMIN — INSULIN LISPRO 2 UNITS: 100 INJECTION, SOLUTION INTRAVENOUS; SUBCUTANEOUS at 12:16

## 2025-08-09 RX ADMIN — METOPROLOL TARTRATE 25 MG: 25 TABLET, FILM COATED ORAL at 09:28

## 2025-08-09 RX ADMIN — ATORVASTATIN CALCIUM 40 MG: 40 TABLET, FILM COATED ORAL at 09:27

## 2025-08-09 RX ADMIN — HEPARIN SODIUM 5000 UNITS: 5000 INJECTION INTRAVENOUS; SUBCUTANEOUS at 06:32

## 2025-08-09 RX ADMIN — INSULIN GLARGINE 30 UNITS: 100 INJECTION, SOLUTION SUBCUTANEOUS at 09:54

## 2025-08-09 RX ADMIN — INSULIN LISPRO 6 UNITS: 100 INJECTION, SOLUTION INTRAVENOUS; SUBCUTANEOUS at 12:15

## 2025-08-09 RX ADMIN — INSULIN LISPRO 2 UNITS: 100 INJECTION, SOLUTION INTRAVENOUS; SUBCUTANEOUS at 09:30

## 2025-08-11 ENCOUNTER — TELEPHONE (OUTPATIENT)
Age: 80
End: 2025-08-11

## 2025-08-21 ENCOUNTER — HOSPITAL ENCOUNTER (EMERGENCY)
Age: 80
Discharge: HOME | End: 2025-08-21
Payer: MEDICARE

## 2025-08-21 ENCOUNTER — APPOINTMENT (OUTPATIENT)
Dept: CT IMAGING | Age: 80
End: 2025-08-21
Payer: MEDICARE

## 2025-08-21 VITALS
TEMPERATURE: 97.6 F | BODY MASS INDEX: 31.53 KG/M2 | HEIGHT: 60 IN | WEIGHT: 160.6 LBS | OXYGEN SATURATION: 95 % | HEART RATE: 82 BPM | SYSTOLIC BLOOD PRESSURE: 113 MMHG | DIASTOLIC BLOOD PRESSURE: 61 MMHG | RESPIRATION RATE: 16 BRPM

## 2025-08-21 DIAGNOSIS — D53.9 MACROCYTIC ANEMIA: ICD-10-CM

## 2025-08-21 DIAGNOSIS — M89.8X9 BONE PAIN: ICD-10-CM

## 2025-08-21 DIAGNOSIS — D72.819 LEUKOPENIA, UNSPECIFIED TYPE: ICD-10-CM

## 2025-08-21 DIAGNOSIS — Z85.79 H/O MULTIPLE MYELOMA: Primary | ICD-10-CM

## 2025-08-21 DIAGNOSIS — M43.9 COMPRESSION DEFORMITY OF VERTEBRA: ICD-10-CM

## 2025-08-21 LAB
ALBUMIN SERPL-MCNC: 3.7 G/DL (ref 3.5–4.6)
ALP SERPL-CCNC: 90 U/L (ref 40–130)
ALT SERPL-CCNC: 17 U/L (ref 0–33)
ANION GAP SERPL CALCULATED.3IONS-SCNC: 18 MEQ/L (ref 9–15)
ANISOCYTOSIS BLD QL SMEAR: ABNORMAL
AST SERPL-CCNC: 17 U/L (ref 0–35)
BASOPHILS # BLD: 0 K/UL (ref 0–0.2)
BASOPHILS NFR BLD: 1 %
BILIRUB SERPL-MCNC: 1.4 MG/DL (ref 0.2–0.7)
BUN SERPL-MCNC: 33 MG/DL (ref 8–23)
CALCIUM SERPL-MCNC: 9.5 MG/DL (ref 8.5–9.9)
CHLORIDE SERPL-SCNC: 103 MEQ/L (ref 95–107)
CO2 SERPL-SCNC: 23 MEQ/L (ref 20–31)
CREAT SERPL-MCNC: 1.97 MG/DL (ref 0.5–0.9)
EKG ATRIAL RATE: 97 BPM
EKG DIAGNOSIS: NORMAL
EKG P AXIS: 45 DEGREES
EKG P-R INTERVAL: 150 MS
EKG Q-T INTERVAL: 368 MS
EKG QRS DURATION: 76 MS
EKG QTC CALCULATION (BAZETT): 467 MS
EKG R AXIS: -10 DEGREES
EKG T AXIS: 84 DEGREES
EKG VENTRICULAR RATE: 97 BPM
EOSINOPHIL # BLD: 0 K/UL (ref 0–0.7)
EOSINOPHIL NFR BLD: 2 %
ERYTHROCYTE [DISTWIDTH] IN BLOOD BY AUTOMATED COUNT: 20.4 % (ref 11.5–14.5)
GLOBULIN SER CALC-MCNC: 2.6 G/DL (ref 2.3–3.5)
GLUCOSE SERPL-MCNC: 118 MG/DL (ref 70–99)
HCT VFR BLD AUTO: 22.6 % (ref 37–47)
HGB BLD-MCNC: 7.4 G/DL (ref 12–16)
HYPOCHROMIA BLD QL SMEAR: ABNORMAL
LYMPHOCYTES # BLD: 0.7 K/UL (ref 1–4.8)
LYMPHOCYTES NFR BLD: 30 %
MACROCYTES BLD QL SMEAR: ABNORMAL
MAGNESIUM SERPL-MCNC: 2.1 MG/DL (ref 1.7–2.4)
MCH RBC QN AUTO: 37.8 PG (ref 27–31.3)
MCHC RBC AUTO-ENTMCNC: 32.7 % (ref 33–37)
MCV RBC AUTO: 115.3 FL (ref 79.4–94.8)
MONOCYTES # BLD: 0.1 K/UL (ref 0.2–0.8)
MONOCYTES NFR BLD: 5 %
NEUTROPHILS # BLD: 1.4 K/UL (ref 1.4–6.5)
NEUTS SEG NFR BLD: 62 %
NRBC BLD-RTO: 9 /100 WBC
PATH INTERP BLD-IMP: YES
PLATELET # BLD AUTO: 69 K/UL (ref 130–400)
PLATELET BLD QL SMEAR: ABNORMAL
POIKILOCYTOSIS BLD QL SMEAR: ABNORMAL
POTASSIUM SERPL-SCNC: 3.1 MEQ/L (ref 3.4–4.9)
PROT SERPL-MCNC: 6.3 G/DL (ref 6.3–8)
RBC # BLD AUTO: 1.96 M/UL (ref 4.2–5.4)
SCHISTOCYTES BLD QL SMEAR: ABNORMAL
SLIDE REVIEW: ABNORMAL
SODIUM SERPL-SCNC: 144 MEQ/L (ref 135–144)
WBC # BLD AUTO: 2.3 K/UL (ref 4.8–10.8)

## 2025-08-21 PROCEDURE — 71250 CT THORAX DX C-: CPT

## 2025-08-21 PROCEDURE — 83735 ASSAY OF MAGNESIUM: CPT

## 2025-08-21 PROCEDURE — 80053 COMPREHEN METABOLIC PANEL: CPT

## 2025-08-21 PROCEDURE — 96372 THER/PROPH/DIAG INJ SC/IM: CPT

## 2025-08-21 PROCEDURE — 6360000002 HC RX W HCPCS

## 2025-08-21 PROCEDURE — 6370000000 HC RX 637 (ALT 250 FOR IP)

## 2025-08-21 PROCEDURE — 99284 EMERGENCY DEPT VISIT MOD MDM: CPT

## 2025-08-21 PROCEDURE — 93005 ELECTROCARDIOGRAM TRACING: CPT

## 2025-08-21 PROCEDURE — 72125 CT NECK SPINE W/O DYE: CPT

## 2025-08-21 PROCEDURE — 2580000003 HC RX 258

## 2025-08-21 PROCEDURE — 85025 COMPLETE CBC W/AUTO DIFF WBC: CPT

## 2025-08-21 RX ORDER — NAPROXEN 500 MG/1
500 TABLET ORAL 2 TIMES DAILY WITH MEALS
Qty: 30 TABLET | Refills: 0 | Status: ON HOLD | OUTPATIENT
Start: 2025-08-21 | End: 2025-08-30

## 2025-08-21 RX ORDER — 0.9 % SODIUM CHLORIDE 0.9 %
500 INTRAVENOUS SOLUTION INTRAVENOUS ONCE
Status: COMPLETED | OUTPATIENT
Start: 2025-08-21 | End: 2025-08-21

## 2025-08-21 RX ORDER — HYDROCODONE BITARTRATE AND ACETAMINOPHEN 5; 325 MG/1; MG/1
1 TABLET ORAL EVERY 8 HOURS PRN
Qty: 9 TABLET | Refills: 0 | Status: SHIPPED | OUTPATIENT
Start: 2025-08-21 | End: 2025-08-24

## 2025-08-21 RX ORDER — HYDROCODONE BITARTRATE AND ACETAMINOPHEN 5; 325 MG/1; MG/1
1 TABLET ORAL ONCE
Status: COMPLETED | OUTPATIENT
Start: 2025-08-21 | End: 2025-08-21

## 2025-08-21 RX ORDER — ORPHENADRINE CITRATE 30 MG/ML
60 INJECTION INTRAMUSCULAR; INTRAVENOUS ONCE
Status: COMPLETED | OUTPATIENT
Start: 2025-08-21 | End: 2025-08-21

## 2025-08-21 RX ADMIN — SODIUM CHLORIDE 500 ML: 0.9 INJECTION, SOLUTION INTRAVENOUS at 11:48

## 2025-08-21 RX ADMIN — ORPHENADRINE CITRATE 60 MG: 60 INJECTION INTRAMUSCULAR; INTRAVENOUS at 11:16

## 2025-08-21 RX ADMIN — HYDROCODONE BITARTRATE AND ACETAMINOPHEN 1 TABLET: 5; 325 TABLET ORAL at 11:16

## 2025-08-21 RX ADMIN — POTASSIUM BICARBONATE 50 MEQ: 977.5 TABLET, EFFERVESCENT ORAL at 13:17

## 2025-08-21 ASSESSMENT — PAIN - FUNCTIONAL ASSESSMENT
PAIN_FUNCTIONAL_ASSESSMENT: 0-10

## 2025-08-21 ASSESSMENT — PAIN SCALES - GENERAL
PAINLEVEL_OUTOF10: 0
PAINLEVEL_OUTOF10: 8
PAINLEVEL_OUTOF10: 10

## 2025-08-21 ASSESSMENT — PAIN DESCRIPTION - LOCATION
LOCATION: BACK;SHOULDER
LOCATION: SHOULDER;BACK

## 2025-08-21 ASSESSMENT — PAIN DESCRIPTION - ORIENTATION
ORIENTATION: LEFT
ORIENTATION: LEFT

## 2025-08-21 ASSESSMENT — PAIN DESCRIPTION - PAIN TYPE: TYPE: ACUTE PAIN

## 2025-08-22 LAB — PATH INTERP BLD-IMP: NORMAL

## (undated) DEVICE — CONMED SCOPE SAVER BITE BLOCK, 20X27 MM: Brand: SCOPE SAVER

## (undated) DEVICE — GLOVE ORANGE PI 8 1/2   MSG9085

## (undated) DEVICE — BRUSH ENDO CLN L90.5IN SHTH DIA1.7MM BRIST DIA5-7MM 2-6MM

## (undated) DEVICE — TUBE SET 96 MM 64 MM H2O PERISTALTIC STD AUX CHANNEL

## (undated) DEVICE — SINGLE PORT MANIFOLD: Brand: NEPTUNE 2

## (undated) DEVICE — TUBING, SUCTION, 1/4" X 10', STRAIGHT: Brand: MEDLINE

## (undated) DEVICE — TUBING IRRIGATION 140/160/180/190 SER GI ENDOSCP SMARTCAP

## (undated) DEVICE — ENDO CARRY-ON PROCEDURE KIT: Brand: ENDO CARRY-ON PROCEDURE KIT